# Patient Record
Sex: FEMALE | Race: WHITE | NOT HISPANIC OR LATINO | Employment: UNEMPLOYED | URBAN - METROPOLITAN AREA
[De-identification: names, ages, dates, MRNs, and addresses within clinical notes are randomized per-mention and may not be internally consistent; named-entity substitution may affect disease eponyms.]

---

## 2018-11-13 PROBLEM — R29.818 TRANSIENT NEUROLOGICAL SYMPTOMS: Status: ACTIVE | Noted: 2018-11-13

## 2020-01-24 DIAGNOSIS — N28.9 KIDNEY DISEASE: Primary | ICD-10-CM

## 2020-01-29 ENCOUNTER — TELEPHONE (OUTPATIENT)
Dept: SURGERY | Facility: CLINIC | Age: 60
End: 2020-01-29

## 2020-01-29 NOTE — TELEPHONE ENCOUNTER
----- Message from Judi Contreras sent at 1/29/2020 10:38 AM CST -----  Contact: Patient  Patient called needs to move appt time please give a call back at [phone]

## 2020-03-09 ENCOUNTER — OFFICE VISIT (OUTPATIENT)
Dept: SURGERY | Facility: CLINIC | Age: 60
End: 2020-03-09
Payer: MEDICARE

## 2020-03-09 VITALS — WEIGHT: 240.38 LBS | BODY MASS INDEX: 40.05 KG/M2 | HEIGHT: 65 IN

## 2020-03-09 DIAGNOSIS — N18.6 END STAGE RENAL DISEASE: Primary | ICD-10-CM

## 2020-03-09 PROCEDURE — 99203 PR OFFICE/OUTPT VISIT, NEW, LEVL III, 30-44 MIN: ICD-10-PCS | Mod: S$GLB,,, | Performed by: SURGERY

## 2020-03-09 PROCEDURE — 99203 OFFICE O/P NEW LOW 30 MIN: CPT | Mod: S$GLB,,, | Performed by: SURGERY

## 2020-03-09 RX ORDER — RIVAROXABAN 15 MG/1
15 TABLET, FILM COATED ORAL NIGHTLY
COMMUNITY
Start: 2020-02-14 | End: 2024-02-01

## 2020-03-09 RX ORDER — OLOPATADINE HYDROCHLORIDE 2 MG/ML
SOLUTION/ DROPS OPHTHALMIC
COMMUNITY
Start: 2019-12-01 | End: 2021-04-01 | Stop reason: ALTCHOICE

## 2020-03-09 RX ORDER — OXYBUTYNIN CHLORIDE 5 MG/1
5 TABLET, EXTENDED RELEASE ORAL DAILY
COMMUNITY
Start: 2019-12-04 | End: 2024-02-01

## 2020-03-09 RX ORDER — NIFEDIPINE 60 MG/1
60 TABLET, EXTENDED RELEASE ORAL NIGHTLY
COMMUNITY
Start: 2020-03-02

## 2020-03-09 RX ORDER — TRAZODONE HYDROCHLORIDE 100 MG/1
100 TABLET ORAL NIGHTLY
COMMUNITY
End: 2021-04-01 | Stop reason: ALTCHOICE

## 2020-03-09 RX ORDER — ISOSORBIDE MONONITRATE 30 MG/1
30 TABLET, EXTENDED RELEASE ORAL DAILY
COMMUNITY
Start: 2020-03-03 | End: 2024-02-01

## 2020-03-09 RX ORDER — DICLOFENAC SODIUM 10 MG/G
GEL TOPICAL
COMMUNITY
Start: 2020-01-25 | End: 2021-04-01 | Stop reason: ALTCHOICE

## 2020-03-09 RX ORDER — HYDRALAZINE HYDROCHLORIDE 100 MG/1
TABLET, FILM COATED ORAL
COMMUNITY
Start: 2020-02-29 | End: 2021-04-01 | Stop reason: ALTCHOICE

## 2020-03-09 RX ORDER — FLUOCINONIDE TOPICAL SOLUTION USP, 0.05% 0.5 MG/ML
SOLUTION TOPICAL
COMMUNITY
Start: 2020-02-25 | End: 2021-04-01 | Stop reason: ALTCHOICE

## 2020-03-09 RX ORDER — EZETIMIBE 10 MG/1
10 TABLET ORAL DAILY
COMMUNITY

## 2020-03-09 RX ORDER — ATORVASTATIN CALCIUM 40 MG/1
40 TABLET, FILM COATED ORAL NIGHTLY
COMMUNITY
Start: 2020-03-06

## 2020-03-09 RX ORDER — INSULIN GLARGINE 300 U/ML
INJECTION, SOLUTION SUBCUTANEOUS
COMMUNITY
Start: 2020-02-06 | End: 2021-04-01 | Stop reason: ALTCHOICE

## 2020-03-09 RX ORDER — INSULIN LISPRO 100 [IU]/ML
INJECTION, SOLUTION INTRAVENOUS; SUBCUTANEOUS
COMMUNITY
Start: 2020-03-02 | End: 2021-04-01 | Stop reason: ALTCHOICE

## 2020-03-09 RX ORDER — FLUTICASONE PROPIONATE 50 MCG
SPRAY, SUSPENSION (ML) NASAL
COMMUNITY
Start: 2019-12-27 | End: 2021-04-01 | Stop reason: ALTCHOICE

## 2020-03-09 RX ORDER — LABETALOL 200 MG/1
200 TABLET, FILM COATED ORAL EVERY 12 HOURS
COMMUNITY
Start: 2020-03-01

## 2020-03-09 RX ORDER — CALCIPOTRIENE AND BETAMETHASONE DIPROPIONATE 50; .5 UG/G; MG/G
AEROSOL, FOAM TOPICAL
COMMUNITY
Start: 2020-02-06 | End: 2021-04-01 | Stop reason: ALTCHOICE

## 2020-03-09 RX ORDER — FUROSEMIDE 80 MG/1
80 TABLET ORAL 2 TIMES DAILY
COMMUNITY
Start: 2020-02-29

## 2020-03-09 RX ORDER — SUCRALFATE 1 G/1
1 TABLET ORAL 4 TIMES DAILY PRN
COMMUNITY
End: 2021-04-01 | Stop reason: ALTCHOICE

## 2020-03-09 RX ORDER — CHOLECALCIFEROL (VITAMIN D3) 25 MCG
1000 TABLET ORAL DAILY
COMMUNITY
End: 2021-04-01 | Stop reason: ALTCHOICE

## 2020-03-09 NOTE — LETTER
March 9, 2020      Vijay Blake MD  [address]  [facility] [address]           [place] [facility] General [facility]  [address]  [facility] [address]  Phone: [phone]  Fax: [phone]          Patient: Joann Kenney   MR Number: [id number]   YOB: 1960   Date of Visit: 3/9/2020       Dear Dr. Vijay Blake:    Thank you for referring Joann Kenney to me for evaluation. Attached you will find relevant portions of my assessment and plan of care.    If you have questions, please do not hesitate to call me. I look forward to following Joann Kenney along with you.    Sincerely,    Bryan Yancey MD    Enclosure  CC:  No Recipients    If you would like to receive this communication electronically, please contact [email or web address] or [phone] to request more information on [employer] Link access.    For providers and/or their staff who would like to refer a patient to [facility], please contact us through our one-stop-shop provider referral line, [facility], at [phone].    If you feel you have received this communication in error or would no longer like to receive these types of communications, please e-mail [email or web address]

## 2020-03-09 NOTE — PROGRESS NOTES
History & Physical    SUBJECTIVE:     History of Present Illness:    59-year-old female referred by Dr. Blake for placement of tunneled peritoneal dialysis catheter.  Patient has end-stage renal disease related to single solitary kidney and longstanding diabetes and hypertension.  Patient is not yet on dialysis.  Only abdominal surgery is a hysterectomy    Chief Complaint   Patient presents with    Other     pd cath         Review of patient's allergies indicates:  Review of patient's allergies indicates:  No Known Allergies    Current Outpatient Medications on File Prior to Visit   Medication Sig Dispense Refill    atorvastatin (LIPITOR) 40 MG tablet       diclofenac sodium (VOLTAREN) 1 % Gel       ENSTILAR 0.005-0.064 % Foam APPLY LOCALLY TWICE DAILY      ezetimibe (ZETIA) 10 mg tablet Take 10 mg by mouth once daily.      fluocinonide (LIDEX) 0.05 % external solution APPLY TO AFFECTED AREA 1 2 TIMES DAILY      fluticasone propionate (FLONASE) 50 mcg/actuation nasal spray       furosemide (LASIX) 80 MG tablet       HUMALOG KWIKPEN INSULIN 100 unit/mL pen       hydrALAZINE (APRESOLINE) 100 MG tablet       isosorbide mononitrate (IMDUR) 30 MG 24 hr tablet       labetalol (NORMODYNE) 300 MG tablet       NIFEdipine (ADALAT CC) 60 MG TbSR       olopatadine (PATADAY) 0.2 % Drop       oxybutynin (DITROPAN) 5 MG Tab       sucralfate (CARAFATE) 1 gram tablet Take 1 g by mouth 4 (four) times daily.      TOUJEO SOLOSTAR U-300 INSULIN 300 unit/mL (1.5 mL) InPn pen INJECT 44 UNITS SUBCUTANEOUSLY ONCE DAILY      traZODone (DESYREL) 100 MG tablet Take 100 mg by mouth every evening.      vitamin D (VITAMIN D3) 1000 units Tab Take 1,000 Units by mouth once daily.      XARELTO 15 mg Tab        No current facility-administered medications on file prior to visit.        Past Medical History:   Diagnosis Date    Atrial fibrillation     Depression     Diabetes mellitus, type 2     Hyperlipidemia      Hypertension     Stroke      Past Surgical History:   Procedure Laterality Date    CORONARY ANGIOPLASTY WITH STENT PLACEMENT      internal heart monitor       History reviewed. No pertinent family history.    Social History     Socioeconomic History    Marital status:      Spouse name: Not on file    Number of children: Not on file    Years of education: Not on file    Highest education level: Not on file   Occupational History    Not on file   Social Needs    Financial resource strain: Not on file    Food insecurity:     Worry: Not on file     Inability: Not on file    Transportation needs:     Medical: Not on file     Non-medical: Not on file   Tobacco Use    Smoking status: Former Smoker   Substance and Sexual Activity    Alcohol use: Not on file    Drug use: Not on file    Sexual activity: Not on file   Lifestyle    Physical activity:     Days per week: Not on file     Minutes per session: Not on file    Stress: Not on file   Relationships    Social connections:     Talks on phone: Not on file     Gets together: Not on file     Attends Yazidism service: Not on file     Active member of club or organization: Not on file     Attends meetings of clubs or organizations: Not on file     Relationship status: Not on file   Other Topics Concern    Not on file   Social History Narrative    Not on file          Review of Systems   Constitutional: Negative for chills and fever.   Respiratory: Positive for cough and sputum production.    Cardiovascular: Negative for chest pain and palpitations.   Gastrointestinal: Negative for abdominal pain, heartburn, nausea and vomiting.   Genitourinary: Positive for dysuria and urgency.   Musculoskeletal: Positive for back pain and neck pain.   Endo/Heme/Allergies: Bruises/bleeds easily (Related to Xarelto drug therapy).       OBJECTIVE:     There were no vitals filed for this visit.              Physical Exam:  Physical Exam   Constitutional: She is oriented  to person, place, and time and well-developed, well-nourished, and in no distress.   HENT:   Head: Atraumatic.   Eyes: Pupils are equal, round, and reactive to light. EOM are normal.   Neck: Normal range of motion. Neck supple.   Cardiovascular: Normal rate, regular rhythm and normal heart sounds.   Pulmonary/Chest: Effort normal and breath sounds normal. No respiratory distress.   Abdominal: Soft. Bowel sounds are normal. She exhibits no distension. There is no tenderness.   Neurological: She is alert and oriented to person, place, and time. She has normal reflexes. No cranial nerve deficit.   Skin: Skin is warm and dry.   Psychiatric: Affect and judgment normal.           ASSESSMENT/PLAN:   End-stage renal disease preparing for peritoneal dialysis  PLAN:  Laparoscopic tunneled peritoneal dialysis catheter insertion is scheduled for 03/17/2020.  Procedure, risk and benefits discussed with patient.  Patient instructed to hold Xarelto for 4-5 days preoperatively and aspirin for 3 days preoperatively

## 2020-03-13 LAB
BASOPHILS NFR SNV MANUAL: 1.2 % (ref 0–3)
BUN SERPL-MCNC: 48 MG/DL (ref 7–18)
BUN/CREAT SERPL: 16.84 RATIO (ref 7–18)
CALCIUM SERPL-MCNC: 9 MG/DL (ref 8.8–10.5)
CHLORIDE SERPL-SCNC: 100 MMOL/L (ref 100–108)
CO2 SERPL-SCNC: 26 MMOL/L (ref 21–32)
CREAT SERPL-MCNC: 2.85 MG/DL (ref 0.55–1.02)
EOSINOPHIL NFR SNV MANUAL: 3.2 % (ref 1–3)
ERYTHROCYTE [DISTWIDTH] IN BLOOD BY AUTOMATED COUNT: 12.1 % (ref 12.5–18)
GFR ESTIMATION: 17
GLUCOSE SERPL-MCNC: 245 MG/DL (ref 70–110)
HCT VFR BLD AUTO: 42.1 % (ref 37–47)
HGB BLD-MCNC: 14.2 G/DL (ref 12–16)
LYMPHOCYTES NFR SNV MANUAL: 24.9 % (ref 25–40)
MANUAL NRBC PER 100 CELLS: 0 %
MCH RBC QN AUTO: 28.7 PG (ref 27–31.2)
MCHC RBC AUTO-ENTMCNC: 33.7 G/DL (ref 31.8–35.4)
MCV RBC AUTO: 85.1 FL (ref 80–97)
MONOCYTES/100 LEUKOCYTES: 4.2 % (ref 1–15)
NEUTROPHILS NFR BLD: 5.57 10*3/UL (ref 1.8–7.7)
NEUTROPHILS NFR SNV MANUAL: 66.3 % (ref 37–80)
PLATELETS: 291 10*3/UL (ref 142–424)
POTASSIUM SERPL-SCNC: 4.1 MMOL/L (ref 3.6–5.2)
RBC # BLD AUTO: 4.95 10*6/UL (ref 4.2–5.4)
SODIUM BLD-SCNC: 136 MMOL/L (ref 135–145)
WBC # BLD: 8.4 10*3/UL (ref 4.6–10.2)

## 2020-03-17 ENCOUNTER — OUTSIDE PLACE OF SERVICE (OUTPATIENT)
Dept: ADMINISTRATIVE | Facility: OTHER | Age: 60
End: 2020-03-17
Payer: MEDICARE

## 2020-03-17 LAB
GLUCOSE SERPL-MCNC: 203 MG/DL (ref 70–105)
GLUCOSE SERPL-MCNC: 236 MG/DL (ref 70–105)

## 2020-03-17 PROCEDURE — 49324 LAP INSERT TUNNEL IP CATH: CPT | Mod: ,,, | Performed by: SURGERY

## 2020-03-17 PROCEDURE — 49324 PR LAP INSERTION TUNNELED INTRAPERITONEAL CATHETER: ICD-10-PCS | Mod: ,,, | Performed by: SURGERY

## 2020-03-25 ENCOUNTER — OFFICE VISIT (OUTPATIENT)
Dept: SURGERY | Facility: CLINIC | Age: 60
End: 2020-03-25
Payer: MEDICARE

## 2020-03-25 ENCOUNTER — TELEPHONE (OUTPATIENT)
Dept: SURGERY | Facility: CLINIC | Age: 60
End: 2020-03-25

## 2020-03-25 DIAGNOSIS — Z98.890 POST-OPERATIVE STATE: Primary | ICD-10-CM

## 2020-03-25 PROCEDURE — 99024 PR POST-OP FOLLOW-UP VISIT: ICD-10-PCS | Mod: S$GLB,POP,, | Performed by: SURGERY

## 2020-03-25 PROCEDURE — 99024 POSTOP FOLLOW-UP VISIT: CPT | Mod: S$GLB,POP,, | Performed by: SURGERY

## 2020-03-25 NOTE — PROGRESS NOTES
HPI:  Postop revisit status post laparoscopic tunneled peritoneal dialysis catheter insertion.  Patient complains of abdominal incisional soreness.    PHYSICAL EXAM:  All 3 incisions are healing well.  There are no sutures to remove.  Steri-Strips were reapplied  ASSESSMENT:    Stable postop  PLAN:    Will exteriorized the catheter when we here from dialysis unit.

## 2020-03-25 NOTE — TELEPHONE ENCOUNTER
Left second message         Left message      ----- Message from Honey Espinosa sent at 3/25/2020  3:13 PM CDT -----  Contact: Patient   Patient called in regards to some questions she forgot to ask today at her appointment , please call back at [phone].        Thanks,  Honey Espinosa       spouse

## 2020-05-20 DIAGNOSIS — N18.6 END STAGE RENAL DISEASE: Primary | ICD-10-CM

## 2020-06-02 LAB
ANION GAP SERPL CALC-SCNC: 8 MMOL/L (ref 3–11)
BUN SERPL-MCNC: 57 MG/DL (ref 7–18)
BUN/CREAT SERPL: 17.64 RATIO (ref 7–18)
CALCIUM BLD-MCNC: NORMAL MG/DL
CALCIUM SERPL-MCNC: 9 MG/DL (ref 8.8–10.5)
CHLORIDE SERPL-SCNC: 106 MMOL/L (ref 100–108)
CO2 SERPL-SCNC: 29 MMOL/L (ref 21–32)
COVID-19 AB, IGM: NORMAL
CREAT SERPL-MCNC: 3.23 MG/DL (ref 0.55–1.02)
GFR ESTIMATION: 15
GLUCOSE SERPL-MCNC: 148 MG/DL (ref 70–110)
POTASSIUM SERPL-SCNC: 3.7 MMOL/L (ref 3.6–5.2)
SODIUM BLD-SCNC: 143 MMOL/L (ref 135–145)

## 2020-06-04 ENCOUNTER — OUTSIDE PLACE OF SERVICE (OUTPATIENT)
Dept: ADMINISTRATIVE | Facility: OTHER | Age: 60
End: 2020-06-04
Payer: MEDICARE

## 2020-06-04 LAB
GLUCOSE SERPL-MCNC: 128 MG/DL (ref 70–105)
GLUCOSE SERPL-MCNC: 136 MG/DL (ref 70–105)

## 2020-06-04 PROCEDURE — 49999 UNLISTED PX ABD PERTM&OMN: CPT | Mod: ,,, | Performed by: SURGERY

## 2020-06-04 PROCEDURE — 49999: ICD-10-PCS | Mod: ,,, | Performed by: SURGERY

## 2020-07-31 DIAGNOSIS — Z76.82 ORGAN TRANSPLANT CANDIDATE: Primary | ICD-10-CM

## 2020-08-12 ENCOUNTER — TELEPHONE (OUTPATIENT)
Dept: TRANSPLANT | Facility: CLINIC | Age: 60
End: 2020-08-12

## 2021-02-26 ENCOUNTER — TELEPHONE (OUTPATIENT)
Dept: TRANSPLANT | Facility: CLINIC | Age: 61
End: 2021-02-26

## 2021-03-11 ENCOUNTER — TELEPHONE (OUTPATIENT)
Dept: TRANSPLANT | Facility: CLINIC | Age: 61
End: 2021-03-11

## 2021-03-12 ENCOUNTER — TELEPHONE (OUTPATIENT)
Dept: TRANSPLANT | Facility: CLINIC | Age: 61
End: 2021-03-12

## 2021-03-16 ENCOUNTER — TELEPHONE (OUTPATIENT)
Dept: TRANSPLANT | Facility: CLINIC | Age: 61
End: 2021-03-16

## 2021-03-19 ENCOUNTER — TELEPHONE (OUTPATIENT)
Dept: TRANSPLANT | Facility: CLINIC | Age: 61
End: 2021-03-19

## 2021-04-01 ENCOUNTER — LAB VISIT (OUTPATIENT)
Dept: LAB | Facility: HOSPITAL | Age: 61
End: 2021-04-01
Attending: NURSE PRACTITIONER
Payer: MEDICARE

## 2021-04-01 ENCOUNTER — OFFICE VISIT (OUTPATIENT)
Dept: TRANSPLANT | Facility: CLINIC | Age: 61
End: 2021-04-01
Payer: MEDICARE

## 2021-04-01 ENCOUNTER — TELEPHONE (OUTPATIENT)
Dept: TRANSPLANT | Facility: CLINIC | Age: 61
End: 2021-04-01

## 2021-04-01 VITALS
SYSTOLIC BLOOD PRESSURE: 142 MMHG | OXYGEN SATURATION: 97 % | BODY MASS INDEX: 40.96 KG/M2 | HEART RATE: 63 BPM | TEMPERATURE: 98 F | HEIGHT: 65 IN | DIASTOLIC BLOOD PRESSURE: 47 MMHG | WEIGHT: 245.81 LBS | RESPIRATION RATE: 16 BRPM

## 2021-04-01 DIAGNOSIS — Z01.818 PRE-TRANSPLANT EVALUATION FOR CHRONIC KIDNEY DISEASE: Primary | ICD-10-CM

## 2021-04-01 DIAGNOSIS — N18.6 DIABETES MELLITUS DUE TO UNDERLYING CONDITION WITH CHRONIC KIDNEY DISEASE ON CHRONIC DIALYSIS, WITH LONG-TERM CURRENT USE OF INSULIN: ICD-10-CM

## 2021-04-01 DIAGNOSIS — Z79.4 DIABETES MELLITUS DUE TO UNDERLYING CONDITION WITH CHRONIC KIDNEY DISEASE ON CHRONIC DIALYSIS, WITH LONG-TERM CURRENT USE OF INSULIN: ICD-10-CM

## 2021-04-01 DIAGNOSIS — E08.22 DIABETES MELLITUS DUE TO UNDERLYING CONDITION WITH CHRONIC KIDNEY DISEASE ON CHRONIC DIALYSIS, WITH LONG-TERM CURRENT USE OF INSULIN: ICD-10-CM

## 2021-04-01 DIAGNOSIS — I25.10 CORONARY ARTERY DISEASE INVOLVING NATIVE CORONARY ARTERY OF NATIVE HEART WITHOUT ANGINA PECTORIS: ICD-10-CM

## 2021-04-01 DIAGNOSIS — N18.6 ESRD ON PERITONEAL DIALYSIS: ICD-10-CM

## 2021-04-01 DIAGNOSIS — Z86.73 HISTORY OF STROKE: Chronic | ICD-10-CM

## 2021-04-01 DIAGNOSIS — Z99.2 DIABETES MELLITUS DUE TO UNDERLYING CONDITION WITH CHRONIC KIDNEY DISEASE ON CHRONIC DIALYSIS, WITH LONG-TERM CURRENT USE OF INSULIN: ICD-10-CM

## 2021-04-01 DIAGNOSIS — E66.01 MORBID OBESITY WITH BODY MASS INDEX OF 40.0-44.9 IN ADULT: ICD-10-CM

## 2021-04-01 DIAGNOSIS — D63.1 ANEMIA IN ESRD (END-STAGE RENAL DISEASE): ICD-10-CM

## 2021-04-01 DIAGNOSIS — N18.6 ANEMIA IN ESRD (END-STAGE RENAL DISEASE): ICD-10-CM

## 2021-04-01 DIAGNOSIS — Z76.82 ORGAN TRANSPLANT CANDIDATE: ICD-10-CM

## 2021-04-01 DIAGNOSIS — E78.49 OTHER HYPERLIPIDEMIA: ICD-10-CM

## 2021-04-01 DIAGNOSIS — I15.0 RENOVASCULAR HYPERTENSION: ICD-10-CM

## 2021-04-01 DIAGNOSIS — Z99.2 ESRD ON PERITONEAL DIALYSIS: ICD-10-CM

## 2021-04-01 LAB
A1 AG RBC QL: NORMAL
ABO + RH BLD: NORMAL
ALBUMIN SERPL BCP-MCNC: 3.2 G/DL (ref 3.5–5.2)
ALP SERPL-CCNC: 66 U/L (ref 55–135)
ALT SERPL W/O P-5'-P-CCNC: 10 U/L (ref 10–44)
ANION GAP SERPL CALC-SCNC: 10 MMOL/L (ref 8–16)
APTT BLDCRRT: 27.6 SEC (ref 21–32)
AST SERPL-CCNC: 14 U/L (ref 10–40)
BASOPHILS # BLD AUTO: 0.08 K/UL (ref 0–0.2)
BASOPHILS NFR BLD: 1 % (ref 0–1.9)
BILIRUB DIRECT SERPL-MCNC: 0.2 MG/DL (ref 0.1–0.3)
BILIRUB SERPL-MCNC: 0.4 MG/DL (ref 0.1–1)
BLD GP AB SCN CELLS X3 SERPL QL: NORMAL
BUN SERPL-MCNC: 45 MG/DL (ref 8–23)
CALCIUM SERPL-MCNC: 9.1 MG/DL (ref 8.7–10.5)
CHLORIDE SERPL-SCNC: 107 MMOL/L (ref 95–110)
CHOLEST SERPL-MCNC: 110 MG/DL (ref 120–199)
CHOLEST/HDLC SERPL: 3.7 {RATIO} (ref 2–5)
CMV IGG SERPL QL IA: REACTIVE
CO2 SERPL-SCNC: 27 MMOL/L (ref 23–29)
CREAT SERPL-MCNC: 5.9 MG/DL (ref 0.5–1.4)
DIFFERENTIAL METHOD: ABNORMAL
EOSINOPHIL # BLD AUTO: 0.5 K/UL (ref 0–0.5)
EOSINOPHIL NFR BLD: 5.9 % (ref 0–8)
ERYTHROCYTE [DISTWIDTH] IN BLOOD BY AUTOMATED COUNT: 13.2 % (ref 11.5–14.5)
EST. GFR  (AFRICAN AMERICAN): 8.2 ML/MIN/1.73 M^2
EST. GFR  (NON AFRICAN AMERICAN): 7.1 ML/MIN/1.73 M^2
ESTIMATED AVG GLUCOSE: 151 MG/DL (ref 68–131)
GLUCOSE SERPL-MCNC: 120 MG/DL (ref 70–110)
HBA1C MFR BLD: 6.9 % (ref 4–5.6)
HBV CORE AB SERPL QL IA: NEGATIVE
HBV SURFACE AG SERPL QL IA: NEGATIVE
HCT VFR BLD AUTO: 28.2 % (ref 37–48.5)
HCV AB SERPL QL IA: NEGATIVE
HDLC SERPL-MCNC: 30 MG/DL (ref 40–75)
HDLC SERPL: 27.3 % (ref 20–50)
HGB BLD-MCNC: 9.3 G/DL (ref 12–16)
HIV 1+2 AB+HIV1 P24 AG SERPL QL IA: NEGATIVE
IMM GRANULOCYTES # BLD AUTO: 0.01 K/UL (ref 0–0.04)
IMM GRANULOCYTES NFR BLD AUTO: 0.1 % (ref 0–0.5)
INR PPP: 1 (ref 0.8–1.2)
LDLC SERPL CALC-MCNC: 54.4 MG/DL (ref 63–159)
LYMPHOCYTES # BLD AUTO: 1.8 K/UL (ref 1–4.8)
LYMPHOCYTES NFR BLD: 21.6 % (ref 18–48)
MCH RBC QN AUTO: 30.1 PG (ref 27–31)
MCHC RBC AUTO-ENTMCNC: 33 G/DL (ref 32–36)
MCV RBC AUTO: 91 FL (ref 82–98)
MONOCYTES # BLD AUTO: 0.4 K/UL (ref 0.3–1)
MONOCYTES NFR BLD: 4.9 % (ref 4–15)
NEUTROPHILS # BLD AUTO: 5.6 K/UL (ref 1.8–7.7)
NEUTROPHILS NFR BLD: 66.5 % (ref 38–73)
NONHDLC SERPL-MCNC: 80 MG/DL
NRBC BLD-RTO: 0 /100 WBC
PHOSPHATE SERPL-MCNC: 5.4 MG/DL (ref 2.7–4.5)
PLATELET # BLD AUTO: 252 K/UL (ref 150–450)
PMV BLD AUTO: 10.7 FL (ref 9.2–12.9)
POTASSIUM SERPL-SCNC: 3.9 MMOL/L (ref 3.5–5.1)
PROT SERPL-MCNC: 6.3 G/DL (ref 6–8.4)
PROTHROMBIN TIME: 11.1 SEC (ref 9–12.5)
PTH-INTACT SERPL-MCNC: 619 PG/ML (ref 9–77)
RBC # BLD AUTO: 3.09 M/UL (ref 4–5.4)
SODIUM SERPL-SCNC: 144 MMOL/L (ref 136–145)
TRIGL SERPL-MCNC: 128 MG/DL (ref 30–150)
WBC # BLD AUTO: 8.37 K/UL (ref 3.9–12.7)

## 2021-04-01 PROCEDURE — 86644 CMV ANTIBODY: CPT | Mod: TXP | Performed by: NURSE PRACTITIONER

## 2021-04-01 PROCEDURE — 3008F PR BODY MASS INDEX (BMI) DOCUMENTED: ICD-10-PCS | Mod: CPTII,S$GLB,TXP, | Performed by: NURSE PRACTITIONER

## 2021-04-01 PROCEDURE — 85610 PROTHROMBIN TIME: CPT | Mod: TXP | Performed by: NURSE PRACTITIONER

## 2021-04-01 PROCEDURE — 81373 HLA I TYPING 1 LOCUS LR: CPT | Mod: 91,PO | Performed by: NURSE PRACTITIONER

## 2021-04-01 PROCEDURE — 86825 HLA X-MATH NON-CYTOTOXIC: CPT | Mod: 91,PO,TXP | Performed by: NURSE PRACTITIONER

## 2021-04-01 PROCEDURE — 85730 THROMBOPLASTIN TIME PARTIAL: CPT | Mod: TXP | Performed by: NURSE PRACTITIONER

## 2021-04-01 PROCEDURE — 86706 HEP B SURFACE ANTIBODY: CPT | Mod: TXP | Performed by: NURSE PRACTITIONER

## 2021-04-01 PROCEDURE — 86900 BLOOD TYPING SEROLOGIC ABO: CPT | Mod: TXP | Performed by: NURSE PRACTITIONER

## 2021-04-01 PROCEDURE — 80053 COMPREHEN METABOLIC PANEL: CPT | Mod: TXP | Performed by: NURSE PRACTITIONER

## 2021-04-01 PROCEDURE — 86704 HEP B CORE ANTIBODY TOTAL: CPT | Mod: TXP | Performed by: NURSE PRACTITIONER

## 2021-04-01 PROCEDURE — 99205 PR OFFICE/OUTPT VISIT, NEW, LEVL V, 60-74 MIN: ICD-10-PCS | Mod: S$GLB,TXP,, | Performed by: NURSE PRACTITIONER

## 2021-04-01 PROCEDURE — 3008F BODY MASS INDEX DOCD: CPT | Mod: CPTII,S$GLB,TXP, | Performed by: NURSE PRACTITIONER

## 2021-04-01 PROCEDURE — 83036 HEMOGLOBIN GLYCOSYLATED A1C: CPT | Mod: TXP | Performed by: NURSE PRACTITIONER

## 2021-04-01 PROCEDURE — 86825 HLA X-MATH NON-CYTOTOXIC: CPT | Mod: PO,TXP | Performed by: NURSE PRACTITIONER

## 2021-04-01 PROCEDURE — 99999 PR PBB SHADOW E&M-EST. PATIENT-LVL III: ICD-10-PCS | Mod: PBBFAC,TXP,, | Performed by: NURSE PRACTITIONER

## 2021-04-01 PROCEDURE — 1125F AMNT PAIN NOTED PAIN PRSNT: CPT | Mod: S$GLB,TXP,, | Performed by: NURSE PRACTITIONER

## 2021-04-01 PROCEDURE — 86787 VARICELLA-ZOSTER ANTIBODY: CPT | Mod: TXP | Performed by: NURSE PRACTITIONER

## 2021-04-01 PROCEDURE — 81373 HLA I TYPING 1 LOCUS LR: CPT | Mod: PO | Performed by: NURSE PRACTITIONER

## 2021-04-01 PROCEDURE — 1125F PR PAIN SEVERITY QUANTIFIED, PAIN PRESENT: ICD-10-PCS | Mod: S$GLB,TXP,, | Performed by: NURSE PRACTITIONER

## 2021-04-01 PROCEDURE — 86828 HLA CLASS I&II ANTIBODY QUAL: CPT | Mod: 91,PO,TXP | Performed by: NURSE PRACTITIONER

## 2021-04-01 PROCEDURE — 99205 OFFICE O/P NEW HI 60 MIN: CPT | Mod: S$GLB,TXP,, | Performed by: NURSE PRACTITIONER

## 2021-04-01 PROCEDURE — 99999 PR PBB SHADOW E&M-EST. PATIENT-LVL III: CPT | Mod: PBBFAC,TXP,, | Performed by: NURSE PRACTITIONER

## 2021-04-01 PROCEDURE — 81376 HLA II TYPING 1 LOCUS LR: CPT | Mod: 91,PO | Performed by: NURSE PRACTITIONER

## 2021-04-01 PROCEDURE — 85025 COMPLETE CBC W/AUTO DIFF WBC: CPT | Mod: TXP | Performed by: NURSE PRACTITIONER

## 2021-04-01 PROCEDURE — 86905 BLOOD TYPING RBC ANTIGENS: CPT | Mod: TXP | Performed by: NURSE PRACTITIONER

## 2021-04-01 PROCEDURE — 82248 BILIRUBIN DIRECT: CPT | Mod: TXP | Performed by: NURSE PRACTITIONER

## 2021-04-01 PROCEDURE — 81376 HLA II TYPING 1 LOCUS LR: CPT | Mod: PO,59 | Performed by: NURSE PRACTITIONER

## 2021-04-01 PROCEDURE — 83970 ASSAY OF PARATHORMONE: CPT | Mod: TXP | Performed by: NURSE PRACTITIONER

## 2021-04-01 PROCEDURE — 86480 TB TEST CELL IMMUN MEASURE: CPT | Mod: TXP | Performed by: NURSE PRACTITIONER

## 2021-04-01 PROCEDURE — 86682 HELMINTH ANTIBODY: CPT | Mod: TXP | Performed by: NURSE PRACTITIONER

## 2021-04-01 PROCEDURE — 84100 ASSAY OF PHOSPHORUS: CPT | Mod: TXP | Performed by: NURSE PRACTITIONER

## 2021-04-01 PROCEDURE — 86703 HIV-1/HIV-2 1 RESULT ANTBDY: CPT | Mod: TXP | Performed by: NURSE PRACTITIONER

## 2021-04-01 PROCEDURE — 99244 OFF/OP CNSLTJ NEW/EST MOD 40: CPT | Mod: S$GLB,TXP,, | Performed by: SURGERY

## 2021-04-01 PROCEDURE — 80061 LIPID PANEL: CPT | Mod: TXP | Performed by: NURSE PRACTITIONER

## 2021-04-01 PROCEDURE — 99244 PR OFFICE CONSULTATION,LEVEL IV: ICD-10-PCS | Mod: S$GLB,TXP,, | Performed by: SURGERY

## 2021-04-01 PROCEDURE — 86592 SYPHILIS TEST NON-TREP QUAL: CPT | Mod: TXP | Performed by: NURSE PRACTITIONER

## 2021-04-01 PROCEDURE — 36415 COLL VENOUS BLD VENIPUNCTURE: CPT | Mod: TXP | Performed by: NURSE PRACTITIONER

## 2021-04-01 PROCEDURE — 86829 HLA CLASS I/II ANTIBODY QUAL: CPT | Mod: PO,TXP | Performed by: NURSE PRACTITIONER

## 2021-04-01 PROCEDURE — 86803 HEPATITIS C AB TEST: CPT | Mod: TXP | Performed by: NURSE PRACTITIONER

## 2021-04-01 PROCEDURE — 87340 HEPATITIS B SURFACE AG IA: CPT | Mod: TXP | Performed by: NURSE PRACTITIONER

## 2021-04-01 RX ORDER — INSULIN ASPART 100 [IU]/ML
INJECTION, SOLUTION INTRAVENOUS; SUBCUTANEOUS
COMMUNITY
End: 2024-02-01

## 2021-04-01 RX ORDER — MINOXIDIL 2.5 MG/1
2.5 TABLET ORAL NIGHTLY
COMMUNITY
End: 2024-02-01

## 2021-04-01 RX ORDER — LORATADINE 10 MG/1
10 TABLET ORAL NIGHTLY
COMMUNITY
End: 2024-02-01

## 2021-04-01 RX ORDER — ALPRAZOLAM 0.5 MG/1
0.5 TABLET ORAL NIGHTLY
COMMUNITY

## 2021-04-01 RX ORDER — DOCUSATE SODIUM 100 MG/1
100 CAPSULE, LIQUID FILLED ORAL 2 TIMES DAILY
COMMUNITY
End: 2024-02-01

## 2021-04-01 RX ORDER — ERGOCALCIFEROL (VITAMIN D2) 50 MCG
50000 CAPSULE ORAL WEEKLY
COMMUNITY

## 2021-04-01 RX ORDER — SUCRALFATE 1 G/10ML
1 SUSPENSION ORAL 4 TIMES DAILY PRN
COMMUNITY

## 2021-04-01 RX ORDER — CLONIDINE 0.1 MG/24H
1 PATCH, EXTENDED RELEASE TRANSDERMAL
COMMUNITY
End: 2024-02-01

## 2021-04-01 RX ORDER — DULOXETIN HYDROCHLORIDE 30 MG/1
30 CAPSULE, DELAYED RELEASE ORAL DAILY
COMMUNITY

## 2021-04-01 RX ORDER — LOSARTAN POTASSIUM 100 MG/1
100 TABLET ORAL NIGHTLY
COMMUNITY

## 2021-04-01 RX ORDER — ASPIRIN 81 MG/1
81 TABLET ORAL DAILY
COMMUNITY

## 2021-04-01 RX ORDER — SEVELAMER CARBONATE 800 MG/1
2400 TABLET, FILM COATED ORAL
COMMUNITY
End: 2024-02-01

## 2021-04-02 LAB
RPR SER QL: NORMAL
STRONGYLOIDES ANTIBODY IGG: NEGATIVE

## 2021-04-03 LAB
GAMMA INTERFERON BACKGROUND BLD IA-ACNC: 0.02 IU/ML
M TB IFN-G CD4+ BCKGRND COR BLD-ACNC: 0.01 IU/ML
MITOGEN IGNF BCKGRD COR BLD-ACNC: 7.7 IU/ML
TB GOLD PLUS: NEGATIVE
TB2 - NIL: 0 IU/ML

## 2021-04-04 LAB
HBV SURFACE AB SER QL IA: POSITIVE
HBV SURFACE AB SERPL IA-ACNC: 80 MIU/ML

## 2021-04-05 LAB
VARICELLA INTERPRETATION: POSITIVE
VARICELLA ZOSTER IGG: 1.99 ISR (ref 0–0.9)

## 2021-04-09 ENCOUNTER — COMMITTEE REVIEW (OUTPATIENT)
Dept: TRANSPLANT | Facility: CLINIC | Age: 61
End: 2021-04-09

## 2021-04-14 LAB
B CELL RESULTS - XM AUTO: NEGATIVE
B MCS AVERAGE - XM AUTO: -15.6
FXMAU TESTING DATE: NORMAL
HLA AB QL: NEGATIVE
HLA AB SERPL: NEGATIVE
HLATY INTERPRETATION: NORMAL
SCRFL TESTING DATE: NORMAL
SERUM COLLECTION DT - XM AUTO: NORMAL
SERUM COLLECTION DT: NORMAL
T CELL RESULTS - XM AUTO: NEGATIVE
T MCS AVERAGE - XM AUTO: -9.6

## 2022-03-23 ENCOUNTER — TELEPHONE (OUTPATIENT)
Dept: TRANSPLANT | Facility: CLINIC | Age: 62
End: 2022-03-23
Payer: MEDICARE

## 2022-03-29 ENCOUNTER — TELEPHONE (OUTPATIENT)
Dept: TRANSPLANT | Facility: CLINIC | Age: 62
End: 2022-03-29
Payer: MEDICARE

## 2022-04-01 DIAGNOSIS — Z76.82 ORGAN TRANSPLANT CANDIDATE: Primary | ICD-10-CM

## 2022-04-12 ENCOUNTER — TELEPHONE (OUTPATIENT)
Dept: TRANSPLANT | Facility: CLINIC | Age: 62
End: 2022-04-12
Payer: MEDICARE

## 2022-04-13 ENCOUNTER — TELEPHONE (OUTPATIENT)
Dept: TRANSPLANT | Facility: CLINIC | Age: 62
End: 2022-04-13
Payer: MEDICARE

## 2022-04-18 NOTE — PROGRESS NOTES
Spoke to patient to complete her history for her upcoming RR appointment her son will come with her to her appointment she is aware that she have to bring a small breakfast/snack to eat after blood is drawn she will not need a

## 2022-04-20 ENCOUNTER — HOSPITAL ENCOUNTER (OUTPATIENT)
Dept: RADIOLOGY | Facility: HOSPITAL | Age: 62
Discharge: HOME | End: 2022-04-20
Attending: NURSE PRACTITIONER
Payer: MEDICARE

## 2022-04-20 ENCOUNTER — HOSPITAL ENCOUNTER (OUTPATIENT)
Dept: RADIOLOGY | Facility: HOSPITAL | Age: 62
Discharge: HOME | End: 2022-04-20
Attending: NURSE PRACTITIONER
Payer: MEDICAID

## 2022-04-20 ENCOUNTER — DOCUMENTATION ONLY (OUTPATIENT)
Dept: TRANSPLANT | Facility: CLINIC | Age: 62
End: 2022-04-20
Payer: MEDICARE

## 2022-04-20 ENCOUNTER — OFFICE VISIT (OUTPATIENT)
Dept: TRANSPLANT | Facility: CLINIC | Age: 62
End: 2022-04-20
Payer: MEDICARE

## 2022-04-20 VITALS
DIASTOLIC BLOOD PRESSURE: 79 MMHG | HEART RATE: 65 BPM | HEIGHT: 65 IN | BODY MASS INDEX: 39.92 KG/M2 | TEMPERATURE: 97 F | RESPIRATION RATE: 16 BRPM | SYSTOLIC BLOOD PRESSURE: 187 MMHG | WEIGHT: 239.63 LBS | OXYGEN SATURATION: 95 %

## 2022-04-20 DIAGNOSIS — E08.22 DIABETES MELLITUS DUE TO UNDERLYING CONDITION WITH CHRONIC KIDNEY DISEASE ON CHRONIC DIALYSIS, WITH LONG-TERM CURRENT USE OF INSULIN: ICD-10-CM

## 2022-04-20 DIAGNOSIS — Z99.2 DIABETES MELLITUS DUE TO UNDERLYING CONDITION WITH CHRONIC KIDNEY DISEASE ON CHRONIC DIALYSIS, WITH LONG-TERM CURRENT USE OF INSULIN: ICD-10-CM

## 2022-04-20 DIAGNOSIS — D63.1 ANEMIA IN ESRD (END-STAGE RENAL DISEASE): ICD-10-CM

## 2022-04-20 DIAGNOSIS — I25.10 CORONARY ARTERY DISEASE INVOLVING NATIVE CORONARY ARTERY OF NATIVE HEART WITHOUT ANGINA PECTORIS: ICD-10-CM

## 2022-04-20 DIAGNOSIS — Z76.82 ORGAN TRANSPLANT CANDIDATE: ICD-10-CM

## 2022-04-20 DIAGNOSIS — Z99.2 ESRD ON PERITONEAL DIALYSIS: ICD-10-CM

## 2022-04-20 DIAGNOSIS — N18.6 ESRD ON PERITONEAL DIALYSIS: ICD-10-CM

## 2022-04-20 DIAGNOSIS — N18.6 DIABETES MELLITUS DUE TO UNDERLYING CONDITION WITH CHRONIC KIDNEY DISEASE ON CHRONIC DIALYSIS, WITH LONG-TERM CURRENT USE OF INSULIN: ICD-10-CM

## 2022-04-20 DIAGNOSIS — Z01.818 PRE-TRANSPLANT EVALUATION FOR CHRONIC KIDNEY DISEASE: Primary | ICD-10-CM

## 2022-04-20 DIAGNOSIS — E78.49 OTHER HYPERLIPIDEMIA: ICD-10-CM

## 2022-04-20 DIAGNOSIS — Z79.4 DIABETES MELLITUS DUE TO UNDERLYING CONDITION WITH CHRONIC KIDNEY DISEASE ON CHRONIC DIALYSIS, WITH LONG-TERM CURRENT USE OF INSULIN: ICD-10-CM

## 2022-04-20 DIAGNOSIS — N18.6 ANEMIA IN ESRD (END-STAGE RENAL DISEASE): ICD-10-CM

## 2022-04-20 DIAGNOSIS — I15.0 RENOVASCULAR HYPERTENSION: ICD-10-CM

## 2022-04-20 PROCEDURE — 71046 XR CHEST PA AND LATERAL: ICD-10-PCS | Mod: 26,TXP,, | Performed by: RADIOLOGY

## 2022-04-20 PROCEDURE — 76770 US EXAM ABDO BACK WALL COMP: CPT | Mod: 26,TXP,, | Performed by: RADIOLOGY

## 2022-04-20 PROCEDURE — 71046 X-RAY EXAM CHEST 2 VIEWS: CPT | Mod: 26,TXP,, | Performed by: RADIOLOGY

## 2022-04-20 PROCEDURE — 76770 US EXAM ABDO BACK WALL COMP: CPT | Mod: TC,TXP

## 2022-04-20 PROCEDURE — 93978 VASCULAR STUDY: CPT | Mod: 26,TXP,, | Performed by: RADIOLOGY

## 2022-04-20 PROCEDURE — 99999 PR PBB SHADOW E&M-EST. PATIENT-LVL V: ICD-10-PCS | Mod: PBBFAC,TXP,, | Performed by: NURSE PRACTITIONER

## 2022-04-20 PROCEDURE — 99205 OFFICE O/P NEW HI 60 MIN: CPT | Mod: S$PBB,TXP,, | Performed by: NURSE PRACTITIONER

## 2022-04-20 PROCEDURE — 76770 US RETROPERITONEAL COMPLETE: ICD-10-PCS | Mod: 26,TXP,, | Performed by: RADIOLOGY

## 2022-04-20 PROCEDURE — 99203 PR OFFICE/OUTPT VISIT, NEW, LEVL III, 30-44 MIN: ICD-10-PCS | Mod: S$PBB,TXP,, | Performed by: TRANSPLANT SURGERY

## 2022-04-20 PROCEDURE — 99205 PR OFFICE/OUTPT VISIT, NEW, LEVL V, 60-74 MIN: ICD-10-PCS | Mod: S$PBB,TXP,, | Performed by: NURSE PRACTITIONER

## 2022-04-20 PROCEDURE — 93978 VASCULAR STUDY: CPT | Mod: TC,TXP

## 2022-04-20 PROCEDURE — 72170 X-RAY EXAM OF PELVIS: CPT | Mod: TC,TXP

## 2022-04-20 PROCEDURE — 99203 OFFICE O/P NEW LOW 30 MIN: CPT | Mod: S$PBB,TXP,, | Performed by: TRANSPLANT SURGERY

## 2022-04-20 PROCEDURE — 72170 X-RAY EXAM OF PELVIS: CPT | Mod: 26,TXP,, | Performed by: RADIOLOGY

## 2022-04-20 PROCEDURE — 99999 PR PBB SHADOW E&M-EST. PATIENT-LVL V: CPT | Mod: PBBFAC,TXP,, | Performed by: NURSE PRACTITIONER

## 2022-04-20 PROCEDURE — 72170 XR PELVIS ROUTINE AP: ICD-10-PCS | Mod: 26,TXP,, | Performed by: RADIOLOGY

## 2022-04-20 PROCEDURE — 71046 X-RAY EXAM CHEST 2 VIEWS: CPT | Mod: TC,TXP

## 2022-04-20 PROCEDURE — 93978 US DOPP ILIACS BILATERAL: ICD-10-PCS | Mod: 26,TXP,, | Performed by: RADIOLOGY

## 2022-04-20 RX ORDER — INSULIN GLARGINE 300 U/ML
58 INJECTION, SOLUTION SUBCUTANEOUS DAILY
COMMUNITY
End: 2024-02-01

## 2022-04-20 RX ORDER — CALCITRIOL 0.25 UG/1
0.25 CAPSULE ORAL
COMMUNITY

## 2022-04-20 NOTE — PROGRESS NOTES
Transplant Surgery  Kidney Transplant Recipient Evaluation    Referring Physician: Brice Mike  Current Nephrologist: Brice Mike    Subjective:     Reason for Visit: evaluate transplant candidacy    History of Present Illness: Joann Kenney is a 61 y.o. year old female undergoing transplant evaluation.    Dialysis History: Joann is on hemodialysis.      Transplant History: N/A    Etiology of Renal Disease: Diabetes Mellitus - Type II (based on medical records from referral).    External provider notes reviewed: Yes    Review of Systems  Objective:     Physical Exam:  Constitutional:   Vitals reviewed: yes   Well-nourished and well-groomed: yes  Eyes:   Sclerae icteric: no   Extraocular movements intact: yes  GI:    Bowel sounds normal: yes   Tenderness: no    If yes, quadrant/location: not applicable   Palpable masses: no    If yes, quadrant/location: not applicable   Hepatosplenomegaly: no   Ascites: no   Hernia: no    If yes, type/location: not applicable   Surgical scars: no    If yes, type/location: not applicable  Resp:   Effort normal: yes   Breath sounds normal: yes    CV:   Regular rate and rhythm: yes   Heart sounds normal: yes   Femoral pulses normal: yes   Extremities edematous: no  Skin:   Rashes or lesions present: no    If yes, describe:not applicable   Jaundice:: no    Musculoskeletal:   Gait normal: yes   Strength normal: yes  Psych:   Oriented to person, place, and time: yes   Affect and mood normal: yes    Additional comments: not applicable    Diagnostics:  The following labs have been reviewed: NA  The following radiology images have been independently reviewed and interpreted: NA    Counseling: We provided Joann Kenney with a group education session today.  We discussed kidney transplantation at length with her, including risks, potential complications, and alternatives in the management of her renal failure.  The discussion included complications related to anesthesia, bleeding,  infection, primary nonfunction, and ATN.  I discussed the typical postoperative course, length of hospitalization, the need for long-term immunosuppression, and the need for long-term routine follow-up.  I discussed living-donor and -donor transplantation and the relative advantages and disadvantages of each.  I also discussed average waiting times for both living donation and  donation.  I discussed national and center-specific survival rates.  I also mentioned the potential benefit of multicenter listing to candidates listed with centers within more than one organ procurement organization.  All questions were answered.    Patient advised that it is recommended that all transplant candidates, and their close contacts and household members receive Covid vaccination.    Final determination of transplant candidacy will be made once evaluation is complete and reviewed by the Kidney & Kidney/Pancreas Selection Committee.    Coronavirus disease (COVID-19) caused by severe acute respiratory virus coronavirus 2 (SARS-C0V 2) is associated with increased mortality in solid organ transplant recipients (SOT) compared to non-transplant patients. Vaccine responses to vaccination are depressed against SARS-CoV2 compared to normal individuals but improve with third vaccination doses. Vaccination prior to SOT provides both the best opportunity for transplant candidates to develop protective immunity and to reduce the risk of serious COVID19 infections post transplantation. Organ transplant candidates at [facility] will be required to receive SARS-CoV-2 vaccination prior to being listed with a an active status, whenever possible. Exceptions will be made for disability related reasons or for sincerely held Jain beliefs.          Transplant Surgery - Candidacy   Assessment/Plan:   Joann Kenney has end stage renal disease (ESRD) on dialysis. I see no surgical contraindication  to placing a kidney transplant. Based on available information, Joann Kenney is a marginal kidney transplant candidate due to BMI 39.8     Additional testing to be completed according to the Written Order Guidelines for Adult Pre-kidney and Pancreas Transplant Evaluation (KI-02).  Interpretation of tests and discussion of patient management involves all members of the multidisciplinary transplant team.    Eleno Squires MD

## 2022-04-20 NOTE — LETTER
April 21, 2022        Brice Mike  [address] [place] [facility]   [facility] [address]  Phone: [phone]  Fax: [phone]             [facility] 1st Fl  [address]  [facility] [address]  Phone: [phone]   Patient: Joann Kenney   MR Number: [id number]   YOB: 1960   Date of Visit: 4/20/2022       Dear Dr. Brice Mike    Thank you for referring Joann Kenney to me for evaluation. Attached you will find relevant portions of my assessment and plan of care.    If you have questions, please do not hesitate to call me. I look forward to following Joann Kenney along with you.    Sincerely,    Trinity Adames NP    Enclosure    If you would like to receive this communication electronically, please contact [email or web address] or [phone] to request [employer] Link access.    [employer] Link is a tool which provides read-only access to select patient information with whom you have a relationship. Its easy to use and provides real time access to review your patients record including encounter summaries, notes, results, and demographic information.    If you feel you have received this communication in error or would no longer like to receive these types of communications, please e-mail [email or web address]

## 2022-04-20 NOTE — PROGRESS NOTES
INITIAL PATIENT EDUCATION NOTE    Ms. Joann Kenney was seen in pre-kidney transplant clinic for evaluation for kidney, kidney/pancreas or pancreas only transplant.  The patient attended a an individual video education session that discussed/reviewed the following aspects of transplantation: evaluation and selection committee process, UNOS waitlist management/multiple listings, types of organs offered (KDPI < 85%, KDPI > 85%, PHS risk, DCD, HCV+, HIV+ for HIV+ recipients and enbloc/dual), financial aspects, surgical procedures, dietary instruction pre- and post-transplant, health maintenance pre- and post-transplant, post-transplant hospitalization and outpatient follow-up, potential to participate in a research protocol, and medication management and side effects.  A question and answer session was provided after the presentation.    The patient was seen by all members of the multi-disciplinary team to include: Nephrologist/PA, Surgeon, , Transplant Coordinator, , Pharmacist and Dietician (if applicable).    The patient reviewed and signed all consents for evaluation which were witnessed and sent to scanning into the EPIC chart.    The patient was given an education book and plan for further evaluation based on her individual assessment.      Reviewed program requirement for complete COVID vaccination with documentation prior to listing.  COVID education information reviewed with patient.     The patient was informed that the transplant team would manage immediate post op pain. If the patient requires long term pain management, they will need to have that pain management addressed by their PCP or previous provider who wrote for long term pain medicines.    The patient was encouraged to call with any questions or concerns.

## 2022-04-20 NOTE — PROGRESS NOTES
Transplant Nephrology  Kidney Transplant Recipient Evaluation    Referring Physician: Brice Mike  Current Nephrologist: Brice Mike    Subjective:   CC:  Initial evaluation of kidney transplant candidacy.    HPI:  Ms. Kenney is a 61 y.o. year old White female who has presented to be evaluated as a potential kidney transplant recipient.  She has ESRD secondary to diabetic nephropathy.  Patient is currently on peritoneal dialysis started on 6/5/20. Patient is dialyzing on cyclic peritoneal dialysis.  Patient reports that she is tolerating dialysis well. . She has a PD catheter for dialysis access.     Patient previously not approved due to BMI > than 40.   Body mass index is 39.88 kg/m².    Previous Transplant: no    Past Medical and Surgical History: Ms. Kenney  has a past medical history of Anemia, Anxiety, Atrial fibrillation, Coronary artery disease, Depression, Diabetes mellitus, type 2, Disorder of kidney and ureter, Heart murmur, Hyperlipidemia, Hypertension, Obesity, ALEJA (obstructive sleep apnea), Proteinuria, Solitary kidney, and Stroke.  She has a past surgical history that includes Coronary angioplasty with stent; internal heart monitor; Hysterectomy; Insertion of implantable loop recorder; Peritoneal catheter insertion; and Incontinence surgery.    Past Social and Family History: Ms. Kenney reports that she has quit smoking. She has never used smokeless tobacco. She reports previous alcohol use. She reports that she does not use drugs. Her family history is not on file.    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 (reports "everything" was taken out)    ALEJA--wears Cpap at night    CVA  --no residual effects   11/13/2018  Transient neurological symptoms     < 20 minutes of symptoms of limited speech output and quadriplegia, resolved by the time of my consultation. Findings poorly localize to a vascular territory, suspect a functional component to her exam currently with limited effort on motor testing. No aphasia detected on formal testing. + hypophonia.      DONOR -no   KIDNEY BX -no    HX ALEJA--wears CPAP nightly       FX ASSESSMENT   Does not formally exercise, is able to run errands and do house work if needed        Past Medical History:   Diagnosis Date    Anemia     Anxiety     Atrial fibrillation     Coronary artery disease     Depression     Diabetes mellitus, type 2     Disorder of kidney and ureter     Heart murmur     Hyperlipidemia     Hypertension     Obesity     ALEJA (obstructive sleep apnea)     Proteinuria     Solitary kidney     Stroke        Review of Systems   Constitutional: Positive for fatigue. Negative for activity change, appetite change, chills, fever and unexpected weight change.   HENT: Positive for trouble swallowing. Negative for congestion, facial swelling, postnasal drip, rhinorrhea, sinus pressure and sore throat.    Eyes: Positive for visual disturbance. Negative for pain and redness.   Respiratory: Positive for apnea. Negative for cough, chest tightness, shortness of breath and wheezing.         ALEJA wears CPAP   Cardiovascular: Positive for palpitations. Negative for chest pain and leg swelling.        Afib, loop recorder    Gastrointestinal: Positive for abdominal distention and constipation. Negative for abdominal pain, diarrhea, nausea and vomiting.   Genitourinary: Negative for dysuria, flank pain and urgency.   Musculoskeletal: Negative " "for gait problem, neck pain and neck stiffness.   Skin: Negative for rash.   Allergic/Immunologic: Negative for environmental allergies, food allergies and immunocompromised state.   Neurological: Positive for dizziness and weakness. Negative for light-headedness and headaches.   Hematological: Bruises/bleeds easily.        XARELTO, ASA   Psychiatric/Behavioral: Negative for agitation and confusion. The patient is nervous/anxious.         HX DEPRESSION       Objective:   Blood pressure (!) 187/79, pulse 65, temperature 97.3 °F (36.3 °C), temperature source Temporal, resp. rate 16, height 5' 5" (1.651 m), weight 108.7 kg (239 lb 10.2 oz), SpO2 95 %.body mass index is 39.88 kg/m².    Physical Exam  Vitals reviewed.   Constitutional:       Appearance: Normal appearance. She is well-developed.   HENT:      Head: Normocephalic.   Eyes:      Pupils: Pupils are equal, round, and reactive to light.   Cardiovascular:      Rate and Rhythm: Normal rate and regular rhythm.      Heart sounds: Murmur heard.    Systolic murmur is present with a grade of 1/6.  Pulmonary:      Effort: Pulmonary effort is normal.      Breath sounds: Normal breath sounds.   Abdominal:      General: Bowel sounds are normal. There is distension.      Palpations: Abdomen is soft.       Musculoskeletal:         General: Normal range of motion.      Cervical back: Normal range of motion and neck supple.   Skin:     General: Skin is warm and dry.   Neurological:      Mental Status: She is alert and oriented to person, place, and time.      Motor: No abnormal muscle tone.   Psychiatric:         Behavior: Behavior normal.         Labs:  Lab Results   Component Value Date    WBC 9.53 04/20/2022    HGB 8.7 (L) 04/20/2022    HCT 27.2 (L) 04/20/2022    LABPLAT 291 03/13/2020     04/20/2022    K 3.9 04/20/2022     04/20/2022    CO2 24 04/20/2022    BUN 58 (H) 04/20/2022    CREATININE 6.8 (H) 04/20/2022    EGFRNONAA 5.9 (A) 04/20/2022    CALCIUM 8.9 " 04/20/2022    PHOS 5.4 (H) 04/20/2022    ALBUMIN 3.2 (L) 04/20/2022    AST 13 04/20/2022    ALT 16 04/20/2022    .8 (H) 04/20/2022       No results found for: PREALBUMIN, BILIRUBINUA, GGT, AMYLASE, LIPASE, PROTEINUA, NITRITE, RBCUA, WBCUA    No results found for: HLAABCTYPE    Labs were reviewed with the patient.    Assessment:     1. Pre-transplant evaluation for chronic kidney disease    2. ESRD on peritoneal dialysis    3. 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 Renovascular hypertension    5. Coronary artery disease involving native coronary artery of native heart without angina pectoris    6. Other hyperlipidemia    7. Anemia in ESRD (end-stage renal disease)        Plan:   Prior to Listing, will need the following items to be completed:  1. Standard serologies, cardiac and imaging studies   2. Cardiology clearance  3. Obtain colonoscopy results  4. GYN/MMG    Body mass index is 39.88 kg/m².  Encouraged lifestyle modifications: diet, exercise, weight loss   Needs to maintain acceptable BMI for txp/guidelines       Transplant Candidacy:   Based on available information, Ms. Kenney is a high-risk kidney transplant candidate D/T CAD and CVA  Meets center eligibility for accepting HCV+ donor offer - yes.  Patient educated on HCV+ donors. Joann is willing to accept HCV+ donor offer - yes   Patient is a candidate for KDPI > 85 kidney donor offer - no d/t weight and anticoagulation/ Xarelto .  Final determination of transplant candidacy will be made once workup is complete and reviewed by the selection committee.    Trinity Adames NP        Counseling:  Exercise: reminded patient of the importance of regular exercise for weight management, blood sugar and blood pressure management.  I also explained exercise has been shown to improve cardiovascular health, energy level, and sleep hygiene.  Lastly, I advised  her that cardiovascular complications are leading cause of death for renal transplant recipients, and regular exercise can help lower this risk.    We discussed various aspects of kidney transplantation including transplant surgery, immunosuppressive medications and the need for close follow up. We also discussed side effects of immunosuppression including weight gain, hypertension, hyperlipidemia, new-onset diabetes after transplantation, infections and malignancies, especially skin cancers and lymphomas. I also reviewed the risk of acute rejection, vascular thrombosis, recurrent disease and potential transmission of infections such as hepatitis and HIV. I informed the patient that the average time on the wait list in the [facility] is between 5 to 7 years.       UNOS Patient Status  Functional Status: 60% - Requires occasional assistance but is able to care for needs  Physical Capacity: No Limitations

## 2022-04-20 NOTE — PROGRESS NOTES
PHARM.D. PRE-TRANSPLANT NOTE:    This patient's medication therapy was evaluated as part of her pre-transplant evaluation.      The following general pharmacologic concerns were noted: Xarelto, aspirin 81 mg--hold perio (hx of stroke/AFib); sucralfate PRN should be avoided post-op    The following concerns for post-operative pain management were noted: None    The following pharmacologic concerns related to HCV therapy were noted: Hx of AFib      This patient's medication profile was reviewed for considerations for DAA Hepatitis C therapy:    [x]  No current inducers of CYP 3A4 or PGP  [x]  No amiodarone on this patient's EMR profile in the last 24 months  [YES]  No past or current atrial fibrillation on this patient's EMR profile       Current Outpatient Medications   Medication Sig Dispense Refill    ALPRAZolam (XANAX) 0.5 MG tablet Take 0.5 mg by mouth every evening.      aspirin (ECOTRIN) 81 MG EC tablet Take 81 mg by mouth once daily.      atorvastatin (LIPITOR) 40 MG tablet Take 40 mg by mouth every evening.       calcitRIOL (ROCALTROL) 0.25 MCG Cap Take 0.25 mcg by mouth every Mon, Wed, Fri.      CHOLECALCIFEROL, VITAMIN D3, ORAL Take 50,000 Units by mouth once a week.      cloNIDine 0.1 mg/24 hr td ptwk (CATAPRES) 0.1 mg/24 hr Place 1 patch onto the skin every 7 days.      docusate sodium (COLACE) 100 MG capsule Take 100 mg by mouth 2 (two) times daily.      DULoxetine (CYMBALTA) 30 MG capsule Take 30 mg by mouth once daily.      ergocalciferol, vitamin D2, 50 mcg (2,000 unit) Cap Take 50,000 Units by mouth once a week.      ezetimibe (ZETIA) 10 mg tablet Take 10 mg by mouth once daily.      furosemide (LASIX) 80 MG tablet Take 80 mg by mouth 2 (two) times daily.       insulin aspart U-100 (NOVOLOG) 100 unit/mL injection Inject into the skin. Insulin pump      insulin glargine U-300 conc (TOUJEO MAX U-300 SOLOSTAR) 300 unit/mL (3 mL) insulin pen Inject 58 Units into the skin once daily.       isosorbide mononitrate (IMDUR) 30 MG 24 hr tablet Take 30 mg by mouth once daily.       labetaloL (NORMODYNE) 200 MG tablet Take 200 mg by mouth every 12 (twelve) hours.       linaCLOtide (LINZESS) 145 mcg Cap capsule Take 145 mcg by mouth before breakfast.      loratadine (CLARITIN) 10 mg tablet Take 10 mg by mouth every evening.      losartan (COZAAR) 100 MG tablet Take 100 mg by mouth every evening.      minoxidiL (LONITEN) 2.5 MG tablet Take 2.5 mg by mouth every evening.      NIFEdipine (PROCARDIA-XL) 60 MG (OSM) 24 hr tablet Take 60 mg by mouth every evening.       oxybutynin (DITROPAN-XL) 5 MG TR24 Take 5 mg by mouth once daily.       sevelamer carbonate (RENVELA) 800 mg Tab Take 2,400 mg by mouth 3 (three) times daily with meals.      sucralfate (CARAFATE) 100 mg/mL suspension Take 1 g by mouth 4 (four) times daily as needed.      XARELTO 15 mg Tab Take 15 mg by mouth every evening.        No current facility-administered medications for this visit.           I am available for consultation and can be contacted, as needed by the other members of the Transplant team.

## 2022-04-20 NOTE — LETTER
April 20, 2022    Joann Kenney   [address]  [place] [address]     Dear Dr. Brice Mike, Vijay Blake MD    Patient: Joann Kenney   MR Number: [id number]   YOB: 1960     A battery of tests must be done to determine if you are in suitable health to undergo a kidney transplant.  All  the recommended studies must be completed and received by the transplant team before you can be presented to the transplant selection committee. Once all your evaluation is complete the committee will then decide if you are a suitable transplant candidate.  The following studies need to be obtained at home:    _X__Mammography: ICD-10 code Z12.31.    _X__Gynecologic exam: The need for a pap smear will be determined by gynecologist.    _X__Cardiac stress test: ICD-10 code N19. We request you to have a stress test to determine if you have any evidence of blockages in your heart.  We usually recommend a nuclear stress test or dobutamine stress echo, given most patients cannot walk on a treadmill long enough to achieve their target heart rate.     _X__2-D echo with color flow doppler: ICD-10 code N19. We need to look at the heart valves and heart muscle, and determine pulmonary artery pressures.      _X__Cardiology consult: We are asking that your cardiologist clear you for transplant surgery and maximize your medical management.  We also need to note if there are special  management strategies that need to be used during your transplant event, especially since we routinely use IV fluids to help the new kidney function at its best.  Also, your heart doctor needs to know that the average wait for a kidney transplant can be as long as 3-5 years.  Thus, we not only ask for a preoperative clearance, but also optimal management of your heart  (for example: lipids, high blood pressure, heart failure, etc.).    You and your doctor should feel free to contact us at any time, if there are questions or concerns about these  tests or the transplant evaluation process.    Sincerely,    Thelma Mancilla MD  Medical Director, Kidney & Kidney/Pancreas Transplantation  [facility]  [facility] [place] [address]  [phone] office  [phone] fax

## 2022-04-20 NOTE — PROGRESS NOTES
Transplant Recipient Adult Psychosocial Assessment    Joann Kenney  [address]  [place] [address]   Physical Address:  [address] [address]  Telephone Information:   Mobile [phone]   Home  [phone] (home)  Work  There is no work phone number on file.  E-mail  No e-mail address on record    Sex: female  YOB: 1960  Age: 61 y.o.    Encounter Date: 4/20/2022  [place] Citizen: yes  Primary Language: English   Needed: no    Emergency Contact:  Name: Sarah Lott  Relationship: daughter  Address[address] [place] [address]  Phone Numbers:  [phone] (mobile)    Family/Social Support:   Number of dependents/: Pt reports no dependents.  Marital history: Pt reports never being . Pt reports no current significant other.  Other family dynamics: Pt reports adult daughter: Sarah (very supportive) and Ranjan (supportive). Pt reports mother and sister are living, but has no contact.    Household Composition:  Name: Joann Kenney  Age: 61  Relationship: patient  Does person drive? yes    Do you and your caregivers have access to reliable transportation? yes  PRIMARY CAREGIVER: Sarah Lott (daughter) [phone] and Ranjan Kenney (son) [phone] will be primary caregivers.    Son Ranjan was with patient for organ transplant evaluation today. Pt's daughter Sarah was at her home and transplant caregiver/transportation plan was confirmed by telephone.     provided in-depth information to patient and caregiver regarding pre- and post-transplant caregiver role.   strongly encourages patient and caregiver to have concrete plan regarding post-transplant care giving, including back-up caregiver(s) to ensure care giving needs are met as needed.    Patient and Caregiver states understanding all aspects of caregiver role/commitment.    Patient and Caregiver verbalizes understanding of the education provided  today and caregiver responsibilities.         remains available. Patient and Caregiver agree to contact  in a timely manner if concerns arise.      Able to take time off work without financial concerns: yes.     Additional Significant Others who will Assist with Transplant:  Sarah Lott, 45 yo daughter, Guillermo GILLETTE, does drive/own car, unemployed. [phone]   Ranjan Kenney, 36 yo son, Guillermo GILLETTE, does drive/own car, self employed [employer]. [phone]    Living Will: no  Healthcare Power of : no  Advance Directives on file: <<no information> per medical record.  Verbally reviewed LW/HCPA information.   provided patient with copy of LW/HCPA documents and provided education on completion of forms.    Living Donors: No. Education and resource information given to patient.    Highest Education Level: High School (9-12) or GED  Reading Ability: 12th grade  Reports difficulty with: seeing and hearing. Pt reports some blurry vision and tinnitus  Learns Best By:  a combination of verbal, written, and tactile instructions.     Status: no  VA Benefits: no     Working for Income: No  If no, reason not working: Disability due to back injury from slip and fall at work 2001. Pt reports having ESRD on dialysis.  Patient is disabled.  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    Spouse/Significant Other Employment: Pt reports no current significant other.    Disabled: yes: date disability began: 2001, due to: a back injury.    Monthly Income:   Disability: $700     Able to afford all costs now and if transplanted, including medications: yes Pt reports that her daughter currently assists and daughter will continue to assist post transplant  Patient and Caregiver verbalizes understanding of personal responsibilities related to transplant costs and the importance of having a financial plan to ensure that patients transplant  costs are fully covered.      provided fundraising information/education.  Patient and Caregiver verbalizes understanding.   remains available.    Insurance:   Payor/Plan Subscr  Sex Relation Sub. Ins. ID Effective Group Num   1. HUMANA MANAGE* DAVID CASILLAS 1960 Female Self [id number] 3/1/20 [id number]                                   [address] [address]   2. [place]* DAVID CASILLAS 1960 Female Self [id number]* 10/1/04                                    [address] [address]     Primary Insurance (for UNOS reporting): Public Insurance - Medicare & Choice  Secondary Insurance (for UNOS reporting): Public Insurance - Medicaid  Patient and Caregiver verbalizes clear understanding that patient may experience difficulty obtaining and/or be denied insurance coverage post-surgery. This includes and is not limited to disability insurance, life insurance, health insurance, burial insurance, long term care insurance, and other insurances.    Patient and Caregiver also reports understanding that future health concerns related to or unrelated to transplantation may not be covered by patient's insurance.  Resources and information provided and reviewed.      Patient and Caregiver provides verbal permission to release any necessary information to outside resources for patient care and discharge planning.  Resources and information provided are reviewed.      [facility], [phone].  PD    Dialysis Adherence:  Patient reports is highly compliant with PD treatments and with appointments/instructions within last 3 months. Dialysis compliance update requested.    Infusion Service: patient utilizing? no  Home Health: patient utilizing? no  DME: yes PD Equipment, shower chair, bed rails, wheelchair, rollator, CPAP, and insulin pump  Pulmonary/Cardiac Rehab: Pt denies   ADLS:  Pt reports issues with housekeeping and walking due to getting tired/short of breath and pain. Pt reports issues with  taking medications due to swallowing issues. Pt reports no difficulties with driving, bathing, cooking, eating, and shopping.    Adherence:   Pt reports suitable adherence with medications, dialysis, and health regimen within last 3 months.  Adherence education and counseling provided.     Per History Section:  Past Medical History:   Diagnosis Date    Anemia     Anxiety     Atrial fibrillation     Coronary artery disease     Depression     Diabetes mellitus, type 2     Disorder of kidney and ureter     Heart murmur     Hyperlipidemia     Hypertension     Obesity     ALEJA (obstructive sleep apnea)     Proteinuria     Solitary kidney     Stroke      Social History     Tobacco Use    Smoking status: Former Smoker    Smokeless tobacco: Never Used   Substance Use Topics    Alcohol use: Not Currently     Social History     Substance and Sexual Activity   Drug Use Never     Social History     Substance and Sexual Activity   Sexual Activity Not Currently    Partners: Male       Per Today's Psychosocial:  Tobacco: none, patient denies any use.  Alcohol: none, patient denies any use.  Illicit Drugs/Non-prescribed Medications: none, patient denies any use.    Patient and Caregiver states clear understanding of the potential impact of substance use as it relates to transplant candidacy and is aware of possible random substance screening.  Substance abstinence/cessation counseling, education and resources provided and reviewed.     Arrests/DWI/Treatment/Rehab: patient denies    Psychiatric History:    Please review [facility] 4-1-21 initial psychosocial for other mental health information.    Mental Health: Pt reports some issues with depression. Pt reports is prescribed Cymbalta 30 mg about 1 month ago but is not sure if it is helping with lessening depressive symptoms at this time. Pt reports taking Cymbalta 30 mg as prescribed but reports it is not helping much with depression. Pt was referred back to  nephrologist regarding Cymbalta and possible need for different antidepressant. Transplant SW to contact dialysis SW regarding patient's Cymbalta utilization and possible need for mental health counseling referral.   Psychiatrist/Counselor: Pt denies at this time.  Medications:  Pt reports taking Cymbalta but reports it is not helping with depression much. Pt was referred back to nephrologist regarding Cymbalta and possible need for different antidepressant. Transplant SW to contact dialysis SW regarding patient's reported Cymbalta issue and possible need for mental health counseling referral. EMR shows patient prescribed Xanax .5 mg QD.  Suicide/Homicide Issues: Pt denies any history of or current suicidal or homicidal ideations.    Safety at home: Pt reports currently living in safe home environment with no abuse.    Knowledge: Patient and Caregiver states having clear understanding and realistic expectations regarding the potential risks and potential benefits of organ transplantation and organ donation, agrees to discuss with health care team members and support system members and to utilize available resources and express questions and/or concerns in order to further facilitate the pt informed decision-making.  Resources and information provided and reviewed.     Patient and Caregiver is aware of [facility]'s affiliation and/or partnership with agencies in home health care, LTAC, SNF, [facility], and other hospitals and clinics.    Understanding: Patient and Caregiver reports having a clear understanding of the many lifetime commitments involved with being a transplant recipient, including costs, compliance, medications, lab work, procedures, appointments, concrete and financial planning, preparedness, timely and appropriate communication of concerns, abstinence (ETOH, tobacco, illicit non-prescribed drugs), adherence to all health care team recommendations, support system and caregiver involvement, appropriate and  timely resource utilization and follow-through, mental health counseling as needed/recommended, and patient and caregiver responsibilities.  Social Service Handbook, resources and detailed educational information provided and reviewed.  Educational information provided.    Patient and Caregiver also reports current and expected compliance with health care regime and states having a clear understanding of the importance of compliance.      Patient and Caregiver reports a clear understanding that risks and benefits may be involved with organ transplantation and with organ donation.      Patient and Caregiver also reports clear understanding that psychosocial risk factors may affect patient, and include but are not limited to feelings of depression, generalized anxiety, anxiety regarding dependence on others, post traumatic stress disorder, feelings of guilt and other emotional and/or mental concerns, and/or exacerbation of existing mental health concerns.  Detailed resources provided and discussed.     Patient and Caregiver agrees to access appropriate resources in a timely manner as needed and/or as recommended, and to communicate concerns appropriately.  Patient and Caregiver also reports a clear understanding of treatment options available.      reviewed education, provided additional information, and answered questions.    Feelings or Concerns: Patient and Caregiver reports high motivation to pursue kidney organ transplant at this time.    Coping: Identify Patient & Caregiver Strategies to [place]:   1. Currently & Pre-transplant - play candy crush, wilda, watch tv, play board games, sing, and play cards   2. At the time of surgery - play candy crush, wilda, watch tv, play board games, sing, and play cards   3. During post-Transplant & Recovery Period - play candy crush, wilda, watch tv, play board games, sing, and play cards    Goals: Pt reports getting off dialysis, having more free time, and  being around for her grandchildren as goals for post transplant.  Patient referred to Vocational Rehabilitation.    Interview Behavior: Patient and Caregiver presents as alert and oriented x 4, pleasant, good eye contact, well groomed, recall good, concentration/judgement good, average intelligence, calm, communicative, cooperative and asking and answering questions appropriately. Pt's supportive son Ranjan in session with patient's permission.         Transplant Social Work - Candidacy  Assessment/Plan:     Psychosocial Suitability: Patient presents at this time as low risk kidney organ transplant candidate at this time. Based on psychosocial risk factors, patient presents as low risk due to patient denying psychosocial barriers to kidney organ transplant. Pt reports having organ transplant caregiver/transportation plan, medical insurance plan and plan to afford transplant costs all in place.    Recommendations/Additional Comments:   Dialysis compliance update requested. Pt reports lives away and will need transplant lodging. Pt to meet with dialysis SW regarding Cymbalta 30mg and possible need for different antidepressant medicine. Transplant SW will contact dialysis SW regarding any mental health medication changes.     SW recommends that pt conduct fundraising to assist pt with pay for cost of medications, food, gas, and other transplant related needs. SW recommends that pt remain aware of potential mental health concerns and contact the team if any concerns arise. SW recommends that pt remain abstinent from tobacco, ETOH, and drug use. SW supports pt's continued adherence. SW remains available to answer any questions or concerns that arise as the pt moves through the transplant process.     Final determination of transplant candidacy will be reviewed by the selection committee.     Veronica JOHNSON LCSW

## 2022-05-04 ENCOUNTER — TELEPHONE (OUTPATIENT)
Dept: TRANSPLANT | Facility: CLINIC | Age: 62
End: 2022-05-04
Payer: MEDICARE

## 2022-05-04 NOTE — TELEPHONE ENCOUNTER
----- Message from Reagan Carvalho sent at 5/4/2022 12:08 PM CDT -----  Regarding: call back  Pt call to speak with Faith in regards to text results    Call

## 2022-05-04 NOTE — TELEPHONE ENCOUNTER
Returned pt's call. She wanted to know where the mass was that was found on imaging. This Rn explained the mass is on her kidney so she will have to get the additional testing and see urology. The pt voiced understanding and all questions were answered.

## 2022-05-04 NOTE — TELEPHONE ENCOUNTER
Results discussed with patient. Patient will need  Ct w/wo contrast for vascular calcifications and renal mass protocol. Patient will also need urology clearance in addition to stress, echo, GYN, mammo, heart cath  and colon records.   Patient voiced understanding.

## 2022-05-04 NOTE — LETTER
May 4, 2022    Joann Kenney   [address]  [facility] [address]             Dear Dr. Brice Mike, Vijay Blake MD    Patient: Joann Kenney   MR Number: [id number]   YOB: 1960     A battery of tests must be done to determine if you are in suitable health to undergo a kidney transplant.  All  the recommended studies must be completed and received by the transplant team before you can be presented to the transplant selection committee. Once all your evaluation is complete the committee will then decide if you are a suitable transplant candidate.  The following studies need to be obtained at home:    _X__CT abd/pelvis with/ without contrast: renal mass protocol and assess for vascular calcifications ICD-10 code N28.89, I73.9    _X__Urology consult :need kidney for kidney transplantation. CT per renal mass protocol; peritoneal dialysis started since  6/5/20        You and your doctor should feel free to contact us at any time, if there are questions or concerns about these tests or the transplant evaluation process.    Sincerely,    Thelma Mancilla MD  Medical Director, Kidney & Kidney/Pancreas Transplantation      [facility]  [address] [place] [id number]  [phone] office  [phone] fax

## 2022-05-10 ENCOUNTER — TELEPHONE (OUTPATIENT)
Dept: UROLOGY | Facility: CLINIC | Age: 62
End: 2022-05-10
Payer: MEDICARE

## 2022-05-18 ENCOUNTER — TELEPHONE (OUTPATIENT)
Dept: TRANSPLANT | Facility: CLINIC | Age: 62
End: 2022-05-18
Payer: MEDICARE

## 2022-05-18 NOTE — TELEPHONE ENCOUNTER
Dialysis Adherence Form received along with additional mental health information indicating pt is adherent to dialysis treatment/recommendations and is engaged with a mental health therapist - see below:

## 2022-05-19 LAB
HLA DRB4 1: NORMAL
HLA SSO DNA TYPING CLASS I & II INTERPRETATION: NORMAL
HLA-A 1 SERO. EQUIV: 3
HLA-A 1: NORMAL
HLA-A 2 SERO. EQUIV: 32
HLA-A 2: NORMAL
HLA-B 1 SERO. EQUIV: 7
HLA-B 1: NORMAL
HLA-B 2 SERO. EQUIV: 35
HLA-B 2: NORMAL
HLA-BW 1 SERO. EQUIV: 6
HLA-BW 2 SERO. EQUIV: NORMAL
HLA-C 1: NORMAL
HLA-C 2: NORMAL
HLA-CW 1 SERO. EQUIV: 4
HLA-CW 2 SERO. EQUIV: 7
HLA-DQ 1 SERO. EQUIV: 7
HLA-DQ 2 SERO. EQUIV: 6
HLA-DQB1 1: NORMAL
HLA-DQB1 2: NORMAL
HLA-DRB1 1 SERO. EQUIV: 11
HLA-DRB1 1: NORMAL
HLA-DRB1 2 SERO. EQUIV: 15
HLA-DRB1 2: NORMAL
HLA-DRB3 1: NORMAL
HLA-DRB3 2: NORMAL
HLA-DRB345 1 SERO. EQUIV: 52
HLA-DRB345 2 SERO. EQUIV: 51
HLA-DRB4 2: NORMAL
HLA-DRB5 1: NORMAL
HLA-DRB5 2: NORMAL
SSDQB TESTING DATE: NORMAL
SSDRB TESTING DATE: NORMAL
SSOA TESTING DATE: NORMAL
SSOB TESTING DATE: NORMAL
SSOC TESTING DATE: NORMAL
SSODR TESTING DATE: NORMAL
TYSSO TESTING DATE: NORMAL

## 2022-05-25 DIAGNOSIS — N28.89 RENAL MASS: Primary | ICD-10-CM

## 2022-06-16 ENCOUNTER — TELEPHONE (OUTPATIENT)
Dept: OBSTETRICS AND GYNECOLOGY | Facility: CLINIC | Age: 62
End: 2022-06-16
Payer: MEDICARE

## 2022-06-16 NOTE — TELEPHONE ENCOUNTER
----- Message from Norma Monique sent at 6/16/2022  4:18 PM CDT -----  Contact: PT  .Type:  Sooner Appointment Request    Caller is requesting a sooner appointment.  Caller declined first available appointment listed below.  Caller will not accept being placed on the waitlist and is requesting a message be sent to doctor.  Name of Caller: Joann    When is the first available appointment?    Symptoms: kidney transplant pt- need medical clearance   Would the patient rather a call back or a response via [facility]?  Callback   Best Call Back Number: [phone]  Additional Information:  [facility] doctor suggested [facility]

## 2022-06-17 ENCOUNTER — OFFICE VISIT (OUTPATIENT)
Dept: OBSTETRICS AND GYNECOLOGY | Facility: CLINIC | Age: 62
End: 2022-06-17
Payer: MEDICARE

## 2022-06-17 VITALS
BODY MASS INDEX: 39.82 KG/M2 | SYSTOLIC BLOOD PRESSURE: 158 MMHG | DIASTOLIC BLOOD PRESSURE: 79 MMHG | HEIGHT: 65 IN | WEIGHT: 239 LBS

## 2022-06-17 DIAGNOSIS — Z00.00 ENCOUNTER FOR WELLNESS EXAMINATION: Primary | ICD-10-CM

## 2022-06-17 PROCEDURE — 3077F PR MOST RECENT SYSTOLIC BLOOD PRESSURE >= 140 MM HG: ICD-10-PCS | Mod: CPTII,NTX,S$GLB, | Performed by: OBSTETRICS & GYNECOLOGY

## 2022-06-17 PROCEDURE — 3008F PR BODY MASS INDEX (BMI) DOCUMENTED: ICD-10-PCS | Mod: CPTII,NTX,S$GLB, | Performed by: OBSTETRICS & GYNECOLOGY

## 2022-06-17 PROCEDURE — 3077F SYST BP >= 140 MM HG: CPT | Mod: CPTII,NTX,S$GLB, | Performed by: OBSTETRICS & GYNECOLOGY

## 2022-06-17 PROCEDURE — 1159F MED LIST DOCD IN RCRD: CPT | Mod: CPTII,NTX,S$GLB, | Performed by: OBSTETRICS & GYNECOLOGY

## 2022-06-17 PROCEDURE — 4010F ACE/ARB THERAPY RXD/TAKEN: CPT | Mod: CPTII,NTX,S$GLB, | Performed by: OBSTETRICS & GYNECOLOGY

## 2022-06-17 PROCEDURE — G0101 CA SCREEN;PELVIC/BREAST EXAM: HCPCS | Mod: NTX,S$GLB,, | Performed by: OBSTETRICS & GYNECOLOGY

## 2022-06-17 PROCEDURE — 4010F PR ACE/ARB THEARPY RXD/TAKEN: ICD-10-PCS | Mod: CPTII,NTX,S$GLB, | Performed by: OBSTETRICS & GYNECOLOGY

## 2022-06-17 PROCEDURE — 3044F PR MOST RECENT HEMOGLOBIN A1C LEVEL <7.0%: ICD-10-PCS | Mod: CPTII,NTX,S$GLB, | Performed by: OBSTETRICS & GYNECOLOGY

## 2022-06-17 PROCEDURE — 3008F BODY MASS INDEX DOCD: CPT | Mod: CPTII,NTX,S$GLB, | Performed by: OBSTETRICS & GYNECOLOGY

## 2022-06-17 PROCEDURE — 3078F PR MOST RECENT DIASTOLIC BLOOD PRESSURE < 80 MM HG: ICD-10-PCS | Mod: CPTII,NTX,S$GLB, | Performed by: OBSTETRICS & GYNECOLOGY

## 2022-06-17 PROCEDURE — 1159F PR MEDICATION LIST DOCUMENTED IN MEDICAL RECORD: ICD-10-PCS | Mod: CPTII,NTX,S$GLB, | Performed by: OBSTETRICS & GYNECOLOGY

## 2022-06-17 PROCEDURE — G0101 PR CA SCREEN;PELVIC/BREAST EXAM: ICD-10-PCS | Mod: NTX,S$GLB,, | Performed by: OBSTETRICS & GYNECOLOGY

## 2022-06-17 PROCEDURE — 3044F HG A1C LEVEL LT 7.0%: CPT | Mod: CPTII,NTX,S$GLB, | Performed by: OBSTETRICS & GYNECOLOGY

## 2022-06-17 PROCEDURE — 3078F DIAST BP <80 MM HG: CPT | Mod: CPTII,NTX,S$GLB, | Performed by: OBSTETRICS & GYNECOLOGY

## 2022-06-17 RX ORDER — LACTULOSE 10 G/15ML
SOLUTION ORAL; RECTAL
COMMUNITY
Start: 2022-05-16 | End: 2024-02-01

## 2022-06-17 RX ORDER — B,C/FERROUS FUM/FA/D3/ZINC OX 8MG-800MCG
1 TABLET ORAL DAILY
COMMUNITY
Start: 2022-05-17

## 2022-06-17 RX ORDER — ROPINIROLE 0.25 MG/1
TABLET, FILM COATED ORAL
COMMUNITY
Start: 2022-05-03 | End: 2024-02-01

## 2022-06-17 RX ORDER — CINACALCET 30 MG/1
TABLET, FILM COATED ORAL
COMMUNITY
Start: 2022-06-11

## 2022-06-17 RX ORDER — ADALIMUMAB 40MG/0.4ML
KIT SUBCUTANEOUS
COMMUNITY
Start: 2022-06-13

## 2022-06-17 RX ORDER — NIFEDIPINE 60 MG/1
TABLET, EXTENDED RELEASE ORAL
COMMUNITY
Start: 2022-06-11 | End: 2024-02-01

## 2022-06-17 RX ORDER — TRIAMCINOLONE ACETONIDE 1 MG/G
1 OINTMENT TOPICAL 2 TIMES DAILY
COMMUNITY
Start: 2022-05-11 | End: 2024-02-01

## 2022-06-17 RX ORDER — GABAPENTIN 100 MG/1
CAPSULE ORAL
COMMUNITY
Start: 2022-05-10 | End: 2024-02-01

## 2022-06-17 RX ORDER — CIPROFLOXACIN 500 MG/1
500 TABLET ORAL 2 TIMES DAILY
COMMUNITY
Start: 2022-06-08 | End: 2024-02-01

## 2022-06-17 NOTE — PROGRESS NOTES
Subjective:       Patient ID: Joann Kenney is a 61 y.o. female.    Chief Complaint:  Well Woman      History of Present Illness  HPI  Annual Exam-Postmenopausal  Patient presents for annual exam. The patient has no complaints today. The patient is not sexually active. GYN screening history: last pap: was normal. The patient is not taking hormone replacement therapy. Patient denies post-menopausal vaginal bleeding.  GYN & OB History  No LMP recorded. Patient has had a hysterectomy.   Date of Last Pap: No result found    OB History   No obstetric history on file.       Review of Systems  Review of Systems   Constitutional: Negative.  Negative for activity change, appetite change, chills, fatigue, fever and unexpected weight change.   HENT: Negative for nasal congestion.    Eyes: Negative for visual disturbance.   Respiratory: Negative for cough, shortness of breath and wheezing.    Cardiovascular: Negative for chest pain, palpitations and leg swelling.   Gastrointestinal: Positive for abdominal pain. Negative for bloating, blood in stool, constipation, diarrhea and reflux.   Endocrine: Negative.  Negative for diabetes, hair loss, hot flashes, hyperthyroidism and hypothyroidism.   Genitourinary: Negative.  Negative for bladder incontinence, decreased libido, dysmenorrhea, dyspareunia, dysuria, flank pain, frequency, genital sores, hematuria, hot flashes, menorrhagia, menstrual problem, pelvic pain, urgency, vaginal bleeding, vaginal discharge, vaginal pain, urinary incontinence, postcoital bleeding, postmenopausal bleeding, vaginal dryness and vaginal odor.   Musculoskeletal: Negative for back pain, joint swelling and myalgias.   Integumentary:  Negative for rash, acne, hair changes, mole/lesion, breast mass, nipple discharge, breast skin changes and breast tenderness. Negative.   Neurological: Negative.  Negative for vertigo, seizures, syncope, numbness and headaches.   Hematological: Negative.  Negative for  adenopathy. Does not bruise/bleed easily.   Psychiatric/Behavioral: Negative.  Negative for depression and sleep disturbance. The patient is not nervous/anxious.    Breast: negative.  Negative for asymmetry, breast self exam, lump, mass, mastodynia, nipple discharge, skin changes and tenderness          Objective:    Physical Exam:   Constitutional: She appears well-developed and well-nourished.    HENT:   Nose: No epistaxis.     Neck: No thyroid mass and no thyromegaly present.     Pulmonary/Chest: Effort normal and breath sounds normal. Chest wall is not dull to percussion. She exhibits no mass, no tenderness, no bony tenderness, no laceration, no crepitus, no edema, no deformity, no swelling and no retraction. Right breast exhibits no inverted nipple, no mass, no nipple discharge, no skin change, no tenderness, presence, no bleeding and no swelling. Left breast exhibits no inverted nipple, no mass, no nipple discharge, no skin change, no tenderness, presence, no bleeding and no swelling. Breasts are symmetrical.        Abdominal: Soft. Bowel sounds are normal. She exhibits no distension. There is no abdominal tenderness. Hernia confirmed negative in the right inguinal area and confirmed negative in the left inguinal area.     Genitourinary:    Vagina and rectum normal.      Pelvic exam was performed with patient supine.   Labial bartholins normal.There is no rash, tenderness, lesion or injury on the right labia. There is no rash, tenderness, lesion or injury on the left labia. Right adnexum displays no mass, no tenderness and no fullness. Left adnexum displays no mass, no tenderness and no fullness. Vaginal cuff normal.  No erythema,  no vaginal discharge, tenderness, bleeding, rectocele, cystocele or unspecified prolapse of vaginal walls in the vagina.    No foreign body in the vagina.      No signs of injury in the vagina.   Cervix is absent.Uterus is absent.                Skin: Skin is warm and dry.     Psychiatric: She has a normal mood and affect. Her speech is normal and behavior is normal.          Assessment:        1. Encounter for wellness examination       Encounter for wellness examination               Plan:      Follow up in about 1 year (around 6/17/2023).   Patient was here for gyn exam wellness gyn she does not need a Pap smear as she does not have a uterus or cervix at this time.  She was here for clearance for renal transplant from the gyn standpoint

## 2022-06-23 ENCOUNTER — TELEPHONE (OUTPATIENT)
Dept: TRANSPLANT | Facility: CLINIC | Age: 62
End: 2022-06-23
Payer: MEDICARE

## 2022-06-23 NOTE — TELEPHONE ENCOUNTER
----- Message from Arleen Engel sent at 6/23/2022 10:05 AM CDT -----  Regarding: letter out  Patient calling to speak to Janet roblero letter out. Requesting a call back.      Call:  [phone] (Mobile    
Returned pts call and informed her that I have all records except cards clearance. Pt verbalized understanding.   
no

## 2022-07-14 ENCOUNTER — TELEPHONE (OUTPATIENT)
Dept: TRANSPLANT | Facility: CLINIC | Age: 62
End: 2022-07-14
Payer: MEDICARE

## 2022-07-14 ENCOUNTER — OFFICE VISIT (OUTPATIENT)
Dept: UROLOGY | Facility: CLINIC | Age: 62
End: 2022-07-14
Payer: MEDICARE

## 2022-07-14 VITALS — SYSTOLIC BLOOD PRESSURE: 127 MMHG | DIASTOLIC BLOOD PRESSURE: 58 MMHG | HEART RATE: 71 BPM | TEMPERATURE: 99 F

## 2022-07-14 DIAGNOSIS — N28.89 RENAL MASS: ICD-10-CM

## 2022-07-14 PROCEDURE — 4010F PR ACE/ARB THEARPY RXD/TAKEN: ICD-10-PCS | Mod: CPTII,NTX,S$GLB, | Performed by: UROLOGY

## 2022-07-14 PROCEDURE — 3044F HG A1C LEVEL LT 7.0%: CPT | Mod: CPTII,NTX,S$GLB, | Performed by: UROLOGY

## 2022-07-14 PROCEDURE — 1159F MED LIST DOCD IN RCRD: CPT | Mod: CPTII,NTX,S$GLB, | Performed by: UROLOGY

## 2022-07-14 PROCEDURE — 3078F DIAST BP <80 MM HG: CPT | Mod: CPTII,NTX,S$GLB, | Performed by: UROLOGY

## 2022-07-14 PROCEDURE — 3074F SYST BP LT 130 MM HG: CPT | Mod: CPTII,NTX,S$GLB, | Performed by: UROLOGY

## 2022-07-14 PROCEDURE — 99204 OFFICE O/P NEW MOD 45 MIN: CPT | Mod: NTX,S$GLB,, | Performed by: UROLOGY

## 2022-07-14 PROCEDURE — 3074F PR MOST RECENT SYSTOLIC BLOOD PRESSURE < 130 MM HG: ICD-10-PCS | Mod: CPTII,NTX,S$GLB, | Performed by: UROLOGY

## 2022-07-14 PROCEDURE — 4010F ACE/ARB THERAPY RXD/TAKEN: CPT | Mod: CPTII,NTX,S$GLB, | Performed by: UROLOGY

## 2022-07-14 PROCEDURE — 99204 PR OFFICE/OUTPT VISIT, NEW, LEVL IV, 45-59 MIN: ICD-10-PCS | Mod: NTX,S$GLB,, | Performed by: UROLOGY

## 2022-07-14 PROCEDURE — 3044F PR MOST RECENT HEMOGLOBIN A1C LEVEL <7.0%: ICD-10-PCS | Mod: CPTII,NTX,S$GLB, | Performed by: UROLOGY

## 2022-07-14 PROCEDURE — 3078F PR MOST RECENT DIASTOLIC BLOOD PRESSURE < 80 MM HG: ICD-10-PCS | Mod: CPTII,NTX,S$GLB, | Performed by: UROLOGY

## 2022-07-14 PROCEDURE — 1159F PR MEDICATION LIST DOCUMENTED IN MEDICAL RECORD: ICD-10-PCS | Mod: CPTII,NTX,S$GLB, | Performed by: UROLOGY

## 2022-07-14 RX ORDER — SUCROFERRIC OXYHYDROXIDE 500 MG/1
1 TABLET, CHEWABLE ORAL
COMMUNITY
Start: 2022-07-06 | End: 2024-02-01

## 2022-07-14 NOTE — TELEPHONE ENCOUNTER
Returned pts call regarding txp status. I informed pt that her chart is with Faith for review. Faith will contact pt if anything else is needed. Pt verbalized understanding.

## 2022-07-14 NOTE — TELEPHONE ENCOUNTER
----- Message from Melquiades Ojeda sent at 7/14/2022  2:24 PM CDT -----  Regarding: Clearance  Patient is calling to inquire about if any additional info is needed for kidney eval clearance.    Contact: [phone]

## 2022-07-14 NOTE — PROGRESS NOTES
Subjective:       Patient ID: Joann Kenney is a 61 y.o. female.    Chief Complaint: No chief complaint on file.      HPI:  61-year-old female referred to evaluate a renal mass on her solitary kidney.  Patient is currently on peritoneal dialysis.  An it ultrasound done showed a questionable lesion on the kidney she had a follow-up CT renal mass protocol which did not reveal any abnormalities    Past Medical History:   Past Medical History:   Diagnosis Date    Anemia     Anxiety     Atrial fibrillation     Coronary artery disease     Depression     Diabetes mellitus, type 2     Disorder of kidney and ureter     Heart murmur     Hyperlipidemia     Hypertension     Obesity     ALEJA (obstructive sleep apnea)     Proteinuria     Solitary kidney     Stroke        Past Surgical Historical:   Past Surgical History:   Procedure Laterality Date    CORONARY ANGIOPLASTY WITH STENT PLACEMENT      HYSTERECTOMY      INCONTINENCE SURGERY      INSERTION OF IMPLANTABLE LOOP RECORDER      internal heart monitor      PERITONEAL CATHETER INSERTION          Medications:   Medication List with Changes/Refills   Current Medications    ALPRAZOLAM (XANAX) 0.5 MG TABLET    Take 0.5 mg by mouth every evening.    ASPIRIN (ECOTRIN) 81 MG EC TABLET    Take 81 mg by mouth once daily.    ATORVASTATIN (LIPITOR) 40 MG TABLET    Take 40 mg by mouth every evening.     CALCITRIOL (ROCALTROL) 0.25 MCG CAP    Take 0.25 mcg by mouth every Mon, Wed, Fri.    CHOLECALCIFEROL, VITAMIN D3, ORAL    Take 50,000 Units by mouth once a week.    CINACALCET (SENSIPAR) 30 MG TAB        CIPROFLOXACIN HCL (CIPRO) 500 MG TABLET    Take 500 mg by mouth 2 (two) times daily.    CLONIDINE 0.1 MG/24 HR TD PTWK (CATAPRES) 0.1 MG/24 HR    Place 1 patch onto the skin every 7 days.    DOCUSATE SODIUM (COLACE) 100 MG CAPSULE    Take 100 mg by mouth 2 (two) times daily.    DULOXETINE (CYMBALTA) 30 MG CAPSULE    Take 30 mg by mouth once daily.    ERGOCALCIFEROL,  VITAMIN D2, 50 MCG (2,000 UNIT) CAP    Take 50,000 Units by mouth once a week.    EZETIMIBE (ZETIA) 10 MG TABLET    Take 10 mg by mouth once daily.    FUROSEMIDE (LASIX) 80 MG TABLET    Take 80 mg by mouth 2 (two) times daily.     GABAPENTIN (NEURONTIN) 100 MG CAPSULE    TAKE 1 CAPSULE BY MOUTH 3 TIMES DAILY AS NEEDED    HUMIRA,CF, PEN 40 MG/0.4 ML PNKT        INSULIN ASPART U-100 (NOVOLOG) 100 UNIT/ML INJECTION    Inject into the skin. Insulin pump    INSULIN GLARGINE U-300 CONC (TOUJEO MAX U-300 SOLOSTAR) 300 UNIT/ML (3 ML) INSULIN PEN    Inject 58 Units into the skin once daily.    ISOSORBIDE MONONITRATE (IMDUR) 30 MG 24 HR TABLET    Take 30 mg by mouth once daily.     LABETALOL (NORMODYNE) 200 MG TABLET    Take 200 mg by mouth every 12 (twelve) hours.     LACTULOSE (CHRONULAC) 10 GRAM/15 ML SOLUTION    TAKE 10ML BY MOUTH TWICE DAILY UNTIL NOT CONSTIPATED    LINACLOTIDE (LINZESS) 145 MCG CAP CAPSULE    Take 145 mcg by mouth before breakfast.    LORATADINE (CLARITIN) 10 MG TABLET    Take 10 mg by mouth every evening.    LOSARTAN (COZAAR) 100 MG TABLET    Take 100 mg by mouth every evening.    METHOXY PEG-EPOETIN BETA (MIRCERA INJ)    Inject 150 mcg into the skin.    MINOXIDIL (LONITEN) 2.5 MG TABLET    Take 2.5 mg by mouth every evening.    MV,TP-KH-Q6-OM-3-DHA-EPA-FISH 400-500 MCG-UNIT CAP    1 tablet.    NIFEDIPINE (ADALAT CC) 60 MG TBSR        NIFEDIPINE (PROCARDIA-XL) 60 MG (OSM) 24 HR TABLET    Take 60 mg by mouth every evening.     OXYBUTYNIN (DITROPAN-XL) 5 MG TR24    Take 5 mg by mouth once daily.     PRORENAL -500 MCG-UNIT CAP    Take 1 capsule by mouth once daily.    ROPINIROLE (REQUIP) 0.25 MG TABLET    TAKE 1 TABLET BY MOUTH EVERY NIGHT AT BEDTIME FOR 1 WEEK, THEN TAKE 2 THEREAFTER    SEVELAMER CARBONATE (RENVELA) 800 MG TAB    Take 2,400 mg by mouth 3 (three) times daily with meals.    SODIUM CHLORIDE 0.9% SOLP 100 ML WITH IRON SUCROSE 100 MG IRON/5 ML SOLN 100 MG    200 mg.    SUCRALFATE  (CARAFATE) 100 MG/ML SUSPENSION    Take 1 g by mouth 4 (four) times daily as needed.    SUCROFERRIC OXYHYDROXIDE (VELPHORO) 500 MG CHEW    Take 1 tablet by mouth.    TRIAMCINOLONE ACETONIDE 0.1% (KENALOG) 0.1 % OINTMENT    1 application 2 (two) times daily.    XARELTO 15 MG TAB    Take 15 mg by mouth every evening.         Past Social History:   Social History     Socioeconomic History    Marital status: Single   Tobacco Use    Smoking status: Former Smoker    Smokeless tobacco: Never Used   Substance and Sexual Activity    Alcohol use: Not Currently    Drug use: Never    Sexual activity: Not Currently     Partners: Male   Social History Narrative    Caregiver Son Ranjan       Allergies: Review of patient's allergies indicates:  No Known Allergies     Family History: No family history on file.     Review of Systems:  Review of Systems   Constitutional: Negative for activity change and appetite change.   HENT: Negative for congestion and dental problem.    Respiratory: Negative for chest tightness and shortness of breath.    Cardiovascular: Negative for chest pain.   Gastrointestinal: Negative for abdominal distention and abdominal pain.   Genitourinary: Negative for decreased urine volume, difficulty urinating, dyspareunia, dysuria, enuresis, flank pain, frequency, genital sores, hematuria, pelvic pain and urgency.   Musculoskeletal: Negative for back pain and neck pain.   Allergic/Immunologic: Negative for immunocompromised state.   Neurological: Negative for dizziness.   Hematological: Negative for adenopathy.   Psychiatric/Behavioral: Negative for agitation, behavioral problems and confusion.       Physical Exam:  Physical Exam  Constitutional:       Appearance: She is well-developed.   HENT:      Head: Normocephalic and atraumatic.      Right Ear: External ear normal.      Left Ear: External ear normal.   Eyes:      Conjunctiva/sclera: Conjunctivae normal.   Neck:      Vascular: No JVD.   Cardiovascular:       Rate and Rhythm: Normal rate and regular rhythm.   Pulmonary:      Effort: Pulmonary effort is normal. No respiratory distress.      Breath sounds: Normal breath sounds. No wheezing.   Abdominal:      General: There is no distension.      Palpations: Abdomen is soft.      Tenderness: There is no abdominal tenderness. There is no rebound.   Musculoskeletal:         General: Normal range of motion.      Cervical back: Normal range of motion.   Skin:     General: Skin is warm and dry.      Findings: No erythema or rash.   Neurological:      Mental Status: She is alert and oriented to person, place, and time.   Psychiatric:         Behavior: Behavior normal.         Assessment/Plan:       Problem List Items Addressed This Visit    None     Visit Diagnoses     Renal mass                 Renal mass solitary kidney     I have seen the images I do not identify any lesions which would preclude the patient from getting a renal transplant.

## 2022-08-08 ENCOUNTER — TELEPHONE (OUTPATIENT)
Dept: TRANSPLANT | Facility: CLINIC | Age: 62
End: 2022-08-08
Payer: MEDICARE

## 2022-08-11 ENCOUNTER — TELEPHONE (OUTPATIENT)
Dept: TRANSPLANT | Facility: CLINIC | Age: 62
End: 2022-08-11
Payer: MEDICARE

## 2022-08-11 NOTE — TELEPHONE ENCOUNTER
Returned patient's call. Made her aware she will need an UTD colonoscopy. Pt stated she is sched for September sometime. She stated she will have records faxed when completed.

## 2022-08-11 NOTE — TELEPHONE ENCOUNTER
----- Message from Mari Rogers RN sent at 8/8/2022  6:15 PM CDT -----  Regarding: FW: rtn call    ----- Message -----  From: Katie Murry  Sent: 8/8/2022   4:32 PM CDT  To: [facility] Pre-Kidney Transplant Non-Clinical  Subject: rtn call                                         Pt was returning a call to the office.  She did not know who had called her.  Pt would like a call back.    Joann  @  [phone]

## 2022-11-14 ENCOUNTER — TELEPHONE (OUTPATIENT)
Dept: TRANSPLANT | Facility: CLINIC | Age: 62
End: 2022-11-14
Payer: MEDICARE

## 2022-11-14 NOTE — TELEPHONE ENCOUNTER
----- Message from Mari Rogers RN sent at 11/11/2022  4:09 PM CST -----  Regarding: FW: Questions    ----- Message -----  From: Latha Draper  Sent: 11/11/2022   3:53 PM CST  To: [facility] Pre-Kidney Transplant Clinical  Subject: Questions                                        Pt is requesting to speak with Janet Pal. They have received their letter in the mail.      Joann @ [phone]

## 2022-11-15 ENCOUNTER — TELEPHONE (OUTPATIENT)
Dept: TRANSPLANT | Facility: CLINIC | Age: 62
End: 2022-11-15
Payer: MEDICARE

## 2022-11-15 NOTE — TELEPHONE ENCOUNTER
----- Message from Melquiades Ojeda sent at 11/14/2022  3:43 PM CST -----  Regarding: Returning Call  Patient called in stating she is returning a missed call attempt from Janet. Requesting a call back to discuss further.        Contact: [phone]

## 2022-11-15 NOTE — TELEPHONE ENCOUNTER
Spoke w/pt and informed her that her chart is with Faith for review. She should hear back from Faith in about 2-3 weeks. Pt verbalized understanding.

## 2022-11-21 ENCOUNTER — TELEPHONE (OUTPATIENT)
Dept: TRANSPLANT | Facility: CLINIC | Age: 62
End: 2022-11-21
Payer: MEDICARE

## 2022-11-21 NOTE — TELEPHONE ENCOUNTER
Chart reviewed in preparation for selection committee.     Initial evaluation date 4/20/2022. Reviewed OBGYN, urology, and cardiology clearances as well as MMG and colonoscopy. All acceptable. Last weight documented 235 lbs (was 239 lb when we saw her).    Recommendations: OK to proceed with presentation

## 2022-12-05 ENCOUNTER — TELEPHONE (OUTPATIENT)
Dept: TRANSPLANT | Facility: CLINIC | Age: 62
End: 2022-12-05
Payer: MEDICARE

## 2022-12-05 NOTE — TELEPHONE ENCOUNTER
Pt called to check on when she's going to committee. I informed her that I wasn't sure but I'll get Faith to give her a call. Pt verbalized understanding. Message routed to Faith

## 2022-12-15 ENCOUNTER — TELEPHONE (OUTPATIENT)
Dept: TRANSPLANT | Facility: CLINIC | Age: 62
End: 2022-12-15
Payer: MEDICARE

## 2022-12-16 ENCOUNTER — COMMITTEE REVIEW (OUTPATIENT)
Dept: TRANSPLANT | Facility: CLINIC | Age: 62
End: 2022-12-16
Payer: MEDICARE

## 2022-12-16 ENCOUNTER — TELEPHONE (OUTPATIENT)
Dept: TRANSPLANT | Facility: CLINIC | Age: 62
End: 2022-12-16
Payer: MEDICARE

## 2022-12-16 NOTE — LETTER
December 16, 2022    Dr. Brice Mike  [address] [address]     Dear Dr. Brice Mike:    Patient: Joann Kenney   MR Number: [id number]   YOB: 1960     Your patient, Joann Kenney, was recently discussed at the [facility] Selection Committee meeting on 12/16/2022. I am happy to inform you that Joann has been approved for transplantation.  She has met selection criteria for a kidney transplant related to ESRD secondary to primary diagnosis of Diabetes Mellitus - Type II. Your patient will be placed on the cadaveric wait list pending final financial approval from insurance company.     We appreciate your confidence in allowing us to participate in your patients care.  If you have any questions or concerns, please do not hesitate to contact me.    Sincerely,      Thelma Mancilla MD  Medical Director, Kidney & Kidney/Pancreas Transplantation  Jr: Enclosure  [facility] [place] [facility]         Joann Kenney (Patient)

## 2022-12-16 NOTE — COMMITTEE REVIEW
Native Organ Dx: Diabetes Mellitus - Type II      SELECTION COMMITTEE NOTE    Joann Kenney was presented at selection committee on 12/16/2022.  Patient met selection criteria for kidney transplant related to ESRD due to  Diabetes Mellitus - Type II.  No absolute contraindications to transplant at this time.  Patient will be placed on the cadaveric wait list pending final financial approval from insurance company.  Patient will return to clinic for routine appointment in 6 month(s). Patient does not meet criteria for High KDPI kidney offer due to weight. Patient does meet HCV+ donor offer. Patient does not meet criteria for dual/enbloc, due to weight.      Note written by ARSENIO Cleaning,RN    ===============================================  I was present at the meeting and attest to the decision of the committee.     Johnna Whelan, DO  Transplant Nephrology

## 2023-03-09 ENCOUNTER — TELEPHONE (OUTPATIENT)
Dept: TRANSPLANT | Facility: CLINIC | Age: 63
End: 2023-03-09
Payer: MEDICARE

## 2023-03-09 DIAGNOSIS — Z76.82 ORGAN TRANSPLANT CANDIDATE: Primary | ICD-10-CM

## 2023-03-09 NOTE — TELEPHONE ENCOUNTER
Contacted Sarah with pt's d/u. Made her aware the pt was listed today and she will need HLA drawn monthly. Sarah asked that the kits be mailed to the pt so she can bring the tubes on her visit. Notified the pt of the aforementioned. The pt voiced understanding and all questions were answered.

## 2023-03-09 NOTE — TELEPHONE ENCOUNTER
"  KIDNEY WAIT LISTING NOTE    Date of Financial clearance to list: 3/2/2023    [facility]:     Organ: Kidney    Last Name: Anne Marie  First Name: Joann    : 1960       Gender: female        MRN#: [id number]                                   State of Permanent Residence: [facility] [address]  [facility] [address]    Ethnicity: Not  or /a   Race:      White    CLINICAL INFORMATION   Candidate Medical Urgency Status: Active (1)  Number of Previous Kidney Transplants: 0  Number of Previous Solid Organ Transplants: 0  Did you enter number of previous kidney or other solid organ transplants? yes  Is this Candidate a Prior Living Donor: no  (If yes, please generate letter to [employer] with patient's date of donation, recipient SSN, signed by Surgical Director after patient is listed in order to receive priority points).      ABO  ABO Blood Group:   A POS     ABO Confirmation: (THESE DATES MUST BE PRIOR TO THE LIST DATE AND SUPPORTED BY SEPARATE LAB REPORTS)    Internal Results    Lab Results   Component Value Date    GROUPTRH A POS 2022    GROUPTRH A POS 2021     No results found for: ABO    External Results    ABO Date 1:    ABO Date 2  Are either of these ABO results based on External Labs? no  (If Yes, STOP and go to source document in Media Tab for verification).    VITALS  Height:  5'5"  Weight:  108.7kg  (Use height from Transplant clinic visits only).  Did you enter height/weight? Yes    HLA    Class I:  Lab Results   Component Value Date    VTIZ7HV 3 2021    XTAS3MU 32 2021    CDUU9VY 7 2021    VVSV3DZ 35 2021    LXIGO8EP 6 2021    LNCOF4WG XX 2021    QGTVD6XR 4 2021    QGSGY1RO 7 2021       Class II:  Lab Results   Component Value Date    CHOTCI55ZS 11 2021    OZWNKI46NC 15 2021    VVNPJH763LU 52 2021    TWAUUW6522 51 2021    DOJWL9BR 7 2021    AKGND7XS 6 2021       Tested for HLA Antibodies: Yes, no " "antibodies detected     If result is "Positive" antibodies are detected     If result is "Negative or questionable" no antibodies detected    Lab Results   Component Value Date    CIPRAS Negative 04/20/2022    CIIPRAS Negative 04/20/2022       DIALYSIS INFORMATION  Is patient Pre-Dialysis: No     GFR Information  Report GFR being used as the criteria for placement on the kidney list. If not, leave blank  GFR < or = 20 ml/min? n/a  If Yes, Specify value  ___   ml/min     Initial date GFR became 20 or less:   Is GFR obtained from an Outside lab Result? n/a  (If YES verify with source document scanned into media)    If patient on Dialysis:    Is candidate currently on dialysis for ESRD? Yes  If Yes,  Date Chronic Dialysis Started:   6/5/2020  (verify with source document in Media Tab)   Dialysis Unit Name: [facility]  [address] [place] [address]                        Physician Name:  Dr. Thelma Banks  NPI#: [id number]    DIABETES INFORMATION  Primary Native Kidney Diagnosis: Diabetes Mellitus - Type II  C-Peptide Value - No results found for: CPEPTIDE  Current Diabetes Status: Type II    FOR NON-KIDNEY DEPARTMENT USE ONLY:  Additional Organs Registered? none    Maximum Acceptable Number of HLA Mismatches  ABDR:     6      (0-6)               AB:               (0-4)  ADR:   _____  (0-4)              BDR: _____ (0-4)  A:        _____  (0-2)              B:      _____ (0-2)          DR: ______ (0-2)    Will Recipient Accept?   Accept HBcAB Positive Organ:            Yes  Accept HBV RAH Positive Organ:        no  Accept HCV Antibody Positive Organ: yes   Accept HCV RAH Positive Organ: yes    Dual Kidney and En Bloc Opt In : No  Dual  Local:   No  Dual Import:   No  En Bloc Local:   No  En Bloc Import: No     Accept KDPI > 85: Single: No     Local: No     Import: No  Accept KDPI > 85: Dual: No     Local: No     Import: No    ### NURSE TO VERIFY CONSENT AND MAKE ANY NECESSARY CHANGES " NEEDED IN UNET AT THE TIME OF VERIFICATION ###    Unacceptible Antigens  If yes, list     No results found for: MI1QGON, CIABCLM, CIIAB    ### DO NOT LIST IF ANTIGEN VALUE WEAK ###    Hep B Vaccine series completed: yes    Blood Type x2, serologies and 2728 was verified by myself and RAULITO Talbert RN  Blood type determination and reporting was completed according to the programs protocols and OPTN requirements.

## 2023-03-09 NOTE — LETTER
2023    Joann Kenney  [address]  [place] [address]    Dear Joann Kenney:  MRN: [id number]    Congratulations! On 3/9/2023, you were placed on  the waiting list for a  donor transplant.    Your candidacy for kidney transplant is based on the following criteria:  ESRD due to  Diabetes Mellitus - Type II.    Your transplant coordinator while on the waiting list is Mars Myrick RN. They can be reached at [phone] or [phone] with any questions.      What to do now?    Ask your living donors to call and begin testing  Give your donors our phone number, [phone]  Make sure your donors have your name and date of birth when they call  You will get transplanted much faster if you have a living donor    Have your blood sent to our Transplant Lab every month  If you are on dialysis - our Transplant Lab will work with your dialysis unit to send your blood every month  If you are not on dialysis   If you live near an [facility] lab, we will schedule you to have blood drawn every month  If you do not live near an [facility] lab, you will be sent blood kits in the mail. You will need to take a kit to your local lab or doctor to have your blood drawn every month and mail to the Transplant Lab.     Call us with ANY CHANGES  Phone numbers - we must be able to reach you anytime of the day or night when a kidney is available  Address  Insurance coverage  Dialysis unit or kidney doctor  Ethan: if you have surgery, stay in the hospital, have to get blood, or have an infection    Review your Kidney/Kidney-Pancreas Transplant Guide   This will give you detailed information about what happens when  you are on the waiting list   you are called when a kidney is available    The [facility] has a transplant surgeon and physician available 365 days a year, 24 hours a day to coordinate organ acceptance, procurement, surgical placement and to address urgent patient care issues.  You  will be notified in writing of any changes to our Transplant Centers staffing plan that would impact your ability to receive a transplant.    Attached is a letter from the United Network for Organ Sharing (UNOS). It describes the services and information offered to patients by UNOS and the Organ Procurement and Transplant Network. We look forward to working with you while on the waiting list.     Congratulations,    Your Transplant Partner  [facility]   [address] [place] [address]  [phone]  Jr:   Jeovanny.  Cc: Brice Mike MD        [facility]                                                                                                                     The Organ Procurement and Transplantation Network   Toll-free patient services line: Your resource for organ transplant information     If you have a question regarding your own medical care, you always should call your transplant hospital first. However, for general organ transplant-related information, you can call the Organ Procurement and Transplantation Network (OPTN) toll-free patient services line at [phone].     Anyone, including potential transplant candidates, candidates, recipients, family members, friends, living donors, and donor family members, can call this number to:     Talk about organ donation, living donation, the transplant process, the donation process, and transplant policies.   Get a free patient information kit with helpful booklets, waiting list and transplant information, and a list of all transplant hospitals.   Ask questions about the OPTN website (https://optn.transplant.hrsa.gov/), the United Network for Organ Sharings (UNOS) website (https://unos.org/), or the UNOS website for living donors and transplant recipients. (https://www.transplantliving.org/).   Learn how the OPTN can help you.   Talk about any concerns that you may have with a transplant  hospital.     The nations transplant system, the OPTN, is managed under federal contract by the United Network for Organ Sharing (UNOS), which is a non-profit charitable organization. The OPTN helps create and define organ sharing policies that make the best use of donated organs. This process continuously evaluating new advances and discoveries so policies can be adapted to best serve patients waiting for transplants. To do so, the OPTN works closely with transplant professionals, transplant patients, transplant candidates, donor families, living donors, and the public. All transplant programs and organ procurement organizations throughout the country are OPTN members and are obligated to follow the policies the OPTN creates for allocating organs.     The OPTN also is responsible for:   Providing educational material for patients, the public, and professionals.   Raising awareness of the need for donated organs and tissue.   Coordinating organ procurement, matching, and placement.   Collecting information about every organ transplant and donation that occurs in the [place].     Remember, you should contact your transplant hospital directly if you have questions or concerns about your own medical care including medical records, work-up progress, and test results.     We are not your transplant hospital, and our staff will not be able to answer questions about your case, so please keep your transplant hospitals phone number handy.   However, while you research your transplant needs and learn as much as you can about transplantation and donation, we welcome your call to our toll-free patient services line at [phone].

## 2023-03-15 DIAGNOSIS — Z76.82 ORGAN TRANSPLANT CANDIDATE: Primary | ICD-10-CM

## 2023-03-15 NOTE — PROGRESS NOTES
YEARLY LIST MANAGEMENT NOTE    Joann Kenney's kidney transplant listing status reviewed.  Patient is due for follow-up appointments in May 2023.   Appointments will be scheduled per protocol.  HLA sample is past due at this time as patient was recently listed.

## 2023-04-27 ENCOUNTER — TELEPHONE (OUTPATIENT)
Dept: TRANSPLANT | Facility: CLINIC | Age: 63
End: 2023-04-27
Payer: MEDICARE

## 2023-04-28 ENCOUNTER — TELEPHONE (OUTPATIENT)
Dept: TRANSPLANT | Facility: CLINIC | Age: 63
End: 2023-04-28
Payer: MEDICARE

## 2023-04-28 NOTE — TELEPHONE ENCOUNTER
MA notes per adherence form    FOR THE PAST THREE MONTHS:    0-AMA's  0-No-shows    No concerns with care giving, transportation, or mental health    Per adherence form pt has hx of depression but pt is coping well and takes her meds as prescribed.     Brought over to clinic to be scanned in.    Geetha Mendez  Abdominal Transplant MA

## 2023-05-11 ENCOUNTER — TELEPHONE (OUTPATIENT)
Dept: TRANSPLANT | Facility: CLINIC | Age: 63
End: 2023-05-11
Payer: MEDICARE

## 2023-05-11 NOTE — TELEPHONE ENCOUNTER
----- Message from Nicolasa Flores sent at 5/11/2023  8:40 AM CDT -----  Regarding: upcoming testing questions   The patient called requesting to speak to Nurse  States she is scheduled for some eval appts on 5/19 mammogram, papsmear etc.  Patient states all of that information had already been sent prior do they need to be repeated  Please contact patient to advise    No further information provided      Patient can be contacted @# [phone] ([place])

## 2023-05-11 NOTE — TELEPHONE ENCOUNTER
Reviewed 5/19 appts with patient, explained that she needs these done yearly. She needs to establish with a new GYN in [place] and will do so. She is due for gyn after 6/17/23.

## 2023-05-19 ENCOUNTER — OFFICE VISIT (OUTPATIENT)
Dept: TRANSPLANT | Facility: CLINIC | Age: 63
End: 2023-05-19
Attending: NURSE PRACTITIONER
Payer: MEDICARE

## 2023-05-19 ENCOUNTER — HOSPITAL ENCOUNTER (OUTPATIENT)
Dept: RADIOLOGY | Facility: HOSPITAL | Age: 63
Discharge: HOME | End: 2023-05-19
Attending: NURSE PRACTITIONER
Payer: MEDICARE

## 2023-05-19 ENCOUNTER — HOSPITAL ENCOUNTER (OUTPATIENT)
Dept: CARDIOLOGY | Facility: HOSPITAL | Age: 63
Discharge: HOME | End: 2023-05-19
Attending: NURSE PRACTITIONER
Payer: MEDICARE

## 2023-05-19 VITALS
TEMPERATURE: 97 F | SYSTOLIC BLOOD PRESSURE: 179 MMHG | HEART RATE: 65 BPM | DIASTOLIC BLOOD PRESSURE: 95 MMHG | OXYGEN SATURATION: 95 % | WEIGHT: 231.69 LBS | HEIGHT: 65 IN | RESPIRATION RATE: 16 BRPM | BODY MASS INDEX: 38.6 KG/M2

## 2023-05-19 VITALS
HEART RATE: 74 BPM | HEIGHT: 65 IN | SYSTOLIC BLOOD PRESSURE: 154 MMHG | BODY MASS INDEX: 39.82 KG/M2 | DIASTOLIC BLOOD PRESSURE: 80 MMHG | WEIGHT: 239 LBS

## 2023-05-19 VITALS — WEIGHT: 222 LBS | BODY MASS INDEX: 36.94 KG/M2

## 2023-05-19 DIAGNOSIS — Z76.82 ORGAN TRANSPLANT CANDIDATE: ICD-10-CM

## 2023-05-19 DIAGNOSIS — I25.10 CORONARY ARTERY DISEASE INVOLVING NATIVE CORONARY ARTERY OF NATIVE HEART WITHOUT ANGINA PECTORIS: ICD-10-CM

## 2023-05-19 DIAGNOSIS — N18.6 ESRD ON PERITONEAL DIALYSIS: ICD-10-CM

## 2023-05-19 DIAGNOSIS — I15.0 RENOVASCULAR HYPERTENSION: ICD-10-CM

## 2023-05-19 DIAGNOSIS — Z12.31 BREAST CANCER SCREENING BY MAMMOGRAM: ICD-10-CM

## 2023-05-19 DIAGNOSIS — Z76.82 PATIENT ON WAITING LIST FOR KIDNEY TRANSPLANT: Primary | ICD-10-CM

## 2023-05-19 DIAGNOSIS — Z99.2 ESRD ON PERITONEAL DIALYSIS: ICD-10-CM

## 2023-05-19 DIAGNOSIS — E11.21 DIABETIC NEPHROPATHY ASSOCIATED WITH TYPE 2 DIABETES MELLITUS: ICD-10-CM

## 2023-05-19 LAB
ASCENDING AORTA: 3.65 CM
AV INDEX (PROSTH): 0.91
AV MEAN GRADIENT: 8 MMHG
AV PEAK GRADIENT: 13 MMHG
AV VALVE AREA: 2.96 CM2
AV VELOCITY RATIO: 0.91
BSA FOR ECHO PROCEDURE: 2.23 M2
CV ECHO LV RWT: 0.4 CM
DOP CALC AO PEAK VEL: 1.83 M/S
DOP CALC AO VTI: 45.96 CM
DOP CALC LVOT AREA: 3.3 CM2
DOP CALC LVOT DIAMETER: 2.04 CM
DOP CALC LVOT PEAK VEL: 1.66 M/S
DOP CALC LVOT STROKE VOLUME: 136.16 CM3
DOP CALCLVOT PEAK VEL VTI: 41.68 CM
E WAVE DECELERATION TIME: 250.08 MSEC
E/A RATIO: 1.42
E/E' RATIO: 17.38 M/S
ECHO LV POSTERIOR WALL: 1 CM (ref 0.6–1.1)
EJECTION FRACTION: 65 %
FRACTIONAL SHORTENING: 38 % (ref 28–44)
INTERVENTRICULAR SEPTUM: 1.2 CM (ref 0.6–1.1)
IVRT: 48.53 MSEC
LA MAJOR: 5.93 CM
LA MINOR: 5.95 CM
LA WIDTH: 4.6 CM
LEFT ATRIUM SIZE: 4.15 CM
LEFT ATRIUM VOLUME INDEX MOD: 37.1 ML/M2
LEFT ATRIUM VOLUME INDEX: 45.3 ML/M2
LEFT ATRIUM VOLUME MOD: 79 CM3
LEFT ATRIUM VOLUME: 96.39 CM3
LEFT INTERNAL DIMENSION IN SYSTOLE: 3.09 CM (ref 2.1–4)
LEFT VENTRICLE DIASTOLIC VOLUME INDEX: 48.23 ML/M2
LEFT VENTRICLE DIASTOLIC VOLUME: 102.72 ML
LEFT VENTRICLE MASS INDEX: 97 G/M2
LEFT VENTRICLE SYSTOLIC VOLUME INDEX: 17.6 ML/M2
LEFT VENTRICLE SYSTOLIC VOLUME: 37.53 ML
LEFT VENTRICULAR INTERNAL DIMENSION IN DIASTOLE: 5 CM (ref 3.5–6)
LEFT VENTRICULAR MASS: 207.14 G
LV LATERAL E/E' RATIO: 19.86 M/S
LV SEPTAL E/E' RATIO: 15.44 M/S
MV A" WAVE DURATION": 11.13 MSEC
MV PEAK A VEL: 0.98 M/S
MV PEAK E VEL: 1.39 M/S
MV STENOSIS PRESSURE HALF TIME: 72.52 MS
MV VALVE AREA P 1/2 METHOD: 3.03 CM2
PISA TR MAX VEL: 2.69 M/S
PULM VEIN S/D RATIO: 0.71
PV PEAK D VEL: 0.85 M/S
PV PEAK S VEL: 0.6 M/S
RA MAJOR: 4.95 CM
RA PRESSURE: 3 MMHG
RA WIDTH: 4.22 CM
RIGHT ATRIAL AREA: 17.5 CM2
RIGHT VENTRICULAR END-DIASTOLIC DIMENSION: 4.04 CM
SINUS: 3.1 CM
STJ: 3 CM
TDI LATERAL: 0.07 M/S
TDI SEPTAL: 0.09 M/S
TDI: 0.08 M/S
TR MAX PG: 29 MMHG
TV REST PULMONARY ARTERY PRESSURE: 32 MMHG

## 2023-05-19 PROCEDURE — 1160F RVW MEDS BY RX/DR IN RCRD: CPT | Mod: CPTII,TXP,, | Performed by: NURSE PRACTITIONER

## 2023-05-19 PROCEDURE — 77067 MAMMO DIGITAL SCREENING BILAT WITH TOMO: ICD-10-PCS | Mod: 26,TXP,, | Performed by: RADIOLOGY

## 2023-05-19 PROCEDURE — 1160F PR REVIEW ALL MEDS BY PRESCRIBER/CLIN PHARMACIST DOCUMENTED: ICD-10-PCS | Mod: CPTII,TXP,, | Performed by: NURSE PRACTITIONER

## 2023-05-19 PROCEDURE — 77063 MAMMO DIGITAL SCREENING BILAT WITH TOMO: ICD-10-PCS | Mod: 26,TXP,, | Performed by: RADIOLOGY

## 2023-05-19 PROCEDURE — 99999 PR PBB SHADOW E&M-EST. PATIENT-LVL V: CPT | Mod: PBBFAC,TXP,, | Performed by: NURSE PRACTITIONER

## 2023-05-19 PROCEDURE — 71046 X-RAY EXAM CHEST 2 VIEWS: CPT | Mod: 26,TXP,, | Performed by: RADIOLOGY

## 2023-05-19 PROCEDURE — 76770 US RETROPERITONEAL COMPLETE: ICD-10-PCS | Mod: 26,TXP,, | Performed by: STUDENT IN AN ORGANIZED HEALTH CARE EDUCATION/TRAINING PROGRAM

## 2023-05-19 PROCEDURE — 3080F DIAST BP >= 90 MM HG: CPT | Mod: CPTII,TXP,, | Performed by: NURSE PRACTITIONER

## 2023-05-19 PROCEDURE — 99215 PR OFFICE/OUTPT VISIT, EST, LEVL V, 40-54 MIN: ICD-10-PCS | Mod: TXP,,, | Performed by: NURSE PRACTITIONER

## 2023-05-19 PROCEDURE — 3077F SYST BP >= 140 MM HG: CPT | Mod: CPTII,TXP,, | Performed by: NURSE PRACTITIONER

## 2023-05-19 PROCEDURE — 77063 BREAST TOMOSYNTHESIS BI: CPT | Mod: 26,TXP,, | Performed by: RADIOLOGY

## 2023-05-19 PROCEDURE — 76770 US EXAM ABDO BACK WALL COMP: CPT | Mod: 26,TXP,, | Performed by: STUDENT IN AN ORGANIZED HEALTH CARE EDUCATION/TRAINING PROGRAM

## 2023-05-19 PROCEDURE — 3080F PR MOST RECENT DIASTOLIC BLOOD PRESSURE >= 90 MM HG: ICD-10-PCS | Mod: CPTII,TXP,, | Performed by: NURSE PRACTITIONER

## 2023-05-19 PROCEDURE — 71046 X-RAY EXAM CHEST 2 VIEWS: CPT | Mod: TC,TXP

## 2023-05-19 PROCEDURE — 4010F ACE/ARB THERAPY RXD/TAKEN: CPT | Mod: CPTII,TXP,, | Performed by: NURSE PRACTITIONER

## 2023-05-19 PROCEDURE — 3008F BODY MASS INDEX DOCD: CPT | Mod: CPTII,TXP,, | Performed by: NURSE PRACTITIONER

## 2023-05-19 PROCEDURE — 71046 XR CHEST PA AND LATERAL: ICD-10-PCS | Mod: 26,TXP,, | Performed by: RADIOLOGY

## 2023-05-19 PROCEDURE — 3008F PR BODY MASS INDEX (BMI) DOCUMENTED: ICD-10-PCS | Mod: CPTII,TXP,, | Performed by: NURSE PRACTITIONER

## 2023-05-19 PROCEDURE — 93306 TTE W/DOPPLER COMPLETE: CPT | Mod: 26,TXP,, | Performed by: INTERNAL MEDICINE

## 2023-05-19 PROCEDURE — 93306 ECHO (CUPID ONLY): ICD-10-PCS | Mod: 26,TXP,, | Performed by: INTERNAL MEDICINE

## 2023-05-19 PROCEDURE — 76770 US EXAM ABDO BACK WALL COMP: CPT | Mod: TC,TXP

## 2023-05-19 PROCEDURE — 99215 OFFICE O/P EST HI 40 MIN: CPT | Mod: TXP,,, | Performed by: NURSE PRACTITIONER

## 2023-05-19 PROCEDURE — 1159F MED LIST DOCD IN RCRD: CPT | Mod: CPTII,TXP,, | Performed by: NURSE PRACTITIONER

## 2023-05-19 PROCEDURE — 1159F PR MEDICATION LIST DOCUMENTED IN MEDICAL RECORD: ICD-10-PCS | Mod: CPTII,TXP,, | Performed by: NURSE PRACTITIONER

## 2023-05-19 PROCEDURE — 99999 PR PBB SHADOW E&M-EST. PATIENT-LVL V: ICD-10-PCS | Mod: PBBFAC,TXP,, | Performed by: NURSE PRACTITIONER

## 2023-05-19 PROCEDURE — 77067 SCR MAMMO BI INCL CAD: CPT | Mod: 26,TXP,, | Performed by: RADIOLOGY

## 2023-05-19 PROCEDURE — 99215 OFFICE O/P EST HI 40 MIN: CPT | Mod: 25,TXP | Performed by: NURSE PRACTITIONER

## 2023-05-19 PROCEDURE — 3077F PR MOST RECENT SYSTOLIC BLOOD PRESSURE >= 140 MM HG: ICD-10-PCS | Mod: CPTII,TXP,, | Performed by: NURSE PRACTITIONER

## 2023-05-19 PROCEDURE — 77067 SCR MAMMO BI INCL CAD: CPT | Mod: TC,TXP

## 2023-05-19 PROCEDURE — 93306 TTE W/DOPPLER COMPLETE: CPT | Mod: TXP

## 2023-05-19 PROCEDURE — 4010F PR ACE/ARB THEARPY RXD/TAKEN: ICD-10-PCS | Mod: CPTII,TXP,, | Performed by: NURSE PRACTITIONER

## 2023-05-19 RX ORDER — MUPIROCIN 20 MG/G
OINTMENT TOPICAL
COMMUNITY
Start: 2023-03-09 | End: 2024-02-01

## 2023-05-19 NOTE — PROGRESS NOTES
Kidney Transplant Recipient Reevalulation    Referring Physician: Brice Mike  Current Nephrologist: Brice Mike  Waitlist Status: active  Dialysis Start Date: 6/5/2020    Subjective:     CC:  Six month reassessment of kidney transplant candidacy.    HPI:  Ms. Kenney is a 62 y.o. year old White female with ESRD secondary to diabetic nephropathy, solitary kidney.  PMH DM2, HTN, incontience (s/p bladder sling), CAD with stents, atrial fibrillation (on xarelto),  CVA (no residual deficits), ALEJA, anemia. She has been on the wait list for a kidney transplant at [facility] since 6/5/2020. Patient is currently on peritoneal dialysis started on 6/5/2020. Patient is dialyzing on cyclic peritoneal dialysis.  Patient reports that she is tolerating dialysis well. . She has a PD catheter. Denies peritonitis or exit site infections. Patient denies any recent hospitalizations or ED visits.    Has lost a few pounds since last clinic visit. Remains active, walking in exam room unassisted. Not frail.    CXR 5/19/2023: No significant change from prior.  PXR 4/25/2022: iliac calcifications are present, need noncon CT pelvis  CT abd/pelvis 6/16/2022 (outside): unclear if reviewed by surgery  Renal US 5/19/2023: congenitally absent right kidney. Sonographic findings consistent with chronic medical renal disease of the left kidney. Two simple left renal cysts.  Iliacs 4/20/2022: favorable for transplant   Echo 5/19/2023: EF 65-70%, PA 32   Stress 6/30/2022 (OUTSIDE): EF 66%. Clinically normal Lexiscan stress test   Colonoscopy 11/4/2022: tubular adenoma, repeat in 5 years    GYN 6/17/2022:  Patient was here for gyn exam wellness gyn she does not need a Pap smear as she does not have a uterus or cervix at this time.  She was here for clearance for renal transplant from the gyn standpoint  MMG 5/19/2023: pending    Current Outpatient Medications   Medication Sig Dispense Refill    ALPRAZolam (XANAX) 0.5 MG tablet Take 0.5 mg by mouth  every evening.      aspirin (ECOTRIN) 81 MG EC tablet Take 81 mg by mouth once daily.      atorvastatin (LIPITOR) 40 MG tablet Take 40 mg by mouth every evening.       calcitRIOL (ROCALTROL) 0.25 MCG Cap Take 0.25 mcg by mouth every Mon, Wed, Fri.      cinacalcet (SENSIPAR) 30 MG Tab       cloNIDine 0.1 mg/24 hr td ptwk (CATAPRES) 0.1 mg/24 hr Place 1 patch onto the skin every 7 days.      docusate sodium (COLACE) 100 MG capsule Take 100 mg by mouth 2 (two) times daily.      DULoxetine (CYMBALTA) 30 MG capsule Take 30 mg by mouth once daily.      ezetimibe (ZETIA) 10 mg tablet Take 10 mg by mouth once daily.      furosemide (LASIX) 80 MG tablet Take 80 mg by mouth 2 (two) times daily.       HUMIRA,CF, PEN 40 mg/0.4 mL PnKt       insulin aspart U-100 (NOVOLOG) 100 unit/mL injection Inject into the skin. Insulin pump      isosorbide mononitrate (IMDUR) 30 MG 24 hr tablet Take 30 mg by mouth once daily.       labetaloL (NORMODYNE) 200 MG tablet Take 200 mg by mouth every 12 (twelve) hours.       lanthanum (FOSRENOL) 1,000 mg PwPk Take 1 packet by mouth.      linaCLOtide (LINZESS) 145 mcg Cap capsule Take 145 mcg by mouth before breakfast.      losartan (COZAAR) 100 MG tablet Take 100 mg by mouth every evening.      minoxidiL (LONITEN) 2.5 MG tablet Take 2.5 mg by mouth every evening.      mupirocin (BACTROBAN) 2 % ointment APPLY TO AFFECTED ONCE DAILY      NIFEdipine (PROCARDIA-XL) 60 MG (OSM) 24 hr tablet Take 60 mg by mouth every evening.       oxybutynin (DITROPAN-XL) 5 MG TR24 Take 5 mg by mouth once daily.       PRORENAL -500 mcg-unit Cap Take 1 capsule by mouth once daily.      sucralfate (CARAFATE) 100 mg/mL suspension Take 1 g by mouth 4 (four) times daily as needed.      CHOLECALCIFEROL, VITAMIN D3, ORAL Take 50,000 Units by mouth once a week.      ciprofloxacin HCl (CIPRO) 500 MG tablet Take 500 mg by mouth 2 (two) times daily.      ergocalciferol, vitamin D2, 50 mcg (2,000 unit) Cap Take 50,000 Units  by mouth once a week.      gabapentin (NEURONTIN) 100 MG capsule TAKE 1 CAPSULE BY MOUTH 3 TIMES DAILY AS NEEDED      insulin glargine U-300 conc (TOUJEO MAX U-300 SOLOSTAR) 300 unit/mL (3 mL) insulin pen Inject 58 Units into the skin once daily.      lactulose (CHRONULAC) 10 gram/15 mL solution TAKE 10ML BY MOUTH TWICE DAILY UNTIL NOT CONSTIPATED      loratadine (CLARITIN) 10 mg tablet Take 10 mg by mouth every evening.      methoxy peg-epoetin beta (MIRCERA INJ) Inject 150 mcg into the skin.      mv,Js-NR-L2-OM-3-dha-epa-fish 400-500 mcg-unit Cap 1 tablet.      NIFEdipine (ADALAT CC) 60 MG TbSR       rOPINIRole (REQUIP) 0.25 MG tablet TAKE 1 TABLET BY MOUTH EVERY NIGHT AT BEDTIME FOR 1 WEEK, THEN TAKE 2 THEREAFTER      sevelamer carbonate (RENVELA) 800 mg Tab Take 2,400 mg by mouth 3 (three) times daily with meals.      sodium chloride 0.9% SolP 100 mL with iron sucrose 100 mg iron/5 mL Soln 100 mg 200 mg.      sucroferric oxyhydroxide (VELPHORO) 500 mg Chew Take 1 tablet by mouth.      triamcinolone acetonide 0.1% (KENALOG) 0.1 % ointment 1 application 2 (two) times daily.      XARELTO 15 mg Tab Take 15 mg by mouth every evening.        No current facility-administered medications for this visit.       Past Medical History:   Diagnosis Date    Anemia     Anxiety     Atrial fibrillation     Coronary artery disease     Depression     Diabetes mellitus, type 2     Disorder of kidney and ureter     Heart murmur     Hyperlipidemia     Hypertension     Obesity     ALEJA (obstructive sleep apnea)     Proteinuria     Solitary kidney     Stroke      Review of Systems   Constitutional:  Negative for activity change, appetite change and fever.   HENT:  Negative for congestion, mouth sores and sore throat.    Eyes:  Negative for visual disturbance.   Respiratory:  Negative for cough, chest tightness and shortness of breath.    Cardiovascular:  Negative for chest pain, palpitations and leg swelling.   Gastrointestinal:   "Negative for abdominal distention, constipation, diarrhea and nausea.   Genitourinary:  Positive for decreased urine volume. Negative for difficulty urinating, frequency and hematuria.        Still making good amount of urine   Musculoskeletal:  Negative for arthralgias, gait problem and myalgias.   Skin:  Negative for wound.   Allergic/Immunologic: Negative for environmental allergies, food allergies and immunocompromised state.   Neurological:  Negative for dizziness, weakness and numbness.   Hematological:  Bruises/bleeds easily.        On xarelto   Psychiatric/Behavioral:  Negative for sleep disturbance. The patient is not nervous/anxious.      Objective:   body mass index is 38.19 kg/m².  BP (!) 179/95 (BP Location: Right arm, Patient Position: Sitting, BP Method: Medium (Automatic))   Pulse 65   Temp 97.2 °F (36.2 °C) (Skin)   Resp 16   Ht 5' 5.32" (1.659 m)   Wt 105.1 kg (231 lb 11.3 oz)   SpO2 95%   BMI 38.19 kg/m²     Physical Exam  Vitals and nursing note reviewed.   Constitutional:       Appearance: Normal appearance. She is obese.   HENT:      Head: Normocephalic.   Cardiovascular:      Rate and Rhythm: Normal rate and regular rhythm.      Heart sounds: Normal heart sounds.   Pulmonary:      Effort: Pulmonary effort is normal.      Breath sounds: Normal breath sounds.   Abdominal:      General: Bowel sounds are normal. There is no distension.      Palpations: Abdomen is soft.      Tenderness: There is no abdominal tenderness.      Comments: PD catheter , no erythema, edema, tenderness or drainage.    Musculoskeletal:         General: Normal range of motion.   Skin:     General: Skin is warm and dry.   Neurological:      General: No focal deficit present.      Mental Status: She is alert.   Psychiatric:         Behavior: Behavior normal.       Labs:  Lab Results   Component Value Date    WBC 9.53 04/20/2022    HGB 8.7 (L) 04/20/2022    HCT 27.2 (L) 04/20/2022    LABPLAT 291 03/13/2020     " 04/20/2022    K 3.9 04/20/2022     04/20/2022    CO2 24 04/20/2022    BUN 58 (H) 04/20/2022    CREATININE 6.8 (H) 04/20/2022    CALCIUM 8.9 04/20/2022    PHOS 5.4 (H) 04/20/2022    ALBUMIN 3.2 (L) 04/20/2022    AST 13 04/20/2022    ALT 16 04/20/2022    .8 (H) 04/20/2022     Labs were reviewed with the patient.    Pre-transplant Workup:   Reviewed with the patient.    Assessment:     1. Patient on waiting list for kidney transplant    2. ESRD on peritoneal dialysis    3. Coronary artery disease involving native coronary artery of native heart without angina pectoris    4. Renovascular hypertension    5. Diabetic nephropathy associated with type 2 diabetes mellitus    6. BMI 38.0-38.9,adult      Plan:   Ensure outside CT abd/pelvis from 6/16/2022 is reviewed by transplant surgeons to assess iliac calcifications.  Needs HLA tubes sent to dialysis unit.  Encouraged continued weight loss.      Transplant Candidacy:   Ms. Kenney is a high-risk kidney transplant candidate due to CAD, history of CVA, obesity.  Meets center eligibility for accepting HCV+ donor offer - Yes.  Patient educated on HCV+ donors. Joann is willing  to accept HCV+ donor offer -  Yes   Patient is a candidate for KDPI > 85 kidney donor offer - No (weight > 88 kg).  She remains in overall stable health, and will remain active on the transplant list.    Patient advised that it is recommended that all transplant candidates, and their close contacts and household members receive Covid vaccination.    Lila Mccurdy NP       Follow-up:   In addition to the tests noted in the plan, Ms. Kenney will continue to have reevaluation as per the standing pre-kidney transplant protocol:  Monthly blood for PRA  Annual return to clinic, except HIV positive, > 65 years of age, or pancreas transplant candidates who will be scheduled to see transplant every 6 months while in pre-transplant phase  Annual re-testing: CXR, EKG, yearly mammograms for women  over 40 and PSA for males over 40, cardiology follow-up as recommended by initial cardiology pre-transplant evaluation  Renal ultrasound every 2 years  Baseline colonoscopy after age 50 and repeated as recommended    UNOS Patient Status  Functional Status: 60% - Requires occasional assistance but is able to care for needs  Physical Capacity: No Limitations

## 2023-05-19 NOTE — LETTER
May 22, 2023        Brice Mike  [address] [address]  Phone: [phone]  Fax: [phone]             [facility]  [address]  [facility] [address]  Phone: [phone]   Patient: Joann Kenney   MR Number: [id number]   YOB: 1960   Date of Visit: 5/19/2023       Dear Dr. Brice Mike    Thank you for referring Joann Kenney to me for evaluation. Attached you will find relevant portions of my assessment and plan of care.    If you have questions, please do not hesitate to call me. I look forward to following Joann Kenney along with you.    Sincerely,    Lila Mccurdy, KEN    Enclosure    If you would like to receive this communication electronically, please contact [email or web address] or [phone] to request [employer] access.    [employer] is a tool which provides read-only access to select patient information with whom you have a relationship. Its easy to use and provides real time access to review your patients record including encounter summaries, notes, results, and demographic information.    If you feel you have received this communication in error or would no longer like to receive these types of communications, please e-mail [email or web address]

## 2023-05-23 NOTE — PROGRESS NOTES
Transplant Psychosocial Evaluation Update:  Last psychosocial evaluation for transplant was completed on 4/20/2022 by CHAR Zavala LCSW    Pt presents today in company of pt's daughter/primary caregiver, Sarah Lott. Pt and caregiver  present as alert, oriented to person, place, time, purpose of visit. Pt and caregiver deny concerns about going through with transplant and state motivation and sense of preparedness for transplantation, caregiver role, psychosocial risk factors, medical risk factors, financial aspects, and lifetime commitments. All concerns and education points as per transplant psychosocial evaluation process addressed (also refer to psychosocial dated 4/20/2022). Pt actively participated in today's interview, with pt's daughter, Sarah Lott, participating as caregiver. Pt asking questions appropriately and denies changes to previous psychosocial evaluation for transplantation which we reviewed line by line with pt and, per pt choice, with pts caregiver today.    Primary Caregivers and Transportation for Transplant:  1. Sarah Lott, 46 yo daughter; [place], [place], DOES drive, unemployed, [phone]  2. Ranjan Lott, 37 yo son, travels frequently (in [place] now), plans to stay with pt post-transplant, [address] drive, [phone]    Both pt and caregiver/s plan to stay locally at  apartments or hotel of choice - information reviewed in depth. Caregivers plan to stay at hospital as appropriate for transplant and post-transplant process.    Pts Vocation: Pt not currently working. Pt began receiving SSD in 2001 due to fall at work resulting in back injury.     DME: PD equipment and supplies, shower chair, bed rails, wheelchair, rollator, CPAP, insulin pump     ADLS:  Pt reports some difficulty walking long distances due to prior back injury. Pt reports having a PCA come to the house 5 days/week for 22 hours/week to assist with cooking and household chores.     Household and  "Dependents: pt resides alone and has no dependents at this time.    Income: Pt states ability to afford all monthly expenses and costs including for medications now and if transplanted based on income and on savings and assets.    Dialysis Adherence: [facility] ([phone]). Dialysis compliance report shows in last 3 months pt had 0 AMAs, 0 no-shows, and no concerns with care givers or transportation. DUSW states, "pt has hx of depression but pt is coping well and takes her meds as prescribed."     Pt and daughter deny any additional concerns, stating preparedness and motivation to proceed with organ transplant.     Mental Health: Pt has history of depression and currently takes Cymbalta and .5 mg of Xanax at night to sleep. Pt reports nephrologist manages mental health medications and pt no longer feels Cymbalta works as well as it used to. SW provided pt with information on transplant pt support groups and strongly encouraged pt to attend. SW strongly encouraged pt to speak with nephrologist regarding adjusting mental health medications. Pt denies SI or HI.     Suitability for Transplant:   Pt remains low risk for transplant at this time based on psychosocial risk factors and strengths.    Pt delma well overall with health needs and life in general, has stable supports, adequate insurance, financial stability, transportation and caregiver plans in place, and is using no substances unless prescribed.     Harmony Shields LMSW         "

## 2023-05-25 ENCOUNTER — TELEPHONE (OUTPATIENT)
Dept: TRANSPLANT | Facility: CLINIC | Age: 63
End: 2023-05-25
Payer: MEDICARE

## 2023-05-25 NOTE — TELEPHONE ENCOUNTER
returned call to pt. Pt reports son is moving home from [place] and will be actively involved in pt's caregiver plan. Pt asked who filled out dialysis compliance form so that pt can thank them- SW informed pt there was no name on sheet and typically the dialysis SW complete form.     Pt thanked SW and wished to thank transplant team for having pt on waitlist. No other needs at this time.     Harmony Shields LMSW

## 2023-10-02 ENCOUNTER — TELEPHONE (OUTPATIENT)
Dept: TRANSPLANT | Facility: CLINIC | Age: 63
End: 2023-10-02
Payer: MEDICARE

## 2023-10-02 NOTE — TELEPHONE ENCOUNTER
I have attempted without success to contact this patient by phone to schedule appt for semi annual clinic visit. Message was left to return call.

## 2023-10-03 ENCOUNTER — TELEPHONE (OUTPATIENT)
Dept: TRANSPLANT | Facility: CLINIC | Age: 63
End: 2023-10-03
Payer: MEDICARE

## 2023-10-03 NOTE — TELEPHONE ENCOUNTER
Spoke to pt confirming appts on 12/01/2023. Appt reminders were mailed on 10/03/2023 and pt is aware to bring care giver.

## 2023-11-27 ENCOUNTER — TELEPHONE (OUTPATIENT)
Dept: TRANSPLANT | Facility: CLINIC | Age: 63
End: 2023-11-27
Payer: MEDICARE

## 2023-11-27 NOTE — TELEPHONE ENCOUNTER
MA notes per Adherence form     FOR THE PAST THREE MONTHS:    0-AMA's  0-No-shows    No concerns with care giving, transportation, or mental health    Scanned in pt's media    Per adherence form pt attends all clinic appts and completes and documents all PD tx's.  Pt's Daughter Sarah is pt's caregiver.  Pt and Daughter drives.  Pt has had counseling and clearance from counselor and is on medication and managed by PCP.     Geetha Mendez  Abdominal Transplant MA

## 2023-11-30 ENCOUNTER — TELEPHONE (OUTPATIENT)
Dept: TRANSPLANT | Facility: CLINIC | Age: 63
End: 2023-11-30
Payer: MEDICARE

## 2023-11-30 NOTE — TELEPHONE ENCOUNTER
----- Message from Latha Draper sent at 11/30/2023  9:50 AM CST -----  Regarding: Reschedule Existing Appointment        Current appt date:   12/01    Type of appt :    Ep Appt    Reason for rescheduling:  The appt no longer works with the patients schedule. She prefers Friday afternoons.    Caller:    Joann    Contact Preference:    [phone]

## 2023-11-30 NOTE — TELEPHONE ENCOUNTER
Spoke to pt confirming rescheduled clinic visit appt on 02/02/2024. Appt reminder mailed on 11/30/2023 and pt is aware to bring care giver.

## 2024-01-10 ENCOUNTER — TELEPHONE (OUTPATIENT)
Dept: TRANSPLANT | Facility: CLINIC | Age: 64
End: 2024-01-10
Payer: MEDICARE

## 2024-01-10 DIAGNOSIS — Z76.82 AWAITING ORGAN TRANSPLANT STATUS: Primary | ICD-10-CM

## 2024-01-10 NOTE — TELEPHONE ENCOUNTER
ON-CALL NOTE    UNOS# [id number]    Notified by Babatunde Alaniz, , that Joann Kenney is eligible for kidney offer.  Spoke with patient and identified no acute medical issues with telephone assessment. Protocol script read to patient regarding PHS risk and HCV+ donor offer . Patient verbalized understanding, all questions answered, patient accepts organ offer. Notified by Babatunde Alaniz that virtual crossmatch is  Unable to obtain Virtual CXM due to sample from 8/2/2023 . Patient report Emergency room visit last night 1/9 for Hypertension urgency and Hospital admit on 1/4-1/5/2024 for Syncope and Dehydration.  Patient schedule to see Cardiologist on 1/16/2024 for discussion of a loop recorder.  Case reviewed with  Dr Banks and patient not medically suitable for Transplant at this time. Needs Cardiology clearance.  Patient notified of plan  and agrees with plan.

## 2024-01-29 ENCOUNTER — TELEPHONE (OUTPATIENT)
Dept: TRANSPLANT | Facility: HOSPITAL | Age: 64
End: 2024-01-29
Payer: MEDICARE

## 2024-01-29 NOTE — TELEPHONE ENCOUNTER
received the following in basket message:    Mars Myrick Jr., RN  P [facility] Kidney Transplant Social Workers  Good morning,  The above patient has questions regarding the [facility].  Can someone please give her a call?  Thanks  Mars     called patient. Patient asked this  questions about [employer].  answered questions and mailed [employer] information sheet to patients mailing address at her request. No other concerns reported at this time.     Laura Wood LMSW

## 2024-02-01 ENCOUNTER — OFFICE VISIT (OUTPATIENT)
Dept: TRANSPLANT | Facility: CLINIC | Age: 64
End: 2024-02-01
Payer: MEDICARE

## 2024-02-01 VITALS
BODY MASS INDEX: 30.97 KG/M2 | HEART RATE: 72 BPM | SYSTOLIC BLOOD PRESSURE: 185 MMHG | HEIGHT: 65 IN | DIASTOLIC BLOOD PRESSURE: 91 MMHG | RESPIRATION RATE: 18 BRPM | TEMPERATURE: 97 F | OXYGEN SATURATION: 99 % | WEIGHT: 185.88 LBS

## 2024-02-01 DIAGNOSIS — Z99.2 DIABETES MELLITUS DUE TO UNDERLYING CONDITION WITH CHRONIC KIDNEY DISEASE ON CHRONIC DIALYSIS, WITH LONG-TERM CURRENT USE OF INSULIN: ICD-10-CM

## 2024-02-01 DIAGNOSIS — D63.1 ANEMIA IN ESRD (END-STAGE RENAL DISEASE): ICD-10-CM

## 2024-02-01 DIAGNOSIS — Z86.73 HISTORY OF STROKE: Chronic | ICD-10-CM

## 2024-02-01 DIAGNOSIS — N18.6 TYPE 2 DM WITH HYPERTENSION AND ESRD ON DIALYSIS: ICD-10-CM

## 2024-02-01 DIAGNOSIS — Q60.0 SOLITARY KIDNEY, CONGENITAL: ICD-10-CM

## 2024-02-01 DIAGNOSIS — Z76.82 PATIENT ON WAITING LIST FOR KIDNEY TRANSPLANT: Primary | ICD-10-CM

## 2024-02-01 DIAGNOSIS — Z79.4 DIABETES MELLITUS DUE TO UNDERLYING CONDITION WITH CHRONIC KIDNEY DISEASE ON CHRONIC DIALYSIS, WITH LONG-TERM CURRENT USE OF INSULIN: ICD-10-CM

## 2024-02-01 DIAGNOSIS — I12.0 TYPE 2 DM WITH HYPERTENSION AND ESRD ON DIALYSIS: ICD-10-CM

## 2024-02-01 DIAGNOSIS — N18.6 ANEMIA IN ESRD (END-STAGE RENAL DISEASE): ICD-10-CM

## 2024-02-01 DIAGNOSIS — E08.22 DIABETES MELLITUS DUE TO UNDERLYING CONDITION WITH CHRONIC KIDNEY DISEASE ON CHRONIC DIALYSIS, WITH LONG-TERM CURRENT USE OF INSULIN: ICD-10-CM

## 2024-02-01 DIAGNOSIS — E66.09 CLASS 1 OBESITY DUE TO EXCESS CALORIES WITH SERIOUS COMORBIDITY AND BODY MASS INDEX (BMI) OF 30.0 TO 30.9 IN ADULT: ICD-10-CM

## 2024-02-01 DIAGNOSIS — G47.33 OSA ON CPAP: ICD-10-CM

## 2024-02-01 DIAGNOSIS — R55 SYNCOPE, UNSPECIFIED SYNCOPE TYPE: ICD-10-CM

## 2024-02-01 DIAGNOSIS — E11.22 TYPE 2 DM WITH HYPERTENSION AND ESRD ON DIALYSIS: ICD-10-CM

## 2024-02-01 DIAGNOSIS — N18.6 ESRD ON PERITONEAL DIALYSIS: ICD-10-CM

## 2024-02-01 DIAGNOSIS — Z99.2 TYPE 2 DM WITH HYPERTENSION AND ESRD ON DIALYSIS: ICD-10-CM

## 2024-02-01 DIAGNOSIS — N25.81 HYPERPARATHYROIDISM DUE TO ESRD ON DIALYSIS: ICD-10-CM

## 2024-02-01 DIAGNOSIS — N18.6 DIABETES MELLITUS DUE TO UNDERLYING CONDITION WITH CHRONIC KIDNEY DISEASE ON CHRONIC DIALYSIS, WITH LONG-TERM CURRENT USE OF INSULIN: ICD-10-CM

## 2024-02-01 DIAGNOSIS — I25.10 CORONARY ARTERY DISEASE INVOLVING NATIVE CORONARY ARTERY OF NATIVE HEART WITHOUT ANGINA PECTORIS: ICD-10-CM

## 2024-02-01 DIAGNOSIS — N18.6 HYPERPARATHYROIDISM DUE TO ESRD ON DIALYSIS: ICD-10-CM

## 2024-02-01 DIAGNOSIS — Z99.2 ESRD ON PERITONEAL DIALYSIS: ICD-10-CM

## 2024-02-01 DIAGNOSIS — Z99.2 HYPERPARATHYROIDISM DUE TO ESRD ON DIALYSIS: ICD-10-CM

## 2024-02-01 PROBLEM — E66.811 CLASS 1 OBESITY DUE TO EXCESS CALORIES WITH SERIOUS COMORBIDITY AND BODY MASS INDEX (BMI) OF 30.0 TO 30.9 IN ADULT: Status: ACTIVE | Noted: 2021-04-01

## 2024-02-01 PROBLEM — Z01.818 PRE-TRANSPLANT EVALUATION FOR CHRONIC KIDNEY DISEASE: Status: RESOLVED | Noted: 2021-04-01 | Resolved: 2024-02-01

## 2024-02-01 PROCEDURE — 99999 PR PBB SHADOW E&M-EST. PATIENT-LVL V: CPT | Mod: PBBFAC,TXP,, | Performed by: NURSE PRACTITIONER

## 2024-02-01 PROCEDURE — 3077F SYST BP >= 140 MM HG: CPT | Mod: CPTII,S$GLB,TXP, | Performed by: NURSE PRACTITIONER

## 2024-02-01 PROCEDURE — 4010F ACE/ARB THERAPY RXD/TAKEN: CPT | Mod: CPTII,S$GLB,TXP, | Performed by: NURSE PRACTITIONER

## 2024-02-01 PROCEDURE — 1126F AMNT PAIN NOTED NONE PRSNT: CPT | Mod: CPTII,S$GLB,TXP, | Performed by: NURSE PRACTITIONER

## 2024-02-01 PROCEDURE — 1159F MED LIST DOCD IN RCRD: CPT | Mod: CPTII,S$GLB,TXP, | Performed by: NURSE PRACTITIONER

## 2024-02-01 PROCEDURE — 3008F BODY MASS INDEX DOCD: CPT | Mod: CPTII,S$GLB,TXP, | Performed by: NURSE PRACTITIONER

## 2024-02-01 PROCEDURE — 3288F FALL RISK ASSESSMENT DOCD: CPT | Mod: CPTII,S$GLB,TXP, | Performed by: NURSE PRACTITIONER

## 2024-02-01 PROCEDURE — 99215 OFFICE O/P EST HI 40 MIN: CPT | Mod: S$GLB,TXP,, | Performed by: NURSE PRACTITIONER

## 2024-02-01 PROCEDURE — 3080F DIAST BP >= 90 MM HG: CPT | Mod: CPTII,S$GLB,TXP, | Performed by: NURSE PRACTITIONER

## 2024-02-01 PROCEDURE — 1101F PT FALLS ASSESS-DOCD LE1/YR: CPT | Mod: CPTII,S$GLB,TXP, | Performed by: NURSE PRACTITIONER

## 2024-02-01 RX ORDER — TIRZEPATIDE 5 MG/.5ML
INJECTION, SOLUTION SUBCUTANEOUS
COMMUNITY
Start: 2023-09-26

## 2024-02-01 RX ORDER — PANTOPRAZOLE SODIUM 40 MG/1
40 TABLET, DELAYED RELEASE ORAL
COMMUNITY

## 2024-02-01 RX ORDER — FERRIC CITRATE 210 MG/1
2 TABLET, COATED ORAL
COMMUNITY
Start: 2023-09-25

## 2024-02-01 RX ORDER — CETIRIZINE HYDROCHLORIDE 10 MG/1
10 TABLET ORAL NIGHTLY
COMMUNITY

## 2024-02-01 NOTE — PROGRESS NOTES
Kidney Transplant Recipient Reevalulation    Referring Physician: Brice Mike  Current Nephrologist: Brice Mike  Waitlist Status: inactive  Dialysis Start Date: 6/5/2020    Subjective:     CC:  Six month reassessment of kidney transplant candidacy.    HPI:  Ms. Kenney is a 63 y.o. year old White female with ESRD secondary to diabetic nephropathy, solitary kidney.  PMH DM2, HTN, incontience (s/p bladder sling), CAD with stents, atrial fibrillation (on xarelto),  CVA (no residual deficits), ALEJA, anemia. She has been on the wait list for a kidney transplant at [facility] since 6/5/2020. Patient is currently on peritoneal dialysis started on 6/5/2020. Patient is dialyzing on cyclic peritoneal dialysis.  Patient reports that she is tolerating dialysis well. . She has a PD catheter. Denies peritonitis or exit site infections.        HX CAD, stents/PCI, TIA, HTN:  on ASA   HX ALEJA reports wearing CPAP nightly      Reports losing ~ 50 lbs on [place] and has had to have insulin and BP meds adjusted     Fx assessment: Uses a walker, to help with balance.  Does not appear frail.    Recent hospitalizations or ED visits.  Has been having Syncope episodes --last 2 1/2 weeks, has passed out ~ 4 times   1/10/24 Hospitalized at  [facility].  Per ED note it was suspected to be d/t orthostatic orthostasis --polypharmacy . Plan for outpatient loop recorder.    Cardiac testing 1/4/24 TTE:  EF 65-70%, bubble study (-).   -Outside records reviewed in clin    She is following with local cardiology and Is waiting  on a date  to replace Loop  recorder , The one she has now is no longer recording     LOV 5/19/23    Lab /diagnostic results / txp wk up reviewed      CXR 5/19/2023: No significant change from prior.  PXR 4/25/2022: iliac calcifications are present, need noncon CT pelvis  CT abd/pelvis 6/16/2022 (outside): unclear if reviewed by surgery  Renal US 5/19/2023: congenitally absent right kidney. Sonographic  findings consistent with chronic medical renal disease of the left kidney. Two simple left renal cysts.  Iliacs 4/20/2022: favorable for transplant   Echo 5/19/2023: EF 65-70%, PA 32   Stress 6/30/2022 (OUTSIDE): EF 66%. Clinically normal Lexiscan stress test   Colonoscopy 11/4/2022: tubular adenoma, repeat in 5 years    GYN 6/17/2022:  Patient was here for gyn exam wellness gyn she does not need a Pap smear as she does not have a uterus or cervix at this time.  She was here for clearance for renal transplant from the gyn standpoint  MMG 5/19/2023:  (-)      Past Medical History:   Diagnosis Date    Anemia     Anxiety     Atrial fibrillation     Coronary artery disease     Depression     Diabetes mellitus, type 2     Disorder of kidney and ureter     Heart murmur     Hyperlipidemia     Hypertension     Obesity     ALEJA (obstructive sleep apnea)     Proteinuria     Solitary kidney     Stroke      Review of Systems   Constitutional:  Positive for fatigue. Negative for activity change, appetite change and fever.   HENT:  Negative for congestion, mouth sores and sore throat.    Eyes:  Negative for visual disturbance.   Respiratory:  Negative for cough, chest tightness and shortness of breath.    Cardiovascular:  Negative for chest pain, palpitations and leg swelling.   Gastrointestinal:  Negative for abdominal distention, constipation, diarrhea and nausea.   Genitourinary:  Positive for decreased urine volume. Negative for difficulty urinating, frequency and hematuria.        Still making good amount of urine   Musculoskeletal:  Positive for gait problem. Negative for arthralgias and myalgias.        Using a rolling walker    Skin:  Negative for wound.   Allergic/Immunologic: Negative for environmental allergies, food allergies and immunocompromised state.   Neurological:  Positive for dizziness, syncope and weakness. Negative for numbness.   Hematological:  Bruises/bleeds easily.         ASA   Psychiatric/Behavioral:   "Negative for sleep disturbance. The patient is not nervous/anxious.        Objective:   body mass index is 30.93 kg/m².  BP (!) 185/91 (BP Location: Right arm, Patient Position: Sitting, BP Method: Medium (Automatic))   Pulse 72   Temp 97.3 °F (36.3 °C) (Tympanic)   Resp 18   Ht 5' 5" (1.651 m)   Wt 84.3 kg (185 lb 13.6 oz)   SpO2 99%   BMI 30.93 kg/m²     Physical Exam  Vitals and nursing note reviewed.   Constitutional:       Appearance: Normal appearance. She is obese.   HENT:      Head: Normocephalic.   Cardiovascular:      Rate and Rhythm: Normal rate and regular rhythm.      Heart sounds: Normal heart sounds.   Pulmonary:      Effort: Pulmonary effort is normal.      Breath sounds: Normal breath sounds.   Abdominal:      General: Bowel sounds are normal. There is no distension.      Palpations: Abdomen is soft.      Tenderness: There is no abdominal tenderness.      Comments: PD catheter     Musculoskeletal:         General: Normal range of motion.   Skin:     General: Skin is warm and dry.   Neurological:      General: No focal deficit present.      Mental Status: She is alert.   Psychiatric:         Behavior: Behavior normal.         Labs:  Lab Results   Component Value Date    WBC 9.53 04/20/2022    HGB 8.7 (L) 04/20/2022    HCT 27.2 (L) 04/20/2022    LABPLAT 291 03/13/2020     04/20/2022    K 3.9 04/20/2022     04/20/2022    CO2 24 04/20/2022    BUN 58 (H) 04/20/2022    CREATININE 6.8 (H) 04/20/2022    CALCIUM 8.9 04/20/2022    PHOS 5.4 (H) 04/20/2022    ALBUMIN 3.2 (L) 04/20/2022    AST 13 04/20/2022    ALT 16 04/20/2022    .8 (H) 04/20/2022     Labs were reviewed with the patient.    Pre-transplant Workup:   Reviewed with the patient.    Assessment:     1. Patient on waiting list for kidney transplant    2. ESRD on peritoneal dialysis    3. Type 2 DM with hypertension and ESRD on dialysis    4. Diabetes mellitus due to underlying condition with chronic kidney disease on chronic " dialysis, with long-term current use of insulin    5. Coronary artery disease involving native coronary artery of native heart without angina pectoris    6. Anemia in ESRD (end-stage renal disease)    7. Class 1 obesity due to excess calories with serious comorbidity and body mass index (BMI) of 30.0 to 30.9 in adult    8. History of stroke    9. Solitary kidney, congenital    10. Syncope, unspecified syncope type    11. ALEJA on CPAP    12. Hyperparathyroidism due to ESRD on dialysis        Plan:   Remains inactive:   syncope-- pending change of loop recorder, and will need  cardiology clearance  and UTD cardiac testing Echo and stress test (get through her local cardiologist)  Add 6 minute walk once cleared by cards       Transplant Candidacy:   Ms. Kenney is a high-risk kidney transplant candidate due to CAD, history of CVA, obesity.  Meets center eligibility for accepting HCV+ donor offer - Yes.  Patient educated on HCV+ donors. Joann is willing  to accept HCV+ donor offer -  Yes   Patient is a candidate for KDPI > 85 kidney donor offer - No (weight > 88 kg).  She remains in overall stable health, and will remain active on the transplant list.    Patient advised that it is recommended that all transplant candidates, and their close contacts and household members receive Covid vaccination.    Conchita Waldrop NP       Follow-up:   In addition to the tests noted in the plan, Ms. Kenney will continue to have reevaluation as per the standing pre-kidney transplant protocol:  Monthly blood for PRA  Annual return to clinic, except HIV positive, > 65 years of age, or pancreas transplant candidates who will be scheduled to see transplant every 6 months while in pre-transplant phase  Annual re-testing: CXR, EKG, yearly mammograms for women over 40 and PSA for males over 40, cardiology follow-up as recommended by initial cardiology pre-transplant evaluation  Renal ultrasound every 2 years  Baseline colonoscopy after age 50  and repeated as recommended    UNOS Patient Status  Functional Status: 60% - Requires occasional assistance but is able to care for needs  Physical Capacity: No Limitations

## 2024-02-01 NOTE — PROGRESS NOTES
YEARLY PATIENT EDUCATION NOTE    Ms. Joann Kenney was seen in pre-kidney transplant clinic for yearly (or six months) evaluation for kidney, kidney/pancreas or pancreas only transplant.  The patient attended a web based education session that discussed/reviewed the following aspects of transplantation: UNOS waitlist management/multiple listings, types of organs offered (KDPI < 85%, KDPI > 85%, PHS increased risk, DCD, HCV+), financial aspects, surgical procedures, dietary instruction pre- and post-transplant, health maintenance pre- and post-transplant, post-transplant hospitalization and outpatient follow-up, potential to participate in a research protocol, and medication management and side effects. A question and answer session was provided after the presentation.    The patient was seen by all members of the multi-disciplinary team to include: Nephrologist/PA, , , Pharmacist and Dietician (if applicable).    The patient reviewed and signed all consents for evaluation which were witnessed and sent to scanning into the EPIC chart.    The patient was given an education book and plan for further evaluation based on her individual assessment.      The patient was encouraged to call with any questions or concerns.

## 2024-02-01 NOTE — LETTER
February 5, 2024        Brice Mike  [address] [facility] [address]  Phone: [phone]  Fax: [phone]             [facility]  [address]  [facility] [address]  Phone: [phone]   Patient: Joann Kenney   MR Number: [id number]   YOB: 1960   Date of Visit: 2/1/2024       Dear Dr. Brice Mike    Thank you for referring Joann Kenney to me for evaluation. Attached you will find relevant portions of my assessment and plan of care.    If you have questions, please do not hesitate to call me. I look forward to following Joann Kenney along with you.    Sincerely,    Conchita Waldrop, NP    Enclosure    If you would like to receive this communication electronically, please contact [email or web address] or [phone] to request TIME PLUS Q Link access.    TIME PLUS Q Link is a tool which provides read-only access to select patient information with whom you have a relationship. Its easy to use and provides real time access to review your patients record including encounter summaries, notes, results, and demographic information.    If you feel you have received this communication in error or would no longer like to receive these types of communications, please e-mail [email or web address]

## 2024-02-02 NOTE — PROGRESS NOTES
Transplant Recipient Adult Psychosocial Assessment    Patient presented to clinic with her friend Annalise Kenney  [address]  [facility] [address]  Telephone Information:   Mobile [phone]   Home  [phone] (home)  Work  There is no work phone number on file.  E-mail  [email or web address]    Sex: female  YOB: 1960  Age: 63 y.o.    Encounter Date: 2/1/2024  [place] Citizen: yes  Primary Language: English   Needed: no    Emergency Contact:  Name: Sarah Lott  Relationship: daughter  Address: [place], [place]  Phone Numbers:   [phone] (mobile)    Family/Social Support:   Number of dependents/: patient denied   Marital history: patient reports never being   Other family dynamics: Patient reports having two adult children who are supportive.     Household Composition:    Patient reports she lives alone  Do you and your caregivers have access to reliable transportation? yes  PRIMARY CAREGIVER: Sarah Lott will be primary caregiver, phone number [phone].      provided in-depth information to patient and caregiver regarding pre- and post-transplant caregiver role.   strongly encourages patient and caregiver to have concrete plan regarding post-transplant care giving, including back-up caregiver(s) to ensure care giving needs are met as needed.    Patient states understanding all aspects of caregiver role/commitment.    Patient verbalizes understanding of the education provided today.         remains available. Patient agree to contact  in a timely manner if concerns arise.      Able to take time off work without financial concerns: yes per patient     Additional Significant Others who will Assist with Transplant:  Name: Ranjan Kenney  Age: 37  City: [place] State: [place]  Relationship: son  Does person drive? yes    Living Will: no  Healthcare Power of : no  Advance Directives on file:  <<no information> per medical record.  Verbally reviewed LW/HCPA information.   provided patient with copy of LW/HCPA documents and provided education on completion of forms.    Living Donors: Education and resource information given to patient.    Highest Education Level: High School (9-12) or GED  Reading Ability: 12th grade  Reports difficulty with: seeing and hearing  Learns Best By:  A combination of methods     Status: no  VA Benefits: no     Working for Income: No  If no, reason not working: Disability  Patient is disabled.  Prior to disability, patient  was employed at a phone company.    Spouse/Significant Other Employment: N/A    Disabled: yes: date disability began: , due to: back injury.    Monthly Income:   Disability: $700  Able to afford all costs now and if transplanted, including medications: yes  Patient verbalizes understanding of personal responsibilities related to transplant costs and the importance of having a financial plan to ensure that patients transplant costs are fully covered.      provided fundraising information/education.  Patient verbalizes understanding.   remains available.    Insurance:   Payer/Plan Subscr  Sex Relation Sub. Ins. ID Effective Group Num   1. HUMANA MANAGE* DAVID CASILLAS 1960 Female Self [id number] 3/1/20 [id number]                                   [address] [address]   2. [place]* DAVID CASILLAS 1960 Female Self [id number]* 10/1/04                                    [address] [address]     Primary Insurance (for UNOS reporting): Public Insurance - Medicare & Choice  Secondary Insurance (for UNOS reporting): Public Insurance - Medicaid  Patient verbalizes clear understanding that patient may experience difficulty obtaining and/or be denied insurance coverage post-surgery. This includes and is not limited to disability insurance, life insurance, health insurance, burial insurance, long term care insurance,  "and other insurances.    Patient also reports understanding that future health concerns related to or unrelated to transplantation may not be covered by patient's insurance.  Resources and information provided and reviewed.      Patient provides verbal permission to release any necessary information to outside resources for patient care and discharge planning.  Resources and information provided are reviewed.      Dialysis Adherence:  Patient reports she is on PD, at [facility].     Infusion Service: patient utilizing? no  Home Health: patient utilizing? yes Patient reports she has a home aide through "[employer]"  DME: yes PD equipment, Shower Chair, Wheel Chair, Rollator, CPAP  Pulmonary/Cardiac Rehab: Patient denied    ADLS:  Patient reports she is "mostly independent" in her ADLs. Patient reports her home aide assists her with dressing when needed.     Adherence:   Patient reports she is adherent with to her medical care.  Adherence education and counseling provided.     Per History Section:  Past Medical History:   Diagnosis Date    Anemia     Anxiety     Atrial fibrillation     Coronary artery disease     Depression     Diabetes mellitus, type 2     Disorder of kidney and ureter     Heart murmur     Hyperlipidemia     Hypertension     Obesity     ALEJA (obstructive sleep apnea)     Proteinuria     Solitary kidney     Stroke      Social History     Tobacco Use    Smoking status: Former    Smokeless tobacco: Never   Substance Use Topics    Alcohol use: Not Currently     Social History     Substance and Sexual Activity   Drug Use Never     Social History     Substance and Sexual Activity   Sexual Activity Not Currently    Partners: Male       Per Today's Psychosocial:  Tobacco: none, patient denies any use.  Alcohol: none, patient denies any use.  Illicit Drugs/Non-prescribed Medications: none, patient denies any use.    Patient states clear understanding of the potential impact of substance use as it relates " to transplant candidacy and is aware of possible random substance screening.  Substance abstinence/cessation counseling, education and resources provided and reviewed.     Arrests/DWI/Treatment/Rehab: patient denies    Psychiatric History:    Mental Health:  Patient reports depression. Patient reports receiving mental health medication through her nephrologist and denied wanting referrals and resources at this time.  left a voicemail with DUSW to discuss further.   Psychiatrist/Counselor: None  Medications:  Xanax & Cymbalta. Patient reports xanax works well for her. Patient reports Cymbalta has not been as helpful.  encouraged patient to discuss this with her prescriber. Patient reports she will do so.   Suicide/Homicide Issues: Patient denied current or past thoughts of suicidal or homicidal ideation.    Safety at home: Patient reports feeling safe in her home.     Knowledge: Patient states having clear understanding and realistic expectations regarding the potential risks and potential benefits of organ transplantation and organ donation, agrees to discuss with health care team members and support system members, and to utilize available resources and express questions and/or concerns in order to further facilitate the pt informed decision-making.  Resources and information provided and reviewed.     Patient is aware of [facility] affiliation and/or partnership with agencies in home health care, LTAC, SNF, [facility], and other hospitals and clinics.    Understanding: Patient reports having a clear understanding of the many lifetime commitments involved with being a transplant recipient, including costs, compliance, medications, lab work, procedures, appointments, concrete and financial planning, preparedness, timely and appropriate communication of concerns, abstinence (ETOH, tobacco, illicit non-prescribed drugs), adherence to all health care team recommendations, support system and caregiver  involvement, appropriate and timely resource utilization and follow-through, mental health counseling as needed/recommended, and patient and caregiver responsibilities.  Social Service Handbook, resources and detailed educational information provided and reviewed.  Educational information provided.    Patient also reports current and expected compliance with health care regime and states having a clear understanding of the importance of compliance.      Patient reports a clear understanding that risks and benefits may be involved with organ transplantation and with organ donation.      Patient also reports clear understanding that psychosocial risk factors may affect patient, and include but are not limited to feelings of depression, generalized anxiety, anxiety regarding dependence on others, post traumatic stress disorder, feelings of guilt and other emotional and/or mental concerns, and/or exacerbation of existing mental health concerns.  Detailed resources provided and discussed.     Patient agrees to access appropriate resources in a timely manner as needed and/or as recommended, and to communicate concerns appropriately.  Patient also reports a clear understanding of treatment options available.      reviewed education, provided additional information, and answered questions.    Feelings or Concerns: Patient denied additional concerns at this time.     Coping: Identify Patient & Caregiver Strategies to [place]:   1. In the past - family and friend support.    2. Currently & Pre-transplant - family and friend support.    3. At the time of surgery - family and friend support.    4. During post-Transplant & Recovery Period - family and friend support.     Goals: Patient reports her goal is to live a healthier life.  Patient referred to Vocational Rehabilitation.    Interview Behavior: Patient presents as alert and oriented x 4, pleasant, good eye contact, well groomed, recall good,  concentration/judgement good, average intelligence, calm, communicative, cooperative, and asking and answering questions appropriately.          Transplant Social Work - Candidacy  Assessment/Plan:     Psychosocial Suitability: Patient presents as a suitable candidate for kidney transplant at this time. Based on psychosocial risk factors, patient presents as medium risk. Patient reports suitable care giving, and transportation plan. Patients friend Sherin presented to clinic with her, and Sherin reports she is not a caregiver for patient. Patient reports depression symptoms. Final candidacy to be determined by transplant selection committee.     Recommendations/Additional Comments: Patient reports understanding she is responsible for her own lodging and transportation plan post transplant.  recommends patient present to future transplant listed clinic visits with her caregiver.  recommends patient engage in fundraising to assist with costs.  recommends patient remain abstinent from etoh, tobacco, and other substances. Patient agrees to contact transplant team with needs, questions, or concerns as they arise.     Laura Wood LMSW

## 2024-02-07 ENCOUNTER — TELEPHONE (OUTPATIENT)
Dept: TRANSPLANT | Facility: HOSPITAL | Age: 64
End: 2024-02-07
Payer: MEDICARE

## 2024-02-07 NOTE — TELEPHONE ENCOUNTER
"Received dialysis adherence form from patients unit. Patients dialysis unit reports the following:    AMA: 0    No Shows: " Completes all PD treatment and documents data; attends clinic appointment"    Concerns with caregivers: No, "daughter"    Concerns with transportation: No, "independent"    Psychosocial concerns: No, "takes Rx for depression; has had psych clearance"         spoke with MARSHA Escobedo over the phone.  discussed patients reported depression. MARSHA reports she will follow up.       Laura Wood LMSW   " n/a

## 2024-04-03 PROCEDURE — 86832 HLA CLASS I HIGH DEFIN QUAL: CPT | Performed by: NURSE PRACTITIONER

## 2024-04-03 PROCEDURE — 86833 HLA CLASS II HIGH DEFIN QUAL: CPT | Performed by: NURSE PRACTITIONER

## 2024-04-09 ENCOUNTER — LAB VISIT (OUTPATIENT)
Dept: LAB | Facility: HOSPITAL | Age: 64
End: 2024-04-09
Payer: MEDICARE

## 2024-04-09 DIAGNOSIS — Z76.82 ORGAN TRANSPLANT CANDIDATE: ICD-10-CM

## 2024-04-19 ENCOUNTER — TELEPHONE (OUTPATIENT)
Dept: TRANSPLANT | Facility: CLINIC | Age: 64
End: 2024-04-19
Payer: MEDICARE

## 2024-04-22 DIAGNOSIS — Z76.82 PATIENT ON WAITING LIST FOR KIDNEY TRANSPLANT: ICD-10-CM

## 2024-04-22 DIAGNOSIS — Z76.82 ORGAN TRANSPLANT CANDIDATE: Primary | ICD-10-CM

## 2024-04-22 NOTE — PROGRESS NOTES
YEARLY LIST MANAGEMENT NOTE    Joann Kenney's kidney transplant listing status reviewed.  Patient is due for follow-up appointments in August 2024.  Appointments will be scheduled per protocol.  HLA sample is up to date and being received on a regular basis. YEARLY LIST MANAGEMENT NOTE

## 2024-04-30 LAB — HPRA INTERPRETATION: NORMAL

## 2024-05-13 ENCOUNTER — TELEPHONE (OUTPATIENT)
Dept: TRANSPLANT | Facility: CLINIC | Age: 64
End: 2024-05-13
Payer: MEDICARE

## 2024-05-26 ENCOUNTER — HOSPITAL ENCOUNTER (EMERGENCY)
Facility: HOSPITAL | Age: 64
Discharge: HOME | End: 2024-05-26
Attending: INTERNAL MEDICINE
Payer: MEDICARE

## 2024-05-26 VITALS
DIASTOLIC BLOOD PRESSURE: 64 MMHG | OXYGEN SATURATION: 98 % | HEIGHT: 65 IN | WEIGHT: 185 LBS | BODY MASS INDEX: 30.82 KG/M2 | HEART RATE: 68 BPM | SYSTOLIC BLOOD PRESSURE: 151 MMHG | TEMPERATURE: 98 F | RESPIRATION RATE: 20 BRPM

## 2024-05-26 DIAGNOSIS — K11.5 SALIVARY STONE: Primary | ICD-10-CM

## 2024-05-26 DIAGNOSIS — J02.9 SORE THROAT: ICD-10-CM

## 2024-05-26 PROCEDURE — 99281 EMR DPT VST MAYX REQ PHY/QHP: CPT | Mod: NTX

## 2024-05-26 RX ORDER — CHLORHEXIDINE GLUCONATE ORAL RINSE 1.2 MG/ML
15 SOLUTION DENTAL 2 TIMES DAILY
COMMUNITY
Start: 2024-05-22

## 2024-05-26 RX ORDER — DIPYRIDAMOLE 50 MG
50 TABLET ORAL 2 TIMES DAILY
COMMUNITY
Start: 2024-04-17

## 2024-05-26 RX ORDER — POTASSIUM CHLORIDE 20 MEQ/1
20 TABLET, EXTENDED RELEASE ORAL DAILY
COMMUNITY
Start: 2024-05-12

## 2024-05-26 RX ORDER — PRIMIDONE 50 MG/1
50 TABLET ORAL NIGHTLY
COMMUNITY
Start: 2024-04-12

## 2024-05-26 RX ORDER — ISOSORBIDE MONONITRATE 30 MG/1
30 TABLET, EXTENDED RELEASE ORAL EVERY MORNING
COMMUNITY
Start: 2024-04-29

## 2024-05-26 RX ORDER — ROPINIROLE 0.25 MG/1
0.25 TABLET, FILM COATED ORAL NIGHTLY
COMMUNITY

## 2024-05-26 RX ORDER — OXYCODONE AND ACETAMINOPHEN 5; 325 MG/1; MG/1
1 TABLET ORAL EVERY 8 HOURS PRN
COMMUNITY
Start: 2024-05-23

## 2024-05-26 RX ORDER — CLINDAMYCIN HYDROCHLORIDE 300 MG/1
300 CAPSULE ORAL EVERY 6 HOURS
COMMUNITY
Start: 2024-05-22

## 2024-05-26 RX ORDER — MINOXIDIL 2.5 MG/1
1 TABLET ORAL 2 TIMES DAILY
COMMUNITY
Start: 2024-03-14

## 2024-05-26 NOTE — DISCHARGE INSTRUCTIONS
Continue antibiotic as prescribed. Can continue warm salt water swish and spit. Hard candies to help with pain from the salivary stone. Follow-up with ENT if not resolving. Return for fever/chills if symptoms get worse.

## 2024-05-26 NOTE — ED TRIAGE NOTES
Pt c/o sore throat onset 4 days ago. States was told by urgent care Friday that she had an abscess and is currently on antibiotics.

## 2024-05-26 NOTE — ED PROVIDER NOTES
Encounter Date: 5/26/2024       History     Chief Complaint   Patient presents with    Sore Throat     Pt c/o sore throat onset 4 days ago. States was told by urgent care Friday that she had an abscess and is currently on antibiotics.     See MDM.     The history is provided by the patient. No  was used.     Review of patient's allergies indicates:  No Known Allergies  Past Medical History:   Diagnosis Date    Anemia     Anxiety     Atrial fibrillation     Coronary artery disease     Depression     Diabetes mellitus, type 2     Disorder of kidney and ureter     Heart murmur     Hyperlipidemia     Hypertension     Obesity     ALEJA (obstructive sleep apnea)     Proteinuria     Solitary kidney     Stroke      Past Surgical History:   Procedure Laterality Date    CORONARY ANGIOPLASTY WITH STENT PLACEMENT      HYSTERECTOMY      INCONTINENCE SURGERY      INSERTION OF IMPLANTABLE LOOP RECORDER      internal heart monitor      PERITONEAL CATHETER INSERTION       No family history on file.  Social History     Tobacco Use    Smoking status: Former    Smokeless tobacco: Never   Substance Use Topics    Alcohol use: Not Currently    Drug use: Never     Review of Systems   Constitutional:  Negative for chills and fever.   HENT:  Positive for dental problem (tooth pain) and sore throat. Negative for ear pain.    Eyes:  Negative for photophobia, discharge and visual disturbance.   Respiratory:  Negative for cough and shortness of breath.    Cardiovascular:  Negative for chest pain.   Gastrointestinal:  Negative for constipation, diarrhea, nausea and vomiting.   Genitourinary:  Negative for dysuria and hematuria.   All other systems reviewed and are negative.      Physical Exam     Initial Vitals [05/26/24 1509]   BP Pulse Resp Temp SpO2   (!) 151/64 68 20 98.1 °F (36.7 °C) 98 %      MAP       --         Physical Exam    Nursing note and vitals reviewed.  Constitutional: She appears well-developed and  well-nourished. She is not diaphoretic. No distress.   HENT:   Mouth/Throat: Uvula is midline, oropharynx is clear and moist and mucous membranes are normal. No trismus in the jaw. No dental abscesses.   0.5cm opening with white rim with just left lateral of midline sublingual without purulence, other drainage. Not tender to palpation. No surrounding erythema, warmth.    Eyes: EOM are normal. Pupils are equal, round, and reactive to light.   Neck: Neck supple.   Normal range of motion.  Cardiovascular:  Normal rate.           Pulmonary/Chest: No respiratory distress.   Musculoskeletal:      Cervical back: Normal range of motion and neck supple.     Lymphadenopathy:     She has cervical adenopathy (left tender lymphadenopathy).   Neurological: She is alert and oriented to person, place, and time.   Skin: Skin is warm and dry.         ED Course   Procedures  Labs Reviewed - No data to display       Imaging Results    None          Medications - No data to display  Medical Decision Making  Pt is a 62 y/o female with history of HTN, HLD, DM, ESRD on PD who presents for sore throat and ulcer under her tongue x2 days. States that she had a sore throat and tooth pain starting Wednesday and went to Urgent care on Friday. She was treated with antibiotics and given a swish and spit. States that the sore throat and tooth pain has improved since being on the antibiotics. Concern today is an ulcer under her tongue that appeared two days ago. Notes that last night a large white stone came out of the ulcer, did not take a picture or bring the stone. Also notes that she was having continuous purulent drainage prior to the stone. No current drainage. States that her left cheek was swollen as well but that it is resolving. She denies fevers or chills. States has been compliant with clindamycin, was started on 5/22 and still has a few days left. She was told by her dialysis doctor to come to [facility] facility if the ulcer does not  improve by Friday because there is an ENT specialist on staff that could treat her.     Discussed with patient that likely suffering from sublingual gland stone. Site is non tender, no fevers or chills. Afebrile, in no acute distress. Ulcer has only been present for about 2 days. Discussed with patient she should continue to take antibiotic, warm salt water swish and spit, and can add sour candies to help expel any further stones. She knows to return with fever, chills, increasing pain. Explained if no improvement in a few weeks she can follow-up with ENT. Patient expressed understanding and was in agreement with plan.     Amount and/or Complexity of Data Reviewed  Discussion of management or test interpretation with external provider(s): Discussed patient with Dr. Tipton who had face to face with patient.       Additional MDM:   Differential Diagnosis:   Other: The following diagnoses were also considered and will be evaluated: Fabrizio's angina, sialadenitis and dental infection.                                   Clinical Impression:  Final diagnoses:  [K11.5] Salivary stone (Primary)  [J02.9] Sore throat          ED Disposition Condition    Discharge Stable          ED Prescriptions    None       Follow-up Information       Follow up With Specialties Details Why Contact Info    Vijay Blake MD Nephrology Call in 1 week  [address] [facility] [id number] [phone]               Crissy Lema PA  05/26/24 1171

## 2024-07-22 ENCOUNTER — TELEPHONE (OUTPATIENT)
Dept: TRANSPLANT | Facility: CLINIC | Age: 64
End: 2024-07-22
Payer: MEDICARE

## 2024-07-22 NOTE — TELEPHONE ENCOUNTER
ON-CALL NOTE    UNOS# [id number]    Notified by Ko Bustamante, , that Joann Kenney is eligible for kidney offer.  Spoke with patient and identified no acute medical issues with telephone assessment. Protocol script read to patient regarding N/A, standard donor offer. Patient verbalized understanding, all questions answered, patient accepts organ offer. Notified by Ko Bustamante that virtual crossmatch is negative. If called in for transplant patient would need to provide fresh sample for Retrospective.  Patient reports no sensitizing event since last blood sample for PRA received. Will notify HLA is patient becomes Primary and is admitted to draw Retrospective crossmatch.    Patient was asked if they have had a positive COVID-19 test or if they have any signs or symptoms. Informed patient that they will be tested for COVID-19 upon arrival to the hospital, unless have a previous positive result. If tested and result is positive, the transplant will not be able to occur, they will be inactivated on the wait list for 21 days per protocol and required to quarantine.     Patient instructed to hold daily ASA 81 mg (last dose 7/22/2024 am) and Mounjaro (last dose 2 weeks) until further instructed. Patient informed to continue normal activities, allow 300cc of pD fluid to dwell in cavity in case called in will need to culture fluid and to remain at home on standby and await updates. Patient verbalized understanding. All questions answered to patient's satisfaction.    7/23/2024 @ 0800 am- Report given May BENSON RN

## 2024-07-23 ENCOUNTER — TELEPHONE (OUTPATIENT)
Dept: TRANSPLANT | Facility: CLINIC | Age: 64
End: 2024-07-23
Payer: MEDICARE

## 2024-07-23 DIAGNOSIS — Z99.2 ESRD ON PERITONEAL DIALYSIS: Primary | ICD-10-CM

## 2024-07-23 DIAGNOSIS — N18.6 ESRD ON PERITONEAL DIALYSIS: Primary | ICD-10-CM

## 2024-07-23 NOTE — TELEPHONE ENCOUNTER
[facility] returned pt's call.  Pt reports being told by [facility] staff that there are no rooms available for reservation at this time. Pt also reports not being able to afford hotel room costs and is inquiring about any assistance offered by transplant team.  ASH informed pt that unfortunately transplant team is unable to provide such assistance at this time.  ASH did inform pt that hospitalized transplant pts are allowed to have 1 caregiver present in hospital room 24/7.  Per pt's request, ASH also provided pt with list of local hotels via email ([email or web address]) to consider.  Pt denies having any additional questions or concerns at this time.    ----- Message from Latha Draper sent at 7/23/2024  1:45 PM CDT -----  Regarding: Lodging      Name Of Caller:   Joann      Contact Preference:   [phone]      Nature of call:   Pt states they're on stand-by for a kidney today. She would like assistance reserving a room at the [facility].

## 2024-07-24 ENCOUNTER — TELEPHONE (OUTPATIENT)
Dept: TRANSPLANT | Facility: CLINIC | Age: 64
End: 2024-07-24
Payer: MEDICARE

## 2024-07-24 ENCOUNTER — ANESTHESIA (OUTPATIENT)
Dept: SURGERY | Facility: HOSPITAL | Age: 64
DRG: 652 | End: 2024-07-24
Payer: MEDICARE

## 2024-07-24 ENCOUNTER — HOSPITAL ENCOUNTER (INPATIENT)
Facility: HOSPITAL | Age: 64
LOS: 3 days | Discharge: HOME HEALTH | DRG: 652 | End: 2024-07-27
Attending: TRANSPLANT SURGERY | Admitting: SURGERY
Payer: MEDICARE

## 2024-07-24 ENCOUNTER — ANESTHESIA EVENT (OUTPATIENT)
Dept: SURGERY | Facility: HOSPITAL | Age: 64
DRG: 652 | End: 2024-07-24
Payer: MEDICARE

## 2024-07-24 DIAGNOSIS — I15.0 RENOVASCULAR HYPERTENSION: ICD-10-CM

## 2024-07-24 DIAGNOSIS — E08.22 DIABETES MELLITUS DUE TO UNDERLYING CONDITION WITH CHRONIC KIDNEY DISEASE ON CHRONIC DIALYSIS, WITH LONG-TERM CURRENT USE OF INSULIN: ICD-10-CM

## 2024-07-24 DIAGNOSIS — E66.09 CLASS 1 OBESITY DUE TO EXCESS CALORIES WITH SERIOUS COMORBIDITY AND BODY MASS INDEX (BMI) OF 30.0 TO 30.9 IN ADULT: ICD-10-CM

## 2024-07-24 DIAGNOSIS — K11.20 SALIVARY GLAND INFECTION: ICD-10-CM

## 2024-07-24 DIAGNOSIS — E11.22 TYPE 2 DM WITH HYPERTENSION AND ESRD ON DIALYSIS: ICD-10-CM

## 2024-07-24 DIAGNOSIS — Z01.818 PRE-OP EVALUATION: ICD-10-CM

## 2024-07-24 DIAGNOSIS — D62 ACUTE BLOOD LOSS ANEMIA: ICD-10-CM

## 2024-07-24 DIAGNOSIS — Z99.2 DIABETES MELLITUS DUE TO UNDERLYING CONDITION WITH CHRONIC KIDNEY DISEASE ON CHRONIC DIALYSIS, WITH LONG-TERM CURRENT USE OF INSULIN: ICD-10-CM

## 2024-07-24 DIAGNOSIS — Z99.2 ESRD ON PERITONEAL DIALYSIS: ICD-10-CM

## 2024-07-24 DIAGNOSIS — Z99.2 TYPE 2 DM WITH HYPERTENSION AND ESRD ON DIALYSIS: ICD-10-CM

## 2024-07-24 DIAGNOSIS — Z79.4 DIABETES MELLITUS DUE TO UNDERLYING CONDITION WITH CHRONIC KIDNEY DISEASE ON CHRONIC DIALYSIS, WITH LONG-TERM CURRENT USE OF INSULIN: ICD-10-CM

## 2024-07-24 DIAGNOSIS — T38.0X5A ADRENAL CORTICOSTEROID CAUSING ADVERSE EFFECT IN THERAPEUTIC USE: ICD-10-CM

## 2024-07-24 DIAGNOSIS — Z94.0 S/P KIDNEY TRANSPLANT: Primary | ICD-10-CM

## 2024-07-24 DIAGNOSIS — Z79.60 LONG-TERM USE OF IMMUNOSUPPRESSANT MEDICATION: ICD-10-CM

## 2024-07-24 DIAGNOSIS — Z76.82 KIDNEY TRANSPLANT CANDIDATE: ICD-10-CM

## 2024-07-24 DIAGNOSIS — Z29.89 PROPHYLACTIC IMMUNOTHERAPY: ICD-10-CM

## 2024-07-24 DIAGNOSIS — I25.10 CORONARY ARTERY DISEASE INVOLVING NATIVE CORONARY ARTERY OF NATIVE HEART WITHOUT ANGINA PECTORIS: ICD-10-CM

## 2024-07-24 DIAGNOSIS — N18.6 ANEMIA IN ESRD (END-STAGE RENAL DISEASE): ICD-10-CM

## 2024-07-24 DIAGNOSIS — D63.1 ANEMIA IN ESRD (END-STAGE RENAL DISEASE): ICD-10-CM

## 2024-07-24 DIAGNOSIS — N18.6 TYPE 2 DM WITH HYPERTENSION AND ESRD ON DIALYSIS: ICD-10-CM

## 2024-07-24 DIAGNOSIS — N18.6 ESRD ON PERITONEAL DIALYSIS: ICD-10-CM

## 2024-07-24 DIAGNOSIS — I12.0 TYPE 2 DM WITH HYPERTENSION AND ESRD ON DIALYSIS: ICD-10-CM

## 2024-07-24 DIAGNOSIS — N18.6 DIABETES MELLITUS DUE TO UNDERLYING CONDITION WITH CHRONIC KIDNEY DISEASE ON CHRONIC DIALYSIS, WITH LONG-TERM CURRENT USE OF INSULIN: ICD-10-CM

## 2024-07-24 LAB
ABO + RH BLD: NORMAL
ALBUMIN SERPL BCP-MCNC: 2.1 G/DL (ref 3.5–5.2)
ALBUMIN SERPL BCP-MCNC: 2.1 G/DL (ref 3.5–5.2)
ALBUMIN SERPL BCP-MCNC: 2.6 G/DL (ref 3.5–5.2)
ALP SERPL-CCNC: 55 U/L (ref 55–135)
ALT SERPL W/O P-5'-P-CCNC: 10 U/L (ref 10–44)
ANION GAP SERPL CALC-SCNC: 12 MMOL/L (ref 8–16)
ANION GAP SERPL CALC-SCNC: 12 MMOL/L (ref 8–16)
ANION GAP SERPL CALC-SCNC: 9 MMOL/L (ref 8–16)
APPEARANCE FLD: NORMAL
APTT PPP: 27.6 SEC (ref 21–32)
AST SERPL-CCNC: 12 U/L (ref 10–40)
BASOPHILS # BLD AUTO: 0.01 K/UL (ref 0–0.2)
BASOPHILS # BLD AUTO: 0.09 K/UL (ref 0–0.2)
BASOPHILS NFR BLD: 0.1 % (ref 0–1.9)
BASOPHILS NFR BLD: 0.9 % (ref 0–1.9)
BILIRUB SERPL-MCNC: 0.4 MG/DL (ref 0.1–1)
BLD GP AB SCN CELLS X3 SERPL QL: NORMAL
BODY FLD TYPE: NORMAL
BUN SERPL-MCNC: 42 MG/DL (ref 8–23)
BUN SERPL-MCNC: 43 MG/DL (ref 8–23)
BUN SERPL-MCNC: 44 MG/DL (ref 8–23)
CALCIUM SERPL-MCNC: 8.7 MG/DL (ref 8.7–10.5)
CALCIUM SERPL-MCNC: 8.9 MG/DL (ref 8.7–10.5)
CALCIUM SERPL-MCNC: 9.9 MG/DL (ref 8.7–10.5)
CHLORIDE SERPL-SCNC: 107 MMOL/L (ref 95–110)
CHLORIDE SERPL-SCNC: 108 MMOL/L (ref 95–110)
CHLORIDE SERPL-SCNC: 109 MMOL/L (ref 95–110)
CLASS I ANTIBODIES - LUMINEX: NORMAL
CLASS I ANTIBODY COMMENTS - LUMINEX: NORMAL
CLASS II ANTIBODIES - LUMINEX: NEGATIVE
CO2 SERPL-SCNC: 20 MMOL/L (ref 23–29)
CO2 SERPL-SCNC: 22 MMOL/L (ref 23–29)
CO2 SERPL-SCNC: 23 MMOL/L (ref 23–29)
COLOR FLD: COLORLESS
CPRA %: 24
CREAT SERPL-MCNC: 8.1 MG/DL (ref 0.5–1.4)
CREAT SERPL-MCNC: 8.2 MG/DL (ref 0.5–1.4)
CREAT SERPL-MCNC: 8.2 MG/DL (ref 0.5–1.4)
DIFFERENTIAL METHOD BLD: ABNORMAL
DIFFERENTIAL METHOD BLD: ABNORMAL
EOSINOPHIL # BLD AUTO: 0 K/UL (ref 0–0.5)
EOSINOPHIL # BLD AUTO: 0.3 K/UL (ref 0–0.5)
EOSINOPHIL NFR BLD: 0.1 % (ref 0–8)
EOSINOPHIL NFR BLD: 2.8 % (ref 0–8)
ERYTHROCYTE [DISTWIDTH] IN BLOOD BY AUTOMATED COUNT: 12.5 % (ref 11.5–14.5)
ERYTHROCYTE [DISTWIDTH] IN BLOOD BY AUTOMATED COUNT: 12.6 % (ref 11.5–14.5)
EST. GFR  (NO RACE VARIABLE): 5 ML/MIN/1.73 M^2
EST. GFR  (NO RACE VARIABLE): 5 ML/MIN/1.73 M^2
EST. GFR  (NO RACE VARIABLE): 5.1 ML/MIN/1.73 M^2
ESTIMATED AVG GLUCOSE: 91 MG/DL (ref 68–131)
ESTIMATED AVG GLUCOSE: 91 MG/DL (ref 68–131)
GLUCOSE SERPL-MCNC: 151 MG/DL (ref 70–110)
GLUCOSE SERPL-MCNC: 88 MG/DL (ref 70–110)
GLUCOSE SERPL-MCNC: 89 MG/DL (ref 70–110)
HBA1C MFR BLD: 4.8 % (ref 4–5.6)
HBA1C MFR BLD: 4.8 % (ref 4–5.6)
HBV CORE AB SERPL QL IA: NORMAL
HBV CORE IGM SERPL QL IA: NORMAL
HBV SURFACE AG SERPL QL IA: NORMAL
HCT VFR BLD AUTO: 24.2 % (ref 37–48.5)
HCT VFR BLD AUTO: 27.6 % (ref 37–48.5)
HCT VFR BLD AUTO: 28 % (ref 37–48.5)
HCV AB SERPL QL IA: NORMAL
HCV RNA SERPL QL NAA+PROBE: NOT DETECTED
HCV RNA SPEC NAA+PROBE-ACNC: NOT DETECTED IU/ML
HGB BLD-MCNC: 8.9 G/DL (ref 12–16)
HGB BLD-MCNC: 9.1 G/DL (ref 12–16)
HIV 1+2 AB+HIV1 P24 AG SERPL QL IA: NORMAL
IMM GRANULOCYTES # BLD AUTO: 0.07 K/UL (ref 0–0.04)
IMM GRANULOCYTES # BLD AUTO: 0.11 K/UL (ref 0–0.04)
IMM GRANULOCYTES NFR BLD AUTO: 0.7 % (ref 0–0.5)
IMM GRANULOCYTES NFR BLD AUTO: 0.7 % (ref 0–0.5)
INR PPP: 0.9 (ref 0.8–1.2)
LYMPHOCYTES # BLD AUTO: 0.1 K/UL (ref 1–4.8)
LYMPHOCYTES # BLD AUTO: 2.1 K/UL (ref 1–4.8)
LYMPHOCYTES NFR BLD: 0.3 % (ref 18–48)
LYMPHOCYTES NFR BLD: 21 % (ref 18–48)
LYMPHOCYTES NFR FLD MANUAL: 15 %
MCH RBC QN AUTO: 30.7 PG (ref 27–31)
MCH RBC QN AUTO: 31.5 PG (ref 27–31)
MCHC RBC AUTO-ENTMCNC: 32.2 G/DL (ref 32–36)
MCHC RBC AUTO-ENTMCNC: 32.5 G/DL (ref 32–36)
MCV RBC AUTO: 95 FL (ref 82–98)
MCV RBC AUTO: 97 FL (ref 82–98)
MONOCYTES # BLD AUTO: 0.2 K/UL (ref 0.3–1)
MONOCYTES # BLD AUTO: 0.5 K/UL (ref 0.3–1)
MONOCYTES NFR BLD: 1.4 % (ref 4–15)
MONOCYTES NFR BLD: 5.2 % (ref 4–15)
MONOS+MACROS NFR FLD MANUAL: 78 %
NEUTROPHILS # BLD AUTO: 14.8 K/UL (ref 1.8–7.7)
NEUTROPHILS # BLD AUTO: 7.1 K/UL (ref 1.8–7.7)
NEUTROPHILS NFR BLD: 69.4 % (ref 38–73)
NEUTROPHILS NFR BLD: 97.4 % (ref 38–73)
NEUTROPHILS NFR FLD MANUAL: 7 %
NRBC BLD-RTO: 0 /100 WBC
NRBC BLD-RTO: 0 /100 WBC
OHS QRS DURATION: 100 MS
OHS QTC CALCULATION: 425 MS
PHOSPHATE SERPL-MCNC: 7.5 MG/DL (ref 2.7–4.5)
PHOSPHATE SERPL-MCNC: 7.5 MG/DL (ref 2.7–4.5)
PHOSPHATE SERPL-MCNC: 8.2 MG/DL (ref 2.7–4.5)
PLATELET # BLD AUTO: 172 K/UL (ref 150–450)
PLATELET # BLD AUTO: 256 K/UL (ref 150–450)
PMV BLD AUTO: 10.2 FL (ref 9.2–12.9)
PMV BLD AUTO: 10.3 FL (ref 9.2–12.9)
POCT GLUCOSE: 123 MG/DL (ref 70–110)
POCT GLUCOSE: 87 MG/DL (ref 70–110)
POCT GLUCOSE: 91 MG/DL (ref 70–110)
POCT GLUCOSE: 98 MG/DL (ref 70–110)
POTASSIUM SERPL-SCNC: 4.3 MMOL/L (ref 3.5–5.1)
POTASSIUM SERPL-SCNC: 4.7 MMOL/L (ref 3.5–5.1)
POTASSIUM SERPL-SCNC: 5 MMOL/L (ref 3.5–5.1)
PROT SERPL-MCNC: 5.6 G/DL (ref 6–8.4)
PROTHROMBIN TIME: 10.2 SEC (ref 9–12.5)
PTH-INTACT SERPL-MCNC: 824.8 PG/ML (ref 9–77)
RBC # BLD AUTO: 2.89 M/UL (ref 4–5.4)
RBC # BLD AUTO: 2.9 M/UL (ref 4–5.4)
SERUM COLLECTION DT - LUMINEX CLASS I: NORMAL
SERUM COLLECTION DT - LUMINEX CLASS II: NORMAL
SODIUM SERPL-SCNC: 140 MMOL/L (ref 136–145)
SODIUM SERPL-SCNC: 140 MMOL/L (ref 136–145)
SODIUM SERPL-SCNC: 142 MMOL/L (ref 136–145)
SPCL1 TESTING DATE: NORMAL
SPCL2 TESTING DATE: NORMAL
SPCLU TESTING DATE: NORMAL
SPECIMEN OUTDATE: NORMAL
WBC # BLD AUTO: 10.16 K/UL (ref 3.9–12.7)
WBC # BLD AUTO: 15.2 K/UL (ref 3.9–12.7)
WBC # FLD: 110 /CU MM

## 2024-07-24 PROCEDURE — 82962 GLUCOSE BLOOD TEST: CPT | Performed by: TRANSPLANT SURGERY

## 2024-07-24 PROCEDURE — 86704 HEP B CORE ANTIBODY TOTAL: CPT | Mod: NTX | Performed by: CLINICAL NURSE SPECIALIST

## 2024-07-24 PROCEDURE — 86826 HLA X-MATCH NONCYTOTOXC ADDL: CPT | Performed by: CLINICAL NURSE SPECIALIST

## 2024-07-24 PROCEDURE — 86825 HLA X-MATH NON-CYTOTOXIC: CPT | Performed by: CLINICAL NURSE SPECIALIST

## 2024-07-24 PROCEDURE — 63600175 PHARM REV CODE 636 W HCPCS: Mod: NTX | Performed by: CLINICAL NURSE SPECIALIST

## 2024-07-24 PROCEDURE — S5010 5% DEXTROSE AND 0.45% SALINE: HCPCS

## 2024-07-24 PROCEDURE — 99222 1ST HOSP IP/OBS MODERATE 55: CPT | Mod: NTX,,, | Performed by: NURSE PRACTITIONER

## 2024-07-24 PROCEDURE — 87205 SMEAR GRAM STAIN: CPT | Mod: NTX | Performed by: CLINICAL NURSE SPECIALIST

## 2024-07-24 PROCEDURE — 36000931 HC OR TIME LEV VII EA ADD 15 MIN: Performed by: TRANSPLANT SURGERY

## 2024-07-24 PROCEDURE — 80069 RENAL FUNCTION PANEL: CPT

## 2024-07-24 PROCEDURE — 36415 COLL VENOUS BLD VENIPUNCTURE: CPT | Performed by: TRANSPLANT SURGERY

## 2024-07-24 PROCEDURE — 27201423 OPTIME MED/SURG SUP & DEVICES STERILE SUPPLY: Performed by: TRANSPLANT SURGERY

## 2024-07-24 PROCEDURE — 71000033 HC RECOVERY, INTIAL HOUR: Performed by: TRANSPLANT SURGERY

## 2024-07-24 PROCEDURE — 27200950 HC CAPD SUPPORT: Mod: NTX

## 2024-07-24 PROCEDURE — 85610 PROTHROMBIN TIME: CPT | Mod: NTX | Performed by: CLINICAL NURSE SPECIALIST

## 2024-07-24 PROCEDURE — 87522 HEPATITIS C REVRS TRNSCRPJ: CPT | Mod: NTX | Performed by: CLINICAL NURSE SPECIALIST

## 2024-07-24 PROCEDURE — 83970 ASSAY OF PARATHORMONE: CPT | Mod: NTX | Performed by: CLINICAL NURSE SPECIALIST

## 2024-07-24 PROCEDURE — C2617 STENT, NON-COR, TEM W/O DEL: HCPCS | Performed by: TRANSPLANT SURGERY

## 2024-07-24 PROCEDURE — 86826 HLA X-MATCH NONCYTOTOXC ADDL: CPT | Mod: 91 | Performed by: CLINICAL NURSE SPECIALIST

## 2024-07-24 PROCEDURE — 87070 CULTURE OTHR SPECIMN AEROBIC: CPT | Mod: NTX | Performed by: CLINICAL NURSE SPECIALIST

## 2024-07-24 PROCEDURE — 86706 HEP B SURFACE ANTIBODY: CPT | Mod: NTX | Performed by: CLINICAL NURSE SPECIALIST

## 2024-07-24 PROCEDURE — 36620 INSERTION CATHETER ARTERY: CPT | Mod: 59,,, | Performed by: ANESTHESIOLOGY

## 2024-07-24 PROCEDURE — 86832 HLA CLASS I HIGH DEFIN QUAL: CPT | Performed by: CLINICAL NURSE SPECIALIST

## 2024-07-24 PROCEDURE — 25000003 PHARM REV CODE 250

## 2024-07-24 PROCEDURE — 25000003 PHARM REV CODE 250: Performed by: PHYSICIAN ASSISTANT

## 2024-07-24 PROCEDURE — D9220A PRA ANESTHESIA: Mod: ANES,,, | Performed by: ANESTHESIOLOGY

## 2024-07-24 PROCEDURE — 86901 BLOOD TYPING SEROLOGIC RH(D): CPT | Mod: NTX | Performed by: CLINICAL NURSE SPECIALIST

## 2024-07-24 PROCEDURE — 63600175 PHARM REV CODE 636 W HCPCS: Performed by: PHYSICIAN ASSISTANT

## 2024-07-24 PROCEDURE — 63600175 PHARM REV CODE 636 W HCPCS: Performed by: TRANSPLANT SURGERY

## 2024-07-24 PROCEDURE — 86825 HLA X-MATH NON-CYTOTOXIC: CPT | Mod: 91 | Performed by: CLINICAL NURSE SPECIALIST

## 2024-07-24 PROCEDURE — 49422 REMOVE TUNNELED IP CATH: CPT | Mod: 51,,, | Performed by: TRANSPLANT SURGERY

## 2024-07-24 PROCEDURE — 86833 HLA CLASS II HIGH DEFIN QUAL: CPT | Performed by: CLINICAL NURSE SPECIALIST

## 2024-07-24 PROCEDURE — 85025 COMPLETE CBC W/AUTO DIFF WBC: CPT | Mod: 91 | Performed by: TRANSPLANT SURGERY

## 2024-07-24 PROCEDURE — 86977 RBC SERUM PRETX INCUBJ/INHIB: CPT | Performed by: CLINICAL NURSE SPECIALIST

## 2024-07-24 PROCEDURE — 99900035 HC TECH TIME PER 15 MIN (STAT)

## 2024-07-24 PROCEDURE — 93010 ELECTROCARDIOGRAM REPORT: CPT | Mod: NTX,,, | Performed by: INTERNAL MEDICINE

## 2024-07-24 PROCEDURE — 63600175 PHARM REV CODE 636 W HCPCS

## 2024-07-24 PROCEDURE — 71000039 HC RECOVERY, EACH ADD'L HOUR: Performed by: TRANSPLANT SURGERY

## 2024-07-24 PROCEDURE — 86705 HEP B CORE ANTIBODY IGM: CPT | Mod: NTX | Performed by: CLINICAL NURSE SPECIALIST

## 2024-07-24 PROCEDURE — 27000221 HC OXYGEN, UP TO 24 HOURS

## 2024-07-24 PROCEDURE — 20600001 HC STEP DOWN PRIVATE ROOM

## 2024-07-24 PROCEDURE — 50360 RNL ALTRNSPLJ W/O RCP NFRCT: CPT | Mod: RT,,, | Performed by: TRANSPLANT SURGERY

## 2024-07-24 PROCEDURE — 63600175 PHARM REV CODE 636 W HCPCS: Performed by: STUDENT IN AN ORGANIZED HEALTH CARE EDUCATION/TRAINING PROGRAM

## 2024-07-24 PROCEDURE — 36000930 HC OR TIME LEV VII 1ST 15 MIN: Performed by: TRANSPLANT SURGERY

## 2024-07-24 PROCEDURE — 83036 HEMOGLOBIN GLYCOSYLATED A1C: CPT | Mod: NTX | Performed by: CLINICAL NURSE SPECIALIST

## 2024-07-24 PROCEDURE — 37000009 HC ANESTHESIA EA ADD 15 MINS: Performed by: TRANSPLANT SURGERY

## 2024-07-24 PROCEDURE — 87389 HIV-1 AG W/HIV-1&-2 AB AG IA: CPT | Mod: NTX | Performed by: CLINICAL NURSE SPECIALIST

## 2024-07-24 PROCEDURE — 25000003 PHARM REV CODE 250: Performed by: STUDENT IN AN ORGANIZED HEALTH CARE EDUCATION/TRAINING PROGRAM

## 2024-07-24 PROCEDURE — 81200001 HC KIDNEY ACQUISITION - CADAVER

## 2024-07-24 PROCEDURE — 37000008 HC ANESTHESIA 1ST 15 MINUTES: Performed by: TRANSPLANT SURGERY

## 2024-07-24 PROCEDURE — D9220A PRA ANESTHESIA: Mod: CRNA,,, | Performed by: STUDENT IN AN ORGANIZED HEALTH CARE EDUCATION/TRAINING PROGRAM

## 2024-07-24 PROCEDURE — 63600175 PHARM REV CODE 636 W HCPCS: Performed by: ANESTHESIOLOGY

## 2024-07-24 PROCEDURE — 89051 BODY FLUID CELL COUNT: CPT | Mod: NTX | Performed by: CLINICAL NURSE SPECIALIST

## 2024-07-24 PROCEDURE — 87340 HEPATITIS B SURFACE AG IA: CPT | Mod: NTX | Performed by: CLINICAL NURSE SPECIALIST

## 2024-07-24 PROCEDURE — 86803 HEPATITIS C AB TEST: CPT | Mod: NTX | Performed by: CLINICAL NURSE SPECIALIST

## 2024-07-24 PROCEDURE — 84100 ASSAY OF PHOSPHORUS: CPT | Mod: NTX | Performed by: CLINICAL NURSE SPECIALIST

## 2024-07-24 PROCEDURE — 85025 COMPLETE CBC W/AUTO DIFF WBC: CPT | Mod: NTX | Performed by: CLINICAL NURSE SPECIALIST

## 2024-07-24 PROCEDURE — 0WPG03Z REMOVAL OF INFUSION DEVICE FROM PERITONEAL CAVITY, OPEN APPROACH: ICD-10-PCS | Performed by: TRANSPLANT SURGERY

## 2024-07-24 PROCEDURE — 86850 RBC ANTIBODY SCREEN: CPT | Mod: NTX | Performed by: CLINICAL NURSE SPECIALIST

## 2024-07-24 PROCEDURE — 27000207 HC ISOLATION

## 2024-07-24 PROCEDURE — 0TY00Z0 TRANSPLANTATION OF RIGHT KIDNEY, ALLOGENEIC, OPEN APPROACH: ICD-10-PCS | Performed by: TRANSPLANT SURGERY

## 2024-07-24 PROCEDURE — 80053 COMPREHEN METABOLIC PANEL: CPT | Mod: NTX | Performed by: CLINICAL NURSE SPECIALIST

## 2024-07-24 PROCEDURE — 94660 CPAP INITIATION&MGMT: CPT

## 2024-07-24 PROCEDURE — 50605 INSERT URETERAL SUPPORT: CPT | Mod: 51,RT,, | Performed by: TRANSPLANT SURGERY

## 2024-07-24 PROCEDURE — 5A09357 ASSISTANCE WITH RESPIRATORY VENTILATION, LESS THAN 24 CONSECUTIVE HOURS, CONTINUOUS POSITIVE AIRWAY PRESSURE: ICD-10-PCS | Performed by: PHYSICIAN ASSISTANT

## 2024-07-24 PROCEDURE — 99223 1ST HOSP IP/OBS HIGH 75: CPT | Mod: 57,AI,NTX, | Performed by: CLINICAL NURSE SPECIALIST

## 2024-07-24 PROCEDURE — 94761 N-INVAS EAR/PLS OXIMETRY MLT: CPT

## 2024-07-24 PROCEDURE — 85014 HEMATOCRIT: CPT

## 2024-07-24 PROCEDURE — 25000003 PHARM REV CODE 250: Mod: NTX | Performed by: CLINICAL NURSE SPECIALIST

## 2024-07-24 PROCEDURE — 93005 ELECTROCARDIOGRAM TRACING: CPT | Mod: NTX

## 2024-07-24 PROCEDURE — 85730 THROMBOPLASTIN TIME PARTIAL: CPT | Mod: NTX | Performed by: CLINICAL NURSE SPECIALIST

## 2024-07-24 PROCEDURE — 81300002 HC KIDNEY TRANSPORT, GROUND 4-5 HOURS

## 2024-07-24 PROCEDURE — 71000015 HC POSTOP RECOV 1ST HR: Performed by: TRANSPLANT SURGERY

## 2024-07-24 PROCEDURE — 87075 CULTR BACTERIA EXCEPT BLOOD: CPT | Mod: NTX | Performed by: CLINICAL NURSE SPECIALIST

## 2024-07-24 PROCEDURE — 86900 BLOOD TYPING SEROLOGIC ABO: CPT | Mod: NTX | Performed by: CLINICAL NURSE SPECIALIST

## 2024-07-24 PROCEDURE — 80069 RENAL FUNCTION PANEL: CPT | Mod: 91 | Performed by: TRANSPLANT SURGERY

## 2024-07-24 DEVICE — SOF-FLEX DOUBLE PIGTAIL URETERAL STENT SET
Type: IMPLANTABLE DEVICE | Site: URETER | Status: FUNCTIONAL
Brand: SOF-FLEX

## 2024-07-24 RX ORDER — DEXTROSE MONOHYDRATE AND SODIUM CHLORIDE 5; .45 G/100ML; G/100ML
INJECTION, SOLUTION INTRAVENOUS CONTINUOUS
Status: DISCONTINUED | OUTPATIENT
Start: 2024-07-24 | End: 2024-07-25

## 2024-07-24 RX ORDER — OXYCODONE HYDROCHLORIDE 5 MG/1
5 TABLET ORAL EVERY 6 HOURS PRN
Status: DISCONTINUED | OUTPATIENT
Start: 2024-07-24 | End: 2024-07-27 | Stop reason: HOSPADM

## 2024-07-24 RX ORDER — IBUPROFEN 200 MG
16 TABLET ORAL
Status: DISCONTINUED | OUTPATIENT
Start: 2024-07-24 | End: 2024-07-27 | Stop reason: HOSPADM

## 2024-07-24 RX ORDER — CEFAZOLIN SODIUM 1 G/3ML
INJECTION, POWDER, FOR SOLUTION INTRAMUSCULAR; INTRAVENOUS
Status: DISCONTINUED | OUTPATIENT
Start: 2024-07-24 | End: 2024-07-24

## 2024-07-24 RX ORDER — SODIUM CHLORIDE 0.9 % (FLUSH) 0.9 %
10 SYRINGE (ML) INJECTION
Status: DISCONTINUED | OUTPATIENT
Start: 2024-07-24 | End: 2024-07-27 | Stop reason: HOSPADM

## 2024-07-24 RX ORDER — MUPIROCIN 20 MG/G
OINTMENT TOPICAL
Status: DISCONTINUED | OUTPATIENT
Start: 2024-07-24 | End: 2024-07-24 | Stop reason: HOSPADM

## 2024-07-24 RX ORDER — KETAMINE HCL IN 0.9 % NACL 50 MG/5 ML
SYRINGE (ML) INTRAVENOUS
Status: DISCONTINUED | OUTPATIENT
Start: 2024-07-24 | End: 2024-07-24

## 2024-07-24 RX ORDER — MIDAZOLAM HYDROCHLORIDE 1 MG/ML
INJECTION INTRAMUSCULAR; INTRAVENOUS
Status: DISCONTINUED | OUTPATIENT
Start: 2024-07-24 | End: 2024-07-24

## 2024-07-24 RX ORDER — HEPARIN SODIUM 10000 [USP'U]/ML
INJECTION, SOLUTION INTRAVENOUS; SUBCUTANEOUS
Status: DISCONTINUED | OUTPATIENT
Start: 2024-07-24 | End: 2024-07-24 | Stop reason: HOSPADM

## 2024-07-24 RX ORDER — DOCUSATE SODIUM 100 MG/1
100 CAPSULE, LIQUID FILLED ORAL 3 TIMES DAILY
Status: DISCONTINUED | OUTPATIENT
Start: 2024-07-24 | End: 2024-07-27 | Stop reason: HOSPADM

## 2024-07-24 RX ORDER — ONDANSETRON HYDROCHLORIDE 2 MG/ML
4 INJECTION, SOLUTION INTRAVENOUS EVERY 6 HOURS PRN
Status: DISCONTINUED | OUTPATIENT
Start: 2024-07-24 | End: 2024-07-27 | Stop reason: HOSPADM

## 2024-07-24 RX ORDER — TRAMADOL HYDROCHLORIDE 50 MG/1
50 TABLET ORAL EVERY 4 HOURS PRN
Status: DISCONTINUED | OUTPATIENT
Start: 2024-07-24 | End: 2024-07-24

## 2024-07-24 RX ORDER — MUPIROCIN 20 MG/G
1 OINTMENT TOPICAL 2 TIMES DAILY
Status: DISCONTINUED | OUTPATIENT
Start: 2024-07-24 | End: 2024-07-27 | Stop reason: HOSPADM

## 2024-07-24 RX ORDER — DROPERIDOL 2.5 MG/ML
0.62 INJECTION, SOLUTION INTRAMUSCULAR; INTRAVENOUS ONCE AS NEEDED
Status: DISCONTINUED | OUTPATIENT
Start: 2024-07-24 | End: 2024-07-24 | Stop reason: HOSPADM

## 2024-07-24 RX ORDER — GLUCAGON 1 MG
1 KIT INJECTION
Status: DISCONTINUED | OUTPATIENT
Start: 2024-07-24 | End: 2024-07-27 | Stop reason: HOSPADM

## 2024-07-24 RX ORDER — TRAMADOL HYDROCHLORIDE 50 MG/1
50 TABLET ORAL EVERY 6 HOURS PRN
Status: DISCONTINUED | OUTPATIENT
Start: 2024-07-24 | End: 2024-07-27 | Stop reason: HOSPADM

## 2024-07-24 RX ORDER — ROCURONIUM BROMIDE 10 MG/ML
INJECTION, SOLUTION INTRAVENOUS
Status: DISCONTINUED | OUTPATIENT
Start: 2024-07-24 | End: 2024-07-24

## 2024-07-24 RX ORDER — INSULIN ASPART 100 [IU]/ML
0-5 INJECTION, SOLUTION INTRAVENOUS; SUBCUTANEOUS
Status: DISCONTINUED | OUTPATIENT
Start: 2024-07-24 | End: 2024-07-24

## 2024-07-24 RX ORDER — PHENYLEPHRINE HCL IN 0.9% NACL 1 MG/10 ML
SYRINGE (ML) INTRAVENOUS
Status: DISCONTINUED | OUTPATIENT
Start: 2024-07-24 | End: 2024-07-24

## 2024-07-24 RX ORDER — TALC
6 POWDER (GRAM) TOPICAL NIGHTLY PRN
Status: DISCONTINUED | OUTPATIENT
Start: 2024-07-24 | End: 2024-07-27 | Stop reason: HOSPADM

## 2024-07-24 RX ORDER — FENTANYL CITRATE 50 UG/ML
INJECTION, SOLUTION INTRAMUSCULAR; INTRAVENOUS
Status: DISCONTINUED | OUTPATIENT
Start: 2024-07-24 | End: 2024-07-24

## 2024-07-24 RX ORDER — BUPIVACAINE HYDROCHLORIDE 2.5 MG/ML
INJECTION, SOLUTION EPIDURAL; INFILTRATION; INTRACAUDAL
Status: DISCONTINUED | OUTPATIENT
Start: 2024-07-24 | End: 2024-07-24 | Stop reason: HOSPADM

## 2024-07-24 RX ORDER — SODIUM CHLORIDE 0.9 % (FLUSH) 0.9 %
10 SYRINGE (ML) INJECTION
Status: DISCONTINUED | OUTPATIENT
Start: 2024-07-24 | End: 2024-07-24 | Stop reason: HOSPADM

## 2024-07-24 RX ORDER — FAMOTIDINE 20 MG/1
20 TABLET, FILM COATED ORAL NIGHTLY
Status: DISCONTINUED | OUTPATIENT
Start: 2024-07-24 | End: 2024-07-25

## 2024-07-24 RX ORDER — DIPHENHYDRAMINE HCL 25 MG
50 CAPSULE ORAL ONCE AS NEEDED
Status: DISCONTINUED | OUTPATIENT
Start: 2024-07-24 | End: 2024-07-27 | Stop reason: HOSPADM

## 2024-07-24 RX ORDER — GLUCAGON 1 MG
1 KIT INJECTION CONTINUOUS PRN
Status: DISCONTINUED | OUTPATIENT
Start: 2024-07-24 | End: 2024-07-27 | Stop reason: HOSPADM

## 2024-07-24 RX ORDER — ACETAMINOPHEN 325 MG/1
650 TABLET ORAL
Status: COMPLETED | OUTPATIENT
Start: 2024-07-25 | End: 2024-07-26

## 2024-07-24 RX ORDER — ACETAMINOPHEN 325 MG/1
650 TABLET ORAL EVERY 8 HOURS
Status: DISPENSED | OUTPATIENT
Start: 2024-07-24 | End: 2024-07-26

## 2024-07-24 RX ORDER — TACROLIMUS 1 MG/1
3 CAPSULE ORAL 2 TIMES DAILY
Status: DISCONTINUED | OUTPATIENT
Start: 2024-07-24 | End: 2024-07-27

## 2024-07-24 RX ORDER — MANNITOL 250 MG/ML
INJECTION, SOLUTION INTRAVENOUS
Status: DISCONTINUED | OUTPATIENT
Start: 2024-07-24 | End: 2024-07-24

## 2024-07-24 RX ORDER — SODIUM CHLORIDE 9 MG/ML
INJECTION, SOLUTION INTRAVENOUS CONTINUOUS
Status: DISCONTINUED | OUTPATIENT
Start: 2024-07-24 | End: 2024-07-25

## 2024-07-24 RX ORDER — PROPOFOL 10 MG/ML
VIAL (ML) INTRAVENOUS
Status: DISCONTINUED | OUTPATIENT
Start: 2024-07-24 | End: 2024-07-24

## 2024-07-24 RX ORDER — VALGANCICLOVIR 450 MG/1
450 TABLET, FILM COATED ORAL EVERY MORNING
Status: DISCONTINUED | OUTPATIENT
Start: 2024-08-03 | End: 2024-07-27 | Stop reason: HOSPADM

## 2024-07-24 RX ORDER — HEPARIN SODIUM 5000 [USP'U]/ML
5000 INJECTION, SOLUTION INTRAVENOUS; SUBCUTANEOUS ONCE
Status: COMPLETED | OUTPATIENT
Start: 2024-07-24 | End: 2024-07-24

## 2024-07-24 RX ORDER — PREGABALIN 75 MG/1
75 CAPSULE ORAL
Status: DISCONTINUED | OUTPATIENT
Start: 2024-07-24 | End: 2024-07-24 | Stop reason: HOSPADM

## 2024-07-24 RX ORDER — DEXMEDETOMIDINE HYDROCHLORIDE 100 UG/ML
INJECTION, SOLUTION INTRAVENOUS
Status: DISCONTINUED | OUTPATIENT
Start: 2024-07-24 | End: 2024-07-24

## 2024-07-24 RX ORDER — SULFAMETHOXAZOLE AND TRIMETHOPRIM 400; 80 MG/1; MG/1
1 TABLET ORAL EVERY MORNING
Status: DISCONTINUED | OUTPATIENT
Start: 2024-08-03 | End: 2024-07-27 | Stop reason: HOSPADM

## 2024-07-24 RX ORDER — CEFAZOLIN SODIUM 1 G/3ML
INJECTION, POWDER, FOR SOLUTION INTRAMUSCULAR; INTRAVENOUS
Status: DISCONTINUED | OUTPATIENT
Start: 2024-07-24 | End: 2024-07-24 | Stop reason: HOSPADM

## 2024-07-24 RX ORDER — LIDOCAINE HYDROCHLORIDE 20 MG/ML
INJECTION INTRAVENOUS
Status: DISCONTINUED | OUTPATIENT
Start: 2024-07-24 | End: 2024-07-24

## 2024-07-24 RX ORDER — HALOPERIDOL 5 MG/ML
INJECTION INTRAMUSCULAR
Status: DISCONTINUED | OUTPATIENT
Start: 2024-07-24 | End: 2024-07-24

## 2024-07-24 RX ORDER — INSULIN ASPART 100 [IU]/ML
0-10 INJECTION, SOLUTION INTRAVENOUS; SUBCUTANEOUS EVERY 6 HOURS PRN
Status: DISCONTINUED | OUTPATIENT
Start: 2024-07-24 | End: 2024-07-24

## 2024-07-24 RX ORDER — ACETAMINOPHEN 650 MG/20.3ML
650 LIQUID ORAL ONCE
Status: COMPLETED | OUTPATIENT
Start: 2024-07-24 | End: 2024-07-24

## 2024-07-24 RX ORDER — HYDROMORPHONE HYDROCHLORIDE 1 MG/ML
0.2 INJECTION, SOLUTION INTRAMUSCULAR; INTRAVENOUS; SUBCUTANEOUS EVERY 5 MIN PRN
Status: DISCONTINUED | OUTPATIENT
Start: 2024-07-24 | End: 2024-07-24 | Stop reason: HOSPADM

## 2024-07-24 RX ORDER — INSULIN ASPART 100 [IU]/ML
0-5 INJECTION, SOLUTION INTRAVENOUS; SUBCUTANEOUS EVERY 6 HOURS PRN
Status: DISCONTINUED | OUTPATIENT
Start: 2024-07-24 | End: 2024-07-24

## 2024-07-24 RX ORDER — FUROSEMIDE 10 MG/ML
INJECTION INTRAMUSCULAR; INTRAVENOUS
Status: DISCONTINUED | OUTPATIENT
Start: 2024-07-24 | End: 2024-07-24

## 2024-07-24 RX ORDER — SODIUM CHLORIDE 0.9 % (FLUSH) 0.9 %
10 SYRINGE (ML) INJECTION
Status: DISCONTINUED | OUTPATIENT
Start: 2024-07-24 | End: 2024-07-25

## 2024-07-24 RX ORDER — METHYLPREDNISOLONE SOD SUCC 125 MG
125 VIAL (EA) INJECTION ONCE
Status: COMPLETED | OUTPATIENT
Start: 2024-07-26 | End: 2024-07-26

## 2024-07-24 RX ORDER — EPINEPHRINE 1 MG/ML
1 INJECTION, SOLUTION, CONCENTRATE INTRAVENOUS ONCE AS NEEDED
Status: DISCONTINUED | OUTPATIENT
Start: 2024-07-24 | End: 2024-07-27 | Stop reason: HOSPADM

## 2024-07-24 RX ORDER — DIPHENHYDRAMINE HCL 25 MG
25 CAPSULE ORAL
Status: COMPLETED | OUTPATIENT
Start: 2024-07-25 | End: 2024-07-26

## 2024-07-24 RX ORDER — METHYLPREDNISOLONE SOD SUCC 125 MG
250 VIAL (EA) INJECTION ONCE
Status: COMPLETED | OUTPATIENT
Start: 2024-07-25 | End: 2024-07-25

## 2024-07-24 RX ORDER — HEPARIN SODIUM 5000 [USP'U]/ML
5000 INJECTION, SOLUTION INTRAVENOUS; SUBCUTANEOUS EVERY 8 HOURS
Status: DISCONTINUED | OUTPATIENT
Start: 2024-07-24 | End: 2024-07-27 | Stop reason: HOSPADM

## 2024-07-24 RX ORDER — INSULIN ASPART 100 [IU]/ML
0-10 INJECTION, SOLUTION INTRAVENOUS; SUBCUTANEOUS EVERY 4 HOURS PRN
Status: DISCONTINUED | OUTPATIENT
Start: 2024-07-24 | End: 2024-07-27 | Stop reason: HOSPADM

## 2024-07-24 RX ORDER — DIPHENHYDRAMINE HYDROCHLORIDE 50 MG/ML
50 INJECTION INTRAMUSCULAR; INTRAVENOUS ONCE
Status: COMPLETED | OUTPATIENT
Start: 2024-07-24 | End: 2024-07-24

## 2024-07-24 RX ORDER — FUROSEMIDE 10 MG/ML
100 INJECTION INTRAMUSCULAR; INTRAVENOUS ONCE
Status: COMPLETED | OUTPATIENT
Start: 2024-07-25 | End: 2024-07-24

## 2024-07-24 RX ORDER — HYDROMORPHONE HYDROCHLORIDE 1 MG/ML
INJECTION, SOLUTION INTRAMUSCULAR; INTRAVENOUS; SUBCUTANEOUS
Status: DISPENSED
Start: 2024-07-24 | End: 2024-07-25

## 2024-07-24 RX ORDER — GLUCAGON 1 MG
1 KIT INJECTION
Status: DISCONTINUED | OUTPATIENT
Start: 2024-07-24 | End: 2024-07-24 | Stop reason: HOSPADM

## 2024-07-24 RX ORDER — IBUPROFEN 200 MG
24 TABLET ORAL
Status: DISCONTINUED | OUTPATIENT
Start: 2024-07-24 | End: 2024-07-27 | Stop reason: HOSPADM

## 2024-07-24 RX ORDER — MYCOPHENOLATE MOFETIL 250 MG/1
1000 CAPSULE ORAL 2 TIMES DAILY
Status: DISCONTINUED | OUTPATIENT
Start: 2024-07-24 | End: 2024-07-27 | Stop reason: HOSPADM

## 2024-07-24 RX ORDER — PREDNISONE 20 MG/1
20 TABLET ORAL DAILY
Status: DISCONTINUED | OUTPATIENT
Start: 2024-07-27 | End: 2024-07-27 | Stop reason: HOSPADM

## 2024-07-24 RX ORDER — BISACODYL 5 MG
10 TABLET, DELAYED RELEASE (ENTERIC COATED) ORAL NIGHTLY
Status: DISCONTINUED | OUTPATIENT
Start: 2024-07-24 | End: 2024-07-27 | Stop reason: HOSPADM

## 2024-07-24 RX ORDER — ONDANSETRON HYDROCHLORIDE 2 MG/ML
INJECTION, SOLUTION INTRAVENOUS
Status: DISCONTINUED | OUTPATIENT
Start: 2024-07-24 | End: 2024-07-24

## 2024-07-24 RX ADMIN — AMPICILLIN SODIUM AND SULBACTAM SODIUM 3 G: 2; 1 INJECTION, POWDER, FOR SOLUTION INTRAMUSCULAR; INTRAVENOUS at 06:07

## 2024-07-24 RX ADMIN — Medication 100 MCG: at 01:07

## 2024-07-24 RX ADMIN — MIDAZOLAM HYDROCHLORIDE 2 MG: 1 INJECTION, SOLUTION INTRAMUSCULAR; INTRAVENOUS at 12:07

## 2024-07-24 RX ADMIN — ACETAMINOPHEN 650 MG: 325 TABLET ORAL at 09:07

## 2024-07-24 RX ADMIN — PHENYLEPHRINE HYDROCHLORIDE 0.2 MCG/KG/MIN: 10 INJECTION INTRAVENOUS at 02:07

## 2024-07-24 RX ADMIN — Medication 15 MG: at 02:07

## 2024-07-24 RX ADMIN — CEFAZOLIN 2 G: 330 INJECTION, POWDER, FOR SOLUTION INTRAMUSCULAR; INTRAVENOUS at 01:07

## 2024-07-24 RX ADMIN — PROPOFOL 50 MG: 10 INJECTION, EMULSION INTRAVENOUS at 04:07

## 2024-07-24 RX ADMIN — DEXMEDETOMIDINE 8 MCG: 100 INJECTION, SOLUTION, CONCENTRATE INTRAVENOUS at 03:07

## 2024-07-24 RX ADMIN — PROPOFOL 50 MG: 10 INJECTION, EMULSION INTRAVENOUS at 01:07

## 2024-07-24 RX ADMIN — Medication 20 MG: at 01:07

## 2024-07-24 RX ADMIN — HYDROMORPHONE HYDROCHLORIDE 0.2 MG: 1 INJECTION, SOLUTION INTRAMUSCULAR; INTRAVENOUS; SUBCUTANEOUS at 04:07

## 2024-07-24 RX ADMIN — FENTANYL CITRATE 100 MCG: 50 INJECTION, SOLUTION INTRAMUSCULAR; INTRAVENOUS at 01:07

## 2024-07-24 RX ADMIN — FUROSEMIDE 100 MG: 10 INJECTION, SOLUTION INTRAMUSCULAR; INTRAVENOUS at 02:07

## 2024-07-24 RX ADMIN — MUPIROCIN 1 G: 20 OINTMENT TOPICAL at 08:07

## 2024-07-24 RX ADMIN — GLYCOPYRROLATE 0.2 MG: 0.2 INJECTION, SOLUTION INTRAMUSCULAR; INTRAVENOUS at 02:07

## 2024-07-24 RX ADMIN — FUROSEMIDE 100 MG: 10 INJECTION, SOLUTION INTRAMUSCULAR; INTRAVENOUS at 11:07

## 2024-07-24 RX ADMIN — DEXMEDETOMIDINE 4 MCG: 100 INJECTION, SOLUTION, CONCENTRATE INTRAVENOUS at 03:07

## 2024-07-24 RX ADMIN — SODIUM CHLORIDE 500 MG: 9 INJECTION, SOLUTION INTRAVENOUS at 01:07

## 2024-07-24 RX ADMIN — LIDOCAINE HYDROCHLORIDE 80 MG: 20 INJECTION INTRAVENOUS at 01:07

## 2024-07-24 RX ADMIN — ROCURONIUM BROMIDE 50 MG: 10 INJECTION, SOLUTION INTRAVENOUS at 01:07

## 2024-07-24 RX ADMIN — DOCUSATE SODIUM 100 MG: 100 CAPSULE, LIQUID FILLED ORAL at 08:07

## 2024-07-24 RX ADMIN — PROPOFOL 100 MG: 10 INJECTION, EMULSION INTRAVENOUS at 01:07

## 2024-07-24 RX ADMIN — ANTI-THYMOCYTE GLOBULIN (RABBIT) 100 MG: 5 INJECTION, POWDER, LYOPHILIZED, FOR SOLUTION INTRAVENOUS at 02:07

## 2024-07-24 RX ADMIN — HALOPERIDOL LACTATE 1 MG: 5 INJECTION, SOLUTION INTRAMUSCULAR at 03:07

## 2024-07-24 RX ADMIN — DIPHENHYDRAMINE HYDROCHLORIDE 50 MG: 50 INJECTION, SOLUTION INTRAMUSCULAR; INTRAVENOUS at 01:07

## 2024-07-24 RX ADMIN — SUGAMMADEX 200 MG: 100 INJECTION, SOLUTION INTRAVENOUS at 04:07

## 2024-07-24 RX ADMIN — ROCURONIUM BROMIDE 20 MG: 10 INJECTION, SOLUTION INTRAVENOUS at 01:07

## 2024-07-24 RX ADMIN — HEPARIN SODIUM 5000 UNITS: 5000 INJECTION INTRAVENOUS; SUBCUTANEOUS at 03:07

## 2024-07-24 RX ADMIN — MYCOPHENOLATE MOFETIL 1000 MG: 250 CAPSULE ORAL at 08:07

## 2024-07-24 RX ADMIN — ONDANSETRON 4 MG: 2 INJECTION INTRAMUSCULAR; INTRAVENOUS at 03:07

## 2024-07-24 RX ADMIN — FAMOTIDINE 20 MG: 20 TABLET ORAL at 08:07

## 2024-07-24 RX ADMIN — HEPARIN SODIUM 5000 UNITS: 5000 INJECTION INTRAVENOUS; SUBCUTANEOUS at 09:07

## 2024-07-24 RX ADMIN — SODIUM CHLORIDE: 0.9 INJECTION, SOLUTION INTRAVENOUS at 12:07

## 2024-07-24 RX ADMIN — Medication 15 MG: at 03:07

## 2024-07-24 RX ADMIN — ACETAMINOPHEN 650 MG: 650 SOLUTION ORAL at 12:07

## 2024-07-24 RX ADMIN — DEXTROSE AND SODIUM CHLORIDE: 5; 450 INJECTION, SOLUTION INTRAVENOUS at 04:07

## 2024-07-24 RX ADMIN — Medication 6 MG: at 11:07

## 2024-07-24 RX ADMIN — MANNITOL 25 G: 12.5 INJECTION, SOLUTION INTRAVENOUS at 02:07

## 2024-07-24 RX ADMIN — TACROLIMUS 3 MG: 1 CAPSULE ORAL at 05:07

## 2024-07-24 RX ADMIN — BISACODYL 10 MG: 5 TABLET, COATED ORAL at 08:07

## 2024-07-24 RX ADMIN — SODIUM CHLORIDE, SODIUM ACETATE ANHYDROUS, SODIUM GLUCONATE, POTASSIUM CHLORIDE, AND MAGNESIUM CHLORIDE: 526; 222; 502; 37; 30 INJECTION, SOLUTION INTRAVENOUS at 01:07

## 2024-07-24 NOTE — NURSING TRANSFER
Nursing Transfer Note      7/24/2024   5:07 PM    Nurse giving handoff:Jonathon BATISTA Rn  Nurse receiving handoff: Gladys Shay    Reason patient is being transferred: postop    Transfer To: [id number]    Transfer via bed    Transfer with n/a    Transported by transport    Transfer Vital Signs:  Blood Pressure:see flowsheet  Heart Rate:  O2:  Temperature:  Respirations:    Telemetry: n/a  Order for Tele Monitor? N/a    Additional Lines: Becerra Catheter        Medicines sent: IVF    Any special needs or follow-up needed:     Patient belongings transferred with patient:  n/a    Chart send with patient: Yes    Notified: friend    Patient reassessed at: 7/24/24  1  Upon arrival to floor: bed in lowest position

## 2024-07-24 NOTE — HPI
Reason for Consult: Management of T2DM, Hyperglycemia     Surgical Procedure and Date: s/p kidney transplant on 2024    Diabetes diagnosis year:     Home Diabetes Medications:  Patient previously on tandem insulin pump and Mounjaro. Patient recently lost 60 lbs and had worsening kidney function. Patient states that she is off all diabetes medication for about 3-months and blood sugar has been well controlled. Patient is from [place] and does not have her pump with her to review pump settings. Per chart review, basal insulin was 58 units daily in the past. Anticipate the patient will need more aggressive insulin management post-op.     How often checking glucose at home? >4 x day (previously on Dexcom G6, but off since she stopped using tandem pump)   BG readings on regimen: states BG have been well controlled off the pump and the GLP1-RA/ GIP ('s)  Hypoglycemia on the regimen?  No  Missed doses on regimen?  No    Diabetes Complications include:     Hyperglycemia (history)    Complicating diabetes co morbidities:   Glucocorticoid use       HPI: Ms. Kenney is a 63 y.o. female with ESRD secondary to diabetic nephropathy, solitary kidney.  PMH DM2, HTN, incontience (s/p bladder sling), CAD with stents (on ASA), atrial fibrillation, CVA (no residual deficits), ALEJA, anemia. She has been on the wait list for a kidney transplant at [facility] since 2020. Patient is currently on peritoneal dialysis started on 2020. Patient is dialyzing on cyclic peritoneal dialysis. Patient reports that she is tolerating dialysis well. She has a PD catheter. Denies peritonitis or exit site infections. She now presents as primary candidate for a  donor kidney transplant. Endocrine consulted to manage hyperglycemia and diabetes in the context of transplant and high dose steroids.

## 2024-07-24 NOTE — ASSESSMENT & PLAN NOTE
Endocrinology consulted for BG management.   BG goal 140-180      - Novolog (Insulin Aspart) prn for BG excursions [facility] SSI (150/25)  - BG checks q4hr  - Hypoglycemia protocol in place    ** Please notify Endocrine for any change and/or advance in diet**  ** Please call Endocrine for any BG related issues **    Discharge Planning:   TBD. Please notify endocrinology prior to discharge.

## 2024-07-24 NOTE — TELEPHONE ENCOUNTER
On call note  UNOS [id number]    2130 I received call from Kyle Schafer informing me that we are now Primary for the right kidney. Ms. Kenney can be called in for admission tonight. NPO after 2 am and OR set to follow 800 am transplant with Dr. Mon on 7/24/2024.   I called admissions for reservation. CSN [id number]. Alphonso, house supervisor notified. [facility] notified, Report call to Guillermo, RIANNA.  Patient instructed to come in for admission. She will be admitted to TSU. She was instructed to bring her meds in a bag, her insurance card, comfortable clothing for after transplant. Her caregiver will accompany her. I gave her my phone number to call with any questions and to inform me when she arrives at the hospital. ETA 4 hours.NPO at 2 am .Patient verbalized understanding of all information discussed.

## 2024-07-24 NOTE — PLAN OF CARE
AAOx3, afebrile, w/o c/o pain. BG monitored q6h. Endocrine is consulted.Pt has been NPO since 0200 7/24/24 for kidney txp today. Kidney txp workup is in process. Surgery and blood consents still need to be done. GAIL/SCD will be placed and bath will be done closer to surgery. Pt has a PD cath on LLQ. PD cath samples collected by HD nurse. Pt stated she takes an abx for a sore in her mouth. NP KAYCEE Tyler aware. Pt stated she has been having difficulty swallowing. Unable to assess due to pt being NPO. Pt has her home CPAP at bedside. Daughter at the bedside. Pt uses a walker. Pt in lowest position, side rails up x2, non-skid foot wear in place, call light within reach, pt verbalized understanding to call RN when needed. Hand hygiene practiced per protocol. Will continue to monitor.

## 2024-07-24 NOTE — NURSING
Pt transported off unit via wheelchair to [facility] for KTX. Pre-op checklist completed. Pt's chart & consent sent w/ pt. Daughter w/ pt.

## 2024-07-24 NOTE — NURSING
Pt arrived to TSU [id number] as direct admit for kidney txp. VSS. No skin breakdown noted. Daughter at the bedside. NP KAYCEE Tyler aware of pt's arrival. Will continue to monitor.

## 2024-07-24 NOTE — NURSING
Pt transferred from PACU. VSS, afebrile, on 2 L NC. Pt set to continuous bedside monitor. Pt w/o complaints of pain. D51/2 NS infusing @ 50 cc/hr, Thymo infusing @ 83.3 cc/hr. Becerra intact w/ pink-tinged UOP. Dressing to RLQ w/ scant SS drainage, site marked. 2 previous PD sites CDI w/ gauze. Daughter @ bedside. Labs ordered for 2200.      Nurses Note -- 4 Eyes      7/24/2024   6:15 PM      Skin assessed during: Transfer      [x] No Altered Skin Integrity Present    []Prevention Measures Documented      [] Yes- Altered Skin Integrity Present or Discovered   [] LDA Added if Not in Epic (Describe Wound)   [] New Altered Skin Integrity was Present on Admit and Documented in LDA   [] Wound Image Taken    Wound Care Consulted? No    Attending Nurse:  HILARIO Graham     Second RN/Staff Member:  HILARIO Sanchez

## 2024-07-24 NOTE — CONSULTS
[facility] - [facility] (2nd Fl)  Infectious Disease  Consult Note    Patient Name: Joann Kenney  MRN: [id number]  Admission Date: 7/24/2024  Hospital Length of Stay: 0 days  Attending Physician: Alphonso Mon MD  Primary Care Provider: Vijay Blake MD     Isolation Status: Enhanced Respiratory      Inpatient consult to Infectious Diseases  Consult performed by: Brigid Sutherland MD  Consult ordered by: Allison Holt PA-C        Consult received, patient in OR  Will see patient on 7/25/2024  Continue unasyn IV post-op

## 2024-07-24 NOTE — CONSULTS
[facility] ([facility])  Endocrinology  Diabetes Consult Note    Consult Requested by: Alphonso Mon MD   Reason for admit: ESRD on peritoneal dialysis    HISTORY OF PRESENT ILLNESS:  Reason for Consult: Management of T2DM, Hyperglycemia     Surgical Procedure and Date: s/p kidney transplant on 2024    Diabetes diagnosis year:     Home Diabetes Medications:  Patient previously on tandem insulin pump and Mounjaro. Patient recently lost 60 lbs and had worsening kidney function. Patient states that she is off all diabetes medication for about 3-months and blood sugar has been well controlled. Patient is from [place] and does not have her pump with her to review pump settings. Per chart review, basal insulin was 58 units daily in the past. Anticipate the patient will need more aggressive insulin management post-op.     How often checking glucose at home? >4 x day (previously on Dexcom G6, but off since she stopped using tandem pump)   BG readings on regimen: states BG have been well controlled off the pump and the GLP1-RA/ GIP ('s)  Hypoglycemia on the regimen?  No  Missed doses on regimen?  No    Diabetes Complications include:     Hyperglycemia (history)    Complicating diabetes co morbidities:   Glucocorticoid use       HPI: Ms. Kenney is a 63 y.o. female with ESRD secondary to diabetic nephropathy, solitary kidney.  PMH DM2, HTN, incontience (s/p bladder sling), CAD with stents (on ASA), atrial fibrillation, CVA (no residual deficits), ALEJA, anemia. She has been on the wait list for a kidney transplant at [facility] since 2020. Patient is currently on peritoneal dialysis started on 2020. Patient is dialyzing on cyclic peritoneal dialysis. Patient reports that she is tolerating dialysis well. She has a PD catheter. Denies peritonitis or exit site infections. She now presents as primary candidate for a  donor kidney transplant. Endocrine consulted to manage hyperglycemia and diabetes  in the context of transplant and high dose steroids.           Interval HPI:   Overnight events: No acute events overnight. Patient in room [facility] 2ND FLR Periop Pool*. Blood glucose stable. BG at goal on current insulin regimen (SSI ). Steroid use- Methylprednisolone  500 mg. Day of Surgery  Renal function- Abnormal - Creatinine 8.1   Vasopressors-  None       Endocrine will continue to follow and manage insulin orders inpatient.         Diet NPO Except for: Sips with Medication     Eating:   NPO  Nausea: No  Hypoglycemia and intervention: No  Fever: No  TPN and/or TF: No      PMH, PSH, FH, SH updated and reviewed     ROS:  Review of Systems   Constitutional:  Negative for unexpected weight change.   Eyes:  Negative for visual disturbance.   Respiratory:  Negative for cough.    Cardiovascular:  Negative for chest pain.   Gastrointestinal:  Negative for nausea and vomiting.   Endocrine: Negative for polydipsia and polyuria.   Musculoskeletal:  Negative for back pain.   Skin:  Negative for rash.   Neurological:  Negative for syncope.   Psychiatric/Behavioral:  Negative for agitation and dysphoric mood.        Current Medications and/or Treatments Impacting Glycemic Control  Immunotherapy:    Immunosuppressants       None          Steroids:   Hormones (From admission, onward)      Start     Stop Route Frequency Ordered    07/24/24 0245  methylPREDNISolone sodium succinate (SOLU-MEDROL) 500 mg in D5W 100 mL IVPB  (IP TXP INTRA-OP THYMO - PERIPHERAL THYMO PRECHECKED)         -- IV Once 07/24/24 0239          Pressors:    Autonomic Drugs (From admission, onward)      None          Hyperglycemia/Diabetes Medications:   Antihyperglycemics (From admission, onward)      Start     Stop Route Frequency Ordered    07/24/24 0238  insulin aspart U-100 pen 0-5 Units         -- SubQ Every 6 hours PRN 07/24/24 0238             PHYSICAL EXAMINATION:  Vitals:    07/24/24 1139   BP: (!) 195/81   Pulse: 76   Resp: 18   Temp: 97.5 °F (36.4  "°C)     Body mass index is 30.89 kg/m².     Physical Exam  Constitutional:       Appearance: She is well-developed.   HENT:      Head: Normocephalic.   Eyes:      Conjunctiva/sclera: Conjunctivae normal.   Pulmonary:      Effort: Pulmonary effort is normal.   Musculoskeletal:         General: Normal range of motion.   Skin:     General: Skin is warm.      Findings: No rash.   Neurological:      Mental Status: She is alert and oriented to person, place, and time.            Labs Reviewed and Include   Recent Labs   Lab 07/24/24  0317   GLU 88   CALCIUM 9.9   ALBUMIN 2.6*   PROT 5.6*      K 4.3   CO2 23      BUN 44*   CREATININE 8.1*   ALKPHOS 55   ALT 10   AST 12   BILITOT 0.4     Lab Results   Component Value Date    WBC 10.16 07/24/2024    HGB 8.9 (L) 07/24/2024    HCT 27.6 (L) 07/24/2024    MCV 95 07/24/2024     07/24/2024     No results for input(s): "TSH", "FREET4" in the last 168 hours.  Lab Results   Component Value Date    HGBA1C 4.8 07/24/2024    HGBA1C 4.8 07/24/2024       Nutritional status:   Body mass index is 30.89 kg/m².  Lab Results   Component Value Date    ALBUMIN 2.6 (L) 07/24/2024    ALBUMIN 3.2 (L) 04/20/2022    ALBUMIN 3.2 (L) 04/01/2021     No results found for: "PREALBUMIN"    Estimated Creatinine Clearance: 7.4 mL/min (A) (based on SCr of 8.1 mg/dL (H)).    Accu-Checks  Recent Labs     07/24/24  0241 07/24/24  0811   POCTGLUCOSE 91 87        ASSESSMENT and PLAN    Renal/  * ESRD on peritoneal dialysis  Titrate insulin slowly to avoid hypoglycemia as the risk of hypoglycemia increases with decreased creatinine clearance.  Estimated Creatinine Clearance: 7.4 mL/min (A) (based on SCr of 8.1 mg/dL (H)).        Endocrine  Diabetes mellitus due to underlying condition with chronic kidney disease on chronic dialysis, with long-term current use of insulin  Endocrinology consulted for BG management.   BG goal 140-180      - Novolog (Insulin Aspart) prn for BG excursions [facility] SSI " (150/25)  - BG checks q4hr  - Hypoglycemia protocol in place    ** Please notify Endocrine for any change and/or advance in diet**  ** Please call Endocrine for any BG related issues **    Discharge Planning:   TBD. Please notify endocrinology prior to discharge.        Other  Adrenal corticosteroid causing adverse effect in therapeutic use  Glucocorticoids markedly increase glucose levels. Expect the steroid taper will help glucose control.             Plan discussed with patient, family, and RN at bedside.        Quincy Lyles, DNP, FNP  Endocrinology  [facility] - Surgery (2nd Fl)

## 2024-07-24 NOTE — NURSING
Nurses Note -- 4 Eyes      7/24/2024   3:04 AM      Skin assessed during: Admit      [x] No Altered Skin Integrity Present    []Prevention Measures Documented      [] Yes- Altered Skin Integrity Present or Discovered   [] LDA Added if Not in Epic (Describe Wound)   [] New Altered Skin Integrity was Present on Admit and Documented in LDA   [] Wound Image Taken    Wound Care Consulted? No    Attending Nurse:  Jonathon Freedman RN    Second RN/Staff Member:   ELISABTE Mancera RN

## 2024-07-24 NOTE — OP NOTE
Pre-operative Discussion Note  Kidney Transplant Surgery    Joann Kenney is a 63 y.o. female with ESRD, requiring chronic dialysis admitted for kidney transplant.  I discussed the planned procedure in detail, including expected hospital course and outcomes, benefits, risks, and potential complications.  Complications discussed included death, graft failure, bleeding, infection, vascular thrombosis, and rejection.  I discussed the risks of anesthesia, as well as the potential need for re-operation.  The possibility of other complications not specifically mentioned was also discussed.  Also, I discussed the need for lifelong immunosuppression and the possibility of serious complications from immunosuppressive drugs.    The discussion included the risks that the patient will incur if she elects to not have the proposed procedure.    Relevant donor-specific risk factors were disclosed and discussed with the patient, including:     Specific PHS donor risk criteria for the organ donor include:  None      The patient was SARS-CoV-2 /COVID-19 tested with negative results.    COVID-19: I discussed the possibility of COVID-19 transmission with the patient. Although [facility] testing is available for the virus using a technique which in theory should be very accurate, there is no data yet regarding the likelihood of a  false-negative test leading to virus transmission. Based on accuracy of testing for other viruses, it is expected that this risk is extremely small.     I also discussed that transplant immunosuppression will increase susceptibility to COVID-19 and other viruses, and that although we use stringent precautions to protect patients from infection, it is possible for a transplant recipient to contract this infection. If COVID-19 infection should occur, it would be a serious matter with a significant risk of death.    All questions were answered.  The patient and available family members voice understanding and agree to  proceed with the transplant.    UNOS Patient Status  Note on scores:  ICU = 10 = total assistance  TSU = 20-30 = partial assistance  Outpatient admitted for transplant requiring medical care in last year = 40-50 = partial assistance  Scores 60 or higher indicate no assistance, meaning no need for medical care in last year. This would be very unusual for a transplant candidate.    UNOS Patient Status  Functional Status: 50% - Requires considerable assistance and frequent medical care  Physical Capacity: No Limitations

## 2024-07-24 NOTE — SUBJECTIVE & OBJECTIVE
"  Subjective:     Chief Complaint/Reason for Admission: Kidney transplant candidate    History of Present Illness:  Ms. Kenney is a 63 y.o. female with ESRD secondary to diabetic nephropathy, solitary kidney.  PMH DM2, HTN, incontience (s/p bladder sling), CAD with stents (on ASA), atrial fibrillation, CVA (no residual deficits), ALEJA, anemia. She has been on the wait list for a kidney transplant at [facility] since 2020. Patient is currently on peritoneal dialysis started on 2020. Patient is dialyzing on cyclic peritoneal dialysis. Patient reports that she is tolerating dialysis well. She has a PD catheter. Denies peritonitis or exit site infections.     She now presents as primary candidate for a  donor kidney transplant. Pre-op labs and imaging to be reviewed prior to transplant. Of note, patient states she just started an oral antibiotic earlier this week for a salivary gland infection. Pt states salivary gland infection has been present approx 1 week. She notes mild yellow drainage. Will review with surgeon prior to transplant.             Dialysis History: Ms. David with ESRD, requiring chronic dialysis who is on peritoneal dialysis started on 2020. Patient is dialyzing on cyclic peritoneal dialysis.  Patient reports that she is tolerating dialysis well.   Date of Last Dialysis:     Native urine output per day: "normal" amount    Previous Transplant: no    PTA Medications   Medication Sig    ALPRAZolam (XANAX) 0.5 MG tablet Take 2 mg by mouth every evening.    aspirin (ECOTRIN) 81 MG EC tablet Take 81 mg by mouth once daily.    atorvastatin (LIPITOR) 40 MG tablet Take 20 mg by mouth every evening.    AURYXIA 210 mg iron Tab Take 2 tablets by mouth.    cetirizine (ZYRTEC) 10 MG tablet Take 10 mg by mouth every evening.    clindamycin (CLEOCIN) 300 MG capsule Take 300 mg by mouth every 6 (six) hours.    DULoxetine (CYMBALTA) 30 MG capsule Take 30 mg by mouth once daily.    ezetimibe (ZETIA) 10 " mg tablet Take 10 mg by mouth once daily.    labetaloL (NORMODYNE) 200 MG tablet Take 200 mg by mouth every 12 (twelve) hours.     losartan (COZAAR) 100 MG tablet Take 100 mg by mouth every evening.    minoxidiL (LONITEN) 2.5 MG tablet Take 1 tablet by mouth 2 (two) times daily.    NIFEdipine (PROCARDIA-XL) 60 MG (OSM) 24 hr tablet Take 60 mg by mouth every evening.     pantoprazole (PROTONIX) 40 MG tablet Take 40 mg by mouth.    sucralfate (CARAFATE) 100 mg/mL suspension Take 1 g by mouth 4 (four) times daily as needed.    cinacalcet (SENSIPAR) 30 MG Tab     furosemide (LASIX) 80 MG tablet Take 80 mg by mouth once daily.    HUMIRA,CF, PEN 40 mg/0.4 mL PnKt        Review of patient's allergies indicates:  No Known Allergies    Past Medical History:   Diagnosis Date    Anemia     Anxiety     Atrial fibrillation     Coronary artery disease     Depression     Diabetes mellitus, type 2     Disorder of kidney and ureter     Heart murmur     Hyperlipidemia     Hypertension     Obesity     ALEJA (obstructive sleep apnea)     Proteinuria     Solitary kidney     Stroke      Past Surgical History:   Procedure Laterality Date    CORONARY ANGIOPLASTY WITH STENT PLACEMENT      HYSTERECTOMY      INCONTINENCE SURGERY      INSERTION OF IMPLANTABLE LOOP RECORDER      internal heart monitor      PERITONEAL CATHETER INSERTION       Family History    None       Tobacco Use    Smoking status: Former    Smokeless tobacco: Never   Substance and Sexual Activity    Alcohol use: Not Currently    Drug use: Never    Sexual activity: Not Currently     Partners: Male        Review of Systems   Constitutional:  Negative for chills and fever.   HENT:  Positive for mouth sores. Negative for congestion.    Respiratory:  Negative for shortness of breath.    Cardiovascular:  Negative for leg swelling.   Gastrointestinal:  Negative for abdominal pain, constipation and nausea.   Genitourinary:  Negative for difficulty urinating and dysuria.  "  Allergic/Immunologic: Negative for immunocompromised state.   Neurological:  Negative for dizziness and headaches.   Psychiatric/Behavioral:  Negative for agitation and confusion.      Objective:     Vital Signs (Most Recent):  Temp: 98.5 °F (36.9 °C) (07/24/24 0400)  Pulse: 75 (07/24/24 0400)  Resp: 20 (07/24/24 0400)  BP: 139/76 (07/24/24 0400)  SpO2: 97 % (07/24/24 0400)  Height: 5' 5" (165.1 cm)  Weight: 84.2 kg (185 lb 11.8 oz)  Body mass index is 30.91 kg/m².      Physical Exam  Constitutional:       Appearance: Normal appearance.   HENT:      Mouth/Throat:      Comments: 1 mm opening with white rim left lateral of midline sublingual without purulence. No surrounding erythema  Cardiovascular:      Rate and Rhythm: Normal rate.      Pulses: Normal pulses.   Pulmonary:      Effort: Pulmonary effort is normal.   Abdominal:      General: Bowel sounds are normal.      Palpations: Abdomen is soft.      Comments: LLQ PD cath site. No signs/symptoms of infection   Skin:     General: Skin is warm.   Neurological:      Mental Status: She is alert and oriented to person, place, and time. Mental status is at baseline.   Psychiatric:         Mood and Affect: Mood normal.         Behavior: Behavior normal.          Laboratory  CBC:   Recent Labs   Lab 07/24/24 0317   WBC 10.16   RBC 2.90*   HGB 8.9*   HCT 27.6*      MCV 95   MCH 30.7   MCHC 32.2     CMP:   Recent Labs   Lab 07/24/24 0317   GLU 88   CALCIUM 9.9   ALBUMIN 2.6*   PROT 5.6*      K 4.3   CO2 23      BUN 44*   CREATININE 8.1*   ALKPHOS 55   ALT 10   AST 12       Diagnostic Results:  CXR reviewed      "

## 2024-07-24 NOTE — ASSESSMENT & PLAN NOTE
Titrate insulin slowly to avoid hypoglycemia as the risk of hypoglycemia increases with decreased creatinine clearance.  Estimated Creatinine Clearance: 7.4 mL/min (A) (based on SCr of 8.1 mg/dL (H)).

## 2024-07-24 NOTE — OP NOTE
Operative Report    Date of Procedure: 7/24/2024  Date of Transplant (UNOS): 7/24/24    Surgeons:  Surgeons and Role:     * Alphonso Mon MD - Primary     * Jaleesa Louis MD - Resident - Assisting    First Assistant Attestation:  The indicated resident served as first assistant for this procedure.    Pre-operative Diagnosis: ESRD, requiring chronic dialysis secondary to Diabetes Mellitus - Type II  Post-operative Diagnosis: Same    Procedure(s) Performed:   Back Table Preparation of Right Kidney     Donation after Brain Death Kidney transplant  Removal of peritoneal dialysis catheter    Anesthesia: General endotracheal    Preamble  Indications and Patient Counseling: The patient is a 63 y.o. year-old female with end-stage kidney disease secondary to Diabetes Mellitus - Type II who has been evaluated for a kidney transplant.  The procedure was thoroughly discussed with the patient, including potential risks, complications, and alternatives.  Specific complications mentioned included death, graft non-function, bleeding, infection, and rejection, as well as the possibility of other complications not specifically mentioned.    Donor Risk Factors: Prior to the operation, the patient was advised of any donor-specific risk factors requiring specific disclosure.  Factors in this case included nothing that required specific disclosure.      Specific PHS donor risk criteria for the organ donor include:  None    All questions were answered, the patient voiced appropriate understanding, and she agreed to proceed with the planned procedure.    ABO Confirmation: Immediately following arrival of the donor organ and prior to implantation, a formal ABO confirmation was done according to hospital and UNOS policies.  I confirmed the UNOS ID number ([id number]) of the donor organ and the donor and recipient ABO types, directly verifying these data by comparison with the UNOS Match Run report ([id number]).  This confirmation was personally  done by an attending surgeon and circulating nurse, and is officially documented elsewhere.    Time-Out: A complete time out was carried out prior to incision, with confirmation of patient identity, correct procedure, correct operative site, appropriate antibiotic prophylaxis, review of any known allergies, and presence of all needed equipment.    Procedure in Detail  Prior to starting the operation, the right kidney  was prepared on the back table. Arterial anatomy was single. Venous anatomy was single. Ureteral anatomy was single. Back table vascular reconstruction was not required  .  Unneeded fat was removed from the kidney, the vessels were cleaned of adherent tissue and tested for leaks, and the kidney was maintained at ice temperature in organ preservation solution until it was brought to the operative field.     The patient was brought into the operating room and placed in a supine position on the OR table.  After the induction of general endotracheal anesthesia, lines were placed by the anesthesiologist.  The urinary bladder was catheterized and irrigated with antibiotic solution.  There was no tension on the axillae and all pressure points were padded.  Sequential compression boots were used as were Tiburcio Huggers.  The abdomen was prepped and draped in the usual sterile fashion.  Skin was incised over the right with a knife and deepened with electrocautery.  The peritoneum and its contents were swept medially, exposing the right external iliac artery and the right external iliac vein.  The Bookwalter retractor was used to provide exposure.  Overlying lymphatics were ligated or cauterized and the vessels were dissected free for a length compatible with anastomosis.  The kidney was brought to the OR table at 7/24/2024  2:33 PM.  Venous control was obtained with a vascular clamp.  A venotomy was made, the vein irrigated, and an end renal to right external iliac vein anastomosis was created with 5-0  polypropylene.  Arterial control was obtained with a vascular clamp.  Arteriotomy was made, the artery irrigated, and an end renal to right external iliac artery anastomosis was created with 5-0 polypropylene.  The kidney was unclamped and reperfused at 7/24/2024  2:56 PM.  Reperfusion quality was good. Intraoperative urine production was observed.  After hemostasis was obtained, a Lich uretero-neocystostomy was created.  The bladder was filled and identified, opened, and the anastomosis created using 6-0 PDS.  The bladder muscle was closed over the distal ureter to create an antireflux tunnel.  A ureteral stent was used.  With the kidney well perfused and sitting appropriately without tension on the anastomoses, viscera were replaced in their usual position.  The wound was closed after a final check for hemostasis.  Overall, the graft quality was assessed to be good.       PD catheter was removed and fascia defect closed with 3-0 vicryl.    At the end of the case the needle, sponge and instrument counts were all correct.  Sterile dressings were applied and the patient was brought to the recovery room/ICU in good condition.    Estimated Blood Loss: 10 mL  Fluids Administered:   Crystalloid (mL) 1,300      Drains: None  Specimens: PD catheter discarded    Findings:    Organ Transplanted: Right Kidney    Arterial Anatomy: single  Number of Arteries: 1  Configuration of Multiple Arteries: not applicable  Venous Anatomy: single  Number of Veins: 1  Ureteral Anatomy: single  Number of Ureters: 1  Reperfusion Quality: good  Overall Graft Quality: good  Intraoperative Urine Production: yes  Becerra: not to be removed before 2 days.  Ureteral Stent: Yes    Ischemic Times:   Anastomosis (warm ischemia) time: 23 minutes   Cold ischemia time: 1,402 minutes  Total ischemia time: 1425 minutes    Donor Data:  IceWEBOS ID:   [id number]  UNOS Match Run:   [id number]  Donor Type:   Donation after Brain Death  Donor CMV Status:   Positive  Donor  HBcAB:   Negative  Donor HCV Status:   Negative

## 2024-07-24 NOTE — HPI
Ms. Kenney is a 63 y.o. female with ESRD secondary to diabetic nephropathy, solitary kidney.  PMH DM2, HTN, incontience (s/p bladder sling), CAD with stents (on ASA), atrial fibrillation, CVA (no residual deficits), ALEJA, anemia. She has been on the wait list for a kidney transplant at [facility] since 2020. Patient is currently on peritoneal dialysis started on 2020. Patient is dialyzing on cyclic peritoneal dialysis. Patient reports that she is tolerating dialysis well. She has a PD catheter. Denies peritonitis or exit site infections.     She now presents as primary candidate for a  donor kidney transplant. Pre-op labs and imaging to be reviewed prior to transplant. Of note, patient states she just started an oral antibiotic earlier this week for a salivary gland infection. Pt states salivary gland infection has been present approx 1 week. She notes mild yellow drainage. Will review with surgeon prior to transplant.

## 2024-07-24 NOTE — SUBJECTIVE & OBJECTIVE
Interval HPI:   Overnight events: No acute events overnight. Patient in room [facility] 2ND FLR Periop Pool*. Blood glucose stable. BG at goal on current insulin regimen (SSI ). Steroid use- Methylprednisolone  500 mg. Day of Surgery  Renal function- Abnormal - Creatinine 8.1   Vasopressors-  None       Endocrine will continue to follow and manage insulin orders inpatient.         Diet NPO Except for: Sips with Medication     Eating:   NPO  Nausea: No  Hypoglycemia and intervention: No  Fever: No  TPN and/or TF: No      PMH, PSH, FH, SH updated and reviewed     ROS:  Review of Systems   Constitutional:  Negative for unexpected weight change.   Eyes:  Negative for visual disturbance.   Respiratory:  Negative for cough.    Cardiovascular:  Negative for chest pain.   Gastrointestinal:  Negative for nausea and vomiting.   Endocrine: Negative for polydipsia and polyuria.   Musculoskeletal:  Negative for back pain.   Skin:  Negative for rash.   Neurological:  Negative for syncope.   Psychiatric/Behavioral:  Negative for agitation and dysphoric mood.        Current Medications and/or Treatments Impacting Glycemic Control  Immunotherapy:    Immunosuppressants       None          Steroids:   Hormones (From admission, onward)      Start     Stop Route Frequency Ordered    07/24/24 0245  methylPREDNISolone sodium succinate (SOLU-MEDROL) 500 mg in D5W 100 mL IVPB  (IP TXP INTRA-OP THYMO - PERIPHERAL THYMO PRECHECKED)         -- IV Once 07/24/24 0239          Pressors:    Autonomic Drugs (From admission, onward)      None          Hyperglycemia/Diabetes Medications:   Antihyperglycemics (From admission, onward)      Start     Stop Route Frequency Ordered    07/24/24 0238  insulin aspart U-100 pen 0-5 Units         -- SubQ Every 6 hours PRN 07/24/24 0238             PHYSICAL EXAMINATION:  Vitals:    07/24/24 1139   BP: (!) 195/81   Pulse: 76   Resp: 18   Temp: 97.5 °F (36.4 °C)     Body mass index is 30.89 kg/m².     Physical  Exam  Constitutional:       Appearance: She is well-developed.   HENT:      Head: Normocephalic.   Eyes:      Conjunctiva/sclera: Conjunctivae normal.   Pulmonary:      Effort: Pulmonary effort is normal.   Musculoskeletal:         General: Normal range of motion.   Skin:     General: Skin is warm.      Findings: No rash.   Neurological:      Mental Status: She is alert and oriented to person, place, and time.

## 2024-07-24 NOTE — H&P
"[facility] - Transplant Stepdown  Kidney Transplant  H&P      Subjective:     Chief Complaint/Reason for Admission: Kidney transplant candidate    History of Present Illness:  Ms. Kenney is a 63 y.o. female with ESRD secondary to diabetic nephropathy, solitary kidney.  PMH DM2, HTN, incontience (s/p bladder sling), CAD with stents (on ASA), atrial fibrillation, CVA (no residual deficits), ALEJA, anemia. She has been on the wait list for a kidney transplant at [facility] since 2020. Patient is currently on peritoneal dialysis started on 2020. Patient is dialyzing on cyclic peritoneal dialysis. Patient reports that she is tolerating dialysis well. She has a PD catheter. Denies peritonitis or exit site infections.     She now presents as primary candidate for a  donor kidney transplant. Pre-op labs and imaging to be reviewed prior to transplant. Of note, patient states she just started an oral antibiotic earlier this week for a salivary gland infection. Pt states salivary gland infection has been present approx 1 week. She notes mild yellow drainage. Will review with surgeon prior to transplant.             Dialysis History: Ms. David with ESRD, requiring chronic dialysis who is on peritoneal dialysis started on 2020. Patient is dialyzing on cyclic peritoneal dialysis.  Patient reports that she is tolerating dialysis well.   Date of Last Dialysis:     Native urine output per day: "normal" amount    Previous Transplant: no    PTA Medications   Medication Sig    ALPRAZolam (XANAX) 0.5 MG tablet Take 2 mg by mouth every evening.    aspirin (ECOTRIN) 81 MG EC tablet Take 81 mg by mouth once daily.    atorvastatin (LIPITOR) 40 MG tablet Take 20 mg by mouth every evening.    AURYXIA 210 mg iron Tab Take 2 tablets by mouth.    cetirizine (ZYRTEC) 10 MG tablet Take 10 mg by mouth every evening.    clindamycin (CLEOCIN) 300 MG capsule Take 300 mg by mouth every 6 (six) hours.    DULoxetine (CYMBALTA) 30 MG " capsule Take 30 mg by mouth once daily.    ezetimibe (ZETIA) 10 mg tablet Take 10 mg by mouth once daily.    labetaloL (NORMODYNE) 200 MG tablet Take 200 mg by mouth every 12 (twelve) hours.     losartan (COZAAR) 100 MG tablet Take 100 mg by mouth every evening.    minoxidiL (LONITEN) 2.5 MG tablet Take 1 tablet by mouth 2 (two) times daily.    NIFEdipine (PROCARDIA-XL) 60 MG (OSM) 24 hr tablet Take 60 mg by mouth every evening.     pantoprazole (PROTONIX) 40 MG tablet Take 40 mg by mouth.    sucralfate (CARAFATE) 100 mg/mL suspension Take 1 g by mouth 4 (four) times daily as needed.    cinacalcet (SENSIPAR) 30 MG Tab     furosemide (LASIX) 80 MG tablet Take 80 mg by mouth once daily.    HUMIRA,CF, PEN 40 mg/0.4 mL PnKt        Review of patient's allergies indicates:  No Known Allergies    Past Medical History:   Diagnosis Date    Anemia     Anxiety     Atrial fibrillation     Coronary artery disease     Depression     Diabetes mellitus, type 2     Disorder of kidney and ureter     Heart murmur     Hyperlipidemia     Hypertension     Obesity     ALEJA (obstructive sleep apnea)     Proteinuria     Solitary kidney     Stroke      Past Surgical History:   Procedure Laterality Date    CORONARY ANGIOPLASTY WITH STENT PLACEMENT      HYSTERECTOMY      INCONTINENCE SURGERY      INSERTION OF IMPLANTABLE LOOP RECORDER      internal heart monitor      PERITONEAL CATHETER INSERTION       Family History    None       Tobacco Use    Smoking status: Former    Smokeless tobacco: Never   Substance and Sexual Activity    Alcohol use: Not Currently    Drug use: Never    Sexual activity: Not Currently     Partners: Male        Review of Systems   Constitutional:  Negative for chills and fever.   HENT:  Positive for mouth sores. Negative for congestion.    Respiratory:  Negative for shortness of breath.    Cardiovascular:  Negative for leg swelling.   Gastrointestinal:  Negative for abdominal pain, constipation and nausea.  "  Genitourinary:  Negative for difficulty urinating and dysuria.   Allergic/Immunologic: Negative for immunocompromised state.   Neurological:  Negative for dizziness and headaches.   Psychiatric/Behavioral:  Negative for agitation and confusion.      Objective:     Vital Signs (Most Recent):  Temp: 98.5 °F (36.9 °C) (24)  Pulse: 75 (24)  Resp: 20 (24)  BP: 139/76 (24)  SpO2: 97 % (24)  Height: 5' 5" (165.1 cm)  Weight: 84.2 kg (185 lb 11.8 oz)  Body mass index is 30.91 kg/m².      Physical Exam  Constitutional:       Appearance: Normal appearance.   HENT:      Mouth/Throat:      Comments: 1 mm opening with white rim left lateral of midline sublingual without purulence. No surrounding erythema  Cardiovascular:      Rate and Rhythm: Normal rate.      Pulses: Normal pulses.   Pulmonary:      Effort: Pulmonary effort is normal.   Abdominal:      General: Bowel sounds are normal.      Palpations: Abdomen is soft.      Comments: LLQ PD cath site. No signs/symptoms of infection   Skin:     General: Skin is warm.   Neurological:      Mental Status: She is alert and oriented to person, place, and time. Mental status is at baseline.   Psychiatric:         Mood and Affect: Mood normal.         Behavior: Behavior normal.          Laboratory  CBC:   Recent Labs   Lab 24   WBC 10.16   RBC 2.90*   HGB 8.9*   HCT 27.6*      MCV 95   MCH 30.7   MCHC 32.2     CMP:   Recent Labs   Lab 24   GLU 88   CALCIUM 9.9   ALBUMIN 2.6*   PROT 5.6*      K 4.3   CO2 23      BUN 44*   CREATININE 8.1*   ALKPHOS 55   ALT 10   AST 12       Diagnostic Results:  CXR reviewed      Assessment/Plan:     Renal/  * ESRD on peritoneal dialysis  - Admit for  donor kidney transplant  - Pre-op labs and imaging reviewed  - Thymo induction      Oncology  Anemia in ESRD (end-stage renal disease)  - CBC reviewed    Endocrine  Diabetes mellitus due to " underlying condition with chronic kidney disease on chronic dialysis, with long-term current use of insulin  - Endocrine consulted          The patient presents for kidney transplant.  There are no apparent contraindications to proceeding with the planned transplant.  The patient understands that the transplant could potentially be cancelled pending detailed assessment of the donor organ.  She will receive Thymoglobulin induction.  A complete discussion of the transplant procedure, including risks, complications, and alternatives, as well as any donor-specific risk factors requiring specific disclosure, will be carried out by the responsible staff surgeon prior to the procedure.     Discharge Planning:  Not suitable at this time    Medical decision making for this encounter includes review of pertinent labs and diagnostic studies, assessment and planning, discussions with consulting providers, discussion with patient/family, and participation in multidisciplinary rounds. Time spent caring for patient: 60 minutes    Nino Tyelr NP  Kidney Transplant  [facility] - Transplant [facility]

## 2024-07-24 NOTE — UM SECONDARY REVIEW
Admitted for potential KTX    TRANSPLANT, KIDNEY  12:34 PM  Alphonso Mon MD  [facility]

## 2024-07-24 NOTE — OP NOTE
Operative Report    Date of Procedure: 7/24/2024    Surgeon: Alphonso Mon MD  First Assistant:     Pre-operative Diagnosis: Allograft kidney for transplantation  Post-operative Diagnosis: Same    Procedure(s) Performed: Back Table Preparation of Kidney, Simple    Anesthesia: Not applicable  Estimated Blood Loss: Not applicable  Fluids Administered: Not applicable    Findings: as described below   Drains: not applicable    Preamble  Indications: This report describes only the backbench preparation of the kidney prior to transplantation.  The transplant operation itself is described in a separate report.    ABO Confirmation: Immediately following arrival of the donor organ and prior to implantation, a formal ABO confirmation was done according to hospital and UNOS policies.  I confirmed the UNOS ID number of the donor organ and the donor and recipient ABO types, directly verifying these data by comparison with the UNOS [facility] report.  This confirmation was personally done by an attending surgeon and circulating nurse, and is officially documented elsewhere.    Time-Out: A complete time out was carried out prior to the procedure, with confirmation of patient identity, correct procedure, correct operative site, appropriate antibiotic prophylaxis, review of any known allergies, and presence of all needed equipment.    Procedure in Detail  Prior to starting the operation, the right kidney  was prepared on the back table. Arterial anatomy was single. Venous anatomy was single. Ureteral anatomy was single. Back table vascular reconstruction was not required  .  Unneeded fat was removed from the kidney, the vessels were cleaned of adherent tissue and tested for leaks, and the kidney was maintained at ice temperature in organ preservation solution until it was brought to the operative field.     An extension of the right renal vein was made with 2 firings of a vascular stapler across the vena cava.

## 2024-07-25 PROBLEM — Z29.89 PROPHYLACTIC IMMUNOTHERAPY: Status: ACTIVE | Noted: 2024-07-25

## 2024-07-25 PROBLEM — Z76.82 PATIENT ON WAITING LIST FOR KIDNEY TRANSPLANT: Status: RESOLVED | Noted: 2024-02-01 | Resolved: 2024-07-25

## 2024-07-25 PROBLEM — N18.6 ESRD ON PERITONEAL DIALYSIS: Status: RESOLVED | Noted: 2021-04-01 | Resolved: 2024-07-25

## 2024-07-25 PROBLEM — Z94.0 S/P KIDNEY TRANSPLANT: Status: ACTIVE | Noted: 2024-07-25

## 2024-07-25 PROBLEM — K11.20 SALIVARY GLAND INFECTION: Status: ACTIVE | Noted: 2024-07-25

## 2024-07-25 PROBLEM — Z79.60 LONG-TERM USE OF IMMUNOSUPPRESSANT MEDICATION: Status: ACTIVE | Noted: 2024-07-25

## 2024-07-25 PROBLEM — D62 ACUTE BLOOD LOSS ANEMIA: Status: ACTIVE | Noted: 2024-07-25

## 2024-07-25 PROBLEM — Z99.2 ESRD ON PERITONEAL DIALYSIS: Status: RESOLVED | Noted: 2021-04-01 | Resolved: 2024-07-25

## 2024-07-25 LAB
ALBUMIN SERPL BCP-MCNC: 2.2 G/DL (ref 3.5–5.2)
ANION GAP SERPL CALC-SCNC: 12 MMOL/L (ref 8–16)
ANION GAP SERPL CALC-SCNC: 14 MMOL/L (ref 8–16)
ANION GAP SERPL CALC-SCNC: 15 MMOL/L (ref 8–16)
BASOPHILS # BLD AUTO: 0.02 K/UL (ref 0–0.2)
BASOPHILS # BLD AUTO: 0.04 K/UL (ref 0–0.2)
BASOPHILS NFR BLD: 0.1 % (ref 0–1.9)
BASOPHILS NFR BLD: 0.2 % (ref 0–1.9)
BUN SERPL-MCNC: 45 MG/DL (ref 8–23)
BUN SERPL-MCNC: 46 MG/DL (ref 8–23)
BUN SERPL-MCNC: 50 MG/DL (ref 8–23)
CALCIUM SERPL-MCNC: 10 MG/DL (ref 8.7–10.5)
CALCIUM SERPL-MCNC: 9.3 MG/DL (ref 8.7–10.5)
CALCIUM SERPL-MCNC: 9.5 MG/DL (ref 8.7–10.5)
CHLORIDE SERPL-SCNC: 104 MMOL/L (ref 95–110)
CHLORIDE SERPL-SCNC: 104 MMOL/L (ref 95–110)
CHLORIDE SERPL-SCNC: 107 MMOL/L (ref 95–110)
CO2 SERPL-SCNC: 19 MMOL/L (ref 23–29)
CO2 SERPL-SCNC: 20 MMOL/L (ref 23–29)
CO2 SERPL-SCNC: 22 MMOL/L (ref 23–29)
CREAT SERPL-MCNC: 6.8 MG/DL (ref 0.5–1.4)
CREAT SERPL-MCNC: 7.1 MG/DL (ref 0.5–1.4)
CREAT SERPL-MCNC: 7.8 MG/DL (ref 0.5–1.4)
DIFFERENTIAL METHOD BLD: ABNORMAL
DIFFERENTIAL METHOD BLD: ABNORMAL
EOSINOPHIL # BLD AUTO: 0 K/UL (ref 0–0.5)
EOSINOPHIL # BLD AUTO: 0 K/UL (ref 0–0.5)
EOSINOPHIL NFR BLD: 0 % (ref 0–8)
EOSINOPHIL NFR BLD: 0 % (ref 0–8)
ERYTHROCYTE [DISTWIDTH] IN BLOOD BY AUTOMATED COUNT: 12.8 % (ref 11.5–14.5)
ERYTHROCYTE [DISTWIDTH] IN BLOOD BY AUTOMATED COUNT: 12.9 % (ref 11.5–14.5)
EST. GFR  (NO RACE VARIABLE): 5.3 ML/MIN/1.73 M^2
EST. GFR  (NO RACE VARIABLE): 5.9 ML/MIN/1.73 M^2
EST. GFR  (NO RACE VARIABLE): 6.3 ML/MIN/1.73 M^2
GLUCOSE SERPL-MCNC: 152 MG/DL (ref 70–110)
GLUCOSE SERPL-MCNC: 158 MG/DL (ref 70–110)
GLUCOSE SERPL-MCNC: 191 MG/DL (ref 70–110)
HCT VFR BLD AUTO: 27.1 % (ref 37–48.5)
HCT VFR BLD AUTO: 27.7 % (ref 37–48.5)
HGB BLD-MCNC: 8.9 G/DL (ref 12–16)
HGB BLD-MCNC: 9.2 G/DL (ref 12–16)
IMM GRANULOCYTES # BLD AUTO: 0.09 K/UL (ref 0–0.04)
IMM GRANULOCYTES # BLD AUTO: 0.12 K/UL (ref 0–0.04)
IMM GRANULOCYTES NFR BLD AUTO: 0.5 % (ref 0–0.5)
IMM GRANULOCYTES NFR BLD AUTO: 0.6 % (ref 0–0.5)
LYMPHOCYTES # BLD AUTO: 0.1 K/UL (ref 1–4.8)
LYMPHOCYTES # BLD AUTO: 0.1 K/UL (ref 1–4.8)
LYMPHOCYTES NFR BLD: 0.4 % (ref 18–48)
LYMPHOCYTES NFR BLD: 0.4 % (ref 18–48)
MAGNESIUM SERPL-MCNC: 1.9 MG/DL (ref 1.6–2.6)
MCH RBC QN AUTO: 31.9 PG (ref 27–31)
MCH RBC QN AUTO: 31.9 PG (ref 27–31)
MCHC RBC AUTO-ENTMCNC: 32.8 G/DL (ref 32–36)
MCHC RBC AUTO-ENTMCNC: 33.2 G/DL (ref 32–36)
MCV RBC AUTO: 96 FL (ref 82–98)
MCV RBC AUTO: 97 FL (ref 82–98)
MONOCYTES # BLD AUTO: 0.2 K/UL (ref 0.3–1)
MONOCYTES # BLD AUTO: 0.3 K/UL (ref 0.3–1)
MONOCYTES NFR BLD: 1.2 % (ref 4–15)
MONOCYTES NFR BLD: 1.4 % (ref 4–15)
NEUTROPHILS # BLD AUTO: 18.2 K/UL (ref 1.8–7.7)
NEUTROPHILS # BLD AUTO: 19.2 K/UL (ref 1.8–7.7)
NEUTROPHILS NFR BLD: 97.5 % (ref 38–73)
NEUTROPHILS NFR BLD: 97.7 % (ref 38–73)
NRBC BLD-RTO: 0 /100 WBC
NRBC BLD-RTO: 0 /100 WBC
PHOSPHATE SERPL-MCNC: 8.8 MG/DL (ref 2.7–4.5)
PLATELET # BLD AUTO: 177 K/UL (ref 150–450)
PLATELET # BLD AUTO: 196 K/UL (ref 150–450)
PMV BLD AUTO: 10.7 FL (ref 9.2–12.9)
PMV BLD AUTO: 10.9 FL (ref 9.2–12.9)
POCT GLUCOSE: 151 MG/DL (ref 70–110)
POCT GLUCOSE: 156 MG/DL (ref 70–110)
POCT GLUCOSE: 161 MG/DL (ref 70–110)
POCT GLUCOSE: 163 MG/DL (ref 70–110)
POCT GLUCOSE: 200 MG/DL (ref 70–110)
POCT GLUCOSE: 203 MG/DL (ref 70–110)
POTASSIUM SERPL-SCNC: 4.2 MMOL/L (ref 3.5–5.1)
POTASSIUM SERPL-SCNC: 4.5 MMOL/L (ref 3.5–5.1)
POTASSIUM SERPL-SCNC: 4.6 MMOL/L (ref 3.5–5.1)
RBC # BLD AUTO: 2.79 M/UL (ref 4–5.4)
RBC # BLD AUTO: 2.88 M/UL (ref 4–5.4)
SODIUM SERPL-SCNC: 138 MMOL/L (ref 136–145)
SODIUM SERPL-SCNC: 138 MMOL/L (ref 136–145)
SODIUM SERPL-SCNC: 141 MMOL/L (ref 136–145)
TACROLIMUS BLD-MCNC: 6.4 NG/ML (ref 5–15)
WBC # BLD AUTO: 18.67 K/UL (ref 3.9–12.7)
WBC # BLD AUTO: 19.58 K/UL (ref 3.9–12.7)

## 2024-07-25 PROCEDURE — 99900035 HC TECH TIME PER 15 MIN (STAT)

## 2024-07-25 PROCEDURE — 63600175 PHARM REV CODE 636 W HCPCS: Performed by: PHYSICIAN ASSISTANT

## 2024-07-25 PROCEDURE — 99221 1ST HOSP IP/OBS SF/LOW 40: CPT | Mod: ,,, | Performed by: INTERNAL MEDICINE

## 2024-07-25 PROCEDURE — 27000207 HC ISOLATION

## 2024-07-25 PROCEDURE — S5010 5% DEXTROSE AND 0.45% SALINE: HCPCS

## 2024-07-25 PROCEDURE — 25000003 PHARM REV CODE 250: Performed by: PHYSICIAN ASSISTANT

## 2024-07-25 PROCEDURE — 85025 COMPLETE CBC W/AUTO DIFF WBC: CPT

## 2024-07-25 PROCEDURE — 80197 ASSAY OF TACROLIMUS: CPT

## 2024-07-25 PROCEDURE — 80048 BASIC METABOLIC PNL TOTAL CA: CPT | Mod: 91 | Performed by: PHYSICIAN ASSISTANT

## 2024-07-25 PROCEDURE — 36415 COLL VENOUS BLD VENIPUNCTURE: CPT | Mod: XB | Performed by: PHYSICIAN ASSISTANT

## 2024-07-25 PROCEDURE — 25000003 PHARM REV CODE 250

## 2024-07-25 PROCEDURE — 94761 N-INVAS EAR/PLS OXIMETRY MLT: CPT

## 2024-07-25 PROCEDURE — 80069 RENAL FUNCTION PANEL: CPT

## 2024-07-25 PROCEDURE — 36415 COLL VENOUS BLD VENIPUNCTURE: CPT

## 2024-07-25 PROCEDURE — 85025 COMPLETE CBC W/AUTO DIFF WBC: CPT | Mod: 91 | Performed by: PHYSICIAN ASSISTANT

## 2024-07-25 PROCEDURE — 99233 SBSQ HOSP IP/OBS HIGH 50: CPT | Mod: ,,, | Performed by: PHYSICIAN ASSISTANT

## 2024-07-25 PROCEDURE — 99232 SBSQ HOSP IP/OBS MODERATE 35: CPT | Mod: ,,, | Performed by: NURSE PRACTITIONER

## 2024-07-25 PROCEDURE — 63600175 PHARM REV CODE 636 W HCPCS

## 2024-07-25 PROCEDURE — 20600001 HC STEP DOWN PRIVATE ROOM

## 2024-07-25 PROCEDURE — 94660 CPAP INITIATION&MGMT: CPT

## 2024-07-25 PROCEDURE — 83735 ASSAY OF MAGNESIUM: CPT

## 2024-07-25 PROCEDURE — 63600175 PHARM REV CODE 636 W HCPCS: Performed by: NURSE PRACTITIONER

## 2024-07-25 RX ORDER — DULOXETIN HYDROCHLORIDE 30 MG/1
30 CAPSULE, DELAYED RELEASE ORAL DAILY
Status: DISCONTINUED | OUTPATIENT
Start: 2024-07-25 | End: 2024-07-25

## 2024-07-25 RX ORDER — CINACALCET 30 MG/1
60 TABLET, FILM COATED ORAL
Status: DISCONTINUED | OUTPATIENT
Start: 2024-07-26 | End: 2024-07-27 | Stop reason: HOSPADM

## 2024-07-25 RX ORDER — VALGANCICLOVIR 450 MG/1
450 TABLET, FILM COATED ORAL EVERY MORNING
Qty: 30 TABLET | Refills: 2 | Status: SHIPPED | OUTPATIENT
Start: 2024-07-25 | End: 2024-07-27

## 2024-07-25 RX ORDER — PREDNISONE 5 MG/1
TABLET ORAL
Qty: 70 TABLET | Refills: 11 | Status: ON HOLD | OUTPATIENT
Start: 2024-07-27

## 2024-07-25 RX ORDER — MYCOPHENOLATE MOFETIL 250 MG/1
1000 CAPSULE ORAL 2 TIMES DAILY
Qty: 240 CAPSULE | Refills: 11 | Status: ON HOLD | OUTPATIENT
Start: 2024-07-25 | End: 2025-07-25

## 2024-07-25 RX ORDER — OXYBUTYNIN CHLORIDE 5 MG/1
5 TABLET ORAL 3 TIMES DAILY PRN
Status: DISCONTINUED | OUTPATIENT
Start: 2024-07-25 | End: 2024-07-27 | Stop reason: HOSPADM

## 2024-07-25 RX ORDER — TACROLIMUS 1 MG/1
6 CAPSULE ORAL EVERY 12 HOURS
Qty: 360 CAPSULE | Refills: 11 | Status: SHIPPED | OUTPATIENT
Start: 2024-07-25 | End: 2024-07-27

## 2024-07-25 RX ORDER — SODIUM CHLORIDE 9 MG/ML
INJECTION, SOLUTION INTRAVENOUS CONTINUOUS
Status: DISCONTINUED | OUTPATIENT
Start: 2024-07-25 | End: 2024-07-26

## 2024-07-25 RX ORDER — PANTOPRAZOLE SODIUM 40 MG/1
40 TABLET, DELAYED RELEASE ORAL DAILY
Qty: 30 TABLET | Refills: 2 | Status: ON HOLD | OUTPATIENT
Start: 2024-07-25 | End: 2025-07-25

## 2024-07-25 RX ORDER — SODIUM BICARBONATE 650 MG/1
650 TABLET ORAL 2 TIMES DAILY
Status: DISCONTINUED | OUTPATIENT
Start: 2024-07-25 | End: 2024-07-27 | Stop reason: HOSPADM

## 2024-07-25 RX ORDER — SULFAMETHOXAZOLE AND TRIMETHOPRIM 400; 80 MG/1; MG/1
1 TABLET ORAL EVERY MORNING
Qty: 30 TABLET | Refills: 5 | Status: ON HOLD | OUTPATIENT
Start: 2024-07-25 | End: 2025-01-21

## 2024-07-25 RX ORDER — PANTOPRAZOLE SODIUM 40 MG/1
40 TABLET, DELAYED RELEASE ORAL DAILY
Status: DISCONTINUED | OUTPATIENT
Start: 2024-07-25 | End: 2024-07-27 | Stop reason: HOSPADM

## 2024-07-25 RX ORDER — ALPRAZOLAM 0.5 MG/1
0.5 TABLET ORAL NIGHTLY PRN
Status: DISCONTINUED | OUTPATIENT
Start: 2024-07-25 | End: 2024-07-27 | Stop reason: HOSPADM

## 2024-07-25 RX ORDER — DULOXETIN HYDROCHLORIDE 30 MG/1
30 CAPSULE, DELAYED RELEASE ORAL NIGHTLY
Status: DISCONTINUED | OUTPATIENT
Start: 2024-07-25 | End: 2024-07-27 | Stop reason: HOSPADM

## 2024-07-25 RX ORDER — NIFEDIPINE 30 MG/1
30 TABLET, EXTENDED RELEASE ORAL DAILY
Status: DISCONTINUED | OUTPATIENT
Start: 2024-07-25 | End: 2024-07-25

## 2024-07-25 RX ADMIN — INSULIN ASPART 2 UNITS: 100 INJECTION, SOLUTION INTRAVENOUS; SUBCUTANEOUS at 05:07

## 2024-07-25 RX ADMIN — BISACODYL 10 MG: 5 TABLET, COATED ORAL at 08:07

## 2024-07-25 RX ADMIN — METHYLPREDNISOLONE SODIUM SUCCINATE 250 MG: 125 INJECTION, POWDER, FOR SOLUTION INTRAMUSCULAR; INTRAVENOUS at 09:07

## 2024-07-25 RX ADMIN — HEPARIN SODIUM 5000 UNITS: 5000 INJECTION INTRAVENOUS; SUBCUTANEOUS at 05:07

## 2024-07-25 RX ADMIN — OXYBUTYNIN CHLORIDE 5 MG: 5 TABLET ORAL at 04:07

## 2024-07-25 RX ADMIN — ACETAMINOPHEN 650 MG: 325 TABLET ORAL at 09:07

## 2024-07-25 RX ADMIN — TACROLIMUS 3 MG: 1 CAPSULE ORAL at 05:07

## 2024-07-25 RX ADMIN — MYCOPHENOLATE MOFETIL 1000 MG: 250 CAPSULE ORAL at 09:07

## 2024-07-25 RX ADMIN — SODIUM BICARBONATE 650 MG: 650 TABLET ORAL at 12:07

## 2024-07-25 RX ADMIN — AMPICILLIN SODIUM AND SULBACTAM SODIUM 3 G: 2; 1 INJECTION, POWDER, FOR SOLUTION INTRAMUSCULAR; INTRAVENOUS at 11:07

## 2024-07-25 RX ADMIN — MUPIROCIN 1 G: 20 OINTMENT TOPICAL at 08:07

## 2024-07-25 RX ADMIN — DOCUSATE SODIUM 100 MG: 100 CAPSULE, LIQUID FILLED ORAL at 02:07

## 2024-07-25 RX ADMIN — SODIUM CHLORIDE 500 ML: 9 INJECTION, SOLUTION INTRAVENOUS at 09:07

## 2024-07-25 RX ADMIN — OXYBUTYNIN CHLORIDE 5 MG: 5 TABLET ORAL at 08:07

## 2024-07-25 RX ADMIN — DULOXETINE HYDROCHLORIDE 30 MG: 30 CAPSULE, DELAYED RELEASE ORAL at 08:07

## 2024-07-25 RX ADMIN — HEPARIN SODIUM 5000 UNITS: 5000 INJECTION INTRAVENOUS; SUBCUTANEOUS at 08:07

## 2024-07-25 RX ADMIN — OXYBUTYNIN CHLORIDE 5 MG: 5 TABLET ORAL at 12:07

## 2024-07-25 RX ADMIN — THERA TABS 1 TABLET: TAB at 09:07

## 2024-07-25 RX ADMIN — SODIUM CHLORIDE: 9 INJECTION, SOLUTION INTRAVENOUS at 10:07

## 2024-07-25 RX ADMIN — INSULIN ASPART 2 UNITS: 100 INJECTION, SOLUTION INTRAVENOUS; SUBCUTANEOUS at 04:07

## 2024-07-25 RX ADMIN — DOCUSATE SODIUM 100 MG: 100 CAPSULE, LIQUID FILLED ORAL at 08:07

## 2024-07-25 RX ADMIN — NIFEDIPINE 30 MG: 30 TABLET, FILM COATED, EXTENDED RELEASE ORAL at 03:07

## 2024-07-25 RX ADMIN — ACETAMINOPHEN 650 MG: 325 TABLET ORAL at 10:07

## 2024-07-25 RX ADMIN — TACROLIMUS 3 MG: 1 CAPSULE ORAL at 08:07

## 2024-07-25 RX ADMIN — SODIUM CHLORIDE: 9 INJECTION, SOLUTION INTRAVENOUS at 05:07

## 2024-07-25 RX ADMIN — AMPICILLIN SODIUM AND SULBACTAM SODIUM 3 G: 2; 1 INJECTION, POWDER, FOR SOLUTION INTRAMUSCULAR; INTRAVENOUS at 02:07

## 2024-07-25 RX ADMIN — OXYCODONE HYDROCHLORIDE 5 MG: 5 TABLET ORAL at 10:07

## 2024-07-25 RX ADMIN — SODIUM BICARBONATE 650 MG: 650 TABLET ORAL at 08:07

## 2024-07-25 RX ADMIN — INSULIN ASPART 2 UNITS: 100 INJECTION, SOLUTION INTRAVENOUS; SUBCUTANEOUS at 09:07

## 2024-07-25 RX ADMIN — DIPHENHYDRAMINE HYDROCHLORIDE 25 MG: 25 CAPSULE ORAL at 10:07

## 2024-07-25 RX ADMIN — MYCOPHENOLATE MOFETIL 1000 MG: 250 CAPSULE ORAL at 08:07

## 2024-07-25 RX ADMIN — INSULIN ASPART 2 UNITS: 100 INJECTION, SOLUTION INTRAVENOUS; SUBCUTANEOUS at 08:07

## 2024-07-25 RX ADMIN — SODIUM CHLORIDE: 9 INJECTION, SOLUTION INTRAVENOUS at 06:07

## 2024-07-25 RX ADMIN — INSULIN ASPART 2 UNITS: 100 INJECTION, SOLUTION INTRAVENOUS; SUBCUTANEOUS at 12:07

## 2024-07-25 RX ADMIN — DEXTROSE AND SODIUM CHLORIDE: 5; 450 INJECTION, SOLUTION INTRAVENOUS at 05:07

## 2024-07-25 RX ADMIN — SODIUM CHLORIDE: 9 INJECTION, SOLUTION INTRAVENOUS at 02:07

## 2024-07-25 RX ADMIN — INSULIN ASPART 2 UNITS: 100 INJECTION, SOLUTION INTRAVENOUS; SUBCUTANEOUS at 01:07

## 2024-07-25 RX ADMIN — OXYCODONE HYDROCHLORIDE 5 MG: 5 TABLET ORAL at 06:07

## 2024-07-25 RX ADMIN — PANTOPRAZOLE SODIUM 40 MG: 40 TABLET, DELAYED RELEASE ORAL at 09:07

## 2024-07-25 RX ADMIN — HEPARIN SODIUM 5000 UNITS: 5000 INJECTION INTRAVENOUS; SUBCUTANEOUS at 02:07

## 2024-07-25 RX ADMIN — ALPRAZOLAM 0.5 MG: 0.5 TABLET ORAL at 09:07

## 2024-07-25 RX ADMIN — AMPICILLIN SODIUM AND SULBACTAM SODIUM 3 G: 2; 1 INJECTION, POWDER, FOR SOLUTION INTRAMUSCULAR; INTRAVENOUS at 06:07

## 2024-07-25 NOTE — SUBJECTIVE & OBJECTIVE
Subjective:   History of Present Illness:  Ms. Kenney is a 63 y.o. female with ESRD secondary to diabetic nephropathy, solitary kidney.  PMH DM2, HTN, incontience (s/p bladder sling), CAD with stents (on ASA), atrial fibrillation, CVA (no residual deficits), ALEJA, anemia. She has been on the wait list for a kidney transplant at [facility] since 2020. Patient is currently on peritoneal dialysis started on 2020. Patient is dialyzing on cyclic peritoneal dialysis. Patient reports that she is tolerating dialysis well. She has a PD catheter. Denies peritonitis or exit site infections.     She now presents as primary candidate for a  donor kidney transplant. Pre-op labs and imaging to be reviewed prior to transplant. Of note, patient states she just started an oral antibiotic earlier this week for a salivary gland infection. Pt states salivary gland infection has been present approx 1 week. She notes mild yellow drainage. Will review with surgeon prior to transplant.                 Hospital Course:  She is now s/p DBD kidney transplant on 24. Surgery without complications. 2 day suggs, no drains, PD cath removed    Interval History: Patient doing fine this morning. Did have symptomatic orthostatic hypotension. Antihypertensives held. Given 500 cc NS bolus. She is otherwise doing ok. Some incisional pain. Making urine with slight improvement in creatinine. Kidney US this AM due to bloody drainage from incision. US satisfactory, no hematoma seen. Plan to pathway, place on IVF NS @75 cc/hr. Repeat labs at noon and 6 pm. ID consulted for recommendations for salivary gland infection. Currently on Unasyn. Appreciate their assistance.         Past Medical, Surgical, Family, and Social History:   Unchanged from H&P.    Scheduled Meds:   acetaminophen  650 mg Oral Q8H    acetaminophen  650 mg Oral Q24H    ampicillin-sulbactam  3 g Intravenous Q8H    antithymocyte globulin (rabbit) 100 mg, hydrocortisone sodium  succinate (SOLU-CORTEF) 20 mg in 0.9% NaCl 500 mL (FOR PERIPHERAL LINE ADMINISTRATION ONLY)  1.5 mg/kg (Adjusted) Intravenous Daily    bisacodyL  10 mg Oral QHS    [START ON 7/26/2024] cinacalcet  60 mg Oral Daily with breakfast    diphenhydrAMINE  25 mg Oral Q24H    docusate sodium  100 mg Oral TID    DULoxetine  30 mg Oral Daily    heparin (porcine)  5,000 Units Subcutaneous Q8H    [START ON 7/26/2024] methylPREDNISolone injection (PEDS and ADULTS)  125 mg Intravenous Once    multivitamin  1 tablet Oral Daily    mupirocin  1 g Nasal BID    mycophenolate  1,000 mg Oral BID    pantoprazole  40 mg Oral Daily    [START ON 7/27/2024] predniSONE  20 mg Oral Daily    sodium bicarbonate  650 mg Oral BID    sodium chloride 0.9%  500 mL Intravenous Once    [START ON 8/3/2024] sulfamethoxazole-trimethoprim 400-80mg  1 tablet Oral Daily AM    tacrolimus  3 mg Oral BID    [START ON 8/3/2024] valGANciclovir  450 mg Oral Daily AM     Continuous Infusions:   0.9% NaCl   Intravenous Continuous        glucagon (human recombinant)  1 mg Intramuscular Continuous PRN         PRN Meds:  Current Facility-Administered Medications:     ALPRAZolam, 0.5 mg, Oral, Nightly PRN    dextrose 10%, 12.5 g, Intravenous, PRN    dextrose 10%, 12.5 g, Intravenous, PRN    dextrose 10%, 25 g, Intravenous, PRN    diphenhydrAMINE, 50 mg, Oral, Once PRN    EPINEPHrine (PF), 1 mg, Subcutaneous, Once PRN    glucagon (human recombinant), 1 mg, Intramuscular, PRN    glucagon (human recombinant), 1 mg, Intramuscular, Continuous PRN    glucose, 16 g, Oral, PRN    glucose, 16 g, Oral, PRN    glucose, 24 g, Oral, PRN    hydrocortisone sodium succinate, 100 mg, Intravenous, Once PRN    insulin aspart U-100, 0-10 Units, Subcutaneous, Q4H PRN    melatonin, 6 mg, Oral, Nightly PRN    ondansetron, 4 mg, Intravenous, Q6H PRN    oxybutynin, 5 mg, Oral, TID PRN    oxyCODONE, 5 mg, Oral, Q6H PRN    sodium chloride 0.9%, 10 mL, Intravenous, PRN    sodium chloride 0.9%, 10  "mL, Intravenous, PRN    traMADoL, 50 mg, Oral, Q6H PRN    traZODone, 25 mg, Oral, Nightly PRN    Intake/Output - Last 3 Shifts         07/23 0700 07/24 0659 07/24 0700 07/25 0659 07/25 0700 07/26 0659    P.O. 25 690     I.V. (mL/kg) 0 (0) 2957.1 (35.1) 294.9 (3.5)    Other 0 0     IV Piggyback  1301     Total Intake(mL/kg) 25 (0.3) 4948.1 (58.8) 294.9 (3.5)    Urine (mL/kg/hr) 1 2714 (1.3) 425 (1.7)    Emesis/NG output 0 0     Other 0 0     Stool 0 0     Blood 0 0     Total Output 1 2714 425    Net +24 +2234.1 -130.1           Urine Occurrence 50 x 0 x     Stool Occurrence 0 x 0 x     Emesis Occurrence 0 x 0 x              Review of Systems   Constitutional:  Positive for activity change. Negative for chills and fever.   Respiratory:  Negative for shortness of breath.    Cardiovascular:  Negative for leg swelling.   Gastrointestinal:  Positive for abdominal pain. Negative for abdominal distention, nausea and vomiting.   Genitourinary:  Negative for hematuria.   Skin:  Positive for wound.   Allergic/Immunologic: Positive for immunocompromised state.   Psychiatric/Behavioral:  Negative for confusion. The patient is nervous/anxious.       Objective:     Vital Signs (Most Recent):  Temp: 98.5 °F (36.9 °C) (07/25/24 0830)  Pulse: 69 (07/25/24 0745)  Resp: 18 (07/25/24 0745)  BP: (!) 157/66 (07/25/24 0745)  SpO2: 97 % (07/25/24 0745) Vital Signs (24h Range):  Temp:  [97.2 °F (36.2 °C)-99.1 °F (37.3 °C)] 98.5 °F (36.9 °C)  Pulse:  [60-90] 69  Resp:  [15-25] 18  SpO2:  [92 %-100 %] 97 %  BP: ()/(47-81) 157/66     Weight: 84.2 kg (185 lb 10 oz)  Height: 5' 5" (165.1 cm)  Body mass index is 30.89 kg/m².     Physical Exam  Vitals reviewed.   Cardiovascular:      Rate and Rhythm: Normal rate.   Pulmonary:      Effort: Pulmonary effort is normal.   Abdominal:      General: There is no distension.      Tenderness: There is abdominal tenderness. There is no guarding or rebound.      Comments: Kidney incision clean intact " with staples in place    Musculoskeletal:      Right lower leg: No edema.      Left lower leg: No edema.   Neurological:      Mental Status: She is alert and oriented to person, place, and time.   Psychiatric:         Mood and Affect: Mood normal.         Behavior: Behavior normal.         Thought Content: Thought content normal.         Judgment: Judgment normal.          Laboratory:  CBC:   Recent Labs   Lab 07/24/24  0317 07/24/24  1637 07/24/24 2232 07/25/24  0648   WBC 10.16  --  15.20* 19.58*   RBC 2.90*  --  2.89* 2.88*   HGB 8.9*  --  9.1* 9.2*   HCT 27.6* 24.2* 28.0* 27.7*     --  172 196   MCV 95  --  97 96   MCH 30.7  --  31.5* 31.9*   MCHC 32.2  --  32.5 33.2     BMP:   Recent Labs   Lab 07/24/24 1637 07/24/24 2232 07/25/24  0648   GLU 89 151* 158*    140 141   K 4.7 5.0 4.5    108 107   CO2 22* 20* 22*   BUN 42* 43* 45*   CREATININE 8.2* 8.2* 7.8*   CALCIUM 8.7 8.9 9.3       Diagnostic Results:  None

## 2024-07-25 NOTE — PROGRESS NOTES
Admit Note     Met with patient and daughter to assess needs. Patient is a 63 y.o. single female, admitted for for kidney transplant.      Kidney transplant candidate [Z76.82]      Patient admitted from home on 7/24/2024 .  At this time, patient presents as alert and oriented x 4, pleasant, good eye contact, well groomed, recall good, concentration/judgement good, average intelligence, calm, communicative, cooperative, and asking and answering questions appropriately.  At this time, patients caregiver presents as alert and oriented x 4, pleasant, good eye contact, well groomed, recall good, concentration/judgement good, average intelligence, calm, communicative, cooperative, and asking and answering questions appropriately.    Household/Family Systems     Patient resides with patient's  self , at [address] [place] [address].  Support system includes her daughter Sarah Lott.  Patient does not have dependents that are need of being cared for.     Patients primary caregiver is Sarah Lott, patients daughter, phone number [phone].  Confirmed patients contact information is [phone] (home);   Telephone Information:   Mobile [phone]   .    During admission, patient's caregiver plans to stay in patient's room.  Confirmed patient and patients caregivers do have access to reliable transportation.    Cognitive Status/Learning     Patient reports reading ability as 12th grade and states patient does have difficulty with seeing, patient reports wearing glasses.  Patient reports patient learns best by seeing and hearing.   Needed: No.   Highest education level: High School (9-12) or GED    Vocation/Disability   .  Working for Income: No  If no, reason not working: Disability  Patient is disabled due to back injury since 2001.  Prior to disability, patient  was employed at a phone company.    Adherence     Patient reports a high level of adherence to patients health care regimen.   Adherence counseling and education provided. Patient verbalizes understanding.    Substance Use    Patient reports the following substance usage.    Tobacco: none, patient denies any use.  Alcohol: none, patient denies any use.  Illicit Drugs/Non-prescribed Medications: none, patient denies any use.  Patient states clear understanding of the potential impact of substance use.  Substance abstinence/cessation counseling, education and resources provided and reviewed.     Services Utilizing/ADLS    Infusion Service: Prior to admission, patient utilizing? no  Home Health: Prior to admission, patient utilizing?  Patient reports having a home aide.  DME: Prior to admission, yes: Shower Chair, Wheel Chair, Rollator, and CPAP.   Pulmonary/Cardiac Rehab: Prior to admission, no  Dialysis:  Prior to admission, yes PD @ [facility].  Transplant Specialty Pharmacy:  Prior to admission, no; patient provides permission to release necessary information to specialty pharmacy for medications post-tranplant. Resources provided and patient is choosing [facility] pharmacy at this time.    Prior to admission, patient reports patient was independent with ADLS (reports home aide would assist her as needed) and was driving.  Patient reports patient is not able to care for self at this time due to compromised medical condition (as documented in medical record) and physical weakness..  Patient indicates a willingness to care for self once medically cleared to do so.    Insurance/Medications    Insured by   Payer/Plan Subscr  Sex Relation Sub. Ins. ID Effective Group Num   1. [employer] MANAGE* DAVID CASILLAS 1960 Female Self [id number] 3/1/20 [id number]                                   [address] [address]   2. [place]* DAVID CASILLAS 1960 Female Self [id number]* 10/1/04                                    [address] [address]      Primary Insurance (for UNOS reporting): Public Insurance - Medicare & Choice  Secondary Insurance (for UNOS  "reporting): Public Insurance - Medicaid    Patient reports patient is able to obtain and afford medications at this time and at time of discharge.    Living Will/Healthcare Power of     Patient states patient does not have a LW and/or HCPA.   provided education regarding LW and HCPA and the completion of forms.    Coping/Mental Health    Patient reports benefiting from her families support. Patient reports a diagnosis of depression which is managed through her nephrologist. Patient reports taking Cymbalta and Xanax (for sleep). Patient reports Cymbalta works "sort of". Patient denied wanting to see psychiatry/psychology while in patient. Patient denied current or past thoughts of suicide or homicide. Patient declined wanting mental health resources at this time. Patient and caregiver agree to contact transplant team with needs, questions, or concerns as they arise.     Discharge Planning    At time of discharge, patient plans to return to [facility] under the care of Sarah Lott.  Patients daughter will transport patient.  Per rounds today, expected discharge date has not been medically determined at this time. Patient and patients caregiver  verbalize understanding and are involved in treatment planning and discharge process.    Additional Concerns    Patient's caretaker denies additional needs and/or concerns at this time.  providing ongoing psychosocial support, education, resources and d/c planning as needed.  SW remains available.  remains available. Patient's caregiver verbalizes understanding and agreement with information reviewed,  availability and how to access available resources as needed. Patient denies additional needs and/or concerns at this time. Patient verbalizes understanding and agreement with information reviewed, social work availability, and how to access available resources as needed.    "

## 2024-07-25 NOTE — ASSESSMENT & PLAN NOTE
Titrate insulin slowly to avoid hypoglycemia as the risk of hypoglycemia increases with decreased creatinine clearance.  Estimated Creatinine Clearance: 7.7 mL/min (A) (based on SCr of 7.8 mg/dL (H)).

## 2024-07-25 NOTE — ASSESSMENT & PLAN NOTE
Endocrinology consulted for BG management.   BG goal 140-180    - Novolog (Insulin Aspart) prn for BG excursions [facility] SSI (150/25)  - BG checks q4hr  - Hypoglycemia protocol in place    ** Please notify Endocrine for any change and/or advance in diet**  ** Please call Endocrine for any BG related issues **    Discharge Planning:   TBD. Please notify endocrinology prior to discharge.

## 2024-07-25 NOTE — HOSPITAL COURSE
She is now s/p DBD kidney transplant on 7/24/24. Surgery without complications. 2 day suggs, no drains, PD cath removed. Making urine post op with renal clearance.     Interval History: No acute events overnight. Patient feeling fine this AM. Some incisional pain. Creatinine continues to trend down with good UOP. Plan to remove suggs catheter today. PT to see patient today. Per ID, will transition to Augmentin from Unasyn for salivary gland infection, stop date 8/3/24. Plan for likely discharge tomorrow. Continue to monitor.

## 2024-07-25 NOTE — SUBJECTIVE & OBJECTIVE
"Interval HPI:   Overnight events: No acute events overnight. Patient in room 63707/28153 A. Blood glucose stable. BG at goal on current insulin regimen (SSI ). Steroid use- Methylprednisolone  250 mg. 1 Day Post-Op  Renal function- Abnormal - Creatinine 7.8   Vasopressors-  None     Endocrine will continue to follow and manage insulin orders inpatient.       Diet Renal     Eatin%  Nausea: No  Hypoglycemia and intervention: No  Fever: No  TPN and/or TF: No    BP (!) 157/66 (BP Location: Left arm, Patient Position: Lying)   Pulse 69   Temp 98.5 °F (36.9 °C) (Oral)   Resp 18   Ht 5' 5" (1.651 m)   Wt 84.2 kg (185 lb 10 oz)   SpO2 97%   Breastfeeding No   BMI 30.89 kg/m²     Labs Reviewed and Include    Recent Labs   Lab 24  0648   *   CALCIUM 9.3   ALBUMIN 2.2*      K 4.5   CO2 22*      BUN 45*   CREATININE 7.8*     Lab Results   Component Value Date    WBC 19.58 (H) 2024    HGB 9.2 (L) 2024    HCT 27.7 (L) 2024    MCV 96 2024     2024     No results for input(s): "TSH", "FREET4" in the last 168 hours.  Lab Results   Component Value Date    HGBA1C 4.8 2024    HGBA1C 4.8 2024       Nutritional status:   Body mass index is 30.89 kg/m².  Lab Results   Component Value Date    ALBUMIN 2.2 (L) 2024    ALBUMIN 2.1 (L) 2024    ALBUMIN 2.1 (L) 2024     No results found for: "PREALBUMIN"    Estimated Creatinine Clearance: 7.7 mL/min (A) (based on SCr of 7.8 mg/dL (H)).    Accu-Checks  Recent Labs     24  0241 24  0811 24  1635 24  2034 24  0031 24  0445   POCTGLUCOSE 91 87 98 123* 163* 161*       Current Medications and/or Treatments Impacting Glycemic Control  Immunotherapy:    Immunosuppressants           Stop Route Frequency     antithymocyte globulin (rabbit) 100 mg, hydrocortisone sodium succinate (SOLU-CORTEF) 20 mg in 0.9% NaCl 500 mL (FOR PERIPHERAL LINE ADMINISTRATION ONLY)      "    07/27/24 0859 IV Daily     mycophenolate capsule 1,000 mg         -- Oral 2 times daily     tacrolimus capsule 3 mg         -- Oral 2 times daily          Steroids:   Hormones (From admission, onward)      Start     Stop Route Frequency Ordered    07/27/24 0900  predniSONE tablet 20 mg  (IP TXP KIDNEY POST-OP THYMO WITH PERIPHERAL PRECHECKED)         -- Oral Daily 07/24/24 1630    07/26/24 0800  methylPREDNISolone sodium succinate injection 125 mg  (IP TXP KIDNEY POST-OP THYMO WITH PERIPHERAL PRECHECKED)         -- IV Once 07/24/24 1630    07/25/24 0900  antithymocyte globulin (rabbit) 100 mg, hydrocortisone sodium succinate (SOLU-CORTEF) 20 mg in 0.9% NaCl 500 mL (FOR PERIPHERAL LINE ADMINISTRATION ONLY)  (IP TXP KIDNEY POST-OP THYMO WITH PERIPHERAL PRECHECKED)         07/27/24 0859 IV Daily 07/24/24 1630    07/25/24 0800  methylPREDNISolone sodium succinate injection 250 mg  (IP TXP KIDNEY POST-OP THYMO WITH PERIPHERAL PRECHECKED)         -- IV Once 07/24/24 1630    07/24/24 2331  melatonin tablet 6 mg         -- Oral Nightly PRN 07/24/24 2331    07/24/24 1729  hydrocortisone sodium succinate injection 100 mg  (IP TXP KIDNEY POST-OP THYMO WITH PERIPHERAL PRECHECKED)         12/21/35 0829 IV Once as needed 07/24/24 1630          Pressors:    Autonomic Drugs (From admission, onward)      Start     Stop Route Frequency Ordered    07/24/24 1729  EPINEPHrine (PF) injection 1 mg  (IP TXP KIDNEY POST-OP THYMO WITH PERIPHERAL PRECHECKED)         12/21/35 0829 SubQ Once as needed 07/24/24 1630          Hyperglycemia/Diabetes Medications:   Antihyperglycemics (From admission, onward)      Start     Stop Route Frequency Ordered    07/24/24 1606  insulin aspart U-100 pen 0-10 Units         -- SubQ Every 4 hours PRN 07/24/24 1507

## 2024-07-25 NOTE — NURSING
Notified KAVIN Lu of pt's incision looking more swollen but still soft this am. She stated she will discuss with Dr. Cody but to monitor for now. PA stated to leave the ABD pad on incision at this time. H/H from 2232 was stable. Pt's bp elevated this am (187/74). Procardia started. Will continue to monitor.

## 2024-07-25 NOTE — NURSING
KAVIN Lu notified Dr. Cody about pt's incision. US ordered. Pt's bp is 177/72 two hours after procardia. Orthostatic bp ordered. Will obtain orthostatics after pt returns from US.

## 2024-07-25 NOTE — CONSULTS
[facility] - Transplant Stepdown  Adult Nutrition  Consult Note    S/p kidney transplant     SUMMARY     Recommendations  1. Continue Renal diet   2. If po intake declines <50% meal consumption, rec'd Novasource Renal BID   3. RD following    Goals: Meet % of EEN/EPN by RD f/u date  Nutrition Goal Status: goal not met  Communication of RD Recs: POC    Assessment and Plan    Nutrition Problem  Increased nutrient needs     Related to (etiology):   Physiological demands     Signs and Symptoms (as evidenced by):   S/p kidney transplant 7/24    Interventions (treatment strategy):  Collaboration of nutrition care w/ other providers    Nutrition Diagnosis Status:   New     Reason for Assessment    Reason For Assessment: consult- s/p kidney transplant   Diagnosis: other (see comments) (s/p kidney transplant)  Relevant Medical History: HTN, DM type 2  Interdisciplinary Rounds: did not attend  General Information Comments: RD consulted s/p kidney transplant 7/24. Spoke w/ pt at bedside, reports a poor appetite currently, noted 0-75% meal consumption. Reports stable appetite PTA w/ 2-3 meals. Pt denies any difficulties w/ chewing however does reports some difficulties swallowing medications. No n/v. Pt reports UBW of 180 lbs. Per chart review, no noted weight fluctuations noted. NFPE completed 7/25, pt at risk for malnutrition if po intake continues to decline <50% meal consumption noted. RD following. LBM noted-7/23  Nutrition Discharge Planning: Post transplant nutrition education provided on 7/25. Food safety/drug interactions emphasized. General healthy/low satl diet recommended. Education material left at bedside. No other needs identified.    Nutrition Risk Screen    Nutrition Risk Screen: difficulty chewing/swallowing    Nutrition/Diet History    Spiritual, Cultural Beliefs, Worship Practices, Values that Affect Care: no  Food Allergies: NKFA    Anthropometrics    Temp: 98.8 °F (37.1 °C)  Height Method:  "Stated  Height: 5' 5" (165.1 cm)  Height (inches): 65 in  Weight Method: Standard Scale  Weight: 84.2 kg (185 lb 10 oz)  Weight (lb): 185.63 lb  Ideal Body Weight (IBW), Female: 125 lb  % Ideal Body Weight, Female (lb): 148.5 %  BMI (Calculated): 30.9  BMI Grade: 30 - 34.9- obesity - grade I       Lab/Procedures/Meds    Pertinent Labs Reviewed: reviewed  Pertinent Labs Comments: GFR 5.9, Glucose 152, BUN 46, Phosphorus 8.8  Pertinent Medications Reviewed: reviewed  Pertinent Medications Comments: tacromilus    Estimated/Assessed Needs    Weight Used For Calorie Calculations: 84.2 kg (185 lb 10 oz)  Energy Calorie Requirements (kcal): 1733  Energy Need Method: [place]-St Jeor (PAL 1.25)  Weight Used For Protein Calculations: 84.2 kg (185 lb 10 oz)   RDA Method (mL): 1733     Nutrition Prescription Ordered    Current Diet Order: Renal diet    Evaluation of Received Nutrient/Fluid Intake    I/O: + 2.2 L since admit  Energy Calories Required: not meeting needs  Protein Required: not meeting needs  Fluid Required: not meeting needs  Total Fluid Intake (mL/kg): 1 ml or fluid per MD  Tolerance: tolerating  % Intake of Estimated Energy Needs: 0 - 25 %  % Meal Intake: 0 - 25 %    Nutrition Risk    Level of Risk/Frequency of Follow-up: low ((2x/week))     Monitor and Evaluation    Food and Nutrient Intake: energy intake, food and beverage intake  Food and Nutrient Adminstration: diet order  Knowledge/Beliefs/Attitudes: food and nutrition knowledge/skill, beliefs and attitudes  Physical Activity and Function: nutrition-related ADLs and IADLs, factors affecting access to physical activity  Anthropometric Measurements: height/length, weight, weight change, body mass index, growth pattern indices/percentile ranks  Biochemical Data, Medical Tests and Procedures: electrolyte and renal panel, glucose/endocrine profile, inflammatory profile, lipid profile, gastrointestinal profile  Nutrition-Focused Physical Findings: overall " appearance, extremities, muscles and bones, head and eyes, skin       Nutrition Follow-Up    RD Follow-up?: Yes

## 2024-07-25 NOTE — PROGRESS NOTES
[facility] - Transplant Stepdown  Kidney Transplant  Progress Note      Reason for Follow-up: Reassessment of Kidney Transplant - 2024  (#1) recipient and management of immunosuppression.    ORGAN:   RIGHT KIDNEY    Donor Type:   Donation after Brain Death    Donor CMV Status: Positive  Donor HBcAB:Negative  Donor HCV Status:Negative  Donor HBV RAH:   Donor HCV RAH: Negative      Subjective:   History of Present Illness:  Ms. Kenney is a 63 y.o. female with ESRD secondary to diabetic nephropathy, solitary kidney.  PMH DM2, HTN, incontience (s/p bladder sling), CAD with stents (on ASA), atrial fibrillation, CVA (no residual deficits), ALEJA, anemia. She has been on the wait list for a kidney transplant at [facility] since 2020. Patient is currently on peritoneal dialysis started on 2020. Patient is dialyzing on cyclic peritoneal dialysis. Patient reports that she is tolerating dialysis well. She has a PD catheter. Denies peritonitis or exit site infections.     She now presents as primary candidate for a  donor kidney transplant. Pre-op labs and imaging to be reviewed prior to transplant. Of note, patient states she just started an oral antibiotic earlier this week for a salivary gland infection. Pt states salivary gland infection has been present approx 1 week. She notes mild yellow drainage. Will review with surgeon prior to transplant.                 Hospital Course:  She is now s/p DBD kidney transplant on 24. Surgery without complications. 2 day suggs, no drains, PD cath removed    Interval History: Patient doing fine this morning. Did have symptomatic orthostatic hypotension. Antihypertensives held. Given 500 cc NS bolus. She is otherwise doing ok. Some incisional pain. Making urine with slight improvement in creatinine. Kidney US this AM due to bloody drainage from incision. US satisfactory, no hematoma seen. Plan to pathway, place on IVF NS @75 cc/hr. Repeat labs at noon and 6 pm. ID  consulted for recommendations for salivary gland infection. Currently on Unasyn. Appreciate their assistance.         Past Medical, Surgical, Family, and Social History:   Unchanged from H&P.    Scheduled Meds:   acetaminophen  650 mg Oral Q8H    acetaminophen  650 mg Oral Q24H    ampicillin-sulbactam  3 g Intravenous Q8H    antithymocyte globulin (rabbit) 100 mg, hydrocortisone sodium succinate (SOLU-CORTEF) 20 mg in 0.9% NaCl 500 mL (FOR PERIPHERAL LINE ADMINISTRATION ONLY)  1.5 mg/kg (Adjusted) Intravenous Daily    bisacodyL  10 mg Oral QHS    [START ON 7/26/2024] cinacalcet  60 mg Oral Daily with breakfast    diphenhydrAMINE  25 mg Oral Q24H    docusate sodium  100 mg Oral TID    DULoxetine  30 mg Oral Daily    heparin (porcine)  5,000 Units Subcutaneous Q8H    [START ON 7/26/2024] methylPREDNISolone injection (PEDS and ADULTS)  125 mg Intravenous Once    multivitamin  1 tablet Oral Daily    mupirocin  1 g Nasal BID    mycophenolate  1,000 mg Oral BID    pantoprazole  40 mg Oral Daily    [START ON 7/27/2024] predniSONE  20 mg Oral Daily    sodium bicarbonate  650 mg Oral BID    sodium chloride 0.9%  500 mL Intravenous Once    [START ON 8/3/2024] sulfamethoxazole-trimethoprim 400-80mg  1 tablet Oral Daily AM    tacrolimus  3 mg Oral BID    [START ON 8/3/2024] valGANciclovir  450 mg Oral Daily AM     Continuous Infusions:   0.9% NaCl   Intravenous Continuous        glucagon (human recombinant)  1 mg Intramuscular Continuous PRN         PRN Meds:  Current Facility-Administered Medications:     ALPRAZolam, 0.5 mg, Oral, Nightly PRN    dextrose 10%, 12.5 g, Intravenous, PRN    dextrose 10%, 12.5 g, Intravenous, PRN    dextrose 10%, 25 g, Intravenous, PRN    diphenhydrAMINE, 50 mg, Oral, Once PRN    EPINEPHrine (PF), 1 mg, Subcutaneous, Once PRN    glucagon (human recombinant), 1 mg, Intramuscular, PRN    glucagon (human recombinant), 1 mg, Intramuscular, Continuous PRN    glucose, 16 g, Oral, PRN    glucose, 16 g,  Oral, PRN    glucose, 24 g, Oral, PRN    hydrocortisone sodium succinate, 100 mg, Intravenous, Once PRN    insulin aspart U-100, 0-10 Units, Subcutaneous, Q4H PRN    melatonin, 6 mg, Oral, Nightly PRN    ondansetron, 4 mg, Intravenous, Q6H PRN    oxybutynin, 5 mg, Oral, TID PRN    oxyCODONE, 5 mg, Oral, Q6H PRN    sodium chloride 0.9%, 10 mL, Intravenous, PRN    sodium chloride 0.9%, 10 mL, Intravenous, PRN    traMADoL, 50 mg, Oral, Q6H PRN    traZODone, 25 mg, Oral, Nightly PRN    Intake/Output - Last 3 Shifts         07/23 0700 07/24 0659 07/24 0700 07/25 0659 07/25 0700 07/26 0659    P.O. 25 690     I.V. (mL/kg) 0 (0) 2957.1 (35.1) 294.9 (3.5)    Other 0 0     IV Piggyback  1301     Total Intake(mL/kg) 25 (0.3) 4948.1 (58.8) 294.9 (3.5)    Urine (mL/kg/hr) 1 2714 (1.3) 425 (1.7)    Emesis/NG output 0 0     Other 0 0     Stool 0 0     Blood 0 0     Total Output 1 2714 425    Net +24 +2234.1 -130.1           Urine Occurrence 50 x 0 x     Stool Occurrence 0 x 0 x     Emesis Occurrence 0 x 0 x              Review of Systems   Constitutional:  Positive for activity change. Negative for chills and fever.   Respiratory:  Negative for shortness of breath.    Cardiovascular:  Negative for leg swelling.   Gastrointestinal:  Positive for abdominal pain. Negative for abdominal distention, nausea and vomiting.   Genitourinary:  Negative for hematuria.   Skin:  Positive for wound.   Allergic/Immunologic: Positive for immunocompromised state.   Psychiatric/Behavioral:  Negative for confusion. The patient is nervous/anxious.       Objective:     Vital Signs (Most Recent):  Temp: 98.5 °F (36.9 °C) (07/25/24 0830)  Pulse: 69 (07/25/24 0745)  Resp: 18 (07/25/24 0745)  BP: (!) 157/66 (07/25/24 0745)  SpO2: 97 % (07/25/24 0745) Vital Signs (24h Range):  Temp:  [97.2 °F (36.2 °C)-99.1 °F (37.3 °C)] 98.5 °F (36.9 °C)  Pulse:  [60-90] 69  Resp:  [15-25] 18  SpO2:  [92 %-100 %] 97 %  BP: ()/(47-81) 157/66     Weight: 84.2 kg  "(185 lb 10 oz)  Height: 5' 5" (165.1 cm)  Body mass index is 30.89 kg/m².     Physical Exam  Vitals reviewed.   Cardiovascular:      Rate and Rhythm: Normal rate.   Pulmonary:      Effort: Pulmonary effort is normal.   Abdominal:      General: There is no distension.      Tenderness: There is abdominal tenderness. There is no guarding or rebound.      Comments: Kidney incision clean intact with staples in place    Musculoskeletal:      Right lower leg: No edema.      Left lower leg: No edema.   Neurological:      Mental Status: She is alert and oriented to person, place, and time.   Psychiatric:         Mood and Affect: Mood normal.         Behavior: Behavior normal.         Thought Content: Thought content normal.         Judgment: Judgment normal.          Laboratory:  CBC:   Recent Labs   Lab 07/24/24  0317 07/24/24  1637 07/24/24 2232 07/25/24  0648   WBC 10.16  --  15.20* 19.58*   RBC 2.90*  --  2.89* 2.88*   HGB 8.9*  --  9.1* 9.2*   HCT 27.6* 24.2* 28.0* 27.7*     --  172 196   MCV 95  --  97 96   MCH 30.7  --  31.5* 31.9*   MCHC 32.2  --  32.5 33.2     BMP:   Recent Labs   Lab 07/24/24  1637 07/24/24 2232 07/25/24  0648   GLU 89 151* 158*    140 141   K 4.7 5.0 4.5    108 107   CO2 22* 20* 22*   BUN 42* 43* 45*   CREATININE 8.2* 8.2* 7.8*   CALCIUM 8.7 8.9 9.3       Diagnostic Results:  None  Assessment/Plan:     * S/P kidney transplant  - Kidney US with satisfactory results, no hematoma  - Making urine with slight improvement in creatinine  - Kidney incision intact with staples in place   - 2 day suggs no drains  - Plan for repeat labs at noon and 6 pm  - Place on IVF NS @ 75 cc/hr  - Continue to monitor.       Acute blood loss anemia  - Expected post operatively  - See anemia of chronic disease      Long-term use of immunosuppressant medication  - Continue prograf. Monitor prograf level daily, monitor for toxic side effects, and adjust for therapeutic dose      Prophylactic " immunotherapy  - See long term use of immunosuppressant medication      Type 2 DM with hypertension and ESRD on dialysis  - Endocrine consulted for help with management. Appreciate their assistance      Class 1 obesity due to excess calories with serious comorbidity and body mass index (BMI) of 30.0 to 30.9 in adult  - Dietician consulted to see patient post op      Anemia in ESRD (end-stage renal disease)  - CBC stable  - Monitor with daily cbc    Coronary artery disease involving native coronary artery of native heart without angina pectoris  - Will restart ASA once safe from bleeding standpoint      Renovascular hypertension  - Hold antihypertensives for now given orthostatic hypotension  - Continue to monitor.       Diabetes mellitus due to underlying condition with chronic kidney disease on chronic dialysis, with long-term current use of insulin  - Endocrine consulted for help with management. Appreciate their assistance          Discharge Planning: Not a candidate for discharge at this time     Medical decision making for this encounter includes review of pertinent labs and diagnostic studies, assessment and planning, discussions with consulting providers, discussion with patient/family, and participation in multidisciplinary rounds. Time spent caring for patient: 60 minutes    Gosia Mora PA-C  Kidney Transplant  [facility] - Transplant Stepdown

## 2024-07-25 NOTE — ASSESSMENT & PLAN NOTE
- Kidney US with satisfactory results, no hematoma  - Making urine with slight improvement in creatinine  - Kidney incision intact with staples in place   - 2 day suggs no drains  - Plan for repeat labs at noon and 6 pm  - Place on IVF NS @ 75 cc/hr  - Continue to monitor.

## 2024-07-25 NOTE — PROGRESS NOTES
[facility] - Transplant Stepdown  Endocrinology  Progress Note    Admit Date: 2024     Reason for Consult: Management of T2DM, Hyperglycemia     Surgical Procedure and Date: s/p kidney transplant on 2024    Diabetes diagnosis year:     Home Diabetes Medications:  Patient previously on tandem insulin pump and Mounjaro. Patient recently lost 60 lbs and had worsening kidney function. Patient states that she is off all diabetes medication for about 3-months and blood sugar has been well controlled. Patient is from [place] and does not have her pump with her to review pump settings. Per chart review, basal insulin was 58 units daily in the past. Anticipate the patient will need more aggressive insulin management post-op.     How often checking glucose at home? >4 x day (previously on Dexcom G6, but off since she stopped using tandem pump)   BG readings on regimen: states BG have been well controlled off the pump and the GLP1-RA/ GIP ('s)  Hypoglycemia on the regimen?  No  Missed doses on regimen?  No    Diabetes Complications include:     Hyperglycemia (history)    Complicating diabetes co morbidities:   Glucocorticoid use       HPI: Ms. Kenney is a 63 y.o. female with ESRD secondary to diabetic nephropathy, solitary kidney.  PMH DM2, HTN, incontience (s/p bladder sling), CAD with stents (on ASA), atrial fibrillation, CVA (no residual deficits), ALEJA, anemia. She has been on the wait list for a kidney transplant at [facility] since 2020. Patient is currently on peritoneal dialysis started on 2020. Patient is dialyzing on cyclic peritoneal dialysis. Patient reports that she is tolerating dialysis well. She has a PD catheter. Denies peritonitis or exit site infections. She now presents as primary candidate for a  donor kidney transplant. Endocrine consulted to manage hyperglycemia and diabetes in the context of transplant and high dose steroids.           Interval HPI:   Overnight events:  "No acute events overnight. Patient in room 59175/30681 A. Blood glucose stable. BG at goal on current insulin regimen (SSI ). Steroid use- Methylprednisolone  250 mg. 1 Day Post-Op  Renal function- Abnormal - Creatinine 7.8   Vasopressors-  None     Endocrine will continue to follow and manage insulin orders inpatient.       Diet Renal     Eatin%  Nausea: No  Hypoglycemia and intervention: No  Fever: No  TPN and/or TF: No    BP (!) 157/66 (BP Location: Left arm, Patient Position: Lying)   Pulse 69   Temp 98.5 °F (36.9 °C) (Oral)   Resp 18   Ht 5' 5" (1.651 m)   Wt 84.2 kg (185 lb 10 oz)   SpO2 97%   Breastfeeding No   BMI 30.89 kg/m²     Labs Reviewed and Include    Recent Labs   Lab 24  0648   *   CALCIUM 9.3   ALBUMIN 2.2*      K 4.5   CO2 22*      BUN 45*   CREATININE 7.8*     Lab Results   Component Value Date    WBC 19.58 (H) 2024    HGB 9.2 (L) 2024    HCT 27.7 (L) 2024    MCV 96 2024     2024     No results for input(s): "TSH", "FREET4" in the last 168 hours.  Lab Results   Component Value Date    HGBA1C 4.8 2024    HGBA1C 4.8 2024       Nutritional status:   Body mass index is 30.89 kg/m².  Lab Results   Component Value Date    ALBUMIN 2.2 (L) 2024    ALBUMIN 2.1 (L) 2024    ALBUMIN 2.1 (L) 2024     No results found for: "PREALBUMIN"    Estimated Creatinine Clearance: 7.7 mL/min (A) (based on SCr of 7.8 mg/dL (H)).    Accu-Checks  Recent Labs     24  0241 24  0811 24  1635 24  2034 24  0031 24  0445   POCTGLUCOSE 91 87 98 123* 163* 161*       Current Medications and/or Treatments Impacting Glycemic Control  Immunotherapy:    Immunosuppressants           Stop Route Frequency     antithymocyte globulin (rabbit) 100 mg, hydrocortisone sodium succinate (SOLU-CORTEF) 20 mg in 0.9% NaCl 500 mL (FOR PERIPHERAL LINE ADMINISTRATION ONLY)         24 0859 IV Daily     " mycophenolate capsule 1,000 mg         -- Oral 2 times daily     tacrolimus capsule 3 mg         -- Oral 2 times daily          Steroids:   Hormones (From admission, onward)      Start     Stop Route Frequency Ordered    07/27/24 0900  predniSONE tablet 20 mg  (IP TXP KIDNEY POST-OP THYMO WITH PERIPHERAL PRECHECKED)         -- Oral Daily 07/24/24 1630    07/26/24 0800  methylPREDNISolone sodium succinate injection 125 mg  (IP TXP KIDNEY POST-OP THYMO WITH PERIPHERAL PRECHECKED)         -- IV Once 07/24/24 1630    07/25/24 0900  antithymocyte globulin (rabbit) 100 mg, hydrocortisone sodium succinate (SOLU-CORTEF) 20 mg in 0.9% NaCl 500 mL (FOR PERIPHERAL LINE ADMINISTRATION ONLY)  (IP TXP KIDNEY POST-OP THYMO WITH PERIPHERAL PRECHECKED)         07/27/24 0859 IV Daily 07/24/24 1630    07/25/24 0800  methylPREDNISolone sodium succinate injection 250 mg  (IP TXP KIDNEY POST-OP THYMO WITH PERIPHERAL PRECHECKED)         -- IV Once 07/24/24 1630    07/24/24 2331  melatonin tablet 6 mg         -- Oral Nightly PRN 07/24/24 2331    07/24/24 1729  hydrocortisone sodium succinate injection 100 mg  (IP TXP KIDNEY POST-OP THYMO WITH PERIPHERAL PRECHECKED)         12/21/35 0829 IV Once as needed 07/24/24 1630          Pressors:    Autonomic Drugs (From admission, onward)      Start     Stop Route Frequency Ordered    07/24/24 1729  EPINEPHrine (PF) injection 1 mg  (IP TXP KIDNEY POST-OP THYMO WITH PERIPHERAL PRECHECKED)         12/21/35 0829 SubQ Once as needed 07/24/24 1630          Hyperglycemia/Diabetes Medications:   Antihyperglycemics (From admission, onward)      Start     Stop Route Frequency Ordered    07/24/24 1606  insulin aspart U-100 pen 0-10 Units         -- SubQ Every 4 hours PRN 07/24/24 1507            ASSESSMENT and PLAN    Renal/  * ESRD on peritoneal dialysis  Titrate insulin slowly to avoid hypoglycemia as the risk of hypoglycemia increases with decreased creatinine clearance.  Estimated Creatinine Clearance:  7.7 mL/min (A) (based on SCr of 7.8 mg/dL (H)).        Endocrine  Diabetes mellitus due to underlying condition with chronic kidney disease on chronic dialysis, with long-term current use of insulin  Endocrinology consulted for BG management.   BG goal 140-180    - Novolog (Insulin Aspart) prn for BG excursions [facility] SSI (150/25)  - BG checks q4hr  - Hypoglycemia protocol in place    ** Please notify Endocrine for any change and/or advance in diet**  ** Please call Endocrine for any BG related issues **    Discharge Planning:   TBD. Please notify endocrinology prior to discharge.        Other  Adrenal corticosteroid causing adverse effect in therapeutic use  Glucocorticoids markedly increase glucose levels. Expect the steroid taper will help glucose control.              Quincy Lyles, DNP, FNP  Endocrinology  [facility] - Transplant Stepdown

## 2024-07-25 NOTE — PROGRESS NOTES
TRANSPLANT NOTE:      ORGAN:   RIGHT KIDNEY    Disease Etiology: Diabetes Mellitus - Type II  Donor Type:   Donation after Brain Death    CDC High Risk:   No    Donor CMV Status:  positive   Donor HBcAB:   Negative    Donor HCV Status:   Negative    Peak cPRA % (within 1 year of transplant): 24%      Joann Kenney is a 63 y.o. female s/p    Donation after Brain Death   kidney transplant on 7/24/2024 (Kidney) for Diabetes Mellitus - Type II.   This patient will receive 3 doses of Thymoglobulin for induction.  This patients maintenance immunosuppression will include a steroid taper per protocol to 5mg daily, Prograf, and Cellcept maintenance.  Opportunistic infection prophylaxis will include Valcyte for 3 months (CMV D + , R + ) and Bactrim for 6 months.  Patient is to begin self medications upon transfer to the [facility], and I plan to meet with this patient and his/her support person prior to discharge to review the medication section of the Kidney Transplant Education Manual.  I have reviewed the pre-op medications and have restarted those, as appropriate.

## 2024-07-25 NOTE — PROGRESS NOTES
EDUCATION NOTE:    Met with Joann Kenney and her caregivers to provide teaching re: immunosuppressant medications.  Reviewed medication section of the Kidney Transplant Education book that was provided.  Emphasized the importance of compliance, role of the blue medication card, concerns for drug interactions, and process of obtaining refills.  Counseled regarding Prograf, Cellcept , prednisone, including directions for use, monitoring, how to handle missed doses, and side effects.  Patient and daughter verbalized understanding and had the opportunity to ask questions.

## 2024-07-25 NOTE — PLAN OF CARE
Recommendations  1. Continue Renal diet   2. If po intake declines <50% meal consumption, rec'd Novasource Renal BID   3. RD following     Goals: Meet % of EEN/EPN by RD f/u date  Nutrition Goal Status: goal not met  Communication of RD Recs: POC

## 2024-07-26 DIAGNOSIS — Z94.0 KIDNEY REPLACED BY TRANSPLANT: Primary | ICD-10-CM

## 2024-07-26 LAB
ALBUMIN SERPL BCP-MCNC: 2.1 G/DL (ref 3.5–5.2)
ANION GAP SERPL CALC-SCNC: 13 MMOL/L (ref 8–16)
BASOPHILS # BLD AUTO: 0.02 K/UL (ref 0–0.2)
BASOPHILS NFR BLD: 0.1 % (ref 0–1.9)
BUN SERPL-MCNC: 55 MG/DL (ref 8–23)
CALCIUM SERPL-MCNC: 9.9 MG/DL (ref 8.7–10.5)
CHLORIDE SERPL-SCNC: 108 MMOL/L (ref 95–110)
CO2 SERPL-SCNC: 18 MMOL/L (ref 23–29)
CREAT SERPL-MCNC: 6.3 MG/DL (ref 0.5–1.4)
DIFFERENTIAL METHOD BLD: ABNORMAL
EOSINOPHIL # BLD AUTO: 0 K/UL (ref 0–0.5)
EOSINOPHIL NFR BLD: 0 % (ref 0–8)
ERYTHROCYTE [DISTWIDTH] IN BLOOD BY AUTOMATED COUNT: 12.9 % (ref 11.5–14.5)
EST. GFR  (NO RACE VARIABLE): 6.9 ML/MIN/1.73 M^2
GLUCOSE SERPL-MCNC: 123 MG/DL (ref 70–110)
HBV SURFACE AB SER QL IA: POSITIVE
HBV SURFACE AB SERPL IA-ACNC: NORMAL MIU/ML
HCT VFR BLD AUTO: 24 % (ref 37–48.5)
HGB BLD-MCNC: 8.2 G/DL (ref 12–16)
IMM GRANULOCYTES # BLD AUTO: 0.14 K/UL (ref 0–0.04)
IMM GRANULOCYTES NFR BLD AUTO: 0.8 % (ref 0–0.5)
LYMPHOCYTES # BLD AUTO: 0.2 K/UL (ref 1–4.8)
LYMPHOCYTES NFR BLD: 1 % (ref 18–48)
MAGNESIUM SERPL-MCNC: 2 MG/DL (ref 1.6–2.6)
MCH RBC QN AUTO: 30.8 PG (ref 27–31)
MCHC RBC AUTO-ENTMCNC: 34.2 G/DL (ref 32–36)
MCV RBC AUTO: 90 FL (ref 82–98)
MONOCYTES # BLD AUTO: 0.5 K/UL (ref 0.3–1)
MONOCYTES NFR BLD: 2.7 % (ref 4–15)
NEUTROPHILS # BLD AUTO: 15.9 K/UL (ref 1.8–7.7)
NEUTROPHILS NFR BLD: 95.4 % (ref 38–73)
NRBC BLD-RTO: 0 /100 WBC
PHOSPHATE SERPL-MCNC: 8 MG/DL (ref 2.7–4.5)
PLATELET # BLD AUTO: 145 K/UL (ref 150–450)
PLATELET BLD QL SMEAR: ABNORMAL
PMV BLD AUTO: 12.6 FL (ref 9.2–12.9)
POCT GLUCOSE: 136 MG/DL (ref 70–110)
POCT GLUCOSE: 139 MG/DL (ref 70–110)
POCT GLUCOSE: 146 MG/DL (ref 70–110)
POCT GLUCOSE: 174 MG/DL (ref 70–110)
POCT GLUCOSE: 176 MG/DL (ref 70–110)
POCT GLUCOSE: 220 MG/DL (ref 70–110)
POTASSIUM SERPL-SCNC: 4.2 MMOL/L (ref 3.5–5.1)
RBC # BLD AUTO: 2.66 M/UL (ref 4–5.4)
SODIUM SERPL-SCNC: 139 MMOL/L (ref 136–145)
TACROLIMUS BLD-MCNC: 6.2 NG/ML (ref 5–15)
WBC # BLD AUTO: 16.66 K/UL (ref 3.9–12.7)

## 2024-07-26 PROCEDURE — 99232 SBSQ HOSP IP/OBS MODERATE 35: CPT | Mod: ,,, | Performed by: NURSE PRACTITIONER

## 2024-07-26 PROCEDURE — 85025 COMPLETE CBC W/AUTO DIFF WBC: CPT | Performed by: PHYSICIAN ASSISTANT

## 2024-07-26 PROCEDURE — 94799 UNLISTED PULMONARY SVC/PX: CPT

## 2024-07-26 PROCEDURE — 94761 N-INVAS EAR/PLS OXIMETRY MLT: CPT

## 2024-07-26 PROCEDURE — 25000003 PHARM REV CODE 250

## 2024-07-26 PROCEDURE — 63600175 PHARM REV CODE 636 W HCPCS: Performed by: PHYSICIAN ASSISTANT

## 2024-07-26 PROCEDURE — 27000207 HC ISOLATION

## 2024-07-26 PROCEDURE — 99900035 HC TECH TIME PER 15 MIN (STAT)

## 2024-07-26 PROCEDURE — 99233 SBSQ HOSP IP/OBS HIGH 50: CPT | Mod: 24,,, | Performed by: PHYSICIAN ASSISTANT

## 2024-07-26 PROCEDURE — 94660 CPAP INITIATION&MGMT: CPT

## 2024-07-26 PROCEDURE — 63600175 PHARM REV CODE 636 W HCPCS: Performed by: INTERNAL MEDICINE

## 2024-07-26 PROCEDURE — 80069 RENAL FUNCTION PANEL: CPT | Performed by: PHYSICIAN ASSISTANT

## 2024-07-26 PROCEDURE — 36415 COLL VENOUS BLD VENIPUNCTURE: CPT | Performed by: PHYSICIAN ASSISTANT

## 2024-07-26 PROCEDURE — 63600175 PHARM REV CODE 636 W HCPCS

## 2024-07-26 PROCEDURE — 80197 ASSAY OF TACROLIMUS: CPT | Performed by: PHYSICIAN ASSISTANT

## 2024-07-26 PROCEDURE — 27100171 HC OXYGEN HIGH FLOW UP TO 24 HOURS

## 2024-07-26 PROCEDURE — 20600001 HC STEP DOWN PRIVATE ROOM

## 2024-07-26 PROCEDURE — 25000003 PHARM REV CODE 250: Performed by: PHYSICIAN ASSISTANT

## 2024-07-26 PROCEDURE — 83735 ASSAY OF MAGNESIUM: CPT | Performed by: PHYSICIAN ASSISTANT

## 2024-07-26 RX ORDER — CARVEDILOL 3.12 MG/1
3.12 TABLET ORAL 2 TIMES DAILY
Status: DISCONTINUED | OUTPATIENT
Start: 2024-07-26 | End: 2024-07-27

## 2024-07-26 RX ORDER — CALCIUM CARBONATE 200(500)MG
TABLET,CHEWABLE ORAL
Status: COMPLETED
Start: 2024-07-26 | End: 2024-07-26

## 2024-07-26 RX ORDER — AMOXICILLIN AND CLAVULANATE POTASSIUM 875; 125 MG/1; MG/1
1 TABLET, FILM COATED ORAL EVERY 24 HOURS
Status: DISCONTINUED | OUTPATIENT
Start: 2024-07-26 | End: 2024-07-27 | Stop reason: HOSPADM

## 2024-07-26 RX ORDER — CARVEDILOL 3.12 MG/1
3.12 TABLET ORAL 2 TIMES DAILY
Status: DISCONTINUED | OUTPATIENT
Start: 2024-07-26 | End: 2024-07-26

## 2024-07-26 RX ORDER — CETIRIZINE HYDROCHLORIDE 5 MG/1
5 TABLET ORAL DAILY
Status: DISCONTINUED | OUTPATIENT
Start: 2024-07-26 | End: 2024-07-27 | Stop reason: HOSPADM

## 2024-07-26 RX ORDER — AMOXICILLIN AND CLAVULANATE POTASSIUM 875; 125 MG/1; MG/1
TABLET, FILM COATED ORAL
Status: COMPLETED
Start: 2024-07-26 | End: 2024-07-26

## 2024-07-26 RX ORDER — CALCIUM CARBONATE 200(500)MG
500 TABLET,CHEWABLE ORAL DAILY PRN
Status: DISCONTINUED | OUTPATIENT
Start: 2024-07-26 | End: 2024-07-27 | Stop reason: HOSPADM

## 2024-07-26 RX ORDER — CETIRIZINE HYDROCHLORIDE 5 MG/1
TABLET ORAL
Status: COMPLETED
Start: 2024-07-26 | End: 2024-07-26

## 2024-07-26 RX ADMIN — DOCUSATE SODIUM 100 MG: 100 CAPSULE, LIQUID FILLED ORAL at 01:07

## 2024-07-26 RX ADMIN — DOCUSATE SODIUM 100 MG: 100 CAPSULE, LIQUID FILLED ORAL at 08:07

## 2024-07-26 RX ADMIN — CETIRIZINE HYDROCHLORIDE 5 MG: 5 TABLET, FILM COATED ORAL at 10:07

## 2024-07-26 RX ADMIN — MYCOPHENOLATE MOFETIL 1000 MG: 250 CAPSULE ORAL at 08:07

## 2024-07-26 RX ADMIN — SODIUM BICARBONATE 650 MG: 650 TABLET ORAL at 08:07

## 2024-07-26 RX ADMIN — INSULIN ASPART 2 UNITS: 100 INJECTION, SOLUTION INTRAVENOUS; SUBCUTANEOUS at 11:07

## 2024-07-26 RX ADMIN — DULOXETINE HYDROCHLORIDE 30 MG: 30 CAPSULE, DELAYED RELEASE ORAL at 08:07

## 2024-07-26 RX ADMIN — TACROLIMUS 3 MG: 1 CAPSULE ORAL at 08:07

## 2024-07-26 RX ADMIN — HEPARIN SODIUM 5000 UNITS: 5000 INJECTION INTRAVENOUS; SUBCUTANEOUS at 10:07

## 2024-07-26 RX ADMIN — DIPHENHYDRAMINE HYDROCHLORIDE 25 MG: 25 CAPSULE ORAL at 03:07

## 2024-07-26 RX ADMIN — TACROLIMUS 3 MG: 1 CAPSULE ORAL at 05:07

## 2024-07-26 RX ADMIN — TRAMADOL HYDROCHLORIDE 50 MG: 50 TABLET, COATED ORAL at 03:07

## 2024-07-26 RX ADMIN — ACETAMINOPHEN 650 MG: 325 TABLET ORAL at 03:07

## 2024-07-26 RX ADMIN — HEPARIN SODIUM 5000 UNITS: 5000 INJECTION INTRAVENOUS; SUBCUTANEOUS at 01:07

## 2024-07-26 RX ADMIN — INSULIN ASPART 2 UNITS: 100 INJECTION, SOLUTION INTRAVENOUS; SUBCUTANEOUS at 05:07

## 2024-07-26 RX ADMIN — BISACODYL 10 MG: 5 TABLET, COATED ORAL at 08:07

## 2024-07-26 RX ADMIN — MUPIROCIN 1 G: 20 OINTMENT TOPICAL at 08:07

## 2024-07-26 RX ADMIN — INSULIN ASPART 2 UNITS: 100 INJECTION, SOLUTION INTRAVENOUS; SUBCUTANEOUS at 12:07

## 2024-07-26 RX ADMIN — CALCIUM CARBONATE (ANTACID) CHEW TAB 500 MG 500 MG: 500 CHEW TAB at 10:07

## 2024-07-26 RX ADMIN — INSULIN ASPART 4 UNITS: 100 INJECTION, SOLUTION INTRAVENOUS; SUBCUTANEOUS at 08:07

## 2024-07-26 RX ADMIN — AMOXICILLIN AND CLAVULANATE POTASSIUM 1 TABLET: 875; 125 TABLET, FILM COATED ORAL at 10:07

## 2024-07-26 RX ADMIN — THERA TABS 1 TABLET: TAB at 08:07

## 2024-07-26 RX ADMIN — CARVEDILOL 3.12 MG: 3.12 TABLET, FILM COATED ORAL at 05:07

## 2024-07-26 RX ADMIN — CALCIUM CARBONATE (ANTACID) CHEW TAB 500 MG 500 MG: 500 CHEW TAB at 09:07

## 2024-07-26 RX ADMIN — ONDANSETRON 4 MG: 2 INJECTION INTRAMUSCULAR; INTRAVENOUS at 12:07

## 2024-07-26 RX ADMIN — CETIRIZINE HYDROCHLORIDE 5 MG: 5 TABLET, FILM COATED ORAL at 09:07

## 2024-07-26 RX ADMIN — PANTOPRAZOLE SODIUM 40 MG: 40 TABLET, DELAYED RELEASE ORAL at 08:07

## 2024-07-26 RX ADMIN — METHYLPREDNISOLONE SODIUM SUCCINATE 125 MG: 125 INJECTION, POWDER, FOR SOLUTION INTRAMUSCULAR; INTRAVENOUS at 03:07

## 2024-07-26 RX ADMIN — AMPICILLIN SODIUM AND SULBACTAM SODIUM 3 G: 2; 1 INJECTION, POWDER, FOR SOLUTION INTRAMUSCULAR; INTRAVENOUS at 03:07

## 2024-07-26 RX ADMIN — ALPRAZOLAM 0.5 MG: 0.5 TABLET ORAL at 11:07

## 2024-07-26 RX ADMIN — CINACALCET 60 MG: 30 TABLET, FILM COATED ORAL at 08:07

## 2024-07-26 RX ADMIN — MYCOPHENOLATE MOFETIL 1000 MG: 250 CAPSULE ORAL at 10:07

## 2024-07-26 NOTE — SUBJECTIVE & OBJECTIVE
"Interval HPI:   Overnight events: No acute events overnight. Patient in room 85157/33170 A. Blood glucose stable. BG at goal on current insulin regimen (SSI ). Steroid use- Methylprednisolone  125 mg. 2 Days Post-Op  Renal function- Abnormal - Creatinine 6.8   Vasopressors-  None     Endocrine will continue to follow and manage insulin orders inpatient.       Diet Renal     Eatin%  Nausea: No  Hypoglycemia and intervention: No  Fever: No  TPN and/or TF: No    BP (!) 121/57 (Patient Position: Standing)   Pulse 81   Temp 98 °F (36.7 °C) (Oral)   Resp 18   Ht 5' 5" (1.651 m)   Wt 86.8 kg (191 lb 5.8 oz)   SpO2 97%   Breastfeeding No   BMI 31.84 kg/m²     Labs Reviewed and Include    Recent Labs   Lab 24  1832   *   CALCIUM 10.0      K 4.2   CO2 20*      BUN 50*   CREATININE 6.8*     Lab Results   Component Value Date    WBC 18.67 (H) 2024    HGB 8.9 (L) 2024    HCT 27.1 (L) 2024    MCV 97 2024     2024     No results for input(s): "TSH", "FREET4" in the last 168 hours.  Lab Results   Component Value Date    HGBA1C 4.8 2024    HGBA1C 4.8 2024       Nutritional status:   Body mass index is 31.84 kg/m².  Lab Results   Component Value Date    ALBUMIN 2.2 (L) 2024    ALBUMIN 2.1 (L) 2024    ALBUMIN 2.1 (L) 2024     No results found for: "PREALBUMIN"    Estimated Creatinine Clearance: 9 mL/min (A) (based on SCr of 6.8 mg/dL (H)).    Accu-Checks  Recent Labs     24  1635 24  2034 24  0031 24  0445 24  0847 24  1259 24  1733 24  2103 24  0003 24  0348   POCTGLUCOSE 98 123* 163* 161* 156* 151* 200* 203* 174* 139*       Current Medications and/or Treatments Impacting Glycemic Control  Immunotherapy:    Immunosuppressants           Stop Route Frequency     antithymocyte globulin (rabbit) 100 mg, hydrocortisone sodium succinate (SOLU-CORTEF) 20 mg in 0.9% NaCl 500 mL " (FOR PERIPHERAL LINE ADMINISTRATION ONLY)         07/27/24 0859 IV Daily     mycophenolate capsule 1,000 mg         -- Oral 2 times daily     tacrolimus capsule 3 mg         -- Oral 2 times daily          Steroids:   Hormones (From admission, onward)      Start     Stop Route Frequency Ordered    07/27/24 0900  predniSONE tablet 20 mg  (IP TXP KIDNEY POST-OP THYMO WITH PERIPHERAL PRECHECKED)         -- Oral Daily 07/24/24 1630    07/26/24 0800  methylPREDNISolone sodium succinate injection 125 mg  (IP TXP KIDNEY POST-OP THYMO WITH PERIPHERAL PRECHECKED)         -- IV Once 07/24/24 1630    07/25/24 0900  antithymocyte globulin (rabbit) 100 mg, hydrocortisone sodium succinate (SOLU-CORTEF) 20 mg in 0.9% NaCl 500 mL (FOR PERIPHERAL LINE ADMINISTRATION ONLY)  (IP TXP KIDNEY POST-OP THYMO WITH PERIPHERAL PRECHECKED)         07/27/24 0859 IV Daily 07/24/24 1630    07/24/24 2331  melatonin tablet 6 mg         -- Oral Nightly PRN 07/24/24 2331    07/24/24 1729  hydrocortisone sodium succinate injection 100 mg  (IP TXP KIDNEY POST-OP THYMO WITH PERIPHERAL PRECHECKED)         12/21/35 0829 IV Once as needed 07/24/24 1630          Pressors:    Autonomic Drugs (From admission, onward)      Start     Stop Route Frequency Ordered    07/24/24 1729  EPINEPHrine (PF) injection 1 mg  (IP TXP KIDNEY POST-OP THYMO WITH PERIPHERAL PRECHECKED)         12/21/35 0829 SubQ Once as needed 07/24/24 1630          Hyperglycemia/Diabetes Medications:   Antihyperglycemics (From admission, onward)      Start     Stop Route Frequency Ordered    07/24/24 1606  insulin aspart U-100 pen 0-10 Units         -- SubQ Every 4 hours PRN 07/24/24 1507

## 2024-07-26 NOTE — ASSESSMENT & PLAN NOTE
Endocrinology consulted for BG management.   BG goal 140-180    - Novolog (Insulin Aspart) prn for BG excursions [facility] SSI (150/25)  - BG checks q4hr  - Hypoglycemia protocol in place    ** Please notify Endocrine for any change and/or advance in diet**  ** Please call Endocrine for any BG related issues **    Discharge Planning:   TBD. Please notify endocrinology prior to discharge.

## 2024-07-26 NOTE — PLAN OF CARE
AAOx4, one day post kidney transplant. Daughter at bedside. Medications taught to patient and daughter. Daughter to set up PM meds. NS going at 75ml/hr. Patient is working on pain management. Mobility is accomplished with walker with standby assist. Patient sat in chair several hours until she could no longer tolerate it.  Currently receiving Unasyn 3x a day for salivary infection. ID consulted. Patient and daughter met with SW, transplant coordinator and pharmacy. Daughter and patient aware of plan of care. Currently in bed, SR up x2, Bed low, call light within reach.  Daughter at bedside

## 2024-07-26 NOTE — PROGRESS NOTES
[facility] - Transplant Stepdown  Kidney Transplant  Progress Note      Reason for Follow-up: Reassessment of Kidney Transplant - 2024  (#1) recipient and management of immunosuppression.    ORGAN:   RIGHT KIDNEY    Donor Type:   Donation after Brain Death    Donor CMV Status: Positive  Donor HBcAB:Negative  Donor HCV Status:Negative  Donor HBV RAH:   Donor HCV RAH: Negative      Subjective:   History of Present Illness:  Ms. Kenney is a 63 y.o. female with ESRD secondary to diabetic nephropathy, solitary kidney.  PMH DM2, HTN, incontience (s/p bladder sling), CAD with stents (on ASA), atrial fibrillation, CVA (no residual deficits), ALEJA, anemia. She has been on the wait list for a kidney transplant at [facility] since 2020. Patient is currently on peritoneal dialysis started on 2020. Patient is dialyzing on cyclic peritoneal dialysis. Patient reports that she is tolerating dialysis well. She has a PD catheter. Denies peritonitis or exit site infections.     She now presents as primary candidate for a  donor kidney transplant. Pre-op labs and imaging to be reviewed prior to transplant. Of note, patient states she just started an oral antibiotic earlier this week for a salivary gland infection. Pt states salivary gland infection has been present approx 1 week. She notes mild yellow drainage. Will review with surgeon prior to transplant.                 Hospital Course:  She is now s/p DBD kidney transplant on 24. Surgery without complications. 2 day suggs, no drains, PD cath removed. Making urine post op with renal clearance.     Interval History: No acute events overnight. Patient feeling fine this AM. Some incisional pain. Creatinine continues to trend down with good UOP. Plan to remove sugsg catheter today. PT to see patient today. Per ID, will transition to Augmentin from Unasyn for salivary gland infection, stop date 8/3/24. Plan for likely discharge tomorrow. Continue to monitor.         Past  Medical, Surgical, Family, and Social History:   Unchanged from H&P.    Scheduled Meds:   acetaminophen  650 mg Oral Q8H    acetaminophen  650 mg Oral Q24H    amoxicillin-clavulanate 875-125mg  1 tablet Oral Daily    antithymocyte globulin (rabbit) 100 mg, hydrocortisone sodium succinate (SOLU-CORTEF) 20 mg in 0.9% NaCl 500 mL (FOR PERIPHERAL LINE ADMINISTRATION ONLY)  1.5 mg/kg (Adjusted) Intravenous Daily    bisacodyL  10 mg Oral QHS    cetirizine  5 mg Oral Daily    cinacalcet  60 mg Oral Daily with breakfast    diphenhydrAMINE  25 mg Oral Q24H    docusate sodium  100 mg Oral TID    DULoxetine  30 mg Oral QHS    heparin (porcine)  5,000 Units Subcutaneous Q8H    methylPREDNISolone injection (PEDS and ADULTS)  125 mg Intravenous Once    multivitamin  1 tablet Oral Daily    mupirocin  1 g Nasal BID    mycophenolate  1,000 mg Oral BID    pantoprazole  40 mg Oral Daily    [START ON 7/27/2024] predniSONE  20 mg Oral Daily    sodium bicarbonate  650 mg Oral BID    [START ON 8/3/2024] sulfamethoxazole-trimethoprim 400-80mg  1 tablet Oral Daily AM    tacrolimus  3 mg Oral BID    [START ON 8/3/2024] valGANciclovir  450 mg Oral Daily AM     Continuous Infusions:   glucagon (human recombinant)  1 mg Intramuscular Continuous PRN         PRN Meds:  Current Facility-Administered Medications:     ALPRAZolam, 0.5 mg, Oral, Nightly PRN    calcium carbonate, 500 mg, Oral, Daily PRN    dextrose 10%, 12.5 g, Intravenous, PRN    dextrose 10%, 12.5 g, Intravenous, PRN    dextrose 10%, 25 g, Intravenous, PRN    diphenhydrAMINE, 50 mg, Oral, Once PRN    EPINEPHrine (PF), 1 mg, Subcutaneous, Once PRN    glucagon (human recombinant), 1 mg, Intramuscular, PRN    glucagon (human recombinant), 1 mg, Intramuscular, Continuous PRN    glucose, 16 g, Oral, PRN    glucose, 16 g, Oral, PRN    glucose, 24 g, Oral, PRN    hydrocortisone sodium succinate, 100 mg, Intravenous, Once PRN    insulin aspart U-100, 0-10 Units, Subcutaneous, Q4H PRN     "melatonin, 6 mg, Oral, Nightly PRN    ondansetron, 4 mg, Intravenous, Q6H PRN    oxybutynin, 5 mg, Oral, TID PRN    oxyCODONE, 5 mg, Oral, Q6H PRN    sodium chloride 0.9%, 10 mL, Intravenous, PRN    traMADoL, 50 mg, Oral, Q6H PRN    traZODone, 25 mg, Oral, Nightly PRN    Intake/Output - Last 3 Shifts         07/24 0700 07/25 0659 07/25 0700 07/26 0659 07/26 0700 07/27 0659    P.O. 690 1380 180    I.V. (mL/kg) 2957.1 (35.1) 970.5 (11.2)     Other 0      IV Piggyback 1301 1182.5     Total Intake(mL/kg) 4948.1 (58.8) 3533 (40.7) 180 (2.1)    Urine (mL/kg/hr) 2714 (1.3) 1925 (0.9) 250 (0.7)    Emesis/NG output 0      Other 0      Stool 0 0     Blood 0      Total Output 2714 1925 250    Net +2234.1 +1608 -70           Urine Occurrence 0 x      Stool Occurrence 0 x 0 x     Emesis Occurrence 0 x               Review of Systems   Constitutional:  Positive for activity change. Negative for chills and fever.   Respiratory:  Negative for shortness of breath.    Cardiovascular:  Negative for leg swelling.   Gastrointestinal:  Positive for abdominal pain. Negative for abdominal distention, nausea and vomiting.   Genitourinary:  Negative for hematuria.   Skin:  Positive for wound.   Allergic/Immunologic: Positive for immunocompromised state.   Psychiatric/Behavioral:  Negative for confusion. The patient is nervous/anxious.       Objective:     Vital Signs (Most Recent):  Temp: 98 °F (36.7 °C) (07/26/24 0740)  Pulse: 81 (07/26/24 0740)  Resp: 18 (07/26/24 0740)  BP: (!) 121/57 (07/26/24 0740)  SpO2: 97 % (07/26/24 0740) Vital Signs (24h Range):  Temp:  [97.7 °F (36.5 °C)-98.8 °F (37.1 °C)] 98 °F (36.7 °C)  Pulse:  [67-81] 81  Resp:  [14-22] 18  SpO2:  [96 %-100 %] 97 %  BP: (108-197)/(54-81) 121/57     Weight: 86.8 kg (191 lb 5.8 oz)  Height: 5' 5" (165.1 cm)  Body mass index is 31.84 kg/m².     Physical Exam  Vitals reviewed.   Cardiovascular:      Rate and Rhythm: Normal rate.   Pulmonary:      Effort: Pulmonary effort is " normal.   Abdominal:      General: There is no distension.      Tenderness: There is abdominal tenderness. There is no guarding or rebound.      Comments: Kidney incision clean intact with staples in place    Musculoskeletal:      Right lower leg: No edema.      Left lower leg: No edema.   Neurological:      Mental Status: She is alert and oriented to person, place, and time.   Psychiatric:         Mood and Affect: Mood normal.         Behavior: Behavior normal.         Thought Content: Thought content normal.         Judgment: Judgment normal.          Laboratory:  CBC:   Recent Labs   Lab 07/24/24  2232 07/25/24  0648 07/25/24  1138 07/26/24  0846   WBC 15.20* 19.58* 18.67* 16.66*   RBC 2.89* 2.88* 2.79* 2.66*   HGB 9.1* 9.2* 8.9* 8.2*   HCT 28.0* 27.7* 27.1* 24.0*    196 177  --    MCV 97 96 97 90   MCH 31.5* 31.9* 31.9* 30.8   MCHC 32.5 33.2 32.8 34.2     BMP:   Recent Labs   Lab 07/25/24  1138 07/25/24  1832 07/26/24  0846   * 191* 123*    138 139   K 4.6 4.2 4.2    104 108   CO2 19* 20* 18*   BUN 46* 50* 55*   CREATININE 7.1* 6.8* 6.3*   CALCIUM 9.5 10.0 9.9       Diagnostic Results:  None  Assessment/Plan:     * S/P kidney transplant  - Kidney US with satisfactory results, no hematoma  - Making urine with improvement in creatinine  - Kidney incision intact with staples in place   - 2 day suggs no drains. Will remove suggs today   - Stop IVF  - Continue to monitor.       Salivary gland infection  - Transition to Augmentin per ID, stop date 8/3/24      Acute blood loss anemia  - Expected post operatively  - See anemia of chronic disease      Long-term use of immunosuppressant medication  - Continue prograf. Monitor prograf level daily, monitor for toxic side effects, and adjust for therapeutic dose      Prophylactic immunotherapy  - See long term use of immunosuppressant medication      Type 2 DM with hypertension and ESRD on dialysis  - Endocrine consulted for help with management.  Appreciate their assistance      Class 1 obesity due to excess calories with serious comorbidity and body mass index (BMI) of 30.0 to 30.9 in adult  - Dietician consulted to see patient post op      Anemia in ESRD (end-stage renal disease)  - CBC stable  - Monitor with daily cbc    Coronary artery disease involving native coronary artery of native heart without angina pectoris  - Will restart ASA once safe from bleeding standpoint      Renovascular hypertension  - Hold antihypertensives for now given orthostatic hypotension  - Continue to monitor.       Diabetes mellitus due to underlying condition with chronic kidney disease on chronic dialysis, with long-term current use of insulin  - Endocrine consulted for help with management. Appreciate their assistance          Discharge Planning: Not a candidate for discharge at this time. Likely dc tomorrow     Medical decision making for this encounter includes review of pertinent labs and diagnostic studies, assessment and planning, discussions with consulting providers, discussion with patient/family, and participation in multidisciplinary rounds. Time spent caring for patient: 60 minutes    Gosia Mora PA-C  Kidney Transplant  [facility] - Transplant Stepdown

## 2024-07-26 NOTE — PROGRESS NOTES
Transplant Teaching Book given to patient, Joann Kenney, on 07/26/2024.  During the course of the hospital stay the patient received information regarding kidney transplant. Teaching and instruction were completed.  Areas that were discussed included: how to contact the Transplant Team, the importance of measuring intake of fluids and urine output, and monitoring vital signs such as blood pressure, temperature, and daily weights.  Parameters for which to report abnormal findings were given.  Appointment were provided along with the rational for the importance of lab work and clinic visits.  A written medication list was provided.  The importance of immunosuppressive medications, their common side effects, and treatment to prevent or minimize side effects has been reviewed.  Signs and symptoms of rejection and infection along with various treatments were reviewed.  The need to avoid infection was discussed.  Wound care and special consideration regarding activities of daily living were explained.  Written and verbal teaching of the above information was given.     Discussed with the patient and caregiver the importance of maintaining COVID-19 precautions; wearing a mask, good handwashing, and social distancing.  Also, to report any signs or symptoms (fever, difficulty breathing, loss of taste/smell, etc.), suspected exposure, or COVID testing, immediately to the transplant program.     Education completed with the patient and her daughter.

## 2024-07-26 NOTE — PROGRESS NOTES
Transplant Social Work Potential Weekend Discharge Note:    Pt will discharge to [facility] under the care of Sarah Lott, patient's daughter, phone number [phone].       [employer] [facility] check in time is today at 3pm. Patient reports readiness to discharge when medically able. Patient will receive home health through [facility]. Home health reports they will discuss local home aide options with patient at admit. Patients daughter reports she is able to care for her Patients daughter reports she will purchase patient a shower chair if needed as patient did not bring hers with her. Per rounds this morning patient does not require additional referrals from this  at this time. Patient and caregiver denied needing or wanting additional resources or referrals at this time. Patient and caregiver agree to contact transplant team with needs, questions, or concerns as they arise.     Pt aware of, involved in, and coping well with this discharge plan. Pt did not have any concerns with the discharge plan at this time. SW remains available at [phone].    Laura Wood LMSW

## 2024-07-26 NOTE — ASSESSMENT & PLAN NOTE
- Kidney US with satisfactory results, no hematoma  - Making urine with improvement in creatinine  - Kidney incision intact with staples in place   - 2 day suggs no drains. Will remove suggs today   - Stop IVF  - Continue to monitor.

## 2024-07-26 NOTE — PROGRESS NOTES
"[facility] - Transplant Stepdown  Adult Nutrition  Progress Note    S/p kidney transplant        SUMMARY       Recommendations  1. Continue Renal diet   2. If po intake declines <50% meal consumption, rec'd Novasource Renal BID   3. RD following    Goals: Meet % of EEN/EPN by RD f/u date  Nutrition Goal Status: progressing   Communication of RD Recs: POC    Assessment and Plan    Nutrition Problem  Increased nutrient needs      Related to (etiology):   Physiological demands      Signs and Symptoms (as evidenced by):   S/p kidney transplant 7/24     Interventions (treatment strategy):  Collaboration of nutrition care w/ other providers     Nutrition Diagnosis Status:   Continues      Reason for Assessment    Reason For Assessment: RD follow-up  Diagnosis: (s/p kidney transplant)  Relevant Medical History: HTN, DM type 2  Interdisciplinary Rounds: did not attend  General Information Comments: Spoke w pt and pt's caregiver at bedside, pt reports a poor appetite, RD observed pt consumed about 50-60% of breakfast this morning on plate.  No n/v noted.  Pt reports UBW of 180 lbs. Per chart review, no noted weight fluctuations noted. NFPE completed 7/25, pt at risk for malnutrition if po intake continues to decline <50% meal consumption noted. RD following. LBM noted-7/23  Nutrition Discharge Planning: Pt w/ no additional questions to post-transplant nutrition diet recommendations. RD contact information provided for further questions/concerns.    Nutrition Risk Screen    Nutrition Risk Screen: no indicators present    Nutrition/Diet History    Spiritual, Cultural Beliefs, Evangelical Practices, Values that Affect Care: no  Food Allergies: NKFA    Anthropometrics    Temp: 98 °F (36.7 °C)  Height Method: Stated  Height: 5' 5" (165.1 cm)  Height (inches): 65 in  Weight Method: Standard Scale  Weight: 86.8 kg (191 lb 5.8 oz)  Weight (lb): 191.36 lb  Ideal Body Weight (IBW), Female: 125 lb  % Ideal Body Weight, Female (lb): " 148.5 %  BMI (Calculated): 31.8  BMI Grade: 30 - 34.9- obesity - grade I       Lab/Procedures/Meds    Pertinent Labs Reviewed: reviewed  Pertinent Labs Comments: GFR 5.9, Glucose 152, BUN 46, Phosphorus 8.8  Pertinent Medications Reviewed: reviewed  Pertinent Medications Comments: jet    Estimated/Assessed Needs    Weight Used For Calorie Calculations: 84.2 kg (185 lb 10 oz)  Energy Calorie Requirements (kcal): 1733  Energy Need Method: [place]-St Jeor (PAL 1.25)  Weight Used For Protein Calculations: 84.2 kg (185 lb 10 oz)  RDA Method (mL): 1733         Nutrition Prescription Ordered    Current Diet Order: Renal diet    Evaluation of Received Nutrient/Fluid Intake    I/O: + 2.2 L since admit  Energy Calories Required: progressing   Protein Required: progressing   Fluid Required: progressing   Total Fluid Intake (mL/kg): 1 ml or fluid per MD  Tolerance: tolerating  % Intake of Estimated Energy Needs: 50 - 75 %  % Meal Intake: 50 - 75 %    Nutrition Risk    Level of Risk/Frequency of Follow-up: low ((2x/week))     Monitor and Evaluation    Food and Nutrient Intake: energy intake, food and beverage intake  Food and Nutrient Adminstration: diet order  Knowledge/Beliefs/Attitudes: food and nutrition knowledge/skill, beliefs and attitudes  Physical Activity and Function: nutrition-related ADLs and IADLs, factors affecting access to physical activity  Anthropometric Measurements: height/length, weight, weight change, body mass index, growth pattern indices/percentile ranks  Biochemical Data, Medical Tests and Procedures: electrolyte and renal panel, glucose/endocrine profile, inflammatory profile, lipid profile, gastrointestinal profile  Nutrition-Focused Physical Findings: overall appearance, extremities, muscles and bones, head and eyes, skin     Nutrition Follow-Up    RD Follow-up?: Yes

## 2024-07-26 NOTE — CONSULTS
[facility] - Transplant Stepdown  Infectious Disease  Consult Note    Patient Name: Joann Kenney  MRN: [id number]  Admission Date: 7/24/2024  Hospital Length of Stay: 1 days  Attending Physician: Alphonso Mon MD  Primary Care Provider: Vijay Blake MD     Isolation Status: Enhanced Respiratory    Patient information was obtained from patient, relative(s), and past medical records.      Consults  Assessment/Plan:     ENT  Salivary gland infection  63F with history of CAD, afib,  T2DM c/b ESRD on PD presented for kidney transplant 7/24/2024, left submandibular bacterial infection.    Recommendations:  - Continue IV amp-sulbactam while inpatient  - On discharge, okay to transition to amox-clav 875-125 mg po bid (dosed per CrCl)  - Plan for 10 day course of antibiotics, last day 8/3/2024  - If submandibular swelling worsens, recommend CT neck with ENT consult      35 minutes of total time spent on the encounter, which includes face to face time and non-face to face time preparing to see the patient (eg, review of tests), obtaining and reviewing separately obtained history, documenting clinical information in the electronic or other health record, independently interpreting results (not separately reported) and communicating results to the patient/family/caregiver, and care coordination (not separately reported).         Thank you for your consult. I will sign off. Please contact us if you have any additional questions.    Brigid Sutherland MD  Infectious Disease  [facility] - Transplant Stepdown    Subjective:     Principal Problem: S/P kidney transplant    HPI: 63F with history of CAD, afib,  T2DM c/b ESRD on PD presented for kidney transplant 7/24/2024. Patient had salivary gland infection about 3 weeks ago. She noticed some stones coming from her sublingual salivary gland followed by drainage. Patient completed course of antibiotics, but swelling and pain returned so she was restarted on antibiotics about a week  prior to admission. Patient reports overall swelling and pain is improving. Patient has an outpatient ENT.    Past Medical History:   Diagnosis Date    Anemia     Anxiety     Atrial fibrillation     Coronary artery disease     Depression     Diabetes mellitus, type 2     Disorder of kidney and ureter     Heart murmur     Hyperlipidemia     Hypertension     Obesity     ALEJA (obstructive sleep apnea)     Proteinuria     Solitary kidney     Stroke        Past Surgical History:   Procedure Laterality Date    CORONARY ANGIOPLASTY WITH STENT PLACEMENT      HYSTERECTOMY      INCONTINENCE SURGERY      INSERTION OF IMPLANTABLE LOOP RECORDER      internal heart monitor      KIDNEY TRANSPLANT Right 7/24/2024    Procedure: TRANSPLANT, KIDNEY;  Surgeon: Alphonso Mon MD;  Location: [facility] OR [address] [place] [address];  Service: Transplant;  Laterality: Right;    PERITONEAL CATHETER INSERTION      PERITONEAL CATHETER REMOVAL Left 7/24/2024    Procedure: REMOVAL, CATHETER, DIALYSIS, PERITONEAL;  Surgeon: Alphonso Mon MD;  Location: [facility] OR [address] [place] [address];  Service: Transplant;  Laterality: Left;       Review of patient's allergies indicates:  No Known Allergies    Medications:  Medications Prior to Admission   Medication Sig    ALPRAZolam (XANAX) 0.5 MG tablet Take 2 mg by mouth every evening.    aspirin (ECOTRIN) 81 MG EC tablet Take 81 mg by mouth once daily.    atorvastatin (LIPITOR) 40 MG tablet Take 20 mg by mouth every evening.    AURYXIA 210 mg iron Tab Take 2 tablets by mouth.    cetirizine (ZYRTEC) 10 MG tablet Take 10 mg by mouth every evening.    clindamycin (CLEOCIN) 300 MG capsule Take 300 mg by mouth every 6 (six) hours.    DULoxetine (CYMBALTA) 30 MG capsule Take 30 mg by mouth once daily.    ezetimibe (ZETIA) 10 mg tablet Take 10 mg by mouth once daily.    labetaloL (NORMODYNE) 200 MG tablet Take 200 mg by mouth every 12 (twelve) hours.     losartan (COZAAR) 100 MG tablet Take 100 mg by mouth every evening.    minoxidiL (LONITEN) 2.5 MG  tablet Take 1 tablet by mouth 2 (two) times daily.    NIFEdipine (PROCARDIA-XL) 60 MG (OSM) 24 hr tablet Take 60 mg by mouth every evening.     sucralfate (CARAFATE) 100 mg/mL suspension Take 1 g by mouth 4 (four) times daily as needed.    [DISCONTINUED] pantoprazole (PROTONIX) 40 MG tablet Take 40 mg by mouth.    cinacalcet (SENSIPAR) 30 MG Tab     furosemide (LASIX) 80 MG tablet Take 80 mg by mouth once daily.    HUMIRA,CF, PEN 40 mg/0.4 mL PnKt      Antibiotics (From admission, onward)      Start     Stop Route Frequency Ordered    08/03/24 0800  sulfamethoxazole-trimethoprim 400-80mg per tablet 1 tablet         -- Oral Every morning 07/24/24 1630    07/24/24 2100  mupirocin 2 % ointment 1 g         07/29/24 2059 Nasl 2 times daily 07/24/24 1630    07/24/24 1900  ampicillin-sulbactam (UNASYN) 3 g in 0.9% NaCl 100 mL IVPB (MB+)         -- IV Every 8 hours (non-standard times) 07/24/24 1011          Antifungals (From admission, onward)      None          Antivirals (From admission, onward)          Stop Route Frequency     valGANciclovir         -- Oral Every morning               There is no immunization history on file for this patient.    Family History    None       Social History     Socioeconomic History    Marital status: Single   Tobacco Use    Smoking status: Former    Smokeless tobacco: Never   Substance and Sexual Activity    Alcohol use: Not Currently    Drug use: Never    Sexual activity: Not Currently     Partners: Male   Social History Narrative    Caregiver Son Ranjan     Review of Systems   Constitutional:  Negative for chills, diaphoresis and fever.   HENT:  Negative for rhinorrhea and sore throat.    Respiratory:  Negative for cough and shortness of breath.    Cardiovascular:  Negative for chest pain and leg swelling.   Gastrointestinal:  Negative for abdominal pain, diarrhea, nausea and vomiting.   Genitourinary:  Negative for dysuria and hematuria.   Musculoskeletal:  Negative for arthralgias  and myalgias.   Skin:  Negative for rash.   Neurological:  Negative for headaches.     Objective:     Vital Signs (Most Recent):  Temp: 97.7 °F (36.5 °C) (07/25/24 2047)  Pulse: 74 (07/25/24 2047)  Resp: 18 (07/25/24 2047)  BP: 137/61 (07/25/24 2047)  SpO2: 100 % (07/25/24 2047) Vital Signs (24h Range):  Temp:  [97.7 °F (36.5 °C)-99.1 °F (37.3 °C)] 97.7 °F (36.5 °C)  Pulse:  [67-90] 74  Resp:  [14-25] 18  SpO2:  [96 %-100 %] 100 %  BP: ()/(47-77) 137/61     Weight: 84.2 kg (185 lb 10 oz)  Body mass index is 30.89 kg/m².    Estimated Creatinine Clearance: 8.8 mL/min (A) (based on SCr of 6.8 mg/dL (H)).     Physical Exam  Constitutional:       General: She is not in acute distress.     Appearance: She is well-developed. She is not diaphoretic.   HENT:      Head: Normocephalic and atraumatic.      Nose: Nose normal.      Mouth/Throat:      Mouth: Mucous membranes are moist.      Pharynx: Oropharyngeal exudate present.      Comments: Left mandibular salivary gland with purulent drainage  Eyes:      Conjunctiva/sclera: Conjunctivae normal.   Pulmonary:      Effort: Pulmonary effort is normal. No respiratory distress.   Abdominal:      General: Abdomen is flat. There is no distension.   Musculoskeletal:      Cervical back: Normal range of motion.      Right lower leg: No edema.      Left lower leg: No edema.   Skin:     General: Skin is warm and dry.      Findings: No erythema or rash.   Neurological:      Mental Status: She is alert.   Psychiatric:         Behavior: Behavior normal.          Significant Labs: All pertinent labs within the past 24 hours have been reviewed.    Significant Imaging: I have reviewed all pertinent imaging results/findings within the past 24 hours.

## 2024-07-26 NOTE — HPI
63F with history of CAD, afib,  T2DM c/b ESRD on PD presented for kidney transplant 7/24/2024. Patient had salivary gland infection about 3 weeks ago. She noticed some stones coming from her sublingual salivary gland followed by drainage. Patient completed course of antibiotics, but swelling and pain returned so she was restarted on antibiotics about a week prior to admission. Patient reports overall swelling and pain is improving. Patient has an outpatient ENT.

## 2024-07-26 NOTE — SUBJECTIVE & OBJECTIVE
Subjective:   History of Present Illness:  Ms. Kenney is a 63 y.o. female with ESRD secondary to diabetic nephropathy, solitary kidney.  PMH DM2, HTN, incontience (s/p bladder sling), CAD with stents (on ASA), atrial fibrillation, CVA (no residual deficits), ALEJA, anemia. She has been on the wait list for a kidney transplant at [facility] since 2020. Patient is currently on peritoneal dialysis started on 2020. Patient is dialyzing on cyclic peritoneal dialysis. Patient reports that she is tolerating dialysis well. She has a PD catheter. Denies peritonitis or exit site infections.     She now presents as primary candidate for a  donor kidney transplant. Pre-op labs and imaging to be reviewed prior to transplant. Of note, patient states she just started an oral antibiotic earlier this week for a salivary gland infection. Pt states salivary gland infection has been present approx 1 week. She notes mild yellow drainage. Will review with surgeon prior to transplant.                 Hospital Course:  She is now s/p DBD kidney transplant on 24. Surgery without complications. 2 day suggs, no drains, PD cath removed. Making urine post op with renal clearance.     Interval History: No acute events overnight. Patient feeling fine this AM. Some incisional pain. Creatinine continues to trend down with good UOP. Plan to remove suggs catheter today. PT to see patient today. Per ID, will transition to Augmentin from Unasyn for salivary gland infection, stop date 8/3/24. Plan for likely discharge tomorrow. Continue to monitor.         Past Medical, Surgical, Family, and Social History:   Unchanged from H&P.    Scheduled Meds:   acetaminophen  650 mg Oral Q8H    acetaminophen  650 mg Oral Q24H    amoxicillin-clavulanate 875-125mg  1 tablet Oral Daily    antithymocyte globulin (rabbit) 100 mg, hydrocortisone sodium succinate (SOLU-CORTEF) 20 mg in 0.9% NaCl 500 mL (FOR PERIPHERAL LINE ADMINISTRATION ONLY)  1.5 mg/kg  (Adjusted) Intravenous Daily    bisacodyL  10 mg Oral QHS    cetirizine  5 mg Oral Daily    cinacalcet  60 mg Oral Daily with breakfast    diphenhydrAMINE  25 mg Oral Q24H    docusate sodium  100 mg Oral TID    DULoxetine  30 mg Oral QHS    heparin (porcine)  5,000 Units Subcutaneous Q8H    methylPREDNISolone injection (PEDS and ADULTS)  125 mg Intravenous Once    multivitamin  1 tablet Oral Daily    mupirocin  1 g Nasal BID    mycophenolate  1,000 mg Oral BID    pantoprazole  40 mg Oral Daily    [START ON 7/27/2024] predniSONE  20 mg Oral Daily    sodium bicarbonate  650 mg Oral BID    [START ON 8/3/2024] sulfamethoxazole-trimethoprim 400-80mg  1 tablet Oral Daily AM    tacrolimus  3 mg Oral BID    [START ON 8/3/2024] valGANciclovir  450 mg Oral Daily AM     Continuous Infusions:   glucagon (human recombinant)  1 mg Intramuscular Continuous PRN         PRN Meds:  Current Facility-Administered Medications:     ALPRAZolam, 0.5 mg, Oral, Nightly PRN    calcium carbonate, 500 mg, Oral, Daily PRN    dextrose 10%, 12.5 g, Intravenous, PRN    dextrose 10%, 12.5 g, Intravenous, PRN    dextrose 10%, 25 g, Intravenous, PRN    diphenhydrAMINE, 50 mg, Oral, Once PRN    EPINEPHrine (PF), 1 mg, Subcutaneous, Once PRN    glucagon (human recombinant), 1 mg, Intramuscular, PRN    glucagon (human recombinant), 1 mg, Intramuscular, Continuous PRN    glucose, 16 g, Oral, PRN    glucose, 16 g, Oral, PRN    glucose, 24 g, Oral, PRN    hydrocortisone sodium succinate, 100 mg, Intravenous, Once PRN    insulin aspart U-100, 0-10 Units, Subcutaneous, Q4H PRN    melatonin, 6 mg, Oral, Nightly PRN    ondansetron, 4 mg, Intravenous, Q6H PRN    oxybutynin, 5 mg, Oral, TID PRN    oxyCODONE, 5 mg, Oral, Q6H PRN    sodium chloride 0.9%, 10 mL, Intravenous, PRN    traMADoL, 50 mg, Oral, Q6H PRN    traZODone, 25 mg, Oral, Nightly PRN    Intake/Output - Last 3 Shifts         07/24 0700 07/25 0659 07/25 0700 07/26 0659 07/26 0700 07/27 0659    P.O.  "690 1380 180    I.V. (mL/kg) 2957.1 (35.1) 970.5 (11.2)     Other 0      IV Piggyback 1301 1182.5     Total Intake(mL/kg) 4948.1 (58.8) 3533 (40.7) 180 (2.1)    Urine (mL/kg/hr) 2714 (1.3) 1925 (0.9) 250 (0.7)    Emesis/NG output 0      Other 0      Stool 0 0     Blood 0      Total Output 2714 1925 250    Net +2234.1 +1608 -70           Urine Occurrence 0 x      Stool Occurrence 0 x 0 x     Emesis Occurrence 0 x               Review of Systems   Constitutional:  Positive for activity change. Negative for chills and fever.   Respiratory:  Negative for shortness of breath.    Cardiovascular:  Negative for leg swelling.   Gastrointestinal:  Positive for abdominal pain. Negative for abdominal distention, nausea and vomiting.   Genitourinary:  Negative for hematuria.   Skin:  Positive for wound.   Allergic/Immunologic: Positive for immunocompromised state.   Psychiatric/Behavioral:  Negative for confusion. The patient is nervous/anxious.       Objective:     Vital Signs (Most Recent):  Temp: 98 °F (36.7 °C) (07/26/24 0740)  Pulse: 81 (07/26/24 0740)  Resp: 18 (07/26/24 0740)  BP: (!) 121/57 (07/26/24 0740)  SpO2: 97 % (07/26/24 0740) Vital Signs (24h Range):  Temp:  [97.7 °F (36.5 °C)-98.8 °F (37.1 °C)] 98 °F (36.7 °C)  Pulse:  [67-81] 81  Resp:  [14-22] 18  SpO2:  [96 %-100 %] 97 %  BP: (108-197)/(54-81) 121/57     Weight: 86.8 kg (191 lb 5.8 oz)  Height: 5' 5" (165.1 cm)  Body mass index is 31.84 kg/m².     Physical Exam  Vitals reviewed.   Cardiovascular:      Rate and Rhythm: Normal rate.   Pulmonary:      Effort: Pulmonary effort is normal.   Abdominal:      General: There is no distension.      Tenderness: There is abdominal tenderness. There is no guarding or rebound.      Comments: Kidney incision clean intact with staples in place    Musculoskeletal:      Right lower leg: No edema.      Left lower leg: No edema.   Neurological:      Mental Status: She is alert and oriented to person, place, and time. "   Psychiatric:         Mood and Affect: Mood normal.         Behavior: Behavior normal.         Thought Content: Thought content normal.         Judgment: Judgment normal.          Laboratory:  CBC:   Recent Labs   Lab 07/24/24  2232 07/25/24  0648 07/25/24  1138 07/26/24  0846   WBC 15.20* 19.58* 18.67* 16.66*   RBC 2.89* 2.88* 2.79* 2.66*   HGB 9.1* 9.2* 8.9* 8.2*   HCT 28.0* 27.7* 27.1* 24.0*    196 177  --    MCV 97 96 97 90   MCH 31.5* 31.9* 31.9* 30.8   MCHC 32.5 33.2 32.8 34.2     BMP:   Recent Labs   Lab 07/25/24  1138 07/25/24  1832 07/26/24  0846   * 191* 123*    138 139   K 4.6 4.2 4.2    104 108   CO2 19* 20* 18*   BUN 46* 50* 55*   CREATININE 7.1* 6.8* 6.3*   CALCIUM 9.5 10.0 9.9       Diagnostic Results:  None

## 2024-07-26 NOTE — SUBJECTIVE & OBJECTIVE
Past Medical History:   Diagnosis Date    Anemia     Anxiety     Atrial fibrillation     Coronary artery disease     Depression     Diabetes mellitus, type 2     Disorder of kidney and ureter     Heart murmur     Hyperlipidemia     Hypertension     Obesity     ALEJA (obstructive sleep apnea)     Proteinuria     Solitary kidney     Stroke        Past Surgical History:   Procedure Laterality Date    CORONARY ANGIOPLASTY WITH STENT PLACEMENT      HYSTERECTOMY      INCONTINENCE SURGERY      INSERTION OF IMPLANTABLE LOOP RECORDER      internal heart monitor      KIDNEY TRANSPLANT Right 7/24/2024    Procedure: TRANSPLANT, KIDNEY;  Surgeon: Alphonso Mon MD;  Location: [facility] OR [address] [place] [address];  Service: Transplant;  Laterality: Right;    PERITONEAL CATHETER INSERTION      PERITONEAL CATHETER REMOVAL Left 7/24/2024    Procedure: REMOVAL, CATHETER, DIALYSIS, PERITONEAL;  Surgeon: Alphonso Mon MD;  Location: [facility] OR [address] [place] [address];  Service: Transplant;  Laterality: Left;       Review of patient's allergies indicates:  No Known Allergies    Medications:  Medications Prior to Admission   Medication Sig    ALPRAZolam (XANAX) 0.5 MG tablet Take 2 mg by mouth every evening.    aspirin (ECOTRIN) 81 MG EC tablet Take 81 mg by mouth once daily.    atorvastatin (LIPITOR) 40 MG tablet Take 20 mg by mouth every evening.    AURYXIA 210 mg iron Tab Take 2 tablets by mouth.    cetirizine (ZYRTEC) 10 MG tablet Take 10 mg by mouth every evening.    clindamycin (CLEOCIN) 300 MG capsule Take 300 mg by mouth every 6 (six) hours.    DULoxetine (CYMBALTA) 30 MG capsule Take 30 mg by mouth once daily.    ezetimibe (ZETIA) 10 mg tablet Take 10 mg by mouth once daily.    labetaloL (NORMODYNE) 200 MG tablet Take 200 mg by mouth every 12 (twelve) hours.     losartan (COZAAR) 100 MG tablet Take 100 mg by mouth every evening.    minoxidiL (LONITEN) 2.5 MG tablet Take 1 tablet by mouth 2 (two) times daily.    NIFEdipine (PROCARDIA-XL) 60 MG (OSM) 24 hr tablet Take 60  mg by mouth every evening.     sucralfate (CARAFATE) 100 mg/mL suspension Take 1 g by mouth 4 (four) times daily as needed.    [DISCONTINUED] pantoprazole (PROTONIX) 40 MG tablet Take 40 mg by mouth.    cinacalcet (SENSIPAR) 30 MG Tab     furosemide (LASIX) 80 MG tablet Take 80 mg by mouth once daily.    HUMIRA,CF, PEN 40 mg/0.4 mL PnKt      Antibiotics (From admission, onward)      Start     Stop Route Frequency Ordered    08/03/24 0800  sulfamethoxazole-trimethoprim 400-80mg per tablet 1 tablet         -- Oral Every morning 07/24/24 1630    07/24/24 2100  mupirocin 2 % ointment 1 g         07/29/24 2059 Nasl 2 times daily 07/24/24 1630    07/24/24 1900  ampicillin-sulbactam (UNASYN) 3 g in 0.9% NaCl 100 mL IVPB (MB+)         -- IV Every 8 hours (non-standard times) 07/24/24 1011          Antifungals (From admission, onward)      None          Antivirals (From admission, onward)          Stop Route Frequency     valGANciclovir         -- Oral Every morning               There is no immunization history on file for this patient.    Family History    None       Social History     Socioeconomic History    Marital status: Single   Tobacco Use    Smoking status: Former    Smokeless tobacco: Never   Substance and Sexual Activity    Alcohol use: Not Currently    Drug use: Never    Sexual activity: Not Currently     Partners: Male   Social History Narrative    Caregiver Son Ranjan     Review of Systems   Constitutional:  Negative for chills, diaphoresis and fever.   HENT:  Negative for rhinorrhea and sore throat.    Respiratory:  Negative for cough and shortness of breath.    Cardiovascular:  Negative for chest pain and leg swelling.   Gastrointestinal:  Negative for abdominal pain, diarrhea, nausea and vomiting.   Genitourinary:  Negative for dysuria and hematuria.   Musculoskeletal:  Negative for arthralgias and myalgias.   Skin:  Negative for rash.   Neurological:  Negative for headaches.     Objective:     Vital Signs  (Most Recent):  Temp: 97.7 °F (36.5 °C) (07/25/24 2047)  Pulse: 74 (07/25/24 2047)  Resp: 18 (07/25/24 2047)  BP: 137/61 (07/25/24 2047)  SpO2: 100 % (07/25/24 2047) Vital Signs (24h Range):  Temp:  [97.7 °F (36.5 °C)-99.1 °F (37.3 °C)] 97.7 °F (36.5 °C)  Pulse:  [67-90] 74  Resp:  [14-25] 18  SpO2:  [96 %-100 %] 100 %  BP: ()/(47-77) 137/61     Weight: 84.2 kg (185 lb 10 oz)  Body mass index is 30.89 kg/m².    Estimated Creatinine Clearance: 8.8 mL/min (A) (based on SCr of 6.8 mg/dL (H)).     Physical Exam  Constitutional:       General: She is not in acute distress.     Appearance: She is well-developed. She is not diaphoretic.   HENT:      Head: Normocephalic and atraumatic.      Nose: Nose normal.      Mouth/Throat:      Mouth: Mucous membranes are moist.      Pharynx: Oropharyngeal exudate present.      Comments: Left mandibular salivary gland with purulent drainage  Eyes:      Conjunctiva/sclera: Conjunctivae normal.   Pulmonary:      Effort: Pulmonary effort is normal. No respiratory distress.   Abdominal:      General: Abdomen is flat. There is no distension.   Musculoskeletal:      Cervical back: Normal range of motion.      Right lower leg: No edema.      Left lower leg: No edema.   Skin:     General: Skin is warm and dry.      Findings: No erythema or rash.   Neurological:      Mental Status: She is alert.   Psychiatric:         Behavior: Behavior normal.          Significant Labs: All pertinent labs within the past 24 hours have been reviewed.    Significant Imaging: I have reviewed all pertinent imaging results/findings within the past 24 hours.

## 2024-07-26 NOTE — ASSESSMENT & PLAN NOTE
Managed per primary team  Avoid hypoglycemia  Titrate insulin slowly to avoid hypoglycemia as the risk of hypoglycemia increases with decreased creatinine clearance.  Estimated Creatinine Clearance: 9 mL/min (A) (based on SCr of 6.8 mg/dL (H)).  Optimize BG control to improve wound healing

## 2024-07-26 NOTE — PLAN OF CARE
Problem: Adult Inpatient Plan of Care  Goal: Plan of Care Review  Outcome: Progressing     Problem: Adult Inpatient Plan of Care  Goal: Optimal Comfort and Wellbeing  Outcome: Progressing     Problem: Adult Inpatient Plan of Care  Goal: Readiness for Transition of Care  Outcome: Progressing   VSS. Orthostatic vitals performed, refused morning set. Complains of pain in abdomen, scheduled tylenol given. Incisions intact, dressing with small amount of drainage. Urine output was 1000ml overnight. NS infusing at 75ml/hr. Refused morning meds and labs, will retry at 800am. No acute changes overnight. Family at bedside and call light within reach.

## 2024-07-26 NOTE — PT/OT/SLP PROGRESS
Physical Therapy      Patient Name:  Joann Kenney   MRN:  [id number]    Patient not seen today secondary to  (pt. with c/o nausea and heartburn). Will follow-up tomorrow.    Nathaniel Farah, PT  7/26/2024

## 2024-07-26 NOTE — PROGRESS NOTES
[facility] - Transplant Stepdown  Endocrinology  Progress Note    Admit Date: 2024     Reason for Consult: Management of T2DM, Hyperglycemia     Surgical Procedure and Date: s/p kidney transplant on 2024    Diabetes diagnosis year:     Home Diabetes Medications:  Patient previously on tandem insulin pump and Mounjaro. Patient recently lost 60 lbs and had worsening kidney function. Patient states that she is off all diabetes medication for about 3-months and blood sugar has been well controlled. Patient is from [place] and does not have her pump with her to review pump settings. Per chart review, basal insulin was 58 units daily in the past. Anticipate the patient will need more aggressive insulin management post-op.     How often checking glucose at home? >4 x day (previously on Dexcom G6, but off since she stopped using tandem pump)   BG readings on regimen: states BG have been well controlled off the pump and the GLP1-RA/ GIP ('s)  Hypoglycemia on the regimen?  No  Missed doses on regimen?  No    Diabetes Complications include:     Hyperglycemia (history)    Complicating diabetes co morbidities:   Glucocorticoid use       HPI: Ms. Kenney is a 63 y.o. female with ESRD secondary to diabetic nephropathy, solitary kidney.  PMH DM2, HTN, incontience (s/p bladder sling), CAD with stents (on ASA), atrial fibrillation, CVA (no residual deficits), ALEJA, anemia. She has been on the wait list for a kidney transplant at [facility] since 2020. Patient is currently on peritoneal dialysis started on 2020. Patient is dialyzing on cyclic peritoneal dialysis. Patient reports that she is tolerating dialysis well. She has a PD catheter. Denies peritonitis or exit site infections. She now presents as primary candidate for a  donor kidney transplant. Endocrine consulted to manage hyperglycemia and diabetes in the context of transplant and high dose steroids.           Interval HPI:   Overnight events:  "No acute events overnight. Patient in room 82064/46046 A. Blood glucose stable. BG at goal on current insulin regimen (SSI ). Steroid use- Methylprednisolone  125 mg. 2 Days Post-Op  Renal function- Abnormal - Creatinine 6.8   Vasopressors-  None     Endocrine will continue to follow and manage insulin orders inpatient.       Diet Renal     Eatin%  Nausea: No  Hypoglycemia and intervention: No  Fever: No  TPN and/or TF: No    BP (!) 121/57 (Patient Position: Standing)   Pulse 81   Temp 98 °F (36.7 °C) (Oral)   Resp 18   Ht 5' 5" (1.651 m)   Wt 86.8 kg (191 lb 5.8 oz)   SpO2 97%   Breastfeeding No   BMI 31.84 kg/m²     Labs Reviewed and Include    Recent Labs   Lab 24  1832   *   CALCIUM 10.0      K 4.2   CO2 20*      BUN 50*   CREATININE 6.8*     Lab Results   Component Value Date    WBC 18.67 (H) 2024    HGB 8.9 (L) 2024    HCT 27.1 (L) 2024    MCV 97 2024     2024     No results for input(s): "TSH", "FREET4" in the last 168 hours.  Lab Results   Component Value Date    HGBA1C 4.8 2024    HGBA1C 4.8 2024       Nutritional status:   Body mass index is 31.84 kg/m².  Lab Results   Component Value Date    ALBUMIN 2.2 (L) 2024    ALBUMIN 2.1 (L) 2024    ALBUMIN 2.1 (L) 2024     No results found for: "PREALBUMIN"    Estimated Creatinine Clearance: 9 mL/min (A) (based on SCr of 6.8 mg/dL (H)).    Accu-Checks  Recent Labs     24  1635 24  2034 24  0031 24  0445 24  0847 24  1259 24  1733 24  2103 24  0003 24  0348   POCTGLUCOSE 98 123* 163* 161* 156* 151* 200* 203* 174* 139*       Current Medications and/or Treatments Impacting Glycemic Control  Immunotherapy:    Immunosuppressants           Stop Route Frequency     antithymocyte globulin (rabbit) 100 mg, hydrocortisone sodium succinate (SOLU-CORTEF) 20 mg in 0.9% NaCl 500 mL (FOR PERIPHERAL LINE " ADMINISTRATION ONLY)         07/27/24 0859 IV Daily     mycophenolate capsule 1,000 mg         -- Oral 2 times daily     tacrolimus capsule 3 mg         -- Oral 2 times daily          Steroids:   Hormones (From admission, onward)      Start     Stop Route Frequency Ordered    07/27/24 0900  predniSONE tablet 20 mg  (IP TXP KIDNEY POST-OP THYMO WITH PERIPHERAL PRECHECKED)         -- Oral Daily 07/24/24 1630    07/26/24 0800  methylPREDNISolone sodium succinate injection 125 mg  (IP TXP KIDNEY POST-OP THYMO WITH PERIPHERAL PRECHECKED)         -- IV Once 07/24/24 1630    07/25/24 0900  antithymocyte globulin (rabbit) 100 mg, hydrocortisone sodium succinate (SOLU-CORTEF) 20 mg in 0.9% NaCl 500 mL (FOR PERIPHERAL LINE ADMINISTRATION ONLY)  (IP TXP KIDNEY POST-OP THYMO WITH PERIPHERAL PRECHECKED)         07/27/24 0859 IV Daily 07/24/24 1630    07/24/24 2331  melatonin tablet 6 mg         -- Oral Nightly PRN 07/24/24 2331    07/24/24 1729  hydrocortisone sodium succinate injection 100 mg  (IP TXP KIDNEY POST-OP THYMO WITH PERIPHERAL PRECHECKED)         12/21/35 0829 IV Once as needed 07/24/24 1630          Pressors:    Autonomic Drugs (From admission, onward)      Start     Stop Route Frequency Ordered    07/24/24 1729  EPINEPHrine (PF) injection 1 mg  (IP TXP KIDNEY POST-OP THYMO WITH PERIPHERAL PRECHECKED)         12/21/35 0829 SubQ Once as needed 07/24/24 1630          Hyperglycemia/Diabetes Medications:   Antihyperglycemics (From admission, onward)      Start     Stop Route Frequency Ordered    07/24/24 1606  insulin aspart U-100 pen 0-10 Units         -- SubQ Every 4 hours PRN 07/24/24 1507            ASSESSMENT and PLAN    Renal/  * S/P kidney transplant  Managed per primary team  Avoid hypoglycemia  Titrate insulin slowly to avoid hypoglycemia as the risk of hypoglycemia increases with decreased creatinine clearance.  Estimated Creatinine Clearance: 9 mL/min (A) (based on SCr of 6.8 mg/dL (H)).  Optimize BG  control to improve wound healing        Endocrine  Diabetes mellitus due to underlying condition with chronic kidney disease on chronic dialysis, with long-term current use of insulin  Endocrinology consulted for BG management.   BG goal 140-180    - Novolog (Insulin Aspart) prn for BG excursions MDC SSI (150/25)  - BG checks q4hr  - Hypoglycemia protocol in place    ** Please notify Endocrine for any change and/or advance in diet**  ** Please call Endocrine for any BG related issues **    Discharge Planning:   TBD. Please notify endocrinology prior to discharge.        Other  Adrenal corticosteroid causing adverse effect in therapeutic use  Glucocorticoids markedly increase glucose levels. Expect the steroid taper will help glucose control.              Quincy Lyles, DNP, FNP  Endocrinology  [facility] - Transplant Stepdown

## 2024-07-26 NOTE — ASSESSMENT & PLAN NOTE
63F with history of CAD, afib,  T2DM c/b ESRD on PD presented for kidney transplant 7/24/2024, left submandibular bacterial infection.    Recommendations:  - Continue IV amp-sulbactam while inpatient  - On discharge, okay to transition to amox-clav 875-125 mg po bid (dosed per CrCl)  - Plan for 10 day course of antibiotics, last day 8/3/2024  - If submandibular swelling worsens, recommend CT neck with ENT consult

## 2024-07-26 NOTE — DISCHARGE SUMMARY
[facility] - Transplant Stepdown  Kidney Transplant  Discharge Summary    Patient Name: Joann Kenney  MRN: [id number]  Admission Date: 2024  Hospital Length of Stay: 2 days  Discharge Date and Time:  2024 11:48 AM  Attending Physician: Alphonso Mon MD   Discharging Provider: Gosia Mora PA-C  Primary Care Provider: Vijay Blake MD    HPI:   Ms. Kenney is a 63 y.o. female with ESRD secondary to diabetic nephropathy, solitary kidney.  PMH DM2, HTN, incontience (s/p bladder sling), CAD with stents (on ASA), atrial fibrillation, CVA (no residual deficits), ALEJA, anemia. She has been on the wait list for a kidney transplant at [email or web address] since 2020. Patient is currently on peritoneal dialysis started on 2020. Patient is dialyzing on cyclic peritoneal dialysis. Patient reports that she is tolerating dialysis well. She has a PD catheter. Denies peritonitis or exit site infections.     She now presents as primary candidate for a  donor kidney transplant. Pre-op labs and imaging to be reviewed prior to transplant. Of note, patient states she just started an oral antibiotic earlier this week for a salivary gland infection. Pt states salivary gland infection has been present approx 1 week. She notes mild yellow drainage. Will review with surgeon prior to transplant.             Procedure(s) (LRB):  TRANSPLANT, KIDNEY (Right)  REMOVAL, CATHETER, DIALYSIS, PERITONEAL (Left)     Hospital Course:    She is now s/p DBD kidney transplant on 24. Surgery without complications. 2 day suggs, no drains, PD cath removed. Making urine post op with renal clearance. Creatinine continues to trend down with good UOP. Suggs catheter removed with no issues voiding following removal. PT saw patient post op. She will receive homehealth PT. On day of discharge, patient feeling fine, tolerating diet. Incision intact with staples in place with small amount of SS drainage. She is stable and ready for discharge.  She will follow up with labs and transplant clinic appointment on Monday 7/29/24.  Patient was transfused 1 unit PRBC prior to discharge for 7.0/21.3     Of note, patient with salivary gland infection. Per ID, she will discharge on Augmentin for a 10 day antibiotic course, stop date 8/3/24. Should she develop submandibular swelling, recommend CT neck with ENT consult     Goals of Care Treatment Preferences:  Code Status: Full Code      Final Active Diagnoses:    Diagnosis Date Noted POA    PRINCIPAL PROBLEM:  S/P kidney transplant [Z94.0] 07/25/2024 Not Applicable    Prophylactic immunotherapy [Z29.89] 07/25/2024 Not Applicable    Long-term use of immunosuppressant medication [Z79.60] 07/25/2024 Not Applicable    Acute blood loss anemia [D62] 07/25/2024 No    Salivary gland infection [K11.20] 07/25/2024 Yes    Adrenal corticosteroid causing adverse effect in therapeutic use [T38.0X5A] 07/24/2024 No    Anemia in ESRD (end-stage renal disease) [N18.6, D63.1] 04/01/2021 Yes    Diabetes mellitus due to underlying condition with chronic kidney disease on chronic dialysis, with long-term current use of insulin [E08.22, N18.6, Z79.4, Z99.2] 04/01/2021 Not Applicable    Renovascular hypertension [I15.0] 04/01/2021 Yes    Class 1 obesity due to excess calories with serious comorbidity and body mass index (BMI) of 30.0 to 30.9 in adult [E66.09, Z68.30] 04/01/2021 Not Applicable    Coronary artery disease involving native coronary artery of native heart without angina pectoris [I25.10] 04/01/2021 Yes      Problems Resolved During this Admission:    Diagnosis Date Noted Date Resolved POA    ESRD on peritoneal dialysis [N18.6, Z99.2] 04/01/2021 07/25/2024 Not Applicable       Treatments: IV hydration    Consults (From admission, onward)          Status Ordering Provider     Inpatient consult to Midline team  Once        Provider:  (Not yet assigned)    Acknowledged SCOT WOLFE     Inpatient consult to Registered  Dietitian/Nutritionist  Once        Provider:  (Not yet assigned)    Completed EUGENIA ACEVES     Inpatient consult to Transplant Nephrology (KTM)  Once        Provider:  (Not yet assigned)    Acknowledged EUGENIA ACEVES     Inpatient consult to Infectious Diseases  Once        Provider:  (Not yet assigned)    Completed CRISTIANO SWEENEY     Inpatient consult to Endocrinology  Once        Provider:  (Not yet assigned)    Completed RICH SUÁREZ            Pending Diagnostic Studies:       Procedure Component Value Units Date/Time    Hepatitis B Surface Antibody, Qual/Quant [[id number]] Collected: 07/24/24 0317    Order Status: Sent Lab Status: In process Updated: 07/24/24 0330    Specimen: Blood     Tacrolimus level [[id number]] Collected: 07/26/24 0846    Order Status: Sent Lab Status: In process Updated: 07/26/24 1034    Specimen: Blood           Significant Diagnostic Studies: Labs: BMP:   Recent Labs   Lab 07/25/24  1832 07/26/24  0846 07/27/24  0552   * 123* 116*    139 140   K 4.2 4.2 3.5    108 108   CO2 20* 18* 21*   BUN 50* 55* 55*   CREATININE 6.8* 6.3* 5.7*   CALCIUM 10.0 9.9 8.9   MG  --  2.0 2.0   , CMP   Recent Labs   Lab 07/25/24  1832 07/26/24  0846 07/27/24  0552    139 140   K 4.2 4.2 3.5    108 108   CO2 20* 18* 21*   * 123* 116*   BUN 50* 55* 55*   CREATININE 6.8* 6.3* 5.7*   CALCIUM 10.0 9.9 8.9   ALBUMIN  --  2.1* 2.2*   ANIONGAP 14 13 11   , CBC   Recent Labs   Lab 07/26/24  0846 07/27/24  0552   WBC 16.66* 7.25   HGB 8.2* 7.0*   HCT 24.0* 21.3*   * 105*   , and All labs within the past 24 hours have been reviewed    Discharged Condition: stable    Disposition:     Follow Up:    Patient Instructions:      Ambulatory referral/consult to Home Health   Standing Status: Future   Referral Priority: Routine Referral Type: Home Health Care   Referral Reason: Specialty Services Required   Requested Specialty: Home Health Services   Number of Visits  "Requested: 1     Medications:  Reconciled Home Medications:      Medication List        START taking these medications      amoxicillin-clavulanate 875-125mg 875-125 mg per tablet  Commonly known as: AUGMENTIN  Take 1 tablet by mouth every 12 (twelve) hours. for 7 days     blood sugar diagnostic Strp  use 1 strip to check blood glucose 3 (three) times daily.     blood-glucose meter Misc  use as directed to check blood glucose     carvediloL 6.25 MG tablet  Commonly known as: COREG  Take 1 tablet (6.25 mg total) by mouth 2 (two) times daily.     insulin aspart U-100 100 unit/mL (3 mL) Inpn pen  Commonly known as: NovoLOG  Inject 0-10 Units into the skin 3 (three) times daily with meals. Max: 30 units per day     lancets Misc  1 lancet each to check blood glucose 3 (three) times daily.     multivitamin Tab  Take 1 tablet by mouth once daily.     mycophenolate 250 mg Cap  Commonly known as: CELLCEPT  Take 4 capsules (1,000 mg total) by mouth 2 (two) times daily.     ondansetron 4 MG Tbdl  Commonly known as: ZOFRAN-ODT  Dissolve 1 tablet (4 mg total) by mouth every 8 (eight) hours as needed.     oxybutynin 5 MG Tab  Commonly known as: DITROPAN  Take 1 tablet (5 mg total) by mouth 3 (three) times daily as needed (bladder spasms).     oxyCODONE 5 MG immediate release tablet  Commonly known as: ROXICODONE  Take 1 tablet (5 mg total) by mouth every 6 (six) hours as needed for Pain.     pen needle, diabetic 32 gauge x 5/32" Ndle  1 each for use with insulin pen 3 (three) times daily.     predniSONE 5 MG tablet  Commonly known as: DELTASONE  Take by mouth daily; 7/27/2024-8/2/2024: 20 mg, 8/3/2024-8/9/2024: 15 mg; 8/10/2024-8/16/2024: 10 mg; 8/17/2024- forever: 5 mg; do not stop     sodium bicarbonate 650 MG tablet  Take 1 tablet (650 mg total) by mouth 2 (two) times daily.     sulfamethoxazole-trimethoprim 400-80mg 400-80 mg per tablet  Commonly known as: BACTRIM,SEPTRA  Take 1 tablet by mouth every morning. Stop 1/21/25   "   tacrolimus 1 MG Cap  Commonly known as: PROGRAF  Take 5 capsules (5 mg total) by mouth every 12 (twelve) hours.     valGANciclovir 450 mg Tab  Commonly known as: VALCYTE  Take 1 tablet (450 mg total) by mouth 3 (three) times a week. Stop 10/23/24  Start taking on: July 29, 2024            CHANGE how you take these medications      cetirizine 5 MG tablet  Commonly known as: ZYRTEC  Take 1 tablet (5 mg total) by mouth once daily.  Start taking on: July 28, 2024  What changed:   medication strength  how much to take  when to take this     cinacalcet 60 MG Tab  Commonly known as: SENSIPAR  Take 1 tablet (60 mg total) by mouth daily with breakfast.  Start taking on: July 28, 2024  What changed:   medication strength  how much to take  how to take this  when to take this     pantoprazole 40 MG tablet  Commonly known as: PROTONIX  Take 1 tablet (40 mg total) by mouth once daily.  What changed: when to take this            CONTINUE taking these medications      ALPRAZolam 0.5 MG tablet  Commonly known as: XANAX  Take 2 mg by mouth every evening.     atorvastatin 40 MG tablet  Commonly known as: LIPITOR  Take 20 mg by mouth every evening.     DULoxetine 30 MG capsule  Commonly known as: CYMBALTA  Take 30 mg by mouth once daily.     ezetimibe 10 mg tablet  Commonly known as: ZETIA  Take 1 tablet (10 mg total) by mouth once daily.            STOP taking these medications      aspirin 81 MG EC tablet  Commonly known as: ECOTRIN     AURYXIA 210 mg iron Tab  Generic drug: ferric citrate     clindamycin 300 MG capsule  Commonly known as: CLEOCIN     furosemide 80 MG tablet  Commonly known as: LASIX     HUMIRA(CF) PEN 40 mg/0.4 mL Pnkt  Generic drug: adalimumab     labetaloL 200 MG tablet  Commonly known as: NORMODYNE     losartan 100 MG tablet  Commonly known as: COZAAR     minoxidiL 2.5 MG tablet  Commonly known as: LONITEN     NIFEdipine 60 MG (OSM) 24 hr tablet  Commonly known as: PROCARDIA-XL     sucralfate 100 mg/mL  suspension  Commonly known as: CARAFATE            Time spent caring for patient (Greater than 1/2 spent in direct face-to-face contact): > 30 minutes    Gosia Mora, PA-C  Kidney Transplant  [facility] - Transplant Stepdown

## 2024-07-26 NOTE — PLAN OF CARE
Recommendations  1. Continue Renal diet   2. If po intake declines <50% meal consumption, rec'd Novasource Renal BID   3. RD following     Goals: Meet % of EEN/EPN by RD f/u date  Nutrition Goal Status: progressing   Communication of RD Recs: POC

## 2024-07-26 NOTE — PROGRESS NOTES
Patient admitted for Kidney transplant.  Transplant coordinator met with the patient on rounds to introduce self and explain the coordinator role.     The post-transplant teaching book was given.   Transplant Coordinator explained that she will follow the patient while in the hospital and assist with discharge.     ESRD 2/2 DM  SCD, KDPI 85%, CIT >23 hrs  Thymo induction  CMV ++

## 2024-07-26 NOTE — PLAN OF CARE
Patient is AAOx4. OOB equipment assist/walker. VSS. /200 systolic, Coreg 3.125 mg administered. Tele NSR. Midline placed, 20g L FA, site CDI. Becerra dc'd this shift, making good urine. Cr trending down 6.3. C/o nausea, Zofran 4 mg IV push administered, full relief attained. 2 puncture sites to left abd, staples intact, cleaned with iodine. RLQ incision, staples intact, site cleaned with iodine. Thymo #3 infusing, pt tolerating well. Self meds 100%. Bed lowest setting, locked, 2x rails up, call light within reach, pt verbalized use.

## 2024-07-27 VITALS
HEART RATE: 61 BPM | HEIGHT: 65 IN | TEMPERATURE: 98 F | WEIGHT: 191.13 LBS | SYSTOLIC BLOOD PRESSURE: 200 MMHG | RESPIRATION RATE: 16 BRPM | DIASTOLIC BLOOD PRESSURE: 76 MMHG | OXYGEN SATURATION: 100 % | BODY MASS INDEX: 31.85 KG/M2

## 2024-07-27 LAB
ALBUMIN SERPL BCP-MCNC: 2.2 G/DL (ref 3.5–5.2)
ANION GAP SERPL CALC-SCNC: 11 MMOL/L (ref 8–16)
BACTERIA FLD AEROBE CULT: NO GROWTH
BASOPHILS # BLD AUTO: 0 K/UL (ref 0–0.2)
BASOPHILS NFR BLD: 0 % (ref 0–1.9)
BLD PROD TYP BPU: NORMAL
BLOOD UNIT EXPIRATION DATE: NORMAL
BLOOD UNIT TYPE CODE: 6200
BLOOD UNIT TYPE: NORMAL
BUN SERPL-MCNC: 55 MG/DL (ref 8–23)
CALCIUM SERPL-MCNC: 8.9 MG/DL (ref 8.7–10.5)
CHLORIDE SERPL-SCNC: 108 MMOL/L (ref 95–110)
CO2 SERPL-SCNC: 21 MMOL/L (ref 23–29)
CODING SYSTEM: NORMAL
CREAT SERPL-MCNC: 5.7 MG/DL (ref 0.5–1.4)
CROSSMATCH INTERPRETATION: NORMAL
DIFFERENTIAL METHOD BLD: ABNORMAL
DISPENSE STATUS: NORMAL
EOSINOPHIL # BLD AUTO: 0 K/UL (ref 0–0.5)
EOSINOPHIL NFR BLD: 0 % (ref 0–8)
ERYTHROCYTE [DISTWIDTH] IN BLOOD BY AUTOMATED COUNT: 12.8 % (ref 11.5–14.5)
EST. GFR  (NO RACE VARIABLE): 7.7 ML/MIN/1.73 M^2
GLUCOSE SERPL-MCNC: 116 MG/DL (ref 70–110)
GRAM STN SPEC: NORMAL
GRAM STN SPEC: NORMAL
HCT VFR BLD AUTO: 21.3 % (ref 37–48.5)
HGB BLD-MCNC: 7 G/DL (ref 12–16)
IMM GRANULOCYTES # BLD AUTO: 0.05 K/UL (ref 0–0.04)
IMM GRANULOCYTES NFR BLD AUTO: 0.7 % (ref 0–0.5)
LYMPHOCYTES # BLD AUTO: 0.1 K/UL (ref 1–4.8)
LYMPHOCYTES NFR BLD: 1 % (ref 18–48)
MAGNESIUM SERPL-MCNC: 2 MG/DL (ref 1.6–2.6)
MCH RBC QN AUTO: 32 PG (ref 27–31)
MCHC RBC AUTO-ENTMCNC: 32.9 G/DL (ref 32–36)
MCV RBC AUTO: 97 FL (ref 82–98)
MONOCYTES # BLD AUTO: 0.3 K/UL (ref 0.3–1)
MONOCYTES NFR BLD: 3.9 % (ref 4–15)
NEUTROPHILS # BLD AUTO: 6.9 K/UL (ref 1.8–7.7)
NEUTROPHILS NFR BLD: 94.4 % (ref 38–73)
NRBC BLD-RTO: 0 /100 WBC
NUM UNITS TRANS PACKED RBC: NORMAL
PHOSPHATE SERPL-MCNC: 5.7 MG/DL (ref 2.7–4.5)
PLATELET # BLD AUTO: 105 K/UL (ref 150–450)
PMV BLD AUTO: 12.4 FL (ref 9.2–12.9)
POCT GLUCOSE: 104 MG/DL (ref 70–110)
POCT GLUCOSE: 135 MG/DL (ref 70–110)
POCT GLUCOSE: 155 MG/DL (ref 70–110)
POTASSIUM SERPL-SCNC: 3.5 MMOL/L (ref 3.5–5.1)
RBC # BLD AUTO: 2.19 M/UL (ref 4–5.4)
SODIUM SERPL-SCNC: 140 MMOL/L (ref 136–145)
TACROLIMUS BLD-MCNC: 4.2 NG/ML (ref 5–15)
WBC # BLD AUTO: 7.25 K/UL (ref 3.9–12.7)

## 2024-07-27 PROCEDURE — 25000003 PHARM REV CODE 250: Performed by: PHYSICIAN ASSISTANT

## 2024-07-27 PROCEDURE — 99231 SBSQ HOSP IP/OBS SF/LOW 25: CPT | Mod: ,,, | Performed by: NURSE PRACTITIONER

## 2024-07-27 PROCEDURE — 97116 GAIT TRAINING THERAPY: CPT

## 2024-07-27 PROCEDURE — 83735 ASSAY OF MAGNESIUM: CPT | Performed by: PHYSICIAN ASSISTANT

## 2024-07-27 PROCEDURE — 63600175 PHARM REV CODE 636 W HCPCS: Performed by: INTERNAL MEDICINE

## 2024-07-27 PROCEDURE — 94761 N-INVAS EAR/PLS OXIMETRY MLT: CPT

## 2024-07-27 PROCEDURE — 1111F DSCHRG MED/CURRENT MED MERGE: CPT | Mod: CPTII,,, | Performed by: NURSE PRACTITIONER

## 2024-07-27 PROCEDURE — 63600175 PHARM REV CODE 636 W HCPCS

## 2024-07-27 PROCEDURE — P9016 RBC LEUKOCYTES REDUCED: HCPCS | Performed by: STUDENT IN AN ORGANIZED HEALTH CARE EDUCATION/TRAINING PROGRAM

## 2024-07-27 PROCEDURE — 80069 RENAL FUNCTION PANEL: CPT | Performed by: PHYSICIAN ASSISTANT

## 2024-07-27 PROCEDURE — 80197 ASSAY OF TACROLIMUS: CPT | Performed by: PHYSICIAN ASSISTANT

## 2024-07-27 PROCEDURE — 86920 COMPATIBILITY TEST SPIN: CPT | Performed by: STUDENT IN AN ORGANIZED HEALTH CARE EDUCATION/TRAINING PROGRAM

## 2024-07-27 PROCEDURE — 25000003 PHARM REV CODE 250

## 2024-07-27 PROCEDURE — 36415 COLL VENOUS BLD VENIPUNCTURE: CPT | Performed by: PHYSICIAN ASSISTANT

## 2024-07-27 PROCEDURE — 97161 PT EVAL LOW COMPLEX 20 MIN: CPT

## 2024-07-27 PROCEDURE — 36430 TRANSFUSION BLD/BLD COMPNT: CPT

## 2024-07-27 PROCEDURE — 85025 COMPLETE CBC W/AUTO DIFF WBC: CPT | Performed by: PHYSICIAN ASSISTANT

## 2024-07-27 PROCEDURE — 30233N1 TRANSFUSION OF NONAUTOLOGOUS RED BLOOD CELLS INTO PERIPHERAL VEIN, PERCUTANEOUS APPROACH: ICD-10-PCS | Performed by: SURGERY

## 2024-07-27 PROCEDURE — 99024 POSTOP FOLLOW-UP VISIT: CPT | Mod: ,,, | Performed by: NURSE PRACTITIONER

## 2024-07-27 PROCEDURE — 99900035 HC TECH TIME PER 15 MIN (STAT)

## 2024-07-27 RX ORDER — OXYBUTYNIN CHLORIDE 5 MG/1
5 TABLET ORAL 3 TIMES DAILY PRN
Qty: 12 TABLET | Refills: 0 | Status: ON HOLD | OUTPATIENT
Start: 2024-07-27 | End: 2025-07-27

## 2024-07-27 RX ORDER — CINACALCET 60 MG/1
60 TABLET, FILM COATED ORAL
Qty: 30 TABLET | Refills: 11 | Status: ON HOLD | OUTPATIENT
Start: 2024-07-28 | End: 2025-07-28

## 2024-07-27 RX ORDER — EZETIMIBE 10 MG/1
10 TABLET ORAL DAILY
Qty: 30 TABLET | Refills: 11 | Status: SHIPPED | OUTPATIENT
Start: 2024-07-27 | End: 2024-08-05 | Stop reason: SDUPTHER

## 2024-07-27 RX ORDER — LANCETS
1 EACH MISCELLANEOUS 3 TIMES DAILY
Qty: 100 EACH | Refills: 1 | Status: ON HOLD | OUTPATIENT
Start: 2024-07-27 | End: 2025-07-27

## 2024-07-27 RX ORDER — CETIRIZINE HYDROCHLORIDE 5 MG/1
5 TABLET ORAL DAILY
Qty: 30 TABLET | Refills: 11 | Status: ON HOLD | OUTPATIENT
Start: 2024-07-28 | End: 2025-07-28

## 2024-07-27 RX ORDER — AMOXICILLIN AND CLAVULANATE POTASSIUM 875; 125 MG/1; MG/1
1 TABLET, FILM COATED ORAL EVERY 12 HOURS
Qty: 14 TABLET | Refills: 0 | Status: ON HOLD | OUTPATIENT
Start: 2024-07-27 | End: 2024-08-04 | Stop reason: HOSPADM

## 2024-07-27 RX ORDER — DEXTROSE 4 G
TABLET,CHEWABLE ORAL
Qty: 1 EACH | Refills: 0 | Status: ON HOLD | OUTPATIENT
Start: 2024-07-27

## 2024-07-27 RX ORDER — ONDANSETRON 4 MG/1
4 TABLET, ORALLY DISINTEGRATING ORAL EVERY 8 HOURS PRN
Qty: 30 TABLET | Refills: 0 | Status: ON HOLD | OUTPATIENT
Start: 2024-07-27

## 2024-07-27 RX ORDER — AMOXICILLIN AND CLAVULANATE POTASSIUM 875; 125 MG/1; MG/1
1 TABLET, FILM COATED ORAL EVERY 12 HOURS
Qty: 14 TABLET | Refills: 0 | Status: SHIPPED | OUTPATIENT
Start: 2024-07-27 | End: 2024-07-27 | Stop reason: SDUPTHER

## 2024-07-27 RX ORDER — CARVEDILOL 6.25 MG/1
6.25 TABLET ORAL 2 TIMES DAILY
Qty: 180 TABLET | Refills: 3 | Status: SHIPPED | OUTPATIENT
Start: 2024-07-27 | End: 2024-07-29

## 2024-07-27 RX ORDER — HYDROCODONE BITARTRATE AND ACETAMINOPHEN 500; 5 MG/1; MG/1
TABLET ORAL
Status: DISCONTINUED | OUTPATIENT
Start: 2024-07-27 | End: 2024-07-27 | Stop reason: HOSPADM

## 2024-07-27 RX ORDER — CARVEDILOL 6.25 MG/1
6.25 TABLET ORAL 2 TIMES DAILY
Status: DISCONTINUED | OUTPATIENT
Start: 2024-07-27 | End: 2024-07-27 | Stop reason: HOSPADM

## 2024-07-27 RX ORDER — VALGANCICLOVIR 450 MG/1
450 TABLET, FILM COATED ORAL
Qty: 12 TABLET | Refills: 2 | Status: ON HOLD | OUTPATIENT
Start: 2024-07-29 | End: 2024-08-13

## 2024-07-27 RX ORDER — OXYCODONE HYDROCHLORIDE 5 MG/1
5 TABLET ORAL EVERY 6 HOURS PRN
Qty: 21 TABLET | Refills: 0 | Status: ON HOLD | OUTPATIENT
Start: 2024-07-27

## 2024-07-27 RX ORDER — PEN NEEDLE, DIABETIC 30 GX3/16"
1 NEEDLE, DISPOSABLE MISCELLANEOUS 3 TIMES DAILY
Qty: 100 EACH | Refills: 1 | Status: ON HOLD | OUTPATIENT
Start: 2024-07-27

## 2024-07-27 RX ORDER — SODIUM BICARBONATE 650 MG/1
650 TABLET ORAL 2 TIMES DAILY
Qty: 60 TABLET | Refills: 11 | Status: ON HOLD | OUTPATIENT
Start: 2024-07-27 | End: 2024-08-13

## 2024-07-27 RX ORDER — INSULIN ASPART 100 [IU]/ML
0-10 INJECTION, SOLUTION INTRAVENOUS; SUBCUTANEOUS
Qty: 6 ML | Refills: 0 | Status: ON HOLD | OUTPATIENT
Start: 2024-07-27 | End: 2024-08-04

## 2024-07-27 RX ORDER — TACROLIMUS 5 MG/1
5 CAPSULE ORAL 2 TIMES DAILY
Status: DISCONTINUED | OUTPATIENT
Start: 2024-07-27 | End: 2024-07-27 | Stop reason: HOSPADM

## 2024-07-27 RX ORDER — TACROLIMUS 1 MG/1
2 CAPSULE ORAL ONCE
Status: COMPLETED | OUTPATIENT
Start: 2024-07-27 | End: 2024-07-27

## 2024-07-27 RX ORDER — TACROLIMUS 1 MG/1
5 CAPSULE ORAL EVERY 12 HOURS
Qty: 300 CAPSULE | Refills: 11 | Status: SHIPPED | OUTPATIENT
Start: 2024-07-27 | End: 2024-07-28 | Stop reason: DRUGHIGH

## 2024-07-27 RX ADMIN — PREDNISONE 20 MG: 20 TABLET ORAL at 08:07

## 2024-07-27 RX ADMIN — CETIRIZINE HYDROCHLORIDE 5 MG: 5 TABLET, FILM COATED ORAL at 08:07

## 2024-07-27 RX ADMIN — MYCOPHENOLATE MOFETIL 1000 MG: 250 CAPSULE ORAL at 08:07

## 2024-07-27 RX ADMIN — AMOXICILLIN AND CLAVULANATE POTASSIUM 1 TABLET: 875; 125 TABLET, FILM COATED ORAL at 08:07

## 2024-07-27 RX ADMIN — SODIUM BICARBONATE 650 MG: 650 TABLET ORAL at 08:07

## 2024-07-27 RX ADMIN — TACROLIMUS 3 MG: 1 CAPSULE ORAL at 08:07

## 2024-07-27 RX ADMIN — PANTOPRAZOLE SODIUM 40 MG: 40 TABLET, DELAYED RELEASE ORAL at 08:07

## 2024-07-27 RX ADMIN — THERA TABS 1 TABLET: TAB at 08:07

## 2024-07-27 RX ADMIN — TACROLIMUS 2 MG: 1 CAPSULE ORAL at 10:07

## 2024-07-27 RX ADMIN — DOCUSATE SODIUM 100 MG: 100 CAPSULE, LIQUID FILLED ORAL at 08:07

## 2024-07-27 RX ADMIN — CINACALCET 60 MG: 30 TABLET, FILM COATED ORAL at 08:07

## 2024-07-27 RX ADMIN — HEPARIN SODIUM 5000 UNITS: 5000 INJECTION INTRAVENOUS; SUBCUTANEOUS at 05:07

## 2024-07-27 RX ADMIN — CARVEDILOL 3.12 MG: 3.12 TABLET, FILM COATED ORAL at 08:07

## 2024-07-27 RX ADMIN — MUPIROCIN 1 G: 20 OINTMENT TOPICAL at 08:07

## 2024-07-27 NOTE — PROGRESS NOTES
DISCHARGE NOTE:    Joann Kenney is a 63 y.o. female s/p   RIGHT KIDNEY     Donation after Brain Death   transplant on 7/24/2024 (Kidney) for ESRD secondary to Diabetes Mellitus - Type II.      Past Medical History:   Diagnosis Date    Anemia     Anxiety     Atrial fibrillation     Coronary artery disease     Depression     Diabetes mellitus, type 2     Disorder of kidney and ureter     Heart murmur     Hyperlipidemia     Hypertension     Obesity     ALEJA (obstructive sleep apnea)     Proteinuria     Solitary kidney     Stroke        Hospital Course:    64 yo F DDRT 7/24/24 - DM2   CMV +/+    cPRA 24%; PD (since 2020), normal UO; ESW: [nocPRA]  PMH: DM, HTN, CAD (on aspirin)   Discharging to apartments      Allergies: Review of patient's allergies indicates:  No Known Allergies    [facility]acy: [facility]    Discharge Medications:     Medication List        START taking these medications      amoxicillin-clavulanate 875-125mg 875-125 mg per tablet  Commonly known as: AUGMENTIN  Take 1 tablet by mouth every 12 (twelve) hours. for 7 days     blood sugar diagnostic Strp  use 1 strip to check blood glucose 3 (three) times daily.     blood-glucose meter Misc  use as directed to check blood glucose     carvediloL 6.25 MG tablet  Commonly known as: COREG  Take 1 tablet (6.25 mg total) by mouth 2 (two) times daily.     insulin aspart U-100 100 unit/mL (3 mL) Inpn pen  Commonly known as: NovoLOG  Inject 0-10 Units into the skin 3 (three) times daily with meals. Max: 30 units per day     lancets Misc  1 lancet each to check blood glucose 3 (three) times daily.     multivitamin Tab     mycophenolate 250 mg Cap  Commonly known as: CELLCEPT  Take 4 capsules (1,000 mg total) by mouth 2 (two) times daily.     ondansetron 4 MG Tbdl  Commonly known as: ZOFRAN-ODT  Dissolve 1 tablet (4 mg total) by mouth every 8 (eight) hours as needed.     oxybutynin 5 MG Tab  Commonly known as: DITROPAN  Take 1 tablet (5 mg total) by mouth 3 (three)  "times daily as needed (bladder spasms).     oxyCODONE 5 MG immediate release tablet  Commonly known as: ROXICODONE  Take 1 tablet (5 mg total) by mouth every 6 (six) hours as needed for Pain.     pen needle, diabetic 32 gauge x 5/32" Ndle  1 each for use with insulin pen 3 (three) times daily.     predniSONE 5 MG tablet  Commonly known as: DELTASONE  Take by mouth daily; 7/27/2024-8/2/2024: 20 mg, 8/3/2024-8/9/2024: 15 mg; 8/10/2024-8/16/2024: 10 mg; 8/17/2024- forever: 5 mg; do not stop     sodium bicarbonate 650 MG tablet  Take 1 tablet (650 mg total) by mouth 2 (two) times daily.     sulfamethoxazole-trimethoprim 400-80mg 400-80 mg per tablet  Commonly known as: BACTRIM,SEPTRA  Take 1 tablet by mouth every morning. Stop 1/21/25     tacrolimus 1 MG Cap  Commonly known as: PROGRAF  Take 5 capsules (5 mg total) by mouth every 12 (twelve) hours.     valGANciclovir 450 mg Tab  Commonly known as: VALCYTE  Take 1 tablet (450 mg total) by mouth 3 (three) times a week. Stop 10/23/24  Start taking on: July 29, 2024            CHANGE how you take these medications      cetirizine 5 MG tablet  Commonly known as: ZYRTEC  Take 1 tablet (5 mg total) by mouth once daily.  Start taking on: July 28, 2024  What changed:   medication strength  how much to take  when to take this     cinacalcet 60 MG Tab  Commonly known as: SENSIPAR  Take 1 tablet (60 mg total) by mouth daily with breakfast.  Start taking on: July 28, 2024  What changed:   medication strength  how much to take  how to take this  when to take this     pantoprazole 40 MG tablet  Commonly known as: PROTONIX  Take 1 tablet (40 mg total) by mouth once daily.  What changed: when to take this            CONTINUE taking these medications      ALPRAZolam 0.5 MG tablet  Commonly known as: XANAX     atorvastatin 40 MG tablet  Commonly known as: LIPITOR     DULoxetine 30 MG capsule  Commonly known as: CYMBALTA     ezetimibe 10 mg tablet  Commonly known as: ZETIA  Take 1 tablet " "(10 mg total) by mouth once daily.            STOP taking these medications      aspirin 81 MG EC tablet  Commonly known as: ECOTRIN     AURYXIA 210 mg iron Tab  Generic drug: ferric citrate     clindamycin 300 MG capsule  Commonly known as: CLEOCIN     furosemide 80 MG tablet  Commonly known as: LASIX     HUMIRA(CF) PEN 40 mg/0.4 mL Pnkt  Generic drug: adalimumab     labetaloL 200 MG tablet  Commonly known as: NORMODYNE     losartan 100 MG tablet  Commonly known as: COZAAR     minoxidiL 2.5 MG tablet  Commonly known as: LONITEN     NIFEdipine 60 MG (OSM) 24 hr tablet  Commonly known as: PROCARDIA-XL     sucralfate 100 mg/mL suspension  Commonly known as: CARAFATE               Where to Get Your Medications        These medications were sent to [facility] [place] [id number]      Hours: Always Open Phone: [phone]   amoxicillin-clavulanate 875-125mg 875-125 mg per tablet  blood sugar diagnostic Strp  blood-glucose meter Misc  carvediloL 6.25 MG tablet  cetirizine 5 MG tablet  cinacalcet 60 MG Tab  ezetimibe 10 mg tablet  insulin aspart U-100 100 unit/mL (3 mL) Inpn pen  lancets Misc  mycophenolate 250 mg Cap  ondansetron 4 MG Tbdl  oxybutynin 5 MG Tab  oxyCODONE 5 MG immediate release tablet  pantoprazole 40 MG tablet  pen needle, diabetic 32 gauge x 5/32" Ndle  predniSONE 5 MG tablet  sodium bicarbonate 650 MG tablet  sulfamethoxazole-trimethoprim 400-80mg 400-80 mg per tablet  tacrolimus 1 MG Cap  valGANciclovir 450 mg Tab          Pharmacy Interventions/Recommendations:  1) Transplant Immunosuppression: Induction Thymo, and maintenance FK, MMF, pred   # Immunosuppression   Tacrolimus 5 BID   Last FK 4.2 on 3/3; increased to 5 mg BID    Goal trough: 8-10 ng/mL  Cellcept (Mycophenolate) 1000 mg BID    Prednisone per taper      # Opportunistic Infection   PJP prophylaxis   Sulfamethoxazole/Trimethoprim SS Daily for 6 months total until 10/23/24  CMV prophylaxis   CMV +/+ " (Moderate Risk) valganciclovir  450 mg THREE TIMES WEEKLY for 3 months until 10/23/24  PLEASE increase to 450 mg daily when crcl > 40 mL/min      # Sallivary gland infection    1. Previously on abx and continued post transplant. Discharging with augmentin BID until 8/3/24. Was on augmentin daily inpatient but kidney function improving so increasing to BID at discharge. Please fu with renal function and adjust as needed.     #HTN    1. Patient previously on 4 drug regimen for BP. Low BP inpt and all were held originally. Discharging with only coreg 6.25 mg BID.     # Depression    1. Home cymbalta 30 mg daily     #CAD     1. Home aspirin 81 mg daily (CURRENTLY HELD)    2. Atorvastatin 20 mg daily    3. Ezetimibe 10 mg daily     #Hyperglycemia    1. Discharging with moderate sliding scale insuline 2 units for every 50 over 150.     # Humira    1. Patient was previously on humira. Unclear indication. Reumatologist was also unclear of indication. Discussed briefly with patient and stated that she used to be extremely itchy all the time (likely 2/2 kidney disease) and she believes she was started on humira for that. Given unclear indication, likely okay to continue to hold post-transplant.     # Maintenance   Sodium bicarb 1300 mg BID   Cinacalcet 60 mg daily   Significant GERD, discharging with protonix             2) Opportunistic Infection prophylaxis: see above     3) Osteoporosis Prevention measures (liver txp): NA    4) Patient Counseling/Education: Demonstrated the use of the BP cuff, thermometer.    5) Follow-Up/Discharge Needs:  see above    6) Patient Assistance Information: none     7) The following medications have been placed on HOLD and should be restarted in the outpatient setting (when appropriate): aspirin 81 mg daily (history of CAD)     Joann and her caregiver verbalized their understanding and had the opportunity to ask questions.

## 2024-07-27 NOTE — PLAN OF CARE
Problem: Adult Inpatient Plan of Care  Goal: Plan of Care Review  Outcome: Progressing     Problem: Adult Inpatient Plan of Care  Goal: Optimal Comfort and Wellbeing  Outcome: Progressing     Problem: Adult Inpatient Plan of Care  Goal: Readiness for Transition of Care  Outcome: Progressing   VSS. Complains of incisional pain, refuses pain meds. Incision ULISES and painted with betadine. Thymo infusion completed, no side effects present. Urine output adequate. Patient ambulating independently. No acute changes overnight. Family at bedside. Call light within reach.

## 2024-07-27 NOTE — CARE UPDATE
Care Update:     No acute events overnight. Patient in room 68767/87004 A. Blood glucose stable on current inpatient regimen.No hypoglycemia noted over the past 24-hours. Endocrine will continue to follow and manage insulin orders inpatient.  3 Days Post-Op    Steroid use- Prednisone 20 mg.   Renal function-   Lab Results   Component Value Date    CREATININE 6.3 (H) 07/26/2024        Diet Renal     POCT Glucose   Date Value Ref Range Status   07/27/2024 135 (H) 70 - 110 mg/dL Final   07/26/2024 155 (H) 70 - 110 mg/dL Final   07/26/2024 220 (H) 70 - 110 mg/dL Final   07/26/2024 176 (H) 70 - 110 mg/dL Final   07/26/2024 146 (H) 70 - 110 mg/dL Final   07/26/2024 136 (H) 70 - 110 mg/dL Final   07/26/2024 139 (H) 70 - 110 mg/dL Final   07/26/2024 174 (H) 70 - 110 mg/dL Final   07/25/2024 203 (H) 70 - 110 mg/dL Final   07/25/2024 200 (H) 70 - 110 mg/dL Final   07/25/2024 151 (H) 70 - 110 mg/dL Final   07/25/2024 156 (H) 70 - 110 mg/dL Final   07/25/2024 161 (H) 70 - 110 mg/dL Final   07/25/2024 163 (H) 70 - 110 mg/dL Final   07/24/2024 123 (H) 70 - 110 mg/dL Final   07/24/2024 98 70 - 110 mg/dL Final   07/24/2024 87 70 - 110 mg/dL Final     Lab Results   Component Value Date    HGBA1C 4.8 07/24/2024    HGBA1C 4.8 07/24/2024     Endocrine  Diabetes mellitus due to underlying condition with chronic kidney disease on chronic dialysis, with long-term current use of insulin  Endocrinology consulted for BG management.   BG goal 140-180     - Novolog (Insulin Aspart) prn for BG excursions [facility] SSI (150/25)  - BG checks q4hr  - Hypoglycemia protocol in place     ____________________________________________  - D/C on Novolog [facility] SSI (150/25)  - BD pen needles  - Glucometer  - Send logs in 3 days      Quincy Lyles DNP, FNP-C  Department of Endocrinology  Inpatient Glycemic Management     
Detail Level: Detailed
Quality 130: Documentation Of Current Medications In The Medical Record: Current Medications Documented

## 2024-07-27 NOTE — PLAN OF CARE
CHW met with patient/family at bedside. Patient experience rounding completed and reviewed the following.     Do you know your discharge plan? Yes or No,    If yes, what is the plan? (Home, Home Health, Rehab, SNF, LTAC, or Other)  Yes Home    Have you discussed your needs and preferences with your SW/CM? Yes or No  Yes Home    If you are discharging home, do you have help at home? Yes or No Yes     Do you think you will need help additional at home at discharge? Yes or No  No    Do you currently have difficulty keeping up with bills, affording medicine or buying food? Yes or No No     Assigned SW/CM notified of any patient/family needs or concerns. Appropriate resources provided to address patient's needs.  ANUEL Quintana CM  Case Management  [phone]

## 2024-07-27 NOTE — PROGRESS NOTES
Patient is AAOx4. OOB independently. VSS. Tele dc'd. H/H 7.0/21.3, administered 1 unit pRBC, pt tolerated well. PIV removed. Team sign off, she is ready for discharge. Pharmacy delivered home medications and updated blue card. Discharge papers printed and discussed, pt has no further questions. Transport assisted pt and her daughter off unit at this time.

## 2024-07-27 NOTE — PLAN OF CARE
Patient does not require acute PT services at this time due to being at (I) PLOF and demonstrated safety with functional mobility, including transfers and ambulation. No goals set    Problem: Physical Therapy  Goal: Physical Therapy Goal  Outcome: Met   7/27/2024

## 2024-07-27 NOTE — PT/OT/SLP EVAL
Physical Therapy Evaluation and Discharge Note    Patient Name:  Joann Kenney   MRN:  [id number]    Recommendations:     Discharge Recommendations: Low Intensity Therapy  Discharge Equipment Recommendations: none   Barriers to discharge: None    Assessment:     Joann Kenney is a 63 y.o. female admitted with a medical diagnosis of S/P kidney transplant. .  At this time, patient is functioning at their prior level of function and does not require further acute PT services.     Recent Surgery: Procedure(s) (LRB):  TRANSPLANT, KIDNEY (Right)  REMOVAL, CATHETER, DIALYSIS, PERITONEAL (Left) 3 Days Post-Op    Plan:     During this hospitalization, patient does not require further acute PT services.  Please re-consult if situation changes.      Subjective     Chief Complaint: discomfort around incision site  Patient/Family Comments/goals: To go home  Pain/Comfort:  Pain Rating 1:  (pt did not rate)  Location - Orientation 1: generalized  Location 1: abdomen  Pain Addressed 1: Reposition, Distraction  Pain Rating Post-Intervention 1: 0/10    Patients cultural, spiritual, Yarsani conflicts given the current situation: no    Patient History:     Living Environment: Pt lives alone in [facility] with 1 FIDEL. Bathroom: walk-in shower   Prior Level of Function: Mod I with rollator occasionally  DME owned: rollator, SPC  Caregiver Assistance: daughter    Objective:     Communicated with RN prior to session.  Patient found HOB elevated with telemetry upon PT entry to room.    General Precautions: Standard, fall    Orthopedic Precautions:N/A   Braces: N/A  Respiratory Status: Room air    Exams:  Gross Motor Coordination:  WFL  Sensation:    -       Intact  RLE ROM: WFL  RLE Strength: WFL  LLE ROM: WFL  LLE Strength: WFL    Functional Mobility:    Bed Mobility:   Supine > Sit: supervision    Transfers:   Sit <> Stand Transfer: supervision from EOB without AD  Toilet Transfer: independence without AD    Balance:   Sitting balance: GOOD-:  Incosistently Maintains balance through MODERATE excursions of active trunk movement,     Standing balance:   FAIR: Maintains without assist but unable to take challenges  GOOD: Needs SUPERVISION only during gait and able to self right with moderate LOB                 Gait:  Distance: 15 ft to bathroom with no AD then >400 ft out in olivia with rollator  Assistive Device: rollator for amb in olivia  Assistance Level: supervision  Gait Assessment: Pt amb with decreased vinny and decreased step length but no LOB or c/o increased pain      AM-PAC 6 CLICK MOBILITY  Total Score:23       Treatment and Education:  Pt educated on tip to reduce fall risk and safety with mobility and using call button for assistance from nursing staff with OOB mobility.  Pt educated on sitting up in chair throughout most of day  Pt educated on amb 2-3x per day with assistance from staff and increased movement during hospital stay.  All questions answered within the scope of PT.  White board updated accordingly.    AM-PAC 6 CLICK MOBILITY  Total Score:23     Patient left up in chair with all lines intact, call button in reach, and daughter present.    GOALS:   Multidisciplinary Problems       Physical Therapy Goals       Not on file              Multidisciplinary Problems (Resolved)          Problem: Physical Therapy    Goal Priority Disciplines Outcome Goal Variances Interventions   Physical Therapy Goal   (Resolved)     PT, PT/OT Met                         History:     Past Medical History:   Diagnosis Date    Anemia     Anxiety     Atrial fibrillation     Coronary artery disease     Depression     Diabetes mellitus, type 2     Disorder of kidney and ureter     Heart murmur     Hyperlipidemia     Hypertension     Obesity     ALEJA (obstructive sleep apnea)     Proteinuria     Solitary kidney     Stroke        Past Surgical History:   Procedure Laterality Date    CORONARY ANGIOPLASTY WITH STENT PLACEMENT      HYSTERECTOMY      INCONTINENCE  SURGERY      INSERTION OF IMPLANTABLE LOOP RECORDER      internal heart monitor      KIDNEY TRANSPLANT Right 7/24/2024    Procedure: TRANSPLANT, KIDNEY;  Surgeon: Alphonso Mon MD;  Location: [facility] OR [address] [place] [address];  Service: Transplant;  Laterality: Right;    PERITONEAL CATHETER INSERTION      PERITONEAL CATHETER REMOVAL Left 7/24/2024    Procedure: REMOVAL, CATHETER, DIALYSIS, PERITONEAL;  Surgeon: Alphonso Mon MD;  Location: [facility] OR [address] [place] [address];  Service: Transplant;  Laterality: Left;       Time Tracking:     PT Received On: 07/27/24  PT Start Time: 0824     PT Stop Time: 0840  PT Total Time (min): 16 min     Billable Minutes: Evaluation 8 and Gait Training 8      07/27/2024

## 2024-07-28 ENCOUNTER — HOSPITAL ENCOUNTER (EMERGENCY)
Facility: HOSPITAL | Age: 64
Discharge: HOME | End: 2024-07-28
Attending: STUDENT IN AN ORGANIZED HEALTH CARE EDUCATION/TRAINING PROGRAM
Payer: MEDICARE

## 2024-07-28 ENCOUNTER — PATIENT MESSAGE (OUTPATIENT)
Dept: ENDOCRINOLOGY | Facility: HOSPITAL | Age: 64
End: 2024-07-28
Payer: MEDICARE

## 2024-07-28 ENCOUNTER — TELEPHONE (OUTPATIENT)
Dept: ENDOCRINOLOGY | Facility: HOSPITAL | Age: 64
End: 2024-07-28

## 2024-07-28 ENCOUNTER — LAB VISIT (OUTPATIENT)
Dept: LAB | Facility: HOSPITAL | Age: 64
End: 2024-07-28
Attending: INTERNAL MEDICINE
Payer: MEDICARE

## 2024-07-28 VITALS
DIASTOLIC BLOOD PRESSURE: 66 MMHG | WEIGHT: 180 LBS | HEART RATE: 71 BPM | BODY MASS INDEX: 29.99 KG/M2 | TEMPERATURE: 99 F | HEIGHT: 65 IN | SYSTOLIC BLOOD PRESSURE: 152 MMHG | RESPIRATION RATE: 16 BRPM | OXYGEN SATURATION: 100 %

## 2024-07-28 DIAGNOSIS — Z94.0 KIDNEY REPLACED BY TRANSPLANT: Primary | ICD-10-CM

## 2024-07-28 DIAGNOSIS — E44.1 MALNUTRITION OF MILD DEGREE: ICD-10-CM

## 2024-07-28 DIAGNOSIS — N18.9 ANEMIA OF RENAL DISEASE: ICD-10-CM

## 2024-07-28 DIAGNOSIS — E43 SEVERE PROTEIN-CALORIE MALNUTRITION: ICD-10-CM

## 2024-07-28 DIAGNOSIS — N18.9 ANEMIA OF RENAL DISEASE: Primary | ICD-10-CM

## 2024-07-28 DIAGNOSIS — Z94.0 S/P KIDNEY TRANSPLANT: ICD-10-CM

## 2024-07-28 DIAGNOSIS — D63.1 ANEMIA OF RENAL DISEASE: Primary | ICD-10-CM

## 2024-07-28 DIAGNOSIS — I10 ASYMPTOMATIC HYPERTENSION: Primary | ICD-10-CM

## 2024-07-28 DIAGNOSIS — Z94.0 KIDNEY REPLACED BY TRANSPLANT: ICD-10-CM

## 2024-07-28 DIAGNOSIS — D63.1 ANEMIA OF RENAL DISEASE: ICD-10-CM

## 2024-07-28 PROBLEM — Z94.9 HYPERTENSION ASSOCIATED WITH TRANSPLANTATION: Status: ACTIVE | Noted: 2024-07-28

## 2024-07-28 PROBLEM — I15.8 HYPERTENSION ASSOCIATED WITH TRANSPLANTATION: Status: ACTIVE | Noted: 2024-07-28

## 2024-07-28 LAB
ALBUMIN SERPL BCP-MCNC: 2.4 G/DL (ref 3.5–5.2)
ANION GAP SERPL CALC-SCNC: 11 MMOL/L (ref 8–16)
BASOPHILS # BLD AUTO: 0 K/UL (ref 0–0.2)
BASOPHILS NFR BLD: 0 % (ref 0–1.9)
BUN SERPL-MCNC: 52 MG/DL (ref 8–23)
CALCIUM SERPL-MCNC: 8.9 MG/DL (ref 8.7–10.5)
CHLORIDE SERPL-SCNC: 108 MMOL/L (ref 95–110)
CO2 SERPL-SCNC: 23 MMOL/L (ref 23–29)
CREAT SERPL-MCNC: 4.3 MG/DL (ref 0.5–1.4)
DIFFERENTIAL METHOD BLD: ABNORMAL
EOSINOPHIL # BLD AUTO: 0.1 K/UL (ref 0–0.5)
EOSINOPHIL NFR BLD: 1.3 % (ref 0–8)
ERYTHROCYTE [DISTWIDTH] IN BLOOD BY AUTOMATED COUNT: 14.9 % (ref 11.5–14.5)
EST. GFR  (NO RACE VARIABLE): 10.9 ML/MIN/1.73 M^2
GLUCOSE SERPL-MCNC: 102 MG/DL (ref 70–110)
HCT VFR BLD AUTO: 23.4 % (ref 37–48.5)
HGB BLD-MCNC: 7.6 G/DL (ref 12–16)
IMM GRANULOCYTES # BLD AUTO: 0.09 K/UL (ref 0–0.04)
IMM GRANULOCYTES NFR BLD AUTO: 1.4 % (ref 0–0.5)
LYMPHOCYTES # BLD AUTO: 0.2 K/UL (ref 1–4.8)
LYMPHOCYTES NFR BLD: 2.7 % (ref 18–48)
MAGNESIUM SERPL-MCNC: 1.9 MG/DL (ref 1.6–2.6)
MCH RBC QN AUTO: 29.9 PG (ref 27–31)
MCHC RBC AUTO-ENTMCNC: 32.5 G/DL (ref 32–36)
MCV RBC AUTO: 92 FL (ref 82–98)
MONOCYTES # BLD AUTO: 0.2 K/UL (ref 0.3–1)
MONOCYTES NFR BLD: 3.6 % (ref 4–15)
NEUTROPHILS # BLD AUTO: 5.8 K/UL (ref 1.8–7.7)
NEUTROPHILS NFR BLD: 91 % (ref 38–73)
NRBC BLD-RTO: 1 /100 WBC
PHOSPHATE SERPL-MCNC: 3.1 MG/DL (ref 2.7–4.5)
PLATELET # BLD AUTO: 78 K/UL (ref 150–450)
PMV BLD AUTO: 11.5 FL (ref 9.2–12.9)
POTASSIUM SERPL-SCNC: 3.2 MMOL/L (ref 3.5–5.1)
RBC # BLD AUTO: 2.54 M/UL (ref 4–5.4)
SODIUM SERPL-SCNC: 142 MMOL/L (ref 136–145)
TACROLIMUS BLD-MCNC: 5 NG/ML (ref 5–15)
WBC # BLD AUTO: 6.34 K/UL (ref 3.9–12.7)

## 2024-07-28 PROCEDURE — 99283 EMERGENCY DEPT VISIT LOW MDM: CPT

## 2024-07-28 PROCEDURE — 80069 RENAL FUNCTION PANEL: CPT | Performed by: INTERNAL MEDICINE

## 2024-07-28 PROCEDURE — 83735 ASSAY OF MAGNESIUM: CPT | Performed by: INTERNAL MEDICINE

## 2024-07-28 PROCEDURE — 25000003 PHARM REV CODE 250

## 2024-07-28 PROCEDURE — 80197 ASSAY OF TACROLIMUS: CPT | Performed by: INTERNAL MEDICINE

## 2024-07-28 PROCEDURE — 36415 COLL VENOUS BLD VENIPUNCTURE: CPT | Performed by: INTERNAL MEDICINE

## 2024-07-28 PROCEDURE — 85025 COMPLETE CBC W/AUTO DIFF WBC: CPT | Performed by: INTERNAL MEDICINE

## 2024-07-28 RX ORDER — CARVEDILOL 3.12 MG/1
6.25 TABLET ORAL
Status: DISCONTINUED | OUTPATIENT
Start: 2024-07-28 | End: 2024-07-28 | Stop reason: HOSPADM

## 2024-07-28 RX ORDER — CARVEDILOL 3.12 MG/1
6.25 TABLET ORAL ONCE
Status: DISCONTINUED | OUTPATIENT
Start: 2024-07-28 | End: 2024-07-28 | Stop reason: HOSPADM

## 2024-07-28 RX ORDER — TACROLIMUS 1 MG/1
6 CAPSULE ORAL EVERY 12 HOURS
Qty: 360 CAPSULE | Refills: 11 | Status: ON HOLD | OUTPATIENT
Start: 2024-07-28 | End: 2025-07-28

## 2024-07-28 RX ADMIN — POTASSIUM BICARBONATE 40 MEQ: 391 TABLET, EFFERVESCENT ORAL at 10:07

## 2024-07-28 NOTE — TELEPHONE ENCOUNTER
Patient needs a follow-up appointment in the discharge clinic in 1-2 weeks.    Lab Results   Component Value Date    HGBA1C 4.8 07/24/2024    HGBA1C 4.8 07/24/2024       POCT Glucose   Date Value Ref Range Status   07/27/2024 104 70 - 110 mg/dL Final   07/27/2024 135 (H) 70 - 110 mg/dL Final   07/26/2024 155 (H) 70 - 110 mg/dL Final   07/26/2024 220 (H) 70 - 110 mg/dL Final   07/26/2024 176 (H) 70 - 110 mg/dL Final   07/26/2024 146 (H) 70 - 110 mg/dL Final   07/26/2024 136 (H) 70 - 110 mg/dL Final   07/26/2024 139 (H) 70 - 110 mg/dL Final   07/26/2024 174 (H) 70 - 110 mg/dL Final   07/25/2024 203 (H) 70 - 110 mg/dL Final   07/25/2024 200 (H) 70 - 110 mg/dL Final   07/25/2024 151 (H) 70 - 110 mg/dL Final   07/25/2024 156 (H) 70 - 110 mg/dL Final       Diabetes Medications               insulin aspart U-100 (NOVOLOG) 100 unit/mL (3 mL) InPn pen Inject 0-10 Units into the skin 3 (three) times daily with meals. Max: 30 units per day            Thanks,    Quincy Lyles DNP, FNP-C  Department of Endocrinology  Inpatient Glycemic Management

## 2024-07-28 NOTE — PROGRESS NOTES
More water intake. High K diet   Check iron panel, ferritin, B12, folic acid with next lab   Tac to 6 mg BID

## 2024-07-28 NOTE — TELEPHONE ENCOUNTER
Patient and daughter Sarah repeated back and voice a understanding of orders.  High K diet reviewed.  Schedule for labs tomorrow 7/29.  ----- Message from Elena Wise MD sent at 7/28/2024  2:29 PM CDT -----  More water intake. High K diet   Check iron panel, ferritin, B12, folic acid with next lab   Tac to 6 mg BID

## 2024-07-29 ENCOUNTER — HOSPITAL ENCOUNTER (INPATIENT)
Facility: HOSPITAL | Age: 64
LOS: 6 days | Discharge: HOME | DRG: 812 | End: 2024-08-04
Attending: TRANSPLANT SURGERY | Admitting: TRANSPLANT SURGERY
Payer: MEDICARE

## 2024-07-29 ENCOUNTER — CLINICAL SUPPORT (OUTPATIENT)
Dept: TRANSPLANT | Facility: CLINIC | Age: 64
End: 2024-07-29
Payer: MEDICARE

## 2024-07-29 ENCOUNTER — TELEPHONE (OUTPATIENT)
Dept: TRANSPLANT | Facility: CLINIC | Age: 64
End: 2024-07-29

## 2024-07-29 VITALS
WEIGHT: 190.69 LBS | RESPIRATION RATE: 16 BRPM | HEIGHT: 65 IN | DIASTOLIC BLOOD PRESSURE: 84 MMHG | SYSTOLIC BLOOD PRESSURE: 182 MMHG | BODY MASS INDEX: 31.77 KG/M2 | OXYGEN SATURATION: 100 % | HEIGHT: 65 IN | HEART RATE: 107 BPM | RESPIRATION RATE: 16 BRPM | DIASTOLIC BLOOD PRESSURE: 84 MMHG | HEART RATE: 107 BPM | WEIGHT: 190.69 LBS | SYSTOLIC BLOOD PRESSURE: 182 MMHG | BODY MASS INDEX: 31.77 KG/M2 | TEMPERATURE: 98 F | OXYGEN SATURATION: 100 % | TEMPERATURE: 98 F

## 2024-07-29 DIAGNOSIS — K11.20 SALIVARY GLAND INFECTION: ICD-10-CM

## 2024-07-29 DIAGNOSIS — Z79.60 LONG-TERM USE OF IMMUNOSUPPRESSANT MEDICATION: ICD-10-CM

## 2024-07-29 DIAGNOSIS — Z94.0 S/P KIDNEY TRANSPLANT: ICD-10-CM

## 2024-07-29 DIAGNOSIS — T38.0X5A ADRENAL CORTICOSTEROID CAUSING ADVERSE EFFECT IN THERAPEUTIC USE: ICD-10-CM

## 2024-07-29 DIAGNOSIS — I12.0 TYPE 2 DM WITH HYPERTENSION AND ESRD ON DIALYSIS: ICD-10-CM

## 2024-07-29 DIAGNOSIS — Z79.4 DIABETES MELLITUS DUE TO UNDERLYING CONDITION WITH CHRONIC KIDNEY DISEASE ON CHRONIC DIALYSIS, WITH LONG-TERM CURRENT USE OF INSULIN: ICD-10-CM

## 2024-07-29 DIAGNOSIS — Z99.2 DIABETES MELLITUS DUE TO UNDERLYING CONDITION WITH CHRONIC KIDNEY DISEASE ON CHRONIC DIALYSIS, WITH LONG-TERM CURRENT USE OF INSULIN: ICD-10-CM

## 2024-07-29 DIAGNOSIS — Z99.2 TYPE 2 DM WITH HYPERTENSION AND ESRD ON DIALYSIS: ICD-10-CM

## 2024-07-29 DIAGNOSIS — E08.22 DIABETES MELLITUS DUE TO UNDERLYING CONDITION WITH CHRONIC KIDNEY DISEASE ON CHRONIC DIALYSIS, WITH LONG-TERM CURRENT USE OF INSULIN: ICD-10-CM

## 2024-07-29 DIAGNOSIS — Z94.0 KIDNEY REPLACED BY TRANSPLANT: Primary | ICD-10-CM

## 2024-07-29 DIAGNOSIS — D63.1 ANEMIA IN ESRD (END-STAGE RENAL DISEASE): ICD-10-CM

## 2024-07-29 DIAGNOSIS — N18.6 DIABETES MELLITUS DUE TO UNDERLYING CONDITION WITH CHRONIC KIDNEY DISEASE ON CHRONIC DIALYSIS, WITH LONG-TERM CURRENT USE OF INSULIN: ICD-10-CM

## 2024-07-29 DIAGNOSIS — I15.0 RENOVASCULAR HYPERTENSION: ICD-10-CM

## 2024-07-29 DIAGNOSIS — Z94.0 S/P KIDNEY TRANSPLANT: Primary | ICD-10-CM

## 2024-07-29 DIAGNOSIS — Z29.89 PROPHYLACTIC IMMUNOTHERAPY: ICD-10-CM

## 2024-07-29 DIAGNOSIS — N18.6 ANEMIA IN ESRD (END-STAGE RENAL DISEASE): ICD-10-CM

## 2024-07-29 DIAGNOSIS — D62 ACUTE BLOOD LOSS ANEMIA: Primary | ICD-10-CM

## 2024-07-29 DIAGNOSIS — F41.9 ANXIETY: ICD-10-CM

## 2024-07-29 DIAGNOSIS — E11.22 TYPE 2 DM WITH HYPERTENSION AND ESRD ON DIALYSIS: ICD-10-CM

## 2024-07-29 DIAGNOSIS — N18.6 TYPE 2 DM WITH HYPERTENSION AND ESRD ON DIALYSIS: ICD-10-CM

## 2024-07-29 LAB
ABO + RH BLD: NORMAL
ALBUMIN SERPL BCP-MCNC: 2.7 G/DL (ref 3.5–5.2)
ANION GAP SERPL CALC-SCNC: 9 MMOL/L (ref 8–16)
B CELL RESULTS - XM ALLO: NEGATIVE
B CELL RESULTS - XM ALLO: NEGATIVE
B CELL RESULTS - XM AUTO: NEGATIVE
B MCS AVERAGE - XM ALLO: -28.3
B MCS AVERAGE - XM ALLO: -38.1
B MCS AVERAGE - XM AUTO: -10.7
BASOPHILS # BLD AUTO: 0.01 K/UL (ref 0–0.2)
BASOPHILS # BLD AUTO: 0.01 K/UL (ref 0–0.2)
BASOPHILS NFR BLD: 0.1 % (ref 0–1.9)
BASOPHILS NFR BLD: 0.1 % (ref 0–1.9)
BLD GP AB SCN CELLS X3 SERPL QL: NORMAL
BLD PROD TYP BPU: NORMAL
BLOOD UNIT EXPIRATION DATE: NORMAL
BLOOD UNIT TYPE CODE: 6200
BLOOD UNIT TYPE: NORMAL
BUN SERPL-MCNC: 50 MG/DL (ref 8–23)
CALCIUM SERPL-MCNC: 8.5 MG/DL (ref 8.7–10.5)
CHLORIDE SERPL-SCNC: 106 MMOL/L (ref 95–110)
CO2 SERPL-SCNC: 25 MMOL/L (ref 23–29)
CODING SYSTEM: NORMAL
CREAT SERPL-MCNC: 3.4 MG/DL (ref 0.5–1.4)
CROSSMATCH INTERPRETATION: NORMAL
DIFFERENTIAL METHOD BLD: ABNORMAL
DIFFERENTIAL METHOD BLD: ABNORMAL
DISPENSE STATUS: NORMAL
DONOR MRN: NORMAL
DONOR MRN: NORMAL
EOSINOPHIL # BLD AUTO: 0 K/UL (ref 0–0.5)
EOSINOPHIL # BLD AUTO: 0 K/UL (ref 0–0.5)
EOSINOPHIL NFR BLD: 0.1 % (ref 0–8)
EOSINOPHIL NFR BLD: 0.2 % (ref 0–8)
ERYTHROCYTE [DISTWIDTH] IN BLOOD BY AUTOMATED COUNT: 14.5 % (ref 11.5–14.5)
ERYTHROCYTE [DISTWIDTH] IN BLOOD BY AUTOMATED COUNT: 15.4 % (ref 11.5–14.5)
EST. GFR  (NO RACE VARIABLE): 14.4 ML/MIN/1.73 M^2
FXMAL TESTING DATE: NORMAL
FXMAL TESTING DATE: NORMAL
FXMAU TESTING DATE: NORMAL
GLUCOSE SERPL-MCNC: 165 MG/DL (ref 70–110)
HCT VFR BLD AUTO: 21.9 % (ref 37–48.5)
HCT VFR BLD AUTO: 24.2 % (ref 37–48.5)
HGB BLD-MCNC: 7.3 G/DL (ref 12–16)
HGB BLD-MCNC: 8.1 G/DL (ref 12–16)
HLA FLOW CROSSMATCH (ALLO) INTERPRETATION: NORMAL
HPRA INTERPRETATION: NORMAL
IMM GRANULOCYTES # BLD AUTO: 0.16 K/UL (ref 0–0.04)
IMM GRANULOCYTES # BLD AUTO: 0.21 K/UL (ref 0–0.04)
IMM GRANULOCYTES NFR BLD AUTO: 1.8 % (ref 0–0.5)
IMM GRANULOCYTES NFR BLD AUTO: 2.1 % (ref 0–0.5)
LYMPHOCYTES # BLD AUTO: 0.1 K/UL (ref 1–4.8)
LYMPHOCYTES # BLD AUTO: 0.1 K/UL (ref 1–4.8)
LYMPHOCYTES NFR BLD: 0.8 % (ref 18–48)
LYMPHOCYTES NFR BLD: 1.4 % (ref 18–48)
MAGNESIUM SERPL-MCNC: 1.8 MG/DL (ref 1.6–2.6)
MCH RBC QN AUTO: 29.9 PG (ref 27–31)
MCH RBC QN AUTO: 29.9 PG (ref 27–31)
MCHC RBC AUTO-ENTMCNC: 33.3 G/DL (ref 32–36)
MCHC RBC AUTO-ENTMCNC: 33.5 G/DL (ref 32–36)
MCV RBC AUTO: 89 FL (ref 82–98)
MCV RBC AUTO: 90 FL (ref 82–98)
MONOCYTES # BLD AUTO: 0.2 K/UL (ref 0.3–1)
MONOCYTES # BLD AUTO: 0.3 K/UL (ref 0.3–1)
MONOCYTES NFR BLD: 1.6 % (ref 4–15)
MONOCYTES NFR BLD: 3.4 % (ref 4–15)
NEUTROPHILS # BLD AUTO: 8.3 K/UL (ref 1.8–7.7)
NEUTROPHILS # BLD AUTO: 9.6 K/UL (ref 1.8–7.7)
NEUTROPHILS NFR BLD: 93.1 % (ref 38–73)
NEUTROPHILS NFR BLD: 95.3 % (ref 38–73)
NRBC BLD-RTO: 0 /100 WBC
NRBC BLD-RTO: 0 /100 WBC
NUM UNITS TRANS PACKED RBC: NORMAL
PHOSPHATE SERPL-MCNC: 2 MG/DL (ref 2.7–4.5)
PLATELET # BLD AUTO: 71 K/UL (ref 150–450)
PLATELET # BLD AUTO: 75 K/UL (ref 150–450)
PMV BLD AUTO: 11.8 FL (ref 9.2–12.9)
PMV BLD AUTO: 12 FL (ref 9.2–12.9)
POCT GLUCOSE: 196 MG/DL (ref 70–110)
POTASSIUM SERPL-SCNC: 3.6 MMOL/L (ref 3.5–5.1)
RBC # BLD AUTO: 2.44 M/UL (ref 4–5.4)
RBC # BLD AUTO: 2.71 M/UL (ref 4–5.4)
SERUM COLLECTION DT - XM ALLO: NORMAL
SERUM COLLECTION DT - XM ALLO: NORMAL
SERUM COLLECTION DT - XM AUTO: NORMAL
SODIUM SERPL-SCNC: 140 MMOL/L (ref 136–145)
SPECIMEN OUTDATE: NORMAL
T CELL RESULTS - XM ALLO: NEGATIVE
T CELL RESULTS - XM ALLO: NEGATIVE
T CELL RESULTS - XM AUTO: NEGATIVE
T MCS AVERAGE - XM ALLO: -16.9
T MCS AVERAGE - XM ALLO: -18.5
T MCS AVERAGE - XM AUTO: -4.6
WBC # BLD AUTO: 10.06 K/UL (ref 3.9–12.7)
WBC # BLD AUTO: 8.88 K/UL (ref 3.9–12.7)

## 2024-07-29 PROCEDURE — 86900 BLOOD TYPING SEROLOGIC ABO: CPT | Performed by: PHYSICIAN ASSISTANT

## 2024-07-29 PROCEDURE — 27000207 HC ISOLATION

## 2024-07-29 PROCEDURE — P9016 RBC LEUKOCYTES REDUCED: HCPCS | Performed by: PHYSICIAN ASSISTANT

## 2024-07-29 PROCEDURE — 80069 RENAL FUNCTION PANEL: CPT | Mod: 91 | Performed by: PHYSICIAN ASSISTANT

## 2024-07-29 PROCEDURE — 99999 PR PBB SHADOW E&M-EST. PATIENT-LVL IV: CPT | Mod: PBBFAC,,,

## 2024-07-29 PROCEDURE — 25000003 PHARM REV CODE 250: Performed by: TRANSPLANT SURGERY

## 2024-07-29 PROCEDURE — 85025 COMPLETE CBC W/AUTO DIFF WBC: CPT | Mod: 91 | Performed by: PHYSICIAN ASSISTANT

## 2024-07-29 PROCEDURE — 36415 COLL VENOUS BLD VENIPUNCTURE: CPT | Mod: XB | Performed by: PHYSICIAN ASSISTANT

## 2024-07-29 PROCEDURE — 86920 COMPATIBILITY TEST SPIN: CPT | Performed by: PHYSICIAN ASSISTANT

## 2024-07-29 PROCEDURE — 99222 1ST HOSP IP/OBS MODERATE 55: CPT | Mod: ,,, | Performed by: NURSE PRACTITIONER

## 2024-07-29 PROCEDURE — 86901 BLOOD TYPING SEROLOGIC RH(D): CPT | Performed by: PHYSICIAN ASSISTANT

## 2024-07-29 PROCEDURE — 25000003 PHARM REV CODE 250: Performed by: PHYSICIAN ASSISTANT

## 2024-07-29 PROCEDURE — 83735 ASSAY OF MAGNESIUM: CPT | Mod: 91 | Performed by: PHYSICIAN ASSISTANT

## 2024-07-29 PROCEDURE — 63600175 PHARM REV CODE 636 W HCPCS: Performed by: PHYSICIAN ASSISTANT

## 2024-07-29 PROCEDURE — 86850 RBC ANTIBODY SCREEN: CPT | Performed by: PHYSICIAN ASSISTANT

## 2024-07-29 PROCEDURE — 36415 COLL VENOUS BLD VENIPUNCTURE: CPT | Performed by: PHYSICIAN ASSISTANT

## 2024-07-29 PROCEDURE — 99999 PR PBB SHADOW E&M-EST. PATIENT-LVL III: CPT | Mod: PBBFAC,,,

## 2024-07-29 PROCEDURE — 20600001 HC STEP DOWN PRIVATE ROOM

## 2024-07-29 PROCEDURE — 30233N1 TRANSFUSION OF NONAUTOLOGOUS RED BLOOD CELLS INTO PERIPHERAL VEIN, PERCUTANEOUS APPROACH: ICD-10-PCS | Performed by: INTERNAL MEDICINE

## 2024-07-29 RX ORDER — MYCOPHENOLATE MOFETIL 250 MG/1
1000 CAPSULE ORAL 2 TIMES DAILY
Status: DISCONTINUED | OUTPATIENT
Start: 2024-07-29 | End: 2024-08-04 | Stop reason: HOSPADM

## 2024-07-29 RX ORDER — PREDNISONE 20 MG/1
20 TABLET ORAL DAILY
Status: DISCONTINUED | OUTPATIENT
Start: 2024-07-30 | End: 2024-08-03

## 2024-07-29 RX ORDER — HYDROCODONE BITARTRATE AND ACETAMINOPHEN 500; 5 MG/1; MG/1
TABLET ORAL
Status: DISCONTINUED | OUTPATIENT
Start: 2024-07-29 | End: 2024-08-03

## 2024-07-29 RX ORDER — OXYBUTYNIN CHLORIDE 5 MG/1
5 TABLET ORAL 3 TIMES DAILY PRN
Status: DISCONTINUED | OUTPATIENT
Start: 2024-07-29 | End: 2024-08-04 | Stop reason: HOSPADM

## 2024-07-29 RX ORDER — GLUCAGON 1 MG
1 KIT INJECTION
Status: DISCONTINUED | OUTPATIENT
Start: 2024-07-29 | End: 2024-08-04 | Stop reason: HOSPADM

## 2024-07-29 RX ORDER — TALC
6 POWDER (GRAM) TOPICAL NIGHTLY PRN
Status: DISCONTINUED | OUTPATIENT
Start: 2024-07-29 | End: 2024-08-04 | Stop reason: HOSPADM

## 2024-07-29 RX ORDER — SODIUM BICARBONATE 650 MG/1
650 TABLET ORAL 2 TIMES DAILY
Status: DISCONTINUED | OUTPATIENT
Start: 2024-07-29 | End: 2024-07-30

## 2024-07-29 RX ORDER — TACROLIMUS 1 MG/1
6 CAPSULE ORAL 2 TIMES DAILY
Status: DISCONTINUED | OUTPATIENT
Start: 2024-07-29 | End: 2024-08-02

## 2024-07-29 RX ORDER — AMOXICILLIN AND CLAVULANATE POTASSIUM 875; 125 MG/1; MG/1
1 TABLET, FILM COATED ORAL EVERY 12 HOURS
Status: DISCONTINUED | OUTPATIENT
Start: 2024-07-29 | End: 2024-08-02

## 2024-07-29 RX ORDER — NIFEDIPINE 30 MG/1
30 TABLET, EXTENDED RELEASE ORAL DAILY
Status: DISCONTINUED | OUTPATIENT
Start: 2024-07-29 | End: 2024-07-30

## 2024-07-29 RX ORDER — PROCHLORPERAZINE EDISYLATE 5 MG/ML
5 INJECTION INTRAMUSCULAR; INTRAVENOUS EVERY 6 HOURS PRN
Status: DISCONTINUED | OUTPATIENT
Start: 2024-07-29 | End: 2024-08-04 | Stop reason: HOSPADM

## 2024-07-29 RX ORDER — VALGANCICLOVIR 450 MG/1
450 TABLET, FILM COATED ORAL
Status: DISCONTINUED | OUTPATIENT
Start: 2024-07-29 | End: 2024-08-04 | Stop reason: HOSPADM

## 2024-07-29 RX ORDER — IBUPROFEN 200 MG
16 TABLET ORAL
Status: DISCONTINUED | OUTPATIENT
Start: 2024-07-29 | End: 2024-08-04 | Stop reason: HOSPADM

## 2024-07-29 RX ORDER — CARVEDILOL 12.5 MG/1
12.5 TABLET ORAL 2 TIMES DAILY
Status: DISCONTINUED | OUTPATIENT
Start: 2024-07-29 | End: 2024-07-30

## 2024-07-29 RX ORDER — INSULIN ASPART 100 [IU]/ML
0-5 INJECTION, SOLUTION INTRAVENOUS; SUBCUTANEOUS
Status: DISCONTINUED | OUTPATIENT
Start: 2024-07-29 | End: 2024-08-03

## 2024-07-29 RX ORDER — ATORVASTATIN CALCIUM 20 MG/1
20 TABLET, FILM COATED ORAL NIGHTLY
Status: DISCONTINUED | OUTPATIENT
Start: 2024-07-29 | End: 2024-08-04 | Stop reason: HOSPADM

## 2024-07-29 RX ORDER — CETIRIZINE HYDROCHLORIDE 5 MG/1
5 TABLET ORAL DAILY
Status: DISCONTINUED | OUTPATIENT
Start: 2024-07-29 | End: 2024-07-29

## 2024-07-29 RX ORDER — OXYCODONE HYDROCHLORIDE 5 MG/1
5 TABLET ORAL EVERY 6 HOURS PRN
Status: DISCONTINUED | OUTPATIENT
Start: 2024-07-29 | End: 2024-08-04 | Stop reason: HOSPADM

## 2024-07-29 RX ORDER — DOCUSATE SODIUM 100 MG/1
100 CAPSULE, LIQUID FILLED ORAL 3 TIMES DAILY
Status: DISCONTINUED | OUTPATIENT
Start: 2024-07-29 | End: 2024-07-30

## 2024-07-29 RX ORDER — ONDANSETRON HYDROCHLORIDE 2 MG/ML
4 INJECTION, SOLUTION INTRAVENOUS EVERY 6 HOURS PRN
Status: DISCONTINUED | OUTPATIENT
Start: 2024-07-29 | End: 2024-08-04 | Stop reason: HOSPADM

## 2024-07-29 RX ORDER — CLONIDINE HYDROCHLORIDE 0.1 MG/1
0.1 TABLET ORAL EVERY 6 HOURS PRN
Status: DISCONTINUED | OUTPATIENT
Start: 2024-07-29 | End: 2024-08-04 | Stop reason: HOSPADM

## 2024-07-29 RX ORDER — ALPRAZOLAM 0.25 MG/1
1 TABLET ORAL 2 TIMES DAILY PRN
Status: DISCONTINUED | OUTPATIENT
Start: 2024-07-29 | End: 2024-08-04 | Stop reason: HOSPADM

## 2024-07-29 RX ORDER — PANTOPRAZOLE SODIUM 40 MG/1
40 TABLET, DELAYED RELEASE ORAL DAILY
Status: DISCONTINUED | OUTPATIENT
Start: 2024-07-29 | End: 2024-08-04 | Stop reason: HOSPADM

## 2024-07-29 RX ORDER — IBUPROFEN 200 MG
24 TABLET ORAL
Status: DISCONTINUED | OUTPATIENT
Start: 2024-07-29 | End: 2024-08-04 | Stop reason: HOSPADM

## 2024-07-29 RX ORDER — CINACALCET 30 MG/1
60 TABLET, FILM COATED ORAL
Status: DISCONTINUED | OUTPATIENT
Start: 2024-07-30 | End: 2024-08-04 | Stop reason: HOSPADM

## 2024-07-29 RX ORDER — CALCIUM CARBONATE 200(500)MG
500 TABLET,CHEWABLE ORAL 3 TIMES DAILY PRN
Status: DISCONTINUED | OUTPATIENT
Start: 2024-07-29 | End: 2024-08-04 | Stop reason: HOSPADM

## 2024-07-29 RX ORDER — ACETAMINOPHEN 325 MG/1
650 TABLET ORAL EVERY 4 HOURS PRN
Status: DISCONTINUED | OUTPATIENT
Start: 2024-07-29 | End: 2024-08-04 | Stop reason: HOSPADM

## 2024-07-29 RX ORDER — DULOXETIN HYDROCHLORIDE 30 MG/1
30 CAPSULE, DELAYED RELEASE ORAL DAILY
Status: DISCONTINUED | OUTPATIENT
Start: 2024-07-29 | End: 2024-07-30

## 2024-07-29 RX ORDER — CARVEDILOL 6.25 MG/1
12.5 TABLET ORAL 2 TIMES DAILY
Qty: 360 TABLET | Refills: 3 | Status: ON HOLD | OUTPATIENT
Start: 2024-07-29 | End: 2025-07-29

## 2024-07-29 RX ORDER — EZETIMIBE 10 MG/1
10 TABLET ORAL DAILY
Status: DISCONTINUED | OUTPATIENT
Start: 2024-07-29 | End: 2024-08-04 | Stop reason: HOSPADM

## 2024-07-29 RX ORDER — SULFAMETHOXAZOLE AND TRIMETHOPRIM 400; 80 MG/1; MG/1
1 TABLET ORAL EVERY MORNING
Status: DISCONTINUED | OUTPATIENT
Start: 2024-07-29 | End: 2024-07-30

## 2024-07-29 RX ORDER — BISACODYL 5 MG
10 TABLET, DELAYED RELEASE (ENTERIC COATED) ORAL NIGHTLY
Status: DISCONTINUED | OUTPATIENT
Start: 2024-07-29 | End: 2024-07-30

## 2024-07-29 RX ORDER — CETIRIZINE HYDROCHLORIDE 5 MG/1
5 TABLET ORAL NIGHTLY
Status: DISCONTINUED | OUTPATIENT
Start: 2024-07-29 | End: 2024-08-04 | Stop reason: HOSPADM

## 2024-07-29 RX ADMIN — INSULIN ASPART 1 UNITS: 100 INJECTION, SOLUTION INTRAVENOUS; SUBCUTANEOUS at 09:07

## 2024-07-29 RX ADMIN — TACROLIMUS 6 MG: 1 CAPSULE ORAL at 05:07

## 2024-07-29 RX ADMIN — OXYCODONE 5 MG: 5 TABLET ORAL at 02:07

## 2024-07-29 RX ADMIN — AMOXICILLIN AND CLAVULANATE POTASSIUM 1 TABLET: 875; 125 TABLET, FILM COATED ORAL at 09:07

## 2024-07-29 RX ADMIN — MYCOPHENOLATE MOFETIL 1000 MG: 250 CAPSULE ORAL at 09:07

## 2024-07-29 RX ADMIN — DIBASIC SODIUM PHOSPHATE, MONOBASIC POTASSIUM PHOSPHATE AND MONOBASIC SODIUM PHOSPHATE 2 TABLET: 852; 155; 130 TABLET ORAL at 02:07

## 2024-07-29 RX ADMIN — CLONIDINE HYDROCHLORIDE 0.1 MG: 0.1 TABLET ORAL at 09:07

## 2024-07-29 RX ADMIN — DULOXETINE HYDROCHLORIDE 30 MG: 30 CAPSULE, DELAYED RELEASE ORAL at 02:07

## 2024-07-29 RX ADMIN — NIFEDIPINE 30 MG: 30 TABLET, FILM COATED, EXTENDED RELEASE ORAL at 02:07

## 2024-07-29 RX ADMIN — OXYBUTYNIN CHLORIDE 5 MG: 5 TABLET ORAL at 05:07

## 2024-07-29 RX ADMIN — CETIRIZINE HYDROCHLORIDE 5 MG: 5 TABLET, FILM COATED ORAL at 09:07

## 2024-07-29 RX ADMIN — ALPRAZOLAM 1 MG: 0.25 TABLET ORAL at 09:07

## 2024-07-29 RX ADMIN — DIBASIC SODIUM PHOSPHATE, MONOBASIC POTASSIUM PHOSPHATE AND MONOBASIC SODIUM PHOSPHATE 2 TABLET: 852; 155; 130 TABLET ORAL at 09:07

## 2024-07-29 RX ADMIN — SODIUM BICARBONATE 650 MG: 650 TABLET ORAL at 09:07

## 2024-07-29 RX ADMIN — ATORVASTATIN CALCIUM 20 MG: 20 TABLET, FILM COATED ORAL at 09:07

## 2024-07-29 RX ADMIN — CARVEDILOL 12.5 MG: 12.5 TABLET, FILM COATED ORAL at 09:07

## 2024-07-29 RX ADMIN — DOCUSATE SODIUM 100 MG: 100 CAPSULE, LIQUID FILLED ORAL at 02:07

## 2024-07-29 RX ADMIN — EZETIMIBE 10 MG: 10 TABLET ORAL at 02:07

## 2024-07-29 NOTE — HPI
Ms. Kenney is a 63 y.o. female with ESRD secondary to diabetic nephropathy, solitary kidney.  PMH DM2, HTN, incontience (s/p bladder sling), CAD with stents (on ASA), atrial fibrillation, CVA (no residual deficits), ALEJA, anemia. She is now s/p DBD kidney transplant on 7/24/24. Surgery without complications. PD cath removed. Post op course notable for down trending Cr with excellent UOP. Also notable for   - Salivary gland infection. Per ID,discharged on Augmentin for a 10 day antibiotic course, stop date 8/3/24. Should she develop submandibular swelling, recommend CT neck with ENT consult   - Anemia requiring prbc transfusion on 7/27/24 with adequate response   - ER visit on 7/28 for hypertension - PO meds adjusted and d/c' from ED    She now presents as a direct admit from her routine clinic appointment with transplant surgery. Exam concerning for subcutaneous hematoma. Labs notable for down trending H&H 7.6/23 --> 7/20. Direct admit for imaging and blood transfusion. D/w Dr Squires. On arrival, BP elevated to 204/88 otherwise vital signs normal. Pt c/o nausea, abdominal pain, and constipation. Denies and changes in urinary output, diarrhea, vomiting, chest pain, sob, fever/chills.

## 2024-07-29 NOTE — ASSESSMENT & PLAN NOTE
- Pt with decreasing H&H 7.6/23 --> 7/20  - Obtain CT scan a/p to assess for collections  - transfuse 1 unit PRBC and repeat CBC after transfusion  - last transfused 7/27

## 2024-07-29 NOTE — HPI
Reason for Consult: Management of T2DM, Hyperglycemia      Surgical Procedure and Date: s/p kidney transplant on 7/24/2024     Diabetes diagnosis year: 2004     Home Diabetes Medications:  Patient previously on tandem insulin pump and Mounjaro. Patient recently lost 60 lbs and had worsening kidney function. Patient states that she is off all diabetes medication for about 3-months and blood sugar has been well controlled. Patient is from [place] and does not have her pump with her to review pump settings. Per chart review, basal insulin was 58 units daily in the past. Anticipate the patient will need more aggressive insulin management post-op.     Most recent D/C recommend:    Novolog SSI:     150 - 200 + 2 unit    201 - 250 + 4 units    251 - 300 + 6 units    301 - 350 + 8 units       > 350   + 10 units       How often checking glucose at home? >4 x day (previously on Dexcom G6, but off since she stopped using tandem pump)   BG readings on regimen: states BG have been well controlled off the pump and the GLP1-RA/ GIP ('s)  Hypoglycemia on the regimen?  No  Missed doses on regimen?  No     Diabetes Complications include:     Hyperglycemia (history)     Complicating diabetes co morbidities:   Glucocorticoid use         HPI: Ms. Kenney is a 63 y.o. female with ESRD secondary to diabetic nephropathy, solitary kidney. PMH DM2, HTN, incontience (s/p bladder sling), CAD with stents (on ASA), atrial fibrillation, CVA (no residual deficits), ALEJA, anemia. She is now s/p DBD kidney transplant on 7/24/24. Surgery without complications. PD cath removed. Post op course notable for down trending Cr with excellent UOP. She now presents as a direct admit from her routine clinic appointment with transplant surgery. Exam concerning for subcutaneous hematoma. Labs notable for down trending H&H 7.6/23 --> 7/20. Direct admit for imaging and blood transfusion. D/w Dr Squires. On arrival, BP elevated to 204/88 otherwise vital  signs normal. Pt c/o nausea, abdominal pain, and constipation. States incision developed bruising with worsening pain starting yesterday afternoon. Denies any changes in urinary output, diarrhea, vomiting, chest pain, sob, fever/chills. Endocrine conus;tal to manage hyperglycemia and type 2 diabetes.     Lab Results   Component Value Date    HGBA1C 4.8 07/24/2024    HGBA1C 4.8 07/24/2024

## 2024-07-29 NOTE — TELEPHONE ENCOUNTER
Incision appears swollen and dark purple bruising present around incision. Per patient's daughter this appeared yesterday within the course of about 8 hours.  assessed patient and today's lab work while patient was in clinic and decision was made to direct admit patient to [facility] for bleeding. KAVIN Mcclure and  made aware. [facility] charge nurse, Ciera contacted regarding plan. Room reservation made. Notified KAVIN Brewer that patient is also hypertensive and has not had a BM since transplant.

## 2024-07-29 NOTE — PROGRESS NOTES
Transplant Coordinator  7/24-7/27/24     Pt admitted for a cadaveric kidney transplant. ESRD 2/2 DM. SCD, KDPI 85%, CIT >23 hrs, Thymo induction and CMV ++ PD cath removed.      Kidney function improved daily. Good UOP. No HD     RLQ incision ULISES with staples.  Becerra removed prior to d/c     Labs 7/29/24 and RTC to meet with coordinator/pharmD     Of note, patient with salivary gland infection. Per ID, she will discharge on Augmentin for a 10 day antibiotic course, stop date 8/3/24.

## 2024-07-29 NOTE — PROGRESS NOTES
Nurses Note -- 4 Eyes      7/29/2024   3:09 PM      Skin assessed during: Admit      [] No Altered Skin Integrity Present    []Prevention Measures Documented      [x] Yes- Altered Skin Integrity Present or Discovered   [x] LDA Added if Not in Epic (Describe Wound)   [x] New Altered Skin Integrity was Present on Admit and Documented in LDA   [x] Wound Image Taken    Wound Care Consulted? No    Attending Nurse:  Shira Acosta RN/Staff Member:  KARY VALE

## 2024-07-29 NOTE — PROGRESS NOTES
Pt to and from CT via wheelchair escorted by this RN. Tolerated well, no distress during transport.

## 2024-07-29 NOTE — ASSESSMENT & PLAN NOTE
- continue coreg 12.5 mg BID  - presented to ED yesterday for high blood pressure and coreg dose increased  - uncontrolled on admit  - start nifedipine 30 mg daily

## 2024-07-29 NOTE — PROGRESS NOTES
Clinic Note: First Return to Clinic Post-  Kidney Transplant    Joann Kenney  is a 63 y.o. female  S/p   RIGHT KIDNEY   transplant on 7/24/2024 (Kidney) for Diabetes Mellitus - Type II.      Discharge Course (Issues/Concerns): patient is complaining of pain, elevated BP, and inflamed incision.     Objective:   There were no vitals filed for this visit.    Met with patient and her caregiver in the clinic to review current medication list.     Current Outpatient Medications   Medication Sig Dispense Refill    ALPRAZolam (XANAX) 0.5 MG tablet Take 2 mg by mouth every evening.      amoxicillin-clavulanate 875-125mg (AUGMENTIN) 875-125 mg per tablet Take 1 tablet by mouth every 12 (twelve) hours. Stop on 8/3/24 for 7 days. 14 tablet 0    atorvastatin (LIPITOR) 40 MG tablet Take 20 mg by mouth every evening.      blood sugar diagnostic Strp use 1 strip to check blood glucose 3 (three) times daily. 100 strip 1    blood-glucose meter Misc use as directed to check blood glucose 1 each 0    carvediloL (COREG) 6.25 MG tablet Take 1 tablet (6.25 mg total) by mouth 2 (two) times daily. 180 tablet 3    cetirizine (ZYRTEC) 5 MG tablet Take 1 tablet (5 mg total) by mouth once daily. 30 tablet 11    cinacalcet (SENSIPAR) 60 MG Tab Take 1 tablet (60 mg total) by mouth daily with breakfast. 30 tablet 11    DULoxetine (CYMBALTA) 30 MG capsule Take 30 mg by mouth once daily.      ezetimibe (ZETIA) 10 mg tablet Take 1 tablet (10 mg total) by mouth once daily. 30 tablet 11    insulin aspart U-100 (NOVOLOG) 100 unit/mL (3 mL) InPn pen Inject 0-10 Units into the skin 3 (three) times daily with meals. Max: 30 units per day 6 mL 0    k phos di & mono-sod phos mono (K-PHOS-NEUTRAL) 250 mg Tab Take 2 tablets by mouth 2 (two) times a day. 120 tablet 11    lancets Misc 1 lancet each to check blood glucose 3 (three) times daily. 100 each 1    multivitamin Tab Take 1 tablet by mouth once daily.      mycophenolate (CELLCEPT) 250 mg Cap Take 4  "capsules (1,000 mg total) by mouth 2 (two) times daily. 240 capsule 11    ondansetron (ZOFRAN-ODT) 4 MG TbDL Dissolve 1 tablet (4 mg total) by mouth every 8 (eight) hours as needed. 30 tablet 0    oxybutynin (DITROPAN) 5 MG Tab Take 1 tablet (5 mg total) by mouth 3 (three) times daily as needed (bladder spasms). 12 tablet 0    oxyCODONE (ROXICODONE) 5 MG immediate release tablet Take 1 tablet (5 mg total) by mouth every 6 (six) hours as needed for Pain. 21 tablet 0    pantoprazole (PROTONIX) 40 MG tablet Take 1 tablet (40 mg total) by mouth once daily. 30 tablet 2    pen needle, diabetic 32 gauge x 5/32" Ndle 1 each for use with insulin pen 3 (three) times daily. 100 each 1    predniSONE (DELTASONE) 5 MG tablet Take by mouth daily; 7/27/2024-8/2/2024: 20 mg, 8/3/2024-8/9/2024: 15 mg; 8/10/2024-8/16/2024: 10 mg; 8/17/2024- forever: 5 mg; do not stop 70 tablet 11    sodium bicarbonate 650 MG tablet Take 1 tablet (650 mg total) by mouth 2 (two) times daily. 60 tablet 11    sulfamethoxazole-trimethoprim 400-80mg (BACTRIM,SEPTRA) 400-80 mg per tablet Take 1 tablet by mouth every morning. Stop 1/21/25 30 tablet 5    tacrolimus (PROGRAF) 1 MG Cap Take 6 capsules (6 mg total) by mouth every 12 (twelve) hours. 360 capsule 11    valGANciclovir (VALCYTE) 450 mg Tab Take 1 tablet (450 mg total) by mouth 3 (three) times a week. Stop 10/23/24 12 tablet 2     No current facility-administered medications for this visit.       Pharmacy Interventions/Recommendations:     1) Graft Function & Immunosuppression Issues: tac/mmf/pred taper; SCr 4.3 to 3.7    2) Opportunistic Infection prophylaxis:   PCP ppx: Bactrim until 1/21/25  CMV ppx: Valcyte until 10/23/24  Fungal ppx: n/a    3) Donor Serologies & Monitoring:     Donor CMV Status:  POSITIVE  Donor HCV Status:    Donor HBcAb:    Donor HBV RAH: Organ record is missing.  Donor HCV RAH: Organ record is missing.      4) Pain Management & Bowel Regimen: oxycydone 5 mg; discussed " alternating with OTC Tylenol or taking Tylenol around the clock for a couple of days.     5) Blood Pressure Management: 182/84 standing--was right after taking Coreg so will recheck after RN appt. Also went to ER Sunday with elevated BP and Coreg was increased from 6.25 to 12.5 mg BID.  If remains elevated, can further increase to 25 mg BID or add on additional agent.     6) Blood Sugar Management & Follow-up:  on fasting labs; on sliding scale insulin and to check apartments for pen needles that are missing.     7) Electrolyte Management: K=3.3, monitor; Ph=2 so starting  mg BID and will increase dietary Phos    8) Osteoporosis Prevention Strategy (Liver Transplant): n/a    9) OTHER medication follow-up (patient assistance, held medications, etc):   -adjust valcyte as renal fn improves  -monitor BP and adjust regimen  -caretaker to check for home supply of atorvastatin (clarify tablet size), Cymbalta and Zetia as well as pen needles that were dispensed form [facility].   -f/u on incision to see if SSTI abx needs to be started (doxy?)    10) Reinforced medication education conducted in the hospital, including medication indications, dosing, administration, side effects, monitoring-- including timing of immunosuppressant levels.     Patient received their FIRST fill of medications from [facility].  Discussed the process for obtaining refills of medications, including verifying the dose and mailing address to have medications delivered.     Joann and her caregivers verbalized understanding and had the opportunity to ask questions.

## 2024-07-29 NOTE — H&P
[facility] - Transplant Step[facility]  Kidney Transplant  H&P      Subjective:     Chief Complaint/Reason for Admission: Anemia     History of Present Illness:  Ms. Kenney is a 63 y.o. female with ESRD secondary to diabetic nephropathy, solitary kidney.  PMH DM2, HTN, incontience (s/p bladder sling), CAD with stents (on ASA), atrial fibrillation, CVA (no residual deficits), ALEJA, anemia. She is now s/p DBD kidney transplant on 7/24/24. Surgery without complications. PD cath removed. Post op course notable for down trending Cr with excellent UOP. Also notable for   - Salivary gland infection. Per ID,discharged on Augmentin for a 10 day antibiotic course, stop date 8/3/24. Should she develop submandibular swelling, recommend CT neck with ENT consult   - Anemia requiring prbc transfusion on 7/27/24 with adequate response   - ER visit on 7/28 for hypertension - PO meds adjusted and d/c' from ED    She now presents as a direct admit from her routine clinic appointment with transplant surgery. Exam concerning for subcutaneous hematoma. Labs notable for down trending H&H 7.6/23 --> 7/20. Direct admit for imaging and blood transfusion. D/w Dr Squires. On arrival, BP elevated to 204/88 otherwise vital signs normal. Pt c/o nausea, abdominal pain, and constipation. States incision developed bruising with worsening pain starting yesterday afternoon. Denies any changes in urinary output, diarrhea, vomiting, chest pain, sob, fever/chills.         PTA Medications   Medication Sig    ALPRAZolam (XANAX) 0.5 MG tablet Take 2 mg by mouth every evening.    amoxicillin-clavulanate 875-125mg (AUGMENTIN) 875-125 mg per tablet Take 1 tablet by mouth every 12 (twelve) hours. Stop on 8/3/24 for 7 days.    atorvastatin (LIPITOR) 40 MG tablet Take 20 mg by mouth every evening.    blood sugar diagnostic Strp use 1 strip to check blood glucose 3 (three) times daily.    blood-glucose meter Misc use as directed to check blood glucose    carvediloL  "(COREG) 6.25 MG tablet Take 2 tablets (12.5 mg total) by mouth 2 (two) times daily.    cetirizine (ZYRTEC) 5 MG tablet Take 1 tablet (5 mg total) by mouth once daily.    cinacalcet (SENSIPAR) 60 MG Tab Take 1 tablet (60 mg total) by mouth daily with breakfast.    DULoxetine (CYMBALTA) 30 MG capsule Take 30 mg by mouth once daily.    ezetimibe (ZETIA) 10 mg tablet Take 1 tablet (10 mg total) by mouth once daily.    insulin aspart U-100 (NOVOLOG) 100 unit/mL (3 mL) InPn pen Inject 0-10 Units into the skin 3 (three) times daily with meals. Max: 30 units per day    k phos di & mono-sod phos mono (K-PHOS-NEUTRAL) 250 mg Tab Take 2 tablets by mouth 2 (two) times a day.    lancets Misc 1 lancet each to check blood glucose 3 (three) times daily.    multivitamin Tab Take 1 tablet by mouth once daily.    mycophenolate (CELLCEPT) 250 mg Cap Take 4 capsules (1,000 mg total) by mouth 2 (two) times daily.    ondansetron (ZOFRAN-ODT) 4 MG TbDL Dissolve 1 tablet (4 mg total) by mouth every 8 (eight) hours as needed.    oxybutynin (DITROPAN) 5 MG Tab Take 1 tablet (5 mg total) by mouth 3 (three) times daily as needed (bladder spasms).    oxyCODONE (ROXICODONE) 5 MG immediate release tablet Take 1 tablet (5 mg total) by mouth every 6 (six) hours as needed for Pain.    pantoprazole (PROTONIX) 40 MG tablet Take 1 tablet (40 mg total) by mouth once daily.    pen needle, diabetic 32 gauge x 5/32" Ndle 1 each for use with insulin pen 3 (three) times daily.    predniSONE (DELTASONE) 5 MG tablet Take by mouth daily; 7/27/2024-8/2/2024: 20 mg, 8/3/2024-8/9/2024: 15 mg; 8/10/2024-8/16/2024: 10 mg; 8/17/2024- forever: 5 mg; do not stop    sodium bicarbonate 650 MG tablet Take 1 tablet (650 mg total) by mouth 2 (two) times daily.    sulfamethoxazole-trimethoprim 400-80mg (BACTRIM,SEPTRA) 400-80 mg per tablet Take 1 tablet by mouth every morning. Stop 1/21/25    tacrolimus (PROGRAF) 1 MG Cap Take 6 capsules (6 mg total) by mouth every 12 " (twelve) hours.    valGANciclovir (VALCYTE) 450 mg Tab Take 1 tablet (450 mg total) by mouth 3 (three) times a week. Stop 10/23/24       Review of patient's allergies indicates:  No Known Allergies    Past Medical History:   Diagnosis Date    Anemia     Anxiety     Atrial fibrillation     Coronary artery disease     Depression     Diabetes mellitus, type 2     Disorder of kidney and ureter     Heart murmur     Hyperlipidemia     Hypertension     Obesity     ALEJA (obstructive sleep apnea)     Proteinuria     Solitary kidney     Stroke      Past Surgical History:   Procedure Laterality Date    CORONARY ANGIOPLASTY WITH STENT PLACEMENT      HYSTERECTOMY      INCONTINENCE SURGERY      INSERTION OF IMPLANTABLE LOOP RECORDER      internal heart monitor      KIDNEY TRANSPLANT Right 7/24/2024    Procedure: TRANSPLANT, KIDNEY;  Surgeon: Alphonso Mon MD;  Location: [address];  Service: Transplant;  Laterality: Right;    PERITONEAL CATHETER INSERTION      PERITONEAL CATHETER REMOVAL Left 7/24/2024    Procedure: REMOVAL, CATHETER, DIALYSIS, PERITONEAL;  Surgeon: Alphonso Mon MD;  Location: [facility] OR [address] [place] [address];  Service: Transplant;  Laterality: Left;     Family History    None       Tobacco Use    Smoking status: Former    Smokeless tobacco: Never   Substance and Sexual Activity    Alcohol use: Not Currently    Drug use: Never    Sexual activity: Not Currently     Partners: Male        Review of Systems   Constitutional:  Negative for activity change, chills and fever.   Respiratory:  Negative for shortness of breath.    Cardiovascular:  Negative for leg swelling.   Gastrointestinal:  Positive for abdominal pain and constipation. Negative for abdominal distention, diarrhea, nausea and vomiting.   Genitourinary:  Negative for decreased urine volume, dysuria, flank pain and hematuria.   Skin:  Positive for wound.   Allergic/Immunologic: Positive for immunocompromised state.   Psychiatric/Behavioral:  Negative for confusion.  "The patient is nervous/anxious.      Objective:     Vital Signs (Most Recent):  Temp: 98.6 °F (37 °C) (07/29/24 1406)  Pulse: 62 (07/29/24 1406)  Resp: 17 (07/29/24 1406)  BP: (!) 204/88 (07/29/24 1406)  SpO2: 99 % (07/29/24 1406)  Height: 5' 5" (165.1 cm)  Weight: 84.3 kg (185 lb 13.6 oz)  Body mass index is 30.93 kg/m².      Physical Exam  Vitals and nursing note reviewed.   Constitutional:       Appearance: She is well-developed.   HENT:      Head: Normocephalic and atraumatic.   Eyes:      General:         Right eye: No discharge.         Left eye: No discharge.   Cardiovascular:      Rate and Rhythm: Normal rate and regular rhythm.   Pulmonary:      Effort: Pulmonary effort is normal.   Abdominal:      General: Abdomen is flat. Bowel sounds are normal. There is no distension.      Palpations: Abdomen is soft.      Tenderness: There is abdominal tenderness.       Musculoskeletal:         General: Swelling (trace bilateral LE edema) present.      Cervical back: Normal range of motion.      Right lower leg: Edema present.      Left lower leg: Edema present.   Skin:     General: Skin is warm and dry.   Neurological:      Mental Status: She is alert and oriented to person, place, and time.   Psychiatric:         Behavior: Behavior normal.          Laboratory  CBC:   Recent Labs   Lab 07/27/24  0552 07/28/24  0845 07/29/24  0821   WBC 7.25 6.34 6.43   RBC 2.19* 2.54* 2.33*   HGB 7.0* 7.6* 7.0*   HCT 21.3* 23.4* 20.8*   * 78* 83*   MCV 97 92 89   MCH 32.0* 29.9 30.0   MCHC 32.9 32.5 33.7     CMP:   Recent Labs   Lab 07/24/24  0317 07/24/24  1637 07/27/24  0552 07/28/24  0845 07/29/24  0821   GLU 88   < > 116* 102 123*   CALCIUM 9.9   < > 8.9 8.9 8.7   ALBUMIN 2.6*   < > 2.2* 2.4* 2.6*   PROT 5.6*  --   --   --   --       < > 140 142 142   K 4.3   < > 3.5 3.2* 3.3*   CO2 23   < > 21* 23 26      < > 108 108 107   BUN 44*   < > 55* 52* 52*   CREATININE 8.1*   < > 5.7* 4.3* 3.7*   ALKPHOS 55  --   --   " "--   --    ALT 10  --   --   --   --    AST 12  --   --   --   --     < > = values in this interval not displayed.     Labs within the past 24 hours have been reviewed.    Diagnostic Results:  Pending    Assessment/Plan:     ENT  Salivary gland infection  - continue augmentin      Cardiac/Vascular  Renovascular hypertension  - continue coreg 12.5 mg BID  - presented to ED yesterday for high blood pressure and coreg dose increased  - uncontrolled on admit  - start nifedipine 30 mg daily      Renal/  S/P kidney transplant  - s/p DDRTxp on 7/24/24  - obtain CT a/p to assess for collections  - Cr trending down daily with reported good UOP  - strict intake/output  - daily renal function panel      Immunology/Multi System  Prophylactic immunotherapy  - continue prograf, cellcept, and steroid taper per protocol  - will check daily prograf levels, monitor for toxic side effects, and adjust for therapeutic dose        Oncology  * Acute blood loss anemia  - Pt with decreasing H&H 7.6/23 --> 7/20  - Obtain CT scan a/p to assess for collections  - transfuse 1 unit PRBC and repeat CBC after transfusion  - last transfused 7/27      Anemia in ESRD (end-stage renal disease)  - see "Acute blood loss anemia"      Endocrine  Diabetes mellitus due to underlying condition with chronic kidney disease on chronic dialysis, with long-term current use of insulin  Consult endocrine for management, appreciate their assistance      Palliative Care  Long-term use of immunosuppressant medication  - see "prophylactic immunotherapy"            Aniyah Tidwell PA-C  Kidney Transplant  [facility] - Transplant Stepdown    "

## 2024-07-29 NOTE — SUBJECTIVE & OBJECTIVE
Subjective:     Chief Complaint/Reason for Admission: Anemia     History of Present Illness:  Ms. Kenney is a 63 y.o. female with ESRD secondary to diabetic nephropathy, solitary kidney.  PMH DM2, HTN, incontience (s/p bladder sling), CAD with stents (on ASA), atrial fibrillation, CVA (no residual deficits), ALEJA, anemia. She is now s/p DBD kidney transplant on 7/24/24. Surgery without complications. PD cath removed. Post op course notable for down trending Cr with excellent UOP. Also notable for   - Salivary gland infection. Per ID,discharged on Augmentin for a 10 day antibiotic course, stop date 8/3/24. Should she develop submandibular swelling, recommend CT neck with ENT consult   - Anemia requiring prbc transfusion on 7/27/24 with adequate response   - ER visit on 7/28 for hypertension - PO meds adjusted and d/c' from ED    She now presents as a direct admit from her routine clinic appointment with transplant surgery. Exam concerning for subcutaneous hematoma. Labs notable for down trending H&H 7.6/23 --> 7/20. Direct admit for imaging and blood transfusion. D/w Dr Squires. On arrival, BP elevated to 204/88 otherwise vital signs normal. Pt c/o nausea, abdominal pain, and constipation. States incision developed bruising with worsening pain starting yesterday afternoon. Denies and changes in urinary output, diarrhea, vomiting, chest pain, sob, fever/chills.         PTA Medications   Medication Sig    ALPRAZolam (XANAX) 0.5 MG tablet Take 2 mg by mouth every evening.    amoxicillin-clavulanate 875-125mg (AUGMENTIN) 875-125 mg per tablet Take 1 tablet by mouth every 12 (twelve) hours. Stop on 8/3/24 for 7 days.    atorvastatin (LIPITOR) 40 MG tablet Take 20 mg by mouth every evening.    blood sugar diagnostic Strp use 1 strip to check blood glucose 3 (three) times daily.    blood-glucose meter Misc use as directed to check blood glucose    carvediloL (COREG) 6.25 MG tablet Take 2 tablets (12.5 mg total) by mouth  "2 (two) times daily.    cetirizine (ZYRTEC) 5 MG tablet Take 1 tablet (5 mg total) by mouth once daily.    cinacalcet (SENSIPAR) 60 MG Tab Take 1 tablet (60 mg total) by mouth daily with breakfast.    DULoxetine (CYMBALTA) 30 MG capsule Take 30 mg by mouth once daily.    ezetimibe (ZETIA) 10 mg tablet Take 1 tablet (10 mg total) by mouth once daily.    insulin aspart U-100 (NOVOLOG) 100 unit/mL (3 mL) InPn pen Inject 0-10 Units into the skin 3 (three) times daily with meals. Max: 30 units per day    k phos di & mono-sod phos mono (K-PHOS-NEUTRAL) 250 mg Tab Take 2 tablets by mouth 2 (two) times a day.    lancets Misc 1 lancet each to check blood glucose 3 (three) times daily.    multivitamin Tab Take 1 tablet by mouth once daily.    mycophenolate (CELLCEPT) 250 mg Cap Take 4 capsules (1,000 mg total) by mouth 2 (two) times daily.    ondansetron (ZOFRAN-ODT) 4 MG TbDL Dissolve 1 tablet (4 mg total) by mouth every 8 (eight) hours as needed.    oxybutynin (DITROPAN) 5 MG Tab Take 1 tablet (5 mg total) by mouth 3 (three) times daily as needed (bladder spasms).    oxyCODONE (ROXICODONE) 5 MG immediate release tablet Take 1 tablet (5 mg total) by mouth every 6 (six) hours as needed for Pain.    pantoprazole (PROTONIX) 40 MG tablet Take 1 tablet (40 mg total) by mouth once daily.    pen needle, diabetic 32 gauge x 5/32" Ndle 1 each for use with insulin pen 3 (three) times daily.    predniSONE (DELTASONE) 5 MG tablet Take by mouth daily; 7/27/2024-8/2/2024: 20 mg, 8/3/2024-8/9/2024: 15 mg; 8/10/2024-8/16/2024: 10 mg; 8/17/2024- forever: 5 mg; do not stop    sodium bicarbonate 650 MG tablet Take 1 tablet (650 mg total) by mouth 2 (two) times daily.    sulfamethoxazole-trimethoprim 400-80mg (BACTRIM,SEPTRA) 400-80 mg per tablet Take 1 tablet by mouth every morning. Stop 1/21/25    tacrolimus (PROGRAF) 1 MG Cap Take 6 capsules (6 mg total) by mouth every 12 (twelve) hours.    valGANciclovir (VALCYTE) 450 mg Tab Take 1 tablet " (450 mg total) by mouth 3 (three) times a week. Stop 10/23/24       Review of patient's allergies indicates:  No Known Allergies    Past Medical History:   Diagnosis Date    Anemia     Anxiety     Atrial fibrillation     Coronary artery disease     Depression     Diabetes mellitus, type 2     Disorder of kidney and ureter     Heart murmur     Hyperlipidemia     Hypertension     Obesity     ALEJA (obstructive sleep apnea)     Proteinuria     Solitary kidney     Stroke      Past Surgical History:   Procedure Laterality Date    CORONARY ANGIOPLASTY WITH STENT PLACEMENT      HYSTERECTOMY      INCONTINENCE SURGERY      INSERTION OF IMPLANTABLE LOOP RECORDER      internal heart monitor      KIDNEY TRANSPLANT Right 7/24/2024    Procedure: TRANSPLANT, KIDNEY;  Surgeon: Alphonso Mon MD;  Location: [address];  Service: Transplant;  Laterality: Right;    PERITONEAL CATHETER INSERTION      PERITONEAL CATHETER REMOVAL Left 7/24/2024    Procedure: REMOVAL, CATHETER, DIALYSIS, PERITONEAL;  Surgeon: Alphonso Mon MD;  Location: [address];  Service: Transplant;  Laterality: Left;     Family History    None       Tobacco Use    Smoking status: Former    Smokeless tobacco: Never   Substance and Sexual Activity    Alcohol use: Not Currently    Drug use: Never    Sexual activity: Not Currently     Partners: Male        Review of Systems   Constitutional:  Negative for activity change, chills and fever.   Respiratory:  Negative for shortness of breath.    Cardiovascular:  Negative for leg swelling.   Gastrointestinal:  Positive for abdominal pain and constipation. Negative for abdominal distention, diarrhea, nausea and vomiting.   Genitourinary:  Negative for decreased urine volume, dysuria, flank pain and hematuria.   Skin:  Positive for wound.   Allergic/Immunologic: Positive for immunocompromised state.   Psychiatric/Behavioral:  Negative for confusion. The patient is nervous/anxious.      Objective:     Vital Signs (Most  "Recent):  Temp: 98.6 °F (37 °C) (07/29/24 1406)  Pulse: 62 (07/29/24 1406)  Resp: 17 (07/29/24 1406)  BP: (!) 204/88 (07/29/24 1406)  SpO2: 99 % (07/29/24 1406)  Height: 5' 5" (165.1 cm)  Weight: 84.3 kg (185 lb 13.6 oz)  Body mass index is 30.93 kg/m².      Physical Exam  Vitals and nursing note reviewed.   Constitutional:       Appearance: She is well-developed.   HENT:      Head: Normocephalic and atraumatic.   Eyes:      General:         Right eye: No discharge.         Left eye: No discharge.   Cardiovascular:      Rate and Rhythm: Normal rate and regular rhythm.   Pulmonary:      Effort: Pulmonary effort is normal.   Abdominal:      General: Abdomen is flat. Bowel sounds are normal. There is no distension.      Palpations: Abdomen is soft.      Tenderness: There is abdominal tenderness.       Musculoskeletal:         General: Swelling (trace bilateral LE edema) present.      Cervical back: Normal range of motion.      Right lower leg: Edema present.      Left lower leg: Edema present.   Skin:     General: Skin is warm and dry.   Neurological:      Mental Status: She is alert and oriented to person, place, and time.   Psychiatric:         Behavior: Behavior normal.          Laboratory  CBC:   Recent Labs   Lab 07/27/24  0552 07/28/24  0845 07/29/24  0821   WBC 7.25 6.34 6.43   RBC 2.19* 2.54* 2.33*   HGB 7.0* 7.6* 7.0*   HCT 21.3* 23.4* 20.8*   * 78* 83*   MCV 97 92 89   MCH 32.0* 29.9 30.0   MCHC 32.9 32.5 33.7     CMP:   Recent Labs   Lab 07/24/24  0317 07/24/24  1637 07/27/24  0552 07/28/24  0845 07/29/24  0821   GLU 88   < > 116* 102 123*   CALCIUM 9.9   < > 8.9 8.9 8.7   ALBUMIN 2.6*   < > 2.2* 2.4* 2.6*   PROT 5.6*  --   --   --   --       < > 140 142 142   K 4.3   < > 3.5 3.2* 3.3*   CO2 23   < > 21* 23 26      < > 108 108 107   BUN 44*   < > 55* 52* 52*   CREATININE 8.1*   < > 5.7* 4.3* 3.7*   ALKPHOS 55  --   --   --   --    ALT 10  --   --   --   --    AST 12  --   --   --   --     " < > = values in this interval not displayed.     Labs within the past 24 hours have been reviewed.    Diagnostic Results:  Pending

## 2024-07-29 NOTE — CONSULTS
[facility] - Transplant Stepdown  Endocrinology  Diabetes Consult Note    Consult Requested by: Alphonso Mon MD   Reason for admit: Acute blood loss anemia    HISTORY OF PRESENT ILLNESS:  Reason for Consult: Management of T2DM, Hyperglycemia      Surgical Procedure and Date: s/p kidney transplant on 7/24/2024     Diabetes diagnosis year: 2004     Home Diabetes Medications:  Patient previously on tandem insulin pump and Mounjaro. Patient recently lost 60 lbs and had worsening kidney function. Patient states that she is off all diabetes medication for about 3-months and blood sugar has been well controlled. Patient is from [place] and does not have her pump with her to review pump settings. Per chart review, basal insulin was 58 units daily in the past. Anticipate the patient will need more aggressive insulin management post-op.     Most recent D/C recommend:    Novolog SSI:     150 - 200 + 2 unit    201 - 250 + 4 units    251 - 300 + 6 units    301 - 350 + 8 units       > 350   + 10 units       How often checking glucose at home? >4 x day (previously on Dexcom G6, but off since she stopped using tandem pump)   BG readings on regimen: states BG have been well controlled off the pump and the GLP1-RA/ GIP ('s)  Hypoglycemia on the regimen?  No  Missed doses on regimen?  No     Diabetes Complications include:     Hyperglycemia (history)     Complicating diabetes co morbidities:   Glucocorticoid use         HPI: Ms. Kenney is a 63 y.o. female with ESRD secondary to diabetic nephropathy, solitary kidney. PMH DM2, HTN, incontience (s/p bladder sling), CAD with stents (on ASA), atrial fibrillation, CVA (no residual deficits), ALEJA, anemia. She is now s/p DBD kidney transplant on 7/24/24. Surgery without complications. PD cath removed. Post op course notable for down trending Cr with excellent UOP. She now presents as a direct admit from her routine clinic appointment with transplant surgery. Exam concerning for  subcutaneous hematoma. Labs notable for down trending H&H 7.6/23 --> 7/20. Direct admit for imaging and blood transfusion. D/w Dr Squires. On arrival, BP elevated to 204/88 otherwise vital signs normal. Pt c/o nausea, abdominal pain, and constipation. States incision developed bruising with worsening pain starting yesterday afternoon. Denies any changes in urinary output, diarrhea, vomiting, chest pain, sob, fever/chills. Endocrine conus;tal to manage hyperglycemia and type 2 diabetes.     Lab Results   Component Value Date    HGBA1C 4.8 07/24/2024    HGBA1C 4.8 07/24/2024         Interval HPI:   Overnight events: No acute events overnight. Patient in room 62514/89810 A. Blood glucose stable. BG at goal on current insulin regimen (SSI ). Steroid use- Prednisone 20 mg.    Renal function- Abnormal - Creatinine 3.7   Vasopressors-  None       Endocrine will continue to follow and manage insulin orders inpatient.         Diet diabetic 2000 Calorie     Eating:   NPO  Nausea: No  Hypoglycemia and intervention: No  Fever: No  TPN and/or TF: No      PMH, PSH, FH, SH updated and reviewed     ROS:  Review of Systems   Constitutional:  Negative for unexpected weight change.   Eyes:  Negative for visual disturbance.   Respiratory:  Negative for cough.    Cardiovascular:  Negative for chest pain.   Gastrointestinal:  Negative for nausea and vomiting.   Endocrine: Negative for polydipsia and polyuria.   Musculoskeletal:  Negative for back pain.   Skin:  Negative for rash.   Neurological:  Negative for syncope.   Psychiatric/Behavioral:  Negative for agitation and dysphoric mood.        Current Medications and/or Treatments Impacting Glycemic Control  Immunotherapy:    Immunosuppressants           Stop Route Frequency     mycophenolate capsule 1,000 mg         -- Oral 2 times daily     tacrolimus capsule 6 mg         -- Oral 2 times daily          Steroids:   Hormones (From admission, onward)      Start     Stop Route Frequency  "Ordered    07/30/24 0900  predniSONE tablet 20 mg         -- Oral Daily 07/29/24 1425    07/29/24 1436  melatonin tablet 6 mg         -- Oral Nightly PRN 07/29/24 1425          Pressors:    Autonomic Drugs (From admission, onward)      None          Hyperglycemia/Diabetes Medications:   Antihyperglycemics (From admission, onward)      Start     Stop Route Frequency Ordered    07/29/24 1526  insulin aspart U-100 pen 0-5 Units         -- SubQ Before meals & nightly PRN 07/29/24 1426             PHYSICAL EXAMINATION:  Vitals:    07/29/24 1445   BP:    Pulse:    Resp: 16   Temp:      Body mass index is 30.93 kg/m².     Physical Exam  Constitutional:       Appearance: She is well-developed.   HENT:      Head: Normocephalic.   Eyes:      Conjunctiva/sclera: Conjunctivae normal.   Pulmonary:      Effort: Pulmonary effort is normal.   Musculoskeletal:         General: Normal range of motion.   Skin:     General: Skin is warm.      Findings: No rash.   Neurological:      Mental Status: She is alert and oriented to person, place, and time.            Labs Reviewed and Include   Recent Labs   Lab 07/29/24  0821   *   CALCIUM 8.7   ALBUMIN 2.6*      K 3.3*   CO2 26      BUN 52*   CREATININE 3.7*     Lab Results   Component Value Date    WBC 6.43 07/29/2024    HGB 7.0 (L) 07/29/2024    HCT 20.8 (L) 07/29/2024    MCV 89 07/29/2024    PLT 83 (L) 07/29/2024     No results for input(s): "TSH", "FREET4" in the last 168 hours.  Lab Results   Component Value Date    HGBA1C 4.8 07/24/2024    HGBA1C 4.8 07/24/2024       Nutritional status:   Body mass index is 30.93 kg/m².  Lab Results   Component Value Date    ALBUMIN 2.6 (L) 07/29/2024    ALBUMIN 2.4 (L) 07/28/2024    ALBUMIN 2.2 (L) 07/27/2024     No results found for: "PREALBUMIN"    Estimated Creatinine Clearance: 16.2 mL/min (A) (based on SCr of 3.7 mg/dL (H)).    Accu-Checks  Recent Labs     07/26/24  1741 07/26/24  2029 07/26/24  2350 07/27/24  0519 " 07/27/24  0830   POCTGLUCOSE 176* 220* 155* 135* 104        ASSESSMENT and PLAN    Oncology  * Acute blood loss anemia  Managed per primary team  Avoid hypoglycemia        Endocrine  Diabetes mellitus due to underlying condition with chronic kidney disease on chronic dialysis, with long-term current use of insulin  Endocrinology consulted for BG management.   BG goal 140-180    - Novolog (Insulin Aspart) prn for BG excursions [facility] SSI (150/25)  - BG checks AC/HS  - Hypoglycemia protocol in place    ** Please notify Endocrine for any change and/or advance in diet**  ** Please call Endocrine for any BG related issues **    Discharge Planning:   TBD. Please notify endocrinology prior to discharge.        Other  Adrenal corticosteroid causing adverse effect in therapeutic use  Glucocorticoids markedly increase glucose levels. Expect the steroid taper will help glucose control.             Plan discussed with patient, family, and RN at bedside.        Quincy Lyles, DNP, FNP  Endocrinology  [facility] - Trans[facility] Step[facility]

## 2024-07-29 NOTE — PROGRESS NOTES
[facility] - Transplant Step[facility]  Endocrinology  Progress Note    Admit Date: 7/29/2024     Reason for Consult: Management of T2DM, Hyperglycemia      Surgical Procedure and Date: s/p kidney transplant on 7/24/2024     Diabetes diagnosis year: 2004     Home Diabetes Medications:  Patient previously on tandem insulin pump and Mounjaro. Patient recently lost 60 lbs and had worsening kidney function. Patient states that she is off all diabetes medication for about 3-months and blood sugar has been well controlled. Patient is from [place] and does not have her pump with her to review pump settings. Per chart review, basal insulin was 58 units daily in the past. Anticipate the patient will need more aggressive insulin management post-op.     Most recent D/C recommend:    Novolog SSI:     150 - 200 + 2 unit    201 - 250 + 4 units    251 - 300 + 6 units    301 - 350 + 8 units       > 350   + 10 units       How often checking glucose at home? >4 x day (previously on Dexcom G6, but off since she stopped using tandem pump)   BG readings on regimen: states BG have been well controlled off the pump and the GLP1-RA/ GIP ('s)  Hypoglycemia on the regimen?  No  Missed doses on regimen?  No     Diabetes Complications include:     Hyperglycemia (history)     Complicating diabetes co morbidities:   Glucocorticoid use         HPI: Ms. Kenney is a 63 y.o. female with ESRD secondary to diabetic nephropathy, solitary kidney. PMH DM2, HTN, incontience (s/p bladder sling), CAD with stents (on ASA), atrial fibrillation, CVA (no residual deficits), ALEJA, anemia. She is now s/p DBD kidney transplant on 7/24/24. Surgery without complications. PD cath removed. Post op course notable for down trending Cr with excellent UOP. She now presents as a direct admit from her routine clinic appointment with transplant surgery. Exam concerning for subcutaneous hematoma. Labs notable for down trending H&H 7.6/23 --> 7/20. Direct admit for imaging  and blood transfusion. D/w Dr Squires. On arrival, BP elevated to 204/88 otherwise vital signs normal. Pt c/o nausea, abdominal pain, and constipation. States incision developed bruising with worsening pain starting yesterday afternoon. Denies any changes in urinary output, diarrhea, vomiting, chest pain, sob, fever/chills. Endocrine conus;tal to manage hyperglycemia and type 2 diabetes.     Lab Results   Component Value Date    HGBA1C 4.8 07/24/2024    HGBA1C 4.8 07/24/2024         Interval HPI:   Overnight events: No acute events overnight. Patient in room 36056/20329 A. Blood glucose stable. BG at goal on current insulin regimen (SSI ). Steroid use- Prednisone 20 mg.    Renal function- Abnormal - Creatinine 3.7   Vasopressors-  None       Endocrine will continue to follow and manage insulin orders inpatient.         Diet diabetic 2000 Calorie     Eating:   NPO  Nausea: No  Hypoglycemia and intervention: No  Fever: No  TPN and/or TF: No      PMH, PSH, FH, SH updated and reviewed     ROS:  Review of Systems   Constitutional:  Negative for unexpected weight change.   Eyes:  Negative for visual disturbance.   Respiratory:  Negative for cough.    Cardiovascular:  Negative for chest pain.   Gastrointestinal:  Negative for nausea and vomiting.   Endocrine: Negative for polydipsia and polyuria.   Musculoskeletal:  Negative for back pain.   Skin:  Negative for rash.   Neurological:  Negative for syncope.   Psychiatric/Behavioral:  Negative for agitation and dysphoric mood.        Current Medications and/or Treatments Impacting Glycemic Control  Immunotherapy:    Immunosuppressants           Stop Route Frequency     mycophenolate capsule 1,000 mg         -- Oral 2 times daily     tacrolimus capsule 6 mg         -- Oral 2 times daily          Steroids:   Hormones (From admission, onward)      Start     Stop Route Frequency Ordered    07/30/24 0900  predniSONE tablet 20 mg         -- Oral Daily 07/29/24 1425    07/29/24  1436  melatonin tablet 6 mg         -- Oral Nightly PRN 07/29/24 1425          Pressors:    Autonomic Drugs (From admission, onward)      None          Hyperglycemia/Diabetes Medications:   Antihyperglycemics (From admission, onward)      Start     Stop Route Frequency Ordered    07/29/24 1526  insulin aspart U-100 pen 0-5 Units         -- SubQ Before meals & nightly PRN 07/29/24 1426             PHYSICAL EXAMINATION:  Vitals:    07/29/24 1445   BP:    Pulse:    Resp: 16   Temp:      Body mass index is 30.93 kg/m².     Physical Exam  Constitutional:       Appearance: She is well-developed.   HENT:      Head: Normocephalic.   Eyes:      Conjunctiva/sclera: Conjunctivae normal.   Pulmonary:      Effort: Pulmonary effort is normal.   Musculoskeletal:         General: Normal range of motion.   Skin:     General: Skin is warm.      Findings: No rash.   Neurological:      Mental Status: She is alert and oriented to person, place, and time.            ASSESSMENT and PLAN    Oncology  * Acute blood loss anemia  Managed per primary team  Avoid hypoglycemia        Endocrine  Diabetes mellitus due to underlying condition with chronic kidney disease on chronic dialysis, with long-term current use of insulin  Endocrinology consulted for BG management.   BG goal 140-180    - Novolog (Insulin Aspart) prn for BG excursions [facility] SSI (150/25)  - BG checks AC/HS  - Hypoglycemia protocol in place    ** Please notify Endocrine for any change and/or advance in diet**  ** Please call Endocrine for any BG related issues **    Discharge Planning:   TBD. Please notify endocrinology prior to discharge.        Other  Adrenal corticosteroid causing adverse effect in therapeutic use  Glucocorticoids markedly increase glucose levels. Expect the steroid taper will help glucose control.              Quincy Lyles, DNP, FNP  Endocrinology  [facility] - Transplant Stepdown

## 2024-07-29 NOTE — ASSESSMENT & PLAN NOTE
Endocrinology consulted for BG management.   BG goal 140-180    - Novolog (Insulin Aspart) prn for BG excursions [facility] SSI (150/25)  - BG checks AC/HS  - Hypoglycemia protocol in place    ** Please notify Endocrine for any change and/or advance in diet**  ** Please call Endocrine for any BG related issues **    Discharge Planning:   TBD. Please notify endocrinology prior to discharge.

## 2024-07-29 NOTE — PLAN OF CARE
Pt admitted today for anemia/incisional hematoma. S/p kidney transplant from 7/24/24. Pt is Aaox4, VSS on bedside monitor though hypertensive. On RA. Pending CT scan to assess hematoma. 1x unit of blood ordered, type and screen in process.  RLQ incision edematous and bruised, oozing small amounts of serosang drainage, abd pad provided. L abdominal PD removal sites with staples, C/D/I. Skin otherwise intact. Urinary hesitancy upon initial arrival to room, bladder scan with small amount of urine detected, monitoring. Plan for repeat CBC tonight. PICC consult for PIV placement. Pt is up with standby-assist, walker provided to pt. Fall risk reviewed with her and her daughter. She remains free from falls so far today. Nonskid socks on, call bell within reach, bed locked/lowest position. Pt verbalized understanding to call for needs/assistance.

## 2024-07-29 NOTE — PROGRESS NOTES
"1ST NURSING VISIT POST DISCHARGE NOTE    1st RN appointment with Joann Kenney post discharge 7/27/24 s/p kidney transplant 7/24/24.  Patient's daughter accompanied her.  Patient reports bloody incisional drainage.  Patient says that she that she is sleeping well.  Incision intact with staples.  Patient has  no drains .  Patient that she is able to explain daily incision care and showering instructions.  Reviewed I&O monitoring, measuring, and recording, and the need for hydration (i.e., at least 2 liters of water daily with minimal caffeine and no grapefruit products).  Medication list and rationale were reviewed.  Patient did bring blue medication card and medication bottles for review.  Patient reports that she has not stopped Dulcolax and has continued Colace.  Patient has not had a bowel movement.  Patient expressed understanding of daily care including BID VS, medications, and I&O documentation.  Patient made aware of today's creatinine level: 3.7.  Patient aware that coordinator will review today's labs with a transplant physician and call the patient with any dose changes indicated.  Next lab appointment scheduled for 8/1.  First post-operative transplant team appointment with labs scheduled for 8/2.  Educated patient on collecting midstream urine sample. Verbalized understanding.  Using the Kidney Transplant Patient Reference Manual, the patient submitted her open book "Self-assessment of Kidney Transplant Patient Knowledge" test, which was completed in the transplant clinic this morning before 1st nursing visit.  This test includes questions regarding critical dose medications commonly used after kidney transplant, medication dosing and side effects, importance of timed lab draws, important signs and symptoms to report 24/7 immediately post-transplant as well as how to contact the transplant team 24/7.    Test Score: 25/25    After completing the test, the patient was given a copy of the Self-assessment " Please call and help make appointment for patient to come in to be seen per Dr. Dolan.    Answer Key to reinforce accurate learning of test content.  Patient expressed her understanding of the value of the information included in the self-assessment test.

## 2024-07-29 NOTE — PROGRESS NOTES
Pt direct admit to [facility] [address] for incisional edema/bruising. KAVIN Cruz notified of pt's arrival. Also notified PA of hypertension.

## 2024-07-29 NOTE — ASSESSMENT & PLAN NOTE
- s/p DDRTxp on 7/24/24  - obtain CT a/p to assess for collections  - Cr trending down daily with reported good UOP  - strict intake/output  - daily renal function panel

## 2024-07-29 NOTE — SUBJECTIVE & OBJECTIVE
Interval HPI:   Overnight events: No acute events overnight. Patient in room 28371/74919 A. Blood glucose stable. BG at goal on current insulin regimen (SSI ). Steroid use- Prednisone 20 mg.    Renal function- Abnormal - Creatinine 3.7   Vasopressors-  None       Endocrine will continue to follow and manage insulin orders inpatient.         Diet diabetic 2000 Calorie     Eating:   NPO  Nausea: No  Hypoglycemia and intervention: No  Fever: No  TPN and/or TF: No      PMH, PSH, FH, SH updated and reviewed     ROS:  Review of Systems   Constitutional:  Negative for unexpected weight change.   Eyes:  Negative for visual disturbance.   Respiratory:  Negative for cough.    Cardiovascular:  Negative for chest pain.   Gastrointestinal:  Negative for nausea and vomiting.   Endocrine: Negative for polydipsia and polyuria.   Musculoskeletal:  Negative for back pain.   Skin:  Negative for rash.   Neurological:  Negative for syncope.   Psychiatric/Behavioral:  Negative for agitation and dysphoric mood.        Current Medications and/or Treatments Impacting Glycemic Control  Immunotherapy:    Immunosuppressants           Stop Route Frequency     mycophenolate capsule 1,000 mg         -- Oral 2 times daily     tacrolimus capsule 6 mg         -- Oral 2 times daily          Steroids:   Hormones (From admission, onward)      Start     Stop Route Frequency Ordered    07/30/24 0900  predniSONE tablet 20 mg         -- Oral Daily 07/29/24 1425    07/29/24 1436  melatonin tablet 6 mg         -- Oral Nightly PRN 07/29/24 1425          Pressors:    Autonomic Drugs (From admission, onward)      None          Hyperglycemia/Diabetes Medications:   Antihyperglycemics (From admission, onward)      Start     Stop Route Frequency Ordered    07/29/24 1526  insulin aspart U-100 pen 0-5 Units         -- SubQ Before meals & nightly PRN 07/29/24 1426             PHYSICAL EXAMINATION:  Vitals:    07/29/24 1445   BP:    Pulse:    Resp: 16   Temp:       Body mass index is 30.93 kg/m².     Physical Exam  Constitutional:       Appearance: She is well-developed.   HENT:      Head: Normocephalic.   Eyes:      Conjunctiva/sclera: Conjunctivae normal.   Pulmonary:      Effort: Pulmonary effort is normal.   Musculoskeletal:         General: Normal range of motion.   Skin:     General: Skin is warm.      Findings: No rash.   Neurological:      Mental Status: She is alert and oriented to person, place, and time.

## 2024-07-30 LAB
ALBUMIN SERPL BCP-MCNC: 2.4 G/DL (ref 3.5–5.2)
ALBUMIN SERPL BCP-MCNC: 2.7 G/DL (ref 3.5–5.2)
ANION GAP SERPL CALC-SCNC: 6 MMOL/L (ref 8–16)
ANION GAP SERPL CALC-SCNC: 9 MMOL/L (ref 8–16)
BACTERIA #/AREA URNS AUTO: ABNORMAL /HPF
BASOPHILS # BLD AUTO: 0 K/UL (ref 0–0.2)
BASOPHILS # BLD AUTO: 0.01 K/UL (ref 0–0.2)
BASOPHILS NFR BLD: 0 % (ref 0–1.9)
BASOPHILS NFR BLD: 0.1 % (ref 0–1.9)
BILIRUB UR QL STRIP: NEGATIVE
BUN SERPL-MCNC: 42 MG/DL (ref 8–23)
BUN SERPL-MCNC: 45 MG/DL (ref 8–23)
C DIFF GDH STL QL: NEGATIVE
C DIFF TOX A+B STL QL IA: NEGATIVE
CALCIUM SERPL-MCNC: 7.8 MG/DL (ref 8.7–10.5)
CALCIUM SERPL-MCNC: 8.2 MG/DL (ref 8.7–10.5)
CHLORIDE SERPL-SCNC: 106 MMOL/L (ref 95–110)
CHLORIDE SERPL-SCNC: 106 MMOL/L (ref 95–110)
CLARITY UR REFRACT.AUTO: CLEAR
CLASS I ANTIBODIES - LUMINEX: NORMAL
CLASS II ANTIBODIES - LUMINEX: NEGATIVE
CO2 SERPL-SCNC: 26 MMOL/L (ref 23–29)
CO2 SERPL-SCNC: 26 MMOL/L (ref 23–29)
COLOR UR AUTO: YELLOW
CPRA %: 24
CREAT SERPL-MCNC: 2.7 MG/DL (ref 0.5–1.4)
CREAT SERPL-MCNC: 2.7 MG/DL (ref 0.5–1.4)
DIFFERENTIAL METHOD BLD: ABNORMAL
DIFFERENTIAL METHOD BLD: ABNORMAL
EOSINOPHIL # BLD AUTO: 0.1 K/UL (ref 0–0.5)
EOSINOPHIL # BLD AUTO: 0.2 K/UL (ref 0–0.5)
EOSINOPHIL NFR BLD: 0.7 % (ref 0–8)
EOSINOPHIL NFR BLD: 2.3 % (ref 0–8)
ERYTHROCYTE [DISTWIDTH] IN BLOOD BY AUTOMATED COUNT: 15.9 % (ref 11.5–14.5)
ERYTHROCYTE [DISTWIDTH] IN BLOOD BY AUTOMATED COUNT: 15.9 % (ref 11.5–14.5)
EST. GFR  (NO RACE VARIABLE): 19 ML/MIN/1.73 M^2
EST. GFR  (NO RACE VARIABLE): 19 ML/MIN/1.73 M^2
GLUCOSE SERPL-MCNC: 144 MG/DL (ref 70–110)
GLUCOSE SERPL-MCNC: 218 MG/DL (ref 70–110)
GLUCOSE UR QL STRIP: ABNORMAL
HCT VFR BLD AUTO: 21.4 % (ref 37–48.5)
HCT VFR BLD AUTO: 23.3 % (ref 37–48.5)
HGB BLD-MCNC: 7.1 G/DL (ref 12–16)
HGB BLD-MCNC: 7.8 G/DL (ref 12–16)
HGB UR QL STRIP: ABNORMAL
HYALINE CASTS UR QL AUTO: 0 /LPF
IMM GRANULOCYTES # BLD AUTO: 0.17 K/UL (ref 0–0.04)
IMM GRANULOCYTES # BLD AUTO: 0.2 K/UL (ref 0–0.04)
IMM GRANULOCYTES NFR BLD AUTO: 1.9 % (ref 0–0.5)
IMM GRANULOCYTES NFR BLD AUTO: 2.3 % (ref 0–0.5)
KETONES UR QL STRIP: NEGATIVE
LEUKOCYTE ESTERASE UR QL STRIP: ABNORMAL
LYMPHOCYTES # BLD AUTO: 0.1 K/UL (ref 1–4.8)
LYMPHOCYTES # BLD AUTO: 0.2 K/UL (ref 1–4.8)
LYMPHOCYTES NFR BLD: 1.4 % (ref 18–48)
LYMPHOCYTES NFR BLD: 3.1 % (ref 18–48)
MAGNESIUM SERPL-MCNC: 1.7 MG/DL (ref 1.6–2.6)
MCH RBC QN AUTO: 29.1 PG (ref 27–31)
MCH RBC QN AUTO: 29.5 PG (ref 27–31)
MCHC RBC AUTO-ENTMCNC: 33.2 G/DL (ref 32–36)
MCHC RBC AUTO-ENTMCNC: 33.5 G/DL (ref 32–36)
MCV RBC AUTO: 88 FL (ref 82–98)
MCV RBC AUTO: 88 FL (ref 82–98)
MICROSCOPIC COMMENT: ABNORMAL
MONOCYTES # BLD AUTO: 0.2 K/UL (ref 0.3–1)
MONOCYTES # BLD AUTO: 0.4 K/UL (ref 0.3–1)
MONOCYTES NFR BLD: 1.9 % (ref 4–15)
MONOCYTES NFR BLD: 5.9 % (ref 4–15)
NEUTROPHILS # BLD AUTO: 6.5 K/UL (ref 1.8–7.7)
NEUTROPHILS # BLD AUTO: 9.7 K/UL (ref 1.8–7.7)
NEUTROPHILS NFR BLD: 86.3 % (ref 38–73)
NEUTROPHILS NFR BLD: 94.1 % (ref 38–73)
NITRITE UR QL STRIP: NEGATIVE
NRBC BLD-RTO: 0 /100 WBC
NRBC BLD-RTO: 0 /100 WBC
PH UR STRIP: 7 [PH] (ref 5–8)
PHOSPHATE SERPL-MCNC: 2 MG/DL (ref 2.7–4.5)
PHOSPHATE SERPL-MCNC: 2.5 MG/DL (ref 2.7–4.5)
PLATELET # BLD AUTO: 84 K/UL (ref 150–450)
PLATELET # BLD AUTO: 94 K/UL (ref 150–450)
PMV BLD AUTO: 11.6 FL (ref 9.2–12.9)
PMV BLD AUTO: 12.1 FL (ref 9.2–12.9)
POCT GLUCOSE: 165 MG/DL (ref 70–110)
POCT GLUCOSE: 194 MG/DL (ref 70–110)
POCT GLUCOSE: 202 MG/DL (ref 70–110)
POCT GLUCOSE: 206 MG/DL (ref 70–110)
POCT GLUCOSE: 219 MG/DL (ref 70–110)
POTASSIUM SERPL-SCNC: 2.8 MMOL/L (ref 3.5–5.1)
POTASSIUM SERPL-SCNC: 4.2 MMOL/L (ref 3.5–5.1)
PROT UR QL STRIP: ABNORMAL
RBC # BLD AUTO: 2.44 M/UL (ref 4–5.4)
RBC # BLD AUTO: 2.64 M/UL (ref 4–5.4)
RBC #/AREA URNS AUTO: >100 /HPF (ref 0–4)
SERUM COLLECTION DT - LUMINEX CLASS I: NORMAL
SERUM COLLECTION DT - LUMINEX CLASS II: NORMAL
SODIUM SERPL-SCNC: 138 MMOL/L (ref 136–145)
SODIUM SERPL-SCNC: 141 MMOL/L (ref 136–145)
SP GR UR STRIP: 1.01 (ref 1–1.03)
SPCL1 TESTING DATE: NORMAL
SPCL2 TESTING DATE: NORMAL
SPLUA TESTING DATE: NORMAL
SQUAMOUS #/AREA URNS AUTO: 1 /HPF
TACROLIMUS BLD-MCNC: 6.8 NG/ML (ref 5–15)
URN SPEC COLLECT METH UR: ABNORMAL
WBC # BLD AUTO: 10.28 K/UL (ref 3.9–12.7)
WBC # BLD AUTO: 7.5 K/UL (ref 3.9–12.7)
WBC #/AREA URNS AUTO: 4 /HPF (ref 0–5)
YEAST UR QL AUTO: ABNORMAL

## 2024-07-30 PROCEDURE — 83735 ASSAY OF MAGNESIUM: CPT | Performed by: PHYSICIAN ASSISTANT

## 2024-07-30 PROCEDURE — 81001 URINALYSIS AUTO W/SCOPE: CPT | Performed by: PHYSICIAN ASSISTANT

## 2024-07-30 PROCEDURE — 63600175 PHARM REV CODE 636 W HCPCS: Performed by: PHYSICIAN ASSISTANT

## 2024-07-30 PROCEDURE — 25000003 PHARM REV CODE 250: Performed by: PHYSICIAN ASSISTANT

## 2024-07-30 PROCEDURE — 87449 NOS EACH ORGANISM AG IA: CPT | Performed by: PHYSICIAN ASSISTANT

## 2024-07-30 PROCEDURE — 87324 CLOSTRIDIUM AG IA: CPT | Performed by: PHYSICIAN ASSISTANT

## 2024-07-30 PROCEDURE — 80069 RENAL FUNCTION PANEL: CPT | Performed by: PHYSICIAN ASSISTANT

## 2024-07-30 PROCEDURE — 85025 COMPLETE CBC W/AUTO DIFF WBC: CPT | Performed by: PHYSICIAN ASSISTANT

## 2024-07-30 PROCEDURE — 25000003 PHARM REV CODE 250: Performed by: TRANSPLANT SURGERY

## 2024-07-30 PROCEDURE — 99232 SBSQ HOSP IP/OBS MODERATE 35: CPT | Mod: ,,, | Performed by: NURSE PRACTITIONER

## 2024-07-30 PROCEDURE — 36415 COLL VENOUS BLD VENIPUNCTURE: CPT | Performed by: PHYSICIAN ASSISTANT

## 2024-07-30 PROCEDURE — 87086 URINE CULTURE/COLONY COUNT: CPT | Performed by: PHYSICIAN ASSISTANT

## 2024-07-30 PROCEDURE — 20600001 HC STEP DOWN PRIVATE ROOM

## 2024-07-30 PROCEDURE — 80197 ASSAY OF TACROLIMUS: CPT | Performed by: PHYSICIAN ASSISTANT

## 2024-07-30 RX ORDER — NIFEDIPINE 30 MG/1
30 TABLET, EXTENDED RELEASE ORAL ONCE
Status: COMPLETED | OUTPATIENT
Start: 2024-07-30 | End: 2024-07-30

## 2024-07-30 RX ORDER — MINOXIDIL 2.5 MG/1
2.5 TABLET ORAL DAILY
Status: DISCONTINUED | OUTPATIENT
Start: 2024-07-30 | End: 2024-07-31

## 2024-07-30 RX ORDER — DULOXETIN HYDROCHLORIDE 30 MG/1
30 CAPSULE, DELAYED RELEASE ORAL NIGHTLY
Status: DISCONTINUED | OUTPATIENT
Start: 2024-07-30 | End: 2024-08-04 | Stop reason: HOSPADM

## 2024-07-30 RX ORDER — PSEUDOEPHEDRINE/ACETAMINOPHEN 30MG-500MG
100 TABLET ORAL
Status: DISCONTINUED | OUTPATIENT
Start: 2024-07-30 | End: 2024-07-30

## 2024-07-30 RX ORDER — SULFAMETHOXAZOLE AND TRIMETHOPRIM 400; 80 MG/1; MG/1
1 TABLET ORAL
Status: DISCONTINUED | OUTPATIENT
Start: 2024-07-31 | End: 2024-08-01

## 2024-07-30 RX ORDER — POTASSIUM CHLORIDE 20 MEQ/1
40 TABLET, EXTENDED RELEASE ORAL EVERY 4 HOURS
Status: COMPLETED | OUTPATIENT
Start: 2024-07-30 | End: 2024-07-30

## 2024-07-30 RX ORDER — SYRING-NEEDL,DISP,INSUL,0.3 ML 29 G X1/2"
296 SYRINGE, EMPTY DISPOSABLE MISCELLANEOUS
Status: DISCONTINUED | OUTPATIENT
Start: 2024-07-30 | End: 2024-07-30

## 2024-07-30 RX ORDER — CARVEDILOL 12.5 MG/1
12.5 TABLET ORAL 2 TIMES DAILY
Status: DISCONTINUED | OUTPATIENT
Start: 2024-07-30 | End: 2024-08-03

## 2024-07-30 RX ORDER — NIFEDIPINE 30 MG/1
60 TABLET, EXTENDED RELEASE ORAL 2 TIMES DAILY
Status: DISCONTINUED | OUTPATIENT
Start: 2024-07-30 | End: 2024-08-04 | Stop reason: HOSPADM

## 2024-07-30 RX ORDER — CARVEDILOL 6.25 MG/1
6.25 TABLET ORAL 2 TIMES DAILY
Status: DISCONTINUED | OUTPATIENT
Start: 2024-07-30 | End: 2024-07-30

## 2024-07-30 RX ORDER — POTASSIUM CHLORIDE 7.45 MG/ML
10 INJECTION INTRAVENOUS
Status: COMPLETED | OUTPATIENT
Start: 2024-07-30 | End: 2024-07-30

## 2024-07-30 RX ADMIN — CARVEDILOL 12.5 MG: 12.5 TABLET, FILM COATED ORAL at 08:07

## 2024-07-30 RX ADMIN — TACROLIMUS 6 MG: 1 CAPSULE ORAL at 06:07

## 2024-07-30 RX ADMIN — AMOXICILLIN AND CLAVULANATE POTASSIUM 1 TABLET: 875; 125 TABLET, FILM COATED ORAL at 08:07

## 2024-07-30 RX ADMIN — PANTOPRAZOLE SODIUM 40 MG: 40 TABLET, DELAYED RELEASE ORAL at 09:07

## 2024-07-30 RX ADMIN — POTASSIUM CHLORIDE 40 MEQ: 1500 TABLET, EXTENDED RELEASE ORAL at 07:07

## 2024-07-30 RX ADMIN — NIFEDIPINE 60 MG: 30 TABLET, FILM COATED, EXTENDED RELEASE ORAL at 08:07

## 2024-07-30 RX ADMIN — ATORVASTATIN CALCIUM 20 MG: 20 TABLET, FILM COATED ORAL at 08:07

## 2024-07-30 RX ADMIN — SULFAMETHOXAZOLE AND TRIMETHOPRIM 1 TABLET: 400; 80 TABLET ORAL at 06:07

## 2024-07-30 RX ADMIN — POTASSIUM CHLORIDE 10 MEQ: 7.46 INJECTION, SOLUTION INTRAVENOUS at 09:07

## 2024-07-30 RX ADMIN — MYCOPHENOLATE MOFETIL 1000 MG: 250 CAPSULE ORAL at 08:07

## 2024-07-30 RX ADMIN — DULOXETINE HYDROCHLORIDE 30 MG: 30 CAPSULE, DELAYED RELEASE ORAL at 08:07

## 2024-07-30 RX ADMIN — CETIRIZINE HYDROCHLORIDE 5 MG: 5 TABLET, FILM COATED ORAL at 08:07

## 2024-07-30 RX ADMIN — CLONIDINE HYDROCHLORIDE 0.1 MG: 0.1 TABLET ORAL at 02:07

## 2024-07-30 RX ADMIN — SODIUM BICARBONATE 650 MG: 650 TABLET ORAL at 09:07

## 2024-07-30 RX ADMIN — EZETIMIBE 10 MG: 10 TABLET ORAL at 08:07

## 2024-07-30 RX ADMIN — DIBASIC SODIUM PHOSPHATE, MONOBASIC POTASSIUM PHOSPHATE AND MONOBASIC SODIUM PHOSPHATE 2 TABLET: 852; 155; 130 TABLET ORAL at 08:07

## 2024-07-30 RX ADMIN — NIFEDIPINE 30 MG: 30 TABLET, FILM COATED, EXTENDED RELEASE ORAL at 09:07

## 2024-07-30 RX ADMIN — POTASSIUM CHLORIDE 10 MEQ: 7.46 INJECTION, SOLUTION INTRAVENOUS at 07:07

## 2024-07-30 RX ADMIN — INSULIN ASPART 2 UNITS: 100 INJECTION, SOLUTION INTRAVENOUS; SUBCUTANEOUS at 01:07

## 2024-07-30 RX ADMIN — POTASSIUM CHLORIDE 40 MEQ: 1500 TABLET, EXTENDED RELEASE ORAL at 11:07

## 2024-07-30 RX ADMIN — CLONIDINE HYDROCHLORIDE 0.1 MG: 0.1 TABLET ORAL at 04:07

## 2024-07-30 RX ADMIN — PREDNISONE 20 MG: 20 TABLET ORAL at 09:07

## 2024-07-30 RX ADMIN — NIFEDIPINE 30 MG: 30 TABLET, FILM COATED, EXTENDED RELEASE ORAL at 01:07

## 2024-07-30 RX ADMIN — POTASSIUM CHLORIDE 10 MEQ: 7.46 INJECTION, SOLUTION INTRAVENOUS at 10:07

## 2024-07-30 RX ADMIN — POTASSIUM CHLORIDE 10 MEQ: 7.46 INJECTION, SOLUTION INTRAVENOUS at 11:07

## 2024-07-30 RX ADMIN — MINOXIDIL 2.5 MG: 2.5 TABLET ORAL at 03:07

## 2024-07-30 RX ADMIN — CINACALCET 60 MG: 30 TABLET, FILM COATED ORAL at 07:07

## 2024-07-30 RX ADMIN — TACROLIMUS 6 MG: 1 CAPSULE ORAL at 07:07

## 2024-07-30 RX ADMIN — INSULIN ASPART 2 UNITS: 100 INJECTION, SOLUTION INTRAVENOUS; SUBCUTANEOUS at 06:07

## 2024-07-30 NOTE — PROGRESS NOTES
[facility] - Trans[facility] Step[facility]  Kidney Transplant  Progress Note      Reason for Follow-up: Reassessment of Kidney Transplant - 7/24/2024  (#1) recipient and management of immunosuppression.    ORGAN:   RIGHT KIDNEY    Donor Type:   Donation after Brain Death    Donor CMV Status: Positive  Donor HBcAB:Negative  Donor HCV Status:Negative  Donor HBV RAH:   Donor HCV RAH: Negative      Subjective:   History of Present Illness:  Ms. Kenney is a 63 y.o. female with ESRD secondary to diabetic nephropathy, solitary kidney.  PMH DM2, HTN, incontience (s/p bladder sling), CAD with stents (on ASA), atrial fibrillation, CVA (no residual deficits), ALEJA, anemia. She is now s/p DBD kidney transplant on 7/24/24. Surgery without complications. PD cath removed. Post op course notable for down trending Cr with excellent UOP. Also notable for   - Salivary gland infection. Per ID,discharged on Augmentin for a 10 day antibiotic course, stop date 8/3/24. Should she develop submandibular swelling, recommend CT neck with ENT consult   - Anemia requiring prbc transfusion on 7/27/24 with adequate response   - ER visit on 7/28 for hypertension - PO meds adjusted and d/c' from ED    She now presents as a direct admit from her routine clinic appointment with transplant surgery. Exam concerning for subcutaneous hematoma. Labs notable for down trending H&H 7.6/23 --> 7/20. Direct admit for imaging and blood transfusion. D/w Dr Squires. On arrival, BP elevated to 204/88 otherwise vital signs normal. Pt c/o nausea, abdominal pain, and constipation. Denies and changes in urinary output, diarrhea, vomiting, chest pain, sob, fever/chills.       Hospital Course:  Pt admitted for acute on chronic anemia, abdominal pain, and increased swelling and bruising at incision. CT a/p obtained on admit and reviewed by Dr Cody. Notable for large stool burden and small sub incisional collection. Transfused 1 unit PRBC and responded appropriately initially. H&H  slightly decreased again today - trend CBC today. She has had multiple bowel movements and is feeling much better today. Replace electrolytes. Continue to monitor.         Past Medical, Surgical, Family, and Social History:   Unchanged from H&P.    Scheduled Meds:   amoxicillin-clavulanate 875-125mg  1 tablet Oral Q12H    atorvastatin  20 mg Oral QHS    carvediloL  6.25 mg Oral BID    cetirizine  5 mg Oral QHS    cinacalcet  60 mg Oral Daily with breakfast    DULoxetine  30 mg Oral QHS    ezetimibe  10 mg Oral Daily    mycophenolate  1,000 mg Oral BID    NIFEdipine  60 mg Oral BID    pantoprazole  40 mg Oral Daily    potassium chloride  10 mEq Intravenous Q1H    potassium chloride  40 mEq Oral Q4H    predniSONE  20 mg Oral Daily    [START ON 7/31/2024] sulfamethoxazole-trimethoprim 400-80mg  1 tablet Oral Every Mon, Wed, Fri    tacrolimus  6 mg Oral BID    valGANciclovir  450 mg Oral Once per day on Monday Wednesday Friday     Continuous Infusions:  PRN Meds:  Current Facility-Administered Medications:     0.9%  NaCl infusion (for blood administration), , Intravenous, Q24H PRN    acetaminophen, 650 mg, Oral, Q4H PRN    ALPRAZolam, 1 mg, Oral, BID PRN    calcium carbonate, 500 mg, Oral, TID PRN    cloNIDine, 0.1 mg, Oral, Q6H PRN    dextrose 10%, 12.5 g, Intravenous, PRN    dextrose 10%, 25 g, Intravenous, PRN    glucagon (human recombinant), 1 mg, Intramuscular, PRN    glucose, 16 g, Oral, PRN    glucose, 24 g, Oral, PRN    insulin aspart U-100, 0-5 Units, Subcutaneous, QID (AC + HS) PRN    melatonin, 6 mg, Oral, Nightly PRN    ondansetron, 4 mg, Intravenous, Q6H PRN    oxybutynin, 5 mg, Oral, TID PRN    oxyCODONE, 5 mg, Oral, Q6H PRN    prochlorperazine, 5 mg, Intravenous, Q6H PRN    Intake/Output - Last 3 Shifts         07/28 0700 07/29 0659 07/29 0700 07/30 0659 07/30 0700 07/31 0659    P.O.  702     Blood  368.8     Total Intake(mL/kg)  1070.8 (12.7)     Urine (mL/kg/hr)  675 200 (0.6)    Stool  0     Total  "Output  675 200    Net  +395.8 -200           Urine Occurrence  1 x     Stool Occurrence  2 x              Review of Systems   Constitutional:  Negative for activity change, chills and fever.   Respiratory:  Negative for shortness of breath.    Cardiovascular:  Negative for leg swelling.   Gastrointestinal:  Negative for abdominal distention, abdominal pain, constipation, diarrhea, nausea and vomiting.   Genitourinary:  Negative for decreased urine volume, dysuria, flank pain and hematuria.   Skin:  Positive for wound.   Allergic/Immunologic: Positive for immunocompromised state.   Psychiatric/Behavioral:  Negative for confusion.       Objective:     Vital Signs (Most Recent):  Temp: 97.7 °F (36.5 °C) (07/30/24 0811)  Pulse: 61 (07/30/24 0811)  Resp: 18 (07/30/24 0811)  BP: (!) 200/81 (07/30/24 0811)  SpO2: 97 % (07/30/24 0811) Vital Signs (24h Range):  Temp:  [97.5 °F (36.4 °C)-98.6 °F (37 °C)] 97.7 °F (36.5 °C)  Pulse:  [57-91] 61  Resp:  [16-19] 18  SpO2:  [97 %-100 %] 97 %  BP: (159-204)/(72-88) 200/81     Weight: 84.3 kg (185 lb 13.6 oz)  Height: 5' 5" (165.1 cm)  Body mass index is 30.93 kg/m².     Physical Exam  Vitals and nursing note reviewed.   Constitutional:       Appearance: She is well-developed.   HENT:      Head: Normocephalic and atraumatic.   Eyes:      General:         Right eye: No discharge.         Left eye: No discharge.   Cardiovascular:      Rate and Rhythm: Normal rate and regular rhythm.   Pulmonary:      Effort: Pulmonary effort is normal.   Abdominal:      General: Abdomen is flat. Bowel sounds are normal. There is no distension.      Palpations: Abdomen is soft.      Tenderness: There is abdominal tenderness.       Musculoskeletal:      Cervical back: Normal range of motion.      Right lower leg: Edema present.      Left lower leg: No edema.   Skin:     General: Skin is warm and dry.   Neurological:      Mental Status: She is alert and oriented to person, place, and time.   Psychiatric: " "        Behavior: Behavior normal.          Laboratory:  CBC:   Recent Labs   Lab 07/29/24  1524 07/29/24  2136 07/30/24  0858   WBC 10.06 8.88 7.50   RBC 2.44* 2.71* 2.44*   HGB 7.3* 8.1* 7.1*   HCT 21.9* 24.2* 21.4*   PLT 71* 75* 84*   MCV 90 89 88   MCH 29.9 29.9 29.1   MCHC 33.3 33.5 33.2     CMP:   Recent Labs   Lab 07/24/24  0317 07/24/24  1637 07/29/24  0821 07/29/24  1524 07/30/24  0618   GLU 88   < > 123* 165* 144*   CALCIUM 9.9   < > 8.7 8.5* 7.8*   ALBUMIN 2.6*   < > 2.6* 2.7* 2.4*   PROT 5.6*  --   --   --   --       < > 142 140 141   K 4.3   < > 3.3* 3.6 2.8*   CO2 23   < > 26 25 26      < > 107 106 106   BUN 44*   < > 52* 50* 45*   CREATININE 8.1*   < > 3.7* 3.4* 2.7*   ALKPHOS 55  --   --   --   --    ALT 10  --   --   --   --    AST 12  --   --   --   --     < > = values in this interval not displayed.     Labs within the past 24 hours have been reviewed.    Diagnostic Results:  Reviewed.  Assessment/Plan:     * Acute blood loss anemia  - Pt with decreasing H&H 7.6/23 --> 7/20 on outpatient labs  - CT with small subincisional collection reviewed by Dr Cody  - transfuse 1 unit PRBC   - trend CBC today      S/P kidney transplant  - s/p DDRTxp on 7/24/24  - CT a/p reviewed  - Cr trending down daily with reported good UOP  - strict intake/output  - daily renal function panel      Salivary gland infection  - continue augmentin      Long-term use of immunosuppressant medication  - see "prophylactic immunotherapy"      Prophylactic immunotherapy  - continue prograf, cellcept, and steroid taper per protocol  - will check daily prograf levels, monitor for toxic side effects, and adjust for therapeutic dose        Adrenal corticosteroid causing adverse effect in therapeutic use  - appreciate endo assistance      Anemia in ESRD (end-stage renal disease)  - see "Acute blood loss anemia"      Renovascular hypertension  - on coreg 12.5 mg BID at home  - presented to ED yesterday for high blood pressure " and coreg dose increased  - uncontrolled on admit  - Adjust regimen: procardia 60 mg BID and coreg 6.25 mg BID with hold parameters      Diabetes mellitus due to underlying condition with chronic kidney disease on chronic dialysis, with long-term current use of insulin  Consult endocrine for management, appreciate their assistance          Discharge Planning: Not a candidate for discharge at this time.     Medical decision making for this encounter includes review of pertinent labs and diagnostic studies, assessment and planning, discussions with consulting providers, discussion with patient/family, and participation in multidisciplinary rounds. Time spent caring for patient: 60 minutes    Aniyah Tidwell PA-C  Kidney Transplant  [facility] - Transplant Step[facility]

## 2024-07-30 NOTE — ASSESSMENT & PLAN NOTE
- Pt with decreasing H&H 7.6/23 --> 7/20 on outpatient labs  - CT with small subincisional collection reviewed by Dr Cody  - transfuse 1 unit PRBC   - trend CBC today

## 2024-07-30 NOTE — SUBJECTIVE & OBJECTIVE
Subjective:   History of Present Illness:  Ms. Kenney is a 63 y.o. female with ESRD secondary to diabetic nephropathy, solitary kidney.  PMH DM2, HTN, incontience (s/p bladder sling), CAD with stents (on ASA), atrial fibrillation, CVA (no residual deficits), ALEJA, anemia. She is now s/p DBD kidney transplant on 7/24/24. Surgery without complications. PD cath removed. Post op course notable for down trending Cr with excellent UOP. Also notable for   - Salivary gland infection. Per ID,discharged on Augmentin for a 10 day antibiotic course, stop date 8/3/24. Should she develop submandibular swelling, recommend CT neck with ENT consult   - Anemia requiring prbc transfusion on 7/27/24 with adequate response   - ER visit on 7/28 for hypertension - PO meds adjusted and d/c' from ED    She now presents as a direct admit from her routine clinic appointment with transplant surgery. Exam concerning for subcutaneous hematoma. Labs notable for down trending H&H 7.6/23 --> 7/20. Direct admit for imaging and blood transfusion. D/w Dr Squires. On arrival, BP elevated to 204/88 otherwise vital signs normal. Pt c/o nausea, abdominal pain, and constipation. Denies and changes in urinary output, diarrhea, vomiting, chest pain, sob, fever/chills.       Hospital Course:  Pt admitted for acute on chronic anemia, abdominal pain, and increased swelling and bruising at incision. CT a/p obtained on admit and reviewed by Dr Cody. Notable for large stool burden and small sub incisional collection. Transfused 1 unit PRBC and responded appropriately initially. H&H slightly decreased again today - trend CBC today. She has had multiple bowel movements and is feeling much better today. Replace electrolytes. Continue to monitor.         Past Medical, Surgical, Family, and Social History:   Unchanged from H&P.    Scheduled Meds:   amoxicillin-clavulanate 875-125mg  1 tablet Oral Q12H    atorvastatin  20 mg Oral QHS    carvediloL  6.25 mg Oral BID     cetirizine  5 mg Oral QHS    cinacalcet  60 mg Oral Daily with breakfast    DULoxetine  30 mg Oral QHS    ezetimibe  10 mg Oral Daily    mycophenolate  1,000 mg Oral BID    NIFEdipine  60 mg Oral BID    pantoprazole  40 mg Oral Daily    potassium chloride  10 mEq Intravenous Q1H    potassium chloride  40 mEq Oral Q4H    predniSONE  20 mg Oral Daily    [START ON 7/31/2024] sulfamethoxazole-trimethoprim 400-80mg  1 tablet Oral Every Mon, Wed, Fri    tacrolimus  6 mg Oral BID    valGANciclovir  450 mg Oral Once per day on Monday Wednesday Friday     Continuous Infusions:  PRN Meds:  Current Facility-Administered Medications:     0.9%  NaCl infusion (for blood administration), , Intravenous, Q24H PRN    acetaminophen, 650 mg, Oral, Q4H PRN    ALPRAZolam, 1 mg, Oral, BID PRN    calcium carbonate, 500 mg, Oral, TID PRN    cloNIDine, 0.1 mg, Oral, Q6H PRN    dextrose 10%, 12.5 g, Intravenous, PRN    dextrose 10%, 25 g, Intravenous, PRN    glucagon (human recombinant), 1 mg, Intramuscular, PRN    glucose, 16 g, Oral, PRN    glucose, 24 g, Oral, PRN    insulin aspart U-100, 0-5 Units, Subcutaneous, QID (AC + HS) PRN    melatonin, 6 mg, Oral, Nightly PRN    ondansetron, 4 mg, Intravenous, Q6H PRN    oxybutynin, 5 mg, Oral, TID PRN    oxyCODONE, 5 mg, Oral, Q6H PRN    prochlorperazine, 5 mg, Intravenous, Q6H PRN    Intake/Output - Last 3 Shifts         07/28 0700 07/29 0659 07/29 0700 07/30 0659 07/30 0700 07/31 0659    P.O.  702     Blood  368.8     Total Intake(mL/kg)  1070.8 (12.7)     Urine (mL/kg/hr)  675 200 (0.6)    Stool  0     Total Output  675 200    Net  +395.8 -200           Urine Occurrence  1 x     Stool Occurrence  2 x              Review of Systems   Constitutional:  Negative for activity change, chills and fever.   Respiratory:  Negative for shortness of breath.    Cardiovascular:  Negative for leg swelling.   Gastrointestinal:  Negative for abdominal distention, abdominal pain, constipation, diarrhea,  "nausea and vomiting.   Genitourinary:  Negative for decreased urine volume, dysuria, flank pain and hematuria.   Skin:  Positive for wound.   Allergic/Immunologic: Positive for immunocompromised state.   Psychiatric/Behavioral:  Negative for confusion.       Objective:     Vital Signs (Most Recent):  Temp: 97.7 °F (36.5 °C) (07/30/24 0811)  Pulse: 61 (07/30/24 0811)  Resp: 18 (07/30/24 0811)  BP: (!) 200/81 (07/30/24 0811)  SpO2: 97 % (07/30/24 0811) Vital Signs (24h Range):  Temp:  [97.5 °F (36.4 °C)-98.6 °F (37 °C)] 97.7 °F (36.5 °C)  Pulse:  [57-91] 61  Resp:  [16-19] 18  SpO2:  [97 %-100 %] 97 %  BP: (159-204)/(72-88) 200/81     Weight: 84.3 kg (185 lb 13.6 oz)  Height: 5' 5" (165.1 cm)  Body mass index is 30.93 kg/m².     Physical Exam  Vitals and nursing note reviewed.   Constitutional:       Appearance: She is well-developed.   HENT:      Head: Normocephalic and atraumatic.   Eyes:      General:         Right eye: No discharge.         Left eye: No discharge.   Cardiovascular:      Rate and Rhythm: Normal rate and regular rhythm.   Pulmonary:      Effort: Pulmonary effort is normal.   Abdominal:      General: Abdomen is flat. Bowel sounds are normal. There is no distension.      Palpations: Abdomen is soft.      Tenderness: There is abdominal tenderness.       Musculoskeletal:      Cervical back: Normal range of motion.      Right lower leg: Edema present.      Left lower leg: No edema.   Skin:     General: Skin is warm and dry.   Neurological:      Mental Status: She is alert and oriented to person, place, and time.   Psychiatric:         Behavior: Behavior normal.          Laboratory:  CBC:   Recent Labs   Lab 07/29/24  1524 07/29/24  2136 07/30/24  0858   WBC 10.06 8.88 7.50   RBC 2.44* 2.71* 2.44*   HGB 7.3* 8.1* 7.1*   HCT 21.9* 24.2* 21.4*   PLT 71* 75* 84*   MCV 90 89 88   MCH 29.9 29.9 29.1   MCHC 33.3 33.5 33.2     CMP:   Recent Labs   Lab 07/24/24  0317 07/24/24  1637 07/29/24  0821 07/29/24  1524 " 07/30/24  0618   GLU 88   < > 123* 165* 144*   CALCIUM 9.9   < > 8.7 8.5* 7.8*   ALBUMIN 2.6*   < > 2.6* 2.7* 2.4*   PROT 5.6*  --   --   --   --       < > 142 140 141   K 4.3   < > 3.3* 3.6 2.8*   CO2 23   < > 26 25 26      < > 107 106 106   BUN 44*   < > 52* 50* 45*   CREATININE 8.1*   < > 3.7* 3.4* 2.7*   ALKPHOS 55  --   --   --   --    ALT 10  --   --   --   --    AST 12  --   --   --   --     < > = values in this interval not displayed.     Labs within the past 24 hours have been reviewed.    Diagnostic Results:  Reviewed.

## 2024-07-30 NOTE — SUBJECTIVE & OBJECTIVE
"Interval HPI:   Overnight events: No acute events overnight. Patient in room 96598/19168 A. Blood glucose stable. BG at goal on current insulin regimen (SSI ). Steroid use- Prednisone 20 mg.    Renal function- Abnormal - Creatinine 2.7   Vasopressors-  None       Endocrine will continue to follow and manage insulin orders inpatient.         Diet diabetic  Calorie     Eatin%  Nausea: No  Hypoglycemia and intervention: No  Fever: No  TPN and/or TF: No      BP (!) 200/81   Pulse 61   Temp 97.7 °F (36.5 °C)   Resp 18   Ht 5' 5" (1.651 m)   Wt 84.3 kg (185 lb 13.6 oz)   SpO2 97%   Breastfeeding No   BMI 30.93 kg/m²     Labs Reviewed and Include    Recent Labs   Lab 24  0618   *   CALCIUM 7.8*   ALBUMIN 2.4*      K 2.8*   CO2 26      BUN 45*   CREATININE 2.7*     Lab Results   Component Value Date    WBC 7.50 2024    HGB 7.1 (L) 2024    HCT 21.4 (L) 2024    MCV 88 2024    PLT 84 (L) 2024     No results for input(s): "TSH", "FREET4" in the last 168 hours.  Lab Results   Component Value Date    HGBA1C 4.8 2024    HGBA1C 4.8 2024       Nutritional status:   Body mass index is 30.93 kg/m².  Lab Results   Component Value Date    ALBUMIN 2.4 (L) 2024    ALBUMIN 2.7 (L) 2024    ALBUMIN 2.6 (L) 2024     No results found for: "PREALBUMIN"    Estimated Creatinine Clearance: 22.3 mL/min (A) (based on SCr of 2.7 mg/dL (H)).    Accu-Checks  Recent Labs     24  1721 24  2105 24  0839   POCTGLUCOSE 196* 202* 165*       Current Medications and/or Treatments Impacting Glycemic Control  Immunotherapy:    Immunosuppressants           Stop Route Frequency     mycophenolate capsule 1,000 mg         -- Oral 2 times daily     tacrolimus capsule 6 mg         -- Oral 2 times daily          Steroids:   Hormones (From admission, onward)      Start     Stop Route Frequency Ordered    24 0900  predniSONE tablet 20 mg         " -- Oral Daily 07/29/24 1425    07/29/24 1436  melatonin tablet 6 mg         -- Oral Nightly PRN 07/29/24 1425          Pressors:    Autonomic Drugs (From admission, onward)      None          Hyperglycemia/Diabetes Medications:   Antihyperglycemics (From admission, onward)      Start     Stop Route Frequency Ordered    07/29/24 1526  insulin aspart U-100 pen 0-5 Units         -- SubQ Before meals & nightly PRN 07/29/24 1429

## 2024-07-30 NOTE — HOSPITAL COURSE
Pt admitted for acute on chronic anemia, abdominal pain, and increased swelling and bruising at incision. CT a/p obtained on admit and reviewed by Dr Cody. Notable for large stool burden and small sub incisional collection. Transfused 1 unit PRBC and responded appropriately initially before trending down. Requiring second unit of PRBC on 7/31/24 without a significant response.     Interval History   NAEON. Pt feeling fine today. H&H decreased again to 6.7/20.5. transfuse 1 unit PRBC and repeat CBC following transfusion. Continue to trend hapto/LDH. Kidney US satisfactory. Consult heme/onc for assistance. BP much improved on additional minoxidil. Cr improving. Sleeping better with xanax. VSS. Cont to monitor

## 2024-07-30 NOTE — PROGRESS NOTES
[facility] - Transplant Stepdown  Endocrinology  Progress Note    Admit Date: 7/29/2024     Reason for Consult: Management of T2DM, Hyperglycemia      Surgical Procedure and Date: s/p kidney transplant on 7/24/2024     Diabetes diagnosis year: 2004     Home Diabetes Medications:  Patient previously on tandem insulin pump and Mounjaro. Patient recently lost 60 lbs and had worsening kidney function. Patient states that she is off all diabetes medication for about 3-months and blood sugar has been well controlled. Patient is from [place] and does not have her pump with her to review pump settings. Per chart review, basal insulin was 58 units daily in the past. Anticipate the patient will need more aggressive insulin management post-op.     Most recent D/C recommend:    Novolog SSI:     150 - 200 + 2 unit    201 - 250 + 4 units    251 - 300 + 6 units    301 - 350 + 8 units       > 350   + 10 units       How often checking glucose at home? >4 x day (previously on Dexcom G6, but off since she stopped using tandem pump)   BG readings on regimen: states BG have been well controlled off the pump and the GLP1-RA/ GIP ('s)  Hypoglycemia on the regimen?  No  Missed doses on regimen?  No     Diabetes Complications include:     Hyperglycemia (history)     Complicating diabetes co morbidities:   Glucocorticoid use         HPI: Ms. Kenney is a 63 y.o. female with ESRD secondary to diabetic nephropathy, solitary kidney. PMH DM2, HTN, incontience (s/p bladder sling), CAD with stents (on ASA), atrial fibrillation, CVA (no residual deficits), ALEJA, anemia. She is now s/p DBD kidney transplant on 7/24/24. Surgery without complications. PD cath removed. Post op course notable for down trending Cr with excellent UOP. She now presents as a direct admit from her routine clinic appointment with transplant surgery. Exam concerning for subcutaneous hematoma. Labs notable for down trending H&H 7.6/23 --> 7/20. Direct admit for imaging  "and blood transfusion. D/w Dr Squires. On arrival, BP elevated to 204/88 otherwise vital signs normal. Pt c/o nausea, abdominal pain, and constipation. States incision developed bruising with worsening pain starting yesterday afternoon. Denies any changes in urinary output, diarrhea, vomiting, chest pain, sob, fever/chills. Endocrine conus;tal to manage hyperglycemia and type 2 diabetes.     Lab Results   Component Value Date    HGBA1C 4.8 2024    HGBA1C 4.8 2024         Interval HPI:   Overnight events: No acute events overnight. Patient in room 03112/17851 A. Blood glucose stable. BG at goal on current insulin regimen (SSI ). Steroid use- Prednisone 20 mg.    Renal function- Abnormal - Creatinine 2.7   Vasopressors-  None       Endocrine will continue to follow and manage insulin orders inpatient.         Diet diabetic  Calorie     Eatin%  Nausea: No  Hypoglycemia and intervention: No  Fever: No  TPN and/or TF: No      BP (!) 200/81   Pulse 61   Temp 97.7 °F (36.5 °C)   Resp 18   Ht 5' 5" (1.651 m)   Wt 84.3 kg (185 lb 13.6 oz)   SpO2 97%   Breastfeeding No   BMI 30.93 kg/m²     Labs Reviewed and Include    Recent Labs   Lab 24  0618   *   CALCIUM 7.8*   ALBUMIN 2.4*      K 2.8*   CO2 26      BUN 45*   CREATININE 2.7*     Lab Results   Component Value Date    WBC 7.50 2024    HGB 7.1 (L) 2024    HCT 21.4 (L) 2024    MCV 88 2024    PLT 84 (L) 2024     No results for input(s): "TSH", "FREET4" in the last 168 hours.  Lab Results   Component Value Date    HGBA1C 4.8 2024    HGBA1C 4.8 2024       Nutritional status:   Body mass index is 30.93 kg/m².  Lab Results   Component Value Date    ALBUMIN 2.4 (L) 2024    ALBUMIN 2.7 (L) 2024    ALBUMIN 2.6 (L) 2024     No results found for: "PREALBUMIN"    Estimated Creatinine Clearance: 22.3 mL/min (A) (based on SCr of 2.7 mg/dL (H)).    Accu-Checks  Recent Labs "     07/29/24  1721 07/29/24  2105 07/30/24  0839   POCTGLUCOSE 196* 202* 165*       Current Medications and/or Treatments Impacting Glycemic Control  Immunotherapy:    Immunosuppressants           Stop Route Frequency     mycophenolate capsule 1,000 mg         -- Oral 2 times daily     tacrolimus capsule 6 mg         -- Oral 2 times daily          Steroids:   Hormones (From admission, onward)      Start     Stop Route Frequency Ordered    07/30/24 0900  predniSONE tablet 20 mg         -- Oral Daily 07/29/24 1425    07/29/24 1436  melatonin tablet 6 mg         -- Oral Nightly PRN 07/29/24 1425          Pressors:    Autonomic Drugs (From admission, onward)      None          Hyperglycemia/Diabetes Medications:   Antihyperglycemics (From admission, onward)      Start     Stop Route Frequency Ordered    07/29/24 1526  insulin aspart U-100 pen 0-5 Units         -- SubQ Before meals & nightly PRN 07/29/24 1426            ASSESSMENT and PLAN    Oncology  * Acute blood loss anemia  Managed per primary team  Avoid hypoglycemia        Endocrine  Diabetes mellitus due to underlying condition with chronic kidney disease on chronic dialysis, with long-term current use of insulin  Endocrinology consulted for BG management.   BG goal 140-180    - Novolog (Insulin Aspart) prn for BG excursions [facility] SSI (150/25)  - BG checks AC/HS  - Hypoglycemia protocol in place    ** Please notify Endocrine for any change and/or advance in diet**  ** Please call Endocrine for any BG related issues **    Discharge Planning:   TBD. Please notify endocrinology prior to discharge.        Other  Adrenal corticosteroid causing adverse effect in therapeutic use  Glucocorticoids markedly increase glucose levels. Expect the steroid taper will help glucose control.              Quincy Lyles, DNP, FNP  Endocrinology  [facility] - Transplant Stepdown

## 2024-07-30 NOTE — ASSESSMENT & PLAN NOTE
Endocrinology consulted for BG management.   BG goal 140-180    - Novolog (Insulin Aspart) prn for BG excursions [facility] SSI (150/25)  - BG checks AC/HS  - Hypoglycemia protocol in place    ** Please notify Endocrine for any change and/or advance in diet**  ** Please call Endocrine for any BG related issues **    Discharge Planning:   TBD. Please notify endocrinology prior to discharge.

## 2024-07-30 NOTE — PLAN OF CARE
Pt aaox4 vswnl and no c/o pain but watery diarrhea x 3. Bed in low position and callbell within reach. Pt ambulates with walker. H&H7.1 and 21.9 before unit blood and 8.1 and 24.2 after blood transfusion. Rt lower quad with staples intact. Left removed PD sites x2 with staples intact. Left FA skin tear with scab. Clonidine given for elevated BP. Accu ck at 6106=229.

## 2024-07-30 NOTE — PROGRESS NOTES
Admit Note     Met with patient to assess needs. Patient is a 63 y.o. single female, admitted for:    Unspecified complication of kidney transplant [T86.10]  Acute blood loss anemia [D62]      Patient admitted from transplant clinic on 7/29/2024 .  At this time, patient presents as alert and oriented x 4, pleasant, good eye contact, well groomed, recall good, concentration/judgement good, average intelligence, calm, communicative, cooperative, and asking and answering questions appropriately.  At this time, patients caregiver presents as  not present at this time. Patient reports her daughter is currently at the [facility] .    Household/Family Systems     Patient resides with patient's  self , at [address] [place] [address].  Support system includes her daughter Sarah Lott ([phone]).  Patient does not have dependents that are need of being cared for.     Patients primary caregiver is Sarah oLtt, patients daughter, phone number [phone].  Confirmed patients contact information is [phone] (home);   Telephone Information:   Mobile [phone]   .    During admission, patient's caregiver plans to stay  at [facility] .  Confirmed patient and patients caregivers do have access to reliable transportation.    Cognitive Status/Learning     Patient reports reading ability as 12th grade and states patient does have difficulty with seeing (wears glasses).  Patient reports patient learns best by seeing and hearing .   Needed: No.   Highest education level: High School (9-12) or GED    Vocation/Disability   .  Working for Income: No  If no, reason not working: Disability  Patient is disabled due to back injury since 2001.  Prior to disability, patient  was employed at a phone company.    Adherence     Patient reports a high level of adherence to patients health care regimen.  Adherence counseling and education provided. Patient verbalizes  understanding.    Substance Use    Patient reports the following substance usage.    Tobacco: none, patient denies any use.  Alcohol: none, patient denies any use.  Illicit Drugs/Non-prescribed Medications: none, patient denies any use.  Patient states clear understanding of the potential impact of substance use.  Substance abstinence/cessation counseling, education and resources provided and reviewed.     Services Utilizing/ADLS    Infusion Service: Prior to admission, patient utilizing? no  Home Health: Prior to admission, patient utilizing? yes -Patient referred to [facility] at time of previous discharge. Patient reports she was not seen prior to this admission.   DME: Prior to admission, yes shower chair, rollator, wheel chair, CPAP  Pulmonary/Cardiac Rehab: Prior to admission, no  Dialysis:  Prior to admission, no  Transplant Specialty Pharmacy:  Prior to admission, yes; [facility].    Prior to admission, patient reports patient was somewhat independent with ADLS (received assistance from home aide when home) and was driving.  Patient reports patient is not able to care for self at this time due to compromised medical condition (as documented in medical record) and physical weakness..  Patient indicates a willingness to care for self once medically cleared to do so.    Insurance/Medications    Insured by   Payer/Plan Subscr  Sex Relation Sub. Ins. ID Effective Group Num   1. HU[employer] MANAGE* DAVID CASILLAS 1960 Female Self [id number] 3/1/20 [id number]                                   [address] [address]   2. [place]* DAVID CASILLAS 1960 Female Self [id number]* 10/1/04                                    [address] [address]      Primary Insurance (for UNOS reporting): Public Insurance - Medicare & Choice  Secondary Insurance (for UNOS reporting): Public Insurance - Medicaid    Patient reports patient is able to obtain and afford medications at this time and at time of discharge.    Living Will/Healthcare  Power of     Patient states patient does not have a LW and/or HCPA.   provided education regarding LW and HCPA and the completion of forms.    Coping/Mental Health    Patient is coping adequately with the aid of  family members.  Patient denies mental health difficulties. Patient denied concerns with mental health at this time. Patient reports a history of depression for which she takes Cymbalta and Xanax. Patient denied current thoughts of suicide or homicide. Patient denied needing or wanting resources or referrals at this time. Patient agrees to contact transplant team with needs, questions, or concerns as they arise.     Discharge Planning    At time of discharge, patient plans to return to [facility] under the care of Sarah Lott.  Patients daughter will transport patient.  Per rounds today, expected discharge date has not been medically determined at this time. Patient and patients caregiver  verbalize understanding and are involved in treatment planning and discharge process.    Additional Concerns     providing ongoing psychosocial support, education, resources and d/c planning as needed.  SW remains available.  remains available. Patient denies additional needs and/or concerns at this time. Patient verbalizes understanding and agreement with information reviewed, social work availability, and how to access available resources as needed.

## 2024-07-30 NOTE — ASSESSMENT & PLAN NOTE
- s/p DDRTxp on 7/24/24  - CT a/p reviewed  - Cr trending down daily with reported good UOP  - strict intake/output  - daily renal function panel

## 2024-07-30 NOTE — DISCHARGE SUMMARY
[facility] - Transplant Stepdown  Kidney Transplant  Discharge Summary    Patient Name: Joann Kenney  MRN: [id number]  Admission Date: 7/29/2024  Hospital Length of Stay: 6 days  Discharge Date and Time:  08/04/2024 11:25 AM  Attending Physician: Alphonso Mon MD   Discharging Provider: MONTY Flores  Primary Care Provider: Vijay Blake MD    HPI:   Ms. Kenney is a 63 y.o. female with ESRD secondary to diabetic nephropathy, solitary kidney.  PMH DM2, HTN, incontience (s/p bladder sling), CAD with stents (on ASA), atrial fibrillation, CVA (no residual deficits), ALEJA, anemia. She is now s/p DBD kidney transplant on 7/24/24. Surgery without complications. PD cath removed. Post op course notable for down trending Cr with excellent UOP. Also notable for   - Salivary gland infection. Per ID,discharged on Augmentin for a 10 day antibiotic course, stop date 8/3/24. Should she develop submandibular swelling, recommend CT neck with ENT consult   - Anemia requiring prbc transfusion on 7/27/24 with adequate response   - ER visit on 7/28 for hypertension - PO meds adjusted and d/c' from ED    She now presents as a direct admit from her routine clinic appointment with transplant surgery. Exam concerning for subcutaneous hematoma. Labs notable for down trending H&H 7.6/23 --> 7/20. Direct admit for imaging and blood transfusion. D/w Dr Squires. On arrival, BP elevated to 204/88 otherwise vital signs normal. Pt c/o nausea, abdominal pain, and constipation. Denies and changes in urinary output, diarrhea, vomiting, chest pain, sob, fever/chills.     * No surgery found *     Hospital Course:    Pt admitted for acute on chronic anemia, abdominal pain, and increased swelling and bruising at incision. CT a/p obtained on admit and reviewed by Dr Cody. Notable for large stool burden and small sub incisional collection. Transfused 1 unit PRBC without appropriate response. H&H trended down again and she has required 2  additional PRBC transfusions over the past 4 days. Kidney US obtained without significant collections. She is having regular bowel movements without s/s of bleeding. LDH, hapto, retic trended. Heme/onc consulted  no  signs of schistocytes on peripheral smear. Electrolytes replaced. Blood pressure medication adjusted for hypertension. Cr continues to trend down with excellent urine output. She is now ready for discharge. She has met with pharm D and received education. Pt expressed understanding of discharge instructions and importance of follow up.     Goals of Care Treatment Preferences:  Code Status: Full Code      Final Active Diagnoses:    Diagnosis Date Noted POA    PRINCIPAL PROBLEM:  Acute blood loss anemia [D62] 07/25/2024 Yes    Anxiety [F41.9] 07/31/2024 Yes    S/P kidney transplant [Z94.0] 07/25/2024 Not Applicable    Prophylactic immunotherapy [Z29.89] 07/25/2024 Not Applicable    Long-term use of immunosuppressant medication [Z79.60] 07/25/2024 Not Applicable    Salivary gland infection [K11.20] 07/25/2024 Yes    Adrenal corticosteroid causing adverse effect in therapeutic use [T38.0X5A] 07/24/2024 Yes    Diabetes mellitus due to underlying condition with chronic kidney disease on chronic dialysis, with long-term current use of insulin [E08.22, N18.6, Z79.4, Z99.2] 04/01/2021 Not Applicable    Anemia in ESRD (end-stage renal disease) [N18.6, D63.1] 04/01/2021 Yes    Renovascular hypertension [I15.0] 04/01/2021 Yes      Problems Resolved During this Admission:       Consults (From admission, onward)          Status Ordering Provider     Inpatient consult to Hematology/Oncology  Once        Provider:  (Not yet assigned)    Completed RAN YATES     Inpatient consult to ENT  Once        Provider:  (Not yet assigned)    Completed GLENDY HILL     Inpatient consult to Midline team  Once        Provider:  (Not yet assigned)    Completed SCOT WOLFE     Inpatient consult to PICC team  (NIAS)  Once        Provider:  (Not yet assigned)    Completed SCOT WOLFE     Consult to Endocrinology  Once        Provider:  (Not yet assigned)    Completed RAN YATES            Pending Diagnostic Studies:       Procedure Component Value Units Date/Time    Copper, serum [[id number]] Collected: 08/02/24 1650    Order Status: Sent Lab Status: In process Updated: 08/02/24 1714    Specimen: Blood     Factor 13 activity [[id number]] Collected: 08/02/24 1650    Order Status: Sent Lab Status: In process Updated: 08/02/24 1714    Specimen: Blood           Significant Diagnostic Studies: Labs: BMP:   Recent Labs   Lab 08/03/24  0607 08/04/24  0616   * 132*    141   K 3.6 3.5    110   CO2 24 22*   BUN 35* 29*   CREATININE 1.9* 1.8*   CALCIUM 8.0* 8.2*   MG 1.9 1.7   , CMP   Recent Labs   Lab 08/03/24  0607 08/04/24  0616    141   K 3.6 3.5    110   CO2 24 22*   * 132*   BUN 35* 29*   CREATININE 1.9* 1.8*   CALCIUM 8.0* 8.2*   ALBUMIN 2.4* 2.5*   ANIONGAP 8 9   , CBC   Recent Labs   Lab 08/02/24  1449 08/03/24  0607 08/04/24  0616   WBC 11.47 9.98 10.25   HGB 8.5* 7.8* 7.6*   HCT 25.0* 23.6* 22.9*    218 260   , and All labs within the past 24 hours have been reviewed    Discharged Condition: stable    Disposition: Home or Self Care    Follow Up:    Patient Instructions:      Ambulatory referral/consult to Home Health   Standing Status: Future   Referral Priority: Routine Referral Type: Home Health Care   Referral Reason: Specialty Services Required   Requested Specialty: Home Health Services   Number of Visits Requested: 1     Ambulatory referral/consult to ENT   Standing Status: Future   Referral Priority: Routine Referral Type: Consultation   Referral Reason: Specialty Services Required   Requested Specialty: Otolaryngology   Number of Visits Requested: 1     Diet diabetic     Pelvic Rest     No driving until:     Other restrictions (specify):     Lifting  restrictions     Notify your health care provider if you experience any of the following:     Notify your health care provider if you experience any of the following:  increased confusion or weakness     Notify your health care provider if you experience any of the following:  persistent dizziness, light-headedness, or visual disturbances     Notify your health care provider if you experience any of the following:  worsening rash     Notify your health care provider if you experience any of the following:  severe persistent headache     Notify your health care provider if you experience any of the following:  difficulty breathing or increased cough     Notify your health care provider if you experience any of the following:  redness, tenderness, or signs of infection (pain, swelling, redness, odor or green/yellow discharge around incision site)     Notify your health care provider if you experience any of the following:  severe uncontrolled pain     Notify your health care provider if you experience any of the following:  persistent nausea and vomiting or diarrhea     Notify your health care provider if you experience any of the following:  temperature >100.4     No dressing needed     Weight bearing restrictions (specify):     Shower on day dressing removed (No bath)     Medications:  Reconciled Home Medications:      Medication List        START taking these medications      minoxidiL 2.5 MG tablet  Commonly known as: LONITEN  Take 2 tablets (5 mg total) by mouth once daily.  Start taking on: August 5, 2024     NIFEdipine 60 MG (OSM) 24 hr tablet  Commonly known as: PROCARDIA-XL  Take 1 tablet (60 mg total) by mouth 2 (two) times a day.            CHANGE how you take these medications      carvediloL 6.25 MG tablet  Commonly known as: COREG  Take 1 tablet (6.25 mg total) by mouth 2 (two) times daily.  What changed: how much to take     insulin aspart U-100 100 unit/mL (3 mL) Inpn pen  Commonly known as:  "NovoLOG  Inject 6 Units into the skin 3 (three) times daily with meals. + SSI (TDD: 48)  What changed:   how much to take  additional instructions     tacrolimus 1 MG Cap  Commonly known as: PROGRAF  Take 4 capsules (4 mg total) by mouth every 12 (twelve) hours.  What changed: how much to take            CONTINUE taking these medications      ACCU-CHEK GUIDE GLUCOSE METER Misc  Generic drug: blood-glucose meter  use as directed to check blood glucose     ACCU-CHEK GUIDE TEST STRIPS Strp  Generic drug: blood sugar diagnostic  use 1 strip to check blood glucose 3 (three) times daily.     ACCU-CHEK SOFTCLIX LANCETS Misc  Generic drug: lancets  1 lancet each to check blood glucose 3 (three) times daily.     ALPRAZolam 2 MG Tab  Commonly known as: XANAX  Take 2 mg by mouth every evening.     atorvastatin 20 MG tablet  Commonly known as: LIPITOR  Take 20 mg by mouth every evening.     BD ULTRA-FINE KIRSTIN PEN NEEDLE 32 gauge x 5/32" Ndle  Generic drug: pen needle, diabetic  1 each for use with insulin pen 3 (three) times daily.     cetirizine 5 MG tablet  Commonly known as: ZYRTEC  Take 1 tablet (5 mg total) by mouth once daily.     cinacalcet 60 MG Tab  Commonly known as: SENSIPAR  Take 1 tablet (60 mg total) by mouth daily with breakfast.     DULoxetine 30 MG capsule  Commonly known as: CYMBALTA  Take 30 mg by mouth once daily.     ezetimibe 10 mg tablet  Commonly known as: ZETIA  Take 1 tablet (10 mg total) by mouth once daily.     multivitamin Tab  Take 1 tablet by mouth once daily.     mycophenolate 250 mg Cap  Commonly known as: CELLCEPT  Take 4 capsules (1,000 mg total) by mouth 2 (two) times daily.     ondansetron 4 MG Tbdl  Commonly known as: ZOFRAN-ODT  Dissolve 1 tablet (4 mg total) by mouth every 8 (eight) hours as needed.     oxybutynin 5 MG Tab  Commonly known as: DITROPAN  Take 1 tablet (5 mg total) by mouth 3 (three) times daily as needed (bladder spasms).     oxyCODONE 5 MG immediate release " tablet  Commonly known as: ROXICODONE  Take 1 tablet (5 mg total) by mouth every 6 (six) hours as needed for Pain.     pantoprazole 40 MG tablet  Commonly known as: PROTONIX  Take 1 tablet (40 mg total) by mouth once daily.     predniSONE 5 MG tablet  Commonly known as: DELTASONE  Take by mouth daily; 7/27/2024-8/2/2024: 20 mg, 8/3/2024-8/9/2024: 15 mg; 8/10/2024-8/16/2024: 10 mg; 8/17/2024- forever: 5 mg; do not stop     sodium bicarbonate 650 MG tablet  Take 1 tablet (650 mg total) by mouth 2 (two) times daily.     sulfamethoxazole-trimethoprim 400-80mg 400-80 mg per tablet  Commonly known as: BACTRIM,SEPTRA  Take 1 tablet by mouth every morning. Stop 1/21/25     valGANciclovir 450 mg Tab  Commonly known as: VALCYTE  Take 1 tablet (450 mg total) by mouth 3 (three) times a week. Stop 10/23/24            STOP taking these medications      amoxicillin-clavulanate 875-125mg 875-125 mg per tablet  Commonly known as: AUGMENTIN     k phos di & mono-sod phos mono 250 mg Tab  Commonly known as: K-PHOS-NEUTRAL            Time spent caring for patient (Greater than 1/2 spent in direct face-to-face contact): > 30 minutes    MONTY Flores  Kidney Transplant  [facility] - Transplant Stepdown

## 2024-07-30 NOTE — ASSESSMENT & PLAN NOTE
- on coreg 12.5 mg BID at home  - presented to ED yesterday for high blood pressure and coreg dose increased  - uncontrolled on admit  - Adjust regimen: procardia 60 mg BID and coreg 6.25 mg BID with hold parameters

## 2024-07-31 PROBLEM — F41.9 ANXIETY: Status: ACTIVE | Noted: 2024-07-31

## 2024-07-31 LAB
ALBUMIN SERPL BCP-MCNC: 2.6 G/DL (ref 3.5–5.2)
ANION GAP SERPL CALC-SCNC: 10 MMOL/L (ref 8–16)
BACTERIA UR CULT: NO GROWTH
BASOPHILS # BLD AUTO: 0.01 K/UL (ref 0–0.2)
BASOPHILS NFR BLD: 0.1 % (ref 0–1.9)
BLD PROD TYP BPU: NORMAL
BLOOD UNIT EXPIRATION DATE: NORMAL
BLOOD UNIT TYPE CODE: 6200
BLOOD UNIT TYPE: NORMAL
BUN SERPL-MCNC: 38 MG/DL (ref 8–23)
CALCIUM SERPL-MCNC: 8.1 MG/DL (ref 8.7–10.5)
CHLORIDE SERPL-SCNC: 108 MMOL/L (ref 95–110)
CO2 SERPL-SCNC: 23 MMOL/L (ref 23–29)
CODING SYSTEM: NORMAL
CREAT SERPL-MCNC: 2.3 MG/DL (ref 0.5–1.4)
CROSSMATCH INTERPRETATION: NORMAL
DIFFERENTIAL METHOD BLD: ABNORMAL
DISPENSE STATUS: NORMAL
EOSINOPHIL # BLD AUTO: 0.2 K/UL (ref 0–0.5)
EOSINOPHIL NFR BLD: 2.3 % (ref 0–8)
ERYTHROCYTE [DISTWIDTH] IN BLOOD BY AUTOMATED COUNT: 16.2 % (ref 11.5–14.5)
EST. GFR  (NO RACE VARIABLE): 23 ML/MIN/1.73 M^2
GLUCOSE SERPL-MCNC: 134 MG/DL (ref 70–110)
HAPTOGLOB SERPL-MCNC: <10 MG/DL (ref 30–250)
HCT VFR BLD AUTO: 21.9 % (ref 37–48.5)
HGB BLD-MCNC: 7.4 G/DL (ref 12–16)
IMM GRANULOCYTES # BLD AUTO: 0.24 K/UL (ref 0–0.04)
IMM GRANULOCYTES NFR BLD AUTO: 2.8 % (ref 0–0.5)
LDH SERPL L TO P-CCNC: 418 U/L (ref 110–260)
LYMPHOCYTES # BLD AUTO: 0.4 K/UL (ref 1–4.8)
LYMPHOCYTES NFR BLD: 4.5 % (ref 18–48)
MAGNESIUM SERPL-MCNC: 1.5 MG/DL (ref 1.6–2.6)
MCH RBC QN AUTO: 30.1 PG (ref 27–31)
MCHC RBC AUTO-ENTMCNC: 33.8 G/DL (ref 32–36)
MCV RBC AUTO: 89 FL (ref 82–98)
MONOCYTES # BLD AUTO: 0.4 K/UL (ref 0.3–1)
MONOCYTES NFR BLD: 4.7 % (ref 4–15)
NEUTROPHILS # BLD AUTO: 7.4 K/UL (ref 1.8–7.7)
NEUTROPHILS NFR BLD: 85.6 % (ref 38–73)
NRBC BLD-RTO: 0 /100 WBC
NUM UNITS TRANS PACKED RBC: NORMAL
PATH REV BLD -IMP: NORMAL
PATH REV BLD -IMP: NORMAL
PHOSPHATE SERPL-MCNC: 2.2 MG/DL (ref 2.7–4.5)
PLATELET # BLD AUTO: 121 K/UL (ref 150–450)
PMV BLD AUTO: 12.6 FL (ref 9.2–12.9)
POCT GLUCOSE: 144 MG/DL (ref 70–110)
POCT GLUCOSE: 196 MG/DL (ref 70–110)
POCT GLUCOSE: 207 MG/DL (ref 70–110)
POCT GLUCOSE: 263 MG/DL (ref 70–110)
POTASSIUM SERPL-SCNC: 3.5 MMOL/L (ref 3.5–5.1)
RBC # BLD AUTO: 2.46 M/UL (ref 4–5.4)
RETICS/RBC NFR AUTO: 4 % (ref 0.5–2.5)
SODIUM SERPL-SCNC: 141 MMOL/L (ref 136–145)
TACROLIMUS BLD-MCNC: 7.3 NG/ML (ref 5–15)
WBC # BLD AUTO: 8.59 K/UL (ref 3.9–12.7)

## 2024-07-31 PROCEDURE — 94761 N-INVAS EAR/PLS OXIMETRY MLT: CPT

## 2024-07-31 PROCEDURE — 20600001 HC STEP DOWN PRIVATE ROOM

## 2024-07-31 PROCEDURE — 25000003 PHARM REV CODE 250

## 2024-07-31 PROCEDURE — 83735 ASSAY OF MAGNESIUM: CPT | Performed by: PHYSICIAN ASSISTANT

## 2024-07-31 PROCEDURE — 63600175 PHARM REV CODE 636 W HCPCS

## 2024-07-31 PROCEDURE — 80197 ASSAY OF TACROLIMUS: CPT | Performed by: PHYSICIAN ASSISTANT

## 2024-07-31 PROCEDURE — 36415 COLL VENOUS BLD VENIPUNCTURE: CPT | Performed by: PHYSICIAN ASSISTANT

## 2024-07-31 PROCEDURE — P9016 RBC LEUKOCYTES REDUCED: HCPCS

## 2024-07-31 PROCEDURE — 63600175 PHARM REV CODE 636 W HCPCS: Performed by: PHYSICIAN ASSISTANT

## 2024-07-31 PROCEDURE — 25000003 PHARM REV CODE 250: Performed by: PHYSICIAN ASSISTANT

## 2024-07-31 PROCEDURE — 86920 COMPATIBILITY TEST SPIN: CPT

## 2024-07-31 PROCEDURE — 85025 COMPLETE CBC W/AUTO DIFF WBC: CPT | Performed by: PHYSICIAN ASSISTANT

## 2024-07-31 PROCEDURE — 85045 AUTOMATED RETICULOCYTE COUNT: CPT | Performed by: PHYSICIAN ASSISTANT

## 2024-07-31 PROCEDURE — 36430 TRANSFUSION BLD/BLD COMPNT: CPT

## 2024-07-31 PROCEDURE — 85060 BLOOD SMEAR INTERPRETATION: CPT | Mod: ,,, | Performed by: PATHOLOGY

## 2024-07-31 PROCEDURE — 83615 LACTATE (LD) (LDH) ENZYME: CPT | Performed by: PHYSICIAN ASSISTANT

## 2024-07-31 PROCEDURE — 25000003 PHARM REV CODE 250: Performed by: TRANSPLANT SURGERY

## 2024-07-31 PROCEDURE — 99232 SBSQ HOSP IP/OBS MODERATE 35: CPT | Mod: ,,,

## 2024-07-31 PROCEDURE — 80069 RENAL FUNCTION PANEL: CPT | Performed by: PHYSICIAN ASSISTANT

## 2024-07-31 PROCEDURE — 83010 ASSAY OF HAPTOGLOBIN QUANT: CPT | Performed by: PHYSICIAN ASSISTANT

## 2024-07-31 RX ORDER — ALPRAZOLAM 0.25 MG/1
0.5 TABLET ORAL NIGHTLY
Status: DISCONTINUED | OUTPATIENT
Start: 2024-07-31 | End: 2024-07-31

## 2024-07-31 RX ORDER — ALPRAZOLAM 0.25 MG/1
2 TABLET ORAL NIGHTLY
Status: DISCONTINUED | OUTPATIENT
Start: 2024-07-31 | End: 2024-08-04 | Stop reason: HOSPADM

## 2024-07-31 RX ORDER — MINOXIDIL 2.5 MG/1
2.5 TABLET ORAL ONCE
Status: COMPLETED | OUTPATIENT
Start: 2024-07-31 | End: 2024-07-31

## 2024-07-31 RX ORDER — HYDROCODONE BITARTRATE AND ACETAMINOPHEN 500; 5 MG/1; MG/1
TABLET ORAL
Status: DISCONTINUED | OUTPATIENT
Start: 2024-07-31 | End: 2024-08-03

## 2024-07-31 RX ORDER — MINOXIDIL 2.5 MG/1
5 TABLET ORAL DAILY
Status: DISCONTINUED | OUTPATIENT
Start: 2024-08-01 | End: 2024-08-04 | Stop reason: HOSPADM

## 2024-07-31 RX ORDER — INSULIN ASPART 100 [IU]/ML
3 INJECTION, SOLUTION INTRAVENOUS; SUBCUTANEOUS
Status: DISCONTINUED | OUTPATIENT
Start: 2024-07-31 | End: 2024-08-02

## 2024-07-31 RX ORDER — ALPRAZOLAM 0.25 MG/1
1 TABLET ORAL NIGHTLY
Status: DISCONTINUED | OUTPATIENT
Start: 2024-07-31 | End: 2024-07-31

## 2024-07-31 RX ADMIN — MINOXIDIL 2.5 MG: 2.5 TABLET ORAL at 01:07

## 2024-07-31 RX ADMIN — CARVEDILOL 12.5 MG: 12.5 TABLET, FILM COATED ORAL at 09:07

## 2024-07-31 RX ADMIN — EZETIMIBE 10 MG: 10 TABLET ORAL at 09:07

## 2024-07-31 RX ADMIN — INSULIN ASPART 3 UNITS: 100 INJECTION, SOLUTION INTRAVENOUS; SUBCUTANEOUS at 06:07

## 2024-07-31 RX ADMIN — INSULIN ASPART 2 UNITS: 100 INJECTION, SOLUTION INTRAVENOUS; SUBCUTANEOUS at 03:07

## 2024-07-31 RX ADMIN — ATORVASTATIN CALCIUM 20 MG: 20 TABLET, FILM COATED ORAL at 09:07

## 2024-07-31 RX ADMIN — DIBASIC SODIUM PHOSPHATE, MONOBASIC POTASSIUM PHOSPHATE AND MONOBASIC SODIUM PHOSPHATE 2 TABLET: 852; 155; 130 TABLET ORAL at 09:07

## 2024-07-31 RX ADMIN — MYCOPHENOLATE MOFETIL 1000 MG: 250 CAPSULE ORAL at 09:07

## 2024-07-31 RX ADMIN — MINOXIDIL 2.5 MG: 2.5 TABLET ORAL at 08:07

## 2024-07-31 RX ADMIN — CARVEDILOL 12.5 MG: 12.5 TABLET, FILM COATED ORAL at 08:07

## 2024-07-31 RX ADMIN — SULFAMETHOXAZOLE AND TRIMETHOPRIM 1 TABLET: 400; 80 TABLET ORAL at 09:07

## 2024-07-31 RX ADMIN — ALPRAZOLAM 2 MG: 0.25 TABLET ORAL at 09:07

## 2024-07-31 RX ADMIN — OXYCODONE 5 MG: 5 TABLET ORAL at 01:07

## 2024-07-31 RX ADMIN — PANTOPRAZOLE SODIUM 40 MG: 40 TABLET, DELAYED RELEASE ORAL at 09:07

## 2024-07-31 RX ADMIN — PREDNISONE 20 MG: 20 TABLET ORAL at 09:07

## 2024-07-31 RX ADMIN — DULOXETINE HYDROCHLORIDE 30 MG: 30 CAPSULE, DELAYED RELEASE ORAL at 09:07

## 2024-07-31 RX ADMIN — CINACALCET 60 MG: 30 TABLET, FILM COATED ORAL at 08:07

## 2024-07-31 RX ADMIN — CETIRIZINE HYDROCHLORIDE 5 MG: 5 TABLET, FILM COATED ORAL at 09:07

## 2024-07-31 RX ADMIN — TACROLIMUS 6 MG: 1 CAPSULE ORAL at 08:07

## 2024-07-31 RX ADMIN — TACROLIMUS 6 MG: 1 CAPSULE ORAL at 06:07

## 2024-07-31 RX ADMIN — VALGANCICLOVIR HYDROCHLORIDE 450 MG: 450 TABLET ORAL at 09:07

## 2024-07-31 RX ADMIN — NIFEDIPINE 60 MG: 30 TABLET, FILM COATED, EXTENDED RELEASE ORAL at 08:07

## 2024-07-31 RX ADMIN — NIFEDIPINE 60 MG: 30 TABLET, FILM COATED, EXTENDED RELEASE ORAL at 09:07

## 2024-07-31 RX ADMIN — AMOXICILLIN AND CLAVULANATE POTASSIUM 1 TABLET: 875; 125 TABLET, FILM COATED ORAL at 09:07

## 2024-07-31 NOTE — SUBJECTIVE & OBJECTIVE
"Interval HPI:   Overnight events: No acute events overnight. Patient in room 13262/32798 A. Blood glucose stable. BG at and above goal on current insulin regimen (SSI ). Steroid use- Prednisone 20 mg.    Renal function-   Lab Results   Component Value Date    CREATININE 2.3 (H) 2024       Vasopressors-  None         Endocrine will continue to follow and manage insulin orders inpatient.            Diet diabetic  Calorie      Eatin%  Nausea: No  Hypoglycemia and intervention: No  Fever: No  TPN and/or TF: No    BP (!) 165/75 (BP Location: Right arm, Patient Position: Standing)   Pulse 79   Temp 98.2 °F (36.8 °C) (Oral)   Resp 18   Ht 5' 5" (1.651 m)   Wt 84.3 kg (185 lb 13.6 oz)   SpO2 99%   Breastfeeding No   BMI 30.93 kg/m²     Labs Reviewed and Include    Recent Labs   Lab 24  0529   *   CALCIUM 8.1*   ALBUMIN 2.6*      K 3.5   CO2 23      BUN 38*   CREATININE 2.3*     Lab Results   Component Value Date    WBC 8.59 2024    HGB 7.4 (L) 2024    HCT 21.9 (L) 2024    MCV 89 2024     (L) 2024     No results for input(s): "TSH", "FREET4" in the last 168 hours.  Lab Results   Component Value Date    HGBA1C 4.8 2024    HGBA1C 4.8 2024       Nutritional status:   Body mass index is 30.93 kg/m².  Lab Results   Component Value Date    ALBUMIN 2.6 (L) 2024    ALBUMIN 2.7 (L) 2024    ALBUMIN 2.4 (L) 2024     No results found for: "PREALBUMIN"    Estimated Creatinine Clearance: 26.1 mL/min (A) (based on SCr of 2.3 mg/dL (H)).    Accu-Checks  Recent Labs     24  1721 24  2105 24  0839 24  1226 24  1726 24  2254 24  0801 07/31/24  1233   POCTGLUCOSE 196* 202* 165* 206* 219* 194* 144* 207*       Current Medications and/or Treatments Impacting Glycemic Control  Immunotherapy:    Immunosuppressants           Stop Route Frequency     mycophenolate capsule 1,000 mg         -- " Oral 2 times daily     tacrolimus capsule 6 mg         -- Oral 2 times daily          Steroids:   Hormones (From admission, onward)      Start     Stop Route Frequency Ordered    07/30/24 0900  predniSONE tablet 20 mg         -- Oral Daily 07/29/24 1425    07/29/24 1436  melatonin tablet 6 mg         -- Oral Nightly PRN 07/29/24 1425          Pressors:    Autonomic Drugs (From admission, onward)      None          Hyperglycemia/Diabetes Medications:   Antihyperglycemics (From admission, onward)      Start     Stop Route Frequency Ordered    07/31/24 1645  insulin aspart U-100 pen 3 Units         -- SubQ 3 times daily with meals 07/31/24 1304    07/29/24 1526  insulin aspart U-100 pen 0-5 Units         -- SubQ Before meals & nightly PRN 07/29/24 1426

## 2024-07-31 NOTE — SUBJECTIVE & OBJECTIVE
Subjective:   History of Present Illness:  Ms. Kenney is a 63 y.o. female with ESRD secondary to diabetic nephropathy, solitary kidney.  PMH DM2, HTN, incontience (s/p bladder sling), CAD with stents (on ASA), atrial fibrillation, CVA (no residual deficits), ALEJA, anemia. She is now s/p DBD kidney transplant on 7/24/24. Surgery without complications. PD cath removed. Post op course notable for down trending Cr with excellent UOP. Also notable for   - Salivary gland infection. Per ID,discharged on Augmentin for a 10 day antibiotic course, stop date 8/3/24. Should she develop submandibular swelling, recommend CT neck with ENT consult   - Anemia requiring prbc transfusion on 7/27/24 with adequate response   - ER visit on 7/28 for hypertension - PO meds adjusted and d/c' from ED    She now presents as a direct admit from her routine clinic appointment with transplant surgery. Exam concerning for subcutaneous hematoma. Labs notable for down trending H&H 7.6/23 --> 7/20. Direct admit for imaging and blood transfusion. D/w Dr Squires. On arrival, BP elevated to 204/88 otherwise vital signs normal. Pt c/o nausea, abdominal pain, and constipation. Denies and changes in urinary output, diarrhea, vomiting, chest pain, sob, fever/chills.       Hospital Course:  Pt admitted for acute on chronic anemia, abdominal pain, and increased swelling and bruising at incision. CT a/p obtained on admit and reviewed by Dr Cody. Notable for large stool burden and small sub incisional collection. Transfused 1 unit PRBC and responded appropriately initially then dropped a bit more. Now having BMs.     Interval History   NAEON. Patient doing well this morning. C/o lack of sleep/trouble weaning off xanax. BP elevated into SBP >230. Will watch and adjust medications today. Also scheduled xanax for anxiety. Hgb dropped a bit today again. Will transfuse another unit today, check hapto tomorrow. Does not appear to be hemolyzing despite LDH and  hapto, platelets increased today. Possible dc tomorrow if BP and hgb stable       Past Medical, Surgical, Family, and Social History:   Unchanged from H&P.    Scheduled Meds:   ALPRAZolam  1 mg Oral QHS    amoxicillin-clavulanate 875-125mg  1 tablet Oral Q12H    atorvastatin  20 mg Oral QHS    carvediloL  12.5 mg Oral BID    cetirizine  5 mg Oral QHS    cinacalcet  60 mg Oral Daily with breakfast    DULoxetine  30 mg Oral QHS    ezetimibe  10 mg Oral Daily    k phos di & mono-sod phos mono  500 mg Oral BID    minoxidiL  2.5 mg Oral Daily    mycophenolate  1,000 mg Oral BID    NIFEdipine  60 mg Oral BID    pantoprazole  40 mg Oral Daily    predniSONE  20 mg Oral Daily    sulfamethoxazole-trimethoprim 400-80mg  1 tablet Oral Every Mon, Wed, Fri    tacrolimus  6 mg Oral BID    valGANciclovir  450 mg Oral Once per day on Monday Wednesday Friday     Continuous Infusions:  PRN Meds:  Current Facility-Administered Medications:     0.9%  NaCl infusion (for blood administration), , Intravenous, Q24H PRN    0.9%  NaCl infusion (for blood administration), , Intravenous, Q24H PRN    acetaminophen, 650 mg, Oral, Q4H PRN    ALPRAZolam, 1 mg, Oral, BID PRN    calcium carbonate, 500 mg, Oral, TID PRN    cloNIDine, 0.1 mg, Oral, Q6H PRN    dextrose 10%, 12.5 g, Intravenous, PRN    dextrose 10%, 25 g, Intravenous, PRN    glucagon (human recombinant), 1 mg, Intramuscular, PRN    glucose, 16 g, Oral, PRN    glucose, 24 g, Oral, PRN    insulin aspart U-100, 0-5 Units, Subcutaneous, QID (AC + HS) PRN    melatonin, 6 mg, Oral, Nightly PRN    ondansetron, 4 mg, Intravenous, Q6H PRN    oxybutynin, 5 mg, Oral, TID PRN    oxyCODONE, 5 mg, Oral, Q6H PRN    prochlorperazine, 5 mg, Intravenous, Q6H PRN    Intake/Output - Last 3 Shifts         07/29 0700 07/30 0659 07/30 0700 07/31 0659 07/31 0700 08/01 0659    P.O. 702 1430 120    Blood 368.8      Total Intake(mL/kg) 1070.8 (12.7) 1430 (17) 120 (1.4)    Urine (mL/kg/hr) 675 1600 (0.8)      "Stool 0 0     Total Output 675 1600     Net +395.8 -170 +120           Urine Occurrence 1 x 3 x     Stool Occurrence 2 x 1 x              Review of Systems   Constitutional:  Negative for activity change, chills and fever.   Respiratory:  Negative for shortness of breath.    Cardiovascular:  Negative for chest pain and leg swelling.   Gastrointestinal:  Positive for diarrhea. Negative for abdominal distention, abdominal pain, constipation, nausea and vomiting.   Genitourinary:  Negative for decreased urine volume, difficulty urinating and dysuria.   Skin:  Positive for wound.   Allergic/Immunologic: Positive for immunocompromised state.   Psychiatric/Behavioral:  Positive for sleep disturbance. Negative for confusion. The patient is nervous/anxious.       Objective:     Vital Signs (Most Recent):  Temp: 97.8 °F (36.6 °C) (07/31/24 0752)  Pulse: 68 (07/31/24 0752)  Resp: 18 (07/31/24 0752)  BP: (!) 204/77 (07/31/24 0752)  SpO2: 99 % (07/31/24 0752) Vital Signs (24h Range):  Temp:  [97.8 °F (36.6 °C)-98.9 °F (37.2 °C)] 97.8 °F (36.6 °C)  Pulse:  [68-92] 68  Resp:  [18] 18  SpO2:  [96 %-99 %] 99 %  BP: (134-234)/() 204/77     Weight: 84.3 kg (185 lb 13.6 oz)  Height: 5' 5" (165.1 cm)  Body mass index is 30.93 kg/m².     Physical Exam  Vitals and nursing note reviewed.   Constitutional:       General: She is not in acute distress.     Appearance: She is well-developed. She is not ill-appearing.   HENT:      Head: Normocephalic and atraumatic.   Eyes:      Extraocular Movements: Extraocular movements intact.      Conjunctiva/sclera: Conjunctivae normal.   Cardiovascular:      Rate and Rhythm: Normal rate and regular rhythm.      Pulses: Normal pulses.   Pulmonary:      Effort: Pulmonary effort is normal.   Abdominal:      General: Abdomen is flat. Bowel sounds are normal. There is no distension.      Palpations: Abdomen is soft.      Tenderness: There is abdominal tenderness. There is no guarding. "       Musculoskeletal:      Cervical back: Normal range of motion.      Right lower leg: No edema.      Left lower leg: No edema.   Skin:     General: Skin is warm and dry.      Findings: Bruising present.   Neurological:      Mental Status: She is alert and oriented to person, place, and time.      Motor: No weakness.   Psychiatric:         Mood and Affect: Mood normal.         Behavior: Behavior normal.         Thought Content: Thought content normal.         Judgment: Judgment normal.          Laboratory:  CBC:   Recent Labs   Lab 07/30/24  0858 07/30/24  1354 07/31/24  0529   WBC 7.50 10.28 8.59   RBC 2.44* 2.64* 2.46*   HGB 7.1* 7.8* 7.4*   HCT 21.4* 23.3* 21.9*   PLT 84* 94* 121*   MCV 88 88 89   MCH 29.1 29.5 30.1   MCHC 33.2 33.5 33.8     CMP:   Recent Labs   Lab 07/30/24  0618 07/30/24  1354 07/31/24  0529   * 218* 134*   CALCIUM 7.8* 8.2* 8.1*   ALBUMIN 2.4* 2.7* 2.6*    138 141   K 2.8* 4.2 3.5   CO2 26 26 23    106 108   BUN 45* 42* 38*   CREATININE 2.7* 2.7* 2.3*     Labs within the past 24 hours have been reviewed.    Diagnostic Results:  No new imaging

## 2024-07-31 NOTE — NURSING
AAOx4,VSS,afebrile, pt on RA during the Day/ home CPAP @ night   BP improving  Accchecks ACHS   RLQ incision with staples cleansed with povidone iodine drainage covered with ABD pad, scant serosanguineous drainage   Plan reviewed with family, non skid socks on, call light in reach, bed in lowest position

## 2024-07-31 NOTE — PLAN OF CARE
H&H=7.4&21.9. Transfused 1 unit PRBC's. No reaction noted. Afebrile. Augmentin continues. K+=3.5. Mg+=1.5. Phos=2.2. CO2=23. Cr=2.3 dec from 2.7. No BM's today. Voiding without difficulty. Appetite decreased. BP remains elevated. Minoxidil dose increased from 2.5 to 5mg. Additional 2.5mg given today. BP dec to 165/75. Nifedipine 60mg given this am. Coreg continues. Glucoses monitored. Insulin given as ordered. RLQ incision continues to ooze sero/sang drainage. ABD pad changed. SL to LUE infiltrated during transfusion. PICC team placed a new SL and transfusion completed. Oxycodone given for pain to LUE. Pain resolved.

## 2024-07-31 NOTE — PLAN OF CARE
K+=2.8 this am. KCL 40meq given IV and K-Dur 80 meq po total given Repeat K+=4.2. Cr=2.7. Bactrim dec from daily to MWF dosing. Phos=2.5/2.0. KPhos po to start tonight. CO2=26. Sodium Bicarb po discontinued. BP elevated when standing. Procardia 60mg and Clonidine 0.1 po given today. Coreg given as ordered. Minoxidil ordered and given. BP decreased this afternoon. Standing BP continue to be monitored. Glucoses monitored. Correction Insulin given as ordered. Denies pain. Incision draining scant sero/sang drainage. Covered with ABD pad. Ambulating in independently. Has decreased appetite. Encouraged to order meals to obtain food she can eat. Afebrile. Augmentin continues.

## 2024-07-31 NOTE — PROGRESS NOTES
[facility]  Kidney Transplant  Progress Note      Reason for Follow-up: Reassessment of Kidney Transplant - 7/24/2024  (#1) recipient and management of immunosuppression.    ORGAN:   RIGHT KIDNEY    Donor Type:   Donation after Brain Death    Donor CMV Status: Positive  Donor HBcAB:Negative  Donor HCV Status:Negative  Donor HBV RAH:   Donor HCV RAH: Negative      Subjective:   History of Present Illness:  Ms. Kenney is a 63 y.o. female with ESRD secondary to diabetic nephropathy, solitary kidney.  PMH DM2, HTN, incontience (s/p bladder sling), CAD with stents (on ASA), atrial fibrillation, CVA (no residual deficits), ALEJA, anemia. She is now s/p DBD kidney transplant on 7/24/24. Surgery without complications. PD cath removed. Post op course notable for down trending Cr with excellent UOP. Also notable for   - Salivary gland infection. Per ID,discharged on Augmentin for a 10 day antibiotic course, stop date 8/3/24. Should she develop submandibular swelling, recommend CT neck with ENT consult   - Anemia requiring prbc transfusion on 7/27/24 with adequate response   - ER visit on 7/28 for hypertension - PO meds adjusted and d/c' from ED    She now presents as a direct admit from her routine clinic appointment with transplant surgery. Exam concerning for subcutaneous hematoma. Labs notable for down trending H&H 7.6/23 --> 7/20. Direct admit for imaging and blood transfusion. D/w Dr Squires. On arrival, BP elevated to 204/88 otherwise vital signs normal. Pt c/o nausea, abdominal pain, and constipation. Denies and changes in urinary output, diarrhea, vomiting, chest pain, sob, fever/chills.       Hospital Course:  Pt admitted for acute on chronic anemia, abdominal pain, and increased swelling and bruising at incision. CT a/p obtained on admit and reviewed by Dr Cody. Notable for large stool burden and small sub incisional collection. Transfused 1 unit PRBC and responded appropriately initially then  dropped a bit more. Now having BMs.     Interval History   NAEON. Patient doing well this morning. C/o lack of sleep/trouble weaning off xanax. BP elevated into SBP >230. Will watch and adjust medications today. Also scheduled xanax for anxiety. Hgb dropped a bit today again. Will transfuse another unit today, check hapto tomorrow. Does not appear to be hemolyzing despite LDH and hapto, platelets increased today. Possible dc tomorrow if BP and hgb stable       Past Medical, Surgical, Family, and Social History:   Unchanged from H&P.    Scheduled Meds:   ALPRAZolam  1 mg Oral QHS    amoxicillin-clavulanate 875-125mg  1 tablet Oral Q12H    atorvastatin  20 mg Oral QHS    carvediloL  12.5 mg Oral BID    cetirizine  5 mg Oral QHS    cinacalcet  60 mg Oral Daily with breakfast    DULoxetine  30 mg Oral QHS    ezetimibe  10 mg Oral Daily    k phos di & mono-sod phos mono  500 mg Oral BID    minoxidiL  2.5 mg Oral Daily    mycophenolate  1,000 mg Oral BID    NIFEdipine  60 mg Oral BID    pantoprazole  40 mg Oral Daily    predniSONE  20 mg Oral Daily    sulfamethoxazole-trimethoprim 400-80mg  1 tablet Oral Every Mon, Wed, Fri    tacrolimus  6 mg Oral BID    valGANciclovir  450 mg Oral Once per day on Monday Wednesday Friday     Continuous Infusions:  PRN Meds:  Current Facility-Administered Medications:     0.9%  NaCl infusion (for blood administration), , Intravenous, Q24H PRN    0.9%  NaCl infusion (for blood administration), , Intravenous, Q24H PRN    acetaminophen, 650 mg, Oral, Q4H PRN    ALPRAZolam, 1 mg, Oral, BID PRN    calcium carbonate, 500 mg, Oral, TID PRN    cloNIDine, 0.1 mg, Oral, Q6H PRN    dextrose 10%, 12.5 g, Intravenous, PRN    dextrose 10%, 25 g, Intravenous, PRN    glucagon (human recombinant), 1 mg, Intramuscular, PRN    glucose, 16 g, Oral, PRN    glucose, 24 g, Oral, PRN    insulin aspart U-100, 0-5 Units, Subcutaneous, QID (AC + HS) PRN    melatonin, 6 mg, Oral, Nightly PRN    ondansetron, 4 mg,  "Intravenous, Q6H PRN    oxybutynin, 5 mg, Oral, TID PRN    oxyCODONE, 5 mg, Oral, Q6H PRN    prochlorperazine, 5 mg, Intravenous, Q6H PRN    Intake/Output - Last 3 Shifts         07/29 0700 07/30 0659 07/30 0700 07/31 0659 07/31 0700 08/01 0659    P.O. 702 1430 120    Blood 368.8      Total Intake(mL/kg) 1070.8 (12.7) 1430 (17) 120 (1.4)    Urine (mL/kg/hr) 675 1600 (0.8)     Stool 0 0     Total Output 675 1600     Net +395.8 -170 +120           Urine Occurrence 1 x 3 x     Stool Occurrence 2 x 1 x              Review of Systems   Constitutional:  Negative for activity change, chills and fever.   Respiratory:  Negative for shortness of breath.    Cardiovascular:  Negative for chest pain and leg swelling.   Gastrointestinal:  Positive for diarrhea. Negative for abdominal distention, abdominal pain, constipation, nausea and vomiting.   Genitourinary:  Negative for decreased urine volume, difficulty urinating and dysuria.   Skin:  Positive for wound.   Allergic/Immunologic: Positive for immunocompromised state.   Psychiatric/Behavioral:  Positive for sleep disturbance. Negative for confusion. The patient is nervous/anxious.       Objective:     Vital Signs (Most Recent):  Temp: 97.8 °F (36.6 °C) (07/31/24 0752)  Pulse: 68 (07/31/24 0752)  Resp: 18 (07/31/24 0752)  BP: (!) 204/77 (07/31/24 0752)  SpO2: 99 % (07/31/24 0752) Vital Signs (24h Range):  Temp:  [97.8 °F (36.6 °C)-98.9 °F (37.2 °C)] 97.8 °F (36.6 °C)  Pulse:  [68-92] 68  Resp:  [18] 18  SpO2:  [96 %-99 %] 99 %  BP: (134-234)/() 204/77     Weight: 84.3 kg (185 lb 13.6 oz)  Height: 5' 5" (165.1 cm)  Body mass index is 30.93 kg/m².     Physical Exam  Vitals and nursing note reviewed.   Constitutional:       General: She is not in acute distress.     Appearance: She is well-developed. She is not ill-appearing.   HENT:      Head: Normocephalic and atraumatic.   Eyes:      Extraocular Movements: Extraocular movements intact.      Conjunctiva/sclera: " Conjunctivae normal.   Cardiovascular:      Rate and Rhythm: Normal rate and regular rhythm.      Pulses: Normal pulses.   Pulmonary:      Effort: Pulmonary effort is normal.   Abdominal:      General: Abdomen is flat. Bowel sounds are normal. There is no distension.      Palpations: Abdomen is soft.      Tenderness: There is abdominal tenderness. There is no guarding.       Musculoskeletal:      Cervical back: Normal range of motion.      Right lower leg: No edema.      Left lower leg: No edema.   Skin:     General: Skin is warm and dry.      Findings: Bruising present.   Neurological:      Mental Status: She is alert and oriented to person, place, and time.      Motor: No weakness.   Psychiatric:         Mood and Affect: Mood normal.         Behavior: Behavior normal.         Thought Content: Thought content normal.         Judgment: Judgment normal.          Laboratory:  CBC:   Recent Labs   Lab 07/30/24  0858 07/30/24  1354 07/31/24  0529   WBC 7.50 10.28 8.59   RBC 2.44* 2.64* 2.46*   HGB 7.1* 7.8* 7.4*   HCT 21.4* 23.3* 21.9*   PLT 84* 94* 121*   MCV 88 88 89   MCH 29.1 29.5 30.1   MCHC 33.2 33.5 33.8     CMP:   Recent Labs   Lab 07/30/24  0618 07/30/24  1354 07/31/24  0529   * 218* 134*   CALCIUM 7.8* 8.2* 8.1*   ALBUMIN 2.4* 2.7* 2.6*    138 141   K 2.8* 4.2 3.5   CO2 26 26 23    106 108   BUN 45* 42* 38*   CREATININE 2.7* 2.7* 2.3*     Labs within the past 24 hours have been reviewed.    Diagnostic Results:  No new imaging   Assessment/Plan:     * Acute blood loss anemia  - Pt with decreasing H&H 7.6/23 --> 7/20 on outpatient labs  - CT with small subincisional collection reviewed by Dr Cody  - transfuse 1 unit PRBC   - giving 1 more unit today   - appears unlikely patient is actively bleeding or hemolyzing. Likely related to chronic anemia from kidney disease       Anxiety  - duloxetine + xanax   - patient reports taking 2 mg qHS at home   - trying to wean herself off, has been taking  "1mg recently  - with elevated BP and recent surgery, encouraging patient to cont xanax qHs at this time   - can consider dc'ing xanax once steroids are weaned down and patient has recovered more     Salivary gland infection  - continue augmentin      Long-term use of immunosuppressant medication  - see "prophylactic immunotherapy"      Prophylactic immunotherapy  - continue prograf, cellcept, and steroid taper per protocol  - will check daily prograf levels, monitor for toxic side effects, and adjust for therapeutic dose        S/P kidney transplant  - s/p DDRTxp on 7/24/24  - CT a/p reviewed  - Cr trending down daily with reported good UOP  - strict intake/output  - daily renal function panel      Adrenal corticosteroid causing adverse effect in therapeutic use  - appreciate endo assistance      Anemia in ESRD (end-stage renal disease)  - see "Acute blood loss anemia"      Renovascular hypertension  - on coreg 12.5 mg BID at home  - presented to ED yesterday for high blood pressure and coreg dose increased  - uncontrolled on admit  - Adjust regimen: procardia 60 mg BID and coreg 6.25 mg BID with hold parameters  - minoxidil added       Diabetes mellitus due to underlying condition with chronic kidney disease on chronic dialysis, with long-term current use of insulin  Consult endocrine for management, appreciate their assistance          Discharge Planning:  Not yet stable for dc     Medical decision making for this encounter includes review of pertinent labs and diagnostic studies, assessment and planning, discussions with consulting providers, discussion with patient/family, and participation in multidisciplinary rounds. Time spent caring for patient: 60 minutes    Morena Gudino PA-C  Kidney Transplant  [facility]  "

## 2024-07-31 NOTE — ASSESSMENT & PLAN NOTE
- duloxetine + xanax   - patient reports taking 2 mg qHS at home   - trying to wean herself off, has been taking 1mg recently  - with elevated BP and recent surgery, encouraging patient to cont xanax qHs at this time   - can consider dc'ing xanax once steroids are weaned down and patient has recovered more

## 2024-07-31 NOTE — PROGRESS NOTES
[facility] - [facility]plant Step[facility]  Endocrinology  Progress Note    Admit Date: 7/29/2024     Reason for Consult: Management of T2DM, Hyperglycemia      Surgical Procedure and Date: s/p kidney transplant on 7/24/2024     Diabetes diagnosis year: 2004     Home Diabetes Medications:  Patient previously on tandem insulin pump and Mounjaro. Patient recently lost 60 lbs and had worsening kidney function. Patient states that she is off all diabetes medication for about 3-months and blood sugar has been well controlled. Patient is from [place] and does not have her pump with her to review pump settings. Per chart review, basal insulin was 58 units daily in the past. Anticipate the patient will need more aggressive insulin management post-op.     Most recent D/C recommend:    Novolog SSI:     150 - 200 + 2 unit    201 - 250 + 4 units    251 - 300 + 6 units    301 - 350 + 8 units       > 350   + 10 units       How often checking glucose at home? >4 x day (previously on Dexcom G6, but off since she stopped using tandem pump)   BG readings on regimen: states BG have been well controlled off the pump and the GLP1-RA/ GIP ('s)  Hypoglycemia on the regimen?  No  Missed doses on regimen?  No     Diabetes Complications include:     Hyperglycemia (history)     Complicating diabetes co morbidities:   Glucocorticoid use         HPI: Ms. Kenney is a 63 y.o. female with ESRD secondary to diabetic nephropathy, solitary kidney. PMH DM2, HTN, incontience (s/p bladder sling), CAD with stents (on ASA), atrial fibrillation, CVA (no residual deficits), ALEJA, anemia. She is now s/p DBD kidney transplant on 7/24/24. Surgery without complications. PD cath removed. Post op course notable for down trending Cr with excellent UOP. She now presents as a direct admit from her routine clinic appointment with transplant surgery. Exam concerning for subcutaneous hematoma. Labs notable for down trending H&H 7.6/23 --> 7/20. Direct admit for imaging  "and blood transfusion. D/w Dr Squires. On arrival, BP elevated to 204/88 otherwise vital signs normal. Pt c/o nausea, abdominal pain, and constipation. States incision developed bruising with worsening pain starting yesterday afternoon. Denies any changes in urinary output, diarrhea, vomiting, chest pain, sob, fever/chills. Endocrine conus;tal to manage hyperglycemia and type 2 diabetes.     Lab Results   Component Value Date    HGBA1C 4.8 2024    HGBA1C 4.8 2024         Interval HPI:   Overnight events: No acute events overnight. Patient in room 07771/03705 A. Blood glucose stable. BG at and above goal on current insulin regimen (SSI ). Steroid use- Prednisone 20 mg.    Renal function-   Lab Results   Component Value Date    CREATININE 2.3 (H) 2024       Vasopressors-  None         Endocrine will continue to follow and manage insulin orders inpatient.            Diet diabetic  Calorie      Eatin%  Nausea: No  Hypoglycemia and intervention: No  Fever: No  TPN and/or TF: No    BP (!) 165/75 (BP Location: Right arm, Patient Position: Standing)   Pulse 79   Temp 98.2 °F (36.8 °C) (Oral)   Resp 18   Ht 5' 5" (1.651 m)   Wt 84.3 kg (185 lb 13.6 oz)   SpO2 99%   Breastfeeding No   BMI 30.93 kg/m²     Labs Reviewed and Include    Recent Labs   Lab 24  0529   *   CALCIUM 8.1*   ALBUMIN 2.6*      K 3.5   CO2 23      BUN 38*   CREATININE 2.3*     Lab Results   Component Value Date    WBC 8.59 2024    HGB 7.4 (L) 2024    HCT 21.9 (L) 2024    MCV 89 2024     (L) 2024     No results for input(s): "TSH", "FREET4" in the last 168 hours.  Lab Results   Component Value Date    HGBA1C 4.8 2024    HGBA1C 4.8 2024       Nutritional status:   Body mass index is 30.93 kg/m².  Lab Results   Component Value Date    ALBUMIN 2.6 (L) 2024    ALBUMIN 2.7 (L) 2024    ALBUMIN 2.4 (L) 2024     No results found for: " ""PREALBUMIN"    Estimated Creatinine Clearance: 26.1 mL/min (A) (based on SCr of 2.3 mg/dL (H)).    Accu-Checks  Recent Labs     07/29/24  1721 07/29/24  2105 07/30/24  0839 07/30/24  1226 07/30/24  1726 07/30/24  2254 07/31/24  0801 07/31/24  1233   POCTGLUCOSE 196* 202* 165* 206* 219* 194* 144* 207*       Current Medications and/or Treatments Impacting Glycemic Control  Immunotherapy:    Immunosuppressants           Stop Route Frequency     mycophenolate capsule 1,000 mg         -- Oral 2 times daily     tacrolimus capsule 6 mg         -- Oral 2 times daily          Steroids:   Hormones (From admission, onward)      Start     Stop Route Frequency Ordered    07/30/24 0900  predniSONE tablet 20 mg         -- Oral Daily 07/29/24 1425    07/29/24 1436  melatonin tablet 6 mg         -- Oral Nightly PRN 07/29/24 1425          Pressors:    Autonomic Drugs (From admission, onward)      None          Hyperglycemia/Diabetes Medications:   Antihyperglycemics (From admission, onward)      Start     Stop Route Frequency Ordered    07/31/24 1645  insulin aspart U-100 pen 3 Units         -- SubQ 3 times daily with meals 07/31/24 1304    07/29/24 1526  insulin aspart U-100 pen 0-5 Units         -- SubQ Before meals & nightly PRN 07/29/24 1426            ASSESSMENT and PLAN    Endocrine  Diabetes mellitus due to underlying condition with chronic kidney disease on chronic dialysis, with long-term current use of insulin  Endocrinology consulted for BG management.   BG goal 140-180    - Start novolog 3 units TIDWM given prandial excursions (~0.3 u/kg/day WBD)  - Novolog (Insulin Aspart) prn for BG excursions LDC SSI   - BG checks AC/HS  - Hypoglycemia protocol in place    ** Please notify Endocrine for any change and/or advance in diet**  ** Please call Endocrine for any BG related issues **    Discharge Planning:   TBD. Please notify endocrinology prior to discharge.            Yoana Shah PA-C  Endocrinology  [facility] - " Transplant Stepdown

## 2024-07-31 NOTE — ASSESSMENT & PLAN NOTE
- Pt with decreasing H&H 7.6/23 --> 7/20 on outpatient labs  - CT with small subincisional collection reviewed by Dr Cody  - transfuse 1 unit PRBC   - giving 1 more unit today   - appears unlikely patient is actively bleeding or hemolyzing. Likely related to chronic anemia from kidney disease

## 2024-07-31 NOTE — PROGRESS NOTES
Update:     Patients daughter reported she may need to return home for a brief period to assist her family. She states that one of her mothers friends is going to contact her today to determine whether she can come to assist with care giving. Patients daughter reports she will remain with her mother if no one else is able to come assist her mother.  offered patients daughter support, and asked that she keep transplant social work team updated on whether patient's care giving plan will change. Patients daughter reports she will do so.       Laura Wood LMSW

## 2024-07-31 NOTE — ASSESSMENT & PLAN NOTE
- on coreg 12.5 mg BID at home  - presented to ED yesterday for high blood pressure and coreg dose increased  - uncontrolled on admit  - Adjust regimen: procardia 60 mg BID and coreg 6.25 mg BID with hold parameters  - minoxidil added

## 2024-07-31 NOTE — ASSESSMENT & PLAN NOTE
Endocrinology consulted for BG management.   BG goal 140-180    - Start novolog 3 units TIDWM given prandial excursions (~0.3 u/kg/day WBD)  - Novolog (Insulin Aspart) prn for BG excursions LDC SSI   - BG checks AC/HS  - Hypoglycemia protocol in place    ** Please notify Endocrine for any change and/or advance in diet**  ** Please call Endocrine for any BG related issues **    Discharge Planning:   TBD. Please notify endocrinology prior to discharge.

## 2024-08-01 LAB
ABO + RH BLD: NORMAL
ALBUMIN SERPL BCP-MCNC: 2.6 G/DL (ref 3.5–5.2)
ANION GAP SERPL CALC-SCNC: 8 MMOL/L (ref 8–16)
BACTERIA SPEC ANAEROBE CULT: NORMAL
BASOPHILS # BLD AUTO: 0.01 K/UL (ref 0–0.2)
BASOPHILS NFR BLD: 0.1 % (ref 0–1.9)
BLD GP AB SCN CELLS X3 SERPL QL: NORMAL
BUN SERPL-MCNC: 39 MG/DL (ref 8–23)
CALCIUM SERPL-MCNC: 8 MG/DL (ref 8.7–10.5)
CHLORIDE SERPL-SCNC: 108 MMOL/L (ref 95–110)
CO2 SERPL-SCNC: 24 MMOL/L (ref 23–29)
CREAT SERPL-MCNC: 2.4 MG/DL (ref 0.5–1.4)
DIFFERENTIAL METHOD BLD: ABNORMAL
EOSINOPHIL # BLD AUTO: 0.1 K/UL (ref 0–0.5)
EOSINOPHIL NFR BLD: 1.3 % (ref 0–8)
ERYTHROCYTE [DISTWIDTH] IN BLOOD BY AUTOMATED COUNT: 16 % (ref 11.5–14.5)
EST. GFR  (NO RACE VARIABLE): 21.9 ML/MIN/1.73 M^2
GLUCOSE SERPL-MCNC: 175 MG/DL (ref 70–110)
HAPTOGLOB SERPL-MCNC: <10 MG/DL (ref 30–250)
HCT VFR BLD AUTO: 22.4 % (ref 37–48.5)
HGB BLD-MCNC: 7.5 G/DL (ref 12–16)
IMM GRANULOCYTES # BLD AUTO: 0.35 K/UL (ref 0–0.04)
IMM GRANULOCYTES NFR BLD AUTO: 4.1 % (ref 0–0.5)
LDH SERPL L TO P-CCNC: 314 U/L (ref 110–260)
LYMPHOCYTES # BLD AUTO: 0.4 K/UL (ref 1–4.8)
LYMPHOCYTES NFR BLD: 4.4 % (ref 18–48)
MAGNESIUM SERPL-MCNC: 1.4 MG/DL (ref 1.6–2.6)
MCH RBC QN AUTO: 29.6 PG (ref 27–31)
MCHC RBC AUTO-ENTMCNC: 33.5 G/DL (ref 32–36)
MCV RBC AUTO: 89 FL (ref 82–98)
MONOCYTES # BLD AUTO: 0.4 K/UL (ref 0.3–1)
MONOCYTES NFR BLD: 4.6 % (ref 4–15)
NEUTROPHILS # BLD AUTO: 7.4 K/UL (ref 1.8–7.7)
NEUTROPHILS NFR BLD: 85.5 % (ref 38–73)
NRBC BLD-RTO: 0 /100 WBC
PHOSPHATE SERPL-MCNC: 3.4 MG/DL (ref 2.7–4.5)
PLATELET # BLD AUTO: 157 K/UL (ref 150–450)
PMV BLD AUTO: 11.7 FL (ref 9.2–12.9)
POCT GLUCOSE: 153 MG/DL (ref 70–110)
POCT GLUCOSE: 188 MG/DL (ref 70–110)
POCT GLUCOSE: 225 MG/DL (ref 70–110)
POCT GLUCOSE: 263 MG/DL (ref 70–110)
POTASSIUM SERPL-SCNC: 3.4 MMOL/L (ref 3.5–5.1)
RBC # BLD AUTO: 2.53 M/UL (ref 4–5.4)
SODIUM SERPL-SCNC: 140 MMOL/L (ref 136–145)
SPECIMEN OUTDATE: NORMAL
TACROLIMUS BLD-MCNC: 9.1 NG/ML (ref 5–15)
WBC # BLD AUTO: 8.61 K/UL (ref 3.9–12.7)

## 2024-08-01 PROCEDURE — 25000003 PHARM REV CODE 250: Performed by: PHYSICIAN ASSISTANT

## 2024-08-01 PROCEDURE — 36415 COLL VENOUS BLD VENIPUNCTURE: CPT

## 2024-08-01 PROCEDURE — 85025 COMPLETE CBC W/AUTO DIFF WBC: CPT | Performed by: PHYSICIAN ASSISTANT

## 2024-08-01 PROCEDURE — 94660 CPAP INITIATION&MGMT: CPT

## 2024-08-01 PROCEDURE — 63600175 PHARM REV CODE 636 W HCPCS: Performed by: PHYSICIAN ASSISTANT

## 2024-08-01 PROCEDURE — 83615 LACTATE (LD) (LDH) ENZYME: CPT

## 2024-08-01 PROCEDURE — 20600001 HC STEP DOWN PRIVATE ROOM

## 2024-08-01 PROCEDURE — 86900 BLOOD TYPING SEROLOGIC ABO: CPT

## 2024-08-01 PROCEDURE — 99232 SBSQ HOSP IP/OBS MODERATE 35: CPT | Mod: ,,,

## 2024-08-01 PROCEDURE — 80069 RENAL FUNCTION PANEL: CPT | Performed by: PHYSICIAN ASSISTANT

## 2024-08-01 PROCEDURE — 83010 ASSAY OF HAPTOGLOBIN QUANT: CPT

## 2024-08-01 PROCEDURE — 86850 RBC ANTIBODY SCREEN: CPT

## 2024-08-01 PROCEDURE — 86901 BLOOD TYPING SEROLOGIC RH(D): CPT

## 2024-08-01 PROCEDURE — 25000003 PHARM REV CODE 250: Performed by: TRANSPLANT SURGERY

## 2024-08-01 PROCEDURE — 80197 ASSAY OF TACROLIMUS: CPT | Performed by: PHYSICIAN ASSISTANT

## 2024-08-01 PROCEDURE — 94761 N-INVAS EAR/PLS OXIMETRY MLT: CPT

## 2024-08-01 PROCEDURE — 25000003 PHARM REV CODE 250

## 2024-08-01 PROCEDURE — 83735 ASSAY OF MAGNESIUM: CPT | Performed by: PHYSICIAN ASSISTANT

## 2024-08-01 PROCEDURE — 99900035 HC TECH TIME PER 15 MIN (STAT)

## 2024-08-01 PROCEDURE — 63600175 PHARM REV CODE 636 W HCPCS

## 2024-08-01 PROCEDURE — 5A09357 ASSISTANCE WITH RESPIRATORY VENTILATION, LESS THAN 24 CONSECUTIVE HOURS, CONTINUOUS POSITIVE AIRWAY PRESSURE: ICD-10-PCS | Performed by: INTERNAL MEDICINE

## 2024-08-01 RX ORDER — BISACODYL 5 MG
10 TABLET, DELAYED RELEASE (ENTERIC COATED) ORAL DAILY PRN
Status: DISCONTINUED | OUTPATIENT
Start: 2024-08-01 | End: 2024-08-01

## 2024-08-01 RX ORDER — BISACODYL 10 MG/1
10 SUPPOSITORY RECTAL DAILY PRN
Status: DISCONTINUED | OUTPATIENT
Start: 2024-08-01 | End: 2024-08-04 | Stop reason: HOSPADM

## 2024-08-01 RX ORDER — MAGNESIUM SULFATE HEPTAHYDRATE 40 MG/ML
2 INJECTION, SOLUTION INTRAVENOUS
Status: COMPLETED | OUTPATIENT
Start: 2024-08-01 | End: 2024-08-01

## 2024-08-01 RX ORDER — POTASSIUM CHLORIDE 20 MEQ/1
40 TABLET, EXTENDED RELEASE ORAL ONCE
Status: COMPLETED | OUTPATIENT
Start: 2024-08-01 | End: 2024-08-01

## 2024-08-01 RX ORDER — BISACODYL 10 MG/1
10 SUPPOSITORY RECTAL DAILY PRN
Status: DISCONTINUED | OUTPATIENT
Start: 2024-08-01 | End: 2024-08-01

## 2024-08-01 RX ADMIN — INSULIN ASPART 3 UNITS: 100 INJECTION, SOLUTION INTRAVENOUS; SUBCUTANEOUS at 05:08

## 2024-08-01 RX ADMIN — INSULIN ASPART 1 UNITS: 100 INJECTION, SOLUTION INTRAVENOUS; SUBCUTANEOUS at 09:08

## 2024-08-01 RX ADMIN — POTASSIUM CHLORIDE 40 MEQ: 1500 TABLET, EXTENDED RELEASE ORAL at 10:08

## 2024-08-01 RX ADMIN — PANTOPRAZOLE SODIUM 40 MG: 40 TABLET, DELAYED RELEASE ORAL at 08:08

## 2024-08-01 RX ADMIN — CETIRIZINE HYDROCHLORIDE 5 MG: 5 TABLET, FILM COATED ORAL at 08:08

## 2024-08-01 RX ADMIN — ALPRAZOLAM 2 MG: 0.25 TABLET ORAL at 10:08

## 2024-08-01 RX ADMIN — MAGNESIUM SULFATE HEPTAHYDRATE 2 G: 40 INJECTION, SOLUTION INTRAVENOUS at 01:08

## 2024-08-01 RX ADMIN — MYCOPHENOLATE MOFETIL 1000 MG: 250 CAPSULE ORAL at 08:08

## 2024-08-01 RX ADMIN — OXYCODONE 5 MG: 5 TABLET ORAL at 04:08

## 2024-08-01 RX ADMIN — MINOXIDIL 5 MG: 2.5 TABLET ORAL at 08:08

## 2024-08-01 RX ADMIN — CLONIDINE HYDROCHLORIDE 0.1 MG: 0.1 TABLET ORAL at 04:08

## 2024-08-01 RX ADMIN — AMOXICILLIN AND CLAVULANATE POTASSIUM 1 TABLET: 875; 125 TABLET, FILM COATED ORAL at 08:08

## 2024-08-01 RX ADMIN — MAGNESIUM SULFATE HEPTAHYDRATE 2 G: 40 INJECTION, SOLUTION INTRAVENOUS at 10:08

## 2024-08-01 RX ADMIN — CARVEDILOL 12.5 MG: 12.5 TABLET, FILM COATED ORAL at 08:08

## 2024-08-01 RX ADMIN — INSULIN ASPART 2 UNITS: 100 INJECTION, SOLUTION INTRAVENOUS; SUBCUTANEOUS at 05:08

## 2024-08-01 RX ADMIN — INSULIN ASPART 3 UNITS: 100 INJECTION, SOLUTION INTRAVENOUS; SUBCUTANEOUS at 08:08

## 2024-08-01 RX ADMIN — INSULIN ASPART 3 UNITS: 100 INJECTION, SOLUTION INTRAVENOUS; SUBCUTANEOUS at 01:08

## 2024-08-01 RX ADMIN — DIBASIC SODIUM PHOSPHATE, MONOBASIC POTASSIUM PHOSPHATE AND MONOBASIC SODIUM PHOSPHATE 2 TABLET: 852; 155; 130 TABLET ORAL at 08:08

## 2024-08-01 RX ADMIN — EZETIMIBE 10 MG: 10 TABLET ORAL at 08:08

## 2024-08-01 RX ADMIN — DULOXETINE HYDROCHLORIDE 30 MG: 30 CAPSULE, DELAYED RELEASE ORAL at 08:08

## 2024-08-01 RX ADMIN — PREDNISONE 20 MG: 20 TABLET ORAL at 08:08

## 2024-08-01 RX ADMIN — TACROLIMUS 6 MG: 1 CAPSULE ORAL at 05:08

## 2024-08-01 RX ADMIN — BISACODYL 10 MG: 5 TABLET, COATED ORAL at 10:08

## 2024-08-01 RX ADMIN — ATORVASTATIN CALCIUM 20 MG: 20 TABLET, FILM COATED ORAL at 08:08

## 2024-08-01 RX ADMIN — TACROLIMUS 6 MG: 1 CAPSULE ORAL at 08:08

## 2024-08-01 RX ADMIN — NIFEDIPINE 60 MG: 30 TABLET, FILM COATED, EXTENDED RELEASE ORAL at 08:08

## 2024-08-01 RX ADMIN — CINACALCET 60 MG: 30 TABLET, FILM COATED ORAL at 08:08

## 2024-08-01 NOTE — PLAN OF CARE
Peripheral entered. Patient is due for a follow-up. Pt is aaox4 vswnl after BP meds given. Minoxidil added and Nifedipine increased. Bed in low position and callbell within reach. Pt ambulates independently. Rt lower quad incision jaquelin with staples intact. Swollen with hematoma noted. Pt had a unit blood today for H&H 7.4/21.9. second unit given since this admit. Left arm skin tear noted. Accu ck at 5241=299. No c/o pain. Pt states 1 loose stool this evening.

## 2024-08-01 NOTE — ASSESSMENT & PLAN NOTE
- on coreg 12.5 mg BID at home  - presented to ED yesterday for high blood pressure and coreg dose increased  - uncontrolled on admit  - Adjust regimen: procardia 60 mg BID and coreg 6.25 mg BID with hold parameters  - minoxidil added   - improving on current regimen

## 2024-08-01 NOTE — SUBJECTIVE & OBJECTIVE
Subjective:   History of Present Illness:  Ms. Kenney is a 63 y.o. female with ESRD secondary to diabetic nephropathy, solitary kidney.  PMH DM2, HTN, incontience (s/p bladder sling), CAD with stents (on ASA), atrial fibrillation, CVA (no residual deficits), ALEJA, anemia. She is now s/p DBD kidney transplant on 7/24/24. Surgery without complications. PD cath removed. Post op course notable for down trending Cr with excellent UOP. Also notable for   - Salivary gland infection. Per ID,discharged on Augmentin for a 10 day antibiotic course, stop date 8/3/24. Should she develop submandibular swelling, recommend CT neck with ENT consult   - Anemia requiring prbc transfusion on 7/27/24 with adequate response   - ER visit on 7/28 for hypertension - PO meds adjusted and d/c' from ED    She now presents as a direct admit from her routine clinic appointment with transplant surgery. Exam concerning for subcutaneous hematoma. Labs notable for down trending H&H 7.6/23 --> 7/20. Direct admit for imaging and blood transfusion. D/w Dr Squires. On arrival, BP elevated to 204/88 otherwise vital signs normal. Pt c/o nausea, abdominal pain, and constipation. Denies and changes in urinary output, diarrhea, vomiting, chest pain, sob, fever/chills.       Hospital Course:  Pt admitted for acute on chronic anemia, abdominal pain, and increased swelling and bruising at incision. CT a/p obtained on admit and reviewed by Dr Cody. Notable for large stool burden and small sub incisional collection. Transfused 1 unit PRBC and responded appropriately initially then dropped a bit more. Now having BMs.     Interval History   NAEON. Patient reports some right sided pain this morning, denies increased swelling around incision. Hgb did not respond to unit of blood yesterday either. Will check hapto and LDH again today and repeat kidney US. BP much improved on additional minoxidil. Cr stable at 2.4, encouraged more fluid intake today. Sleeping  better with xanax. VSS. Cont to monitor       Past Medical, Surgical, Family, and Social History:   Unchanged from H&P.    Scheduled Meds:   ALPRAZolam  2 mg Oral QHS    amoxicillin-clavulanate 875-125mg  1 tablet Oral Q12H    atorvastatin  20 mg Oral QHS    carvediloL  12.5 mg Oral BID    cetirizine  5 mg Oral QHS    cinacalcet  60 mg Oral Daily with breakfast    DULoxetine  30 mg Oral QHS    ezetimibe  10 mg Oral Daily    insulin aspart U-100  3 Units Subcutaneous TIDWM    k phos di & mono-sod phos mono  500 mg Oral BID    magnesium sulfate IVPB  2 g Intravenous Q2H    minoxidiL  5 mg Oral Daily    mycophenolate  1,000 mg Oral BID    NIFEdipine  60 mg Oral BID    pantoprazole  40 mg Oral Daily    predniSONE  20 mg Oral Daily    tacrolimus  6 mg Oral BID    valGANciclovir  450 mg Oral Once per day on Monday Wednesday Friday     Continuous Infusions:  PRN Meds:  Current Facility-Administered Medications:     0.9%  NaCl infusion (for blood administration), , Intravenous, Q24H PRN    0.9%  NaCl infusion (for blood administration), , Intravenous, Q24H PRN    acetaminophen, 650 mg, Oral, Q4H PRN    ALPRAZolam, 1 mg, Oral, BID PRN    bisacodyL, 10 mg, Oral, Daily PRN    calcium carbonate, 500 mg, Oral, TID PRN    cloNIDine, 0.1 mg, Oral, Q6H PRN    dextrose 10%, 12.5 g, Intravenous, PRN    dextrose 10%, 25 g, Intravenous, PRN    glucagon (human recombinant), 1 mg, Intramuscular, PRN    glucose, 16 g, Oral, PRN    glucose, 24 g, Oral, PRN    insulin aspart U-100, 0-5 Units, Subcutaneous, QID (AC + HS) PRN    melatonin, 6 mg, Oral, Nightly PRN    ondansetron, 4 mg, Intravenous, Q6H PRN    oxybutynin, 5 mg, Oral, TID PRN    oxyCODONE, 5 mg, Oral, Q6H PRN    prochlorperazine, 5 mg, Intravenous, Q6H PRN    Intake/Output - Last 3 Shifts         07/30 0700 07/31 0659 07/31 0700 08/01 0659 08/01 0700 08/02 0659    P.O. 1430 840     Blood  350     Total Intake(mL/kg) 1430 (17) 1190 (14.1)     Urine (mL/kg/hr) 1600 (0.8) 1050  "(0.5)     Stool 0 0     Total Output 1600 1050     Net -170 +140            Urine Occurrence 3 x      Stool Occurrence 1 x 1 x              Review of Systems   Constitutional:  Negative for activity change, chills and fever.   Respiratory:  Negative for shortness of breath.    Cardiovascular:  Negative for chest pain and leg swelling.   Gastrointestinal:  Positive for abdominal pain. Negative for abdominal distention, constipation, diarrhea, nausea and vomiting.   Genitourinary:  Negative for decreased urine volume, difficulty urinating and dysuria.   Skin:  Positive for wound.   Allergic/Immunologic: Positive for immunocompromised state.   Psychiatric/Behavioral:  Negative for confusion and sleep disturbance. The patient is not nervous/anxious.       Objective:     Vital Signs (Most Recent):  Temp: 97.9 °F (36.6 °C) (08/01/24 0805)  Pulse: 70 (08/01/24 0805)  Resp: 18 (08/01/24 0805)  BP: 129/66 (08/01/24 0805)  SpO2: 98 % (08/01/24 0805) Vital Signs (24h Range):  Temp:  [97.9 °F (36.6 °C)-98.8 °F (37.1 °C)] 97.9 °F (36.6 °C)  Pulse:  [69-84] 70  Resp:  [16-18] 18  SpO2:  [97 %-99 %] 98 %  BP: (129-179)/(66-77) 129/66     Weight: 84.3 kg (185 lb 13.6 oz)  Height: 5' 5" (165.1 cm)  Body mass index is 30.93 kg/m².     Physical Exam  Vitals and nursing note reviewed.   Constitutional:       General: She is not in acute distress.     Appearance: She is well-developed. She is not ill-appearing.   HENT:      Head: Normocephalic and atraumatic.   Eyes:      Extraocular Movements: Extraocular movements intact.      Conjunctiva/sclera: Conjunctivae normal.   Cardiovascular:      Rate and Rhythm: Normal rate and regular rhythm.      Pulses: Normal pulses.   Pulmonary:      Effort: Pulmonary effort is normal. No respiratory distress.      Breath sounds: No wheezing.   Abdominal:      General: Abdomen is flat. Bowel sounds are normal. There is no distension.      Palpations: Abdomen is soft. There is no mass.      Tenderness: " There is abdominal tenderness. There is no guarding.       Musculoskeletal:      Cervical back: Normal range of motion.      Right lower leg: No edema.      Left lower leg: No edema.   Skin:     General: Skin is warm and dry.      Findings: Bruising present.   Neurological:      Mental Status: She is alert and oriented to person, place, and time.      Motor: No weakness.   Psychiatric:         Mood and Affect: Mood normal.         Behavior: Behavior normal.         Thought Content: Thought content normal.         Judgment: Judgment normal.          Laboratory:  CBC:   Recent Labs   Lab 07/30/24  1354 07/31/24  0529 08/01/24  0529   WBC 10.28 8.59 8.61   RBC 2.64* 2.46* 2.53*   HGB 7.8* 7.4* 7.5*   HCT 23.3* 21.9* 22.4*   PLT 94* 121* 157   MCV 88 89 89   MCH 29.5 30.1 29.6   MCHC 33.5 33.8 33.5     CMP:   Recent Labs   Lab 07/30/24  1354 07/31/24  0529 08/01/24  0529   * 134* 175*   CALCIUM 8.2* 8.1* 8.0*   ALBUMIN 2.7* 2.6* 2.6*    141 140   K 4.2 3.5 3.4*   CO2 26 23 24    108 108   BUN 42* 38* 39*   CREATININE 2.7* 2.3* 2.4*     Labs within the past 24 hours have been reviewed.    Diagnostic Results:  No new imaging

## 2024-08-01 NOTE — PROGRESS NOTES
[facility] - Transplant Stepdown  Endocrinology  Progress Note    Admit Date: 7/29/2024     Reason for Consult: Management of T2DM, Hyperglycemia      Surgical Procedure and Date: s/p kidney transplant on 7/24/2024     Diabetes diagnosis year: 2004     Home Diabetes Medications:  Patient previously on tandem insulin pump and Mounjaro. Patient recently lost 60 lbs and had worsening kidney function. Patient states that she is off all diabetes medication for about 3-months and blood sugar has been well controlled. Patient is from [place] and does not have her pump with her to review pump settings. Per chart review, basal insulin was 58 units daily in the past. Anticipate the patient will need more aggressive insulin management post-op.     Most recent D/C recommend:    Novolog SSI:     150 - 200 + 2 unit    201 - 250 + 4 units    251 - 300 + 6 units    301 - 350 + 8 units       > 350   + 10 units       How often checking glucose at home? >4 x day (previously on Dexcom G6, but off since she stopped using tandem pump)   BG readings on regimen: states BG have been well controlled off the pump and the GLP1-RA/ GIP ('s)  Hypoglycemia on the regimen?  No  Missed doses on regimen?  No     Diabetes Complications include:     Hyperglycemia (history)     Complicating diabetes co morbidities:   Glucocorticoid use         HPI: Ms. Kenney is a 63 y.o. female with ESRD secondary to diabetic nephropathy, solitary kidney. PMH DM2, HTN, incontience (s/p bladder sling), CAD with stents (on ASA), atrial fibrillation, CVA (no residual deficits), ALEJA, anemia. She is now s/p DBD kidney transplant on 7/24/24. Surgery without complications. PD cath removed. Post op course notable for down trending Cr with excellent UOP. She now presents as a direct admit from her routine clinic appointment with transplant surgery. Exam concerning for subcutaneous hematoma. Labs notable for down trending H&H 7.6/23 --> 7/20. Direct admit for imaging  "and blood transfusion. D/w Dr Squires. On arrival, BP elevated to 204/88 otherwise vital signs normal. Pt c/o nausea, abdominal pain, and constipation. States incision developed bruising with worsening pain starting yesterday afternoon. Denies any changes in urinary output, diarrhea, vomiting, chest pain, sob, fever/chills. Endocrine conus;tal to manage hyperglycemia and type 2 diabetes.     Lab Results   Component Value Date    HGBA1C 4.8 2024    HGBA1C 4.8 2024         Interval HPI:   Overnight events: No acute events overnight. Patient in room 21290/17240 A. Blood glucose stable. BG at and above goal on current insulin regimen (SSI ). Steroid use- Prednisone 20 mg.    Renal function-   Lab Results   Component Value Date    CREATININE 2.4 (H) 2024       Vasopressors-  None         Diet diabetic  Calorie      Eatin%  Nausea: No  Hypoglycemia and intervention: No  Fever: No  TPN and/or TF: No    /66   Pulse 70   Temp 97.9 °F (36.6 °C) (Oral)   Resp 18   Ht 5' 5" (1.651 m)   Wt 84.3 kg (185 lb 13.6 oz)   SpO2 98%   Breastfeeding No   BMI 30.93 kg/m²     Labs Reviewed and Include    Recent Labs   Lab 24  0529   *   CALCIUM 8.0*   ALBUMIN 2.6*      K 3.4*   CO2 24      BUN 39*   CREATININE 2.4*     Lab Results   Component Value Date    WBC 8.61 2024    HGB 7.5 (L) 2024    HCT 22.4 (L) 2024    MCV 89 2024     2024     No results for input(s): "TSH", "FREET4" in the last 168 hours.  Lab Results   Component Value Date    HGBA1C 4.8 2024    HGBA1C 4.8 2024       Nutritional status:   Body mass index is 30.93 kg/m².  Lab Results   Component Value Date    ALBUMIN 2.6 (L) 2024    ALBUMIN 2.6 (L) 2024    ALBUMIN 2.7 (L) 2024     No results found for: "PREALBUMIN"    Estimated Creatinine Clearance: 25 mL/min (A) (based on SCr of 2.4 mg/dL (H)).    Accu-Checks  Recent Labs     24  6404 " 07/30/24  0839 07/30/24  1226 07/30/24  1726 07/30/24  2254 07/31/24  0801 07/31/24  1233 07/31/24  1800 07/31/24  2150 08/01/24  0844   POCTGLUCOSE 202* 165* 206* 219* 194* 144* 207* 263* 196* 153*       Current Medications and/or Treatments Impacting Glycemic Control  Immunotherapy:    Immunosuppressants           Stop Route Frequency     mycophenolate capsule 1,000 mg         -- Oral 2 times daily     tacrolimus capsule 6 mg         -- Oral 2 times daily          Steroids:   Hormones (From admission, onward)      Start     Stop Route Frequency Ordered    07/30/24 0900  predniSONE tablet 20 mg         -- Oral Daily 07/29/24 1425    07/29/24 1436  melatonin tablet 6 mg         -- Oral Nightly PRN 07/29/24 1425          Pressors:    Autonomic Drugs (From admission, onward)      None          Hyperglycemia/Diabetes Medications:   Antihyperglycemics (From admission, onward)      Start     Stop Route Frequency Ordered    07/31/24 1645  insulin aspart U-100 pen 3 Units         -- SubQ 3 times daily with meals 07/31/24 1304    07/29/24 1526  insulin aspart U-100 pen 0-5 Units         -- SubQ Before meals & nightly PRN 07/29/24 1426            ASSESSMENT and PLAN    Oncology  * Acute blood loss anemia  Managed per primary team  Avoid hypoglycemia        Endocrine  Diabetes mellitus due to underlying condition with chronic kidney disease on chronic dialysis, with long-term current use of insulin  Endocrinology consulted for BG management.   BG goal 140-180    - novolog 3 units TIDWM given prandial excursions (~0.3 u/kg/day WBD)  - Novolog (Insulin Aspart) prn for BG excursions [facility] SSI   - BG checks AC/HS  - Hypoglycemia protocol in place    ** Please notify Endocrine for any change and/or advance in diet**  ** Please call Endocrine for any BG related issues **    Discharge Planning:   TBD. Please notify endocrinology prior to discharge.        Other  Adrenal corticosteroid causing adverse effect in therapeutic  use  Glucocorticoids markedly increase glucose levels. Expect the steroid taper will help glucose control.             Yoana Shah PA-C  Endocrinology  [facility] - Transplant Stepdown

## 2024-08-01 NOTE — PROGRESS NOTES
[facility] - Transplant Stepdown  Kidney Transplant  Progress Note      Reason for Follow-up: Reassessment of Kidney Transplant - 7/24/2024  (#1) recipient and management of immunosuppression.    ORGAN:   RIGHT KIDNEY    Donor Type:   Donation after Brain Death    Donor CMV Status: Positive  Donor HBcAB:Negative  Donor HCV Status:Negative  Donor HBV RAH:   Donor HCV RAH: Negative      Subjective:   History of Present Illness:  Ms. Kenney is a 63 y.o. female with ESRD secondary to diabetic nephropathy, solitary kidney.  PMH DM2, HTN, incontience (s/p bladder sling), CAD with stents (on ASA), atrial fibrillation, CVA (no residual deficits), ALEJA, anemia. She is now s/p DBD kidney transplant on 7/24/24. Surgery without complications. PD cath removed. Post op course notable for down trending Cr with excellent UOP. Also notable for   - Salivary gland infection. Per ID,discharged on Augmentin for a 10 day antibiotic course, stop date 8/3/24. Should she develop submandibular swelling, recommend CT neck with ENT consult   - Anemia requiring prbc transfusion on 7/27/24 with adequate response   - ER visit on 7/28 for hypertension - PO meds adjusted and d/c' from ED    She now presents as a direct admit from her routine clinic appointment with transplant surgery. Exam concerning for subcutaneous hematoma. Labs notable for down trending H&H 7.6/23 --> 7/20. Direct admit for imaging and blood transfusion. D/w Dr Squires. On arrival, BP elevated to 204/88 otherwise vital signs normal. Pt c/o nausea, abdominal pain, and constipation. Denies and changes in urinary output, diarrhea, vomiting, chest pain, sob, fever/chills.       Hospital Course:  Pt admitted for acute on chronic anemia, abdominal pain, and increased swelling and bruising at incision. CT a/p obtained on admit and reviewed by Dr Cody. Notable for large stool burden and small sub incisional collection. Transfused 1 unit PRBC and responded appropriately initially then  dropped a bit more. Now having BMs.     Interval History   NAEON. Patient reports some right sided pain this morning, denies increased swelling around incision. Hgb did not respond to unit of blood yesterday either. Will check hapto and LDH again today and repeat kidney US. BP much improved on additional minoxidil. Cr stable at 2.4, encouraged more fluid intake today. Sleeping better with xanax. VSS. Cont to monitor       Past Medical, Surgical, Family, and Social History:   Unchanged from H&P.    Scheduled Meds:   ALPRAZolam  2 mg Oral QHS    amoxicillin-clavulanate 875-125mg  1 tablet Oral Q12H    atorvastatin  20 mg Oral QHS    carvediloL  12.5 mg Oral BID    cetirizine  5 mg Oral QHS    cinacalcet  60 mg Oral Daily with breakfast    DULoxetine  30 mg Oral QHS    ezetimibe  10 mg Oral Daily    insulin aspart U-100  3 Units Subcutaneous TIDWM    k phos di & mono-sod phos mono  500 mg Oral BID    magnesium sulfate IVPB  2 g Intravenous Q2H    minoxidiL  5 mg Oral Daily    mycophenolate  1,000 mg Oral BID    NIFEdipine  60 mg Oral BID    pantoprazole  40 mg Oral Daily    predniSONE  20 mg Oral Daily    tacrolimus  6 mg Oral BID    valGANciclovir  450 mg Oral Once per day on Monday Wednesday Friday     Continuous Infusions:  PRN Meds:  Current Facility-Administered Medications:     0.9%  NaCl infusion (for blood administration), , Intravenous, Q24H PRN    0.9%  NaCl infusion (for blood administration), , Intravenous, Q24H PRN    acetaminophen, 650 mg, Oral, Q4H PRN    ALPRAZolam, 1 mg, Oral, BID PRN    bisacodyL, 10 mg, Oral, Daily PRN    calcium carbonate, 500 mg, Oral, TID PRN    cloNIDine, 0.1 mg, Oral, Q6H PRN    dextrose 10%, 12.5 g, Intravenous, PRN    dextrose 10%, 25 g, Intravenous, PRN    glucagon (human recombinant), 1 mg, Intramuscular, PRN    glucose, 16 g, Oral, PRN    glucose, 24 g, Oral, PRN    insulin aspart U-100, 0-5 Units, Subcutaneous, QID (AC + HS) PRN    melatonin, 6 mg, Oral, Nightly PRN     "ondansetron, 4 mg, Intravenous, Q6H PRN    oxybutynin, 5 mg, Oral, TID PRN    oxyCODONE, 5 mg, Oral, Q6H PRN    prochlorperazine, 5 mg, Intravenous, Q6H PRN    Intake/Output - Last 3 Shifts         07/30 0700  07/31 0659 07/31 0700  08/01 0659 08/01 0700  08/02 0659    P.O. 1430 840     Blood  350     Total Intake(mL/kg) 1430 (17) 1190 (14.1)     Urine (mL/kg/hr) 1600 (0.8) 1050 (0.5)     Stool 0 0     Total Output 1600 1050     Net -170 +140            Urine Occurrence 3 x      Stool Occurrence 1 x 1 x              Review of Systems   Constitutional:  Negative for activity change, chills and fever.   Respiratory:  Negative for shortness of breath.    Cardiovascular:  Negative for chest pain and leg swelling.   Gastrointestinal:  Positive for abdominal pain. Negative for abdominal distention, constipation, diarrhea, nausea and vomiting.   Genitourinary:  Negative for decreased urine volume, difficulty urinating and dysuria.   Skin:  Positive for wound.   Allergic/Immunologic: Positive for immunocompromised state.   Psychiatric/Behavioral:  Negative for confusion and sleep disturbance. The patient is not nervous/anxious.       Objective:     Vital Signs (Most Recent):  Temp: 97.9 °F (36.6 °C) (08/01/24 0805)  Pulse: 70 (08/01/24 0805)  Resp: 18 (08/01/24 0805)  BP: 129/66 (08/01/24 0805)  SpO2: 98 % (08/01/24 0805) Vital Signs (24h Range):  Temp:  [97.9 °F (36.6 °C)-98.8 °F (37.1 °C)] 97.9 °F (36.6 °C)  Pulse:  [69-84] 70  Resp:  [16-18] 18  SpO2:  [97 %-99 %] 98 %  BP: (129-179)/(66-77) 129/66     Weight: 84.3 kg (185 lb 13.6 oz)  Height: 5' 5" (165.1 cm)  Body mass index is 30.93 kg/m².     Physical Exam  Vitals and nursing note reviewed.   Constitutional:       General: She is not in acute distress.     Appearance: She is well-developed. She is not ill-appearing.   HENT:      Head: Normocephalic and atraumatic.   Eyes:      Extraocular Movements: Extraocular movements intact.      Conjunctiva/sclera: Conjunctivae " normal.   Cardiovascular:      Rate and Rhythm: Normal rate and regular rhythm.      Pulses: Normal pulses.   Pulmonary:      Effort: Pulmonary effort is normal. No respiratory distress.      Breath sounds: No wheezing.   Abdominal:      General: Abdomen is flat. Bowel sounds are normal. There is no distension.      Palpations: Abdomen is soft. There is no mass.      Tenderness: There is abdominal tenderness. There is no guarding.       Musculoskeletal:      Cervical back: Normal range of motion.      Right lower leg: No edema.      Left lower leg: No edema.   Skin:     General: Skin is warm and dry.      Findings: Bruising present.   Neurological:      Mental Status: She is alert and oriented to person, place, and time.      Motor: No weakness.   Psychiatric:         Mood and Affect: Mood normal.         Behavior: Behavior normal.         Thought Content: Thought content normal.         Judgment: Judgment normal.          Laboratory:  CBC:   Recent Labs   Lab 07/30/24  1354 07/31/24  0529 08/01/24  0529   WBC 10.28 8.59 8.61   RBC 2.64* 2.46* 2.53*   HGB 7.8* 7.4* 7.5*   HCT 23.3* 21.9* 22.4*   PLT 94* 121* 157   MCV 88 89 89   MCH 29.5 30.1 29.6   MCHC 33.5 33.8 33.5     CMP:   Recent Labs   Lab 07/30/24  1354 07/31/24  0529 08/01/24  0529   * 134* 175*   CALCIUM 8.2* 8.1* 8.0*   ALBUMIN 2.7* 2.6* 2.6*    141 140   K 4.2 3.5 3.4*   CO2 26 23 24    108 108   BUN 42* 38* 39*   CREATININE 2.7* 2.3* 2.4*     Labs within the past 24 hours have been reviewed.    Diagnostic Results:  No new imaging   Assessment/Plan:     * Acute blood loss anemia  - Pt with decreasing H&H 7.6/23 --> 7/20 on outpatient labs  - CT with small subincisional collection reviewed by Dr Cody  - 2 units transfused this admission without appropriate response   - cont investigating today with hapto, LDH, US   - appears unlikely patient is actively bleeding or hemolyzing. Likely related to chronic anemia from kidney disease  "      Anxiety  - duloxetine + xanax   - patient reports taking 2 mg qHS at home   - trying to wean herself off, has been taking 1mg recently  - with elevated BP and recent surgery, encouraging patient to cont xanax qHs at this time   - can consider dc'ing xanax once steroids are weaned down and patient has recovered more     Salivary gland infection  - continue augmentin      Long-term use of immunosuppressant medication  - see "prophylactic immunotherapy"      Prophylactic immunotherapy  - continue prograf, cellcept, and steroid taper per protocol  - will check daily prograf levels, monitor for toxic side effects, and adjust for therapeutic dose        S/P kidney transplant  - s/p DDRTxp on 7/24/24  - CT a/p reviewed  - Cr trending down daily with reported good UOP  - strict intake/output  - daily renal function panel      Adrenal corticosteroid causing adverse effect in therapeutic use  - appreciate endo assistance      Anemia in ESRD (end-stage renal disease)  - see "Acute blood loss anemia"      Renovascular hypertension  - on coreg 12.5 mg BID at home  - presented to ED yesterday for high blood pressure and coreg dose increased  - uncontrolled on admit  - Adjust regimen: procardia 60 mg BID and coreg 6.25 mg BID with hold parameters  - minoxidil added   - improving on current regimen       Diabetes mellitus due to underlying condition with chronic kidney disease on chronic dialysis, with long-term current use of insulin  Consult endocrine for management, appreciate their assistance          Discharge Planning:  Not yet stable for dc     Medical decision making for this encounter includes review of pertinent labs and diagnostic studies, assessment and planning, discussions with consulting providers, discussion with patient/family, and participation in multidisciplinary rounds. Time spent caring for patient: 60 minutes    Morena Gudino PA-C  Kidney Transplant  [facility] - Trans[facility] Step[facility]  "

## 2024-08-01 NOTE — SUBJECTIVE & OBJECTIVE
"Interval HPI:   Overnight events: No acute events overnight. Patient in room 57909/35723 A. Blood glucose stable. BG at and above goal on current insulin regimen (SSI ). Steroid use- Prednisone 20 mg.    Renal function-   Lab Results   Component Value Date    CREATININE 2.4 (H) 2024       Vasopressors-  None         Diet diabetic  Calorie      Eatin%  Nausea: No  Hypoglycemia and intervention: No  Fever: No  TPN and/or TF: No    /66   Pulse 70   Temp 97.9 °F (36.6 °C) (Oral)   Resp 18   Ht 5' 5" (1.651 m)   Wt 84.3 kg (185 lb 13.6 oz)   SpO2 98%   Breastfeeding No   BMI 30.93 kg/m²     Labs Reviewed and Include    Recent Labs   Lab 24  0529   *   CALCIUM 8.0*   ALBUMIN 2.6*      K 3.4*   CO2 24      BUN 39*   CREATININE 2.4*     Lab Results   Component Value Date    WBC 8.61 2024    HGB 7.5 (L) 2024    HCT 22.4 (L) 2024    MCV 89 2024     2024     No results for input(s): "TSH", "FREET4" in the last 168 hours.  Lab Results   Component Value Date    HGBA1C 4.8 2024    HGBA1C 4.8 2024       Nutritional status:   Body mass index is 30.93 kg/m².  Lab Results   Component Value Date    ALBUMIN 2.6 (L) 2024    ALBUMIN 2.6 (L) 2024    ALBUMIN 2.7 (L) 2024     No results found for: "PREALBUMIN"    Estimated Creatinine Clearance: 25 mL/min (A) (based on SCr of 2.4 mg/dL (H)).    Accu-Checks  Recent Labs     24  2105 24  0839 24  1226 24  1726 24  2254 24  0801 24  1233 24  1800 24  2150 24  0844   POCTGLUCOSE 202* 165* 206* 219* 194* 144* 207* 263* 196* 153*       Current Medications and/or Treatments Impacting Glycemic Control  Immunotherapy:    Immunosuppressants           Stop Route Frequency     mycophenolate capsule 1,000 mg         -- Oral 2 times daily     tacrolimus capsule 6 mg         -- Oral 2 times daily          Steroids:   Hormones " (From admission, onward)      Start     Stop Route Frequency Ordered    07/30/24 0900  predniSONE tablet 20 mg         -- Oral Daily 07/29/24 1425    07/29/24 1436  melatonin tablet 6 mg         -- Oral Nightly PRN 07/29/24 1425          Pressors:    Autonomic Drugs (From admission, onward)      None          Hyperglycemia/Diabetes Medications:   Antihyperglycemics (From admission, onward)      Start     Stop Route Frequency Ordered    07/31/24 1645  insulin aspart U-100 pen 3 Units         -- SubQ 3 times daily with meals 07/31/24 1304    07/29/24 1526  insulin aspart U-100 pen 0-5 Units         -- SubQ Before meals & nightly PRN 07/29/24 1426

## 2024-08-01 NOTE — ASSESSMENT & PLAN NOTE
- Pt with decreasing H&H 7.6/23 --> 7/20 on outpatient labs  - CT with small subincisional collection reviewed by Dr Cody  - 2 units transfused this admission without appropriate response   - cont investigating today with hapto, LDH, US   - appears unlikely patient is actively bleeding or hemolyzing. Likely related to chronic anemia from kidney disease

## 2024-08-01 NOTE — ASSESSMENT & PLAN NOTE
Endocrinology consulted for BG management.   BG goal 140-180    - novolog 3 units TIDWM given prandial excursions (~0.3 u/kg/day WBD)  - Novolog (Insulin Aspart) prn for BG excursions LDC SSI   - BG checks AC/HS  - Hypoglycemia protocol in place    ** Please notify Endocrine for any change and/or advance in diet**  ** Please call Endocrine for any BG related issues **    Discharge Planning:   TBD. Please notify endocrinology prior to discharge.

## 2024-08-01 NOTE — CONSULTS
Otolaryngology - Head and Neck Surgery  Consultation Report    Consultation From: transplant    Chief Complaint: bilateral SMG sialadenosis    History of Present Illness: 63 y.o. year old female with past medical history of diabetes, ESRD, currently admitted for hematoma following renal transplant approximately 1 week ago.  ENT consulted for submandibular stones.  Patient has been dealing with this problem for approximately 1 year, proceeding her renal transplant surgery.  She occasionally passes stones under her tongue.  Gets intermittent bilateral submandibular gland swelling that typically self resolves.  She was evaluated by ID for this in his status post a course of Augmentin.  At this time she reports bilateral submandibular pain, not draining pus into the oral cavity.  She has been evaluated by an outside ENT for this.        Past Medical History: Patient has a past medical history of Anemia, Anxiety, Atrial fibrillation, Coronary artery disease, Depression, Diabetes mellitus, type 2, Disorder of kidney and ureter, Heart murmur, Hyperlipidemia, Hypertension, Obesity, ALEJA (obstructive sleep apnea), Proteinuria, Solitary kidney, and Stroke.    Past Surgical History: Patient has a past surgical history that includes Coronary angioplasty with stent; internal heart monitor; Hysterectomy; Insertion of implantable loop recorder; Peritoneal catheter insertion; Incontinence surgery; Kidney transplant (Right, 7/24/2024); and Peritoneal catheter removal (Left, 7/24/2024).    Social History: Patient reports that she has quit smoking. She has never used smokeless tobacco. She reports that she does not currently use alcohol. She reports that she does not use drugs.    Family History: family history is not on file.    Medications:    ALPRAZolam  2 mg Oral QHS    amoxicillin-clavulanate 875-125mg  1 tablet Oral Q12H    atorvastatin  20 mg Oral QHS    carvediloL  12.5 mg Oral BID    cetirizine  5 mg Oral QHS    cinacalcet  60  mg Oral Daily with breakfast    DULoxetine  30 mg Oral QHS    ezetimibe  10 mg Oral Daily    insulin aspart U-100  3 Units Subcutaneous TIDWM    k phos di & mono-sod phos mono  500 mg Oral BID    minoxidiL  5 mg Oral Daily    mycophenolate  1,000 mg Oral BID    NIFEdipine  60 mg Oral BID    pantoprazole  40 mg Oral Daily    predniSONE  20 mg Oral Daily    tacrolimus  6 mg Oral BID    valGANciclovir  450 mg Oral Once per day on Monday Wednesday Friday       Allergies: Patient has No Known Allergies.    Physical Exam:  Temp:  [97.9 °F (36.6 °C)-98.8 °F (37.1 °C)] 98.2 °F (36.8 °C)  Pulse:  [67-84] 71  Resp:  [18] 18  SpO2:  [98 %-99 %] 99 %  BP: (129-179)/(66-75) 158/68      NAD   Oropharynx clear.    Breathing unlabored.    Ptotic and enlarged bilateral submandibular glands.  Multiple palpable stones at the hilum.  No intraductal stones to palpation.  Both submandibular ducts draining saliva.  No pus.  No edema or overlying erythema in the submandibular area to suggest acute infection.    Parotids normal to palpation.  House Brackmann 1 bilaterally.      Recent Labs   Lab 08/01/24  0529   WBC 8.61   HGB 7.5*   HCT 22.4*   MCV 89        BMP  Recent Labs   Lab 08/01/24  0529   *      K 3.4*      CO2 24   BUN 39*   CREATININE 2.4*   CALCIUM 8.0*   MG 1.4*            Assessment:  Chronic bilateral submandibular gland stones, without acute sialadenitis    Plan:   Discussed conservative management including gland massage, warm compresses, sialagogues (e.g., sugar free lemon candies)  Place ambulatory referral to ENT to discuss possible intervention following recovery from renal transplant      Scott Rivera MD  [facility] Otolaryngology, PGY3  08/01/2024 4:47 PM

## 2024-08-02 DIAGNOSIS — Z94.0 KIDNEY REPLACED BY TRANSPLANT: Primary | ICD-10-CM

## 2024-08-02 DIAGNOSIS — N18.6 END STAGE RENAL DISEASE: ICD-10-CM

## 2024-08-02 DIAGNOSIS — Z21 ASYMPTOMATIC HUMAN IMMUNODEFICIENCY VIRUS (HIV) INFECTION STATUS: ICD-10-CM

## 2024-08-02 LAB
ALBUMIN SERPL BCP-MCNC: 2.4 G/DL (ref 3.5–5.2)
ALBUMIN SERPL BCP-MCNC: 2.5 G/DL (ref 3.5–5.2)
ALP SERPL-CCNC: 48 U/L (ref 55–135)
ALT SERPL W/O P-5'-P-CCNC: 29 U/L (ref 10–44)
ANION GAP SERPL CALC-SCNC: 8 MMOL/L (ref 8–16)
APTT PPP: 23.9 SEC (ref 21–32)
AST SERPL-CCNC: 14 U/L (ref 10–40)
BASOPHILS # BLD AUTO: 0.02 K/UL (ref 0–0.2)
BASOPHILS # BLD AUTO: 0.04 K/UL (ref 0–0.2)
BASOPHILS NFR BLD: 0.3 % (ref 0–1.9)
BASOPHILS NFR BLD: 0.3 % (ref 0–1.9)
BILIRUB DIRECT SERPL-MCNC: 0.2 MG/DL (ref 0.1–0.3)
BILIRUB SERPL-MCNC: 0.6 MG/DL (ref 0.1–1)
BLD PROD TYP BPU: NORMAL
BLOOD UNIT EXPIRATION DATE: NORMAL
BLOOD UNIT TYPE CODE: 6200
BLOOD UNIT TYPE: NORMAL
BUN SERPL-MCNC: 36 MG/DL (ref 8–23)
CALCIUM SERPL-MCNC: 8.2 MG/DL (ref 8.7–10.5)
CHLORIDE SERPL-SCNC: 108 MMOL/L (ref 95–110)
CO2 SERPL-SCNC: 22 MMOL/L (ref 23–29)
CODING SYSTEM: NORMAL
CREAT SERPL-MCNC: 2 MG/DL (ref 0.5–1.4)
CROSSMATCH INTERPRETATION: NORMAL
DIFFERENTIAL METHOD BLD: ABNORMAL
DIFFERENTIAL METHOD BLD: ABNORMAL
DISPENSE STATUS: NORMAL
EOSINOPHIL # BLD AUTO: 0.1 K/UL (ref 0–0.5)
EOSINOPHIL # BLD AUTO: 0.1 K/UL (ref 0–0.5)
EOSINOPHIL NFR BLD: 0.7 % (ref 0–8)
EOSINOPHIL NFR BLD: 1.4 % (ref 0–8)
ERYTHROCYTE [DISTWIDTH] IN BLOOD BY AUTOMATED COUNT: 15.4 % (ref 11.5–14.5)
ERYTHROCYTE [DISTWIDTH] IN BLOOD BY AUTOMATED COUNT: 15.9 % (ref 11.5–14.5)
EST. GFR  (NO RACE VARIABLE): 27.2 ML/MIN/1.73 M^2
FIBRINOGEN PPP-MCNC: 266 MG/DL (ref 182–400)
FOLATE SERPL-MCNC: 6.6 NG/ML (ref 4–24)
GLUCOSE SERPL-MCNC: 145 MG/DL (ref 70–110)
HAPTOGLOB SERPL-MCNC: 11 MG/DL (ref 30–250)
HAPTOGLOB SERPL-MCNC: 15 MG/DL (ref 30–250)
HCT VFR BLD AUTO: 20.5 % (ref 37–48.5)
HCT VFR BLD AUTO: 25 % (ref 37–48.5)
HGB BLD-MCNC: 6.7 G/DL (ref 12–16)
HGB BLD-MCNC: 8.5 G/DL (ref 12–16)
IMM GRANULOCYTES # BLD AUTO: 0.22 K/UL (ref 0–0.04)
IMM GRANULOCYTES # BLD AUTO: 0.23 K/UL (ref 0–0.04)
IMM GRANULOCYTES NFR BLD AUTO: 2 % (ref 0–0.5)
IMM GRANULOCYTES NFR BLD AUTO: 3 % (ref 0–0.5)
INR PPP: 1 (ref 0.8–1.2)
LDH SERPL L TO P-CCNC: 265 U/L (ref 110–260)
LDH SERPL L TO P-CCNC: 303 U/L (ref 110–260)
LYMPHOCYTES # BLD AUTO: 0.1 K/UL (ref 1–4.8)
LYMPHOCYTES # BLD AUTO: 0.3 K/UL (ref 1–4.8)
LYMPHOCYTES NFR BLD: 1.1 % (ref 18–48)
LYMPHOCYTES NFR BLD: 4.7 % (ref 18–48)
MAGNESIUM SERPL-MCNC: 2 MG/DL (ref 1.6–2.6)
MCH RBC QN AUTO: 29.5 PG (ref 27–31)
MCH RBC QN AUTO: 29.9 PG (ref 27–31)
MCHC RBC AUTO-ENTMCNC: 32.7 G/DL (ref 32–36)
MCHC RBC AUTO-ENTMCNC: 34 G/DL (ref 32–36)
MCV RBC AUTO: 88 FL (ref 82–98)
MCV RBC AUTO: 90 FL (ref 82–98)
MONOCYTES # BLD AUTO: 0.3 K/UL (ref 0.3–1)
MONOCYTES # BLD AUTO: 0.4 K/UL (ref 0.3–1)
MONOCYTES NFR BLD: 2.2 % (ref 4–15)
MONOCYTES NFR BLD: 5.8 % (ref 4–15)
NEUTROPHILS # BLD AUTO: 10.7 K/UL (ref 1.8–7.7)
NEUTROPHILS # BLD AUTO: 6.2 K/UL (ref 1.8–7.7)
NEUTROPHILS NFR BLD: 84.8 % (ref 38–73)
NEUTROPHILS NFR BLD: 93.7 % (ref 38–73)
NRBC BLD-RTO: 0 /100 WBC
NRBC BLD-RTO: 0 /100 WBC
NUM UNITS TRANS PACKED RBC: NORMAL
PHOSPHATE SERPL-MCNC: 3.7 MG/DL (ref 2.7–4.5)
PLATELET # BLD AUTO: 196 K/UL (ref 150–450)
PLATELET # BLD AUTO: 235 K/UL (ref 150–450)
PMV BLD AUTO: 11.5 FL (ref 9.2–12.9)
PMV BLD AUTO: 11.8 FL (ref 9.2–12.9)
POCT GLUCOSE: 125 MG/DL (ref 70–110)
POCT GLUCOSE: 179 MG/DL (ref 70–110)
POCT GLUCOSE: 263 MG/DL (ref 70–110)
POCT GLUCOSE: 290 MG/DL (ref 70–110)
POTASSIUM SERPL-SCNC: 3.5 MMOL/L (ref 3.5–5.1)
PROT SERPL-MCNC: 4.2 G/DL (ref 6–8.4)
PROTHROMBIN TIME: 11.1 SEC (ref 9–12.5)
RBC # BLD AUTO: 2.27 M/UL (ref 4–5.4)
RBC # BLD AUTO: 2.84 M/UL (ref 4–5.4)
RETICS/RBC NFR AUTO: 5 % (ref 0.5–2.5)
SODIUM SERPL-SCNC: 138 MMOL/L (ref 136–145)
TACROLIMUS BLD-MCNC: 10.9 NG/ML (ref 5–15)
VIT B12 SERPL-MCNC: 337 PG/ML (ref 210–950)
WBC # BLD AUTO: 11.47 K/UL (ref 3.9–12.7)
WBC # BLD AUTO: 7.29 K/UL (ref 3.9–12.7)

## 2024-08-02 PROCEDURE — 82607 VITAMIN B-12: CPT | Performed by: PHYSICIAN ASSISTANT

## 2024-08-02 PROCEDURE — 83615 LACTATE (LD) (LDH) ENZYME: CPT | Mod: 91 | Performed by: PHYSICIAN ASSISTANT

## 2024-08-02 PROCEDURE — 80069 RENAL FUNCTION PANEL: CPT | Performed by: PHYSICIAN ASSISTANT

## 2024-08-02 PROCEDURE — 99232 SBSQ HOSP IP/OBS MODERATE 35: CPT | Mod: ,,,

## 2024-08-02 PROCEDURE — 80197 ASSAY OF TACROLIMUS: CPT | Performed by: PHYSICIAN ASSISTANT

## 2024-08-02 PROCEDURE — 36415 COLL VENOUS BLD VENIPUNCTURE: CPT | Performed by: INTERNAL MEDICINE

## 2024-08-02 PROCEDURE — 25000003 PHARM REV CODE 250

## 2024-08-02 PROCEDURE — 94660 CPAP INITIATION&MGMT: CPT

## 2024-08-02 PROCEDURE — P9016 RBC LEUKOCYTES REDUCED: HCPCS | Performed by: PHYSICIAN ASSISTANT

## 2024-08-02 PROCEDURE — 85290 CLOT FACTOR XIII FIBRIN STAB: CPT | Performed by: STUDENT IN AN ORGANIZED HEALTH CARE EDUCATION/TRAINING PROGRAM

## 2024-08-02 PROCEDURE — 99233 SBSQ HOSP IP/OBS HIGH 50: CPT | Mod: ,,, | Performed by: INTERNAL MEDICINE

## 2024-08-02 PROCEDURE — 85025 COMPLETE CBC W/AUTO DIFF WBC: CPT | Performed by: PHYSICIAN ASSISTANT

## 2024-08-02 PROCEDURE — 25000003 PHARM REV CODE 250: Performed by: TRANSPLANT SURGERY

## 2024-08-02 PROCEDURE — 83735 ASSAY OF MAGNESIUM: CPT | Performed by: PHYSICIAN ASSISTANT

## 2024-08-02 PROCEDURE — 82525 ASSAY OF COPPER: CPT | Performed by: STUDENT IN AN ORGANIZED HEALTH CARE EDUCATION/TRAINING PROGRAM

## 2024-08-02 PROCEDURE — 80076 HEPATIC FUNCTION PANEL: CPT | Performed by: PHYSICIAN ASSISTANT

## 2024-08-02 PROCEDURE — 85045 AUTOMATED RETICULOCYTE COUNT: CPT | Performed by: PHYSICIAN ASSISTANT

## 2024-08-02 PROCEDURE — 25000003 PHARM REV CODE 250: Performed by: PHYSICIAN ASSISTANT

## 2024-08-02 PROCEDURE — 63600175 PHARM REV CODE 636 W HCPCS: Mod: JZ,EC,JG | Performed by: PHYSICIAN ASSISTANT

## 2024-08-02 PROCEDURE — 27100171 HC OXYGEN HIGH FLOW UP TO 24 HOURS

## 2024-08-02 PROCEDURE — 85730 THROMBOPLASTIN TIME PARTIAL: CPT | Performed by: PHYSICIAN ASSISTANT

## 2024-08-02 PROCEDURE — 85610 PROTHROMBIN TIME: CPT | Performed by: PHYSICIAN ASSISTANT

## 2024-08-02 PROCEDURE — 86920 COMPATIBILITY TEST SPIN: CPT | Performed by: PHYSICIAN ASSISTANT

## 2024-08-02 PROCEDURE — 85025 COMPLETE CBC W/AUTO DIFF WBC: CPT | Mod: 91 | Performed by: TRANSPLANT SURGERY

## 2024-08-02 PROCEDURE — 36415 COLL VENOUS BLD VENIPUNCTURE: CPT | Performed by: PHYSICIAN ASSISTANT

## 2024-08-02 PROCEDURE — 83615 LACTATE (LD) (LDH) ENZYME: CPT | Performed by: INTERNAL MEDICINE

## 2024-08-02 PROCEDURE — 85384 FIBRINOGEN ACTIVITY: CPT | Performed by: PHYSICIAN ASSISTANT

## 2024-08-02 PROCEDURE — 83010 ASSAY OF HAPTOGLOBIN QUANT: CPT | Mod: 91 | Performed by: PHYSICIAN ASSISTANT

## 2024-08-02 PROCEDURE — 36415 COLL VENOUS BLD VENIPUNCTURE: CPT | Mod: XB | Performed by: STUDENT IN AN ORGANIZED HEALTH CARE EDUCATION/TRAINING PROGRAM

## 2024-08-02 PROCEDURE — 94761 N-INVAS EAR/PLS OXIMETRY MLT: CPT

## 2024-08-02 PROCEDURE — 99900035 HC TECH TIME PER 15 MIN (STAT)

## 2024-08-02 PROCEDURE — 20600001 HC STEP DOWN PRIVATE ROOM

## 2024-08-02 PROCEDURE — 83010 ASSAY OF HAPTOGLOBIN QUANT: CPT | Performed by: INTERNAL MEDICINE

## 2024-08-02 PROCEDURE — 82746 ASSAY OF FOLIC ACID SERUM: CPT | Performed by: PHYSICIAN ASSISTANT

## 2024-08-02 RX ORDER — HYDROCODONE BITARTRATE AND ACETAMINOPHEN 500; 5 MG/1; MG/1
TABLET ORAL
Status: DISCONTINUED | OUTPATIENT
Start: 2024-08-02 | End: 2024-08-03

## 2024-08-02 RX ORDER — AMOXICILLIN AND CLAVULANATE POTASSIUM 875; 125 MG/1; MG/1
1 TABLET, FILM COATED ORAL EVERY 12 HOURS
Status: DISCONTINUED | OUTPATIENT
Start: 2024-08-02 | End: 2024-08-04 | Stop reason: HOSPADM

## 2024-08-02 RX ORDER — TACROLIMUS 1 MG/1
5 CAPSULE ORAL 2 TIMES DAILY
Status: DISCONTINUED | OUTPATIENT
Start: 2024-08-02 | End: 2024-08-03

## 2024-08-02 RX ORDER — INSULIN ASPART 100 [IU]/ML
4 INJECTION, SOLUTION INTRAVENOUS; SUBCUTANEOUS
Status: DISCONTINUED | OUTPATIENT
Start: 2024-08-02 | End: 2024-08-03

## 2024-08-02 RX ADMIN — TACROLIMUS 5 MG: 1 CAPSULE ORAL at 05:08

## 2024-08-02 RX ADMIN — DULOXETINE HYDROCHLORIDE 30 MG: 30 CAPSULE, DELAYED RELEASE ORAL at 08:08

## 2024-08-02 RX ADMIN — INSULIN ASPART 3 UNITS: 100 INJECTION, SOLUTION INTRAVENOUS; SUBCUTANEOUS at 05:08

## 2024-08-02 RX ADMIN — PANTOPRAZOLE SODIUM 40 MG: 40 TABLET, DELAYED RELEASE ORAL at 08:08

## 2024-08-02 RX ADMIN — CARVEDILOL 12.5 MG: 12.5 TABLET, FILM COATED ORAL at 08:08

## 2024-08-02 RX ADMIN — EPOETIN ALFA-EPBX 10000 UNITS: 10000 INJECTION, SOLUTION INTRAVENOUS; SUBCUTANEOUS at 01:08

## 2024-08-02 RX ADMIN — NIFEDIPINE 60 MG: 30 TABLET, FILM COATED, EXTENDED RELEASE ORAL at 08:08

## 2024-08-02 RX ADMIN — CINACALCET 60 MG: 30 TABLET, FILM COATED ORAL at 08:08

## 2024-08-02 RX ADMIN — TACROLIMUS 6 MG: 1 CAPSULE ORAL at 08:08

## 2024-08-02 RX ADMIN — MYCOPHENOLATE MOFETIL 1000 MG: 250 CAPSULE ORAL at 08:08

## 2024-08-02 RX ADMIN — DIBASIC SODIUM PHOSPHATE, MONOBASIC POTASSIUM PHOSPHATE AND MONOBASIC SODIUM PHOSPHATE 1 TABLET: 852; 155; 130 TABLET ORAL at 08:08

## 2024-08-02 RX ADMIN — VALGANCICLOVIR HYDROCHLORIDE 450 MG: 450 TABLET ORAL at 08:08

## 2024-08-02 RX ADMIN — INSULIN ASPART 1 UNITS: 100 INJECTION, SOLUTION INTRAVENOUS; SUBCUTANEOUS at 09:08

## 2024-08-02 RX ADMIN — AMOXICILLIN AND CLAVULANATE POTASSIUM 1 TABLET: 875; 125 TABLET, FILM COATED ORAL at 08:08

## 2024-08-02 RX ADMIN — EZETIMIBE 10 MG: 10 TABLET ORAL at 08:08

## 2024-08-02 RX ADMIN — INSULIN ASPART 4 UNITS: 100 INJECTION, SOLUTION INTRAVENOUS; SUBCUTANEOUS at 01:08

## 2024-08-02 RX ADMIN — ATORVASTATIN CALCIUM 20 MG: 20 TABLET, FILM COATED ORAL at 08:08

## 2024-08-02 RX ADMIN — ALPRAZOLAM 2 MG: 0.25 TABLET ORAL at 09:08

## 2024-08-02 RX ADMIN — PREDNISONE 20 MG: 20 TABLET ORAL at 08:08

## 2024-08-02 RX ADMIN — BISACODYL 10 MG: 10 SUPPOSITORY RECTAL at 06:08

## 2024-08-02 RX ADMIN — INSULIN ASPART 3 UNITS: 100 INJECTION, SOLUTION INTRAVENOUS; SUBCUTANEOUS at 08:08

## 2024-08-02 RX ADMIN — CETIRIZINE HYDROCHLORIDE 5 MG: 5 TABLET, FILM COATED ORAL at 08:08

## 2024-08-02 RX ADMIN — MINOXIDIL 5 MG: 2.5 TABLET ORAL at 08:08

## 2024-08-02 RX ADMIN — INSULIN ASPART 4 UNITS: 100 INJECTION, SOLUTION INTRAVENOUS; SUBCUTANEOUS at 05:08

## 2024-08-02 NOTE — SUBJECTIVE & OBJECTIVE
"Interval HPI:   Overnight events: No acute events overnight. Patient in room 54838/93671 A. Blood glucose stable. BG at and above goal on current insulin regimen (SSI ). Steroid use- Prednisone 20 mg.    Renal function-   Lab Results   Component Value Date    CREATININE 2.0 (H) 2024          Vasopressors-  None         Diet diabetic  Calorie      Eatin%  Nausea: No  Hypoglycemia and intervention: No  Fever: No  TPN and/or TF: No    /63   Pulse 65   Temp 98.2 °F (36.8 °C) (Oral)   Resp 18   Ht 5' 5" (1.651 m)   Wt 86.1 kg (189 lb 13.1 oz)   SpO2 100%   Breastfeeding No   BMI 31.59 kg/m²     Labs Reviewed and Include    Recent Labs   Lab 24  0530 24  0708   *  --    CALCIUM 8.2*  --    ALBUMIN 2.4* 2.5*   PROT  --  4.2*     --    K 3.5  --    CO2 22*  --      --    BUN 36*  --    CREATININE 2.0*  --    ALKPHOS  --  48*   ALT  --  29   AST  --  14   BILITOT  --  0.6     Lab Results   Component Value Date    WBC 7.29 2024    HGB 6.7 (L) 2024    HCT 20.5 (L) 2024    MCV 90 2024     2024     No results for input(s): "TSH", "FREET4" in the last 168 hours.  Lab Results   Component Value Date    HGBA1C 4.8 2024    HGBA1C 4.8 2024       Nutritional status:   Body mass index is 31.59 kg/m².  Lab Results   Component Value Date    ALBUMIN 2.5 (L) 2024    ALBUMIN 2.4 (L) 2024    ALBUMIN 2.6 (L) 2024     No results found for: "PREALBUMIN"    Estimated Creatinine Clearance: 30.4 mL/min (A) (based on SCr of 2 mg/dL (H)).    Accu-Checks  Recent Labs     24  2254 24  0801 24  1233 24  1800 24  2150 24  0844 24  1303 24  1739 24  2156 24  0848   POCTGLUCOSE 194* 144* 207* 263* 196* 153* 188* 225* 263* 125*       Current Medications and/or Treatments Impacting Glycemic Control  Immunotherapy:    Immunosuppressants           Stop Route Frequency     " tacrolimus capsule 5 mg         -- Oral 2 times daily     mycophenolate capsule 1,000 mg         -- Oral 2 times daily          Steroids:   Hormones (From admission, onward)      Start     Stop Route Frequency Ordered    07/30/24 0900  predniSONE tablet 20 mg         -- Oral Daily 07/29/24 1425    07/29/24 1436  melatonin tablet 6 mg         -- Oral Nightly PRN 07/29/24 1425          Pressors:    Autonomic Drugs (From admission, onward)      None          Hyperglycemia/Diabetes Medications:   Antihyperglycemics (From admission, onward)      Start     Stop Route Frequency Ordered    07/31/24 1645  insulin aspart U-100 pen 3 Units         -- SubQ 3 times daily with meals 07/31/24 1304    07/29/24 1526  insulin aspart U-100 pen 0-5 Units         -- SubQ Before meals & nightly PRN 07/29/24 1426

## 2024-08-02 NOTE — ASSESSMENT & PLAN NOTE
- Pt with decreasing H&H 7.6/23 --> 7/20 on outpatient labs  - CT with small subincisional collection reviewed by Dr Cody  - 2 units transfused this admission without appropriate response.   - Transfuse 1 unit PRBC again today. Consult heme/onc

## 2024-08-02 NOTE — PROGRESS NOTES
"Transplant Social Work Potential Weekend Discharge Note:    Pt will discharge to [facility] apartments under the care of Sarah Lott, patient's daughter, phone number [phone].     Patient reports readiness to discharge when medically able. Patient has been referred to [facility]  for PT/SN/SW. Per rounds this morning patient does not require additional referrals from this  at this time. Patient denied needing or wanting additional resources or referrals at this time. Patient agrees to contact transplant team with needs, questions, or concerns as they arise.      spoke with patients daughter. Patients daughter reports her brother was supposed to act as a additional caregiver for her mother. Patients daughter voiced frustration that her brother "did not hold up his end of the bargain". Patients daughter reports she has a special needs son who her  is caring for at home and she was hoping her brother would relieve her so she could return home to assist her son.  provided support and encouragement to her.  offered resources including transplant support group and scheduling a appointment with outpatient transplant social work team. Patient declined. Patient reports she will reach out to transplant social work if needed. Patients daughter confirmed she will be able to act as caregiver until her mother returns home or until another family member is able to relieve her.       Pt aware of, involved in, and coping well with this discharge plan. Pt did not have any concerns with the discharge plan at this time. SW remains available at [phone].      Laura Wood LMSW   "

## 2024-08-02 NOTE — PLAN OF CARE
AAOx4, afebrile, c/o mild pain in rt kidney incision. Telemetry monitor in place (Sinus eric- NSR). BG monitored achs and tx per orders. Endocrine is following. CBC monitored closely daily. Haptoglobin and Lactate dehydrogenase ordered for the am. Pt feels constipated. PRN suppository ordered. Pt wants to wait until the am. Encouraging pt to drink. Cr trended up 8/1. Encouraging pt to use the hat to measure the urine. RLQ incision with staples. Betadine applied to staples. Area is bruised-team is aware. Kidney US done 8/1. LUQ incision from old PD cath site with staples-painted with betadine. Heat packs provided for bilateral submandibular area per ENT's recommendations. PO abx continued for salivary gland infection. Pt's bp is not lower standing. Pt is on Home CPAP while sleeping. Pt is able to position self independently. Pt is in lowest position, side rails up x2, non-skid foot wear in place, call light within reach, pt verbalized understanding to call RN when needed. Hand hygiene practiced per protocol. Will continue to monitor.

## 2024-08-02 NOTE — CONSULTS
Hematology Oncology Consult Note    Inpatient consult to Hematology/Oncology  Consult performed by: Saul Hogue MD  Consult ordered by: Aniyah Tidwell PA-C        SUBJECTIVE:     History of Present Illness:    Ms. Kenney is a 63 y.o. female with ESRD secondary to diabetic nephropathy, solitary kidney.  PMH DM2, HTN, incontience (s/p bladder sling), CAD with stents (on ASA), atrial fibrillation, CVA (no residual deficits), ALEJA, anemia. She is now s/p DBD kidney transplant on 7/24/24. Surgery reportedly without complications. Her post op course was reportedly complicated by a salivary gland infection for which she was on Unasyn and later discharged on Augmentin.      She then presented here as direct admit for her routine clinic due to multiple bruises throughout her body and exam concerning for subcutaneous hematoma. Labs notable for down trending H&H 7.6/23 --> 7/20, though here baseline has been around 7-9 most recently. CT abd/pelvis noted a small subincisional collection measured at 4.7cm per radiology.     Subjectively patient referred gradually worsening abdominal bruising for the past couple of days. Admitted to some minor bleeding at the site but not significant per her. Hematology consulted to assist in her evaluation.    Review of patient's allergies indicates:  No Known Allergies  Past Medical History:   Diagnosis Date    Anemia     Anxiety     Atrial fibrillation     Coronary artery disease     Depression     Diabetes mellitus, type 2     Disorder of kidney and ureter     Heart murmur     Hyperlipidemia     Hypertension     Obesity     ALEJA (obstructive sleep apnea)     Proteinuria     Solitary kidney     Stroke      Past Surgical History:   Procedure Laterality Date    CORONARY ANGIOPLASTY WITH STENT PLACEMENT      HYSTERECTOMY      INCONTINENCE SURGERY      INSERTION OF IMPLANTABLE LOOP RECORDER      internal heart monitor      KIDNEY TRANSPLANT Right 7/24/2024    Procedure:  TRANSPLANT, KIDNEY;  Surgeon: Alphonso Mon MD;  Location: [address];  Service: Transplant;  Laterality: Right;    PERITONEAL CATHETER INSERTION      PERITONEAL CATHETER REMOVAL Left 7/24/2024    Procedure: REMOVAL, CATHETER, DIALYSIS, PERITONEAL;  Surgeon: Alphonso Mon MD;  Location: [address];  Service: Transplant;  Laterality: Left;     No family history on file.  Social History     Tobacco Use    Smoking status: Former    Smokeless tobacco: Never   Substance Use Topics    Alcohol use: Not Currently    Drug use: Never     Review of Systems   Constitutional:  Negative for chills, fever, malaise/fatigue and weight loss.   HENT:  Negative for congestion and sore throat.    Eyes:  Negative for blurred vision and double vision.   Respiratory:  Negative for cough and shortness of breath.    Cardiovascular:  Negative for chest pain, orthopnea and leg swelling.   Gastrointestinal:  Negative for abdominal pain, heartburn, nausea and vomiting.   Genitourinary:  Negative for dysuria, frequency and urgency.   Musculoskeletal:  Negative for joint pain and myalgias.   Neurological:  Negative for dizziness and headaches.   Endo/Heme/Allergies:  Bruises/bleeds easily.   Psychiatric/Behavioral:  The patient does not have insomnia.      OBJECTIVE:     Vital Signs:  Temp:  [97.9 °F (36.6 °C)-98.4 °F (36.9 °C)]   Pulse:  [56-72]   Resp:  [18]   BP: (129-138)/(58-70)   SpO2:  [97 %-100 %]     Physical Exam  Vitals and nursing note reviewed.   Constitutional:       General: She is not in acute distress.     Appearance: She is not toxic-appearing.   HENT:      Head: Normocephalic and atraumatic.      Mouth/Throat:      Mouth: Mucous membranes are moist.      Pharynx: No oropharyngeal exudate.   Eyes:      Extraocular Movements: Extraocular movements intact.      Pupils: Pupils are equal, round, and reactive to light.   Cardiovascular:      Rate and Rhythm: Normal rate and regular rhythm.      Heart sounds: No murmur  heard.  Pulmonary:      Effort: Pulmonary effort is normal.      Breath sounds: No wheezing or rales.   Abdominal:      General: Abdomen is flat. Bowel sounds are normal. There is no distension.      Tenderness: There is no abdominal tenderness. There is no guarding.   Musculoskeletal:         General: No swelling or tenderness. Normal range of motion.      Cervical back: Normal range of motion.   Lymphadenopathy:      Cervical: No cervical adenopathy.   Skin:     General: Skin is warm.      Coloration: Skin is not jaundiced.      Findings: Bruising (abdominal bruising at RLQ tracking back to flanks. Large purpuric lesion at left arm) present. No rash.   Neurological:      General: No focal deficit present.      Mental Status: She is alert and oriented to person, place, and time.      Cranial Nerves: No cranial nerve deficit.       Laboratory:  CBC:   Recent Labs   Lab 08/02/24  0530   WBC 7.29   RBC 2.27*   HGB 6.7*   HCT 20.5*      MCV 90   MCH 29.5   MCHC 32.7     BMP:   Recent Labs   Lab 08/02/24  0530   *      K 3.5      CO2 22*   BUN 36*   CREATININE 2.0*   CALCIUM 8.2*   MG 2.0         Diagnostic Results:  Labs: Reviewed  CT: Reviewed      ASSESSMENT/PLAN:   Normocytic Anemia  Bruising    63F with recent kidney transplant, recent infection treated with antibiotics coming in for multiple large bruises. Has had acute on chronic normocytic anemia with a Hgb of 6.7 on consultation though her recent baseline ranges from 7-9.Has evidence of a 4cm postop subcutaneous hematoma and is currently s/p 1u PRBC.    No evidence of hemolysis based on recent smear, and LDH is coming down to 265 today and hapto gradually increasing to 15. Bilirubin is normal.     He has an absolute retic count of 2.1, which suggests hypoproliferation. Nutritional workup normal which includes B12, Folate, and Iron/Ferritin.      Her anemia is multifactorial but would think that her recent worsening is due to marrow  suppression given her acute illness/surgical stressors, recent infection, recent use of antibiotics.     Recommendations:   -would order coags (PT, PTT, Fibrinogen) to investigate for DIC  -factor XIII ordered given subq bleeding  -transfuse for a Hgb of <7      Discussed with Dr. Yusuf      We will follow. Let us know if any questions.      Saul Hogue MD  Hematology/Oncology PGY-IV

## 2024-08-02 NOTE — PROGRESS NOTES
[facility] - Transplant Stepdown  Endocrinology  Progress Note    Admit Date: 7/29/2024     Reason for Consult: Management of T2DM, Hyperglycemia      Surgical Procedure and Date: s/p kidney transplant on 7/24/2024     Diabetes diagnosis year: 2004     Home Diabetes Medications:  Patient previously on tandem insulin pump and Mounjaro. Patient recently lost 60 lbs and had worsening kidney function. Patient states that she is off all diabetes medication for about 3-months and blood sugar has been well controlled. Patient is from [place] and does not have her pump with her to review pump settings. Per chart review, basal insulin was 58 units daily in the past. Anticipate the patient will need more aggressive insulin management post-op.     Most recent D/C recommend:    Novolog SSI:     150 - 200 + 2 unit    201 - 250 + 4 units    251 - 300 + 6 units    301 - 350 + 8 units       > 350   + 10 units       How often checking glucose at home? >4 x day (previously on Dexcom G6, but off since she stopped using tandem pump)   BG readings on regimen: states BG have been well controlled off the pump and the GLP1-RA/ GIP ('s)  Hypoglycemia on the regimen?  No  Missed doses on regimen?  No     Diabetes Complications include:     Hyperglycemia (history)     Complicating diabetes co morbidities:   Glucocorticoid use         HPI: Ms. Kenney is a 63 y.o. female with ESRD secondary to diabetic nephropathy, solitary kidney. PMH DM2, HTN, incontience (s/p bladder sling), CAD with stents (on ASA), atrial fibrillation, CVA (no residual deficits), ALEJA, anemia. She is now s/p DBD kidney transplant on 7/24/24. Surgery without complications. PD cath removed. Post op course notable for down trending Cr with excellent UOP. She now presents as a direct admit from her routine clinic appointment with transplant surgery. Exam concerning for subcutaneous hematoma. Labs notable for down trending H&H 7.6/23 --> 7/20. Direct admit for imaging  "and blood transfusion. D/w Dr Squires. On arrival, BP elevated to 204/88 otherwise vital signs normal. Pt c/o nausea, abdominal pain, and constipation. States incision developed bruising with worsening pain starting yesterday afternoon. Denies any changes in urinary output, diarrhea, vomiting, chest pain, sob, fever/chills. Endocrine conus;tal to manage hyperglycemia and type 2 diabetes.     Lab Results   Component Value Date    HGBA1C 4.8 2024    HGBA1C 4.8 2024         Interval HPI:   Overnight events: No acute events overnight. Patient in room 36558/48869 A. Blood glucose stable. BG at and above goal on current insulin regimen (SSI ). Steroid use- Prednisone 20 mg.    Renal function-   Lab Results   Component Value Date    CREATININE 2.0 (H) 2024          Vasopressors-  None         Diet diabetic  Calorie      Eatin%  Nausea: No  Hypoglycemia and intervention: No  Fever: No  TPN and/or TF: No    /63   Pulse 65   Temp 98.2 °F (36.8 °C) (Oral)   Resp 18   Ht 5' 5" (1.651 m)   Wt 86.1 kg (189 lb 13.1 oz)   SpO2 100%   Breastfeeding No   BMI 31.59 kg/m²     Labs Reviewed and Include    Recent Labs   Lab 24  0530 24  0708   *  --    CALCIUM 8.2*  --    ALBUMIN 2.4* 2.5*   PROT  --  4.2*     --    K 3.5  --    CO2 22*  --      --    BUN 36*  --    CREATININE 2.0*  --    ALKPHOS  --  48*   ALT  --  29   AST  --  14   BILITOT  --  0.6     Lab Results   Component Value Date    WBC 7.29 2024    HGB 6.7 (L) 2024    HCT 20.5 (L) 2024    MCV 90 2024     2024     No results for input(s): "TSH", "FREET4" in the last 168 hours.  Lab Results   Component Value Date    HGBA1C 4.8 2024    HGBA1C 4.8 2024       Nutritional status:   Body mass index is 31.59 kg/m².  Lab Results   Component Value Date    ALBUMIN 2.5 (L) 2024    ALBUMIN 2.4 (L) 2024    ALBUMIN 2.6 (L) 2024     No results found " "for: "PREALBUMIN"    Estimated Creatinine Clearance: 30.4 mL/min (A) (based on SCr of 2 mg/dL (H)).    Accu-Checks  Recent Labs     07/30/24  2254 07/31/24  0801 07/31/24  1233 07/31/24  1800 07/31/24  2150 08/01/24  0844 08/01/24  1303 08/01/24  1739 08/01/24  2156 08/02/24  0848   POCTGLUCOSE 194* 144* 207* 263* 196* 153* 188* 225* 263* 125*       Current Medications and/or Treatments Impacting Glycemic Control  Immunotherapy:    Immunosuppressants           Stop Route Frequency     tacrolimus capsule 5 mg         -- Oral 2 times daily     mycophenolate capsule 1,000 mg         -- Oral 2 times daily          Steroids:   Hormones (From admission, onward)      Start     Stop Route Frequency Ordered    07/30/24 0900  predniSONE tablet 20 mg         -- Oral Daily 07/29/24 1425    07/29/24 1436  melatonin tablet 6 mg         -- Oral Nightly PRN 07/29/24 1425          Pressors:    Autonomic Drugs (From admission, onward)      None          Hyperglycemia/Diabetes Medications:   Antihyperglycemics (From admission, onward)      Start     Stop Route Frequency Ordered    07/31/24 1645  insulin aspart U-100 pen 3 Units         -- SubQ 3 times daily with meals 07/31/24 1304    07/29/24 1526  insulin aspart U-100 pen 0-5 Units         -- SubQ Before meals & nightly PRN 07/29/24 1426            ASSESSMENT and PLAN    Oncology  * Acute blood loss anemia  Managed per primary team  Avoid hypoglycemia        Endocrine  Diabetes mellitus due to underlying condition with chronic kidney disease on chronic dialysis, with long-term current use of insulin  Endocrinology consulted for BG management.   BG goal 140-180    - novolog 4 units TIDWM given prandial excursions (~0.3 u/kg/day WBD)  - Novolog (Insulin Aspart) prn for BG excursions LDC SSI   - BG checks AC/HS  - Hypoglycemia protocol in place    ** Please notify Endocrine for any change and/or advance in diet**  ** Please call Endocrine for any BG related issues **    Discharge " Planning:   TBD. Please notify endocrinology prior to discharge.        Other  Adrenal corticosteroid causing adverse effect in therapeutic use  Glucocorticoids markedly increase glucose levels. Expect the steroid taper will help glucose control.             Yoana Shah PA-C  Endocrinology  [facility] - Transplant Stepdown

## 2024-08-02 NOTE — SUBJECTIVE & OBJECTIVE
Subjective:   History of Present Illness:  Ms. Kenney is a 63 y.o. female with ESRD secondary to diabetic nephropathy, solitary kidney.  PMH DM2, HTN, incontience (s/p bladder sling), CAD with stents (on ASA), atrial fibrillation, CVA (no residual deficits), ALEJA, anemia. She is now s/p DBD kidney transplant on 7/24/24. Surgery without complications. PD cath removed. Post op course notable for down trending Cr with excellent UOP. Also notable for   - Salivary gland infection. Per ID,discharged on Augmentin for a 10 day antibiotic course, stop date 8/3/24. Should she develop submandibular swelling, recommend CT neck with ENT consult   - Anemia requiring prbc transfusion on 7/27/24 with adequate response   - ER visit on 7/28 for hypertension - PO meds adjusted and d/c' from ED    She now presents as a direct admit from her routine clinic appointment with transplant surgery. Exam concerning for subcutaneous hematoma. Labs notable for down trending H&H 7.6/23 --> 7/20. Direct admit for imaging and blood transfusion. D/w Dr Squires. On arrival, BP elevated to 204/88 otherwise vital signs normal. Pt c/o nausea, abdominal pain, and constipation. Denies and changes in urinary output, diarrhea, vomiting, chest pain, sob, fever/chills.     Hospital Course:  Pt admitted for acute on chronic anemia, abdominal pain, and increased swelling and bruising at incision. CT a/p obtained on admit and reviewed by Dr Cody. Notable for large stool burden and small sub incisional collection. Transfused 1 unit PRBC and responded appropriately initially before trending down. Requiring second unit of PRBC on 7/31/24 without a significant response.     Interval History   NAEON. Pt feeling fine today. H&H decreased again to 6.7/20.5. transfuse 1 unit PRBC and repeat CBC following transfusion. Continue to trend hapto/LDH. Kidney US satisfactory. Consult heme/onc for assistance. BP much improved on additional minoxidil. Cr improving. Sleeping  better with xanax. VSS. Cont to monitor       Past Medical, Surgical, Family, and Social History:   Unchanged from H&P.    Scheduled Meds:   ALPRAZolam  2 mg Oral QHS    amoxicillin-clavulanate 875-125mg  1 tablet Oral Q12H    atorvastatin  20 mg Oral QHS    carvediloL  12.5 mg Oral BID    cetirizine  5 mg Oral QHS    cinacalcet  60 mg Oral Daily with breakfast    DULoxetine  30 mg Oral QHS    epoetin zana-epbx  10,000 Units Subcutaneous Q7 Days    ezetimibe  10 mg Oral Daily    insulin aspart U-100  3 Units Subcutaneous TIDWM    k phos di & mono-sod phos mono  250 mg Oral BID    minoxidiL  5 mg Oral Daily    mycophenolate  1,000 mg Oral BID    NIFEdipine  60 mg Oral BID    pantoprazole  40 mg Oral Daily    predniSONE  20 mg Oral Daily    tacrolimus  5 mg Oral BID    valGANciclovir  450 mg Oral Once per day on Monday Wednesday Friday     Continuous Infusions:  PRN Meds:  Current Facility-Administered Medications:     0.9%  NaCl infusion (for blood administration), , Intravenous, Q24H PRN    0.9%  NaCl infusion (for blood administration), , Intravenous, Q24H PRN    0.9%  NaCl infusion (for blood administration), , Intravenous, Q24H PRN    acetaminophen, 650 mg, Oral, Q4H PRN    ALPRAZolam, 1 mg, Oral, BID PRN    bisacodyL, 10 mg, Rectal, Daily PRN    calcium carbonate, 500 mg, Oral, TID PRN    cloNIDine, 0.1 mg, Oral, Q6H PRN    dextrose 10%, 12.5 g, Intravenous, PRN    dextrose 10%, 25 g, Intravenous, PRN    glucagon (human recombinant), 1 mg, Intramuscular, PRN    glucose, 16 g, Oral, PRN    glucose, 24 g, Oral, PRN    insulin aspart U-100, 0-5 Units, Subcutaneous, QID (AC + HS) PRN    melatonin, 6 mg, Oral, Nightly PRN    ondansetron, 4 mg, Intravenous, Q6H PRN    oxybutynin, 5 mg, Oral, TID PRN    oxyCODONE, 5 mg, Oral, Q6H PRN    prochlorperazine, 5 mg, Intravenous, Q6H PRN    Intake/Output - Last 3 Shifts         07/31 0700  08/01 0659 08/01 0700  08/02 0659 08/02 0700  08/03 0659    P.O. 840 4440     I.V.  "(mL/kg)  98.2 (1.1)     Blood 350      Other  0     Total Intake(mL/kg) 1190 (14.1) 2148.2 (24.9)     Urine (mL/kg/hr) 1050 (0.5) 2675 (1.3)     Emesis/NG output  0     Other  0     Stool 0 0     Blood  0     Total Output 1050 2675     Net +140 -526.8            Urine Occurrence  2 x     Stool Occurrence 1 x 0 x     Emesis Occurrence  0 x              Review of Systems   Constitutional:  Negative for activity change, chills and fever.   Respiratory:  Negative for shortness of breath.    Cardiovascular:  Negative for chest pain and leg swelling.   Gastrointestinal:  Negative for abdominal distention, abdominal pain, constipation, diarrhea, nausea and vomiting.   Genitourinary:  Negative for decreased urine volume, difficulty urinating and dysuria.   Skin:  Positive for wound.   Allergic/Immunologic: Positive for immunocompromised state.   Psychiatric/Behavioral:  Negative for confusion and sleep disturbance. The patient is not nervous/anxious.       Objective:     Vital Signs (Most Recent):  Temp: 97.9 °F (36.6 °C) (08/02/24 1204)  Pulse: 72 (08/02/24 1204)  Resp: 18 (08/02/24 1204)  BP: 138/70 (08/02/24 1204)  SpO2: 99 % (08/02/24 1204) Vital Signs (24h Range):  Temp:  [97.9 °F (36.6 °C)-98.4 °F (36.9 °C)] 97.9 °F (36.6 °C)  Pulse:  [56-88] 72  Resp:  [18-22] 18  SpO2:  [98 %-100 %] 99 %  BP: (129-198)/(58-80) 138/70     Weight: 86.1 kg (189 lb 13.1 oz)  Height: 5' 5" (165.1 cm)  Body mass index is 31.59 kg/m².     Physical Exam  Vitals and nursing note reviewed.   Constitutional:       General: She is not in acute distress.     Appearance: She is well-developed. She is not ill-appearing.   HENT:      Head: Normocephalic and atraumatic.   Cardiovascular:      Rate and Rhythm: Normal rate and regular rhythm.      Pulses: Normal pulses.   Pulmonary:      Effort: Pulmonary effort is normal.   Abdominal:      General: Abdomen is flat. Bowel sounds are normal. There is no distension.      Palpations: Abdomen is soft. " There is no mass.      Tenderness: There is abdominal tenderness. There is no guarding.       Musculoskeletal:      Right lower leg: No edema.      Left lower leg: No edema.   Skin:     General: Skin is warm and dry.      Findings: Bruising present.   Neurological:      Mental Status: She is alert and oriented to person, place, and time.      Motor: No weakness.   Psychiatric:         Mood and Affect: Mood normal.         Behavior: Behavior normal.          Laboratory:  CBC:   Recent Labs   Lab 07/31/24 0529 08/01/24 0529 08/02/24  0530   WBC 8.59 8.61 7.29   RBC 2.46* 2.53* 2.27*   HGB 7.4* 7.5* 6.7*   HCT 21.9* 22.4* 20.5*   * 157 196   MCV 89 89 90   MCH 30.1 29.6 29.5   MCHC 33.8 33.5 32.7     CMP:   Recent Labs   Lab 07/31/24 0529 08/01/24 0529 08/02/24  0530 08/02/24  0708   * 175* 145*  --    CALCIUM 8.1* 8.0* 8.2*  --    ALBUMIN 2.6* 2.6* 2.4* 2.5*   PROT  --   --   --  4.2*    140 138  --    K 3.5 3.4* 3.5  --    CO2 23 24 22*  --     108 108  --    BUN 38* 39* 36*  --    CREATININE 2.3* 2.4* 2.0*  --    ALKPHOS  --   --   --  48*   ALT  --   --   --  29   AST  --   --   --  14     Labs within the past 24 hours have been reviewed.    Diagnostic Results:  Reviewed.

## 2024-08-02 NOTE — PROGRESS NOTES
[facility]  Kidney Transplant  Progress Note      Reason for Follow-up: Reassessment of Kidney Transplant - 7/24/2024  (#1) recipient and management of immunosuppression.    ORGAN:   RIGHT KIDNEY    Donor Type:   Donation after Brain Death    Donor CMV Status: Positive  Donor HBcAB:Negative  Donor HCV Status:Negative  Donor HBV RAH:   Donor HCV RAH: Negative      Subjective:   History of Present Illness:  Ms. Kenney is a 63 y.o. female with ESRD secondary to diabetic nephropathy, solitary kidney.  PMH DM2, HTN, incontience (s/p bladder sling), CAD with stents (on ASA), atrial fibrillation, CVA (no residual deficits), ALEJA, anemia. She is now s/p DBD kidney transplant on 7/24/24. Surgery without complications. PD cath removed. Post op course notable for down trending Cr with excellent UOP. Also notable for   - Salivary gland infection. Per ID,discharged on Augmentin for a 10 day antibiotic course, stop date 8/3/24. Should she develop submandibular swelling, recommend CT neck with ENT consult   - Anemia requiring prbc transfusion on 7/27/24 with adequate response   - ER visit on 7/28 for hypertension - PO meds adjusted and d/c' from ED    She now presents as a direct admit from her routine clinic appointment with transplant surgery. Exam concerning for subcutaneous hematoma. Labs notable for down trending H&H 7.6/23 --> 7/20. Direct admit for imaging and blood transfusion. D/w Dr Squires. On arrival, BP elevated to 204/88 otherwise vital signs normal. Pt c/o nausea, abdominal pain, and constipation. Denies and changes in urinary output, diarrhea, vomiting, chest pain, sob, fever/chills.     Hospital Course:  Pt admitted for acute on chronic anemia, abdominal pain, and increased swelling and bruising at incision. CT a/p obtained on admit and reviewed by Dr Cody. Notable for large stool burden and small sub incisional collection. Transfused 1 unit PRBC and responded appropriately initially before  trending down. Requiring second unit of PRBC on 7/31/24 without a significant response.     Interval History   NAEON. Pt feeling fine today. H&H decreased again to 6.7/20.5. transfuse 1 unit PRBC and repeat CBC following transfusion. Continue to trend hapto/LDH. Kidney US satisfactory. Consult heme/onc for assistance. BP much improved on additional minoxidil. Cr improving. Sleeping better with xanax. VSS. Cont to monitor       Past Medical, Surgical, Family, and Social History:   Unchanged from H&P.    Scheduled Meds:   ALPRAZolam  2 mg Oral QHS    amoxicillin-clavulanate 875-125mg  1 tablet Oral Q12H    atorvastatin  20 mg Oral QHS    carvediloL  12.5 mg Oral BID    cetirizine  5 mg Oral QHS    cinacalcet  60 mg Oral Daily with breakfast    DULoxetine  30 mg Oral QHS    epoetin zana-epbx  10,000 Units Subcutaneous Q7 Days    ezetimibe  10 mg Oral Daily    insulin aspart U-100  3 Units Subcutaneous TIDWM    k phos di & mono-sod phos mono  250 mg Oral BID    minoxidiL  5 mg Oral Daily    mycophenolate  1,000 mg Oral BID    NIFEdipine  60 mg Oral BID    pantoprazole  40 mg Oral Daily    predniSONE  20 mg Oral Daily    tacrolimus  5 mg Oral BID    valGANciclovir  450 mg Oral Once per day on Monday Wednesday Friday     Continuous Infusions:  PRN Meds:  Current Facility-Administered Medications:     0.9%  NaCl infusion (for blood administration), , Intravenous, Q24H PRN    0.9%  NaCl infusion (for blood administration), , Intravenous, Q24H PRN    0.9%  NaCl infusion (for blood administration), , Intravenous, Q24H PRN    acetaminophen, 650 mg, Oral, Q4H PRN    ALPRAZolam, 1 mg, Oral, BID PRN    bisacodyL, 10 mg, Rectal, Daily PRN    calcium carbonate, 500 mg, Oral, TID PRN    cloNIDine, 0.1 mg, Oral, Q6H PRN    dextrose 10%, 12.5 g, Intravenous, PRN    dextrose 10%, 25 g, Intravenous, PRN    glucagon (human recombinant), 1 mg, Intramuscular, PRN    glucose, 16 g, Oral, PRN    glucose, 24 g, Oral, PRN    insulin aspart  "U-100, 0-5 Units, Subcutaneous, QID (AC + HS) PRN    melatonin, 6 mg, Oral, Nightly PRN    ondansetron, 4 mg, Intravenous, Q6H PRN    oxybutynin, 5 mg, Oral, TID PRN    oxyCODONE, 5 mg, Oral, Q6H PRN    prochlorperazine, 5 mg, Intravenous, Q6H PRN    Intake/Output - Last 3 Shifts         07/31 0700 08/01 0659 08/01 0700 08/02 0659 08/02 0700 08/03 0659    P.O. 840 2050     I.V. (mL/kg)  98.2 (1.1)     Blood 350      Other  0     Total Intake(mL/kg) 1190 (14.1) 2148.2 (24.9)     Urine (mL/kg/hr) 1050 (0.5) 2675 (1.3)     Emesis/NG output  0     Other  0     Stool 0 0     Blood  0     Total Output 1050 2675     Net +140 -526.8            Urine Occurrence  2 x     Stool Occurrence 1 x 0 x     Emesis Occurrence  0 x              Review of Systems   Constitutional:  Negative for activity change, chills and fever.   Respiratory:  Negative for shortness of breath.    Cardiovascular:  Negative for chest pain and leg swelling.   Gastrointestinal:  Negative for abdominal distention, abdominal pain, constipation, diarrhea, nausea and vomiting.   Genitourinary:  Negative for decreased urine volume, difficulty urinating and dysuria.   Skin:  Positive for wound.   Allergic/Immunologic: Positive for immunocompromised state.   Psychiatric/Behavioral:  Negative for confusion and sleep disturbance. The patient is not nervous/anxious.       Objective:     Vital Signs (Most Recent):  Temp: 97.9 °F (36.6 °C) (08/02/24 1204)  Pulse: 72 (08/02/24 1204)  Resp: 18 (08/02/24 1204)  BP: 138/70 (08/02/24 1204)  SpO2: 99 % (08/02/24 1204) Vital Signs (24h Range):  Temp:  [97.9 °F (36.6 °C)-98.4 °F (36.9 °C)] 97.9 °F (36.6 °C)  Pulse:  [56-88] 72  Resp:  [18-22] 18  SpO2:  [98 %-100 %] 99 %  BP: (129-198)/(58-80) 138/70     Weight: 86.1 kg (189 lb 13.1 oz)  Height: 5' 5" (165.1 cm)  Body mass index is 31.59 kg/m².     Physical Exam  Vitals and nursing note reviewed.   Constitutional:       General: She is not in acute distress.     " Appearance: She is well-developed. She is not ill-appearing.   HENT:      Head: Normocephalic and atraumatic.   Cardiovascular:      Rate and Rhythm: Normal rate and regular rhythm.      Pulses: Normal pulses.   Pulmonary:      Effort: Pulmonary effort is normal.   Abdominal:      General: Abdomen is flat. Bowel sounds are normal. There is no distension.      Palpations: Abdomen is soft. There is no mass.      Tenderness: There is abdominal tenderness. There is no guarding.       Musculoskeletal:      Right lower leg: No edema.      Left lower leg: No edema.   Skin:     General: Skin is warm and dry.      Findings: Bruising present.   Neurological:      Mental Status: She is alert and oriented to person, place, and time.      Motor: No weakness.   Psychiatric:         Mood and Affect: Mood normal.         Behavior: Behavior normal.          Laboratory:  CBC:   Recent Labs   Lab 07/31/24  0529 08/01/24  0529 08/02/24  0530   WBC 8.59 8.61 7.29   RBC 2.46* 2.53* 2.27*   HGB 7.4* 7.5* 6.7*   HCT 21.9* 22.4* 20.5*   * 157 196   MCV 89 89 90   MCH 30.1 29.6 29.5   MCHC 33.8 33.5 32.7     CMP:   Recent Labs   Lab 07/31/24  0529 08/01/24  0529 08/02/24  0530 08/02/24  0708   * 175* 145*  --    CALCIUM 8.1* 8.0* 8.2*  --    ALBUMIN 2.6* 2.6* 2.4* 2.5*   PROT  --   --   --  4.2*    140 138  --    K 3.5 3.4* 3.5  --    CO2 23 24 22*  --     108 108  --    BUN 38* 39* 36*  --    CREATININE 2.3* 2.4* 2.0*  --    ALKPHOS  --   --   --  48*   ALT  --   --   --  29   AST  --   --   --  14     Labs within the past 24 hours have been reviewed.    Diagnostic Results:  Reviewed.  Assessment/Plan:     * Acute blood loss anemia  - Pt with decreasing H&H 7.6/23 --> 7/20 on outpatient labs  - CT with small subincisional collection reviewed by Dr Cody  - 2 units transfused this admission without appropriate response.   - Transfuse 1 unit PRBC again today. Consult heme/onc       S/P kidney transplant  - s/p DDRTxp  "on 7/24/24  - CT a/p reviewed  - Cr trending down daily with reported good UOP  - strict intake/output  - daily renal function panel      Anxiety  - duloxetine + xanax   - patient reports taking 2 mg qHS at home   - trying to wean herself off, has been taking 1mg recently  - with elevated BP and recent surgery, encouraging patient to cont xanax qHs at this time   - can consider dc'ing xanax once steroids are weaned down and patient has recovered more     Salivary gland infection  - continue augmentin      Long-term use of immunosuppressant medication  - see "prophylactic immunotherapy"      Prophylactic immunotherapy  - continue prograf, cellcept, and steroid taper per protocol  - will check daily prograf levels, monitor for toxic side effects, and adjust for therapeutic dose        Adrenal corticosteroid causing adverse effect in therapeutic use  - appreciate endo assistance      Anemia in ESRD (end-stage renal disease)  - see "Acute blood loss anemia"      Renovascular hypertension  - on coreg 12.5 mg BID at home  - presented to ED yesterday for high blood pressure and coreg dose increased  - uncontrolled on admit  - Adjust regimen: procardia 60 mg BID and coreg 6.25 mg BID with hold parameters  - minoxidil added   - improving on current regimen       Diabetes mellitus due to underlying condition with chronic kidney disease on chronic dialysis, with long-term current use of insulin  Consult endocrine for management, appreciate their assistance          Discharge Planning: Not a candidate for discharge at this time.     Medical decision making for this encounter includes review of pertinent labs and diagnostic studies, assessment and planning, discussions with consulting providers, discussion with patient/family, and participation in multidisciplinary rounds. Time spent caring for patient: 60 minutes    Aniyah Tidwell PA-C  Kidney Transplant  [facility]  "

## 2024-08-03 LAB
ALBUMIN SERPL BCP-MCNC: 2.4 G/DL (ref 3.5–5.2)
ANION GAP SERPL CALC-SCNC: 8 MMOL/L (ref 8–16)
ANISOCYTOSIS BLD QL SMEAR: SLIGHT
BASOPHILS NFR BLD: 0 % (ref 0–1.9)
BUN SERPL-MCNC: 35 MG/DL (ref 8–23)
CALCIUM SERPL-MCNC: 8 MG/DL (ref 8.7–10.5)
CHLORIDE SERPL-SCNC: 108 MMOL/L (ref 95–110)
CO2 SERPL-SCNC: 24 MMOL/L (ref 23–29)
CREAT SERPL-MCNC: 1.9 MG/DL (ref 0.5–1.4)
DIFFERENTIAL METHOD BLD: ABNORMAL
DOHLE BOD BLD QL SMEAR: PRESENT
EOSINOPHIL NFR BLD: 0 % (ref 0–8)
ERYTHROCYTE [DISTWIDTH] IN BLOOD BY AUTOMATED COUNT: 15.7 % (ref 11.5–14.5)
EST. GFR  (NO RACE VARIABLE): 28.9 ML/MIN/1.73 M^2
GLUCOSE SERPL-MCNC: 151 MG/DL (ref 70–110)
HAPTOGLOB SERPL-MCNC: 14 MG/DL (ref 30–250)
HCT VFR BLD AUTO: 23.6 % (ref 37–48.5)
HGB BLD-MCNC: 7.8 G/DL (ref 12–16)
HYPOCHROMIA BLD QL SMEAR: ABNORMAL
IMM GRANULOCYTES # BLD AUTO: ABNORMAL K/UL (ref 0–0.04)
IMM GRANULOCYTES NFR BLD AUTO: ABNORMAL % (ref 0–0.5)
LDH SERPL L TO P-CCNC: 340 U/L (ref 110–260)
LYMPHOCYTES NFR BLD: 1 % (ref 18–48)
MAGNESIUM SERPL-MCNC: 1.9 MG/DL (ref 1.6–2.6)
MCH RBC QN AUTO: 29.5 PG (ref 27–31)
MCHC RBC AUTO-ENTMCNC: 33.1 G/DL (ref 32–36)
MCV RBC AUTO: 89 FL (ref 82–98)
MONOCYTES NFR BLD: 5 % (ref 4–15)
NEUTROPHILS NFR BLD: 94 % (ref 38–73)
NRBC BLD-RTO: 0 /100 WBC
PHOSPHATE SERPL-MCNC: 3.6 MG/DL (ref 2.7–4.5)
PLATELET # BLD AUTO: 218 K/UL (ref 150–450)
PLATELET BLD QL SMEAR: ABNORMAL
PMV BLD AUTO: 11.7 FL (ref 9.2–12.9)
POCT GLUCOSE: 126 MG/DL (ref 70–110)
POCT GLUCOSE: 185 MG/DL (ref 70–110)
POCT GLUCOSE: 224 MG/DL (ref 70–110)
POCT GLUCOSE: 237 MG/DL (ref 70–110)
POTASSIUM SERPL-SCNC: 3.6 MMOL/L (ref 3.5–5.1)
RBC # BLD AUTO: 2.64 M/UL (ref 4–5.4)
SODIUM SERPL-SCNC: 140 MMOL/L (ref 136–145)
TACROLIMUS BLD-MCNC: 10.2 NG/ML (ref 5–15)
TOXIC GRANULES BLD QL SMEAR: PRESENT
WBC # BLD AUTO: 9.98 K/UL (ref 3.9–12.7)

## 2024-08-03 PROCEDURE — 63600175 PHARM REV CODE 636 W HCPCS: Performed by: INTERNAL MEDICINE

## 2024-08-03 PROCEDURE — 99232 SBSQ HOSP IP/OBS MODERATE 35: CPT | Mod: ,,, | Performed by: INTERNAL MEDICINE

## 2024-08-03 PROCEDURE — 63600175 PHARM REV CODE 636 W HCPCS: Performed by: PHYSICIAN ASSISTANT

## 2024-08-03 PROCEDURE — 97161 PT EVAL LOW COMPLEX 20 MIN: CPT

## 2024-08-03 PROCEDURE — 99232 SBSQ HOSP IP/OBS MODERATE 35: CPT | Mod: ,,,

## 2024-08-03 PROCEDURE — 25000003 PHARM REV CODE 250

## 2024-08-03 PROCEDURE — 83735 ASSAY OF MAGNESIUM: CPT | Performed by: PHYSICIAN ASSISTANT

## 2024-08-03 PROCEDURE — 25000003 PHARM REV CODE 250: Performed by: PHYSICIAN ASSISTANT

## 2024-08-03 PROCEDURE — 94761 N-INVAS EAR/PLS OXIMETRY MLT: CPT

## 2024-08-03 PROCEDURE — 63600175 PHARM REV CODE 636 W HCPCS

## 2024-08-03 PROCEDURE — 85027 COMPLETE CBC AUTOMATED: CPT | Performed by: PHYSICIAN ASSISTANT

## 2024-08-03 PROCEDURE — 36415 COLL VENOUS BLD VENIPUNCTURE: CPT | Performed by: PHYSICIAN ASSISTANT

## 2024-08-03 PROCEDURE — 83010 ASSAY OF HAPTOGLOBIN QUANT: CPT | Performed by: PHYSICIAN ASSISTANT

## 2024-08-03 PROCEDURE — 94660 CPAP INITIATION&MGMT: CPT

## 2024-08-03 PROCEDURE — 80069 RENAL FUNCTION PANEL: CPT | Performed by: PHYSICIAN ASSISTANT

## 2024-08-03 PROCEDURE — 20600001 HC STEP DOWN PRIVATE ROOM

## 2024-08-03 PROCEDURE — 25000003 PHARM REV CODE 250: Performed by: TRANSPLANT SURGERY

## 2024-08-03 PROCEDURE — 80197 ASSAY OF TACROLIMUS: CPT | Performed by: PHYSICIAN ASSISTANT

## 2024-08-03 PROCEDURE — 85007 BL SMEAR W/DIFF WBC COUNT: CPT | Performed by: PHYSICIAN ASSISTANT

## 2024-08-03 PROCEDURE — 83615 LACTATE (LD) (LDH) ENZYME: CPT | Performed by: PHYSICIAN ASSISTANT

## 2024-08-03 RX ORDER — INSULIN ASPART 100 [IU]/ML
0-10 INJECTION, SOLUTION INTRAVENOUS; SUBCUTANEOUS
Status: DISCONTINUED | OUTPATIENT
Start: 2024-08-03 | End: 2024-08-04 | Stop reason: HOSPADM

## 2024-08-03 RX ORDER — CARVEDILOL 6.25 MG/1
6.25 TABLET ORAL 2 TIMES DAILY
Status: DISCONTINUED | OUTPATIENT
Start: 2024-08-03 | End: 2024-08-04 | Stop reason: HOSPADM

## 2024-08-03 RX ORDER — INSULIN ASPART 100 [IU]/ML
6 INJECTION, SOLUTION INTRAVENOUS; SUBCUTANEOUS
Status: DISCONTINUED | OUTPATIENT
Start: 2024-08-04 | End: 2024-08-04 | Stop reason: HOSPADM

## 2024-08-03 RX ORDER — TACROLIMUS 1 MG/1
4 CAPSULE ORAL 2 TIMES DAILY
Status: DISCONTINUED | OUTPATIENT
Start: 2024-08-03 | End: 2024-08-04 | Stop reason: HOSPADM

## 2024-08-03 RX ADMIN — MINOXIDIL 5 MG: 2.5 TABLET ORAL at 08:08

## 2024-08-03 RX ADMIN — INSULIN ASPART 2 UNITS: 100 INJECTION, SOLUTION INTRAVENOUS; SUBCUTANEOUS at 08:08

## 2024-08-03 RX ADMIN — NIFEDIPINE 60 MG: 30 TABLET, FILM COATED, EXTENDED RELEASE ORAL at 08:08

## 2024-08-03 RX ADMIN — INSULIN ASPART 4 UNITS: 100 INJECTION, SOLUTION INTRAVENOUS; SUBCUTANEOUS at 08:08

## 2024-08-03 RX ADMIN — DIBASIC SODIUM PHOSPHATE, MONOBASIC POTASSIUM PHOSPHATE AND MONOBASIC SODIUM PHOSPHATE 1 TABLET: 852; 155; 130 TABLET ORAL at 08:08

## 2024-08-03 RX ADMIN — PREDNISONE 20 MG: 20 TABLET ORAL at 08:08

## 2024-08-03 RX ADMIN — AMOXICILLIN AND CLAVULANATE POTASSIUM 1 TABLET: 875; 125 TABLET, FILM COATED ORAL at 08:08

## 2024-08-03 RX ADMIN — CLONIDINE HYDROCHLORIDE 0.1 MG: 0.1 TABLET ORAL at 03:08

## 2024-08-03 RX ADMIN — ATORVASTATIN CALCIUM 20 MG: 20 TABLET, FILM COATED ORAL at 08:08

## 2024-08-03 RX ADMIN — EZETIMIBE 10 MG: 10 TABLET ORAL at 08:08

## 2024-08-03 RX ADMIN — ALPRAZOLAM 2 MG: 0.25 TABLET ORAL at 10:08

## 2024-08-03 RX ADMIN — INSULIN ASPART 2 UNITS: 100 INJECTION, SOLUTION INTRAVENOUS; SUBCUTANEOUS at 12:08

## 2024-08-03 RX ADMIN — MYCOPHENOLATE MOFETIL 1000 MG: 250 CAPSULE ORAL at 08:08

## 2024-08-03 RX ADMIN — CETIRIZINE HYDROCHLORIDE 5 MG: 5 TABLET, FILM COATED ORAL at 10:08

## 2024-08-03 RX ADMIN — INSULIN ASPART 4 UNITS: 100 INJECTION, SOLUTION INTRAVENOUS; SUBCUTANEOUS at 05:08

## 2024-08-03 RX ADMIN — INSULIN ASPART 4 UNITS: 100 INJECTION, SOLUTION INTRAVENOUS; SUBCUTANEOUS at 12:08

## 2024-08-03 RX ADMIN — TACROLIMUS 4 MG: 1 CAPSULE ORAL at 05:08

## 2024-08-03 RX ADMIN — TACROLIMUS 5 MG: 1 CAPSULE ORAL at 08:08

## 2024-08-03 RX ADMIN — PANTOPRAZOLE SODIUM 40 MG: 40 TABLET, DELAYED RELEASE ORAL at 08:08

## 2024-08-03 RX ADMIN — CARVEDILOL 12.5 MG: 12.5 TABLET, FILM COATED ORAL at 08:08

## 2024-08-03 RX ADMIN — CINACALCET 60 MG: 30 TABLET, FILM COATED ORAL at 08:08

## 2024-08-03 RX ADMIN — CARVEDILOL 6.25 MG: 6.25 TABLET, FILM COATED ORAL at 08:08

## 2024-08-03 RX ADMIN — DULOXETINE HYDROCHLORIDE 30 MG: 30 CAPSULE, DELAYED RELEASE ORAL at 08:08

## 2024-08-03 NOTE — PT/OT/SLP EVAL
Physical Therapy Evaluation and discharge    Patient Name:  Joann Kenney   MRN:  [id number]    Recommendations:     Discharge Recommendations: No Therapy Indicated   Discharge Equipment Recommendations: none   Barriers to discharge: None    Assessment:     Joann Kenney is a 63 y.o. female admitted with a medical diagnosis of Acute blood loss anemia.  She presents with the following impairments/functional limitations:  (pt has no rehab needs.) pt tolerated treatment well and does not have PT needs. Pt is safe to ambulate on unit and will be able to discharge home with no needs. Pt is s/p kidney transplant 7/24/24.    Rehab Prognosis: Good; patient would benefit from acute skilled PT services to address these deficits and reach maximum level of function.    Recent Surgery: * No surgery found *      Plan:     During this hospitalization, patient to be seen  (pt is being discharged from PT.) to address the identified rehab impairments via  (pt has no rehab needs.)   Plan of Care Expires:  08/03/24    Subjective     Chief Complaint: pt c/o slight pain during treatment.   Patient/Family Comments/goals: to go home   Pain/Comfort:  Pain Rating 1: 3/10 (R lower abdomen)  Pain Addressed 1: Distraction  Pain Rating Post-Intervention 1: 3/10 (R lower abdomen)    Patients cultural, spiritual, Taoism conflicts given the current situation:      Living Environment:  Pt lives alone in 1 st floor apt with slab entrance.   Prior to admission, patients level of function was pt used rollator PRN.  Equipment used at home: rollator, cane, quad, cane, straight, wheelchair (walk in shower with built in bench).  DME owned (not currently used): none.  Upon discharge, patient will have assistance from no one.    Objective:     Communicated with nurse  prior to session.  Patient found supine with telemetry (hep lock)  upon PT entry to room.    General Precautions: Standard, fall  Orthopedic Precautions:    Braces:    Respiratory Status:  Room air    Exams:  Cognitive Exam:  Patient is oriented to Person, Place, Time, and Situation  RLE ROM: WFL  RLE Strength: WFL  LLE ROM: WFL  LLE Strength: WFL    Functional Mobility:  Bed Mobility:     Rolling Left:  independence  Supine to Sit: independence    Transfers:     Sit to Stand:  independence with no AD    Gait: pt received gait training ~ 56 ft independently in room. Pt did not want to ambulate in hallway.     Balance: pt sat on EOB Independently.     Pt white board updated with current therapists name and level of mobility assistance needed.       AM-PAC 6 CLICK MOBILITY  Total Score:24       Treatment & Education:  Pt received verbal instructions in role of PT, POC and discharge instructions.     Patient left up in chair with all lines intact, call button in reach, and RN  notified.    GOALS:   Multidisciplinary Problems       Physical Therapy Goals       Not on file                    History:     Past Medical History:   Diagnosis Date    Anemia     Anxiety     Atrial fibrillation     Coronary artery disease     Depression     Diabetes mellitus, type 2     Disorder of kidney and ureter     Heart murmur     Hyperlipidemia     Hypertension     Obesity     ALEJA (obstructive sleep apnea)     Proteinuria     Solitary kidney     Stroke        Past Surgical History:   Procedure Laterality Date    CORONARY ANGIOPLASTY WITH STENT PLACEMENT      HYSTERECTOMY      INCONTINENCE SURGERY      INSERTION OF IMPLANTABLE LOOP RECORDER      internal heart monitor      KIDNEY TRANSPLANT Right 7/24/2024    Procedure: TRANSPLANT, KIDNEY;  Surgeon: Alphonso Mon MD;  Location: [facility] OR [address] [place] [address];  Service: Transplant;  Laterality: Right;    PERITONEAL CATHETER INSERTION      PERITONEAL CATHETER REMOVAL Left 7/24/2024    Procedure: REMOVAL, CATHETER, DIALYSIS, PERITONEAL;  Surgeon: Alphonso Mon MD;  Location: [facility] OR [address] [place] [address];  Service: Transplant;  Laterality: Left;       Time Tracking:     PT Received On: 08/03/24  PT  Start Time: 1017     PT Stop Time: 1027  PT Total Time (min): 10 min     Billable Minutes: Evaluation 10 min       08/03/2024

## 2024-08-03 NOTE — PROGRESS NOTES
[facility]  Endocrinology  Progress Note    Admit Date: 7/29/2024     Reason for Consult: Management of T2DM, Hyperglycemia      Surgical Procedure and Date: s/p kidney transplant on 7/24/2024     Diabetes diagnosis year: 2004     Home Diabetes Medications:  Patient previously on tandem insulin pump and Mounjaro. Patient recently lost 60 lbs and had worsening kidney function. Patient states that she is off all diabetes medication for about 3-months and blood sugar has been well controlled. Patient is from [place] and does not have her pump with her to review pump settings. Per chart review, basal insulin was 58 units daily in the past. Anticipate the patient will need more aggressive insulin management post-op.     Most recent D/C recommend:    Novolog SSI:     150 - 200 + 2 unit    201 - 250 + 4 units    251 - 300 + 6 units    301 - 350 + 8 units       > 350   + 10 units       How often checking glucose at home? >4 x day (previously on Dexcom G6, but off since she stopped using tandem pump)   BG readings on regimen: states BG have been well controlled off the pump and the GLP1-RA/ GIP ('s)  Hypoglycemia on the regimen?  No  Missed doses on regimen?  No     Diabetes Complications include:     Hyperglycemia (history)     Complicating diabetes co morbidities:   Glucocorticoid use         HPI: Ms. Kenney is a 63 y.o. female with ESRD secondary to diabetic nephropathy, solitary kidney. PMH DM2, HTN, incontience (s/p bladder sling), CAD with stents (on ASA), atrial fibrillation, CVA (no residual deficits), ALEJA, anemia. She is now s/p DBD kidney transplant on 7/24/24. Surgery without complications. PD cath removed. Post op course notable for down trending Cr with excellent UOP. She now presents as a direct admit from her routine clinic appointment with transplant surgery. Exam concerning for subcutaneous hematoma. Labs notable for down trending H&H 7.6/23 --> 7/20. Direct admit for imaging  "and blood transfusion. D/w Dr Squires. On arrival, BP elevated to 204/88 otherwise vital signs normal. Pt c/o nausea, abdominal pain, and constipation. States incision developed bruising with worsening pain starting yesterday afternoon. Denies any changes in urinary output, diarrhea, vomiting, chest pain, sob, fever/chills. Endocrine conus;tal to manage hyperglycemia and type 2 diabetes.     Lab Results   Component Value Date    HGBA1C 4.8 2024    HGBA1C 4.8 2024         Interval HPI:   Overnight events: No acute events overnight. Patient in room 54950/90475 A. Blood glucose stable. BG at and above goal on current insulin regimen (SSI ). Steroid use- Prednisone 20 mg.    Renal function-   Lab Results   Component Value Date    CREATININE 1.9 (H) 2024          Vasopressors-  None         Diet diabetic  Calorie      Eatin%  Nausea: No  Hypoglycemia and intervention: No  Fever: No  TPN and/or TF: No    /62   Pulse (!) 59   Temp 97.9 °F (36.6 °C)   Resp 19   Ht 5' 5" (1.651 m)   Wt 86.7 kg (191 lb 2.2 oz)   SpO2 99%   Breastfeeding No   BMI 31.81 kg/m²     Labs Reviewed and Include    Recent Labs   Lab 24  0607   *   CALCIUM 8.0*   ALBUMIN 2.4*      K 3.6   CO2 24      BUN 35*   CREATININE 1.9*     Lab Results   Component Value Date    WBC 9.98 2024    HGB 7.8 (L) 2024    HCT 23.6 (L) 2024    MCV 89 2024     2024     No results for input(s): "TSH", "FREET4" in the last 168 hours.  Lab Results   Component Value Date    HGBA1C 4.8 2024    HGBA1C 4.8 2024       Nutritional status:   Body mass index is 31.81 kg/m².  Lab Results   Component Value Date    ALBUMIN 2.4 (L) 2024    ALBUMIN 2.5 (L) 2024    ALBUMIN 2.4 (L) 2024     No results found for: "PREALBUMIN"    Estimated Creatinine Clearance: 32.1 mL/min (A) (based on SCr of 1.9 mg/dL (H)).    Accu-Checks  Recent Labs     24  2790 " 08/01/24  0844 08/01/24  1303 08/01/24  1739 08/01/24  2156 08/02/24  0848 08/02/24  1302 08/02/24  1731 08/02/24  2145 08/03/24  0810   POCTGLUCOSE 196* 153* 188* 225* 263* 125* 179* 290* 263* 126*       Current Medications and/or Treatments Impacting Glycemic Control  Immunotherapy:    Immunosuppressants           Stop Route Frequency     tacrolimus capsule 5 mg         -- Oral 2 times daily     mycophenolate capsule 1,000 mg         -- Oral 2 times daily          Steroids:   Hormones (From admission, onward)      Start     Stop Route Frequency Ordered    07/30/24 0900  predniSONE tablet 20 mg         -- Oral Daily 07/29/24 1425    07/29/24 1436  melatonin tablet 6 mg         -- Oral Nightly PRN 07/29/24 1425          Pressors:    Autonomic Drugs (From admission, onward)      None          Hyperglycemia/Diabetes Medications:   Antihyperglycemics (From admission, onward)      Start     Stop Route Frequency Ordered    08/02/24 1245  insulin aspart U-100 pen 4 Units         -- SubQ 3 times daily with meals 08/02/24 1234    07/29/24 1526  insulin aspart U-100 pen 0-5 Units         -- SubQ Before meals & nightly PRN 07/29/24 1426            ASSESSMENT and PLAN    Oncology  * Acute blood loss anemia  Managed per primary team  Avoid hypoglycemia        Endocrine  Diabetes mellitus due to underlying condition with chronic kidney disease on chronic dialysis, with long-term current use of insulin  Endocrinology consulted for BG management.   BG goal 140-180    - novolog 4 units TIDWM given prandial excursions  - Novolog (Insulin Aspart) prn for BG excursions [facility] SSI (150/25)   - BG checks AC/HS  - Hypoglycemia protocol in place    ** Please notify Endocrine for any change and/or advance in diet**  ** Please call Endocrine for any BG related issues **    Discharge Planning:   TBD. Please notify endocrinology prior to discharge.        Other  Adrenal corticosteroid causing adverse effect in therapeutic use  Glucocorticoids  markedly increase glucose levels. Expect the steroid taper will help glucose control.             Yoana Shah PA-C  Endocrinology  [facility] - Transplant Stepdown

## 2024-08-03 NOTE — PROGRESS NOTES
[facility]  Transplant Nephrology  Progress Note    Patient Name: Joann Kenney  MRN: [id number]  Admission Date: 7/29/2024  Hospital Length of Stay: 5 days  Attending Provider: Alphonso Mon MD   Primary Care Physician: Vijay Blake MD  Principal Problem:Acute blood loss anemia    Consults  Subjective:     Interval History: no issues overnight. BP remains controlled. Patient received a unit of blood yesterday with Hgb going from 6.7 to 7.8. Denies any bleeding at this time. Seen by hematology without any schistocytes noted on peripheral smear. Factor XIII pending    Review of patient's allergies indicates:  No Known Allergies  Current Facility-Administered Medications   Medication Frequency    acetaminophen tablet 650 mg Q4H PRN    ALPRAZolam tablet 1 mg BID PRN    ALPRAZolam tablet 2 mg QHS    amoxicillin-clavulanate 875-125mg per tablet 1 tablet Q12H    atorvastatin tablet 20 mg QHS    bisacodyL suppository 10 mg Daily PRN    calcium carbonate 200 mg calcium (500 mg) chewable tablet 500 mg TID PRN    carvediloL tablet 6.25 mg BID    cetirizine tablet 5 mg QHS    cinacalcet tablet 60 mg Daily with breakfast    cloNIDine tablet 0.1 mg Q6H PRN    dextrose 10% bolus 125 mL 125 mL PRN    dextrose 10% bolus 250 mL 250 mL PRN    DULoxetine DR capsule 30 mg QHS    epoetin zana-epbx injection 10,000 Units Q7 Days    ezetimibe tablet 10 mg Daily    glucagon (human recombinant) injection 1 mg PRN    glucose chewable tablet 16 g PRN    glucose chewable tablet 24 g PRN    insulin aspart U-100 pen 0-10 Units QID (AC + HS) PRN    insulin aspart U-100 pen 4 Units TIDWM    k phos di & mono-sod phos mono 250 mg tablet 1 tablet BID    melatonin tablet 6 mg Nightly PRN    minoxidiL tablet 5 mg Daily    mycophenolate capsule 1,000 mg BID    NIFEdipine 24 hr tablet 60 mg BID    ondansetron injection 4 mg Q6H PRN    oxybutynin tablet 5 mg TID PRN    oxyCODONE immediate release tablet 5 mg Q6H PRN     pantoprazole EC tablet 40 mg Daily    [START ON 8/4/2024] predniSONE tablet 15 mg Daily    prochlorperazine injection Soln 5 mg Q6H PRN    tacrolimus capsule 4 mg BID    valGANciclovir tablet 450 mg Once per day on Monday Wednesday Friday       Objective:     Vital Signs (Most Recent):  Temp: 98 °F (36.7 °C) (08/03/24 1206)  Pulse: (!) 59 (08/03/24 1206)  Resp: 18 (08/03/24 1206)  BP: 128/61 (08/03/24 1206)  SpO2: 98 % (08/03/24 1206) Vital Signs (24h Range):  Temp:  [97.9 °F (36.6 °C)-98.4 °F (36.9 °C)] 98 °F (36.7 °C)  Pulse:  [56-75] 59  Resp:  [18-19] 18  SpO2:  [97 %-99 %] 98 %  BP: (128-183)/(61-78) 128/61     Weight: 86.7 kg (191 lb 2.2 oz) (08/03/24 0300)  Body mass index is 31.81 kg/m².  Body surface area is 1.99 meters squared.    I/O last 3 completed shifts:  In: 2150 [P.O.:1850; Blood:300]  Out: 2125 [Urine:2125]    Physical Exam  Vitals reviewed.   Constitutional:       General: She is not in acute distress.     Appearance: Normal appearance. She is not ill-appearing or toxic-appearing.      Comments: Appears stated age   HENT:      Head: Normocephalic and atraumatic.      Right Ear: External ear normal.      Left Ear: External ear normal.      Nose: Nose normal.   Eyes:      General: No scleral icterus.     Conjunctiva/sclera: Conjunctivae normal.   Cardiovascular:      Rate and Rhythm: Normal rate and regular rhythm.      Pulses: Normal pulses.      Heart sounds: Normal heart sounds. No murmur heard.     No friction rub.   Pulmonary:      Effort: Pulmonary effort is normal. No respiratory distress.      Breath sounds: Normal breath sounds. No wheezing or rhonchi.   Abdominal:      General: Bowel sounds are normal. There is no distension.      Palpations: Abdomen is soft.      Tenderness: There is no abdominal tenderness. There is no guarding.      Comments: Incision noted; clean with some fullness present    Musculoskeletal:         General: No swelling or deformity. Normal range of motion.       Cervical back: Normal range of motion and neck supple. No tenderness.      Right lower leg: No edema.      Left lower leg: No edema.   Skin:     General: Skin is warm and dry.      Coloration: Skin is not jaundiced.      Findings: No erythema or rash.   Neurological:      General: No focal deficit present.      Mental Status: She is alert and oriented to person, place, and time.      Motor: No weakness.   Psychiatric:         Mood and Affect: Mood normal.         Behavior: Behavior normal.         Assessment/Plan:   63 yr old  female with ESRD secondary to solitary kidney/DM s/p a kidney transplant on 24. Crossmatch negative. 24% PRA    1) s/p  donor kidney transplant:   - improving graft function with creatinine decreased to 1.9. Cont to monitor   - CT abdomen and pelvis with 4.5 cm SQ fluid collection. US kidney transplant with 3.0 complex collection along the inferior pole of the kidney   - high FK. Decrease FK to 4 mg BID from 5 mg BID   - cont on MMF 1 gram BID   - cont on prednisone taper   - cont on Valcyte MWF   - will need PJP prophylaxis at the time of dscharge  2) Anemia:   - initially felt to be secondary to acute blood loss but both CT and US kidney transplant without strong evidence for it   - haptoglobin, LDH consistent with hemolysis   - heme consulted but no signs of schistocytes on peripheral smear noted. Absolute retic count low and concern for hypo proliferation.   - PRBC transfusion on ,  and    - monitor without PRBC in the next 24 hrs   - cont on epogen  3) HTN:   - cont on minoxidil 5 mg daily   - decrease Coreg to 6.25 mg BID   - cont on Procardia 60 mg BID  4) Acute sialadenitis:   - on Augmentin and to complete 10 day course   - to follow up with ENT as outpatient  5) type II DM:   - appreciate endo's recommendation  6) Secondary hyperparathyroidism   - cont on Sensipar 60 mg BID  7) Hyperphosphatemia:   - can stop kphos    Dispo: not suitable for  discharge. IF H/H remains stable tomorrow, will consider d/c    Johnna Whelan DO  Nephrology  [facility]

## 2024-08-03 NOTE — PLAN OF CARE
AAOx4, afebrile, c/o mild pain in rt kidney incision. Telemetry monitor in place (NSR). BG monitored achs and tx per orders. Endocrine is following. CBC monitored closely daily. Haptoglobin and Lactate dehydrogenase ordered for the am. Heme/Onc following. Pt feels constipated. PRN suppository given. Pt had small bm. Active bowel sounds noted. Encouraging pt to use the hat to measure the urine. RLQ incision with staples, leaking ss drainage. Betadine applied to staples. Area is bruised-team is aware. Kidney US done 8/1. LUQ incision from old PD cath site with staples-painted with betadine. Heat packs provided for bilateral submandibular area per ENT's recommendations. PO abx continued for salivary gland infection. Wound care ordered for scab noted on back. Pt's bp was elevated this am. Pt's bp was not lower standing. PRN clonidine given. Pt is on Home CPAP while sleeping. Pt is able to position self independently. Pt is in lowest position, side rails up x2, non-skid foot wear in place, call light within reach, pt verbalized understanding to call RN when needed. Hand hygiene practiced per protocol. Will continue to monitor.

## 2024-08-03 NOTE — ASSESSMENT & PLAN NOTE
Endocrinology consulted for BG management.   BG goal 140-180    - novolog 4 units TIDWM given prandial excursions  - Novolog (Insulin Aspart) prn for BG excursions [facility] SSI (150/25)   - BG checks AC/HS  - Hypoglycemia protocol in place    ** Please notify Endocrine for any change and/or advance in diet**  ** Please call Endocrine for any BG related issues **    Discharge Planning:   TBD. Please notify endocrinology prior to discharge.

## 2024-08-03 NOTE — PLAN OF CARE
Patient is AAOx4. OOB independently. VSS. Tele SB/NSR. PT worked with her, tolerated well, PT signed off on her. H/H 7.8/23.6 this shift. Incision RLQ, staples intact, Ecchymosis noted to site, ULISES. Two puncture sites to L quadrant,s, staples intact, sites CDI. Skin tears noted to Left arm, site CDI. Large ecchymosis noted to left bicep from previous midline removal. Bed lowest setting, locked, 2x rails up, call light within reach, pt verbalized use.

## 2024-08-03 NOTE — SUBJECTIVE & OBJECTIVE
"Interval HPI:   Overnight events: No acute events overnight. Patient in room 86217/66303 A. Blood glucose stable. BG at and above goal on current insulin regimen (SSI ). Steroid use- Prednisone 20 mg.    Renal function-   Lab Results   Component Value Date    CREATININE 1.9 (H) 2024          Vasopressors-  None         Diet diabetic  Calorie      Eatin%  Nausea: No  Hypoglycemia and intervention: No  Fever: No  TPN and/or TF: No    /62   Pulse (!) 59   Temp 97.9 °F (36.6 °C)   Resp 19   Ht 5' 5" (1.651 m)   Wt 86.7 kg (191 lb 2.2 oz)   SpO2 99%   Breastfeeding No   BMI 31.81 kg/m²     Labs Reviewed and Include    Recent Labs   Lab 24  0607   *   CALCIUM 8.0*   ALBUMIN 2.4*      K 3.6   CO2 24      BUN 35*   CREATININE 1.9*     Lab Results   Component Value Date    WBC 9.98 2024    HGB 7.8 (L) 2024    HCT 23.6 (L) 2024    MCV 89 2024     2024     No results for input(s): "TSH", "FREET4" in the last 168 hours.  Lab Results   Component Value Date    HGBA1C 4.8 2024    HGBA1C 4.8 2024       Nutritional status:   Body mass index is 31.81 kg/m².  Lab Results   Component Value Date    ALBUMIN 2.4 (L) 2024    ALBUMIN 2.5 (L) 2024    ALBUMIN 2.4 (L) 2024     No results found for: "PREALBUMIN"    Estimated Creatinine Clearance: 32.1 mL/min (A) (based on SCr of 1.9 mg/dL (H)).    Accu-Checks  Recent Labs     24  2150 24  0844 24  1303 24  1739 24  2156 24  0848 24  1302 24  1731 24  2145 24  0810   POCTGLUCOSE 196* 153* 188* 225* 263* 125* 179* 290* 263* 126*       Current Medications and/or Treatments Impacting Glycemic Control  Immunotherapy:    Immunosuppressants           Stop Route Frequency     tacrolimus capsule 5 mg         -- Oral 2 times daily     mycophenolate capsule 1,000 mg         -- Oral 2 times daily          Steroids:   Hormones " (From admission, onward)      Start     Stop Route Frequency Ordered    07/30/24 0900  predniSONE tablet 20 mg         -- Oral Daily 07/29/24 1425    07/29/24 1436  melatonin tablet 6 mg         -- Oral Nightly PRN 07/29/24 1425          Pressors:    Autonomic Drugs (From admission, onward)      None          Hyperglycemia/Diabetes Medications:   Antihyperglycemics (From admission, onward)      Start     Stop Route Frequency Ordered    08/02/24 1245  insulin aspart U-100 pen 4 Units         -- SubQ 3 times daily with meals 08/02/24 1234    07/29/24 1526  insulin aspart U-100 pen 0-5 Units         -- SubQ Before meals & nightly PRN 07/29/24 1426

## 2024-08-04 ENCOUNTER — PATIENT MESSAGE (OUTPATIENT)
Dept: ENDOCRINOLOGY | Facility: HOSPITAL | Age: 64
End: 2024-08-04
Payer: MEDICARE

## 2024-08-04 VITALS
WEIGHT: 191.13 LBS | TEMPERATURE: 98 F | HEART RATE: 60 BPM | BODY MASS INDEX: 31.85 KG/M2 | OXYGEN SATURATION: 97 % | DIASTOLIC BLOOD PRESSURE: 65 MMHG | SYSTOLIC BLOOD PRESSURE: 143 MMHG | HEIGHT: 65 IN | RESPIRATION RATE: 19 BRPM

## 2024-08-04 LAB
ALBUMIN SERPL BCP-MCNC: 2.5 G/DL (ref 3.5–5.2)
ANION GAP SERPL CALC-SCNC: 9 MMOL/L (ref 8–16)
BASOPHILS # BLD AUTO: 0.02 K/UL (ref 0–0.2)
BASOPHILS NFR BLD: 0.2 % (ref 0–1.9)
BUN SERPL-MCNC: 29 MG/DL (ref 8–23)
CALCIUM SERPL-MCNC: 8.2 MG/DL (ref 8.7–10.5)
CHLORIDE SERPL-SCNC: 110 MMOL/L (ref 95–110)
CO2 SERPL-SCNC: 22 MMOL/L (ref 23–29)
CREAT SERPL-MCNC: 1.8 MG/DL (ref 0.5–1.4)
DIFFERENTIAL METHOD BLD: ABNORMAL
EOSINOPHIL # BLD AUTO: 0.1 K/UL (ref 0–0.5)
EOSINOPHIL NFR BLD: 1.2 % (ref 0–8)
ERYTHROCYTE [DISTWIDTH] IN BLOOD BY AUTOMATED COUNT: 15.6 % (ref 11.5–14.5)
EST. GFR  (NO RACE VARIABLE): 30.9 ML/MIN/1.73 M^2
GLUCOSE SERPL-MCNC: 132 MG/DL (ref 70–110)
HCT VFR BLD AUTO: 22.9 % (ref 37–48.5)
HGB BLD-MCNC: 7.6 G/DL (ref 12–16)
IMM GRANULOCYTES # BLD AUTO: 0.21 K/UL (ref 0–0.04)
IMM GRANULOCYTES NFR BLD AUTO: 2 % (ref 0–0.5)
LDH SERPL L TO P-CCNC: 294 U/L (ref 110–260)
LYMPHOCYTES # BLD AUTO: 0.4 K/UL (ref 1–4.8)
LYMPHOCYTES NFR BLD: 4.2 % (ref 18–48)
MAGNESIUM SERPL-MCNC: 1.7 MG/DL (ref 1.6–2.6)
MCH RBC QN AUTO: 30.5 PG (ref 27–31)
MCHC RBC AUTO-ENTMCNC: 33.2 G/DL (ref 32–36)
MCV RBC AUTO: 92 FL (ref 82–98)
MONOCYTES # BLD AUTO: 0.4 K/UL (ref 0.3–1)
MONOCYTES NFR BLD: 3.5 % (ref 4–15)
NEUTROPHILS # BLD AUTO: 9.1 K/UL (ref 1.8–7.7)
NEUTROPHILS NFR BLD: 88.9 % (ref 38–73)
NRBC BLD-RTO: 0 /100 WBC
PHOSPHATE SERPL-MCNC: 3.5 MG/DL (ref 2.7–4.5)
PLATELET # BLD AUTO: 260 K/UL (ref 150–450)
PMV BLD AUTO: 11.6 FL (ref 9.2–12.9)
POCT GLUCOSE: 129 MG/DL (ref 70–110)
POTASSIUM SERPL-SCNC: 3.5 MMOL/L (ref 3.5–5.1)
RBC # BLD AUTO: 2.49 M/UL (ref 4–5.4)
SODIUM SERPL-SCNC: 141 MMOL/L (ref 136–145)
TACROLIMUS BLD-MCNC: 9.7 NG/ML (ref 5–15)
WBC # BLD AUTO: 10.25 K/UL (ref 3.9–12.7)

## 2024-08-04 PROCEDURE — 83735 ASSAY OF MAGNESIUM: CPT | Performed by: PHYSICIAN ASSISTANT

## 2024-08-04 PROCEDURE — 63600175 PHARM REV CODE 636 W HCPCS: Performed by: PHYSICIAN ASSISTANT

## 2024-08-04 PROCEDURE — 25000003 PHARM REV CODE 250: Performed by: PHYSICIAN ASSISTANT

## 2024-08-04 PROCEDURE — 99232 SBSQ HOSP IP/OBS MODERATE 35: CPT | Mod: ,,,

## 2024-08-04 PROCEDURE — 63600175 PHARM REV CODE 636 W HCPCS: Performed by: INTERNAL MEDICINE

## 2024-08-04 PROCEDURE — 94660 CPAP INITIATION&MGMT: CPT

## 2024-08-04 PROCEDURE — 25000003 PHARM REV CODE 250

## 2024-08-04 PROCEDURE — 83615 LACTATE (LD) (LDH) ENZYME: CPT | Performed by: PHYSICIAN ASSISTANT

## 2024-08-04 PROCEDURE — 80069 RENAL FUNCTION PANEL: CPT | Performed by: PHYSICIAN ASSISTANT

## 2024-08-04 PROCEDURE — 85025 COMPLETE CBC W/AUTO DIFF WBC: CPT | Performed by: PHYSICIAN ASSISTANT

## 2024-08-04 PROCEDURE — 25000003 PHARM REV CODE 250: Performed by: TRANSPLANT SURGERY

## 2024-08-04 PROCEDURE — 99900035 HC TECH TIME PER 15 MIN (STAT)

## 2024-08-04 PROCEDURE — 36415 COLL VENOUS BLD VENIPUNCTURE: CPT | Performed by: PHYSICIAN ASSISTANT

## 2024-08-04 PROCEDURE — 80197 ASSAY OF TACROLIMUS: CPT | Performed by: PHYSICIAN ASSISTANT

## 2024-08-04 RX ORDER — TACROLIMUS 1 MG/1
4 CAPSULE ORAL EVERY 12 HOURS
Qty: 240 CAPSULE | Refills: 11 | Status: ON HOLD | OUTPATIENT
Start: 2024-08-04 | End: 2024-08-13

## 2024-08-04 RX ORDER — CARVEDILOL 6.25 MG/1
6.25 TABLET ORAL 2 TIMES DAILY
Qty: 180 TABLET | Refills: 3 | Status: ON HOLD | OUTPATIENT
Start: 2024-08-04

## 2024-08-04 RX ORDER — NIFEDIPINE 60 MG/1
60 TABLET, EXTENDED RELEASE ORAL 2 TIMES DAILY
Qty: 60 TABLET | Refills: 11 | Status: ON HOLD | OUTPATIENT
Start: 2024-08-04

## 2024-08-04 RX ORDER — INSULIN ASPART 100 [IU]/ML
6 INJECTION, SOLUTION INTRAVENOUS; SUBCUTANEOUS
Qty: 6 ML | Refills: 0 | Status: ON HOLD | OUTPATIENT
Start: 2024-08-04 | End: 2025-08-04

## 2024-08-04 RX ORDER — MINOXIDIL 2.5 MG/1
5 TABLET ORAL DAILY
Qty: 60 TABLET | Refills: 11 | Status: ON HOLD | OUTPATIENT
Start: 2024-08-05 | End: 2025-08-05

## 2024-08-04 RX ADMIN — MINOXIDIL 5 MG: 2.5 TABLET ORAL at 09:08

## 2024-08-04 RX ADMIN — PANTOPRAZOLE SODIUM 40 MG: 40 TABLET, DELAYED RELEASE ORAL at 09:08

## 2024-08-04 RX ADMIN — AMOXICILLIN AND CLAVULANATE POTASSIUM 1 TABLET: 875; 125 TABLET, FILM COATED ORAL at 09:08

## 2024-08-04 RX ADMIN — CLONIDINE HYDROCHLORIDE 0.1 MG: 0.1 TABLET ORAL at 04:08

## 2024-08-04 RX ADMIN — INSULIN ASPART 6 UNITS: 100 INJECTION, SOLUTION INTRAVENOUS; SUBCUTANEOUS at 09:08

## 2024-08-04 RX ADMIN — EZETIMIBE 10 MG: 10 TABLET ORAL at 09:08

## 2024-08-04 RX ADMIN — MYCOPHENOLATE MOFETIL 1000 MG: 250 CAPSULE ORAL at 09:08

## 2024-08-04 RX ADMIN — CINACALCET 60 MG: 30 TABLET, FILM COATED ORAL at 09:08

## 2024-08-04 RX ADMIN — TACROLIMUS 4 MG: 1 CAPSULE ORAL at 08:08

## 2024-08-04 RX ADMIN — NIFEDIPINE 60 MG: 30 TABLET, FILM COATED, EXTENDED RELEASE ORAL at 09:08

## 2024-08-04 RX ADMIN — PREDNISONE 15 MG: 5 TABLET ORAL at 09:08

## 2024-08-04 RX ADMIN — DIBASIC SODIUM PHOSPHATE, MONOBASIC POTASSIUM PHOSPHATE AND MONOBASIC SODIUM PHOSPHATE 1 TABLET: 852; 155; 130 TABLET ORAL at 09:08

## 2024-08-04 RX ADMIN — CARVEDILOL 6.25 MG: 6.25 TABLET, FILM COATED ORAL at 09:08

## 2024-08-04 NOTE — PLAN OF CARE
Patient is alert and orientedX4. She was admitted on 7/29/024 for acute blood loss anaemia. S/p Kidney Transplant on 7/24/024 secondary to HTN. VSS. Afebrile. Spo2 maintained@RA. NSR on Tele. Accucheck AC HS. HS BG= 224, 2 U insulin Novolog given for the correction. Scheduled medicines given as per MAR. PO Xanax given before bed. Patient slept well. Recent H/H=7.8/23.6. Serum Cr= 1.9. RLQ incision CDI with staples intact.  Bed in low position, wheels locked, call light within patient's reach. Plan of care reviewed and discussed with the patient in the beginning of the shift, verbalized understanding of care.

## 2024-08-04 NOTE — PLAN OF CARE
Patient discharged to home. Discharge instructions verbally given and hard copy provided to patient and daughter. Prescription delivered to bedside. Removed 20 g IV with cath tip in place. Patient tolerated IV removal well with bleeding controlled. Patient remains free of falls with no acute pain or distress noted. Patient left floor via transport.

## 2024-08-04 NOTE — ASSESSMENT & PLAN NOTE
Endocrinology consulted for BG management.   BG goal 140-180    - novolog 6 units TIDWM given prandial excursions (20% increase)  - Novolog (Insulin Aspart) prn for BG excursions [facility] SSI (150/25)   - BG checks AC/HS  - Hypoglycemia protocol in place    ** Please notify Endocrine for any change and/or advance in diet**  ** Please call Endocrine for any BG related issues **    Discharge Planning:   Discharge Planning:   - Novolog 6 units TIDWM  - Novolog SSI for BG excursions:  Add correction scale if needed.  Blood sugar 150 to 200 add 2 units  Blood sugar 201 to 250 add 4 units  Blood sugar 251 to 300 add 6 units  Blood sugar 301 to 350 add 8 units  Blood sugar greater than 350 add 10 units  - Insurance approved diabetes testing supplies to check blood sugar 4 times a day (Before meals and at bedtime) and as needed.  - BD pen needles   - Send BG logs in 4 days  - Follow-up in discharge clinic in 2 weeks.          CONSTITUTIONAL: Well-developed; well-nourished; appears in moderate distress from pain.   SKIN: warm, dry  HEAD: Normocephalic; atraumatic.  EYES: EOMI, normal sclera and conjunctiva   ENT: No nasal discharge; airway clear.  NECK: Supple; non tender.  CARD: S1, S2 normal; no murmurs, gallops, or rubs. Regular rate and rhythm.   RESP: No wheezes, rales or rhonchi.  ABD: soft ntnd  MSK: right ttp CVA.   EXT: Normal ROM.  No clubbing, cyanosis or edema.   LYMPH: No acute cervical adenopathy.  NEURO: Alert, oriented, grossly unremarkable  PSYCH: Cooperative, appropriate.

## 2024-08-04 NOTE — SUBJECTIVE & OBJECTIVE
"Interval HPI:   Overnight events: No acute events overnight. Patient in room 37204/26834 A. Blood glucose stable. BG at and above goal on current insulin regimen (SSI ). Steroid use- Prednisone 15 mg.    Renal function-   Lab Results   Component Value Date    CREATININE 1.8 (H) 2024          Vasopressors-  None         Diet diabetic  Calorie      Eatin%  Nausea: No  Hypoglycemia and intervention: No  Fever: No  TPN and/or TF: No    BP (!) 176/74   Pulse 65   Temp 97.5 °F (36.4 °C)   Resp 19   Ht 5' 5" (1.651 m)   Wt 86.7 kg (191 lb 2.2 oz)   SpO2 97%   Breastfeeding No   BMI 31.81 kg/m²     Labs Reviewed and Include    Recent Labs   Lab 24  0616   *   CALCIUM 8.2*   ALBUMIN 2.5*      K 3.5   CO2 22*      BUN 29*   CREATININE 1.8*     Lab Results   Component Value Date    WBC 10.25 2024    HGB 7.6 (L) 2024    HCT 22.9 (L) 2024    MCV 92 2024     2024     No results for input(s): "TSH", "FREET4" in the last 168 hours.  Lab Results   Component Value Date    HGBA1C 4.8 2024    HGBA1C 4.8 2024       Nutritional status:   Body mass index is 31.81 kg/m².  Lab Results   Component Value Date    ALBUMIN 2.5 (L) 2024    ALBUMIN 2.4 (L) 2024    ALBUMIN 2.5 (L) 2024     No results found for: "PREALBUMIN"    Estimated Creatinine Clearance: 33.9 mL/min (A) (based on SCr of 1.8 mg/dL (H)).    Accu-Checks  Recent Labs     24  2156 24  0848 24  1302 24  1731 24  2145 24  0810 24  1231 24  1732 24  2054 24  0911   POCTGLUCOSE 263* 125* 179* 290* 263* 126* 185* 237* 224* 129*       Current Medications and/or Treatments Impacting Glycemic Control  Immunotherapy:    Immunosuppressants           Stop Route Frequency     tacrolimus capsule 4 mg         -- Oral 2 times daily     mycophenolate capsule 1,000 mg         -- Oral 2 times daily          Steroids: "   Hormones (From admission, onward)      Start     Stop Route Frequency Ordered    08/04/24 0900  predniSONE tablet 15 mg         -- Oral Daily 08/03/24 1004    07/29/24 1436  melatonin tablet 6 mg         -- Oral Nightly PRN 07/29/24 1425          Pressors:    Autonomic Drugs (From admission, onward)      None          Hyperglycemia/Diabetes Medications:   Antihyperglycemics (From admission, onward)      Start     Stop Route Frequency Ordered    08/04/24 0715  insulin aspart U-100 pen 6 Units         -- SubQ 3 times daily with meals 08/03/24 2223    08/03/24 1026  insulin aspart U-100 pen 0-10 Units         -- SubQ Before meals & nightly PRN 08/03/24 0904

## 2024-08-04 NOTE — PROGRESS NOTES
[facility]  Endocrinology  Progress Note    Admit Date: 7/29/2024     Reason for Consult: Management of T2DM, Hyperglycemia      Surgical Procedure and Date: s/p kidney transplant on 7/24/2024     Diabetes diagnosis year: 2004     Home Diabetes Medications:  Patient previously on tandem insulin pump and Mounjaro. Patient recently lost 60 lbs and had worsening kidney function. Patient states that she is off all diabetes medication for about 3-months and blood sugar has been well controlled. Patient is from [place] and does not have her pump with her to review pump settings. Per chart review, basal insulin was 58 units daily in the past. Anticipate the patient will need more aggressive insulin management post-op.     Most recent D/C recommend:    Novolog SSI:     150 - 200 + 2 unit    201 - 250 + 4 units    251 - 300 + 6 units    301 - 350 + 8 units       > 350   + 10 units       How often checking glucose at home? >4 x day (previously on Dexcom G6, but off since she stopped using tandem pump)   BG readings on regimen: states BG have been well controlled off the pump and the GLP1-RA/ GIP ('s)  Hypoglycemia on the regimen?  No  Missed doses on regimen?  No     Diabetes Complications include:     Hyperglycemia (history)     Complicating diabetes co morbidities:   Glucocorticoid use         HPI: Ms. Kenney is a 63 y.o. female with ESRD secondary to diabetic nephropathy, solitary kidney. PMH DM2, HTN, incontience (s/p bladder sling), CAD with stents (on ASA), atrial fibrillation, CVA (no residual deficits), ALEJA, anemia. She is now s/p DBD kidney transplant on 7/24/24. Surgery without complications. PD cath removed. Post op course notable for down trending Cr with excellent UOP. She now presents as a direct admit from her routine clinic appointment with transplant surgery. Exam concerning for subcutaneous hematoma. Labs notable for down trending H&H 7.6/23 --> 7/20. Direct admit for imaging  "and blood transfusion. D/w Dr Squires. On arrival, BP elevated to 204/88 otherwise vital signs normal. Pt c/o nausea, abdominal pain, and constipation. States incision developed bruising with worsening pain starting yesterday afternoon. Denies any changes in urinary output, diarrhea, vomiting, chest pain, sob, fever/chills. Endocrine conus;tal to manage hyperglycemia and type 2 diabetes.     Lab Results   Component Value Date    HGBA1C 4.8 2024    HGBA1C 4.8 2024         Interval HPI:   Overnight events: No acute events overnight. Patient in room 20317/75116 A. Blood glucose stable. BG at and above goal on current insulin regimen (SSI ). Steroid use- Prednisone 15 mg.    Renal function-   Lab Results   Component Value Date    CREATININE 1.8 (H) 2024          Vasopressors-  None         Diet diabetic  Calorie      Eatin%  Nausea: No  Hypoglycemia and intervention: No  Fever: No  TPN and/or TF: No    BP (!) 176/74   Pulse 65   Temp 97.5 °F (36.4 °C)   Resp 19   Ht 5' 5" (1.651 m)   Wt 86.7 kg (191 lb 2.2 oz)   SpO2 97%   Breastfeeding No   BMI 31.81 kg/m²     Labs Reviewed and Include    Recent Labs   Lab 24  0616   *   CALCIUM 8.2*   ALBUMIN 2.5*      K 3.5   CO2 22*      BUN 29*   CREATININE 1.8*     Lab Results   Component Value Date    WBC 10.25 2024    HGB 7.6 (L) 2024    HCT 22.9 (L) 2024    MCV 92 2024     2024     No results for input(s): "TSH", "FREET4" in the last 168 hours.  Lab Results   Component Value Date    HGBA1C 4.8 2024    HGBA1C 4.8 2024       Nutritional status:   Body mass index is 31.81 kg/m².  Lab Results   Component Value Date    ALBUMIN 2.5 (L) 2024    ALBUMIN 2.4 (L) 2024    ALBUMIN 2.5 (L) 2024     No results found for: "PREALBUMIN"    Estimated Creatinine Clearance: 33.9 mL/min (A) (based on SCr of 1.8 mg/dL (H)).    Accu-Checks  Recent Labs     24  9828 " 08/02/24  0848 08/02/24  1302 08/02/24  1731 08/02/24  2145 08/03/24  0810 08/03/24  1231 08/03/24  1732 08/03/24  2054 08/04/24  0911   POCTGLUCOSE 263* 125* 179* 290* 263* 126* 185* 237* 224* 129*       Current Medications and/or Treatments Impacting Glycemic Control  Immunotherapy:    Immunosuppressants           Stop Route Frequency     tacrolimus capsule 4 mg         -- Oral 2 times daily     mycophenolate capsule 1,000 mg         -- Oral 2 times daily          Steroids:   Hormones (From admission, onward)      Start     Stop Route Frequency Ordered    08/04/24 0900  predniSONE tablet 15 mg         -- Oral Daily 08/03/24 1004    07/29/24 1436  melatonin tablet 6 mg         -- Oral Nightly PRN 07/29/24 1425          Pressors:    Autonomic Drugs (From admission, onward)      None          Hyperglycemia/Diabetes Medications:   Antihyperglycemics (From admission, onward)      Start     Stop Route Frequency Ordered    08/04/24 0715  insulin aspart U-100 pen 6 Units         -- SubQ 3 times daily with meals 08/03/24 2223    08/03/24 1026  insulin aspart U-100 pen 0-10 Units         -- SubQ Before meals & nightly PRN 08/03/24 0926            ASSESSMENT and PLAN    Endocrine  Diabetes mellitus due to underlying condition with chronic kidney disease on chronic dialysis, with long-term current use of insulin  Endocrinology consulted for BG management.   BG goal 140-180    - novolog 6 units TIDWM given prandial excursions (20% increase)  - Novolog (Insulin Aspart) prn for BG excursions [facility] SSI (150/25)   - BG checks AC/HS  - Hypoglycemia protocol in place    ** Please notify Endocrine for any change and/or advance in diet**  ** Please call Endocrine for any BG related issues **    Discharge Planning:   Discharge Planning:   - Novolog 6 units TIDWM  - Novolog SSI for BG excursions:  Add correction scale if needed.  Blood sugar 150 to 200 add 2 units  Blood sugar 201 to 250 add 4 units  Blood sugar 251 to 300 add 6  units  Blood sugar 301 to 350 add 8 units  Blood sugar greater than 350 add 10 units  - Insurance approved diabetes testing supplies to check blood sugar 4 times a day (Before meals and at bedtime) and as needed.  - BD pen needles   - Send BG logs in 4 days  - Follow-up in discharge clinic in 2 weeks.               Yoana Shah PA-C  Endocrinology  [facility]

## 2024-08-04 NOTE — PROGRESS NOTES
Discharge Medication Note:    Hospital Course:  62 y/o F s/p Kidney Transplant on 7/24/24 admitted for acute on chronic anemia, abdominal pain and increased swelling at incision.  CT A/P - notable for large stool burden and small sub incisional collection.  Patient received blood products, having regular bowel movements without s/s of bleeding.  Heme c/s with no signs of schistocytes on peripheral smear and trended LDH/hapto/retics. Will do retacrit weekly outpatient.  Patient with BP management -- coreg decreased from 12.5 mg BID to 6.25 mg BID, added minoxidil 5 mg daily and nifedipine 60 mg BID.  Endocrine consulted for diabetes management - adjusted regimen to Novolog 6 units with meals + SSI.      Met with Joann Kenney at discharge to review discharge medications and to update the blue medication card.           Medication List        START taking these medications      minoxidiL 2.5 MG tablet  Commonly known as: LONITEN  Take 2 tablets (5 mg total) by mouth once daily.  Start taking on: August 5, 2024     NIFEdipine 60 MG (OSM) 24 hr tablet  Commonly known as: PROCARDIA-XL  Take 1 tablet (60 mg total) by mouth 2 (two) times a day.            CHANGE how you take these medications      carvediloL 6.25 MG tablet  Commonly known as: COREG  Take 1 tablet (6.25 mg total) by mouth 2 (two) times daily.  What changed: how much to take     insulin aspart U-100 100 unit/mL (3 mL) Inpn pen  Commonly known as: NovoLOG  Inject 6 Units into the skin 3 (three) times daily with meals. + SSI (TDD: 48)  What changed:   how much to take  additional instructions     tacrolimus 1 MG Cap  Commonly known as: PROGRAF  Take 4 capsules (4 mg total) by mouth every 12 (twelve) hours.  What changed: how much to take            CONTINUE taking these medications      ACCU-CHEK GUIDE GLUCOSE METER Misc  Generic drug: blood-glucose meter  use as directed to check blood glucose     ACCU-CHEK GUIDE TEST STRIPS Strp  Generic drug: blood sugar  "diagnostic  use 1 strip to check blood glucose 3 (three) times daily.     ACCU-CHEK SOFTCLIX LANCETS Misc  Generic drug: lancets  1 lancet each to check blood glucose 3 (three) times daily.     ALPRAZolam 2 MG Tab  Commonly known as: XANAX     atorvastatin 20 MG tablet  Commonly known as: LIPITOR     BD ULTRA-FINE KIRSTIN PEN NEEDLE 32 gauge x 5/32" Ndle  Generic drug: pen needle, diabetic  1 each for use with insulin pen 3 (three) times daily.     cetirizine 5 MG tablet  Commonly known as: ZYRTEC  Take 1 tablet (5 mg total) by mouth once daily.     cinacalcet 60 MG Tab  Commonly known as: SENSIPAR  Take 1 tablet (60 mg total) by mouth daily with breakfast.     DULoxetine 30 MG capsule  Commonly known as: CYMBALTA     ezetimibe 10 mg tablet  Commonly known as: ZETIA  Take 1 tablet (10 mg total) by mouth once daily.     multivitamin Tab     mycophenolate 250 mg Cap  Commonly known as: CELLCEPT  Take 4 capsules (1,000 mg total) by mouth 2 (two) times daily.     ondansetron 4 MG Tbdl  Commonly known as: ZOFRAN-ODT  Dissolve 1 tablet (4 mg total) by mouth every 8 (eight) hours as needed.     oxybutynin 5 MG Tab  Commonly known as: DITROPAN  Take 1 tablet (5 mg total) by mouth 3 (three) times daily as needed (bladder spasms).     oxyCODONE 5 MG immediate release tablet  Commonly known as: ROXICODONE  Take 1 tablet (5 mg total) by mouth every 6 (six) hours as needed for Pain.     pantoprazole 40 MG tablet  Commonly known as: PROTONIX  Take 1 tablet (40 mg total) by mouth once daily.     predniSONE 5 MG tablet  Commonly known as: DELTASONE  Take by mouth daily; 7/27/2024-8/2/2024: 20 mg, 8/3/2024-8/9/2024: 15 mg; 8/10/2024-8/16/2024: 10 mg; 8/17/2024- forever: 5 mg; do not stop     sodium bicarbonate 650 MG tablet  Take 1 tablet (650 mg total) by mouth 2 (two) times daily.     sulfamethoxazole-trimethoprim 400-80mg 400-80 mg per tablet  Commonly known as: BACTRIM,SEPTRA  Take 1 tablet by mouth every morning. Stop 1/21/25   "   valGANciclovir 450 mg Tab  Commonly known as: VALCYTE  Take 1 tablet (450 mg total) by mouth 3 (three) times a week. Stop 10/23/24            STOP taking these medications      amoxicillin-clavulanate 875-125mg 875-125 mg per tablet  Commonly known as: AUGMENTIN     k phos di & mono-sod phos mono 250 mg Tab  Commonly known as: K-PHOS-NEUTRAL               Where to Get Your Medications        These medications were sent to [facility]  [address] [place] [id number]      Hours: Always Open Phone: [phone]   carvediloL 6.25 MG tablet  insulin aspart U-100 100 unit/mL (3 mL) Inpn pen  minoxidiL 2.5 MG tablet  NIFEdipine 60 MG (OSM) 24 hr tablet  tacrolimus 1 MG Cap            The following medications have been placed on HOLD and should be restarted in the outpatient setting (when appropriate): N/A    Joann Kenney verbalized understanding and had the opportunity to ask questions.

## 2024-08-05 ENCOUNTER — LAB VISIT (OUTPATIENT)
Dept: LAB | Facility: HOSPITAL | Age: 64
End: 2024-08-05
Attending: INTERNAL MEDICINE
Payer: MEDICARE

## 2024-08-05 ENCOUNTER — TELEPHONE (OUTPATIENT)
Dept: TRANSPLANT | Facility: CLINIC | Age: 64
End: 2024-08-05

## 2024-08-05 DIAGNOSIS — Z94.0 S/P KIDNEY TRANSPLANT: ICD-10-CM

## 2024-08-05 DIAGNOSIS — Z94.0 KIDNEY REPLACED BY TRANSPLANT: ICD-10-CM

## 2024-08-05 LAB
ALBUMIN SERPL BCP-MCNC: 2.9 G/DL (ref 3.5–5.2)
ANION GAP SERPL CALC-SCNC: 8 MMOL/L (ref 8–16)
BASOPHILS # BLD AUTO: 0.07 K/UL (ref 0–0.2)
BASOPHILS NFR BLD: 0.6 % (ref 0–1.9)
BUN SERPL-MCNC: 25 MG/DL (ref 8–23)
CALCIUM SERPL-MCNC: 8.6 MG/DL (ref 8.7–10.5)
CHLORIDE SERPL-SCNC: 109 MMOL/L (ref 95–110)
CO2 SERPL-SCNC: 25 MMOL/L (ref 23–29)
CREAT SERPL-MCNC: 1.7 MG/DL (ref 0.5–1.4)
DIFFERENTIAL METHOD BLD: ABNORMAL
EOSINOPHIL # BLD AUTO: 0.1 K/UL (ref 0–0.5)
EOSINOPHIL NFR BLD: 1.3 % (ref 0–8)
ERYTHROCYTE [DISTWIDTH] IN BLOOD BY AUTOMATED COUNT: 16.3 % (ref 11.5–14.5)
EST. GFR  (NO RACE VARIABLE): 33.1 ML/MIN/1.73 M^2
GLUCOSE SERPL-MCNC: 129 MG/DL (ref 70–110)
HCT VFR BLD AUTO: 27.8 % (ref 37–48.5)
HGB BLD-MCNC: 9 G/DL (ref 12–16)
IMM GRANULOCYTES # BLD AUTO: 0.2 K/UL (ref 0–0.04)
IMM GRANULOCYTES NFR BLD AUTO: 1.8 % (ref 0–0.5)
LYMPHOCYTES # BLD AUTO: 0.4 K/UL (ref 1–4.8)
LYMPHOCYTES NFR BLD: 3.7 % (ref 18–48)
MAGNESIUM SERPL-MCNC: 1.6 MG/DL (ref 1.6–2.6)
MCH RBC QN AUTO: 30.5 PG (ref 27–31)
MCHC RBC AUTO-ENTMCNC: 32.4 G/DL (ref 32–36)
MCV RBC AUTO: 94 FL (ref 82–98)
MONOCYTES # BLD AUTO: 0.4 K/UL (ref 0.3–1)
MONOCYTES NFR BLD: 3.9 % (ref 4–15)
NEUTROPHILS # BLD AUTO: 9.9 K/UL (ref 1.8–7.7)
NEUTROPHILS NFR BLD: 88.7 % (ref 38–73)
NRBC BLD-RTO: 0 /100 WBC
PHOSPHATE SERPL-MCNC: 3 MG/DL (ref 2.7–4.5)
PLATELET # BLD AUTO: 328 K/UL (ref 150–450)
PMV BLD AUTO: 11.4 FL (ref 9.2–12.9)
POTASSIUM SERPL-SCNC: 3.5 MMOL/L (ref 3.5–5.1)
RBC # BLD AUTO: 2.95 M/UL (ref 4–5.4)
SODIUM SERPL-SCNC: 142 MMOL/L (ref 136–145)
TACROLIMUS BLD-MCNC: 8.8 NG/ML (ref 5–15)
WBC # BLD AUTO: 11.2 K/UL (ref 3.9–12.7)

## 2024-08-05 PROCEDURE — 85025 COMPLETE CBC W/AUTO DIFF WBC: CPT | Performed by: INTERNAL MEDICINE

## 2024-08-05 PROCEDURE — 36415 COLL VENOUS BLD VENIPUNCTURE: CPT | Performed by: INTERNAL MEDICINE

## 2024-08-05 PROCEDURE — 83735 ASSAY OF MAGNESIUM: CPT | Performed by: INTERNAL MEDICINE

## 2024-08-05 PROCEDURE — 80197 ASSAY OF TACROLIMUS: CPT | Performed by: INTERNAL MEDICINE

## 2024-08-05 PROCEDURE — 80069 RENAL FUNCTION PANEL: CPT | Performed by: INTERNAL MEDICINE

## 2024-08-05 RX ORDER — ATORVASTATIN CALCIUM 20 MG/1
20 TABLET, FILM COATED ORAL NIGHTLY
Qty: 90 TABLET | Refills: 3 | Status: ON HOLD | OUTPATIENT
Start: 2024-08-05 | End: 2025-08-05

## 2024-08-05 RX ORDER — EZETIMIBE 10 MG/1
10 TABLET ORAL DAILY
Qty: 90 TABLET | Refills: 3 | Status: ON HOLD | OUTPATIENT
Start: 2024-08-05 | End: 2025-08-05

## 2024-08-06 LAB — COPPER SERPL-MCNC: 920 UG/L (ref 810–1990)

## 2024-08-07 ENCOUNTER — PATIENT OUTREACH (OUTPATIENT)
Dept: ADMINISTRATIVE | Facility: CLINIC | Age: 64
End: 2024-08-07
Payer: MEDICARE

## 2024-08-07 ENCOUNTER — CLINICAL SUPPORT (OUTPATIENT)
Dept: TRANSPLANT | Facility: CLINIC | Age: 64
End: 2024-08-07
Payer: MEDICARE

## 2024-08-07 ENCOUNTER — OFFICE VISIT (OUTPATIENT)
Dept: TRANSPLANT | Facility: CLINIC | Age: 64
End: 2024-08-07
Payer: MEDICARE

## 2024-08-07 VITALS
TEMPERATURE: 97 F | HEIGHT: 65 IN | TEMPERATURE: 97 F | HEIGHT: 65 IN | WEIGHT: 194.44 LBS | OXYGEN SATURATION: 98 % | BODY MASS INDEX: 32.4 KG/M2 | SYSTOLIC BLOOD PRESSURE: 147 MMHG | DIASTOLIC BLOOD PRESSURE: 65 MMHG | RESPIRATION RATE: 16 BRPM | SYSTOLIC BLOOD PRESSURE: 147 MMHG | HEART RATE: 87 BPM | HEART RATE: 87 BPM | OXYGEN SATURATION: 98 % | BODY MASS INDEX: 32.4 KG/M2 | RESPIRATION RATE: 16 BRPM | WEIGHT: 194.44 LBS | DIASTOLIC BLOOD PRESSURE: 65 MMHG

## 2024-08-07 DIAGNOSIS — Z94.9 HYPERTENSION ASSOCIATED WITH TRANSPLANTATION: ICD-10-CM

## 2024-08-07 DIAGNOSIS — Z99.2 DIABETES MELLITUS DUE TO UNDERLYING CONDITION WITH CHRONIC KIDNEY DISEASE ON CHRONIC DIALYSIS, WITH LONG-TERM CURRENT USE OF INSULIN: ICD-10-CM

## 2024-08-07 DIAGNOSIS — Z79.60 LONG-TERM USE OF IMMUNOSUPPRESSANT MEDICATION: ICD-10-CM

## 2024-08-07 DIAGNOSIS — Z79.4 DIABETES MELLITUS DUE TO UNDERLYING CONDITION WITH CHRONIC KIDNEY DISEASE ON CHRONIC DIALYSIS, WITH LONG-TERM CURRENT USE OF INSULIN: ICD-10-CM

## 2024-08-07 DIAGNOSIS — N18.6 DIABETES MELLITUS DUE TO UNDERLYING CONDITION WITH CHRONIC KIDNEY DISEASE ON CHRONIC DIALYSIS, WITH LONG-TERM CURRENT USE OF INSULIN: ICD-10-CM

## 2024-08-07 DIAGNOSIS — N18.6 ANEMIA IN ESRD (END-STAGE RENAL DISEASE): Primary | ICD-10-CM

## 2024-08-07 DIAGNOSIS — E08.22 DIABETES MELLITUS DUE TO UNDERLYING CONDITION WITH CHRONIC KIDNEY DISEASE ON CHRONIC DIALYSIS, WITH LONG-TERM CURRENT USE OF INSULIN: ICD-10-CM

## 2024-08-07 DIAGNOSIS — Z94.0 S/P KIDNEY TRANSPLANT: Primary | ICD-10-CM

## 2024-08-07 DIAGNOSIS — D63.1 ANEMIA IN ESRD (END-STAGE RENAL DISEASE): Primary | ICD-10-CM

## 2024-08-07 DIAGNOSIS — I15.8 HYPERTENSION ASSOCIATED WITH TRANSPLANTATION: ICD-10-CM

## 2024-08-07 PROCEDURE — 1125F AMNT PAIN NOTED PAIN PRSNT: CPT | Mod: CPTII,S$GLB,, | Performed by: NURSE PRACTITIONER

## 2024-08-07 PROCEDURE — 99999 PR PBB SHADOW E&M-EST. PATIENT-LVL III: CPT | Mod: PBBFAC,,, | Performed by: NURSE PRACTITIONER

## 2024-08-07 PROCEDURE — 1111F DSCHRG MED/CURRENT MED MERGE: CPT | Mod: CPTII,S$GLB,, | Performed by: NURSE PRACTITIONER

## 2024-08-07 PROCEDURE — 3008F BODY MASS INDEX DOCD: CPT | Mod: CPTII,S$GLB,, | Performed by: NURSE PRACTITIONER

## 2024-08-07 PROCEDURE — 1101F PT FALLS ASSESS-DOCD LE1/YR: CPT | Mod: CPTII,S$GLB,, | Performed by: NURSE PRACTITIONER

## 2024-08-07 PROCEDURE — 3066F NEPHROPATHY DOC TX: CPT | Mod: CPTII,S$GLB,, | Performed by: NURSE PRACTITIONER

## 2024-08-07 PROCEDURE — 3078F DIAST BP <80 MM HG: CPT | Mod: CPTII,S$GLB,, | Performed by: NURSE PRACTITIONER

## 2024-08-07 PROCEDURE — 99999 PR PBB SHADOW E&M-EST. PATIENT-LVL III: CPT | Mod: PBBFAC,,,

## 2024-08-07 PROCEDURE — 3044F HG A1C LEVEL LT 7.0%: CPT | Mod: CPTII,S$GLB,, | Performed by: NURSE PRACTITIONER

## 2024-08-07 PROCEDURE — 4010F ACE/ARB THERAPY RXD/TAKEN: CPT | Mod: CPTII,S$GLB,, | Performed by: NURSE PRACTITIONER

## 2024-08-07 PROCEDURE — 3288F FALL RISK ASSESSMENT DOCD: CPT | Mod: CPTII,S$GLB,, | Performed by: NURSE PRACTITIONER

## 2024-08-07 PROCEDURE — 99215 OFFICE O/P EST HI 40 MIN: CPT | Mod: S$GLB,,, | Performed by: NURSE PRACTITIONER

## 2024-08-07 PROCEDURE — 3077F SYST BP >= 140 MM HG: CPT | Mod: CPTII,S$GLB,, | Performed by: NURSE PRACTITIONER

## 2024-08-08 ENCOUNTER — NURSE TRIAGE (OUTPATIENT)
Dept: ADMINISTRATIVE | Facility: CLINIC | Age: 64
End: 2024-08-08
Payer: MEDICARE

## 2024-08-08 PROBLEM — K59.00 CONSTIPATION: Status: ACTIVE | Noted: 2024-08-08

## 2024-08-08 PROBLEM — T14.8XXA WOUND DRAINAGE: Status: ACTIVE | Noted: 2024-08-08

## 2024-08-08 PROBLEM — R58 BLEEDING: Status: ACTIVE | Noted: 2024-08-08

## 2024-08-08 PROCEDURE — 99285 EMERGENCY DEPT VISIT HI MDM: CPT | Mod: 27

## 2024-08-09 ENCOUNTER — HOSPITAL ENCOUNTER (INPATIENT)
Facility: HOSPITAL | Age: 64
LOS: 4 days | Discharge: HOME HEALTH | DRG: 699 | End: 2024-08-13
Attending: EMERGENCY MEDICINE | Admitting: INTERNAL MEDICINE
Payer: MEDICARE

## 2024-08-09 DIAGNOSIS — T14.8XXA WOUND DRAINAGE: ICD-10-CM

## 2024-08-09 DIAGNOSIS — R58 BLEEDING: ICD-10-CM

## 2024-08-09 DIAGNOSIS — E83.42 HYPOMAGNESEMIA: ICD-10-CM

## 2024-08-09 DIAGNOSIS — E87.8 DISORDER OF ELECTROLYTES: ICD-10-CM

## 2024-08-09 DIAGNOSIS — K59.00 CONSTIPATION, UNSPECIFIED CONSTIPATION TYPE: ICD-10-CM

## 2024-08-09 DIAGNOSIS — Z94.0 S/P KIDNEY TRANSPLANT: Primary | ICD-10-CM

## 2024-08-09 DIAGNOSIS — Z79.60 LONG-TERM USE OF IMMUNOSUPPRESSANT MEDICATION: ICD-10-CM

## 2024-08-09 DIAGNOSIS — I15.0 RENOVASCULAR HYPERTENSION: ICD-10-CM

## 2024-08-09 DIAGNOSIS — Z29.89 PROPHYLACTIC IMMUNOTHERAPY: ICD-10-CM

## 2024-08-09 PROBLEM — R29.818 TRANSIENT NEUROLOGICAL SYMPTOMS: Status: RESOLVED | Noted: 2018-11-13 | Resolved: 2024-08-09

## 2024-08-09 LAB
ALBUMIN SERPL BCP-MCNC: 3.1 G/DL (ref 3.5–5.2)
ALBUMIN SERPL BCP-MCNC: 3.1 G/DL (ref 3.5–5.2)
ALP SERPL-CCNC: 75 U/L (ref 55–135)
ALT SERPL W/O P-5'-P-CCNC: 19 U/L (ref 10–44)
ANION GAP SERPL CALC-SCNC: 10 MMOL/L (ref 8–16)
ANION GAP SERPL CALC-SCNC: 9 MMOL/L (ref 8–16)
APPEARANCE FLD: NORMAL
AST SERPL-CCNC: 15 U/L (ref 10–40)
BASOPHILS # BLD AUTO: 0.06 K/UL (ref 0–0.2)
BASOPHILS NFR BLD: 0.7 % (ref 0–1.9)
BILIRUB SERPL-MCNC: 0.5 MG/DL (ref 0.1–1)
BODY FLD TYPE: NORMAL
BUN SERPL-MCNC: 17 MG/DL (ref 8–23)
BUN SERPL-MCNC: 18 MG/DL (ref 8–23)
CALCIUM SERPL-MCNC: 8.9 MG/DL (ref 8.7–10.5)
CALCIUM SERPL-MCNC: 9 MG/DL (ref 8.7–10.5)
CHLORIDE SERPL-SCNC: 110 MMOL/L (ref 95–110)
CHLORIDE SERPL-SCNC: 111 MMOL/L (ref 95–110)
CO2 SERPL-SCNC: 21 MMOL/L (ref 23–29)
CO2 SERPL-SCNC: 22 MMOL/L (ref 23–29)
COLOR FLD: YELLOW
CREAT SERPL-MCNC: 1.8 MG/DL (ref 0.5–1.4)
CREAT SERPL-MCNC: 1.8 MG/DL (ref 0.5–1.4)
DIFFERENTIAL METHOD BLD: ABNORMAL
EOSINOPHIL # BLD AUTO: 0.1 K/UL (ref 0–0.5)
EOSINOPHIL NFR BLD: 0.6 % (ref 0–8)
ERYTHROCYTE [DISTWIDTH] IN BLOOD BY AUTOMATED COUNT: 18 % (ref 11.5–14.5)
EST. GFR  (NO RACE VARIABLE): 30.9 ML/MIN/1.73 M^2
EST. GFR  (NO RACE VARIABLE): 30.9 ML/MIN/1.73 M^2
FERRITIN SERPL-MCNC: 1666 NG/ML (ref 20–300)
GLUCOSE SERPL-MCNC: 145 MG/DL (ref 70–110)
GLUCOSE SERPL-MCNC: 147 MG/DL (ref 70–110)
GRAM STN SPEC: NORMAL
GRAM STN SPEC: NORMAL
HCT VFR BLD AUTO: 28 % (ref 37–48.5)
HGB BLD-MCNC: 9.1 G/DL (ref 12–16)
IMM GRANULOCYTES # BLD AUTO: 0.04 K/UL (ref 0–0.04)
IMM GRANULOCYTES NFR BLD AUTO: 0.5 % (ref 0–0.5)
IRON SERPL-MCNC: 103 UG/DL (ref 30–160)
LYMPHOCYTES # BLD AUTO: 0.4 K/UL (ref 1–4.8)
LYMPHOCYTES NFR BLD: 4.2 % (ref 18–48)
LYMPHOCYTES NFR FLD MANUAL: 83 %
MAGNESIUM SERPL-MCNC: 1.3 MG/DL (ref 1.6–2.6)
MCH RBC QN AUTO: 30.1 PG (ref 27–31)
MCHC RBC AUTO-ENTMCNC: 32.5 G/DL (ref 32–36)
MCV RBC AUTO: 93 FL (ref 82–98)
MONOCYTES # BLD AUTO: 0.4 K/UL (ref 0.3–1)
MONOCYTES NFR BLD: 4.2 % (ref 4–15)
MONOS+MACROS NFR FLD MANUAL: 7 %
NEUTROPHILS # BLD AUTO: 8 K/UL (ref 1.8–7.7)
NEUTROPHILS NFR BLD: 89.8 % (ref 38–73)
NEUTROPHILS NFR FLD MANUAL: 10 %
NRBC BLD-RTO: 0 /100 WBC
OHS QRS DURATION: 96 MS
OHS QTC CALCULATION: 480 MS
PHOSPHATE SERPL-MCNC: 2 MG/DL (ref 2.7–4.5)
PHOSPHATE SERPL-MCNC: 2 MG/DL (ref 2.7–4.5)
PLATELET # BLD AUTO: 313 K/UL (ref 150–450)
PMV BLD AUTO: 10.6 FL (ref 9.2–12.9)
POCT GLUCOSE: 217 MG/DL (ref 70–110)
POTASSIUM SERPL-SCNC: 3.9 MMOL/L (ref 3.5–5.1)
POTASSIUM SERPL-SCNC: 4 MMOL/L (ref 3.5–5.1)
PROT SERPL-MCNC: 5.6 G/DL (ref 6–8.4)
RBC # BLD AUTO: 3.02 M/UL (ref 4–5.4)
SATURATED IRON: 42 % (ref 20–50)
SODIUM SERPL-SCNC: 141 MMOL/L (ref 136–145)
SODIUM SERPL-SCNC: 142 MMOL/L (ref 136–145)
TACROLIMUS BLD-MCNC: 12.8 NG/ML (ref 5–15)
TOTAL IRON BINDING CAPACITY: 247 UG/DL (ref 250–450)
TRANSFERRIN SERPL-MCNC: 167 MG/DL (ref 200–375)
WBC # BLD AUTO: 8.88 K/UL (ref 3.9–12.7)
WBC # FLD: 47 /CU MM

## 2024-08-09 PROCEDURE — 63600175 PHARM REV CODE 636 W HCPCS: Performed by: NURSE PRACTITIONER

## 2024-08-09 PROCEDURE — 87116 MYCOBACTERIA CULTURE: CPT | Performed by: NURSE PRACTITIONER

## 2024-08-09 PROCEDURE — 25000003 PHARM REV CODE 250: Performed by: NURSE PRACTITIONER

## 2024-08-09 PROCEDURE — 93005 ELECTROCARDIOGRAM TRACING: CPT

## 2024-08-09 PROCEDURE — 82728 ASSAY OF FERRITIN: CPT | Performed by: NURSE PRACTITIONER

## 2024-08-09 PROCEDURE — 83735 ASSAY OF MAGNESIUM: CPT | Performed by: NURSE PRACTITIONER

## 2024-08-09 PROCEDURE — 89051 BODY FLUID CELL COUNT: CPT | Performed by: NURSE PRACTITIONER

## 2024-08-09 PROCEDURE — 20600001 HC STEP DOWN PRIVATE ROOM

## 2024-08-09 PROCEDURE — 93010 ELECTROCARDIOGRAM REPORT: CPT | Mod: ,,, | Performed by: INTERNAL MEDICINE

## 2024-08-09 PROCEDURE — 83540 ASSAY OF IRON: CPT | Performed by: NURSE PRACTITIONER

## 2024-08-09 PROCEDURE — 85025 COMPLETE CBC W/AUTO DIFF WBC: CPT

## 2024-08-09 PROCEDURE — 80053 COMPREHEN METABOLIC PANEL: CPT | Performed by: STUDENT IN AN ORGANIZED HEALTH CARE EDUCATION/TRAINING PROGRAM

## 2024-08-09 PROCEDURE — 80197 ASSAY OF TACROLIMUS: CPT | Performed by: NURSE PRACTITIONER

## 2024-08-09 PROCEDURE — 82570 ASSAY OF URINE CREATININE: CPT | Performed by: NURSE PRACTITIONER

## 2024-08-09 PROCEDURE — 80069 RENAL FUNCTION PANEL: CPT | Performed by: NURSE PRACTITIONER

## 2024-08-09 PROCEDURE — 87205 SMEAR GRAM STAIN: CPT | Performed by: NURSE PRACTITIONER

## 2024-08-09 PROCEDURE — 27000207 HC ISOLATION

## 2024-08-09 PROCEDURE — 0W9G30Z DRAINAGE OF PERITONEAL CAVITY WITH DRAINAGE DEVICE, PERCUTANEOUS APPROACH: ICD-10-PCS | Performed by: RADIOLOGY

## 2024-08-09 PROCEDURE — 87206 SMEAR FLUORESCENT/ACID STAI: CPT | Performed by: NURSE PRACTITIONER

## 2024-08-09 PROCEDURE — 87102 FUNGUS ISOLATION CULTURE: CPT | Performed by: NURSE PRACTITIONER

## 2024-08-09 PROCEDURE — 63600175 PHARM REV CODE 636 W HCPCS: Performed by: RADIOLOGY

## 2024-08-09 PROCEDURE — 87075 CULTR BACTERIA EXCEPT BLOOD: CPT | Performed by: NURSE PRACTITIONER

## 2024-08-09 PROCEDURE — 87070 CULTURE OTHR SPECIMN AEROBIC: CPT | Performed by: NURSE PRACTITIONER

## 2024-08-09 RX ORDER — MAGNESIUM SULFATE HEPTAHYDRATE 40 MG/ML
2 INJECTION, SOLUTION INTRAVENOUS ONCE
Status: COMPLETED | OUTPATIENT
Start: 2024-08-09 | End: 2024-08-09

## 2024-08-09 RX ORDER — PREDNISONE 10 MG/1
10 TABLET ORAL DAILY
Status: DISCONTINUED | OUTPATIENT
Start: 2024-08-10 | End: 2024-08-13 | Stop reason: HOSPADM

## 2024-08-09 RX ORDER — VALGANCICLOVIR 450 MG/1
450 TABLET, FILM COATED ORAL
Status: DISCONTINUED | OUTPATIENT
Start: 2024-08-09 | End: 2024-08-12

## 2024-08-09 RX ORDER — PREDNISONE 20 MG/1
20 TABLET ORAL DAILY
Status: DISCONTINUED | OUTPATIENT
Start: 2024-08-09 | End: 2024-08-09

## 2024-08-09 RX ORDER — DOCUSATE SODIUM 100 MG/1
100 CAPSULE, LIQUID FILLED ORAL 3 TIMES DAILY
Status: DISCONTINUED | OUTPATIENT
Start: 2024-08-09 | End: 2024-08-13 | Stop reason: HOSPADM

## 2024-08-09 RX ORDER — CARVEDILOL 6.25 MG/1
6.25 TABLET ORAL 2 TIMES DAILY
Status: DISCONTINUED | OUTPATIENT
Start: 2024-08-09 | End: 2024-08-13 | Stop reason: HOSPADM

## 2024-08-09 RX ORDER — MYCOPHENOLATE MOFETIL 250 MG/1
1000 CAPSULE ORAL 2 TIMES DAILY
Status: DISCONTINUED | OUTPATIENT
Start: 2024-08-09 | End: 2024-08-13 | Stop reason: HOSPADM

## 2024-08-09 RX ORDER — ONDANSETRON HYDROCHLORIDE 2 MG/ML
INJECTION, SOLUTION INTRAVENOUS
Status: COMPLETED | OUTPATIENT
Start: 2024-08-09 | End: 2024-08-09

## 2024-08-09 RX ORDER — ATORVASTATIN CALCIUM 20 MG/1
20 TABLET, FILM COATED ORAL NIGHTLY
Status: DISCONTINUED | OUTPATIENT
Start: 2024-08-09 | End: 2024-08-13 | Stop reason: HOSPADM

## 2024-08-09 RX ORDER — ONDANSETRON 8 MG/1
8 TABLET, ORALLY DISINTEGRATING ORAL EVERY 6 HOURS PRN
Status: DISCONTINUED | OUTPATIENT
Start: 2024-08-09 | End: 2024-08-12

## 2024-08-09 RX ORDER — TALC
6 POWDER (GRAM) TOPICAL NIGHTLY PRN
Status: DISCONTINUED | OUTPATIENT
Start: 2024-08-09 | End: 2024-08-13 | Stop reason: HOSPADM

## 2024-08-09 RX ORDER — CETIRIZINE HYDROCHLORIDE 5 MG/1
5 TABLET ORAL DAILY
Status: DISCONTINUED | OUTPATIENT
Start: 2024-08-09 | End: 2024-08-09

## 2024-08-09 RX ORDER — NIFEDIPINE 30 MG/1
60 TABLET, EXTENDED RELEASE ORAL 2 TIMES DAILY
Status: DISCONTINUED | OUTPATIENT
Start: 2024-08-09 | End: 2024-08-13 | Stop reason: HOSPADM

## 2024-08-09 RX ORDER — SULFAMETHOXAZOLE AND TRIMETHOPRIM 400; 80 MG/1; MG/1
1 TABLET ORAL EVERY MORNING
Status: DISCONTINUED | OUTPATIENT
Start: 2024-08-09 | End: 2024-08-13 | Stop reason: HOSPADM

## 2024-08-09 RX ORDER — OXYCODONE HYDROCHLORIDE 5 MG/1
5 TABLET ORAL EVERY 6 HOURS PRN
Status: DISCONTINUED | OUTPATIENT
Start: 2024-08-09 | End: 2024-08-13 | Stop reason: HOSPADM

## 2024-08-09 RX ORDER — LANOLIN ALCOHOL/MO/W.PET/CERES
400 CREAM (GRAM) TOPICAL 2 TIMES DAILY
Status: DISCONTINUED | OUTPATIENT
Start: 2024-08-09 | End: 2024-08-11

## 2024-08-09 RX ORDER — SODIUM CHLORIDE 0.9 % (FLUSH) 0.9 %
3 SYRINGE (ML) INJECTION
Status: DISCONTINUED | OUTPATIENT
Start: 2024-08-09 | End: 2024-08-13 | Stop reason: HOSPADM

## 2024-08-09 RX ORDER — SODIUM BICARBONATE 650 MG/1
1300 TABLET ORAL 2 TIMES DAILY
Status: DISCONTINUED | OUTPATIENT
Start: 2024-08-09 | End: 2024-08-13 | Stop reason: HOSPADM

## 2024-08-09 RX ORDER — EZETIMIBE 10 MG/1
10 TABLET ORAL DAILY
Status: DISCONTINUED | OUTPATIENT
Start: 2024-08-09 | End: 2024-08-13 | Stop reason: HOSPADM

## 2024-08-09 RX ORDER — POLYETHYLENE GLYCOL 3350 17 G/17G
17 POWDER, FOR SOLUTION ORAL 2 TIMES DAILY
Status: DISCONTINUED | OUTPATIENT
Start: 2024-08-09 | End: 2024-08-13 | Stop reason: HOSPADM

## 2024-08-09 RX ORDER — FENTANYL CITRATE 50 UG/ML
INJECTION, SOLUTION INTRAMUSCULAR; INTRAVENOUS
Status: COMPLETED | OUTPATIENT
Start: 2024-08-09 | End: 2024-08-09

## 2024-08-09 RX ORDER — TACROLIMUS 1 MG/1
4 CAPSULE ORAL 2 TIMES DAILY
Status: DISCONTINUED | OUTPATIENT
Start: 2024-08-09 | End: 2024-08-09

## 2024-08-09 RX ORDER — BISACODYL 5 MG
10 TABLET, DELAYED RELEASE (ENTERIC COATED) ORAL NIGHTLY
Status: DISCONTINUED | OUTPATIENT
Start: 2024-08-09 | End: 2024-08-13 | Stop reason: HOSPADM

## 2024-08-09 RX ORDER — SODIUM BICARBONATE 650 MG/1
650 TABLET ORAL 2 TIMES DAILY
Status: DISCONTINUED | OUTPATIENT
Start: 2024-08-09 | End: 2024-08-09

## 2024-08-09 RX ORDER — DULOXETIN HYDROCHLORIDE 30 MG/1
30 CAPSULE, DELAYED RELEASE ORAL DAILY
Status: DISCONTINUED | OUTPATIENT
Start: 2024-08-09 | End: 2024-08-12

## 2024-08-09 RX ORDER — MIDAZOLAM HYDROCHLORIDE 1 MG/ML
INJECTION, SOLUTION INTRAMUSCULAR; INTRAVENOUS
Status: COMPLETED | OUTPATIENT
Start: 2024-08-09 | End: 2024-08-09

## 2024-08-09 RX ORDER — MINOXIDIL 2.5 MG/1
5 TABLET ORAL DAILY
Status: DISCONTINUED | OUTPATIENT
Start: 2024-08-09 | End: 2024-08-13 | Stop reason: HOSPADM

## 2024-08-09 RX ORDER — TACROLIMUS 1 MG/1
3 CAPSULE ORAL 2 TIMES DAILY
Status: DISCONTINUED | OUTPATIENT
Start: 2024-08-10 | End: 2024-08-12

## 2024-08-09 RX ORDER — CINACALCET 30 MG/1
60 TABLET, FILM COATED ORAL
Status: DISCONTINUED | OUTPATIENT
Start: 2024-08-09 | End: 2024-08-13 | Stop reason: HOSPADM

## 2024-08-09 RX ORDER — SODIUM CHLORIDE 9 MG/ML
INJECTION, SOLUTION INTRAVENOUS CONTINUOUS
Status: DISCONTINUED | OUTPATIENT
Start: 2024-08-09 | End: 2024-08-11

## 2024-08-09 RX ORDER — ALPRAZOLAM 0.25 MG/1
2 TABLET ORAL NIGHTLY
Status: DISCONTINUED | OUTPATIENT
Start: 2024-08-09 | End: 2024-08-13 | Stop reason: HOSPADM

## 2024-08-09 RX ORDER — PANTOPRAZOLE SODIUM 40 MG/1
40 TABLET, DELAYED RELEASE ORAL DAILY
Status: DISCONTINUED | OUTPATIENT
Start: 2024-08-09 | End: 2024-08-13 | Stop reason: HOSPADM

## 2024-08-09 RX ADMIN — PANTOPRAZOLE SODIUM 40 MG: 40 TABLET, DELAYED RELEASE ORAL at 09:08

## 2024-08-09 RX ADMIN — MYCOPHENOLATE MOFETIL 1000 MG: 250 CAPSULE ORAL at 09:08

## 2024-08-09 RX ADMIN — ATORVASTATIN CALCIUM 20 MG: 20 TABLET, FILM COATED ORAL at 09:08

## 2024-08-09 RX ADMIN — MINOXIDIL 5 MG: 2.5 TABLET ORAL at 09:08

## 2024-08-09 RX ADMIN — ONDANSETRON 4 MG: 2 INJECTION INTRAMUSCULAR; INTRAVENOUS at 03:08

## 2024-08-09 RX ADMIN — THERA TABS 1 TABLET: TAB at 09:08

## 2024-08-09 RX ADMIN — TACROLIMUS 4 MG: 1 CAPSULE ORAL at 09:08

## 2024-08-09 RX ADMIN — DIBASIC SODIUM PHOSPHATE, MONOBASIC POTASSIUM PHOSPHATE AND MONOBASIC SODIUM PHOSPHATE 2 TABLET: 852; 155; 130 TABLET ORAL at 09:08

## 2024-08-09 RX ADMIN — CARVEDILOL 6.25 MG: 6.25 TABLET, FILM COATED ORAL at 09:08

## 2024-08-09 RX ADMIN — CINACALCET 60 MG: 60 TABLET, FILM COATED ORAL at 09:08

## 2024-08-09 RX ADMIN — MAGNESIUM SULFATE HEPTAHYDRATE 2 G: 40 INJECTION, SOLUTION INTRAVENOUS at 10:08

## 2024-08-09 RX ADMIN — MIDAZOLAM HYDROCHLORIDE 1 MG: 2 INJECTION, SOLUTION INTRAMUSCULAR; INTRAVENOUS at 03:08

## 2024-08-09 RX ADMIN — POLYETHYLENE GLYCOL 3350 17 G: 17 POWDER, FOR SOLUTION ORAL at 09:08

## 2024-08-09 RX ADMIN — ALPRAZOLAM 2 MG: 0.25 TABLET ORAL at 09:08

## 2024-08-09 RX ADMIN — NIFEDIPINE 60 MG: 30 TABLET, FILM COATED, EXTENDED RELEASE ORAL at 09:08

## 2024-08-09 RX ADMIN — BISACODYL 10 MG: 5 TABLET, COATED ORAL at 09:08

## 2024-08-09 RX ADMIN — DOCUSATE SODIUM 100 MG: 100 CAPSULE, LIQUID FILLED ORAL at 09:08

## 2024-08-09 RX ADMIN — PREDNISONE 20 MG: 20 TABLET ORAL at 09:08

## 2024-08-09 RX ADMIN — SODIUM CHLORIDE: 9 INJECTION, SOLUTION INTRAVENOUS at 10:08

## 2024-08-09 RX ADMIN — SULFAMETHOXAZOLE AND TRIMETHOPRIM 1 TABLET: 400; 80 TABLET ORAL at 09:08

## 2024-08-09 RX ADMIN — FENTANYL CITRATE 50 MCG: 50 INJECTION, SOLUTION INTRAMUSCULAR; INTRAVENOUS at 03:08

## 2024-08-09 RX ADMIN — VALGANCICLOVIR HYDROCHLORIDE 450 MG: 450 TABLET ORAL at 09:08

## 2024-08-09 RX ADMIN — EZETIMIBE 10 MG: 10 TABLET ORAL at 10:08

## 2024-08-09 RX ADMIN — Medication 400 MG: at 09:08

## 2024-08-09 RX ADMIN — SODIUM BICARBONATE 650 MG TABLET 650 MG: at 09:08

## 2024-08-09 RX ADMIN — SODIUM BICARBONATE 650 MG TABLET 1300 MG: at 09:08

## 2024-08-09 NOTE — PROGRESS NOTES
KTS ADMIT ATTEMPT      SW attempted to contact pt and caregiver in ED, to complete hospital admit. SW was unsuccessful and will complete Monday if needed.       SW remains available to pt and family.

## 2024-08-09 NOTE — H&P
Radiology History & Physical      SUBJECTIVE:     Chief Complaint: Bleeding from surgical incision     History of Present Illness:  Joann Kenney is a 63 y.o. female with PMHx including ESRD s/p right kidney transplant (DOS 7/24/24), a-fib, CAD, DM II, HLD, HTN, ALEJA who initially presented with bleeding from her transplant surgical incision.     CT imaging shows fluid collection around the right renal transplant and extending into the RLQ abdominal wall. This fluid collection has enlarged compared to previous CT on 7/29/24.     IR consult was placed to evaluate for aspiration of the fluid collection with drain placement.      Past Medical History:   Diagnosis Date    Anemia     Anxiety     Atrial fibrillation     Coronary artery disease     Depression     Diabetes mellitus, type 2     Disorder of kidney and ureter     Heart murmur     Hyperlipidemia     Hypertension     Obesity     ALEJA (obstructive sleep apnea)     Proteinuria     Solitary kidney     Stroke     Transient neurological symptoms 11/13/2018     Past Surgical History:   Procedure Laterality Date    CORONARY ANGIOPLASTY WITH STENT PLACEMENT      HYSTERECTOMY      INCONTINENCE SURGERY      INSERTION OF IMPLANTABLE LOOP RECORDER      internal heart monitor      KIDNEY TRANSPLANT Right 7/24/2024    Procedure: TRANSPLANT, KIDNEY;  Surgeon: Alphonso Mon MD;  Location: [facility] OR [address] [place] [address];  Service: Transplant;  Laterality: Right;    PERITONEAL CATHETER INSERTION      PERITONEAL CATHETER REMOVAL Left 7/24/2024    Procedure: REMOVAL, CATHETER, DIALYSIS, PERITONEAL;  Surgeon: Alphonso Mon MD;  Location: [facility] OR [address] [place] [address];  Service: Transplant;  Laterality: Left;       Home Meds:   Prior to Admission medications    Medication Sig Start Date End Date Taking? Authorizing Provider   ALPRAZolam (XANAX) 2 MG Tab Take 2 mg by mouth every evening.    Provider, Historical   atorvastatin (LIPITOR) 20 MG tablet Take 1 tablet (20 mg total) by mouth every evening. 8/5/24  8/5/25  Joce Landry MD   bisacodyL (DULCOLAX) 10 mg Supp Place 1 suppository (10 mg total) rectally once. for 1 dose 8/8/24 8/9/24  Anabel Comer MD   blood sugar diagnostic Strp use 1 strip to check blood glucose 3 (three) times daily. 7/27/24   Elena Wise MD   blood-glucose meter Misc use as directed to check blood glucose 7/27/24   Elena Wise MD   carvediloL (COREG) 6.25 MG tablet Take 1 tablet (6.25 mg total) by mouth 2 (two) times daily. 8/4/24   Roberth Broderick MD   cetirizine (ZYRTEC) 5 MG tablet Take 1 tablet (5 mg total) by mouth once daily. 7/28/24 7/28/25  Elena Wise MD   cinacalcet (SENSIPAR) 60 MG Tab Take 1 tablet (60 mg total) by mouth daily with breakfast. 7/28/24 7/28/25  Elena Wise MD   DULoxetine (CYMBALTA) 30 MG capsule Take 30 mg by mouth once daily.    Provider, Historical   ezetimibe (ZETIA) 10 mg tablet Take 1 tablet (10 mg total) by mouth once daily. 8/5/24 8/5/25  Joce Landry MD   furosemide (LASIX) 40 MG tablet Take 1 tablet (40 mg total) by mouth once daily. for 4 days 8/9/24 8/13/24  Anabel Comer MD   insulin aspart U-100 (NOVOLOG) 100 unit/mL (3 mL) InPn pen Inject 6 Units into the skin 3 (three) times daily with meals. + SSI (TDD: 48) 8/4/24 8/4/25  Roberth Broderick MD   lancets Misc 1 lancet each to check blood glucose 3 (three) times daily. 7/27/24 7/27/25  Elena Wise MD   minoxidiL (LONITEN) 2.5 MG tablet Take 2 tablets (5 mg total) by mouth once daily. 8/5/24 8/5/25  Roberth Broderick MD   multivitamin Tab Take 1 tablet by mouth once daily. 7/26/24   Elena Wise MD   mycophenolate (CELLCEPT) 250 mg Cap Take 4 capsules (1,000 mg total) by mouth 2 (two) times daily. 7/25/24 7/25/25  Elena Wise MD   NIFEdipine (PROCARDIA-XL) 60 MG (OSM) 24 hr tablet Take 1 tablet (60 mg total) by mouth 2 (two) times a day. 8/4/24   Roberth Broderick MD   ondansetron (ZOFRAN-ODT) 4 MG TbDL Dissolve 1 tablet (4 mg total) by  "mouth every 8 (eight) hours as needed. 7/27/24   Elena Wise MD   oxybutynin (DITROPAN) 5 MG Tab Take 1 tablet (5 mg total) by mouth 3 (three) times daily as needed (bladder spasms). 7/27/24 7/27/25  Elena Wise MD   oxyCODONE (ROXICODONE) 5 MG immediate release tablet Take 1 tablet (5 mg total) by mouth every 6 (six) hours as needed for Pain. 7/27/24   Vandana Cloud, MONTY   pantoprazole (PROTONIX) 40 MG tablet Take 1 tablet (40 mg total) by mouth once daily. 7/25/24 7/25/25  Elena Wise MD   pen needle, diabetic 32 gauge x 5/32" Ndle 1 each for use with insulin pen 3 (three) times daily. 7/27/24   Elena Wise MD   predniSONE (DELTASONE) 5 MG tablet Take by mouth daily; 7/27/2024-8/2/2024: 20 mg, 8/3/2024-8/9/2024: 15 mg; 8/10/2024-8/16/2024: 10 mg; 8/17/2024- forever: 5 mg; do not stop 7/27/24   Elena Wise MD   sodium bicarbonate 650 MG tablet Take 1 tablet (650 mg total) by mouth 2 (two) times daily. 7/27/24 7/27/25  Elena Wise MD   sulfamethoxazole-trimethoprim 400-80mg (BACTRIM,SEPTRA) 400-80 mg per tablet Take 1 tablet by mouth every morning. Stop 1/21/25 7/25/24 1/21/25  Elena Wise MD   tacrolimus (PROGRAF) 1 MG Cap Take 4 capsules (4 mg total) by mouth every 12 (twelve) hours. 8/4/24 8/4/25  Roberth Broderick MD   valGANciclovir (VALCYTE) 450 mg Tab Take 1 tablet (450 mg total) by mouth 3 (three) times a week. Stop 10/23/24 7/29/24 10/27/24  Elena Wise MD     Anticoagulants/Antiplatelets: no anticoagulation    Allergies: Review of patient's allergies indicates:  No Known Allergies  Sedation History:  no adverse reactions    Review of Systems:   Hematological: no known coagulopathies  Respiratory: no shortness of breath  Cardiovascular: no chest pain  Gastrointestinal: no abdominal pain  Genito-Urinary: no dysuria  Musculoskeletal: negative  Neurological: no TIA or stroke symptoms         OBJECTIVE:     Vital Signs (Most Recent)  Temp: 97.9 " °F (36.6 °C) (08/09/24 1138)  Pulse: 71 (08/09/24 1138)  Resp: 20 (08/09/24 1138)  BP: (!) 167/71 (08/09/24 1138)  SpO2: 99 % (08/09/24 1138)    Physical Exam:  ASA: class 3  Mallampati: class III    General: no acute distress  Mental Status: alert and oriented to person, place and time  HEENT: normocephalic, atraumatic  Chest: unlabored breathing  Heart: regular heart rate  Abdomen: nondistended. Two laparoscopy incisions are well healed. Lower abdomen/pelvis area has dressing that is clean and dry  Extremity: moves all extremities    Laboratory  Lab Results   Component Value Date    INR 1.0 08/02/2024       Lab Results   Component Value Date    WBC 8.88 08/09/2024    HGB 9.1 (L) 08/09/2024    HCT 28.0 (L) 08/09/2024    MCV 93 08/09/2024     08/09/2024      Lab Results   Component Value Date     (H) 08/09/2024     (H) 08/09/2024     08/09/2024     08/09/2024    K 3.9 08/09/2024    K 4.0 08/09/2024     (H) 08/09/2024     08/09/2024    CO2 21 (L) 08/09/2024    CO2 22 (L) 08/09/2024    BUN 17 08/09/2024    BUN 18 08/09/2024    CREATININE 1.8 (H) 08/09/2024    CREATININE 1.8 (H) 08/09/2024    CALCIUM 8.9 08/09/2024    CALCIUM 9.0 08/09/2024    MG 1.3 (L) 08/09/2024    ALT 19 08/09/2024    AST 15 08/09/2024    ALBUMIN 3.1 (L) 08/09/2024    ALBUMIN 3.1 (L) 08/09/2024    BILITOT 0.5 08/09/2024    BILIDIR 0.2 08/02/2024       ASSESSMENT/PLAN:     Sedation Plan: up to moderate    After thorough discussion regarding the risks and benefits of the procedure with the patient and her daughter at bedside, the patient has elected to proceed, and formal consent was obtained. Patient will undergo US guided, possibly CT guided, aspiration of intra abdominal fluid collection with drain placement.    Quincy Lam MD PGY-2  Department of Radiology  [facility]

## 2024-08-09 NOTE — NURSING
Pt arrived to unit via stretcher from ED. Pt AAOx4. Pt denies pain or SOB upon arrival. Pt MALKA to RLQ draining SS fluid. Pt updated on plan of care. Bed in low position with call light within reach. Pt instructed to call for assistance prior to getting out of bed. Pt placed in non skid slip resistant socks.

## 2024-08-09 NOTE — HPI
Patient is a 64 y/o F s/p DBD kidney transplant 7/24/2024 for ESRD 2/2 Type II DM. PMHx born w solitary kidney, DM2, HTN, incontience (s/p bladder sling), CAD with stents (on ASA), atrial fibrillation, CVA (no residual deficits), ALEJA, and anemia. Surgery without complications. PD cath removed. Post op course notable for down trending Cr with excellent UOP. She was seen in ER 7/28 for hypertension - PO meds adjusted and d/c' from ED. Pt was re admitted 7/29 thru 8/4 from clinic w HTN, decreased H/H and suspected subcutaneous hematoma at incision. She was transfused 3u pRBC during this hospital stay. Hemolysis w/u w no signs of schistocytes on peripheral smear. Kidney US 8/1 w 2 small collections. BP meds were adjusted- Pt is taking Coreg 6.25 mg bid (HR 65-75), Nifedipine 60mg bid and Minoxidil 5mg for BP management. Pt has been doing well since other than constipation. She presented to ER 8/8 w c/o spontaneous rust colored drainage fr lower portion of incision where she was missing staple(s). She was otherwise feeling well, labs were stable. Steri strips applied to open area and pressure dressing applied. Pt was started on Lasix 40 mg PO qd x5 and discharged from ER. Per pt several hours later she was bedning forward and noted large gush of again rust colored drainage and returned to ER as instructed. In ER, Lab are stable. Incision w new swelling to anterior portion of incision. CT A/P noted enlarged perinephric collection and also enlarged right lower quad abdominal wall collection. BP improved. Of note, pt remains constipated despite supp last evening. Plan to admit, consult IR for drainage, f/u fluid studies and monitor CBC and CR. Pt and dtr verbalize understanding to admission. POC d/w w Dr Ruelas per colleague.

## 2024-08-09 NOTE — H&P
[facility] Emergency Dept  Kidney Transplant  H&P        Subjective:     History of Present Illness:   Patient is a 64 y/o F s/p DBD kidney transplant 7/24/2024 for ESRD 2/2 Type II DM. PMHx born w solitary kidney, DM2, HTN, incontience (s/p bladder sling), CAD with stents (on ASA), atrial fibrillation, CVA (no residual deficits), ALEJA, and anemia. Surgery without complications. PD cath removed. Post op course notable for down trending Cr with excellent UOP. She was seen in ER 7/28 for hypertension - PO meds adjusted and d/c' from ED. Pt was re admitted 7/29 thru 8/4 from clinic w HTN, decreased H/H and suspected subcutaneous hematoma at incision. She was transfused 3u pRBC during this hospital stay. Hemolysis w/u w no signs of schistocytes on peripheral smear. Kidney US 8/1 w 2 small collections. BP meds were adjusted- Pt is taking Coreg 6.25 mg bid (HR 65-75), Nifedipine 60mg bid and Minoxidil 5mg for BP management. Pt has been doing well since other than constipation. She presented to ER 8/8 w c/o spontaneous rust colored drainage fr lower portion of incision where she was missing staple(s). She was otherwise feeling well, labs were stable. Steri strips applied to open area and pressure dressing applied. Pt was started on Lasix 40 mg PO qd x5 and discharged from ER. Per pt several hours later she was bedning forward and noted large gush of again rust colored drainage and returned to ER as instructed. In ER, Lab are stable. Incision w new swelling to anterior portion of incision. CT A/P noted enlarged perinephric collection and also enlarged right lower quad abdominal wall collection. BP improved. Of note, pt remains constipated despite supp last evening. Plan to admit, consult IR for drainage, f/u fluid studies and monitor CBC and CR. Pt and dtr verbalize understanding to admission. POC d/w w Dr Ruelas per colleague.        Ms. Kenney is a 63 y.o. year old female who is status post Kidney Transplant - 7/24/2024   (#1).    She received induction therapy with Thymoglobulin and her maintenance immunosuppression consists of:   Immunosuppressants (From admission, onward)      Start     Stop Route Frequency Ordered    08/09/24 0900  mycophenolate capsule 1,000 mg         -- Oral 2 times daily 08/09/24 0739    08/09/24 0845  tacrolimus capsule 4 mg         -- Oral 2 times daily 08/09/24 0739          Past Medical and Surgical History: Ms. Kenney has a past medical history of Anemia, Anxiety, Atrial fibrillation, Coronary artery disease, Depression, Diabetes mellitus, type 2, Disorder of kidney and ureter, Heart murmur, Hyperlipidemia, Hypertension, Obesity, ALEJA (obstructive sleep apnea), Proteinuria, Solitary kidney, and Stroke.  She has a past surgical history that includes Coronary angioplasty with stent; internal heart monitor; Hysterectomy; Insertion of implantable loop recorder; Peritoneal catheter insertion; Incontinence surgery; Kidney transplant (Right, 7/24/2024); and Peritoneal catheter removal (Left, 7/24/2024).    Past Social and Family History: Ms. Kenney reports that she has quit smoking. She has never used smokeless tobacco. She reports that she does not currently use alcohol. She reports that she does not use drugs.Her family history is not on file.    Intake/Output - Last 3 Shifts       None             Review of Systems   Constitutional:  Negative for activity change, appetite change, fatigue and fever.   HENT: Negative.     Eyes: Negative.    Respiratory: Negative.  Negative for cough, shortness of breath and wheezing.    Cardiovascular:  Positive for leg swelling. Negative for chest pain and palpitations.   Gastrointestinal:  Positive for constipation. Negative for abdominal pain (denies), nausea and vomiting.   Genitourinary: Negative.    Musculoskeletal: Negative.    Skin:  Positive for wound.   Allergic/Immunologic: Positive for immunocompromised state.   Hematological: Negative.    Psychiatric/Behavioral:  "Negative.       Objective:     Vital Signs (Most Recent):  Temp: 98.7 °F (37.1 °C) (08/09/24 0710)  Pulse: 72 (08/09/24 0730)  Resp: 20 (08/09/24 0730)  BP: (!) 158/70 (08/09/24 0730)  SpO2: 99 % (08/09/24 0730) Vital Signs (24h Range):  Temp:  [98.3 °F (36.8 °C)-99.4 °F (37.4 °C)] 98.7 °F (37.1 °C)  Pulse:  [63-84] 72  Resp:  [12-20] 20  SpO2:  [96 %-100 %] 99 %  BP: (157-215)/(66-97) 158/70     Weight: 88 kg (194 lb)  Height: 5' 5" (165.1 cm)  Body mass index is 32.28 kg/m².     Physical Exam  Vitals and nursing note reviewed.   Constitutional:       Appearance: Normal appearance. She is obese.   HENT:      Head: Normocephalic.   Eyes:      General: No scleral icterus.  Abdominal:      General: Bowel sounds are normal. There is no distension.      Palpations: Abdomen is soft.      Tenderness: There is no abdominal tenderness.      Comments: Palpable hematoma to anterior portion of RLQ kidney incision larger and more diffuse on exam today compared to 8/8  Area w/o staples to lower portion of incision CDI but WITH pressure draining rust colored, thin drainage   Musculoskeletal:         General: Injury: trace BLE.      Right lower leg: Edema (trace) present.      Left lower leg: Edema (trace BLE) present.   Skin:     General: Skin is warm and dry.      Capillary Refill: Capillary refill takes 2 to 3 seconds.   Neurological:      General: No focal deficit present.      Mental Status: She is alert and oriented to person, place, and time.   Psychiatric:         Mood and Affect: Mood normal.         Behavior: Behavior normal.         Thought Content: Thought content normal.         Judgment: Judgment normal.          Significant Labs:  CBC:   Recent Labs   Lab 08/05/24  0943 08/08/24  1318 08/09/24  0339   WBC 11.20 6.81 8.88   RBC 2.95* 2.75* 3.02*   HGB 9.0* 8.3* 9.1*   HCT 27.8* 25.5* 28.0*    255 313   MCV 94 93 93   MCH 30.5 30.2 30.1   MCHC 32.4 32.5 32.5     CMP:   Recent Labs   Lab 08/05/24  0943 " 08/08/24  1318 08/09/24  0737   * 169* 147*  145*   CALCIUM 8.6* 8.6* 9.0  8.9   ALBUMIN 2.9* 2.7* 3.1*  3.1*   PROT  --  4.8* 5.6*    141 142  141   K 3.5 4.0 4.0  3.9   CO2 25 23 22*  21*    110 110  111*   BUN 25* 18 18  17   CREATININE 1.7* 1.7* 1.8*  1.8*   ALKPHOS  --  65 75   ALT  --  18 19   AST  --  12 15     Coagulation:   Recent Labs   Lab 08/02/24  1449   APTT 23.9     Labs within the past 24 hours have been reviewed.    Diagnostics:  Previous imaging reviewed  Assessment/Plan:     Neuro  Transient neurological symptoms-resolved as of 8/9/2024        Cardiac/Vascular  Renovascular hypertension  - cont Coreg, Nifedipine and Minoxidil w parameters      Renal/  S/P kidney transplant  - s/p DBD kidney transplant w no surgical issues  - post op was seen in ER for BP mgt and admitted 7/29-8/4 for HTN, anemia and subq hematoma, required adjsutment in BP meds, 3u pRBC and monitor of incision  - she returned to ER 8/8 w spontaneous drainage from lower portsion of incision, h/h stable, Cr at BL, steri strips applied, pressure dressing and Lasix Rx  - she returns to ER overnight w increasing drainage and swelling to anterior portion of icnision, CT A/P reviewd and w enlarging collections  - IR consulted to drain  - cont to trend Cr, strict I/Os      Immunology/Multi System  Prophylactic immunotherapy  - see Long term immuno      Oncology  Anemia in ESRD (end-stage renal disease)  - anemia likely multifactorial- post surgery w enlarging collection on CT, blood draws and anemia of CKD  - CBC stable, cont to monitor  - transfuse for hgb <7      Palliative Care  Long-term use of immunosuppressant medication  - Chronic Prophylactic Immunosuppression- cont to check prograf level daily.  Assess for toxicity and adjust level as needed  - cont MMF          Discharge Planning:  Discussed plan of care.  No plan for discharge today.      Medical decision making for this encounter includes review  of pertinent labs and diagnostic studies, assessment and planning, discussions with consulting providers, discussion with patient/family, and participation in multidisciplinary rounds. Time spent caring for patient: 90 minutes    Vandana Lamb NP  Kidney Transplant  [facility] - Emergency Dept

## 2024-08-09 NOTE — ASSESSMENT & PLAN NOTE
- Chronic Prophylactic Immunosuppression- cont to check prograf level daily.  Assess for toxicity and adjust level as needed  - cont MMF

## 2024-08-09 NOTE — ASSESSMENT & PLAN NOTE
- anemia likely multifactorial- post surgery w enlarging collection on CT, blood draws and anemia of CKD  - CBC stable, cont to monitor  - transfuse for hgb <7

## 2024-08-09 NOTE — ED NOTES
Nurses Note -- 4 Eyes      8/9/2024   1:19 AM      Skin assessed during: Daily Assessment      [] No Altered Skin Integrity Present    []Prevention Measures Documented      [x] Yes- Altered Skin Integrity Present or Discovered   [x] LDA Added if Not in Epic (Describe Wound)   [x] New Altered Skin Integrity was Present on Admit and Documented in LDA   [x] Wound Image Taken    Wound Care Consulted? No    Attending Nurse:  Carmen Acosat RN/Staff Member:  [email or web address]

## 2024-08-09 NOTE — PLAN OF CARE
Post-procedure recovery completed. Patient AAOx3, no distress noted, respirations even and unlabored, will continue to monitor. VSS. RLQ site clean, dry, and intact; no bleeding or hematoma noted. Patient to be transferred to room [address]34 via patient transporter. Patient stable for transport. Phone report given, RN.

## 2024-08-09 NOTE — SUBJECTIVE & OBJECTIVE
Subjective:     History of Present Illness:   Patient is a 62 y/o F s/p DBD kidney transplant 7/24/2024 for ESRD 2/2 Type II DM. PMHx born w solitary kidney, DM2, HTN, incontience (s/p bladder sling), CAD with stents (on ASA), atrial fibrillation, CVA (no residual deficits), ALEJA, and anemia. Surgery without complications. PD cath removed. Post op course notable for down trending Cr with excellent UOP. She was seen in ER 7/28 for hypertension - PO meds adjusted and d/c' from ED. Pt was re admitted 7/29 thru 8/4 from clinic w HTN, decreased H/H and suspected subcutaneous hematoma at incision. She was transfused 3u pRBC during this hospital stay. Hemolysis w/u w no signs of schistocytes on peripheral smear. Kidney US 8/1 w 2 small collections. BP meds were adjusted- Pt is taking Coreg 6.25 mg bid (HR 65-75), Nifedipine 60mg bid and Minoxidil 5mg for BP management. Pt has been doing well since other than constipation. She presented to ER 8/8 w c/o spontaneous rust colored drainage fr lower portion of incision where she was missing staple(s). She was otherwise feeling well, labs were stable. Steri strips applied to open area and pressure dressing applied. Pt was started on Lasix 40 mg PO qd x5 and discharged from ER. Per pt several hours later she was bedning forward and noted large gush of again rust colored drainage and returned to ER as instructed. In ER, Lab are stable. Incision w new swelling to anterior portion of incision. CT A/P noted enlarged perinephric collection and also enlarged right lower quad abdominal wall collection. BP improved. Of note, pt remains constipated despite supp last evening. Plan to admit, consult IR for drainage, f/u fluid studies and monitor CBC and CR. Pt and dtr verbalize understanding to admission. POC d/w w Dr Ruelas per colleague.        Ms. Kenney is a 63 y.o. year old female who is status post Kidney Transplant - 7/24/2024  (#1).    She received induction therapy with  Thymoglobulin and her maintenance immunosuppression consists of:   Immunosuppressants (From admission, onward)      Start     Stop Route Frequency Ordered    08/09/24 0900  mycophenolate capsule 1,000 mg         -- Oral 2 times daily 08/09/24 0739    08/09/24 0845  tacrolimus capsule 4 mg         -- Oral 2 times daily 08/09/24 0739          Past Medical and Surgical History: Ms. Kenney has a past medical history of Anemia, Anxiety, Atrial fibrillation, Coronary artery disease, Depression, Diabetes mellitus, type 2, Disorder of kidney and ureter, Heart murmur, Hyperlipidemia, Hypertension, Obesity, ALEJA (obstructive sleep apnea), Proteinuria, Solitary kidney, and Stroke.  She has a past surgical history that includes Coronary angioplasty with stent; internal heart monitor; Hysterectomy; Insertion of implantable loop recorder; Peritoneal catheter insertion; Incontinence surgery; Kidney transplant (Right, 7/24/2024); and Peritoneal catheter removal (Left, 7/24/2024).    Past Social and Family History: Ms. Kenney reports that she has quit smoking. She has never used smokeless tobacco. She reports that she does not currently use alcohol. She reports that she does not use drugs.Her family history is not on file.    Intake/Output - Last 3 Shifts       None             Review of Systems   Constitutional:  Negative for activity change, appetite change, fatigue and fever.   HENT: Negative.     Eyes: Negative.    Respiratory: Negative.  Negative for cough, shortness of breath and wheezing.    Cardiovascular:  Positive for leg swelling. Negative for chest pain and palpitations.   Gastrointestinal:  Positive for constipation. Negative for abdominal pain (denies), nausea and vomiting.   Genitourinary: Negative.    Musculoskeletal: Negative.    Skin:  Positive for wound.   Allergic/Immunologic: Positive for immunocompromised state.   Hematological: Negative.    Psychiatric/Behavioral: Negative.       Objective:     Vital Signs  "(Most Recent):  Temp: 98.7 °F (37.1 °C) (08/09/24 0710)  Pulse: 72 (08/09/24 0730)  Resp: 20 (08/09/24 0730)  BP: (!) 158/70 (08/09/24 0730)  SpO2: 99 % (08/09/24 0730) Vital Signs (24h Range):  Temp:  [98.3 °F (36.8 °C)-99.4 °F (37.4 °C)] 98.7 °F (37.1 °C)  Pulse:  [63-84] 72  Resp:  [12-20] 20  SpO2:  [96 %-100 %] 99 %  BP: (157-215)/(66-97) 158/70     Weight: 88 kg (194 lb)  Height: 5' 5" (165.1 cm)  Body mass index is 32.28 kg/m².     Physical Exam  Vitals and nursing note reviewed.   Constitutional:       Appearance: Normal appearance. She is obese.   HENT:      Head: Normocephalic.   Eyes:      General: No scleral icterus.  Abdominal:      General: Bowel sounds are normal. There is no distension.      Palpations: Abdomen is soft.      Tenderness: There is no abdominal tenderness.      Comments: Palpable hematoma to anterior portion of RLQ kidney incision larger and more diffuse on exam today compared to 8/8  Area w/o staples to lower portion of incision CDI but WITH pressure draining rust colored, thin drainage   Musculoskeletal:         General: Injury: trace BLE.      Right lower leg: Edema (trace) present.      Left lower leg: Edema (trace BLE) present.   Skin:     General: Skin is warm and dry.      Capillary Refill: Capillary refill takes 2 to 3 seconds.   Neurological:      General: No focal deficit present.      Mental Status: She is alert and oriented to person, place, and time.   Psychiatric:         Mood and Affect: Mood normal.         Behavior: Behavior normal.         Thought Content: Thought content normal.         Judgment: Judgment normal.          Significant Labs:  CBC:   Recent Labs   Lab 08/05/24  0943 08/08/24  1318 08/09/24  0339   WBC 11.20 6.81 8.88   RBC 2.95* 2.75* 3.02*   HGB 9.0* 8.3* 9.1*   HCT 27.8* 25.5* 28.0*    255 313   MCV 94 93 93   MCH 30.5 30.2 30.1   MCHC 32.4 32.5 32.5     CMP:   Recent Labs   Lab 08/05/24  0943 08/08/24  1318 08/09/24  0737   * 169* 147*  " 145*   CALCIUM 8.6* 8.6* 9.0  8.9   ALBUMIN 2.9* 2.7* 3.1*  3.1*   PROT  --  4.8* 5.6*    141 142  141   K 3.5 4.0 4.0  3.9   CO2 25 23 22*  21*    110 110  111*   BUN 25* 18 18  17   CREATININE 1.7* 1.7* 1.8*  1.8*   ALKPHOS  --  65 75   ALT  --  18 19   AST  --  12 15     Coagulation:   Recent Labs   Lab 08/02/24  1449   APTT 23.9     Labs within the past 24 hours have been reviewed.    Diagnostics:  Previous imaging reviewed

## 2024-08-09 NOTE — ED NOTES
Report given to Paz in TSU. Spoke w/ IR nurse + IR to send patient to room #[id number] following IR recovery period.

## 2024-08-09 NOTE — ED TRIAGE NOTES
Joann Kenney, a 63 y.o. female presents to the ED w/ complaint of bleeding. Patient states she is having bleeding from incision site. Patient seen yesterday for similar issue. Patient states having kidney transplant 15 days ago, patient denies C/P,SOB,N/V/D.  Patient took all of her home medications prior to arrival    Triage note:  Chief Complaint   Patient presents with    Post-op Problem     S/p kidney transplant 15 days ago, reporting bleeding from incision site, seen here earlier today and steri strips were placed to site     Review of patient's allergies indicates:  No Known Allergies  Past Medical History:   Diagnosis Date    Anemia     Anxiety     Atrial fibrillation     Coronary artery disease     Depression     Diabetes mellitus, type 2     Disorder of kidney and ureter     Heart murmur     Hyperlipidemia     Hypertension     Obesity     ALEJA (obstructive sleep apnea)     Proteinuria     Solitary kidney     Stroke

## 2024-08-09 NOTE — CONSULTS
Radiology Consult Note      SUBJECTIVE:     Chief Complaint: Bleeding from surgical incision    History of Present Illness:  Joann Kenney is a 63 y.o. female with PMHx including ESRD s/p right kidney transplant (DOS 7/24/24), a-fib, CAD, DM II, HLD, HTN, ALEJA who initially presented with bleeding from her transplant surgical incision.    CT imaging shows fluid collection around the right renal transplant and extending into the RLQ abdominal wall. This fluid collection has enlarged compared to previous CT on 7/29/24.    IR consult was placed to evaluate for drainage of the fluid collection.      Past Medical History:   Diagnosis Date    Anemia     Anxiety     Atrial fibrillation     Coronary artery disease     Depression     Diabetes mellitus, type 2     Disorder of kidney and ureter     Heart murmur     Hyperlipidemia     Hypertension     Obesity     ALEJA (obstructive sleep apnea)     Proteinuria     Solitary kidney     Stroke     Transient neurological symptoms 11/13/2018     Past Surgical History:   Procedure Laterality Date    CORONARY ANGIOPLASTY WITH STENT PLACEMENT      HYSTERECTOMY      INCONTINENCE SURGERY      INSERTION OF IMPLANTABLE LOOP RECORDER      internal heart monitor      KIDNEY TRANSPLANT Right 7/24/2024    Procedure: TRANSPLANT, KIDNEY;  Surgeon: Alphonso Mon MD;  Location: [facility] OR [address] [place] [address];  Service: Transplant;  Laterality: Right;    PERITONEAL CATHETER INSERTION      PERITONEAL CATHETER REMOVAL Left 7/24/2024    Procedure: REMOVAL, CATHETER, DIALYSIS, PERITONEAL;  Surgeon: Alphonso Mon MD;  Location: [facility] OR [address] [place] [address];  Service: Transplant;  Laterality: Left;       Home Meds:   Prior to Admission medications    Medication Sig Start Date End Date Taking? Authorizing Provider   ALPRAZolam (XANAX) 2 MG Tab Take 2 mg by mouth every evening.    Provider, Historical   atorvastatin (LIPITOR) 20 MG tablet Take 1 tablet (20 mg total) by mouth every evening. 8/5/24 8/5/25  Joce Landry MD    bisacodyL (DULCOLAX) 10 mg Supp Place 1 suppository (10 mg total) rectally once. for 1 dose 8/8/24 8/9/24  Anabel Comer MD   blood sugar diagnostic Strp use 1 strip to check blood glucose 3 (three) times daily. 7/27/24   Elena Wise MD   blood-glucose meter Misc use as directed to check blood glucose 7/27/24   Elena Wise MD   carvediloL (COREG) 6.25 MG tablet Take 1 tablet (6.25 mg total) by mouth 2 (two) times daily. 8/4/24   Roberth Broderick MD   cetirizine (ZYRTEC) 5 MG tablet Take 1 tablet (5 mg total) by mouth once daily. 7/28/24 7/28/25  Elena Wise MD   cinacalcet (SENSIPAR) 60 MG Tab Take 1 tablet (60 mg total) by mouth daily with breakfast. 7/28/24 7/28/25  Elena Wise MD   DULoxetine (CYMBALTA) 30 MG capsule Take 30 mg by mouth once daily.    Provider, Historical   ezetimibe (ZETIA) 10 mg tablet Take 1 tablet (10 mg total) by mouth once daily. 8/5/24 8/5/25  Joce Landry MD   furosemide (LASIX) 40 MG tablet Take 1 tablet (40 mg total) by mouth once daily. for 4 days 8/9/24 8/13/24  Anabel Comer MD   insulin aspart U-100 (NOVOLOG) 100 unit/mL (3 mL) InPn pen Inject 6 Units into the skin 3 (three) times daily with meals. + SSI (TDD: 48) 8/4/24 8/4/25  Roberth Broderick MD   lancets Misc 1 lancet each to check blood glucose 3 (three) times daily. 7/27/24 7/27/25  Elena Wise MD   minoxidiL (LONITEN) 2.5 MG tablet Take 2 tablets (5 mg total) by mouth once daily. 8/5/24 8/5/25  Roberth Broderick MD   multivitamin Tab Take 1 tablet by mouth once daily. 7/26/24   Elena Wise MD   mycophenolate (CELLCEPT) 250 mg Cap Take 4 capsules (1,000 mg total) by mouth 2 (two) times daily. 7/25/24 7/25/25  Elena Wise MD   NIFEdipine (PROCARDIA-XL) 60 MG (OSM) 24 hr tablet Take 1 tablet (60 mg total) by mouth 2 (two) times a day. 8/4/24   Roberth Broderick MD   ondansetron (ZOFRAN-ODT) 4 MG TbDL Dissolve 1 tablet (4 mg total) by mouth every 8 (eight) hours as  "needed. 7/27/24   Elena Wise MD   oxybutynin (DITROPAN) 5 MG Tab Take 1 tablet (5 mg total) by mouth 3 (three) times daily as needed (bladder spasms). 7/27/24 7/27/25  Elena Wise MD   oxyCODONE (ROXICODONE) 5 MG immediate release tablet Take 1 tablet (5 mg total) by mouth every 6 (six) hours as needed for Pain. 7/27/24   Vandana Cloud, FAUSTOP   pantoprazole (PROTONIX) 40 MG tablet Take 1 tablet (40 mg total) by mouth once daily. 7/25/24 7/25/25  Elena Wise MD   pen needle, diabetic 32 gauge x 5/32" Ndle 1 each for use with insulin pen 3 (three) times daily. 7/27/24   Elena Wise MD   predniSONE (DELTASONE) 5 MG tablet Take by mouth daily; 7/27/2024-8/2/2024: 20 mg, 8/3/2024-8/9/2024: 15 mg; 8/10/2024-8/16/2024: 10 mg; 8/17/2024- forever: 5 mg; do not stop 7/27/24   Elena Wise MD   sodium bicarbonate 650 MG tablet Take 1 tablet (650 mg total) by mouth 2 (two) times daily. 7/27/24 7/27/25  Elena Wise MD   sulfamethoxazole-trimethoprim 400-80mg (BACTRIM,SEPTRA) 400-80 mg per tablet Take 1 tablet by mouth every morning. Stop 1/21/25 7/25/24 1/21/25  Elena Wise MD   tacrolimus (PROGRAF) 1 MG Cap Take 4 capsules (4 mg total) by mouth every 12 (twelve) hours. 8/4/24 8/4/25  Roberth Broderick MD   valGANciclovir (VALCYTE) 450 mg Tab Take 1 tablet (450 mg total) by mouth 3 (three) times a week. Stop 10/23/24 7/29/24 10/27/24  Elena Wise MD     Anticoagulants/Antiplatelets: no anticoagulation    Allergies: Review of patient's allergies indicates:  No Known Allergies        OBJECTIVE:     Vital Signs (Most Recent)  Temp: 98.7 °F (37.1 °C) (08/09/24 0710)  Pulse: 72 (08/09/24 0730)  Resp: 20 (08/09/24 0730)  BP: (!) 158/70 (08/09/24 0730)  SpO2: 99 % (08/09/24 0730)      Laboratory  Lab Results   Component Value Date    INR 1.0 08/02/2024       Lab Results   Component Value Date    WBC 8.88 08/09/2024    HGB 9.1 (L) 08/09/2024    HCT 28.0 (L) 08/09/2024    " MCV 93 08/09/2024     08/09/2024      Lab Results   Component Value Date     (H) 08/09/2024     (H) 08/09/2024     08/09/2024     08/09/2024    K 3.9 08/09/2024    K 4.0 08/09/2024     (H) 08/09/2024     08/09/2024    CO2 21 (L) 08/09/2024    CO2 22 (L) 08/09/2024    BUN 17 08/09/2024    BUN 18 08/09/2024    CREATININE 1.8 (H) 08/09/2024    CREATININE 1.8 (H) 08/09/2024    CALCIUM 8.9 08/09/2024    CALCIUM 9.0 08/09/2024    MG 1.3 (L) 08/09/2024    ALT 19 08/09/2024    AST 15 08/09/2024    ALBUMIN 3.1 (L) 08/09/2024    ALBUMIN 3.1 (L) 08/09/2024    BILITOT 0.5 08/09/2024    BILIDIR 0.2 08/02/2024       ASSESSMENT/PLAN:     Medical records and imaging studies were reviewed. We will plan on percutaneous aspiration and drain placement for her intraabdominal fluid collection today (8/9/24). Please keep the patient NPO in preparation for the procedure. Will evaluate the patient and provide H&P. Please see that note for complete assessment and plan.    Quincy Lam MD PGY-2  Department of Radiology  [facility]

## 2024-08-09 NOTE — ASSESSMENT & PLAN NOTE
- s/p DBD kidney transplant w no surgical issues  - post op was seen in ER for BP mgt and admitted 7/29-8/4 for HTN, anemia and subq hematoma, required adjsutment in BP meds, 3u pRBC and monitor of incision  - she returned to ER 8/8 w spontaneous drainage from lower portsion of incision, h/h stable, Cr at BL, steri strips applied, pressure dressing and Lasix Rx  - she returns to ER overnight w increasing drainage and swelling to anterior portion of icnision, CT A/P reviewd and w enlarging collections  - IR consulted to drain  - cont to trend Cr, strict I/Os

## 2024-08-09 NOTE — PROCEDURES
Radiology Post-Procedure Note    Pre Op Diagnosis: Merle transplant fluid collection  Post Op Diagnosis: Same    Procedure: US-guided drain placement    Procedure performed by: Jesús Rose MD    Written Informed Consent Obtained: Yes  Specimen Removed: YES 20 mL serous fluid  Estimated Blood Loss: Minimal    Findings:   Stable RLQ merle-transplant fluid collection. 10 Fr drain placed.     Patient tolerated procedure well.    Jesús Rose MD  Interventional Radiology  Department of Radiology

## 2024-08-09 NOTE — ED NOTES
"Per Mora,MD states to  "stand patient  up and capture the blood pressure after 1 minute of standing. This is how she normally gets her blood pressure taken as it is chronically high when she lays down".   "

## 2024-08-09 NOTE — NURSING
Nurses Note -- 4 Eyes      8/9/2024   6:30 PM      Skin assessed during: Admit      [] No Altered Skin Integrity Present    []Prevention Measures Documented      [x] Yes- Altered Skin Integrity Present or Discovered   [] LDA Added if Not in Epic (Describe Wound)   [x] New Altered Skin Integrity was Present on Admit and Documented in LDA   [] Wound Image Taken    Wound Care Consulted? No    Attending Nurse:  Paz Acosta RN/Staff Member:  MO

## 2024-08-09 NOTE — ED PROVIDER NOTES
"ED [place] of Care Note  7:19 AM 8/9/2024  Joann Kenney is a 63 y.o. female who presented to the ED on 8/9/2024 and has been managed by , who reports patient C/O bleeding from incisional site. I assumed care of patient from off-going ED physician team at 7:19 AM pending ***.    On my evaluation, Joann Kenney appears {DESC; WELL/ILL:31352} and is hemodynamically {DESC STABLE/UNSTABLE:06937}. Thus far, Joann Kenney has received:  Medications - No data to display    On my exam, I appreciate:  BP (!) 176/76 (BP Location: Left arm, Patient Position: Standing)   Pulse 74   Temp 98.7 °F (37.1 °C) (Oral)   Resp 18   Ht 5' 5" (1.651 m)   Wt 88 kg (194 lb)   SpO2 98%   BMI 32.28 kg/m²     ED Course as of 08/09/24 0719   Fri Aug 09, 2024   0116 CBC reviewed with finding of 7.6 hemoglobin. [KW]   0557 Hemoglobin(!): 9.1 [KW]   0558 Stable from prior CBC blood draws over the past several days.  Hemoglobin of 9.1 is likely secondary to chronic inflammation versus CKD. [KW]   0648 EKG independently interpreted with findings of sinus tachycardia no ST elevation QT prolongation regular rate rhythm right bundle branch block which is not significantly to the accept in V1 which can be a normal variant. [KW]   0702 Sign out: 15d post op renal transplant returning after incision bleeding. Pending ct abd w/o contrast to eval for wound dehiscence. Pending surg rec  [AC]      ED Course User Index  [AC] Anabel Comer MD  [KW] Ramy Mora MD        Additional ED course:  ***    Disposition: ***  I have discussed and counseled Joann Kenney regarding exam, results, diagnosis, treatment, and plan.  ______________________  Anabel Comer MD   Emergency Medicine Resident PGY1  8/9/2024        "

## 2024-08-09 NOTE — PROVIDER PROGRESS NOTES - EMERGENCY DEPT.
Encounter Date: 8/8/2024    ED Physician Progress Notes       .  ED [place] of Care Note  8:36 AM 8/9/2024  Joann Kenney is a 63 y.o. female who presented to the ED on 8/9/2024 and has been managed by Dr. Mora, who reports patient C/O bleeding from incision site. I assumed care of patient from off-going ED physician team at 8:36 AM pending CT abd/pelvis.    On my evaluation, Joann Kenney appears well and is hemodynamically stable. Thus far, Joann Kenney has received:  Medications   ALPRAZolam tablet 2 mg (has no administration in time range)   atorvastatin tablet 20 mg (has no administration in time range)   carvediloL tablet 6.25 mg (6.25 mg Oral Given 8/9/24 0941)   cinacalcet tablet 60 mg (60 mg Oral Given 8/9/24 0947)   DULoxetine DR capsule 30 mg (30 mg Oral Not Given 8/9/24 0900)   ezetimibe tablet 10 mg (10 mg Oral Given 8/9/24 1035)   minoxidiL tablet 5 mg (5 mg Oral Given 8/9/24 0941)   multivitamin tablet (1 tablet Oral Given 8/9/24 0941)   mycophenolate capsule 1,000 mg (1,000 mg Oral Given 8/9/24 0940)   NIFEdipine 24 hr tablet 60 mg (60 mg Oral Given 8/9/24 0941)   oxyCODONE immediate release tablet 5 mg (has no administration in time range)   pantoprazole EC tablet 40 mg (40 mg Oral Given 8/9/24 0942)   sulfamethoxazole-trimethoprim 400-80mg per tablet 1 tablet (1 tablet Oral Given 8/9/24 0941)   valGANciclovir tablet 450 mg (450 mg Oral Given 8/9/24 0955)   sodium chloride 0.9% flush 3 mL (has no administration in time range)   ondansetron disintegrating tablet 8 mg (has no administration in time range)   melatonin tablet 6 mg (has no administration in time range)   0.9%  NaCl infusion ( Intravenous New Bag 8/9/24 1003)   k phos di & mono-sod phos mono 250 mg tablet 2 tablet (2 tablets Oral Given 8/9/24 0942)   predniSONE tablet 10 mg (has no administration in time range)   sodium bicarbonate tablet 1,300 mg (has no administration in time range)   magnesium oxide tablet 400 mg (0 mg Oral Hold  "8/9/24 1100)   tacrolimus capsule 3 mg (has no administration in time range)   magnesium sulfate 2g in water 50mL IVPB (premix) (0 g Intravenous Stopped 8/9/24 1201)       On my exam, I appreciate:  BP (!) 167/71 (Patient Position: Standing)   Pulse 71   Temp 97.9 °F (36.6 °C) (Oral)   Resp 20   Ht 5' 5" (1.651 m)   Wt 88 kg (194 lb)   SpO2 99%   BMI 32.28 kg/m²     ED Course as of 08/09/24 1351   Fri Aug 09, 2024   0116 CBC reviewed with finding of 7.6 hemoglobin. [KW]   0557 Hemoglobin(!): 9.1 [KW]   0558 Stable from prior CBC blood draws over the past several days.  Hemoglobin of 9.1 is likely secondary to chronic inflammation versus CKD. [KW]   0648 EKG independently interpreted with findings of sinus tachycardia no ST elevation QT prolongation regular rate rhythm right bundle branch block which is not significantly to the accept in V1 which can be a normal variant. [KW]   0702 Sign out: 15d post op renal transplant returning after incision bleeding. Pending ct abd w/o contrast to eval for wound dehiscence. Pending surg rec  [AC]   0730 Patient is signed out to Dr. JAMISON LUO to follow formal CT read and communicate with transplant team regarding disposition of patient. [KW]   0746 Throughout the night it was notable that the patient had multiple episodes of elevated blood pressure.  However on questioning the patient patient states that her blood pressure is generally taken standing which generated results and a 30-40 point systolic drop in her blood pressure.  Her blood pressure orthostatic was taken by this writer and was found to be in the 160s to 170 systolic over 80s to 90s diastolic. [KW]   0813 Spoke with transplant surgery: new, larger perinephritic fluid, plan for IR to drain. Recommend admission [AC]      ED Course User Index  [AC] Anabel Luo MD  [KW] Ramy Moar MD        Additional ED course:  CT abdomen and pelvis significant for increased collection of fluid, increased fat " stranding, improvement of previously identified hematoma.  Discussed case with transplant surgery who recommended IR drainage of fluid.  Patient will need to be admitted following her procedure.    Disposition: Admitted  I have discussed and counseled Joann Kenney regarding exam, results, diagnosis, treatment, and plan.  ______________________  Anabel Comer MD   Emergency Medicine Resident PGY1  8/9/2024

## 2024-08-09 NOTE — ED PROVIDER NOTES
Encounter Date: 8/8/2024       History     Chief Complaint   Patient presents with    Post-op Problem     S/p kidney transplant 15 days ago, reporting bleeding from incision site, seen here earlier today and steri strips were placed to site     Patient is a 63-year-old female with history of recent renal transplant 15 days ago.  Patient is coming in for bleeding from the incision site.  Patient was seen in the ED less than 24 hours ago or bleeding at the incision site.  Patient states that the blood is soaking through a pad.  At this time bleeding stopped.  Patient denies fevers chills nausea vomiting chest pain shortness of breath diaphoresis.  Patient is urinating normally.  Patient has taken her home meds including her immunosuppressive therapy.  Patient's last was immunosuppressant therapy was done in the p.m..  Patient denies traumas including her accidents and falls.  Patient has no complaints at this time.      Patient stated she has changed her dressing since leaving the ED this morning.  Patient states that that gauze was soaked        Review of patient's allergies indicates:  No Known Allergies  Past Medical History:   Diagnosis Date    Anemia     Anxiety     Atrial fibrillation     Coronary artery disease     Depression     Diabetes mellitus, type 2     Disorder of kidney and ureter     Heart murmur     Hyperlipidemia     Hypertension     Obesity     ALEJA (obstructive sleep apnea)     Proteinuria     Solitary kidney     Stroke      Past Surgical History:   Procedure Laterality Date    CORONARY ANGIOPLASTY WITH STENT PLACEMENT      HYSTERECTOMY      INCONTINENCE SURGERY      INSERTION OF IMPLANTABLE LOOP RECORDER      internal heart monitor      KIDNEY TRANSPLANT Right 7/24/2024    Procedure: TRANSPLANT, KIDNEY;  Surgeon: Alphonso Mon MD;  Location: [facility] OR [address] [place] [address];  Service: Transplant;  Laterality: Right;    PERITONEAL CATHETER INSERTION      PERITONEAL CATHETER REMOVAL Left 7/24/2024    Procedure:  REMOVAL, CATHETER, DIALYSIS, PERITONEAL;  Surgeon: Alphonso Mon MD;  Location: [facility] OR [address] [place] [address];  Service: Transplant;  Laterality: Left;     No family history on file.  Social History     Tobacco Use    Smoking status: Former    Smokeless tobacco: Never   Substance Use Topics    Alcohol use: Not Currently    Drug use: Never     Review of Systems    Physical Exam     Initial Vitals [08/08/24 2251]   BP Pulse Resp Temp SpO2   (!) 179/71 74 18 99 °F (37.2 °C) 100 %      MAP       --         Physical Exam    Vitals reviewed.  Constitutional: She appears well-developed and well-nourished.   HENT:   Head: Normocephalic and atraumatic.   Cardiovascular:  Normal rate and regular rhythm.           Abdominal: Abdomen is soft. She exhibits no distension and no mass. There is no abdominal tenderness.   An incision line measuring approximately 20 cm under the pannus.  The wound does not appear to be infected and it is not red or tender out of proportion.  There is no subcutaneous air noted on exam.  No crackles.  At this time there is no bleeding.  It is noted that on the dressing there is some blood.   There is no rebound and no guarding.     Neurological: She is alert. GCS score is 15. GCS eye subscore is 4. GCS verbal subscore is 5. GCS motor subscore is 6.   Skin: Skin is warm and dry. Capillary refill takes less than 2 seconds. No rash noted. No erythema. No pallor.   Psychiatric: She has a normal mood and affect. Her behavior is normal. Judgment and thought content normal.         ED Course   Procedures  Labs Reviewed - No data to display       Imaging Results    None          Medications - No data to display  Medical Decision Making  My differential diagnosis includes but is not limited to:  Intra-abdominal bleeding versus incisional bleeding versus soft tissue infection versus wound dehiscence.      My leading diagnosis at this time is:  Bleeding from incision site        Life threats to patient which have been  considered include:  DIC which is unlikely this time as the patient is not bleeding on examination.        (include 3)  External notes reviewed include findings of:  I reviewed her last outpatient transplant surgery note.  At that time on 08/07/2024 patient was doing well.          All tests reviewed with findings of:    Prior CBC from 4 days ago was notable for hemoglobin of 7.6 down from 9 2 days prior to that.      (1 of the following)    I have independently interpreted   With findings of       Discussions with other healthcare providers include:  AP P from the transplant team.  The plan is to admit the patient to observation.        (minimal low moderate high)  Level of risk for this patient is:  High given that the patient has a recent transplant is at risk for acute rejection       Amount and/or Complexity of Data Reviewed  Labs: ordered. Decision-making details documented in ED Course.     Details: No labs were ordered given the fact that we have recently ascertain labs less than 12 hours prior to this admission.  The quantity of blood that the patient is endorsing does not appear to be large.  Radiology: ordered. Decision-making details documented in ED Course.     Details: No imaging was ordered given that the patient has stable vitals is not endorsing pain out of proportion.    Risk  Decision regarding hospitalization.  Risk Details: Patient signed out to JAMISON LUO pending follow up CT abdomen and pelvis read and follow up with transplant a advanced practice provider.               ED Course as of 08/10/24 0820   Fri Aug 09, 2024   0116 CBC reviewed with finding of 7.6 hemoglobin. [KW]   0557 Hemoglobin(!): 9.1 [KW]   0558 Stable from prior CBC blood draws over the past several days.  Hemoglobin of 9.1 is likely secondary to chronic inflammation versus CKD. [KW]   0648 EKG independently interpreted with findings of sinus tachycardia no ST elevation QT prolongation regular rate rhythm right bundle  branch block which is not significantly to the accept in V1 which can be a normal variant. [KW]   0702 Sign out: 15d post op renal transplant returning after incision bleeding. Pending ct abd w/o contrast to eval for wound dehiscence. Pending surg rec  [AC]   0730 Patient is signed out to Dr. JAMISON LUO to follow formal CT read and communicate with transplant team regarding disposition of patient. [KW]   0746 Throughout the night it was notable that the patient had multiple episodes of elevated blood pressure.  However on questioning the patient patient states that her blood pressure is generally taken standing which generated results and a 30-40 point systolic drop in her blood pressure.  Her blood pressure orthostatic was taken by this writer and was found to be in the 160s to 170 systolic over 80s to 90s diastolic. [KW]   0813 Spoke with transplant surgery: new, larger perinephritic fluid, plan for IR to drain. Recommend admission [AC]      ED Course User Index  [AC] Anabel Luo MD  [KW] Ramy Mora MD                             Clinical Impression:  Patient is stable.            Ramy Mora MD  Resident  08/09/24 2533       Ramy Mora MD  Resident  08/10/24 0821

## 2024-08-10 LAB
ALBUMIN SERPL BCP-MCNC: 2.8 G/DL (ref 3.5–5.2)
ANION GAP SERPL CALC-SCNC: 10 MMOL/L (ref 8–16)
BASOPHILS # BLD AUTO: 0.07 K/UL (ref 0–0.2)
BASOPHILS NFR BLD: 1.4 % (ref 0–1.9)
BODY FLUID SOURCE, CREATININE: NORMAL
BUN SERPL-MCNC: 16 MG/DL (ref 8–23)
CALCIUM SERPL-MCNC: 8.4 MG/DL (ref 8.7–10.5)
CHLORIDE SERPL-SCNC: 113 MMOL/L (ref 95–110)
CO2 SERPL-SCNC: 19 MMOL/L (ref 23–29)
CREAT FLD-MCNC: 1.6 MG/DL
CREAT SERPL-MCNC: 1.6 MG/DL (ref 0.5–1.4)
DIFFERENTIAL METHOD BLD: ABNORMAL
EOSINOPHIL # BLD AUTO: 0.1 K/UL (ref 0–0.5)
EOSINOPHIL NFR BLD: 2.5 % (ref 0–8)
ERYTHROCYTE [DISTWIDTH] IN BLOOD BY AUTOMATED COUNT: 18.3 % (ref 11.5–14.5)
EST. GFR  (NO RACE VARIABLE): 35.6 ML/MIN/1.73 M^2
GLUCOSE SERPL-MCNC: 115 MG/DL (ref 70–110)
HCT VFR BLD AUTO: 26.9 % (ref 37–48.5)
HGB BLD-MCNC: 8.6 G/DL (ref 12–16)
IMM GRANULOCYTES # BLD AUTO: 0.02 K/UL (ref 0–0.04)
IMM GRANULOCYTES NFR BLD AUTO: 0.4 % (ref 0–0.5)
LYMPHOCYTES # BLD AUTO: 0.4 K/UL (ref 1–4.8)
LYMPHOCYTES NFR BLD: 7.9 % (ref 18–48)
MAGNESIUM SERPL-MCNC: 1.5 MG/DL (ref 1.6–2.6)
MCH RBC QN AUTO: 30.8 PG (ref 27–31)
MCHC RBC AUTO-ENTMCNC: 32 G/DL (ref 32–36)
MCV RBC AUTO: 96 FL (ref 82–98)
MONOCYTES # BLD AUTO: 0.4 K/UL (ref 0.3–1)
MONOCYTES NFR BLD: 7.4 % (ref 4–15)
NEUTROPHILS # BLD AUTO: 4.2 K/UL (ref 1.8–7.7)
NEUTROPHILS NFR BLD: 80.4 % (ref 38–73)
NRBC BLD-RTO: 0 /100 WBC
PHOSPHATE SERPL-MCNC: 3 MG/DL (ref 2.7–4.5)
PLATELET # BLD AUTO: 250 K/UL (ref 150–450)
PMV BLD AUTO: 10.5 FL (ref 9.2–12.9)
POTASSIUM SERPL-SCNC: 3.6 MMOL/L (ref 3.5–5.1)
RBC # BLD AUTO: 2.79 M/UL (ref 4–5.4)
SODIUM SERPL-SCNC: 142 MMOL/L (ref 136–145)
TACROLIMUS BLD-MCNC: 7.3 NG/ML (ref 5–15)
WBC # BLD AUTO: 5.17 K/UL (ref 3.9–12.7)

## 2024-08-10 PROCEDURE — 80069 RENAL FUNCTION PANEL: CPT | Performed by: NURSE PRACTITIONER

## 2024-08-10 PROCEDURE — 36415 COLL VENOUS BLD VENIPUNCTURE: CPT | Performed by: NURSE PRACTITIONER

## 2024-08-10 PROCEDURE — 85025 COMPLETE CBC W/AUTO DIFF WBC: CPT | Performed by: NURSE PRACTITIONER

## 2024-08-10 PROCEDURE — 20600001 HC STEP DOWN PRIVATE ROOM

## 2024-08-10 PROCEDURE — 63600175 PHARM REV CODE 636 W HCPCS: Performed by: NURSE PRACTITIONER

## 2024-08-10 PROCEDURE — 27000207 HC ISOLATION

## 2024-08-10 PROCEDURE — 25000003 PHARM REV CODE 250: Performed by: NURSE PRACTITIONER

## 2024-08-10 PROCEDURE — 83735 ASSAY OF MAGNESIUM: CPT | Performed by: NURSE PRACTITIONER

## 2024-08-10 PROCEDURE — 80197 ASSAY OF TACROLIMUS: CPT | Performed by: NURSE PRACTITIONER

## 2024-08-10 PROCEDURE — 94761 N-INVAS EAR/PLS OXIMETRY MLT: CPT

## 2024-08-10 PROCEDURE — 99233 SBSQ HOSP IP/OBS HIGH 50: CPT | Mod: ,,, | Performed by: INTERNAL MEDICINE

## 2024-08-10 RX ADMIN — BISACODYL 10 MG: 5 TABLET, COATED ORAL at 08:08

## 2024-08-10 RX ADMIN — PANTOPRAZOLE SODIUM 40 MG: 40 TABLET, DELAYED RELEASE ORAL at 08:08

## 2024-08-10 RX ADMIN — SODIUM BICARBONATE 650 MG TABLET 1300 MG: at 08:08

## 2024-08-10 RX ADMIN — DULOXETINE HYDROCHLORIDE 30 MG: 30 CAPSULE, DELAYED RELEASE ORAL at 08:08

## 2024-08-10 RX ADMIN — THERA TABS 1 TABLET: TAB at 08:08

## 2024-08-10 RX ADMIN — CINACALCET 60 MG: 60 TABLET, FILM COATED ORAL at 08:08

## 2024-08-10 RX ADMIN — MYCOPHENOLATE MOFETIL 1000 MG: 250 CAPSULE ORAL at 08:08

## 2024-08-10 RX ADMIN — POLYETHYLENE GLYCOL 3350 17 G: 17 POWDER, FOR SOLUTION ORAL at 08:08

## 2024-08-10 RX ADMIN — PREDNISONE 10 MG: 10 TABLET ORAL at 08:08

## 2024-08-10 RX ADMIN — ALPRAZOLAM 2 MG: 0.25 TABLET ORAL at 10:08

## 2024-08-10 RX ADMIN — DOCUSATE SODIUM 100 MG: 100 CAPSULE, LIQUID FILLED ORAL at 03:08

## 2024-08-10 RX ADMIN — NIFEDIPINE 60 MG: 30 TABLET, FILM COATED, EXTENDED RELEASE ORAL at 08:08

## 2024-08-10 RX ADMIN — TACROLIMUS 3 MG: 1 CAPSULE ORAL at 08:08

## 2024-08-10 RX ADMIN — MINOXIDIL 5 MG: 2.5 TABLET ORAL at 08:08

## 2024-08-10 RX ADMIN — Medication 400 MG: at 08:08

## 2024-08-10 RX ADMIN — DOCUSATE SODIUM 100 MG: 100 CAPSULE, LIQUID FILLED ORAL at 08:08

## 2024-08-10 RX ADMIN — EZETIMIBE 10 MG: 10 TABLET ORAL at 08:08

## 2024-08-10 RX ADMIN — SULFAMETHOXAZOLE AND TRIMETHOPRIM 1 TABLET: 400; 80 TABLET ORAL at 06:08

## 2024-08-10 RX ADMIN — CARVEDILOL 6.25 MG: 6.25 TABLET, FILM COATED ORAL at 08:08

## 2024-08-10 RX ADMIN — DIBASIC SODIUM PHOSPHATE, MONOBASIC POTASSIUM PHOSPHATE AND MONOBASIC SODIUM PHOSPHATE 2 TABLET: 852; 155; 130 TABLET ORAL at 08:08

## 2024-08-10 RX ADMIN — ATORVASTATIN CALCIUM 20 MG: 20 TABLET, FILM COATED ORAL at 08:08

## 2024-08-10 RX ADMIN — TACROLIMUS 3 MG: 1 CAPSULE ORAL at 05:08

## 2024-08-10 NOTE — NURSING
Met with patient at bedside. She was crying and upset because her son, who was supposed to be her caregiver starting tomorrow, has backed out and will no longer be her caregiver. Her daughter has been at her bedside but must leave today to care for her special needs son. Patient said no one else can come to [place] to be with her at the apartments.     Plan is for patient to have surgery and then she doesn't know how long she will be in the hospital before being discharged. Discussed with patient the possibility that her team may deem her well enough to return home instead of to [facility] apartments upon hospital discharge. She said during the week if she is discharged, she has a friend who can get her and drive her home; her daughter would be available for teaching via [email or web address]. If she is discharged Friday through Sunday, her daughter could come get her.     Will send a message to  to see her Monday and will update medical team.

## 2024-08-10 NOTE — PLAN OF CARE
Pt accidentally removed IV access. NS fluids stopped. IV access attempted 3 times with no success. KAVIN Renee notified. Charge Nurse notified rapid team to place IV access. At this time team was in a middle of rapid. Pt with no signs of distress noted. Bed locked in the lowest position and call light within reach.

## 2024-08-10 NOTE — PROGRESS NOTES
"KIDNEY TRANSPLANT MEDICINE PROGRESS NOTE    Please refer to detailed progress note from 8/9/2024 for complete details of this admission.    Interval history: fluid collection drained, she feels great.    Past medical, surgical, family, social history reviewed & unchanged since admission.     I/O last 3 completed shifts:  In: 640 [P.O.:640]  Out: 1830 [Urine:1450; Drains:380]    VITALS:  height is 5' 5" (1.651 m) and weight is 82.6 kg (182 lb 1.6 oz). Her oral temperature is 98 °F (36.7 °C). Her blood pressure is 153/71 (abnormal) and her pulse is 60. Her respiration is 18 and oxygen saturation is 99%.       A&O x3, NAD.  Lungs CTA, unlabored.  Heart regular, no rub.  Abdomen soft, nontender, no masses.  Allograft nontender.  Edema: none.    Recent Labs   Lab 08/08/24  1318 08/09/24  0339 08/10/24  0553   WBC 6.81 8.88 5.17   HGB 8.3* 9.1* 8.6*   HCT 25.5* 28.0* 26.9*    313 250       Recent Labs   Lab 08/05/24  0943 08/08/24  1318 08/09/24  0737 08/10/24  0553    141 142  141 142   K 3.5 4.0 4.0  3.9 3.6    110 110  111* 113*   CO2 25 23 22*  21* 19*   BUN 25* 18 18  17 16   CREATININE 1.7* 1.7* 1.8*  1.8* 1.6*   CALCIUM 8.6* 8.6* 9.0  8.9 8.4*   PHOS 3.0  --  2.0*  2.0* 3.0     Recent Labs   Lab 08/05/24  0943 08/08/24  1318 08/09/24  0737   Tacrolimus Lvl 8.8 11.1 12.8       ASSESSMENT/PLAN:    Problems/plans as noted in the referenced progress note. Additional pertinent findings:  Immunosuppression: will adjust prograf dose when level is resulted. No change with MMF (high risk medication) 1000 bid   Fluid collection: unlikely to be infected only 47 cells, but cultures are pending  Kidney function improved   Acidosis continue with sodium bicarbonate   Prophylaxis per protocol  Surgery to discuss disposition with drains and discharge if they feel indicated     CKD post kidney transplant  -Follow with strict I/Os, daily weights, BP (goal <140/90), daily labs.    -Avoid nephrotoxic agents " when feasible (NSAIDs, IV contrast dye, Aminoglycoside-containing antibiotics)  -Renally dose all appropriate medications, including antibiotics      Encounter for Monitoring Immunosuppression post Transplant  -Continue to trend immunosuppression levels.   -Monitor for med-related side effects or drug toxicity given immunosuppressant med with narrow therapeutic window.

## 2024-08-10 NOTE — PLAN OF CARE
Problem: Adult Inpatient Plan of Care  Goal: Plan of Care Review  Outcome: Progressing  Goal: Patient-Specific Goal (Individualized)  Outcome: Progressing  Goal: Absence of Hospital-Acquired Illness or Injury  Outcome: Progressing  Goal: Optimal Comfort and Wellbeing  Outcome: Progressing  Goal: Readiness for Transition of Care  Outcome: Progressing  Pt progressing towards goals of care. RLQ MALKA with 140 cc of serous fluid this shift. Inc is CDI. No BM despite bowel regimen. Will continue to monitor.

## 2024-08-10 NOTE — PLAN OF CARE
Pt is AAOx4. Ambulatory independently. Afebrile. RA. VSS. RLQ incision, ULISES with staples. Syd drain to RLQ, total output this shift 170 cc. Pt educated on the frequency of emptying drain. Pt not adhering to instructions and wanting drain to be emptied Q2 hrs. Pt voiding in hat. See flowsheets for details. Scheduled meds given. Pt free of falls. Bed locked in the lowest position and call light within reach.

## 2024-08-11 LAB
ALBUMIN SERPL BCP-MCNC: 2.7 G/DL (ref 3.5–5.2)
ANION GAP SERPL CALC-SCNC: 8 MMOL/L (ref 8–16)
BASOPHILS # BLD AUTO: 0.06 K/UL (ref 0–0.2)
BASOPHILS NFR BLD: 1.2 % (ref 0–1.9)
BUN SERPL-MCNC: 15 MG/DL (ref 8–23)
CALCIUM SERPL-MCNC: 8.2 MG/DL (ref 8.7–10.5)
CHLORIDE SERPL-SCNC: 113 MMOL/L (ref 95–110)
CO2 SERPL-SCNC: 20 MMOL/L (ref 23–29)
CREAT SERPL-MCNC: 1.6 MG/DL (ref 0.5–1.4)
DIFFERENTIAL METHOD BLD: ABNORMAL
EOSINOPHIL # BLD AUTO: 0.2 K/UL (ref 0–0.5)
EOSINOPHIL NFR BLD: 3.1 % (ref 0–8)
ERYTHROCYTE [DISTWIDTH] IN BLOOD BY AUTOMATED COUNT: 18.1 % (ref 11.5–14.5)
EST. GFR  (NO RACE VARIABLE): 35.6 ML/MIN/1.73 M^2
GLUCOSE SERPL-MCNC: 111 MG/DL (ref 70–110)
HCT VFR BLD AUTO: 25.4 % (ref 37–48.5)
HGB BLD-MCNC: 8 G/DL (ref 12–16)
IMM GRANULOCYTES # BLD AUTO: 0.02 K/UL (ref 0–0.04)
IMM GRANULOCYTES NFR BLD AUTO: 0.4 % (ref 0–0.5)
LYMPHOCYTES # BLD AUTO: 0.4 K/UL (ref 1–4.8)
LYMPHOCYTES NFR BLD: 9 % (ref 18–48)
MAGNESIUM SERPL-MCNC: 1.4 MG/DL (ref 1.6–2.6)
MCH RBC QN AUTO: 30.5 PG (ref 27–31)
MCHC RBC AUTO-ENTMCNC: 31.5 G/DL (ref 32–36)
MCV RBC AUTO: 97 FL (ref 82–98)
MONOCYTES # BLD AUTO: 0.3 K/UL (ref 0.3–1)
MONOCYTES NFR BLD: 6.5 % (ref 4–15)
NEUTROPHILS # BLD AUTO: 3.9 K/UL (ref 1.8–7.7)
NEUTROPHILS NFR BLD: 79.8 % (ref 38–73)
NRBC BLD-RTO: 0 /100 WBC
PHOSPHATE SERPL-MCNC: 2.5 MG/DL (ref 2.7–4.5)
PLATELET # BLD AUTO: 232 K/UL (ref 150–450)
PMV BLD AUTO: 10.6 FL (ref 9.2–12.9)
POTASSIUM SERPL-SCNC: 3.7 MMOL/L (ref 3.5–5.1)
RBC # BLD AUTO: 2.62 M/UL (ref 4–5.4)
SODIUM SERPL-SCNC: 141 MMOL/L (ref 136–145)
TACROLIMUS BLD-MCNC: 8.7 NG/ML (ref 5–15)
WBC # BLD AUTO: 4.89 K/UL (ref 3.9–12.7)

## 2024-08-11 PROCEDURE — 99233 SBSQ HOSP IP/OBS HIGH 50: CPT | Mod: ,,, | Performed by: INTERNAL MEDICINE

## 2024-08-11 PROCEDURE — 36415 COLL VENOUS BLD VENIPUNCTURE: CPT | Performed by: NURSE PRACTITIONER

## 2024-08-11 PROCEDURE — 80197 ASSAY OF TACROLIMUS: CPT | Performed by: NURSE PRACTITIONER

## 2024-08-11 PROCEDURE — 63600175 PHARM REV CODE 636 W HCPCS: Performed by: NURSE PRACTITIONER

## 2024-08-11 PROCEDURE — 80069 RENAL FUNCTION PANEL: CPT | Performed by: NURSE PRACTITIONER

## 2024-08-11 PROCEDURE — 25000003 PHARM REV CODE 250: Performed by: NURSE PRACTITIONER

## 2024-08-11 PROCEDURE — 85025 COMPLETE CBC W/AUTO DIFF WBC: CPT | Performed by: NURSE PRACTITIONER

## 2024-08-11 PROCEDURE — 20600001 HC STEP DOWN PRIVATE ROOM

## 2024-08-11 PROCEDURE — 27000207 HC ISOLATION

## 2024-08-11 PROCEDURE — 83735 ASSAY OF MAGNESIUM: CPT | Performed by: NURSE PRACTITIONER

## 2024-08-11 PROCEDURE — 94761 N-INVAS EAR/PLS OXIMETRY MLT: CPT

## 2024-08-11 RX ORDER — LANOLIN ALCOHOL/MO/W.PET/CERES
800 CREAM (GRAM) TOPICAL 2 TIMES DAILY
Status: DISCONTINUED | OUTPATIENT
Start: 2024-08-11 | End: 2024-08-13 | Stop reason: HOSPADM

## 2024-08-11 RX ORDER — HEPARIN SODIUM 5000 [USP'U]/ML
5000 INJECTION, SOLUTION INTRAVENOUS; SUBCUTANEOUS EVERY 8 HOURS
Status: DISCONTINUED | OUTPATIENT
Start: 2024-08-11 | End: 2024-08-13 | Stop reason: HOSPADM

## 2024-08-11 RX ADMIN — Medication 800 MG: at 08:08

## 2024-08-11 RX ADMIN — MINOXIDIL 5 MG: 2.5 TABLET ORAL at 08:08

## 2024-08-11 RX ADMIN — CARVEDILOL 6.25 MG: 6.25 TABLET, FILM COATED ORAL at 08:08

## 2024-08-11 RX ADMIN — SODIUM BICARBONATE 650 MG TABLET 1300 MG: at 08:08

## 2024-08-11 RX ADMIN — THERA TABS 1 TABLET: TAB at 08:08

## 2024-08-11 RX ADMIN — DOCUSATE SODIUM 100 MG: 100 CAPSULE, LIQUID FILLED ORAL at 08:08

## 2024-08-11 RX ADMIN — DIBASIC SODIUM PHOSPHATE, MONOBASIC POTASSIUM PHOSPHATE AND MONOBASIC SODIUM PHOSPHATE 2 TABLET: 852; 155; 130 TABLET ORAL at 08:08

## 2024-08-11 RX ADMIN — EZETIMIBE 10 MG: 10 TABLET ORAL at 08:08

## 2024-08-11 RX ADMIN — Medication 400 MG: at 08:08

## 2024-08-11 RX ADMIN — TACROLIMUS 3 MG: 1 CAPSULE ORAL at 05:08

## 2024-08-11 RX ADMIN — NIFEDIPINE 60 MG: 30 TABLET, FILM COATED, EXTENDED RELEASE ORAL at 08:08

## 2024-08-11 RX ADMIN — BISACODYL 10 MG: 5 TABLET, COATED ORAL at 08:08

## 2024-08-11 RX ADMIN — PANTOPRAZOLE SODIUM 40 MG: 40 TABLET, DELAYED RELEASE ORAL at 08:08

## 2024-08-11 RX ADMIN — MYCOPHENOLATE MOFETIL 1000 MG: 250 CAPSULE ORAL at 08:08

## 2024-08-11 RX ADMIN — TACROLIMUS 3 MG: 1 CAPSULE ORAL at 08:08

## 2024-08-11 RX ADMIN — HEPARIN SODIUM 5000 UNITS: 5000 INJECTION INTRAVENOUS; SUBCUTANEOUS at 02:08

## 2024-08-11 RX ADMIN — ATORVASTATIN CALCIUM 20 MG: 20 TABLET, FILM COATED ORAL at 08:08

## 2024-08-11 RX ADMIN — DULOXETINE HYDROCHLORIDE 30 MG: 30 CAPSULE, DELAYED RELEASE ORAL at 08:08

## 2024-08-11 RX ADMIN — ALPRAZOLAM 2 MG: 0.25 TABLET ORAL at 09:08

## 2024-08-11 RX ADMIN — POLYETHYLENE GLYCOL 3350 17 G: 17 POWDER, FOR SOLUTION ORAL at 09:08

## 2024-08-11 RX ADMIN — CINACALCET 60 MG: 60 TABLET, FILM COATED ORAL at 08:08

## 2024-08-11 RX ADMIN — HEPARIN SODIUM 5000 UNITS: 5000 INJECTION INTRAVENOUS; SUBCUTANEOUS at 08:08

## 2024-08-11 RX ADMIN — POLYETHYLENE GLYCOL 3350 17 G: 17 POWDER, FOR SOLUTION ORAL at 08:08

## 2024-08-11 RX ADMIN — PREDNISONE 10 MG: 10 TABLET ORAL at 08:08

## 2024-08-11 RX ADMIN — DOCUSATE SODIUM 100 MG: 100 CAPSULE, LIQUID FILLED ORAL at 02:08

## 2024-08-11 RX ADMIN — ONDANSETRON 8 MG: 8 TABLET, ORALLY DISINTEGRATING ORAL at 09:08

## 2024-08-11 RX ADMIN — SULFAMETHOXAZOLE AND TRIMETHOPRIM 1 TABLET: 400; 80 TABLET ORAL at 08:08

## 2024-08-11 NOTE — PLAN OF CARE
Pt. AAOx4, VSS, spo2> 94% on room air.   Pt. Ambulating independently.   Syd drain c minimal serous output  NS infusing at 75cc/hr  No complaints of pain/N/V this shift.   Bed in low locked position, call light within reach, pt instructed to call for assistance needed, pt verbalized understanding, remains free from falls this shift. WCTM.

## 2024-08-11 NOTE — PLAN OF CARE
Problem: Adult Inpatient Plan of Care  Goal: Plan of Care Review  Outcome: Progressing  Goal: Patient-Specific Goal (Individualized)  Outcome: Progressing  Goal: Absence of Hospital-Acquired Illness or Injury  Outcome: Progressing  Goal: Optimal Comfort and Wellbeing  Outcome: Progressing  Goal: Readiness for Transition of Care  Outcome: Progressing     Problem: Diabetes Comorbidity  Goal: Blood Glucose Level Within Targeted Range  Outcome: Progressing   Pt progressing towards goals of care. RLQ MALKA with 100 cc of serous fluid this shift. Inc is CDI. Bowel regimen effective with pt reporting bm today. Will continue to monitor.

## 2024-08-11 NOTE — PROGRESS NOTES
"KIDNEY TRANSPLANT MEDICINE PROGRESS NOTE    Please refer to detailed progress note from 8/9/2024 for complete details of this admission.    Interval history: today feels fine and has some questions about kidney function and surgery to repair fascia defect.    Past medical, surgical, family, social history reviewed & unchanged since admission.     I/O last 3 completed shifts:  In: 1673 [P.O.:820; I.V.:853]  Out: 3250 [Urine:2900; Drains:350]    VITALS:  height is 5' 5" (1.651 m) and weight is 82.6 kg (182 lb 1.6 oz). Her oral temperature is 98 °F (36.7 °C). Her blood pressure is 135/59 (abnormal) and her pulse is 64. Her respiration is 16 and oxygen saturation is 98%.       A&O x3, NAD.  Lungs CTA, unlabored.  Heart regular, no rub.  Abdomen soft, nontender, no masses.  Allograft nontender.  Edema: none.    Recent Labs   Lab 08/09/24  0339 08/10/24  0553 08/11/24  0537   WBC 8.88 5.17 4.89   HGB 9.1* 8.6* 8.0*   HCT 28.0* 26.9* 25.4*    250 232       Recent Labs   Lab 08/09/24  0737 08/10/24  0553 08/11/24  0537     141 142 141   K 4.0  3.9 3.6 3.7     111* 113* 113*   CO2 22*  21* 19* 20*   BUN 18  17 16 15   CREATININE 1.8*  1.8* 1.6* 1.6*   CALCIUM 9.0  8.9 8.4* 8.2*   PHOS 2.0*  2.0* 3.0 2.5*     Recent Labs   Lab 08/08/24  1318 08/09/24  0737 08/10/24  0553   Tacrolimus Lvl 11.1 12.8 7.3       ASSESSMENT/PLAN:    Problems/plans as noted in the referenced progress note. Additional pertinent findings:  Neuro  Transient neurological symptoms-resolved as of 8/9/2024           Cardiac/Vascular  Renovascular hypertension  - cont Coreg, Nifedipine and Minoxidil w parameters        Renal/  S/P kidney transplant  - s/p DBD kidney transplant w no surgical issues  - post op was seen in ER for BP mgt and admitted 7/29-8/4 for HTN, anemia and subq hematoma, required adjsutment in BP meds, 3u pRBC and monitor of incision  - she returned to ER 8/8 w spontaneous drainage from lower portsion of " incision, h/h stable, Cr at BL, steri strips applied, pressure dressing and Lasix Rx  - she returns to ER overnight w increasing drainage and swelling to anterior portion of icnision, CT A/P reviewd and w enlarging collections  Collection drained per IR. Creatinine improving   - cont to trend Cr, strict I/Os        Immunology/Multi System  Prophylactic immunotherapy  - see Long term immuno        Oncology  Anemia in ESRD (end-stage renal disease)  - anemia likely multifactorial- post surgery w enlarging collection on CT, blood draws and anemia of CKD  - CBC stable, cont to monitor  - transfuse for hgb <7        Palliative Care  Long-term use of immunosuppressant medication  - Chronic Prophylactic Immunosuppression- cont to check prograf level daily.  Assess for toxicity and adjust level as needed  - cont MMF    CT scan reviewed by Dr Holt yesterday and plan to proceed with surgery to repair fascia defect ?tomorrow. Surgery to clarify    Magnesium replacement    I spoke with daughter during the face to face encounter with the patient              CKD post kidney transplant  -Follow with strict I/Os, daily weights, BP (goal <140/90), daily labs.    -Avoid nephrotoxic agents when feasible (NSAIDs, IV contrast dye, Aminoglycoside-containing antibiotics)  -Renally dose all appropriate medications, including antibiotics      Encounter for Monitoring Immunosuppression post Transplant  -Continue to trend immunosuppression levels.   -Monitor for med-related side effects or drug toxicity given immunosuppressant med with narrow therapeutic window.

## 2024-08-12 PROBLEM — R58 BLEEDING: Status: RESOLVED | Noted: 2024-08-08 | Resolved: 2024-08-12

## 2024-08-12 LAB
ACID FAST MOD KINY STN SPEC: NORMAL
ALBUMIN SERPL BCP-MCNC: 2.8 G/DL (ref 3.5–5.2)
ANION GAP SERPL CALC-SCNC: 9 MMOL/L (ref 8–16)
BACTERIA SPEC AEROBE CULT: NO GROWTH
BASOPHILS # BLD AUTO: 0.06 K/UL (ref 0–0.2)
BASOPHILS NFR BLD: 0.9 % (ref 0–1.9)
BUN SERPL-MCNC: 17 MG/DL (ref 8–23)
CALCIUM SERPL-MCNC: 8.2 MG/DL (ref 8.7–10.5)
CHLORIDE SERPL-SCNC: 112 MMOL/L (ref 95–110)
CO2 SERPL-SCNC: 22 MMOL/L (ref 23–29)
CREAT SERPL-MCNC: 1.6 MG/DL (ref 0.5–1.4)
DIFFERENTIAL METHOD BLD: ABNORMAL
EOSINOPHIL # BLD AUTO: 0.2 K/UL (ref 0–0.5)
EOSINOPHIL NFR BLD: 2.6 % (ref 0–8)
ERYTHROCYTE [DISTWIDTH] IN BLOOD BY AUTOMATED COUNT: 18.2 % (ref 11.5–14.5)
EST. GFR  (NO RACE VARIABLE): 35.6 ML/MIN/1.73 M^2
FUNGUS SPEC CULT: NORMAL
GLUCOSE SERPL-MCNC: 110 MG/DL (ref 70–110)
HCT VFR BLD AUTO: 26.8 % (ref 37–48.5)
HGB BLD-MCNC: 8.6 G/DL (ref 12–16)
IMM GRANULOCYTES # BLD AUTO: 0.05 K/UL (ref 0–0.04)
IMM GRANULOCYTES NFR BLD AUTO: 0.7 % (ref 0–0.5)
LYMPHOCYTES # BLD AUTO: 0.4 K/UL (ref 1–4.8)
LYMPHOCYTES NFR BLD: 6 % (ref 18–48)
MAGNESIUM SERPL-MCNC: 1.4 MG/DL (ref 1.6–2.6)
MCH RBC QN AUTO: 31 PG (ref 27–31)
MCHC RBC AUTO-ENTMCNC: 32.1 G/DL (ref 32–36)
MCV RBC AUTO: 97 FL (ref 82–98)
MONOCYTES # BLD AUTO: 0.3 K/UL (ref 0.3–1)
MONOCYTES NFR BLD: 4.8 % (ref 4–15)
MYCOBACTERIUM SPEC QL CULT: NORMAL
NEUTROPHILS # BLD AUTO: 5.8 K/UL (ref 1.8–7.7)
NEUTROPHILS NFR BLD: 85 % (ref 38–73)
NRBC BLD-RTO: 0 /100 WBC
PHOSPHATE SERPL-MCNC: 2.5 MG/DL (ref 2.7–4.5)
PLATELET # BLD AUTO: 229 K/UL (ref 150–450)
PMV BLD AUTO: 10.7 FL (ref 9.2–12.9)
POTASSIUM SERPL-SCNC: 3.4 MMOL/L (ref 3.5–5.1)
RBC # BLD AUTO: 2.77 M/UL (ref 4–5.4)
SODIUM SERPL-SCNC: 143 MMOL/L (ref 136–145)
TACROLIMUS BLD-MCNC: 6.5 NG/ML (ref 5–15)
WBC # BLD AUTO: 6.82 K/UL (ref 3.9–12.7)

## 2024-08-12 PROCEDURE — 25000003 PHARM REV CODE 250: Performed by: NURSE PRACTITIONER

## 2024-08-12 PROCEDURE — 80197 ASSAY OF TACROLIMUS: CPT | Performed by: NURSE PRACTITIONER

## 2024-08-12 PROCEDURE — 63600175 PHARM REV CODE 636 W HCPCS: Performed by: NURSE PRACTITIONER

## 2024-08-12 PROCEDURE — 25000003 PHARM REV CODE 250: Performed by: INTERNAL MEDICINE

## 2024-08-12 PROCEDURE — 20600001 HC STEP DOWN PRIVATE ROOM

## 2024-08-12 PROCEDURE — 25000003 PHARM REV CODE 250

## 2024-08-12 PROCEDURE — 83735 ASSAY OF MAGNESIUM: CPT | Performed by: NURSE PRACTITIONER

## 2024-08-12 PROCEDURE — 63600175 PHARM REV CODE 636 W HCPCS

## 2024-08-12 PROCEDURE — 85025 COMPLETE CBC W/AUTO DIFF WBC: CPT | Performed by: NURSE PRACTITIONER

## 2024-08-12 PROCEDURE — 27000207 HC ISOLATION

## 2024-08-12 PROCEDURE — 36415 COLL VENOUS BLD VENIPUNCTURE: CPT | Performed by: NURSE PRACTITIONER

## 2024-08-12 PROCEDURE — 80069 RENAL FUNCTION PANEL: CPT | Performed by: NURSE PRACTITIONER

## 2024-08-12 PROCEDURE — 25500020 PHARM REV CODE 255

## 2024-08-12 RX ORDER — SODIUM,POTASSIUM PHOSPHATES 280-250MG
2 POWDER IN PACKET (EA) ORAL DAILY
Status: DISCONTINUED | OUTPATIENT
Start: 2024-08-12 | End: 2024-08-13 | Stop reason: HOSPADM

## 2024-08-12 RX ORDER — PROCHLORPERAZINE EDISYLATE 5 MG/ML
2.5 INJECTION INTRAMUSCULAR; INTRAVENOUS EVERY 6 HOURS PRN
Status: DISCONTINUED | OUTPATIENT
Start: 2024-08-12 | End: 2024-08-13 | Stop reason: HOSPADM

## 2024-08-12 RX ORDER — ONDANSETRON HYDROCHLORIDE 2 MG/ML
4 INJECTION, SOLUTION INTRAVENOUS EVERY 6 HOURS PRN
Status: DISCONTINUED | OUTPATIENT
Start: 2024-08-12 | End: 2024-08-13 | Stop reason: HOSPADM

## 2024-08-12 RX ORDER — TACROLIMUS 1 MG/1
4 CAPSULE ORAL 2 TIMES DAILY
Status: DISCONTINUED | OUTPATIENT
Start: 2024-08-12 | End: 2024-08-13

## 2024-08-12 RX ORDER — VALGANCICLOVIR 450 MG/1
450 TABLET, FILM COATED ORAL DAILY
Status: DISCONTINUED | OUTPATIENT
Start: 2024-08-13 | End: 2024-08-13 | Stop reason: HOSPADM

## 2024-08-12 RX ORDER — TACROLIMUS 1 MG/1
1 CAPSULE ORAL ONCE
Status: COMPLETED | OUTPATIENT
Start: 2024-08-12 | End: 2024-08-12

## 2024-08-12 RX ORDER — POTASSIUM CHLORIDE 20 MEQ/1
40 TABLET, EXTENDED RELEASE ORAL ONCE
Status: DISCONTINUED | OUTPATIENT
Start: 2024-08-12 | End: 2024-08-12

## 2024-08-12 RX ORDER — DULOXETIN HYDROCHLORIDE 30 MG/1
30 CAPSULE, DELAYED RELEASE ORAL NIGHTLY
Status: DISCONTINUED | OUTPATIENT
Start: 2024-08-12 | End: 2024-08-13 | Stop reason: HOSPADM

## 2024-08-12 RX ORDER — MAGNESIUM SULFATE HEPTAHYDRATE 40 MG/ML
2 INJECTION, SOLUTION INTRAVENOUS ONCE
Status: COMPLETED | OUTPATIENT
Start: 2024-08-12 | End: 2024-08-12

## 2024-08-12 RX ADMIN — PREDNISONE 10 MG: 10 TABLET ORAL at 08:08

## 2024-08-12 RX ADMIN — NIFEDIPINE 60 MG: 30 TABLET, FILM COATED, EXTENDED RELEASE ORAL at 08:08

## 2024-08-12 RX ADMIN — PANTOPRAZOLE SODIUM 40 MG: 40 TABLET, DELAYED RELEASE ORAL at 08:08

## 2024-08-12 RX ADMIN — MYCOPHENOLATE MOFETIL 1000 MG: 250 CAPSULE ORAL at 09:08

## 2024-08-12 RX ADMIN — CINACALCET 60 MG: 60 TABLET, FILM COATED ORAL at 08:08

## 2024-08-12 RX ADMIN — EZETIMIBE 10 MG: 10 TABLET ORAL at 08:08

## 2024-08-12 RX ADMIN — ONDANSETRON 4 MG: 2 INJECTION INTRAMUSCULAR; INTRAVENOUS at 07:08

## 2024-08-12 RX ADMIN — TACROLIMUS 1 MG: 1 CAPSULE ORAL at 10:08

## 2024-08-12 RX ADMIN — HEPARIN SODIUM 5000 UNITS: 5000 INJECTION INTRAVENOUS; SUBCUTANEOUS at 06:08

## 2024-08-12 RX ADMIN — BISACODYL 10 MG: 5 TABLET, COATED ORAL at 09:08

## 2024-08-12 RX ADMIN — IOHEXOL 15 ML: 350 INJECTION, SOLUTION INTRAVENOUS at 05:08

## 2024-08-12 RX ADMIN — OXYCODONE 5 MG: 5 TABLET ORAL at 03:08

## 2024-08-12 RX ADMIN — HEPARIN SODIUM 5000 UNITS: 5000 INJECTION INTRAVENOUS; SUBCUTANEOUS at 09:08

## 2024-08-12 RX ADMIN — POTASSIUM BICARBONATE 50 MEQ: 978 TABLET, EFFERVESCENT ORAL at 12:08

## 2024-08-12 RX ADMIN — Medication 800 MG: at 09:08

## 2024-08-12 RX ADMIN — MYCOPHENOLATE MOFETIL 1000 MG: 250 CAPSULE ORAL at 08:08

## 2024-08-12 RX ADMIN — OXYCODONE 5 MG: 5 TABLET ORAL at 11:08

## 2024-08-12 RX ADMIN — DULOXETINE HYDROCHLORIDE 30 MG: 30 CAPSULE, DELAYED RELEASE ORAL at 09:08

## 2024-08-12 RX ADMIN — MINOXIDIL 5 MG: 2.5 TABLET ORAL at 08:08

## 2024-08-12 RX ADMIN — CARVEDILOL 6.25 MG: 6.25 TABLET, FILM COATED ORAL at 09:08

## 2024-08-12 RX ADMIN — CARVEDILOL 6.25 MG: 6.25 TABLET, FILM COATED ORAL at 08:08

## 2024-08-12 RX ADMIN — DIBASIC SODIUM PHOSPHATE, MONOBASIC POTASSIUM PHOSPHATE AND MONOBASIC SODIUM PHOSPHATE 2 TABLET: 852; 155; 130 TABLET ORAL at 08:08

## 2024-08-12 RX ADMIN — DOCUSATE SODIUM 100 MG: 100 CAPSULE, LIQUID FILLED ORAL at 02:08

## 2024-08-12 RX ADMIN — THERA TABS 1 TABLET: TAB at 08:08

## 2024-08-12 RX ADMIN — DOCUSATE SODIUM 100 MG: 100 CAPSULE, LIQUID FILLED ORAL at 08:08

## 2024-08-12 RX ADMIN — Medication 800 MG: at 08:08

## 2024-08-12 RX ADMIN — TACROLIMUS 4 MG: 1 CAPSULE ORAL at 05:08

## 2024-08-12 RX ADMIN — OXYCODONE 5 MG: 5 TABLET ORAL at 08:08

## 2024-08-12 RX ADMIN — SODIUM BICARBONATE 650 MG TABLET 1300 MG: at 09:08

## 2024-08-12 RX ADMIN — IOHEXOL 15 ML: 350 INJECTION, SOLUTION INTRAVENOUS at 06:08

## 2024-08-12 RX ADMIN — PROMETHAZINE HYDROCHLORIDE 12.5 MG: 25 INJECTION INTRAMUSCULAR; INTRAVENOUS at 11:08

## 2024-08-12 RX ADMIN — SODIUM BICARBONATE 650 MG TABLET 1300 MG: at 08:08

## 2024-08-12 RX ADMIN — POTASSIUM BICARBONATE 50 MEQ: 978 TABLET, EFFERVESCENT ORAL at 02:08

## 2024-08-12 RX ADMIN — NIFEDIPINE 30 MG: 30 TABLET, FILM COATED, EXTENDED RELEASE ORAL at 09:08

## 2024-08-12 RX ADMIN — POLYETHYLENE GLYCOL 3350 17 G: 17 POWDER, FOR SOLUTION ORAL at 09:08

## 2024-08-12 RX ADMIN — HEPARIN SODIUM 5000 UNITS: 5000 INJECTION INTRAVENOUS; SUBCUTANEOUS at 02:08

## 2024-08-12 RX ADMIN — MAGNESIUM SULFATE HEPTAHYDRATE 2 G: 40 INJECTION, SOLUTION INTRAVENOUS at 08:08

## 2024-08-12 RX ADMIN — VALGANCICLOVIR HYDROCHLORIDE 450 MG: 450 TABLET ORAL at 08:08

## 2024-08-12 RX ADMIN — DOCUSATE SODIUM 100 MG: 100 CAPSULE, LIQUID FILLED ORAL at 09:08

## 2024-08-12 RX ADMIN — PROCHLORPERAZINE EDISYLATE 2.5 MG: 5 INJECTION INTRAMUSCULAR; INTRAVENOUS at 09:08

## 2024-08-12 RX ADMIN — SULFAMETHOXAZOLE AND TRIMETHOPRIM 1 TABLET: 400; 80 TABLET ORAL at 07:08

## 2024-08-12 RX ADMIN — ATORVASTATIN CALCIUM 20 MG: 20 TABLET, FILM COATED ORAL at 09:08

## 2024-08-12 RX ADMIN — ALPRAZOLAM 1 MG: 0.25 TABLET ORAL at 09:08

## 2024-08-12 RX ADMIN — TACROLIMUS 3 MG: 1 CAPSULE ORAL at 08:08

## 2024-08-12 NOTE — PLAN OF CARE
Patient is AAOx3. Patient c/o RLQ incisional pain. PRN oxy given per orders. RLQ MALKA drain to bulb suction with serous output noted. Patient received K and Mg replacement. Patient is scheduled for a CT AP today. Reminded the patient to call for assistance.

## 2024-08-12 NOTE — SUBJECTIVE & OBJECTIVE
Subjective:     History of Present Illness:   Patient is a 64 y/o F s/p DBD kidney transplant 7/24/2024 for ESRD 2/2 Type II DM. PMHx born w solitary kidney, DM2, HTN, incontience (s/p bladder sling), CAD with stents (on ASA), atrial fibrillation, CVA (no residual deficits), ALEJA, and anemia. Surgery without complications. PD cath removed. Post op course notable for down trending Cr with excellent UOP. She was seen in ER 7/28 for hypertension - PO meds adjusted and d/c' from ED. Pt was re admitted 7/29 thru 8/4 from clinic w HTN, decreased H/H and suspected subcutaneous hematoma at incision. She was transfused 3u pRBC during this hospital stay. Hemolysis w/u w no signs of schistocytes on peripheral smear. Kidney US 8/1 w 2 small collections. BP meds were adjusted- Pt is taking Coreg 6.25 mg bid (HR 65-75), Nifedipine 60mg bid and Minoxidil 5mg for BP management. Pt has been doing well since other than constipation. She presented to ER 8/8 w c/o spontaneous rust colored drainage fr lower portion of incision where she was missing staple(s). She was otherwise feeling well, labs were stable. Steri strips applied to open area and pressure dressing applied. Pt was started on Lasix 40 mg PO qd x5 and discharged from ER. Per pt several hours later she was bedning forward and noted large gush of again rust colored drainage and returned to ER as instructed. In ER, Lab are stable. Incision w new swelling to anterior portion of incision. CT A/P noted enlarged perinephric collection and also enlarged right lower quad abdominal wall collection. BP improved. Of note, pt remains constipated despite supp last evening. Plan to admit, consult IR for drainage, f/u fluid studies and monitor CBC and CR. Pt and dtr verbalize understanding to admission. POC d/w w Dr Ruelas per colleague.        Patient admitted for drainage of perinephric fluid collection. IR consulted and drain placed 8/19/24. Fluid drained w/o signs of infection or  urine.     Interval History: no acute events overnight. Pt overall doing well. Cr down to 1.6, making excellent UOP. Drain to RLQ w 125cc serosang drainage. CT A/P w/o fr 8/8 reviewed, plan to repeat CT A/P today since fluid collection drained to re assess fascia. Pt verbalizes understanding. Electrolytes replaced. Cont bowel regimen. CBC stable. VSS. Monitor. Anne Marie is a 63 y.o. year old female who is status post Kidney Transplant - 7/24/2024  (#1).      She received induction therapy with Thymoglobulin and her maintenance immunosuppression consists of:   Immunosuppressants (From admission, onward)      Start     Stop Route Frequency Ordered    08/12/24 1800  tacrolimus capsule 4 mg         -- Oral 2 times daily 08/12/24 0943    08/09/24 0900  mycophenolate capsule 1,000 mg         -- Oral 2 times daily 08/09/24 0739          Past Medical and Surgical History: Ms. Kenney has a past medical history of Anemia, Anxiety, Atrial fibrillation, Coronary artery disease, Depression, Diabetes mellitus, type 2, Disorder of kidney and ureter, Heart murmur, Hyperlipidemia, Hypertension, Obesity, ALEJA (obstructive sleep apnea), Proteinuria, Solitary kidney, Stroke, and Transient neurological symptoms (11/13/2018).  She has a past surgical history that includes Coronary angioplasty with stent; internal heart monitor; Hysterectomy; Insertion of implantable loop recorder; Peritoneal catheter insertion; Incontinence surgery; Kidney transplant (Right, 7/24/2024); and Peritoneal catheter removal (Left, 7/24/2024).    Past Social and Family History: Ms. Kenney reports that she has quit smoking. She has never used smokeless tobacco. She reports that she does not currently use alcohol. She reports that she does not use drugs.Her family history is not on file.    Intake/Output - Last 3 Shifts         08/10 0700  08/11 0659 08/11 0700 08/12 0659 08/12 0700 08/13 0659    P.O. 180 360 240    I.V. (mL/kg) 853 (10.3)  50 (0.6)    Total  "Intake(mL/kg) 1033 (12.5) 360 (4.4) 290 (3.5)    Urine (mL/kg/hr) 1800 (0.9) 1700 (0.9) 200 (0.4)    Drains 180 125     Stool 0 0     Total Output 1980 1825 200    Net -947 -1465 +90           Stool Occurrence 0 x               Review of Systems   Constitutional:  Negative for activity change, appetite change, fatigue and fever.   HENT: Negative.     Eyes: Negative.    Respiratory: Negative.  Negative for cough, shortness of breath and wheezing.    Cardiovascular:  Positive for leg swelling. Negative for chest pain and palpitations.   Gastrointestinal:  Positive for constipation. Negative for abdominal pain (denies), nausea and vomiting.   Genitourinary: Negative.    Musculoskeletal: Negative.    Skin:  Positive for wound.   Allergic/Immunologic: Positive for immunocompromised state.   Hematological: Negative.    Psychiatric/Behavioral: Negative.       Objective:     Vital Signs (Most Recent):  Temp: 98.6 °F (37 °C) (08/12/24 1117)  Pulse: 68 (08/12/24 1117)  Resp: 20 (08/12/24 1117)  BP: (!) 169/71 (08/12/24 1117)  SpO2: 99 % (08/12/24 1117) Vital Signs (24h Range):  Temp:  [98.2 °F (36.8 °C)-98.6 °F (37 °C)] 98.6 °F (37 °C)  Pulse:  [68-84] 68  Resp:  [16-20] 20  SpO2:  [97 %-99 %] 99 %  BP: (141-198)/(63-82) 169/71     Weight: 82.5 kg (181 lb 14.1 oz)  Height: 5' 5" (165.1 cm)  Body mass index is 30.27 kg/m².     Physical Exam  Vitals and nursing note reviewed.   Constitutional:       Appearance: Normal appearance. She is obese.   HENT:      Head: Normocephalic.   Eyes:      General: No scleral icterus.  Abdominal:      General: Bowel sounds are normal. There is no distension.      Palpations: Abdomen is soft.      Tenderness: There is no abdominal tenderness.      Comments: Abdomen soft w MALKA to RLQ w serosang drainage  Small amount of nehemiah drainage w deep palpation to lower end of incision     Musculoskeletal:         General: Injury: trace BLE.      Right lower leg: Edema (trace) present.      Left lower leg: " "Edema (trace BLE) present.   Skin:     General: Skin is warm and dry.      Capillary Refill: Capillary refill takes 2 to 3 seconds.   Neurological:      General: No focal deficit present.      Mental Status: She is alert and oriented to person, place, and time. Mental status is at baseline.   Psychiatric:         Mood and Affect: Mood normal.         Behavior: Behavior normal.         Thought Content: Thought content normal.         Judgment: Judgment normal.          Significant Labs:  CBC:   Recent Labs   Lab 08/10/24  0553 08/11/24  0537 08/12/24  0619   WBC 5.17 4.89 6.82   RBC 2.79* 2.62* 2.77*   HGB 8.6* 8.0* 8.6*   HCT 26.9* 25.4* 26.8*    232 229   MCV 96 97 97   MCH 30.8 30.5 31.0   MCHC 32.0 31.5* 32.1     CMP:   Recent Labs   Lab 08/08/24  1318 08/09/24  0737 08/10/24  0553 08/11/24  0537 08/12/24  0619   * 147*  145* 115* 111* 110   CALCIUM 8.6* 9.0  8.9 8.4* 8.2* 8.2*   ALBUMIN 2.7* 3.1*  3.1* 2.8* 2.7* 2.8*   PROT 4.8* 5.6*  --   --   --     142  141 142 141 143   K 4.0 4.0  3.9 3.6 3.7 3.4*   CO2 23 22*  21* 19* 20* 22*    110  111* 113* 113* 112*   BUN 18 18  17 16 15 17   CREATININE 1.7* 1.8*  1.8* 1.6* 1.6* 1.6*   ALKPHOS 65 75  --   --   --    ALT 18 19  --   --   --    AST 12 15  --   --   --      Coagulation:   No results for input(s): "PT", "APTT" in the last 168 hours.    Labs within the past 24 hours have been reviewed.    Diagnostics:  Previous imaging reviewed  "

## 2024-08-12 NOTE — PROGRESS NOTES
[facility] - Transplant Stepdown  Kidney Transplant  Progress Note      Reason for Follow-up: Reassessment of Kidney Transplant - 7/24/2024  (#1) recipient and management of immunosuppression.     ORGAN:   RIGHT KIDNEY    Donor Type:   Donation after Brain Death    PHS Increased Risk: no   Cold Ischemia:   Induction Medications: Thymoglobulin        Subjective:     History of Present Illness:   Patient is a 62 y/o F s/p DBD kidney transplant 7/24/2024 for ESRD 2/2 Type II DM. PMHx born w solitary kidney, DM2, HTN, incontience (s/p bladder sling), CAD with stents (on ASA), atrial fibrillation, CVA (no residual deficits), ALEJA, and anemia. Surgery without complications. PD cath removed. Post op course notable for down trending Cr with excellent UOP. She was seen in ER 7/28 for hypertension - PO meds adjusted and d/c' from ED. Pt was re admitted 7/29 thru 8/4 from clinic w HTN, decreased H/H and suspected subcutaneous hematoma at incision. She was transfused 3u pRBC during this hospital stay. Hemolysis w/u w no signs of schistocytes on peripheral smear. Kidney US 8/1 w 2 small collections. BP meds were adjusted- Pt is taking Coreg 6.25 mg bid (HR 65-75), Nifedipine 60mg bid and Minoxidil 5mg for BP management. Pt has been doing well since other than constipation. She presented to ER 8/8 w c/o spontaneous rust colored drainage fr lower portion of incision where she was missing staple(s). She was otherwise feeling well, labs were stable. Steri strips applied to open area and pressure dressing applied. Pt was started on Lasix 40 mg PO qd x5 and discharged from ER. Per pt several hours later she was bedning forward and noted large gush of again rust colored drainage and returned to ER as instructed. In ER, Lab are stable. Incision w new swelling to anterior portion of incision. CT A/P noted enlarged perinephric collection and also enlarged right lower quad abdominal wall collection. BP improved. Of note, pt remains  constipated despite supp last evening. Plan to admit, consult IR for drainage, f/u fluid studies and monitor CBC and CR. Pt and dtr verbalize understanding to admission. POC d/w w Dr Ruelas per colleague.        Patient admitted for drainage of perinephric fluid collection. IR consulted and drain placed 8/19/24. Fluid drained w/o signs of infection or urine.     Interval History: no acute events overnight. Pt overall doing well. Cr down to 1.6, making excellent UOP. Drain to RLQ w 125cc serosang drainage. CT A/P w/o fr 8/8 reviewed, plan to repeat CT A/P today since fluid collection drained to re assess fascia. Pt verbalizes understanding. Electrolytes replaced. Cont bowel regimen. CBC stable. VSS. Monitor. Anne Marie is a 63 y.o. year old female who is status post Kidney Transplant - 7/24/2024  (#1).      She received induction therapy with Thymoglobulin and her maintenance immunosuppression consists of:   Immunosuppressants (From admission, onward)      Start     Stop Route Frequency Ordered    08/12/24 1800  tacrolimus capsule 4 mg         -- Oral 2 times daily 08/12/24 0943    08/09/24 0900  mycophenolate capsule 1,000 mg         -- Oral 2 times daily 08/09/24 0739          Past Medical and Surgical History: Ms. Kenney has a past medical history of Anemia, Anxiety, Atrial fibrillation, Coronary artery disease, Depression, Diabetes mellitus, type 2, Disorder of kidney and ureter, Heart murmur, Hyperlipidemia, Hypertension, Obesity, ALEJA (obstructive sleep apnea), Proteinuria, Solitary kidney, Stroke, and Transient neurological symptoms (11/13/2018).  She has a past surgical history that includes Coronary angioplasty with stent; internal heart monitor; Hysterectomy; Insertion of implantable loop recorder; Peritoneal catheter insertion; Incontinence surgery; Kidney transplant (Right, 7/24/2024); and Peritoneal catheter removal (Left, 7/24/2024).    Past Social and Family History: Ms. Kenney reports that she has quit  "smoking. She has never used smokeless tobacco. She reports that she does not currently use alcohol. She reports that she does not use drugs.Her family history is not on file.    Intake/Output - Last 3 Shifts         08/10 0700  08/11 0659 08/11 0700 08/12 0659 08/12 0700 08/13 0659    P.O. 180 360 240    I.V. (mL/kg) 853 (10.3)  50 (0.6)    Total Intake(mL/kg) 1033 (12.5) 360 (4.4) 290 (3.5)    Urine (mL/kg/hr) 1800 (0.9) 1700 (0.9) 200 (0.4)    Drains 180 125     Stool 0 0     Total Output 1980 1825 200    Net -947 -1465 +90           Stool Occurrence 0 x               Review of Systems   Constitutional:  Negative for activity change, appetite change, fatigue and fever.   HENT: Negative.     Eyes: Negative.    Respiratory: Negative.  Negative for cough, shortness of breath and wheezing.    Cardiovascular:  Positive for leg swelling. Negative for chest pain and palpitations.   Gastrointestinal:  Positive for constipation. Negative for abdominal pain (denies), nausea and vomiting.   Genitourinary: Negative.    Musculoskeletal: Negative.    Skin:  Positive for wound.   Allergic/Immunologic: Positive for immunocompromised state.   Hematological: Negative.    Psychiatric/Behavioral: Negative.       Objective:     Vital Signs (Most Recent):  Temp: 98.6 °F (37 °C) (08/12/24 1117)  Pulse: 68 (08/12/24 1117)  Resp: 20 (08/12/24 1117)  BP: (!) 169/71 (08/12/24 1117)  SpO2: 99 % (08/12/24 1117) Vital Signs (24h Range):  Temp:  [98.2 °F (36.8 °C)-98.6 °F (37 °C)] 98.6 °F (37 °C)  Pulse:  [68-84] 68  Resp:  [16-20] 20  SpO2:  [97 %-99 %] 99 %  BP: (141-198)/(63-82) 169/71     Weight: 82.5 kg (181 lb 14.1 oz)  Height: 5' 5" (165.1 cm)  Body mass index is 30.27 kg/m².     Physical Exam  Vitals and nursing note reviewed.   Constitutional:       Appearance: Normal appearance. She is obese.   HENT:      Head: Normocephalic.   Eyes:      General: No scleral icterus.  Abdominal:      General: Bowel sounds are normal. There is no " "distension.      Palpations: Abdomen is soft.      Tenderness: There is no abdominal tenderness.      Comments: Abdomen soft w MALKA to RLQ w serosang drainage  Small amount of nehemiah drainage w deep palpation to lower end of incision     Musculoskeletal:         General: Injury: trace BLE.      Right lower leg: Edema (trace) present.      Left lower leg: Edema (trace BLE) present.   Skin:     General: Skin is warm and dry.      Capillary Refill: Capillary refill takes 2 to 3 seconds.   Neurological:      General: No focal deficit present.      Mental Status: She is alert and oriented to person, place, and time. Mental status is at baseline.   Psychiatric:         Mood and Affect: Mood normal.         Behavior: Behavior normal.         Thought Content: Thought content normal.         Judgment: Judgment normal.          Significant Labs:  CBC:   Recent Labs   Lab 08/10/24  0553 08/11/24  0537 08/12/24  0619   WBC 5.17 4.89 6.82   RBC 2.79* 2.62* 2.77*   HGB 8.6* 8.0* 8.6*   HCT 26.9* 25.4* 26.8*    232 229   MCV 96 97 97   MCH 30.8 30.5 31.0   MCHC 32.0 31.5* 32.1     CMP:   Recent Labs   Lab 08/08/24  1318 08/09/24  0737 08/10/24  0553 08/11/24  0537 08/12/24  0619   * 147*  145* 115* 111* 110   CALCIUM 8.6* 9.0  8.9 8.4* 8.2* 8.2*   ALBUMIN 2.7* 3.1*  3.1* 2.8* 2.7* 2.8*   PROT 4.8* 5.6*  --   --   --     142  141 142 141 143   K 4.0 4.0  3.9 3.6 3.7 3.4*   CO2 23 22*  21* 19* 20* 22*    110  111* 113* 113* 112*   BUN 18 18  17 16 15 17   CREATININE 1.7* 1.8*  1.8* 1.6* 1.6* 1.6*   ALKPHOS 65 75  --   --   --    ALT 18 19  --   --   --    AST 12 15  --   --   --      Coagulation:   No results for input(s): "PT", "APTT" in the last 168 hours.    Labs within the past 24 hours have been reviewed.    Diagnostics:  Previous imaging reviewed  Assessment/Plan:     * S/P kidney transplant  - s/p DBD kidney transplant w no surgical issues  - post op was seen in ER for BP mgt and admitted " 7/29-8/4 for HTN, anemia and subq hematoma, required adjsutment in BP meds, 3u pRBC and monitor of incision  - she returned to ER 8/8 w spontaneous drainage from lower portsion of incision, h/h stable, Cr at BL, steri strips applied, pressure dressing and Lasix Rx  - she returns to ER overnight w increasing drainage and swelling to anterior portion of icnision, CT A/P reviewd and w enlarging collections  - IR consulted to drain  - drain holding charge, 125 cc serosang drainage noted.  - plan for repeat CT A/P today since fluid collection drained to reassess fascia. Will make pt NPO at [facility] in the event pt would need surgical procedure.   - cont to trend Cr, strict I/Os      Constipation  - cont bowel regimen      Acute blood loss anemia  - CBC remains stable, no need for blood transfusion since admit  - cont to monitor CBC daily    Long-term use of immunosuppressant medication  - Chronic Prophylactic Immunosuppression- cont to check prograf level daily.  Assess for toxicity and adjust level as needed  - cont MMF      Prophylactic immunotherapy  - see Long term immuno      Anemia in ESRD (end-stage renal disease)  - anemia likely multifactorial- post surgery w enlarging collection on CT, blood draws and anemia of CKD  - CBC stable, cont to monitor  - transfuse for hgb <7      Renovascular hypertension  - cont Coreg, Nifedipine and Minoxidil w parameters          Discharge Planning:  Discussed plan of care.  No plan for discharge today.    Medical decision making for this encounter includes review of pertinent labs and diagnostic studies, assessment and planning, discussions with consulting providers, discussion with patient/family, and participation in multidisciplinary rounds. Time spent caring for patient: 90 minutes    Vandana Lamb NP  Kidney Transplant  [facility] - Transplant Stepdown

## 2024-08-12 NOTE — HOSPITAL COURSE
Patient admitted for drainage of perinephric fluid collection. IR consulted and drain placed 8/19/24. Fluid drained w/o signs of infection or urine.     Interval History: no acute events overnight. Pt doing well. Cr cont to trend down, 1.5 today, making excellent UOP. Drain to RLQ w decreasing output. CT a/p repeated 8/12- images and collections reviewed per surgeon, no plan for surgery, ok to discharge home w drain. Pt will need f/u in clinic to assess removing drain 8/21/24.  Pt was on bowel regimen (colace, bisacodyl and Miralax bid) for stool burden. She is now moving her bowels and having loose stool. Bowel regimen decreased. Pt also had nausea, improved w Zofran. Able to drink a supplement. Plan to make sure pt can eat dinner. If she can, will proceed w discharge. RN to flush drain today and educate pt/CG on drain care. CBC stable. VSS. Monitor.

## 2024-08-12 NOTE — ASSESSMENT & PLAN NOTE
- s/p DBD kidney transplant w no surgical issues  - post op was seen in ER for BP mgt and admitted 7/29-8/4 for HTN, anemia and subq hematoma, required adjsutment in BP meds, 3u pRBC and monitor of incision  - she returned to ER 8/8 w spontaneous drainage from lower portsion of incision, h/h stable, Cr at BL, steri strips applied, pressure dressing and Lasix Rx  - she returns to ER overnight w increasing drainage and swelling to anterior portion of icnision, CT A/P reviewd and w enlarging collections  - IR consulted to drain  - drain holding charge, 125 cc serosang drainage noted.  - plan for repeat CT A/P today since fluid collection drained to reassess fascia. Will make pt NPO at MN in the event pt would need surgical procedure.   - cont to trend Cr, strict I/Os

## 2024-08-12 NOTE — PLAN OF CARE
Pt is AAOx4. Ambulatory independently. Afebrile. RA. RLQ incision, ULISES with staples. Syd drain to RLQ, total output this shift 170 cc. Pt voiding in hat. See flowsheets for details. Possible surgery today for fascia defect.  Pt free of falls. Bed locked in the lowest position and call light within reach.

## 2024-08-12 NOTE — PROGRESS NOTES
Admit Note     Met with patient and daughter to assess needs. Patient is a 63 y.o. single female, admitted for:    Bleeding [R58]  Renovascular hypertension [I15.0]  S/P kidney transplant [Z94.0]  Prophylactic immunotherapy [Z29.89]  Disorder of electrolytes [E87.8]  Wound drainage [T14.8XXA]  Long-term use of immunosuppressant medication [Z79.60]    Patient admitted from emergency department on 8/9/2024 .  At this time, patient presents as alert and oriented x 4, pleasant, good eye contact, well groomed, recall good, concentration/judgement good, average intelligence, calm, communicative, cooperative, and asking and answering questions appropriately.  At this time, patients caregiver presents as alert and oriented x 4, pleasant, good eye contact, well groomed, recall good, concentration/judgement good, average intelligence, calm, communicative, cooperative, and asking and answering questions appropriately.    Household/Family Systems     Patient resides with patient's  self , at  [address] [address], [place], [place]. Support system includes her daughter Sarah Lott ([phone]).  Patient does not have dependents that are need of being cared for.       Patient's daughter reports she is returning to [place], [place] today to assist him caring for her children. Patients daughter reports she has found a caregiver who will be able to assist her mother starting Wednesday and can stay with her mother for the next two weeks. Patients daughter reports caregiver will be Summer Beckham ([phone]).      Confirmed patients contact information is [phone] (home);   Telephone Information:   Mobile [phone]       Confirmed patient and patients caregivers do have access to reliable transportation.    Cognitive Status/Learning     Patient reports reading ability as 12th grade and states patient does have difficulty with seeing.  Patient reports patient learns best by seeing and hearing.   Needed:  No.   Highest education level: High School (9-12) or GED    Vocation/Disability   .  Working for Income: No  If no, reason not working: Disability  Patient is disabled due to back injury since .    Adherence     Patient reports a high level of adherence to patients health care regimen.  Adherence counseling and education provided. Patient verbalizes understanding.    Substance Use    Patient reports the following substance usage.    Tobacco: none, patient denies any use.  Alcohol: none, patient denies any use.  Illicit Drugs/Non-prescribed Medications: none, patient denies any use.  Patient states clear understanding of the potential impact of substance use.  Substance abstinence/cessation counseling, education and resources provided and reviewed.     Services Utilizing/ADLS    Infusion Service: Prior to admission, patient utilizing? no  Home Health: Prior to admission, patient utilizing? Patient previously referred to [facility].   DME: Prior to admission, yes- shower chair, rollator, wheel chair, CPAP  Pulmonary/Cardiac Rehab: Prior to admission, no  Dialysis:  Prior to admission, no  Transplant Specialty Pharmacy:  Prior to admission, yes; [facility].    Prior to admission, patient reports patient was somewhat independent with ADLS (received assistance at home from a aide) and was driving.  Patient reports patient is able to care for self at this time..  Patient indicates a willingness to care for self once medically cleared to do so.    Insurance/Medications    Insured by   Payer/Plan Subscr  Sex Relation Sub. Ins. ID Effective Group Num   1. [employer] MANAGE* DAVID CASILLAS 1960 Female Self [id number] 3/1/20 [id number]                                   [address] [address]   2. [place]* DAVID CASILLAS 1960 Female Self [id number]* 10/1/04                                    [address] [address]      Primary Insurance (for UNOS reporting): Public Insurance - Medicare & Choice  Secondary Insurance (for UNOS  reporting): Public Insurance - Medicaid    Patient reports patient is able to obtain and afford medications at this time and at time of discharge.    Living Will/Healthcare Power of     Patient states patient does not have a LW and/or HCPA.   provided education regarding LW and HCPA and the completion of forms.    Coping/Mental Health    Patient is coping adequately with the aid of  family members.  Patient reports increased stress due to readmissions and care giving concerns.  offered support and encouragement. Patient denied wanting resources or referrals at this time. Patient agrees to contact transplant team with needs, questions, or concerns as they arise.      offered support to patients daughter. Patients daughter expressed feeling stress related to her children's needs at home and trying to find a caregiver for her mother.  offered support and encouragement.     Discharge Planning    At time of discharge, patient plans to return to [address] under the care of Summer Beckham.  Patients friend will transport patient.  Per rounds today, expected discharge date has not been medically determined at this time. Patient and patients caregiver  verbalize understanding and are involved in treatment planning and discharge process.    Additional Concerns    Patient's caretaker denies additional needs and/or concerns at this time.  providing ongoing psychosocial support, education, resources and d/c planning as needed.  SW remains available.  remains available. Patient's caregiver verbalizes understanding and agreement with information reviewed,  availability and how to access available resources as needed. Patient denies additional needs and/or concerns at this time. Patient verbalizes understanding and agreement with information reviewed, social work availability, and how to access available resources as  needed.

## 2024-08-13 VITALS
HEART RATE: 70 BPM | SYSTOLIC BLOOD PRESSURE: 166 MMHG | WEIGHT: 181.88 LBS | BODY MASS INDEX: 30.3 KG/M2 | RESPIRATION RATE: 16 BRPM | TEMPERATURE: 98 F | DIASTOLIC BLOOD PRESSURE: 71 MMHG | OXYGEN SATURATION: 98 % | HEIGHT: 65 IN

## 2024-08-13 PROBLEM — E87.8 DISORDER OF ELECTROLYTES: Status: ACTIVE | Noted: 2024-08-13

## 2024-08-13 LAB
ALBUMIN SERPL BCP-MCNC: 2.7 G/DL (ref 3.5–5.2)
ANION GAP SERPL CALC-SCNC: 7 MMOL/L (ref 8–16)
BASOPHILS # BLD AUTO: 0.04 K/UL (ref 0–0.2)
BASOPHILS NFR BLD: 0.5 % (ref 0–1.9)
BUN SERPL-MCNC: 17 MG/DL (ref 8–23)
CALCIUM SERPL-MCNC: 8.5 MG/DL (ref 8.7–10.5)
CHLORIDE SERPL-SCNC: 108 MMOL/L (ref 95–110)
CO2 SERPL-SCNC: 25 MMOL/L (ref 23–29)
CREAT SERPL-MCNC: 1.5 MG/DL (ref 0.5–1.4)
DIFFERENTIAL METHOD BLD: ABNORMAL
EOSINOPHIL # BLD AUTO: 0.1 K/UL (ref 0–0.5)
EOSINOPHIL NFR BLD: 0.7 % (ref 0–8)
ERYTHROCYTE [DISTWIDTH] IN BLOOD BY AUTOMATED COUNT: 18.1 % (ref 11.5–14.5)
EST. GFR  (NO RACE VARIABLE): 38.4 ML/MIN/1.73 M^2
GLUCOSE SERPL-MCNC: 111 MG/DL (ref 70–110)
HCT VFR BLD AUTO: 25 % (ref 37–48.5)
HGB BLD-MCNC: 8.4 G/DL (ref 12–16)
IMM GRANULOCYTES # BLD AUTO: 0.03 K/UL (ref 0–0.04)
IMM GRANULOCYTES NFR BLD AUTO: 0.4 % (ref 0–0.5)
LYMPHOCYTES # BLD AUTO: 0.4 K/UL (ref 1–4.8)
LYMPHOCYTES NFR BLD: 5.9 % (ref 18–48)
MAGNESIUM SERPL-MCNC: 1.7 MG/DL (ref 1.6–2.6)
MCH RBC QN AUTO: 32.3 PG (ref 27–31)
MCHC RBC AUTO-ENTMCNC: 33.6 G/DL (ref 32–36)
MCV RBC AUTO: 96 FL (ref 82–98)
MONOCYTES # BLD AUTO: 0.4 K/UL (ref 0.3–1)
MONOCYTES NFR BLD: 5.8 % (ref 4–15)
NEUTROPHILS # BLD AUTO: 6.5 K/UL (ref 1.8–7.7)
NEUTROPHILS NFR BLD: 86.7 % (ref 38–73)
NRBC BLD-RTO: 0 /100 WBC
PHOSPHATE SERPL-MCNC: 2.4 MG/DL (ref 2.7–4.5)
PLATELET # BLD AUTO: 204 K/UL (ref 150–450)
PMV BLD AUTO: 11.5 FL (ref 9.2–12.9)
POTASSIUM SERPL-SCNC: 4.2 MMOL/L (ref 3.5–5.1)
RBC # BLD AUTO: 2.6 M/UL (ref 4–5.4)
SODIUM SERPL-SCNC: 140 MMOL/L (ref 136–145)
TACROLIMUS BLD-MCNC: 5.9 NG/ML (ref 5–15)
WBC # BLD AUTO: 7.44 K/UL (ref 3.9–12.7)

## 2024-08-13 PROCEDURE — 85025 COMPLETE CBC W/AUTO DIFF WBC: CPT | Performed by: NURSE PRACTITIONER

## 2024-08-13 PROCEDURE — 63600175 PHARM REV CODE 636 W HCPCS: Performed by: NURSE PRACTITIONER

## 2024-08-13 PROCEDURE — 80197 ASSAY OF TACROLIMUS: CPT | Performed by: NURSE PRACTITIONER

## 2024-08-13 PROCEDURE — 25000003 PHARM REV CODE 250: Performed by: NURSE PRACTITIONER

## 2024-08-13 PROCEDURE — 83735 ASSAY OF MAGNESIUM: CPT | Performed by: NURSE PRACTITIONER

## 2024-08-13 PROCEDURE — 80069 RENAL FUNCTION PANEL: CPT | Performed by: NURSE PRACTITIONER

## 2024-08-13 PROCEDURE — 36415 COLL VENOUS BLD VENIPUNCTURE: CPT | Performed by: NURSE PRACTITIONER

## 2024-08-13 PROCEDURE — 63600175 PHARM REV CODE 636 W HCPCS

## 2024-08-13 RX ORDER — SODIUM BICARBONATE 650 MG/1
1300 TABLET ORAL 2 TIMES DAILY
Qty: 120 TABLET | Refills: 11 | Status: ON HOLD | OUTPATIENT
Start: 2024-08-13 | End: 2025-08-13

## 2024-08-13 RX ORDER — LANOLIN ALCOHOL/MO/W.PET/CERES
800 CREAM (GRAM) TOPICAL 2 TIMES DAILY
Qty: 60 TABLET | Refills: 5 | Status: ON HOLD | OUTPATIENT
Start: 2024-08-13

## 2024-08-13 RX ORDER — TACROLIMUS 1 MG/1
5 CAPSULE ORAL 2 TIMES DAILY
Status: DISCONTINUED | OUTPATIENT
Start: 2024-08-13 | End: 2024-08-13 | Stop reason: HOSPADM

## 2024-08-13 RX ORDER — TACROLIMUS 1 MG/1
5 CAPSULE ORAL EVERY 12 HOURS
Qty: 300 CAPSULE | Refills: 11 | Status: ON HOLD | OUTPATIENT
Start: 2024-08-13 | End: 2025-08-13

## 2024-08-13 RX ORDER — TACROLIMUS 1 MG/1
1 CAPSULE ORAL ONCE
Status: COMPLETED | OUTPATIENT
Start: 2024-08-13 | End: 2024-08-13

## 2024-08-13 RX ORDER — VALGANCICLOVIR 450 MG/1
450 TABLET, FILM COATED ORAL DAILY
Qty: 30 TABLET | Refills: 2 | Status: ON HOLD | OUTPATIENT
Start: 2024-08-13 | End: 2024-11-11

## 2024-08-13 RX ADMIN — CARVEDILOL 6.25 MG: 6.25 TABLET, FILM COATED ORAL at 08:08

## 2024-08-13 RX ADMIN — MYCOPHENOLATE MOFETIL 1000 MG: 250 CAPSULE ORAL at 08:08

## 2024-08-13 RX ADMIN — HEPARIN SODIUM 5000 UNITS: 5000 INJECTION INTRAVENOUS; SUBCUTANEOUS at 01:08

## 2024-08-13 RX ADMIN — TACROLIMUS 4 MG: 1 CAPSULE ORAL at 08:08

## 2024-08-13 RX ADMIN — EZETIMIBE 10 MG: 10 TABLET ORAL at 08:08

## 2024-08-13 RX ADMIN — CINACALCET 60 MG: 60 TABLET, FILM COATED ORAL at 08:08

## 2024-08-13 RX ADMIN — ONDANSETRON 4 MG: 2 INJECTION INTRAMUSCULAR; INTRAVENOUS at 08:08

## 2024-08-13 RX ADMIN — TACROLIMUS 1 MG: 1 CAPSULE ORAL at 11:08

## 2024-08-13 RX ADMIN — PREDNISONE 10 MG: 10 TABLET ORAL at 08:08

## 2024-08-13 RX ADMIN — MINOXIDIL 5 MG: 2.5 TABLET ORAL at 08:08

## 2024-08-13 RX ADMIN — ONDANSETRON 4 MG: 2 INJECTION INTRAMUSCULAR; INTRAVENOUS at 01:08

## 2024-08-13 RX ADMIN — VALGANCICLOVIR HYDROCHLORIDE 450 MG: 450 TABLET ORAL at 08:08

## 2024-08-13 RX ADMIN — POTASSIUM & SODIUM PHOSPHATES POWDER PACK 280-160-250 MG 2 PACKET: 280-160-250 PACK at 08:08

## 2024-08-13 RX ADMIN — OXYCODONE 5 MG: 5 TABLET ORAL at 04:08

## 2024-08-13 RX ADMIN — PANTOPRAZOLE SODIUM 40 MG: 40 TABLET, DELAYED RELEASE ORAL at 08:08

## 2024-08-13 RX ADMIN — POLYETHYLENE GLYCOL 3350 17 G: 17 POWDER, FOR SOLUTION ORAL at 08:08

## 2024-08-13 RX ADMIN — NIFEDIPINE 60 MG: 30 TABLET, FILM COATED, EXTENDED RELEASE ORAL at 08:08

## 2024-08-13 RX ADMIN — SULFAMETHOXAZOLE AND TRIMETHOPRIM 1 TABLET: 400; 80 TABLET ORAL at 06:08

## 2024-08-13 RX ADMIN — HEPARIN SODIUM 5000 UNITS: 5000 INJECTION INTRAVENOUS; SUBCUTANEOUS at 06:08

## 2024-08-13 NOTE — PROGRESS NOTES
Transplant Social Work Discharge Note:     Pt will discharge to daughter's (Sarah) home ([address]; [place] [address]) under the care of Sarah Lott, patient's daughter, phone number [phone].      Patient reports readiness to discharge. Patient expressed understanding and agreement with recommendation for discharge with home health services.  Pt denies preference for agency placement.  Referral completed with [facility] [place] ([phone]  / [phone]) and confirmed pt will be seen at residence for HH admission tomorrow (8/14/2024). Per rounds this morning patient does not require additional referrals from this  at this time. Patient denied needing or wanting additional resources or referrals at this time. Patient agrees to contact transplant team with needs, questions, or concerns as they arise.  [facility] also contacted Sarah via phone to review and confirm discharge plans.  Sarah denies any additional needs or concerns at this time.     Pt aware of, involved in, and coping well with this discharge plan. Pt did not have any concerns with the discharge plan at this time. [facility] remains available at [phone].

## 2024-08-13 NOTE — PLAN OF CARE
PLAN OF CARE     Dx: Reassessment of kidney transplant site           Vital Signs (last 12 hours):   Temp:  [98 °F -100.2 °F ]   Pulse:  [67-77]   Resp:  [16-20]   BP: (153-175)/(67-74)   SpO2:  [98 %]        Neuro: A&Ox4      Cardiac: No tele, pulse WNL.      Respiratory: RA. Home CPAP nightly for sleep.      Gtts: N/A    Gastro.: No BM this shift, previous BM on 7/11. Pt. Had 2 emesis events that required PRN Zofran, Compazine & a one time order of Phenergan. No N/V post Phenergan admin.      Urine Output: Total UOP was 1505 cc.     Drains: MALKA drain 10 cc output.     Diet: NPO @ midnight for possible IR intervention.      Labs/Accuchecks: Daily labs.      Skin: Sacrum intact without redness. RLQ incision & MALKA site, CDI.        Shift Events:      Fall bundle in place. Pt. Remained free from falls/rauma/injuries. No complaints of CP/ SOB/discomfort. VSS. Pt. Reports pain this shift, given PRN Oxy. The POC reviewed w/ pt., questions were answered & encouraged. No further concerns at this time.

## 2024-08-13 NOTE — PROGRESS NOTES
"Discharge Medication Note:    Hospital Course:    Patient admitted for drainage of perinephric fluid collection. IR consulted and drain placed 8/19/24. Fluid drained w/o signs of infection or urine.     Met with Joann Kenney at discharge to review discharge medications and to update the blue medication card.           Medication List        START taking these medications      magnesium oxide 400 mg (241.3 mg magnesium) tablet  Commonly known as: MAG-OX  Take 2 tablets (800 mg total) by mouth 2 (two) times daily.            CHANGE how you take these medications      sodium bicarbonate 650 MG tablet  Take 2 tablets (1,300 mg total) by mouth 2 (two) times daily.  What changed: how much to take     tacrolimus 1 MG Cap  Commonly known as: PROGRAF  Take 5 capsules (5 mg total) by mouth every 12 (twelve) hours.  What changed: how much to take     valGANciclovir 450 mg Tab  Commonly known as: VALCYTE  Take 1 tablet (450 mg total) by mouth once daily. Stop 10/23/24  What changed: when to take this            CONTINUE taking these medications      ACCU-CHEK GUIDE GLUCOSE METER Misc  Generic drug: blood-glucose meter  use as directed to check blood glucose     ACCU-CHEK GUIDE TEST STRIPS Strp  Generic drug: blood sugar diagnostic  use 1 strip to check blood glucose 3 (three) times daily.     ACCU-CHEK SOFTCLIX LANCETS Misc  Generic drug: lancets  1 lancet each to check blood glucose 3 (three) times daily.     ALPRAZolam 2 MG Tab  Commonly known as: XANAX     atorvastatin 20 MG tablet  Commonly known as: LIPITOR  Take 1 tablet (20 mg total) by mouth every evening.     BD ULTRA-FINE KIRSTIN PEN NEEDLE 32 gauge x 5/32" Ndle  Generic drug: pen needle, diabetic  1 each for use with insulin pen 3 (three) times daily.     carvediloL 6.25 MG tablet  Commonly known as: COREG  Take 1 tablet (6.25 mg total) by mouth 2 (two) times daily.     cetirizine 5 MG tablet  Commonly known as: ZYRTEC  Take 1 tablet (5 mg total) by mouth once " daily.     cinacalcet 60 MG Tab  Commonly known as: SENSIPAR  Take 1 tablet (60 mg total) by mouth daily with breakfast.     DULoxetine 30 MG capsule  Commonly known as: CYMBALTA     ezetimibe 10 mg tablet  Commonly known as: ZETIA  Take 1 tablet (10 mg total) by mouth once daily.     insulin aspart U-100 100 unit/mL (3 mL) Inpn pen  Commonly known as: NovoLOG  Inject 6 Units into the skin 3 (three) times daily with meals. + SSI (TDD: 48)     minoxidiL 2.5 MG tablet  Commonly known as: LONITEN  Take 2 tablets (5 mg total) by mouth once daily.     multivitamin Tab     mycophenolate 250 mg Cap  Commonly known as: CELLCEPT  Take 4 capsules (1,000 mg total) by mouth 2 (two) times daily.     NIFEdipine 60 MG (OSM) 24 hr tablet  Commonly known as: PROCARDIA-XL  Take 1 tablet (60 mg total) by mouth 2 (two) times a day.     ondansetron 4 MG Tbdl  Commonly known as: ZOFRAN-ODT  Dissolve 1 tablet (4 mg total) by mouth every 8 (eight) hours as needed.     oxybutynin 5 MG Tab  Commonly known as: DITROPAN  Take 1 tablet (5 mg total) by mouth 3 (three) times daily as needed (bladder spasms).     oxyCODONE 5 MG immediate release tablet  Commonly known as: ROXICODONE  Take 1 tablet (5 mg total) by mouth every 6 (six) hours as needed for Pain.     pantoprazole 40 MG tablet  Commonly known as: PROTONIX  Take 1 tablet (40 mg total) by mouth once daily.     predniSONE 5 MG tablet  Commonly known as: DELTASONE  Take by mouth daily; 7/27/2024-8/2/2024: 20 mg, 8/3/2024-8/9/2024: 15 mg; 8/10/2024-8/16/2024: 10 mg; 8/17/2024- forever: 5 mg; do not stop     sulfamethoxazole-trimethoprim 400-80mg 400-80 mg per tablet  Commonly known as: BACTRIM,SEPTRA  Take 1 tablet by mouth every morning. Stop 1/21/25            STOP taking these medications      bisacodyL 10 mg Supp  Commonly known as: DULCOLAX     furosemide 40 MG tablet  Commonly known as: LASIX               Where to Get Your Medications        These medications were sent to [facility]  [address] [facility] [address]      Hours: Always Open Phone: [phone]   magnesium oxide 400 mg (241.3 mg magnesium) tablet  sodium bicarbonate 650 MG tablet  tacrolimus 1 MG Cap  valGANciclovir 450 mg Tab            The following medications have been placed on HOLD and should be restarted in the outpatient setting (when appropriate): None    Joann Kenney verbalized understanding and had the opportunity to ask questions.

## 2024-08-13 NOTE — DISCHARGE SUMMARY
[facility] - Transplant Stepdown  Kidney Transplant  Discharge Summary    Patient Name: Joann Kenney  MRN: [id number]  Admission Date: 8/9/2024  Hospital Length of Stay: 4 days  Discharge Date and Time:  08/13/2024 10:57 AM  Attending Physician: Joce Landry MD   Discharging Provider: Vandana Lamb NP  Primary Care Provider: Vijay Blake MD    HPI:   Patient is a 64 y/o F s/p DBD kidney transplant 7/24/2024 for ESRD 2/2 Type II DM. PMHx born w solitary kidney, DM2, HTN, incontience (s/p bladder sling), CAD with stents (on ASA), atrial fibrillation, CVA (no residual deficits), ALEJA, and anemia. Surgery without complications. PD cath removed. Post op course notable for down trending Cr with excellent UOP. She was seen in ER 7/28 for hypertension - PO meds adjusted and d/c' from ED. Pt was re admitted 7/29 thru 8/4 from clinic w HTN, decreased H/H and suspected subcutaneous hematoma at incision. She was transfused 3u pRBC during this hospital stay. Hemolysis w/u w no signs of schistocytes on peripheral smear. Kidney US 8/1 w 2 small collections. BP meds were adjusted- Pt is taking Coreg 6.25 mg bid (HR 65-75), Nifedipine 60mg bid and Minoxidil 5mg for BP management. Pt has been doing well since other than constipation. She presented to ER 8/8 w c/o spontaneous rust colored drainage fr lower portion of incision where she was missing staple(s). She was otherwise feeling well, labs were stable. Steri strips applied to open area and pressure dressing applied. Pt was started on Lasix 40 mg PO qd x5 and discharged from ER. Per pt several hours later she was bedning forward and noted large gush of again rust colored drainage and returned to ER as instructed. In ER, Lab are stable. Incision w new swelling to anterior portion of incision. CT A/P noted enlarged perinephric collection and also enlarged right lower quad abdominal wall collection. BP improved. Of note, pt remains constipated despite supp last evening.  Plan to admit, consult IR for drainage, f/u fluid studies and monitor CBC and CR. Pt and dtr verbalize understanding to admission. POC d/w w Dr Ruelas per colleague.      * No surgery found *     Hospital Course:    Patient admitted for drainage of perinephric fluid collection. IR consulted and drain placed 8/9/24. Fluid drainage w/o signs of infection or urine(WBC 47, segs 10, lymph 83, Cr 1.6). Initially, drain not holding charge, continued to drain. MALKA drain to RLQ holding charge and draining up to 125cc serosang drainage daily. Plan to keep drain in place until patient is seen in clinic on 8/21/2024 per surgeon. CT was repeated 8/12/24. Images reviewed per Dr Ruelas, no need for surgical intervention, collections reviewed, cont drainage per MALKA. Dtr is primary CG. She needs to return home to care for special needs child. Plan to discharge patient to home in [place] where dtr can care for patient. Plan for labs Thursday and Monday in [place]. Pt will return to clinic 8/21/24 to have stent removed and assess for drain removal. Patient is eating well. She is moving her bowels. She reports discomfort to RLQ, encourage pt to take PRN pain medications. Excellent urine output. On day of discharge, Cr lowest its been since transplant at 1.5. CBC stable, no transfusion requirements during this hospital stay. Vital signs are stable on current regimen (Coreg, Nifedipine and Minoxidil).    Discharge instructions reviewed by Pharmacist, Nursing and Transplant coordinator.  Patient and CG verbalize understanding and are in agreement with discharge from hospital today.  Patient is medically stable for discharge.  Patient will f/u w labs on Thursday and Monday in [place]. [facility] ordered. Clinic appt 8/21/24. Plan of care reviewed w pt's dtr- Sarah per phone.       Length of Stay was longer than expected due to: Discharged as expected    Goals of Care Treatment Preferences:  Code Status: Full Code      Final Active  "Diagnoses:    Diagnosis Date Noted POA    PRINCIPAL PROBLEM:  S/P kidney transplant [Z94.0] 07/25/2024 Not Applicable    Disorder of electrolytes [E87.8] 08/13/2024 Yes    Constipation [K59.00] 08/08/2024 Yes    Long-term use of immunosuppressant medication [Z79.60] 07/25/2024 Not Applicable    Prophylactic immunotherapy [Z29.89] 07/25/2024 Not Applicable    Acute blood loss anemia [D62] 07/25/2024 Yes    Renovascular hypertension [I15.0] 04/01/2021 Yes      Problems Resolved During this Admission:    Diagnosis Date Noted Date Resolved POA    Transient neurological symptoms [R29.818] 11/13/2018 08/09/2024 Yes     Treatments: see hospital course    Consults (From admission, onward)          Status Ordering Provider     Inpatient consult to PICC team (hospitals)  Once        Provider:  (Not yet assigned)    Completed DEBORA COULTER     Inpatient consult to Interventional Radiology  Once        Provider:  (Not yet assigned)    Completed LAILA GHOTRA            Pending Diagnostic Studies:       Procedure Component Value Units Date/Time    IR Abscess Drainage With Tube Placement [[id number]] Resulted: 08/09/24 1531    Order Status: Sent Lab Status: In process Updated: 08/09/24 1533          Significant Diagnostic Studies:   Labs: CMP   Recent Labs   Lab 08/12/24  0619 08/13/24  0602    140   K 3.4* 4.2   * 108   CO2 22* 25    111*   BUN 17 17   CREATININE 1.6* 1.5*   CALCIUM 8.2* 8.5*   ALBUMIN 2.8* 2.7*   ANIONGAP 9 7*     CBC   Recent Labs   Lab 08/12/24  0619 08/13/24  0602   WBC 6.82 7.44   HGB 8.6* 8.4*   HCT 26.8* 25.0*    204     INR   Lab Results   Component Value Date    INR 1.0 08/02/2024    INR 0.9 07/24/2024    INR 1.0 04/20/2022     and All labs within the past 24 hours have been reviewed    Microbiology: Blood Culture No results found for: "LABBLOO" and Urine Culture    Lab Results   Component Value Date    LABURIN No growth 07/30/2024     Radiology: X-Ray: CXR: X-Ray Chest 1 View " (CXR): No results found for this visit on 08/09/24.  CT scan: CT ABDOMEN PELVIS WITHOUT CONTRAST:   Results for orders placed or performed during the hospital encounter of 08/09/24   CT Abdomen Pelvis  Without Contrast    Narrative    EXAMINATION:  CT ABDOMEN PELVIS WITHOUT CONTRAST    CLINICAL HISTORY:  Abdominal abscess/infection suspected;assess fascia;    TECHNIQUE:  Low dose axial images, sagittal and coronal reformations were obtained from the lung bases to the pubic symphysis.  Contrast was not administered.    COMPARISON:  CT abdomen is 08/09/2024, 07/29/2024 CT abdomen pelvis with and without 06/16/2022    FINDINGS:  Evaluation of solid organs is limited due to the absence of intravenous contrast.    Lungs: No consolidation or pleural effusion in the visualized lung bases.    Heart: Normal size. Calcified atherosclerosis of the coronary arteries within multiple vessels.    Liver: Mildly enlarged bladder.  1.1 cm hyperdensity in left hepatic lobe is similar in appearance when compared to CT of the abdomen in 2022. Stable subcentimeter hypodensities.    Gallbladder: No calcified gallstones.  No pericholecystic inflammatory changes.    Bile ducts: No intrahepatic or extrahepatic biliary ductal dilatation.    Spleen: Normal size. Interval resolution of perisplenic fluid.    Pancreas: No peripancreatic fat stranding.    Adrenals: Stable left adrenal adenoma.  Hypertrophy of the right adrenal.    Renal/Ureters: The right native kidney is absent. There is perinephric fat stranding and cortical thinning of the atrophic left native kidney. Small nonobstructing nephrolithiasis within the native kidney.    Right iliac fossa transplanted kidney with redemonstration of the loculated perinephric fluid collection, status post IR drain placement.  Continues with abdominal fluid collection.  Similar in size, dimensions 20 x 7.8 x 9.4 cm. Previously 21 x 7.6 x 9.7 cm when remeasured today. There is a right ureteral stent  projecting from the transplant into the bladder. The bladder is unremarkable.    Reproductive: Surgically absent uterus.  No adnexal masses.    Stomach/Bowel: No evidence of obstruction or inflammatory changes. Appendix is normal.    Peritoneum: Similar volume of perinephric fluid.  Small stable volume of pelvic ascites.  No intraperitoneal free air.    Lymph Nodes: No pathologic kashmir enlargement in the abdomen or pelvis.    Vasculature: Abdominal aorta tapers normally.  There is moderate calcified atherosclerosis of the abdominal aorta and its branches.    Bones: No acute fractures. Degenerative changes of the spine.  No osseous destructive changes.    Soft Tissues: Interval expansion of the right lower quadrant incisional fluid collection. There are incisional staples overlying the collection.      Impression    1. Overall similar size of right lower quadrant perinephric fluid collection that appears continuous with abdominal wall fluid collection status post percutaneous drainage catheter placement.  Exam is limited but there is no evidence of hydronephrosis of the transplanted kidney.  2. Interval increase in volume of the subcutaneous incisional fluid collection.  Seroma versus hematoma.  3. Stable adrenal adenoma, stable liver hyperdensity.  4. Additional findings above.  5. This report was flagged in Epic as abnormal.    Electronically signed by resident: Harley Epsarza  Date:    08/13/2024  Time:    07:01    Electronically signed by: Fran Gutierrez  Date:    08/13/2024  Time:    09:22       Discharged Condition: fair    Disposition: Home or Self Care    Follow Up: see hospital course    Patient Instructions:      Notify your health care provider if you experience any of the following:  temperature >100.4     Notify your health care provider if you experience any of the following:  persistent nausea and vomiting or diarrhea     Notify your health care provider if you experience any of the following:  severe  uncontrolled pain     Notify your health care provider if you experience any of the following:  redness, tenderness, or signs of infection (pain, swelling, redness, odor or green/yellow discharge around incision site)     Notify your health care provider if you experience any of the following:  difficulty breathing or increased cough     Notify your health care provider if you experience any of the following:  severe persistent headache     Notify your health care provider if you experience any of the following:  increased confusion or weakness     Notify your health care provider if you experience any of the following:  persistent dizziness, light-headedness, or visual disturbances     Notify your health care provider if you experience any of the following:  worsening rash     Activity as tolerated     Medications:  Reconciled Home Medications:      Medication List        START taking these medications      magnesium oxide 400 mg (241.3 mg magnesium) tablet  Commonly known as: MAG-OX  Take 2 tablets (800 mg total) by mouth 2 (two) times daily.            CHANGE how you take these medications      sodium bicarbonate 650 MG tablet  Take 2 tablets (1,300 mg total) by mouth 2 (two) times daily.  What changed: how much to take     tacrolimus 1 MG Cap  Commonly known as: PROGRAF  Take 5 capsules (5 mg total) by mouth every 12 (twelve) hours.  What changed: how much to take     valGANciclovir 450 mg Tab  Commonly known as: VALCYTE  Take 1 tablet (450 mg total) by mouth once daily. Stop 10/23/24  What changed: when to take this            CONTINUE taking these medications      ACCU-CHEK GUIDE GLUCOSE METER Misc  Generic drug: blood-glucose meter  use as directed to check blood glucose     ACCU-CHEK GUIDE TEST STRIPS Strp  Generic drug: blood sugar diagnostic  use 1 strip to check blood glucose 3 (three) times daily.     ACCU-CHEK SOFTCLIX LANCETS Misc  Generic drug: lancets  1 lancet each to check blood glucose 3 (three)  "times daily.     ALPRAZolam 2 MG Tab  Commonly known as: XANAX  Take 2 mg by mouth every evening.     atorvastatin 20 MG tablet  Commonly known as: LIPITOR  Take 1 tablet (20 mg total) by mouth every evening.     BD ULTRA-FINE KIRSTIN PEN NEEDLE 32 gauge x 5/32" Ndle  Generic drug: pen needle, diabetic  1 each for use with insulin pen 3 (three) times daily.     carvediloL 6.25 MG tablet  Commonly known as: COREG  Take 1 tablet (6.25 mg total) by mouth 2 (two) times daily.     cetirizine 5 MG tablet  Commonly known as: ZYRTEC  Take 1 tablet (5 mg total) by mouth once daily.     cinacalcet 60 MG Tab  Commonly known as: SENSIPAR  Take 1 tablet (60 mg total) by mouth daily with breakfast.     DULoxetine 30 MG capsule  Commonly known as: CYMBALTA  Take 30 mg by mouth once daily.     ezetimibe 10 mg tablet  Commonly known as: ZETIA  Take 1 tablet (10 mg total) by mouth once daily.     insulin aspart U-100 100 unit/mL (3 mL) Inpn pen  Commonly known as: NovoLOG  Inject 6 Units into the skin 3 (three) times daily with meals. + SSI (TDD: 48)     minoxidiL 2.5 MG tablet  Commonly known as: LONITEN  Take 2 tablets (5 mg total) by mouth once daily.     multivitamin Tab  Take 1 tablet by mouth once daily.     mycophenolate 250 mg Cap  Commonly known as: CELLCEPT  Take 4 capsules (1,000 mg total) by mouth 2 (two) times daily.     NIFEdipine 60 MG (OSM) 24 hr tablet  Commonly known as: PROCARDIA-XL  Take 1 tablet (60 mg total) by mouth 2 (two) times a day.     ondansetron 4 MG Tbdl  Commonly known as: ZOFRAN-ODT  Dissolve 1 tablet (4 mg total) by mouth every 8 (eight) hours as needed.     oxybutynin 5 MG Tab  Commonly known as: DITROPAN  Take 1 tablet (5 mg total) by mouth 3 (three) times daily as needed (bladder spasms).     oxyCODONE 5 MG immediate release tablet  Commonly known as: ROXICODONE  Take 1 tablet (5 mg total) by mouth every 6 (six) hours as needed for Pain.     pantoprazole 40 MG tablet  Commonly known as: " PROTONIX  Take 1 tablet (40 mg total) by mouth once daily.     predniSONE 5 MG tablet  Commonly known as: DELTASONE  Take by mouth daily; 7/27/2024-8/2/2024: 20 mg, 8/3/2024-8/9/2024: 15 mg; 8/10/2024-8/16/2024: 10 mg; 8/17/2024- forever: 5 mg; do not stop     sulfamethoxazole-trimethoprim 400-80mg 400-80 mg per tablet  Commonly known as: BACTRIM,SEPTRA  Take 1 tablet by mouth every morning. Stop 1/21/25            STOP taking these medications      bisacodyL 10 mg Supp  Commonly known as: DULCOLAX     furosemide 40 MG tablet  Commonly known as: LASIX            Time spent caring for patient (Greater than 1/2 spent in direct face-to-face contact): > 30 minutes    Vandana Lamb NP  Kidney Transplant  [facility] - Transplant Stepdown

## 2024-08-13 NOTE — NURSING
Discharge instructions reviewed with the patient and her daughter. Both were taught how to empty and record the MALKA drain output. Reminded them both that the drain is to be empty every 8 hours. Right FA PIV removed. Patient preparing for discharge, awaiting bedside delivery.

## 2024-08-13 NOTE — SUBJECTIVE & OBJECTIVE
Subjective:     History of Present Illness:   Patient is a 64 y/o F s/p DBD kidney transplant 7/24/2024 for ESRD 2/2 Type II DM. PMHx born w solitary kidney, DM2, HTN, incontience (s/p bladder sling), CAD with stents (on ASA), atrial fibrillation, CVA (no residual deficits), ALEJA, and anemia. Surgery without complications. PD cath removed. Post op course notable for down trending Cr with excellent UOP. She was seen in ER 7/28 for hypertension - PO meds adjusted and d/c' from ED. Pt was re admitted 7/29 thru 8/4 from clinic w HTN, decreased H/H and suspected subcutaneous hematoma at incision. She was transfused 3u pRBC during this hospital stay. Hemolysis w/u w no signs of schistocytes on peripheral smear. Kidney US 8/1 w 2 small collections. BP meds were adjusted- Pt is taking Coreg 6.25 mg bid (HR 65-75), Nifedipine 60mg bid and Minoxidil 5mg for BP management. Pt has been doing well since other than constipation. She presented to ER 8/8 w c/o spontaneous rust colored drainage fr lower portion of incision where she was missing staple(s). She was otherwise feeling well, labs were stable. Steri strips applied to open area and pressure dressing applied. Pt was started on Lasix 40 mg PO qd x5 and discharged from ER. Per pt several hours later she was bedning forward and noted large gush of again rust colored drainage and returned to ER as instructed. In ER, Lab are stable. Incision w new swelling to anterior portion of incision. CT A/P noted enlarged perinephric collection and also enlarged right lower quad abdominal wall collection. BP improved. Of note, pt remains constipated despite supp last evening. Plan to admit, consult IR for drainage, f/u fluid studies and monitor CBC and CR. Pt and dtr verbalize understanding to admission. POC d/w w Dr Ruelas per colleague.        Ms. Kenney is a 63 y.o. year old female who is status post Kidney Transplant - 7/24/2024  (#1).    She received induction therapy with  Thymoglobulin and her maintenance immunosuppression consists of:   Immunosuppressants (From admission, onward)      Start     Stop Route Frequency Ordered    08/13/24 1800  tacrolimus capsule 5 mg         -- Oral 2 times daily 08/13/24 1021    08/09/24 0900  mycophenolate capsule 1,000 mg         -- Oral 2 times daily 08/09/24 0739          Past Medical and Surgical History: Ms. Kenney has a past medical history of Anemia, Anxiety, Atrial fibrillation, Bleeding (08/08/2024), Coronary artery disease, Depression, Diabetes mellitus, type 2, Disorder of kidney and ureter, Heart murmur, Hyperlipidemia, Hypertension, Obesity, ALEJA (obstructive sleep apnea), Proteinuria, Solitary kidney, Stroke, and Transient neurological symptoms (11/13/2018).  She has a past surgical history that includes Coronary angioplasty with stent; internal heart monitor; Hysterectomy; Insertion of implantable loop recorder; Peritoneal catheter insertion; Incontinence surgery; Kidney transplant (Right, 7/24/2024); and Peritoneal catheter removal (Left, 7/24/2024).    Past Social and Family History: Ms. Kenney reports that she has quit smoking. She has never used smokeless tobacco. She reports that she does not currently use alcohol. She reports that she does not use drugs.Her family history is not on file.    Intake/Output - Last 3 Shifts         08/11 0700 08/12 0659 08/12 0700 08/13 0659 08/13 0700 08/14 0659    P.O. 360 1610 240    I.V. (mL/kg)  50 (0.6)     Total Intake(mL/kg) 360 (4.4) 1660 (20.1) 240 (2.9)    Urine (mL/kg/hr) 1700 (0.9) 2500 (1.3) 200 (0.3)    Emesis/NG output  0     Drains 125 15     Stool 0 0 0    Total Output 1825 2515 200    Net -1465 -855 +40           Urine Occurrence   1 x    Stool Occurrence  0 x 1 x    Emesis Occurrence  1 x              Review of Systems   Constitutional:  Negative for activity change, appetite change, fatigue and fever.   HENT: Negative.     Eyes: Negative.    Respiratory: Negative.  Negative  "for cough, shortness of breath and wheezing.    Cardiovascular:  Negative for chest pain and palpitations.   Gastrointestinal:  Negative for abdominal pain (denies), constipation, nausea and vomiting.   Genitourinary: Negative.    Musculoskeletal: Negative.    Skin:  Positive for wound.   Allergic/Immunologic: Positive for immunocompromised state.   Hematological: Negative.    Psychiatric/Behavioral: Negative.       Objective:     Vital Signs (Most Recent):  Temp: 98.4 °F (36.9 °C) (08/13/24 1113)  Pulse: 70 (08/13/24 1113)  Resp: 18 (08/13/24 1113)  BP: (!) 166/71 (08/13/24 1113)  SpO2: 98 % (08/13/24 1113) Vital Signs (24h Range):  Temp:  [97.9 °F (36.6 °C)-100.2 °F (37.9 °C)] 98.4 °F (36.9 °C)  Pulse:  [67-77] 70  Resp:  [16-20] 18  SpO2:  [96 %-100 %] 98 %  BP: (145-175)/(65-74) 166/71     Weight: 82.5 kg (181 lb 14.1 oz)  Height: 5' 5" (165.1 cm)  Body mass index is 30.27 kg/m².     Physical Exam  Vitals and nursing note reviewed.   Constitutional:       Appearance: Normal appearance. She is obese.   HENT:      Head: Normocephalic.   Eyes:      General: No scleral icterus.  Abdominal:      General: Bowel sounds are normal. There is no distension.      Palpations: Abdomen is soft.      Tenderness: There is no abdominal tenderness.      Comments: Palpable hematoma to anterior portion of RLQ kidney incision larger and more diffuse on exam today compared to 8/8  Area w/o staples to lower portion of incision CDI but WITH pressure draining rust colored, thin drainage   Musculoskeletal:         General: Injury: trace BLE.      Right lower leg: No edema.   Skin:     General: Skin is warm and dry.      Capillary Refill: Capillary refill takes 2 to 3 seconds.   Neurological:      General: No focal deficit present.      Mental Status: She is alert and oriented to person, place, and time.   Psychiatric:         Mood and Affect: Mood normal.         Behavior: Behavior normal.         Thought Content: Thought content normal. " "        Judgment: Judgment normal.        Significant Labs:  CBC:   Recent Labs   Lab 08/11/24  0537 08/12/24  0619 08/13/24  0602   WBC 4.89 6.82 7.44   RBC 2.62* 2.77* 2.60*   HGB 8.0* 8.6* 8.4*   HCT 25.4* 26.8* 25.0*    229 204   MCV 97 97 96   MCH 30.5 31.0 32.3*   MCHC 31.5* 32.1 33.6     CMP:   Recent Labs   Lab 08/08/24  1318 08/09/24  0737 08/10/24  0553 08/11/24  0537 08/12/24  0619 08/13/24  0602   * 147*  145*   < > 111* 110 111*   CALCIUM 8.6* 9.0  8.9   < > 8.2* 8.2* 8.5*   ALBUMIN 2.7* 3.1*  3.1*   < > 2.7* 2.8* 2.7*   PROT 4.8* 5.6*  --   --   --   --     142  141   < > 141 143 140   K 4.0 4.0  3.9   < > 3.7 3.4* 4.2   CO2 23 22*  21*   < > 20* 22* 25    110  111*   < > 113* 112* 108   BUN 18 18  17   < > 15 17 17   CREATININE 1.7* 1.8*  1.8*   < > 1.6* 1.6* 1.5*   ALKPHOS 65 75  --   --   --   --    ALT 18 19  --   --   --   --    AST 12 15  --   --   --   --     < > = values in this interval not displayed.     Coagulation:   No results for input(s): "PT", "APTT" in the last 168 hours.    Labs within the past 24 hours have been reviewed.    Diagnostics:  Previous imaging reviewed  "

## 2024-08-14 LAB — FACT XIIIA PPP-ACNC: 82 %

## 2024-08-16 ENCOUNTER — TELEPHONE (OUTPATIENT)
Dept: TRANSPLANT | Facility: CLINIC | Age: 64
End: 2024-08-16
Payer: MEDICARE

## 2024-08-16 ENCOUNTER — HOSPITAL ENCOUNTER (INPATIENT)
Facility: HOSPITAL | Age: 64
LOS: 10 days | Discharge: HOME HEALTH | DRG: 863 | End: 2024-08-26
Attending: TRANSPLANT SURGERY | Admitting: TRANSPLANT SURGERY
Payer: MEDICARE

## 2024-08-16 DIAGNOSIS — Z16.12 INFECTION DUE TO ESBL-PRODUCING KLEBSIELLA PNEUMONIAE: ICD-10-CM

## 2024-08-16 DIAGNOSIS — Z29.89 PROPHYLACTIC IMMUNOTHERAPY: ICD-10-CM

## 2024-08-16 DIAGNOSIS — I15.8 HYPERTENSION ASSOCIATED WITH TRANSPLANTATION: ICD-10-CM

## 2024-08-16 DIAGNOSIS — Z79.60 LONG-TERM USE OF IMMUNOSUPPRESSANT MEDICATION: ICD-10-CM

## 2024-08-16 DIAGNOSIS — N18.6 TYPE 2 DM WITH HYPERTENSION AND ESRD ON DIALYSIS: ICD-10-CM

## 2024-08-16 DIAGNOSIS — Z94.9 HYPERTENSION ASSOCIATED WITH TRANSPLANTATION: ICD-10-CM

## 2024-08-16 DIAGNOSIS — Z99.2 TYPE 2 DM WITH HYPERTENSION AND ESRD ON DIALYSIS: ICD-10-CM

## 2024-08-16 DIAGNOSIS — F41.9 ANXIETY: ICD-10-CM

## 2024-08-16 DIAGNOSIS — Z94.0 S/P KIDNEY TRANSPLANT: ICD-10-CM

## 2024-08-16 DIAGNOSIS — E08.22 DIABETES MELLITUS DUE TO UNDERLYING CONDITION WITH CHRONIC KIDNEY DISEASE ON CHRONIC DIALYSIS, WITH LONG-TERM CURRENT USE OF INSULIN: ICD-10-CM

## 2024-08-16 DIAGNOSIS — Z99.2 DIABETES MELLITUS DUE TO UNDERLYING CONDITION WITH CHRONIC KIDNEY DISEASE ON CHRONIC DIALYSIS, WITH LONG-TERM CURRENT USE OF INSULIN: ICD-10-CM

## 2024-08-16 DIAGNOSIS — N18.6 DIABETES MELLITUS DUE TO UNDERLYING CONDITION WITH CHRONIC KIDNEY DISEASE ON CHRONIC DIALYSIS, WITH LONG-TERM CURRENT USE OF INSULIN: ICD-10-CM

## 2024-08-16 DIAGNOSIS — T81.49XA WOUND INFECTION AFTER SURGERY: Primary | ICD-10-CM

## 2024-08-16 DIAGNOSIS — E83.39 HYPOPHOSPHATEMIA: ICD-10-CM

## 2024-08-16 DIAGNOSIS — E78.49 OTHER HYPERLIPIDEMIA: ICD-10-CM

## 2024-08-16 DIAGNOSIS — A49.8 INFECTION DUE TO ESBL-PRODUCING KLEBSIELLA PNEUMONIAE: ICD-10-CM

## 2024-08-16 DIAGNOSIS — D63.8 ANEMIA OF CHRONIC DISEASE: ICD-10-CM

## 2024-08-16 DIAGNOSIS — E87.6 HYPOKALEMIA: ICD-10-CM

## 2024-08-16 DIAGNOSIS — I12.0 TYPE 2 DM WITH HYPERTENSION AND ESRD ON DIALYSIS: ICD-10-CM

## 2024-08-16 DIAGNOSIS — T14.8XXA WOUND DRAINAGE: ICD-10-CM

## 2024-08-16 DIAGNOSIS — Z79.4 DIABETES MELLITUS DUE TO UNDERLYING CONDITION WITH CHRONIC KIDNEY DISEASE ON CHRONIC DIALYSIS, WITH LONG-TERM CURRENT USE OF INSULIN: ICD-10-CM

## 2024-08-16 DIAGNOSIS — E11.22 TYPE 2 DM WITH HYPERTENSION AND ESRD ON DIALYSIS: ICD-10-CM

## 2024-08-16 LAB — BACTERIA SPEC ANAEROBE CULT: NORMAL

## 2024-08-16 PROCEDURE — 25000003 PHARM REV CODE 250: Performed by: PHYSICIAN ASSISTANT

## 2024-08-16 PROCEDURE — 20600001 HC STEP DOWN PRIVATE ROOM

## 2024-08-16 RX ORDER — LANOLIN ALCOHOL/MO/W.PET/CERES
800 CREAM (GRAM) TOPICAL 2 TIMES DAILY
Status: DISCONTINUED | OUTPATIENT
Start: 2024-08-16 | End: 2024-08-26 | Stop reason: HOSPADM

## 2024-08-16 RX ORDER — MYCOPHENOLATE MOFETIL 250 MG/1
1000 CAPSULE ORAL 2 TIMES DAILY
Status: DISCONTINUED | OUTPATIENT
Start: 2024-08-16 | End: 2024-08-17

## 2024-08-16 RX ORDER — ACETAMINOPHEN 325 MG/1
650 TABLET ORAL EVERY 8 HOURS PRN
Status: DISCONTINUED | OUTPATIENT
Start: 2024-08-16 | End: 2024-08-26 | Stop reason: HOSPADM

## 2024-08-16 RX ORDER — SODIUM CHLORIDE 0.9 % (FLUSH) 0.9 %
10 SYRINGE (ML) INJECTION
Status: DISCONTINUED | OUTPATIENT
Start: 2024-08-16 | End: 2024-08-26 | Stop reason: HOSPADM

## 2024-08-16 RX ORDER — CARVEDILOL 6.25 MG/1
6.25 TABLET ORAL 2 TIMES DAILY
Status: DISCONTINUED | OUTPATIENT
Start: 2024-08-16 | End: 2024-08-26 | Stop reason: HOSPADM

## 2024-08-16 RX ORDER — OXYBUTYNIN CHLORIDE 5 MG/1
5 TABLET ORAL 3 TIMES DAILY PRN
Status: DISCONTINUED | OUTPATIENT
Start: 2024-08-16 | End: 2024-08-26 | Stop reason: HOSPADM

## 2024-08-16 RX ORDER — MINOXIDIL 2.5 MG/1
5 TABLET ORAL DAILY
Status: DISCONTINUED | OUTPATIENT
Start: 2024-08-17 | End: 2024-08-24

## 2024-08-16 RX ORDER — ALPRAZOLAM 0.5 MG/1
2 TABLET ORAL NIGHTLY
Status: DISCONTINUED | OUTPATIENT
Start: 2024-08-16 | End: 2024-08-26 | Stop reason: HOSPADM

## 2024-08-16 RX ORDER — CINACALCET 30 MG/1
60 TABLET, FILM COATED ORAL
Status: DISCONTINUED | OUTPATIENT
Start: 2024-08-17 | End: 2024-08-26 | Stop reason: HOSPADM

## 2024-08-16 RX ORDER — SODIUM BICARBONATE 650 MG/1
1300 TABLET ORAL 2 TIMES DAILY
Status: DISCONTINUED | OUTPATIENT
Start: 2024-08-16 | End: 2024-08-18

## 2024-08-16 RX ORDER — OXYCODONE HYDROCHLORIDE 5 MG/1
5 TABLET ORAL EVERY 6 HOURS PRN
Status: DISCONTINUED | OUTPATIENT
Start: 2024-08-16 | End: 2024-08-26 | Stop reason: HOSPADM

## 2024-08-16 RX ORDER — PREDNISONE 5 MG/1
5 TABLET ORAL DAILY
Status: DISCONTINUED | OUTPATIENT
Start: 2024-08-17 | End: 2024-08-26 | Stop reason: HOSPADM

## 2024-08-16 RX ORDER — ONDANSETRON 8 MG/1
8 TABLET, ORALLY DISINTEGRATING ORAL EVERY 8 HOURS PRN
Status: DISCONTINUED | OUTPATIENT
Start: 2024-08-16 | End: 2024-08-26 | Stop reason: HOSPADM

## 2024-08-16 RX ORDER — GLUCAGON 1 MG
1 KIT INJECTION
Status: DISCONTINUED | OUTPATIENT
Start: 2024-08-16 | End: 2024-08-26 | Stop reason: HOSPADM

## 2024-08-16 RX ORDER — SULFAMETHOXAZOLE AND TRIMETHOPRIM 400; 80 MG/1; MG/1
1 TABLET ORAL EVERY MORNING
Status: DISCONTINUED | OUTPATIENT
Start: 2024-08-17 | End: 2024-08-26 | Stop reason: HOSPADM

## 2024-08-16 RX ORDER — TACROLIMUS 5 MG/1
5 CAPSULE ORAL 2 TIMES DAILY
Status: DISCONTINUED | OUTPATIENT
Start: 2024-08-17 | End: 2024-08-17

## 2024-08-16 RX ORDER — INSULIN ASPART 100 [IU]/ML
6 INJECTION, SOLUTION INTRAVENOUS; SUBCUTANEOUS
Status: DISCONTINUED | OUTPATIENT
Start: 2024-08-17 | End: 2024-08-17

## 2024-08-16 RX ORDER — IBUPROFEN 200 MG
16 TABLET ORAL
Status: DISCONTINUED | OUTPATIENT
Start: 2024-08-16 | End: 2024-08-26 | Stop reason: HOSPADM

## 2024-08-16 RX ORDER — NIFEDIPINE 30 MG/1
60 TABLET, EXTENDED RELEASE ORAL 2 TIMES DAILY
Status: DISCONTINUED | OUTPATIENT
Start: 2024-08-16 | End: 2024-08-21

## 2024-08-16 RX ORDER — PANTOPRAZOLE SODIUM 40 MG/1
40 TABLET, DELAYED RELEASE ORAL DAILY
Status: DISCONTINUED | OUTPATIENT
Start: 2024-08-17 | End: 2024-08-26 | Stop reason: HOSPADM

## 2024-08-16 RX ORDER — IBUPROFEN 200 MG
24 TABLET ORAL
Status: DISCONTINUED | OUTPATIENT
Start: 2024-08-16 | End: 2024-08-26 | Stop reason: HOSPADM

## 2024-08-16 RX ORDER — ATORVASTATIN CALCIUM 20 MG/1
20 TABLET, FILM COATED ORAL NIGHTLY
Status: DISCONTINUED | OUTPATIENT
Start: 2024-08-16 | End: 2024-08-26 | Stop reason: HOSPADM

## 2024-08-16 RX ORDER — TALC
6 POWDER (GRAM) TOPICAL NIGHTLY PRN
Status: DISCONTINUED | OUTPATIENT
Start: 2024-08-16 | End: 2024-08-26 | Stop reason: HOSPADM

## 2024-08-16 RX ORDER — EZETIMIBE 10 MG/1
10 TABLET ORAL DAILY
Status: DISCONTINUED | OUTPATIENT
Start: 2024-08-17 | End: 2024-08-26 | Stop reason: HOSPADM

## 2024-08-16 RX ORDER — INSULIN ASPART 100 [IU]/ML
0-5 INJECTION, SOLUTION INTRAVENOUS; SUBCUTANEOUS
Status: DISCONTINUED | OUTPATIENT
Start: 2024-08-16 | End: 2024-08-26 | Stop reason: HOSPADM

## 2024-08-16 RX ORDER — DULOXETIN HYDROCHLORIDE 30 MG/1
30 CAPSULE, DELAYED RELEASE ORAL DAILY
Status: DISCONTINUED | OUTPATIENT
Start: 2024-08-17 | End: 2024-08-17

## 2024-08-16 RX ORDER — VALGANCICLOVIR 450 MG/1
450 TABLET, FILM COATED ORAL DAILY
Status: DISCONTINUED | OUTPATIENT
Start: 2024-08-17 | End: 2024-08-17

## 2024-08-16 RX ORDER — CETIRIZINE HYDROCHLORIDE 5 MG/1
5 TABLET ORAL DAILY
Status: DISCONTINUED | OUTPATIENT
Start: 2024-08-17 | End: 2024-08-17

## 2024-08-16 RX ADMIN — ALPRAZOLAM 2 MG: 0.5 TABLET ORAL at 11:08

## 2024-08-16 NOTE — HPI
Joann Kenney is a 62 y/o F s/p DBD kidney transplant 7/24/2024 for ESRD 2/2 Type II DM. PMHx born w solitary kidney, DM2, HTN, incontience (s/p bladder sling), CAD with stents (on ASA), atrial fibrillation, CVA (no residual deficits), ALEJA, and anemia. Surgery without complications. PD cath removed. Post op course notable for down trending Cr with excellent UOP. Patient recently admitted 8/8 to 8/13 d/t incisional drainage. CT A/P noted enlarged fluid collection around the right renal transplant and extending into the RLQ abdominal wall. She underwent IR fluid aspiration and drain placement on 8/9. Fluid studies neg for infection or urine leak (WBC 47, segs 10, lymph 83, Cr 1.6). Repeat CT scan done 8/12 reviewed by surgeons, plan to discharge pt with drain in place for continued drainage per MALKA.     Patient now presents as a transfer from OSH due to incisional drainage. Pt presented to her local ED with large amount of bloody drainage from her transplant incision. Wound has 3 cm opening in medial aspect with old bloody drainage CT A/P at OSH w/ large collection 17 x 4 x 3 cm. Her percutaneous drain remains in place and per patient turned milky and purulent appearing day prior to presenting to the OSH.  Pt reports overall feeling well. Pt denies fever/chills, decreased UOP, dysuria. Plan for infectious work-up, IV abx, wound care consult. Will review CT imaging with transplant surgeon with likely plan for IR consult

## 2024-08-16 NOTE — TELEPHONE ENCOUNTER
ASH returned Sarah's call regarding care for mother (pt) beyond recent admit.     Sarah reports she is unable to care for mother and recently appointed caregiver will not be able to assist either. Sarah inquired of rehabilitation facilities and nursing facilities. ASH explained unsure if these facilities will be available to pt based off need. ASH explained team will not know until pt's discharge plans have been determined based of need.      Sarah verbalized understanding and will await follow up. ASH remains available to pt and family.         ----- Message from Nivia Lara sent at 8/16/2024 12:14 PM CDT -----  Regarding: Patient advice  Contact: Sarah  [phone]            Name of Caller: Sarah Pt's daughter      Contact Preference:[phone]     Nature of Call:  Requesting a call back states pt is back in the ER pt's daughter have a few question

## 2024-08-16 NOTE — ASSESSMENT & PLAN NOTE
- S/p Ktxp 7/24/24  - Cr trended down with excellent UOP  - Multiple Readmit d/t wound complications

## 2024-08-16 NOTE — ASSESSMENT & PLAN NOTE
- Transfer from OSH d/t large wound drainage from kidney transplant incision  - Recently admitted 8/8-8/13 for wound drainage, s/p IR aspiration and drain placement of perinephric collection  - Perc drain now w/ milky purulent output  - Plan for IV abx, infectious w/u, wound care, possible IR consult   - Continue to monitor

## 2024-08-17 LAB
ALBUMIN SERPL BCP-MCNC: 2.3 G/DL (ref 3.5–5.2)
ALBUMIN SERPL BCP-MCNC: 2.6 G/DL (ref 3.5–5.2)
ANION GAP SERPL CALC-SCNC: 10 MMOL/L (ref 8–16)
ANION GAP SERPL CALC-SCNC: 10 MMOL/L (ref 8–16)
APPEARANCE FLD: NORMAL
BACTERIA #/AREA URNS AUTO: ABNORMAL /HPF
BASOPHILS # BLD AUTO: 0.01 K/UL (ref 0–0.2)
BASOPHILS # BLD AUTO: 0.01 K/UL (ref 0–0.2)
BASOPHILS NFR BLD: 0.1 % (ref 0–1.9)
BASOPHILS NFR BLD: 0.1 % (ref 0–1.9)
BILIRUB UR QL STRIP: NEGATIVE
BODY FLD TYPE: NORMAL
BODY FLUID COMMENTS: NORMAL
BODY FLUID SOURCE, CREATININE: NORMAL
BUN SERPL-MCNC: 19 MG/DL (ref 8–23)
BUN SERPL-MCNC: 20 MG/DL (ref 8–23)
CALCIUM SERPL-MCNC: 10.2 MG/DL (ref 8.7–10.5)
CALCIUM SERPL-MCNC: 9.8 MG/DL (ref 8.7–10.5)
CHLORIDE SERPL-SCNC: 107 MMOL/L (ref 95–110)
CHLORIDE SERPL-SCNC: 108 MMOL/L (ref 95–110)
CLARITY UR REFRACT.AUTO: CLEAR
CO2 SERPL-SCNC: 19 MMOL/L (ref 23–29)
CO2 SERPL-SCNC: 22 MMOL/L (ref 23–29)
COLOR FLD: NORMAL
COLOR UR AUTO: COLORLESS
CREAT FLD-MCNC: 1.7 MG/DL
CREAT SERPL-MCNC: 1.8 MG/DL (ref 0.5–1.4)
CREAT SERPL-MCNC: 1.9 MG/DL (ref 0.5–1.4)
DIFFERENTIAL METHOD BLD: ABNORMAL
DIFFERENTIAL METHOD BLD: ABNORMAL
EOSINOPHIL # BLD AUTO: 0 K/UL (ref 0–0.5)
EOSINOPHIL # BLD AUTO: 0 K/UL (ref 0–0.5)
EOSINOPHIL NFR BLD: 0 % (ref 0–8)
EOSINOPHIL NFR BLD: 0 % (ref 0–8)
ERYTHROCYTE [DISTWIDTH] IN BLOOD BY AUTOMATED COUNT: 17 % (ref 11.5–14.5)
ERYTHROCYTE [DISTWIDTH] IN BLOOD BY AUTOMATED COUNT: 17 % (ref 11.5–14.5)
EST. GFR  (NO RACE VARIABLE): 28.9 ML/MIN/1.73 M^2
EST. GFR  (NO RACE VARIABLE): 30.9 ML/MIN/1.73 M^2
GLUCOSE SERPL-MCNC: 211 MG/DL (ref 70–110)
GLUCOSE SERPL-MCNC: 228 MG/DL (ref 70–110)
GLUCOSE UR QL STRIP: ABNORMAL
HCT VFR BLD AUTO: 27.5 % (ref 37–48.5)
HCT VFR BLD AUTO: 29 % (ref 37–48.5)
HGB BLD-MCNC: 9.1 G/DL (ref 12–16)
HGB BLD-MCNC: 9.4 G/DL (ref 12–16)
HGB UR QL STRIP: NEGATIVE
HYALINE CASTS UR QL AUTO: 0 /LPF
IMM GRANULOCYTES # BLD AUTO: 0.03 K/UL (ref 0–0.04)
IMM GRANULOCYTES # BLD AUTO: 0.04 K/UL (ref 0–0.04)
IMM GRANULOCYTES NFR BLD AUTO: 0.4 % (ref 0–0.5)
IMM GRANULOCYTES NFR BLD AUTO: 0.6 % (ref 0–0.5)
KETONES UR QL STRIP: ABNORMAL
LEUKOCYTE ESTERASE UR QL STRIP: ABNORMAL
LYMPHOCYTES # BLD AUTO: 0.2 K/UL (ref 1–4.8)
LYMPHOCYTES # BLD AUTO: 0.2 K/UL (ref 1–4.8)
LYMPHOCYTES NFR BLD: 2.3 % (ref 18–48)
LYMPHOCYTES NFR BLD: 2.9 % (ref 18–48)
LYMPHOCYTES NFR FLD MANUAL: 1 %
MAGNESIUM SERPL-MCNC: 1.5 MG/DL (ref 1.6–2.6)
MCH RBC QN AUTO: 30.7 PG (ref 27–31)
MCH RBC QN AUTO: 31.5 PG (ref 27–31)
MCHC RBC AUTO-ENTMCNC: 32.4 G/DL (ref 32–36)
MCHC RBC AUTO-ENTMCNC: 33.1 G/DL (ref 32–36)
MCV RBC AUTO: 95 FL (ref 82–98)
MCV RBC AUTO: 95 FL (ref 82–98)
MICROSCOPIC COMMENT: ABNORMAL
MONOCYTES # BLD AUTO: 0.1 K/UL (ref 0.3–1)
MONOCYTES # BLD AUTO: 0.3 K/UL (ref 0.3–1)
MONOCYTES NFR BLD: 1.1 % (ref 4–15)
MONOCYTES NFR BLD: 3.6 % (ref 4–15)
MONOS+MACROS NFR FLD MANUAL: 1 %
NEUTROPHILS # BLD AUTO: 6.5 K/UL (ref 1.8–7.7)
NEUTROPHILS # BLD AUTO: 7.1 K/UL (ref 1.8–7.7)
NEUTROPHILS NFR BLD: 92.8 % (ref 38–73)
NEUTROPHILS NFR BLD: 96.1 % (ref 38–73)
NEUTROPHILS NFR FLD MANUAL: 98 %
NITRITE UR QL STRIP: NEGATIVE
NRBC BLD-RTO: 0 /100 WBC
NRBC BLD-RTO: 0 /100 WBC
PH UR STRIP: 8 [PH] (ref 5–8)
PHOSPHATE SERPL-MCNC: 3.2 MG/DL (ref 2.7–4.5)
PHOSPHATE SERPL-MCNC: 3.2 MG/DL (ref 2.7–4.5)
PLATELET # BLD AUTO: 292 K/UL (ref 150–450)
PLATELET # BLD AUTO: 324 K/UL (ref 150–450)
PMV BLD AUTO: 10.9 FL (ref 9.2–12.9)
PMV BLD AUTO: 11.2 FL (ref 9.2–12.9)
POCT GLUCOSE: 223 MG/DL (ref 70–110)
POCT GLUCOSE: 233 MG/DL (ref 70–110)
POCT GLUCOSE: 240 MG/DL (ref 70–110)
POCT GLUCOSE: 283 MG/DL (ref 70–110)
POTASSIUM SERPL-SCNC: 4.4 MMOL/L (ref 3.5–5.1)
POTASSIUM SERPL-SCNC: 4.4 MMOL/L (ref 3.5–5.1)
PROT UR QL STRIP: ABNORMAL
RBC # BLD AUTO: 2.89 M/UL (ref 4–5.4)
RBC # BLD AUTO: 3.06 M/UL (ref 4–5.4)
RBC #/AREA URNS AUTO: 1 /HPF (ref 0–4)
SODIUM SERPL-SCNC: 136 MMOL/L (ref 136–145)
SODIUM SERPL-SCNC: 140 MMOL/L (ref 136–145)
SP GR UR STRIP: 1.01 (ref 1–1.03)
SQUAMOUS #/AREA URNS AUTO: 1 /HPF
TACROLIMUS BLD-MCNC: 10.5 NG/ML (ref 5–15)
URN SPEC COLLECT METH UR: ABNORMAL
WBC # BLD AUTO: 6.96 K/UL (ref 3.9–12.7)
WBC # BLD AUTO: 7.4 K/UL (ref 3.9–12.7)
WBC # FLD: NORMAL /CU MM
WBC #/AREA URNS AUTO: 11 /HPF (ref 0–5)
YEAST UR QL AUTO: ABNORMAL

## 2024-08-17 PROCEDURE — 80069 RENAL FUNCTION PANEL: CPT | Performed by: PHYSICIAN ASSISTANT

## 2024-08-17 PROCEDURE — 0HC7XZZ EXTIRPATION OF MATTER FROM ABDOMEN SKIN, EXTERNAL APPROACH: ICD-10-PCS | Performed by: SURGERY

## 2024-08-17 PROCEDURE — 84478 ASSAY OF TRIGLYCERIDES: CPT | Performed by: PHYSICIAN ASSISTANT

## 2024-08-17 PROCEDURE — 63600175 PHARM REV CODE 636 W HCPCS: Performed by: NURSE PRACTITIONER

## 2024-08-17 PROCEDURE — 25000003 PHARM REV CODE 250: Performed by: PHYSICIAN ASSISTANT

## 2024-08-17 PROCEDURE — 3E10X8Z IRRIGATION OF SKIN AND MUCOUS MEMBRANES USING IRRIGATING SUBSTANCE: ICD-10-PCS | Performed by: SURGERY

## 2024-08-17 PROCEDURE — 87086 URINE CULTURE/COLONY COUNT: CPT | Performed by: PHYSICIAN ASSISTANT

## 2024-08-17 PROCEDURE — 36415 COLL VENOUS BLD VENIPUNCTURE: CPT | Performed by: PHYSICIAN ASSISTANT

## 2024-08-17 PROCEDURE — 94660 CPAP INITIATION&MGMT: CPT

## 2024-08-17 PROCEDURE — 63600175 PHARM REV CODE 636 W HCPCS: Performed by: PHYSICIAN ASSISTANT

## 2024-08-17 PROCEDURE — 94761 N-INVAS EAR/PLS OXIMETRY MLT: CPT

## 2024-08-17 PROCEDURE — 87040 BLOOD CULTURE FOR BACTERIA: CPT | Mod: 59 | Performed by: PHYSICIAN ASSISTANT

## 2024-08-17 PROCEDURE — 87186 SC STD MICRODIL/AGAR DIL: CPT | Performed by: PHYSICIAN ASSISTANT

## 2024-08-17 PROCEDURE — 25000003 PHARM REV CODE 250: Performed by: TRANSPLANT SURGERY

## 2024-08-17 PROCEDURE — 20600001 HC STEP DOWN PRIVATE ROOM

## 2024-08-17 PROCEDURE — 85025 COMPLETE CBC W/AUTO DIFF WBC: CPT | Mod: 91 | Performed by: PHYSICIAN ASSISTANT

## 2024-08-17 PROCEDURE — 63600175 PHARM REV CODE 636 W HCPCS: Performed by: TRANSPLANT SURGERY

## 2024-08-17 PROCEDURE — 99223 1ST HOSP IP/OBS HIGH 75: CPT | Mod: 24,,, | Performed by: PHYSICIAN ASSISTANT

## 2024-08-17 PROCEDURE — 80197 ASSAY OF TACROLIMUS: CPT | Performed by: PHYSICIAN ASSISTANT

## 2024-08-17 PROCEDURE — 87088 URINE BACTERIA CULTURE: CPT | Performed by: PHYSICIAN ASSISTANT

## 2024-08-17 PROCEDURE — 89051 BODY FLUID CELL COUNT: CPT | Performed by: PHYSICIAN ASSISTANT

## 2024-08-17 PROCEDURE — 97606 NEG PRS WND THER DME>50 SQCM: CPT

## 2024-08-17 PROCEDURE — 27000190 HC CPAP FULL FACE MASK W/VALVE

## 2024-08-17 PROCEDURE — 80069 RENAL FUNCTION PANEL: CPT | Mod: 91 | Performed by: PHYSICIAN ASSISTANT

## 2024-08-17 PROCEDURE — 99222 1ST HOSP IP/OBS MODERATE 55: CPT | Mod: ,,, | Performed by: NURSE PRACTITIONER

## 2024-08-17 PROCEDURE — 81001 URINALYSIS AUTO W/SCOPE: CPT | Performed by: PHYSICIAN ASSISTANT

## 2024-08-17 PROCEDURE — 27100171 HC OXYGEN HIGH FLOW UP TO 24 HOURS

## 2024-08-17 PROCEDURE — 82570 ASSAY OF URINE CREATININE: CPT | Performed by: PHYSICIAN ASSISTANT

## 2024-08-17 PROCEDURE — 99900035 HC TECH TIME PER 15 MIN (STAT)

## 2024-08-17 PROCEDURE — 83735 ASSAY OF MAGNESIUM: CPT | Performed by: PHYSICIAN ASSISTANT

## 2024-08-17 RX ORDER — VALGANCICLOVIR 450 MG/1
450 TABLET, FILM COATED ORAL
Status: DISCONTINUED | OUTPATIENT
Start: 2024-08-19 | End: 2024-08-22

## 2024-08-17 RX ORDER — DULOXETIN HYDROCHLORIDE 30 MG/1
30 CAPSULE, DELAYED RELEASE ORAL NIGHTLY
Status: DISCONTINUED | OUTPATIENT
Start: 2024-08-17 | End: 2024-08-26 | Stop reason: HOSPADM

## 2024-08-17 RX ORDER — MAGNESIUM SULFATE HEPTAHYDRATE 40 MG/ML
2 INJECTION, SOLUTION INTRAVENOUS ONCE
Status: COMPLETED | OUTPATIENT
Start: 2024-08-17 | End: 2024-08-17

## 2024-08-17 RX ORDER — INSULIN ASPART 100 [IU]/ML
5 INJECTION, SOLUTION INTRAVENOUS; SUBCUTANEOUS
Status: DISCONTINUED | OUTPATIENT
Start: 2024-08-17 | End: 2024-08-18

## 2024-08-17 RX ORDER — TACROLIMUS 1 MG/1
4 CAPSULE ORAL 2 TIMES DAILY
Status: DISCONTINUED | OUTPATIENT
Start: 2024-08-17 | End: 2024-08-18

## 2024-08-17 RX ORDER — CETIRIZINE HYDROCHLORIDE 5 MG/1
5 TABLET ORAL NIGHTLY
Status: DISCONTINUED | OUTPATIENT
Start: 2024-08-17 | End: 2024-08-26 | Stop reason: HOSPADM

## 2024-08-17 RX ADMIN — EZETIMIBE 10 MG: 10 TABLET ORAL at 08:08

## 2024-08-17 RX ADMIN — TACROLIMUS 5 MG: 5 CAPSULE ORAL at 08:08

## 2024-08-17 RX ADMIN — MYCOPHENOLATE MOFETIL 1000 MG: 250 CAPSULE ORAL at 12:08

## 2024-08-17 RX ADMIN — CEFEPIME 1 G: 1 INJECTION, POWDER, FOR SOLUTION INTRAMUSCULAR; INTRAVENOUS at 12:08

## 2024-08-17 RX ADMIN — DULOXETINE 30 MG: 30 CAPSULE, DELAYED RELEASE ORAL at 01:08

## 2024-08-17 RX ADMIN — CETIRIZINE HYDROCHLORIDE 5 MG: 5 TABLET, FILM COATED ORAL at 10:08

## 2024-08-17 RX ADMIN — INSULIN ASPART 2 UNITS: 100 INJECTION, SOLUTION INTRAVENOUS; SUBCUTANEOUS at 05:08

## 2024-08-17 RX ADMIN — INSULIN ASPART 2 UNITS: 100 INJECTION, SOLUTION INTRAVENOUS; SUBCUTANEOUS at 08:08

## 2024-08-17 RX ADMIN — DULOXETINE 30 MG: 30 CAPSULE, DELAYED RELEASE ORAL at 10:08

## 2024-08-17 RX ADMIN — MINOXIDIL 5 MG: 2.5 TABLET ORAL at 08:08

## 2024-08-17 RX ADMIN — ATORVASTATIN CALCIUM 20 MG: 20 TABLET, FILM COATED ORAL at 10:08

## 2024-08-17 RX ADMIN — Medication 800 MG: at 12:08

## 2024-08-17 RX ADMIN — PANTOPRAZOLE SODIUM 40 MG: 40 TABLET, DELAYED RELEASE ORAL at 08:08

## 2024-08-17 RX ADMIN — ALPRAZOLAM 2 MG: 0.5 TABLET ORAL at 10:08

## 2024-08-17 RX ADMIN — MYCOPHENOLATE MOFETIL 1000 MG: 250 CAPSULE ORAL at 08:08

## 2024-08-17 RX ADMIN — ATORVASTATIN CALCIUM 20 MG: 20 TABLET, FILM COATED ORAL at 12:08

## 2024-08-17 RX ADMIN — INSULIN ASPART 3 UNITS: 100 INJECTION, SOLUTION INTRAVENOUS; SUBCUTANEOUS at 12:08

## 2024-08-17 RX ADMIN — CARVEDILOL 6.25 MG: 6.25 TABLET, FILM COATED ORAL at 12:08

## 2024-08-17 RX ADMIN — SODIUM BICARBONATE 650 MG TABLET 1300 MG: at 08:08

## 2024-08-17 RX ADMIN — SODIUM BICARBONATE 650 MG TABLET 1300 MG: at 10:08

## 2024-08-17 RX ADMIN — Medication 800 MG: at 10:08

## 2024-08-17 RX ADMIN — CARVEDILOL 6.25 MG: 6.25 TABLET, FILM COATED ORAL at 10:08

## 2024-08-17 RX ADMIN — PREDNISONE 5 MG: 5 TABLET ORAL at 08:08

## 2024-08-17 RX ADMIN — NIFEDIPINE 60 MG: 30 TABLET, FILM COATED, EXTENDED RELEASE ORAL at 12:08

## 2024-08-17 RX ADMIN — NIFEDIPINE 60 MG: 30 TABLET, FILM COATED, EXTENDED RELEASE ORAL at 08:08

## 2024-08-17 RX ADMIN — INSULIN ASPART 5 UNITS: 100 INJECTION, SOLUTION INTRAVENOUS; SUBCUTANEOUS at 12:08

## 2024-08-17 RX ADMIN — CARVEDILOL 6.25 MG: 6.25 TABLET, FILM COATED ORAL at 08:08

## 2024-08-17 RX ADMIN — SULFAMETHOXAZOLE AND TRIMETHOPRIM 1 TABLET: 400; 80 TABLET ORAL at 06:08

## 2024-08-17 RX ADMIN — VALGANCICLOVIR HYDROCHLORIDE 450 MG: 450 TABLET ORAL at 08:08

## 2024-08-17 RX ADMIN — MAGNESIUM SULFATE HEPTAHYDRATE 2 G: 40 INJECTION, SOLUTION INTRAVENOUS at 09:08

## 2024-08-17 RX ADMIN — CETIRIZINE HYDROCHLORIDE 5 MG: 5 TABLET, FILM COATED ORAL at 01:08

## 2024-08-17 RX ADMIN — SODIUM BICARBONATE 650 MG TABLET 1300 MG: at 12:08

## 2024-08-17 RX ADMIN — TACROLIMUS 4 MG: 1 CAPSULE ORAL at 05:08

## 2024-08-17 RX ADMIN — NIFEDIPINE 60 MG: 30 TABLET, FILM COATED, EXTENDED RELEASE ORAL at 10:08

## 2024-08-17 RX ADMIN — CINACALCET 60 MG: 30 TABLET, FILM COATED ORAL at 08:08

## 2024-08-17 RX ADMIN — Medication 800 MG: at 08:08

## 2024-08-17 RX ADMIN — INSULIN ASPART 1 UNITS: 100 INJECTION, SOLUTION INTRAVENOUS; SUBCUTANEOUS at 10:08

## 2024-08-17 RX ADMIN — CEFEPIME 1 G: 1 INJECTION, POWDER, FOR SOLUTION INTRAMUSCULAR; INTRAVENOUS at 11:08

## 2024-08-17 RX ADMIN — INSULIN ASPART 5 UNITS: 100 INJECTION, SOLUTION INTRAVENOUS; SUBCUTANEOUS at 05:08

## 2024-08-17 NOTE — PROGRESS NOTES
Pharmacist Renal Dose Adjustment Note    Joann Kenney is a 63 y.o. female being treated with the medication cefepime    Patient Data:    Vital Signs (Most Recent):  Temp: 98.5 °F (36.9 °C) (08/16/24 2310)  Pulse: 87 (08/16/24 2310)  Resp: 18 (08/16/24 2310)  BP: (!) 178/68 (08/16/24 2310)  SpO2: 97 % (08/16/24 2310) Vital Signs (72h Range):  Temp:  [98.2 °F (36.8 °C)-98.5 °F (36.9 °C)]   Pulse:  [71-87]   Resp:  [18-27]   BP: (168-178)/(64-73)   SpO2:  [94 %-97 %]      Recent Labs   Lab 08/11/24  0537 08/12/24  0619 08/13/24  0602   CREATININE 1.6* 1.6* 1.5*     Serum creatinine: 1.5 mg/dL (H) 08/13/24 0602  Estimated creatinine clearance: 39.4 mL/min (A)    Medication: cefepime 1 gram every 8 hours will be changed to cefepime 1 gram every 12 hours    Andrew Del AngelD

## 2024-08-17 NOTE — SUBJECTIVE & OBJECTIVE
Subjective:   History of Present Illness:  Joann Kenney is a 62 y/o F s/p DBD kidney transplant 7/24/2024 for ESRD 2/2 Type II DM. PMHx born w solitary kidney, DM2, HTN, incontience (s/p bladder sling), CAD with stents (on ASA), atrial fibrillation, CVA (no residual deficits), ALEJA, and anemia. Surgery without complications. PD cath removed. Post op course notable for down trending Cr with excellent UOP. Patient recently admitted 8/8 to 8/13 d/t incisional drainage. CT A/P noted enlarged fluid collection around the right renal transplant and extending into the RLQ abdominal wall. She underwent IR fluid aspiration and drain placement on 8/9. Fluid studies neg for infection or urine leak (WBC 47, segs 10, lymph 83, Cr 1.6). Repeat CT scan done 8/12 reviewed by surgeons, plan to discharge pt with drain in place for continued drainage per MALKA.     Patient now presents as a transfer from OSH due to incisional drainage. Pt presented to her local ED with large amount of bloody drainage from her transplant incision. Wound has 3 cm opening in medial aspect with old bloody drainage CT A/P at OSH w/ large collection 17 x 4 x 3 cm. Her percutaneous drain remains in place and per patient turned milky and purulent appearing day prior to presenting to the OSH.  Pt reports overall feeling well. Pt denies fever/chills, decreased UOP, dysuria. Plan for infectious work-up, IV abx, wound care consult. Will review CT imaging with transplant surgeon with likely plan for IR consult     Ms. Kenney is a 63 y.o. year old female who is status post Kidney Transplant - 7/24/2024  (#1).  Her maintenance immunosuppression consists of:   Immunosuppressants (From admission, onward)      Start     Stop Route Frequency Ordered    08/17/24 1800  tacrolimus capsule 4 mg         -- Oral 2 times daily 08/17/24 1015            Her post transplant course has been complicated by wound infection .    Hospital Course:  No notes on file    Interval  History:  [place]  Has no complaints this AM  RLQ MALKA drain holding suction with thin milk serous drainage  OSH CT images reviewed; persistent subcutaneous collection with foci of air present  Wound open at bedside with old hematoma evacuation; fascia intact so wound was irrigated, packed, and wound care consulted for wound vac placement  Continues to have good urine output  Cr downtrending; no emergent indication for dialysis at this time    Past Medical, Surgical, Family, and Social History:   Unchanged from H&P.    Scheduled Meds:   ALPRAZolam  2 mg Oral QHS    atorvastatin  20 mg Oral QHS    carvediloL  6.25 mg Oral BID    ceFEPime IV (PEDS and ADULTS)  1 g Intravenous Q12H    cetirizine  5 mg Oral QHS    cinacalcet  60 mg Oral Daily with breakfast    DULoxetine  30 mg Oral QHS    ezetimibe  10 mg Oral Daily    insulin aspart U-100  5 Units Subcutaneous TID WM    magnesium oxide  800 mg Oral BID    magnesium sulfate IVPB  2 g Intravenous Once    minoxidiL  5 mg Oral Daily    NIFEdipine  60 mg Oral BID    pantoprazole  40 mg Oral Daily    predniSONE  5 mg Oral Daily    sodium bicarbonate  1,300 mg Oral BID    sulfamethoxazole-trimethoprim 400-80mg  1 tablet Oral QAM    tacrolimus  4 mg Oral BID    [START ON 8/19/2024] valGANciclovir  450 mg Oral Every Mon, Wed, Fri     Continuous Infusions:  PRN Meds:  Current Facility-Administered Medications:     acetaminophen, 650 mg, Oral, Q8H PRN    dextrose 10%, 12.5 g, Intravenous, PRN    dextrose 10%, 25 g, Intravenous, PRN    glucagon (human recombinant), 1 mg, Intramuscular, PRN    glucose, 16 g, Oral, PRN    glucose, 24 g, Oral, PRN    insulin aspart U-100, 0-5 Units, Subcutaneous, QID (AC + HS) PRN    melatonin, 6 mg, Oral, Nightly PRN    ondansetron, 8 mg, Oral, Q8H PRN    oxybutynin, 5 mg, Oral, TID PRN    oxyCODONE, 5 mg, Oral, Q6H PRN    sodium chloride 0.9%, 10 mL, Intravenous, PRN    Intake/Output - Last 3 Shifts         08/15 0700  08/16 0659 08/16 0700  08/17  "0659 08/17 0700  08/18 0659    P.O.  360     Total Intake(mL/kg)  360 (4.4)     Urine (mL/kg/hr)  800 600 (1.5)    Drains  200 100    Stool  0     Total Output  1000 700    Net  -640 -700           Stool Occurrence  0 x              Review of Systems   Constitutional:  Negative for activity change, appetite change, chills and fever.   Respiratory:  Negative for shortness of breath.    Cardiovascular:  Negative for leg swelling.   Gastrointestinal:  Negative for abdominal distention, nausea and vomiting.   Genitourinary:  Negative for decreased urine volume and dysuria.   Skin:  Positive for wound.   Allergic/Immunologic: Positive for immunocompromised state.   Psychiatric/Behavioral:  Negative for confusion and decreased concentration.       Objective:     Vital Signs (Most Recent):  Temp: 97.7 °F (36.5 °C) (08/17/24 1128)  Pulse: 68 (08/17/24 1128)  Resp: 18 (08/17/24 1128)  BP: 131/60 (08/17/24 1128)  SpO2: 96 % (08/17/24 1128) Vital Signs (24h Range):  Temp:  [97.5 °F (36.4 °C)-98.5 °F (36.9 °C)] 97.7 °F (36.5 °C)  Pulse:  [68-87] 68  Resp:  [17-27] 18  SpO2:  [94 %-100 %] 96 %  BP: (129-189)/(60-84) 131/60     Weight: 81.6 kg (180 lb)  Height: 5' 5" (165.1 cm)  Body mass index is 29.95 kg/m².     Physical Exam  Vitals reviewed.   Cardiovascular:      Rate and Rhythm: Normal rate.   Pulmonary:      Effort: Pulmonary effort is normal.   Abdominal:      General: There is no distension.      Palpations: Abdomen is soft.      Tenderness: There is abdominal tenderness. There is no guarding or rebound.          Comments: RLQ incision with staples removed from medial 2/3 and old hematoma evacuated. No necrotic tissue present and fascia palpated and intact. Wound irrigated and packed with wet to dry Kirlex. RLQ MALKA drain with suction and thin milky serous output present   Musculoskeletal:      Right lower leg: No edema.      Left lower leg: No edema.   Skin:     General: Skin is warm and dry.   Neurological:      General: " No focal deficit present.      Mental Status: She is alert. Mental status is at baseline.   Psychiatric:         Mood and Affect: Mood normal.         Thought Content: Thought content normal.          Laboratory:  CBC:   Recent Labs   Lab 08/13/24  0602 08/17/24  0006 08/17/24  0550   WBC 7.44 7.40 6.96   RBC 2.60* 3.06* 2.89*   HGB 8.4* 9.4* 9.1*   HCT 25.0* 29.0* 27.5*    324 292   MCV 96 95 95   MCH 32.3* 30.7 31.5*   MCHC 33.6 32.4 33.1     BMP:   Recent Labs   Lab 08/13/24  0602 08/17/24  0006 08/17/24  0550   * 211* 228*    140 136   K 4.2 4.4 4.4    108 107   CO2 25 22* 19*   BUN 17 19 20   CREATININE 1.5* 1.9* 1.8*   CALCIUM 8.5* 10.2 9.8       Diagnostic Results:  US - Kidney: Results for orders placed during the hospital encounter of 07/29/24    US Transplant Kidney With Doppler    Narrative  EXAMINATION:  US TRANSPLANT KIDNEY WITH DOPPLER    CLINICAL HISTORY:  complication after renal transplant;    TECHNIQUE:  Real time gray scale and doppler ultrasound was performed over the patient's renal allograft.    COMPARISON:  CT abdomen pelvis 07/29/2024; u/S transplant kidney with Doppler 07/25/2024    FINDINGS:  Patient is status post renal allograft in the right lower quadrant on 07/24/2024.  Hilum is located posterior medially, previously posterolaterally.  The allograft measures 9.7 cm. Normal perfusion. No hydronephrosis.  Ureteral stent is possibly visualized near the hilum; however, it is not visualized within the bladder.    Minimally complex collection adjacent to the inferior pole measuring 3.0 x 3.2 x 3.0 cm.    Minimally complex collection superficial to the renal allograft measuring 3.5 x 1.6 x 1.0 cm.    Small amount of peritoneal free fluid.    Vasculature:    Resistive indices of the arteries:    Interlobar: 0.78 with normal waveform, previously 0.76    Upper segment: 0.72 with normal waveform, previously 0.89    Mid segment: 0.81 with normal waveform, previously  0.82    Lower segment: 0.81 with normal waveform, previously 0.87    Main renal artery peak systolic velocity: 230cm/sec with normal waveform, previously 175cm/sec.    Renal artery/iliac ratio: 1.4.    The main renal vein is patent.    Impression  Slight improvement of segmental renal arterial velocities, which remain mildly elevated within the mid and lower segments.    Peritransplant fluid collections as detailed above.    Electronically signed by resident: Kyle Nieto  Date:    08/01/2024  Time:    13:15    Electronically signed by: Kevyn Reynoso MD  Date:    08/01/2024  Time:    13:56

## 2024-08-17 NOTE — SUBJECTIVE & OBJECTIVE
Interval HPI:   Overnight events: Remains in TSU. BG slightly above goal ranges on current prn SQ insulin correction scale. Remains on Prednisone 5 mg daily. Creatinine 1.8. Diet NPO    Eating:   NPO  Nausea: No  Hypoglycemia and intervention: No  Fever: No  TPN and/or TF: No  If yes, type of TF/TPN and rate: n/a    PMH, PSH, FH, SH reviewed     ROS:  Constitutional: Negative for weight changes.  Eyes: Negative for visual disturbance.  Respiratory: Negative for cough.   Cardiovascular: Negative for chest pain.  Gastrointestinal: Negative for nausea.  Endocrine: Negative for polyuria, polydipsia.  Musculoskeletal: Negative for back pain.  Skin: Negative for rash.  Neurological: Negative for syncope.  Psychiatric/Behavioral: Negative for depression.      Review of Systems    Current Medications and/or Treatments Impacting Glycemic Control  Immunotherapy:    Immunosuppressants           Stop Route Frequency     tacrolimus capsule 4 mg         -- Oral 2 times daily          Steroids:   Hormones (From admission, onward)      Start     Stop Route Frequency Ordered    08/17/24 0900  predniSONE tablet 5 mg         -- Oral Daily 08/16/24 2320    08/16/24 2320  melatonin tablet 6 mg         -- Oral Nightly PRN 08/16/24 2320          Pressors:    Autonomic Drugs (From admission, onward)      None          Hyperglycemia/Diabetes Medications:   Antihyperglycemics (From admission, onward)      Start     Stop Route Frequency Ordered    08/17/24 1200  insulin aspart U-100 pen 5 Units         -- SubQ 3 times daily with meals 08/17/24 1037    08/16/24 2320  insulin aspart U-100 pen 0-5 Units         -- SubQ Before meals & nightly PRN 08/16/24 2320             PHYSICAL EXAMINATION:  Vitals:    08/17/24 0741   BP: 129/61   Pulse: 69   Resp: 18   Temp: 97.5 °F (36.4 °C)     Body mass index is 29.95 kg/m².     Physical Exam  Constitutional: Well developed, well nourished, NAD.  ENT: External ears no masses with nose patent; normal  hearing.  Neck: Supple; trachea midline.  Cardiovascular: Normal heart sounds  Lungs: Normal effort; lungs anterior bilaterally clear to auscultation.  Abdomen: Soft, no masses, no hernias.  MS: No clubbing or cyanosis of nails noted;unable to assess gait.  Skin: No rashes, lesions, or ulcers; no nodules. Injection sites are ok. No lipo hypertropthy or atrophy.  Psychiatric: Good judgement and insight; normal mood and affect.  Neurological: Cranial nerves are grossly intact.   Foot: Nails in good condition, no amputations noted.

## 2024-08-17 NOTE — NURSING
Pt admitted to TSU in room 80130 via stretcher. Plan of care reviewed. Pt oriented to room and use of call light. Bed locked in the lowest position and call light within reach.

## 2024-08-17 NOTE — CONSULTS
[facility] - Transplant Stepdown  Endocrinology  Diabetes Consult Note    Consult Requested by: Freddy Ruelas Jr., *   Reason for admit: Wound drainage    HISTORY OF PRESENT ILLNESS:  Reason for Consult: Management of T2DM, Hyperglycemia     Surgical Procedure and Date:  kidney transplant 7/24/2024     Diabetes diagnosis year: 2004    Home Diabetes Medications:    - Novolog 6 units TIDWM  - Novolog SSI for BG excursions:  Add correction scale if needed.  Blood sugar 150 to 200 add 2 units  Blood sugar 201 to 250 add 4 units  Blood sugar 251 to 300 add 6 units  Blood sugar 301 to 350 add 8 units  Blood sugar greater than 350 add 10 units    Lab Results   Component Value Date    HGBA1C 4.8 07/24/2024    HGBA1C 4.8 07/24/2024       How often checking glucose at home?  3x daily     BG readings on regimen: 140-<200  Hypoglycemia on the regimen?  No  Missed doses on regimen?  No    Diabetes Complications include:     none    Complicating diabetes co morbidities:   S/p kidney txp, glucocorticoid use       HPI:   Patient is a 63 y.o. 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 PMHx born w solitary kidney, DM2, HTN, incontience (s/p bladder sling), CAD with stents (on ASA), atrial fibrillation, CVA (no residual deficits), ALEJA, and anemia. Surgery without complications. PD cath removed. Post op course notable for down trending Cr with excellent UOP. Patient recently admitted 8/8 to 8/13 d/t incisional drainage. CT A/P noted enlarged fluid collection around the right renal transplant and extending into the RLQ abdominal wall. She underwent IR fluid aspiration and drain placement on 8/9. Fluid studies neg for infection or urine leak (WBC 47, segs 10, lymph 83, Cr 1.6). Repeat CT scan done 8/12 reviewed by surgeons, plan to discharge pt with drain in place for continued drainage per MALKA.      Patient now presents as a transfer from OSH due to incisional drainage. Pt presented to her  local ED with large amount of bloody drainage from her transplant incision. Wound has 3 cm opening in medial aspect with old bloody drainage CT A/P at OSH w/ large collection 17 x 4 x 3 cm. Her percutaneous drain remains in place and per patient turned milky and purulent appearing day prior to presenting to the OSH.  Pt reports overall feeling well. Pt denies fever/chills, decreased UOP, dysuria. Plan for infectious work-up, IV abx, wound care consult. Endocrinology consulted for management of T2DM.         Interval HPI:   Overnight events: Remains in TSU. BG slightly above goal ranges on current prn SQ insulin correction scale. Remains on Prednisone 5 mg daily. Creatinine 1.8. Diet NPO    Eating:   NPO  Nausea: No  Hypoglycemia and intervention: No  Fever: No  TPN and/or TF: No  If yes, type of TF/TPN and rate: n/a    PMH, PSH, FH, SH reviewed     ROS:  Constitutional: Negative for weight changes.  Eyes: Negative for visual disturbance.  Respiratory: Negative for cough.   Cardiovascular: Negative for chest pain.  Gastrointestinal: Negative for nausea.  Endocrine: Negative for polyuria, polydipsia.  Musculoskeletal: Negative for back pain.  Skin: Negative for rash.  Neurological: Negative for syncope.  Psychiatric/Behavioral: Negative for depression.      Review of Systems    Current Medications and/or Treatments Impacting Glycemic Control  Immunotherapy:    Immunosuppressants           Stop Route Frequency     tacrolimus capsule 4 mg         -- Oral 2 times daily          Steroids:   Hormones (From admission, onward)      Start     Stop Route Frequency Ordered    08/17/24 0900  predniSONE tablet 5 mg         -- Oral Daily 08/16/24 2320    08/16/24 2320  melatonin tablet 6 mg         -- Oral Nightly PRN 08/16/24 2320          Pressors:    Autonomic Drugs (From admission, onward)      None          Hyperglycemia/Diabetes Medications:   Antihyperglycemics (From admission, onward)      Start     Stop Route Frequency  "Ordered    08/17/24 1200  insulin aspart U-100 pen 5 Units         -- SubQ 3 times daily with meals 08/17/24 1037    08/16/24 2320  insulin aspart U-100 pen 0-5 Units         -- SubQ Before meals & nightly PRN 08/16/24 2320             PHYSICAL EXAMINATION:  Vitals:    08/17/24 0741   BP: 129/61   Pulse: 69   Resp: 18   Temp: 97.5 °F (36.4 °C)     Body mass index is 29.95 kg/m².     Physical Exam  Constitutional: Well developed, well nourished, NAD.  ENT: External ears no masses with nose patent; normal hearing.  Neck: Supple; trachea midline.  Cardiovascular: Normal heart sounds  Lungs: Normal effort; lungs anterior bilaterally clear to auscultation.  Abdomen: Soft, no masses, no hernias.  MS: No clubbing or cyanosis of nails noted;unable to assess gait.  Skin: No rashes, lesions, or ulcers; no nodules. Injection sites are ok. No lipo hypertropthy or atrophy.  Psychiatric: Good judgement and insight; normal mood and affect.  Neurological: Cranial nerves are grossly intact.   Foot: Nails in good condition, no amputations noted.         Labs Reviewed and Include   Recent Labs   Lab 08/17/24  0550   *   CALCIUM 9.8   ALBUMIN 2.3*      K 4.4   CO2 19*      BUN 20   CREATININE 1.8*     Lab Results   Component Value Date    WBC 6.96 08/17/2024    HGB 9.1 (L) 08/17/2024    HCT 27.5 (L) 08/17/2024    MCV 95 08/17/2024     08/17/2024     No results for input(s): "TSH", "FREET4" in the last 168 hours.  Lab Results   Component Value Date    HGBA1C 4.8 07/24/2024    HGBA1C 4.8 07/24/2024       Nutritional status:   Body mass index is 29.95 kg/m².  Lab Results   Component Value Date    ALBUMIN 2.3 (L) 08/17/2024    ALBUMIN 2.6 (L) 08/17/2024    ALBUMIN 2.7 (L) 08/13/2024     No results found for: "PREALBUMIN"    Estimated Creatinine Clearance: 32.9 mL/min (A) (based on SCr of 1.8 mg/dL (H)).    Accu-Checks  Recent Labs     08/17/24  0841   POCTGLUCOSE 223*        ASSESSMENT and PLAN    Renal/  S/P " kidney transplant  Managed per primary team  Avoid hypoglycemia        Immunology/Multi System  Prophylactic immunotherapy  May increase insulin resistance.         Endocrine  Diabetes mellitus due to underlying condition with chronic kidney disease on chronic dialysis, with long-term current use of insulin  BG goal 140-180    Start Novolog 5 units TID with meals (20% reduction from home dosing) HOLD while NPO  Low Dose Correction Scale  BG monitoring ac/hs    ** Please call Endocrine for any BG related issues **        Orthopedic  * Wound drainage  Managed per primary team              Plan discussed with patient at bedside.     Tatiana Garcia NP  Endocrinology  [facility] - Transplant Stepdown

## 2024-08-17 NOTE — PROGRESS NOTES
[facility] - [facility]plant [facility]  Kidney Transplant  Progress Note      Reason for Follow-up: Reassessment of Kidney Transplant - 7/24/2024  (#1) recipient and management of immunosuppression.    ORGAN:   RIGHT KIDNEY    Donor Type:   Donation after Brain Death    Donor CMV Status: Positive  Donor HBcAB:Negative  Donor HCV Status:Negative  Donor HBV RAH:   Donor HCV RAH: Negative      Subjective:   History of Present Illness:  Joann Kenney is a 64 y/o F s/p DBD kidney transplant 7/24/2024 for ESRD 2/2 Type II DM. PMHx born w solitary kidney, DM2, HTN, incontience (s/p bladder sling), CAD with stents (on ASA), atrial fibrillation, CVA (no residual deficits), ALEJA, and anemia. Surgery without complications. PD cath removed. Post op course notable for down trending Cr with excellent UOP. Patient recently admitted 8/8 to 8/13 d/t incisional drainage. CT A/P noted enlarged fluid collection around the right renal transplant and extending into the RLQ abdominal wall. She underwent IR fluid aspiration and drain placement on 8/9. Fluid studies neg for infection or urine leak (WBC 47, segs 10, lymph 83, Cr 1.6). Repeat CT scan done 8/12 reviewed by surgeons, plan to discharge pt with drain in place for continued drainage per MALKA.     Patient now presents as a transfer from OSH due to incisional drainage. Pt presented to her local ED with large amount of bloody drainage from her transplant incision. Wound has 3 cm opening in medial aspect with old bloody drainage CT A/P at OSH w/ large collection 17 x 4 x 3 cm. Her percutaneous drain remains in place and per patient turned milky and purulent appearing day prior to presenting to the OSH.  Pt reports overall feeling well. Pt denies fever/chills, decreased UOP, dysuria. Plan for infectious work-up, IV abx, wound care consult. Will review CT imaging with transplant surgeon with likely plan for IR consult     Ms. Kenney is a 63 y.o. year old female who is status post Kidney  Transplant - 7/24/2024  (#1).  Her maintenance immunosuppression consists of:   Immunosuppressants (From admission, onward)      Start     Stop Route Frequency Ordered    08/17/24 1800  tacrolimus capsule 4 mg         -- Oral 2 times daily 08/17/24 1015            Her post transplant course has been complicated by wound infection .    Hospital Course:  No notes on file    Interval History:  [place]  Has no complaints this AM  RLQ MALKA drain holding suction with thin milk serous drainage  OSH CT images reviewed; persistent subcutaneous collection with foci of air present  Wound open at bedside with old hematoma evacuation; fascia intact so wound was irrigated, packed, and wound care consulted for wound vac placement  Continues to have good urine output  Cr downtrending; no emergent indication for dialysis at this time    Past Medical, Surgical, Family, and Social History:   Unchanged from H&P.    Scheduled Meds:   ALPRAZolam  2 mg Oral QHS    atorvastatin  20 mg Oral QHS    carvediloL  6.25 mg Oral BID    ceFEPime IV (PEDS and ADULTS)  1 g Intravenous Q12H    cetirizine  5 mg Oral QHS    cinacalcet  60 mg Oral Daily with breakfast    DULoxetine  30 mg Oral QHS    ezetimibe  10 mg Oral Daily    insulin aspart U-100  5 Units Subcutaneous TID WM    magnesium oxide  800 mg Oral BID    magnesium sulfate IVPB  2 g Intravenous Once    minoxidiL  5 mg Oral Daily    NIFEdipine  60 mg Oral BID    pantoprazole  40 mg Oral Daily    predniSONE  5 mg Oral Daily    sodium bicarbonate  1,300 mg Oral BID    sulfamethoxazole-trimethoprim 400-80mg  1 tablet Oral QAM    tacrolimus  4 mg Oral BID    [START ON 8/19/2024] valGANciclovir  450 mg Oral Every Mon, Wed, Fri     Continuous Infusions:  PRN Meds:  Current Facility-Administered Medications:     acetaminophen, 650 mg, Oral, Q8H PRN    dextrose 10%, 12.5 g, Intravenous, PRN    dextrose 10%, 25 g, Intravenous, PRN    glucagon (human recombinant), 1 mg, Intramuscular, PRN    glucose, 16 g,  "Oral, PRN    glucose, 24 g, Oral, PRN    insulin aspart U-100, 0-5 Units, Subcutaneous, QID (AC + HS) PRN    melatonin, 6 mg, Oral, Nightly PRN    ondansetron, 8 mg, Oral, Q8H PRN    oxybutynin, 5 mg, Oral, TID PRN    oxyCODONE, 5 mg, Oral, Q6H PRN    sodium chloride 0.9%, 10 mL, Intravenous, PRN    Intake/Output - Last 3 Shifts         08/15 0700 08/16 0659 08/16 0700  08/17 0659 08/17 0700  08/18 0659    P.O.  360     Total Intake(mL/kg)  360 (4.4)     Urine (mL/kg/hr)  800 600 (1.5)    Drains  200 100    Stool  0     Total Output  1000 700    Net  -640 -700           Stool Occurrence  0 x              Review of Systems   Constitutional:  Negative for activity change, appetite change, chills and fever.   Respiratory:  Negative for shortness of breath.    Cardiovascular:  Negative for leg swelling.   Gastrointestinal:  Negative for abdominal distention, nausea and vomiting.   Genitourinary:  Negative for decreased urine volume and dysuria.   Skin:  Positive for wound.   Allergic/Immunologic: Positive for immunocompromised state.   Psychiatric/Behavioral:  Negative for confusion and decreased concentration.       Objective:     Vital Signs (Most Recent):  Temp: 97.7 °F (36.5 °C) (08/17/24 1128)  Pulse: 68 (08/17/24 1128)  Resp: 18 (08/17/24 1128)  BP: 131/60 (08/17/24 1128)  SpO2: 96 % (08/17/24 1128) Vital Signs (24h Range):  Temp:  [97.5 °F (36.4 °C)-98.5 °F (36.9 °C)] 97.7 °F (36.5 °C)  Pulse:  [68-87] 68  Resp:  [17-27] 18  SpO2:  [94 %-100 %] 96 %  BP: (129-189)/(60-84) 131/60     Weight: 81.6 kg (180 lb)  Height: 5' 5" (165.1 cm)  Body mass index is 29.95 kg/m².     Physical Exam  Vitals reviewed.   Cardiovascular:      Rate and Rhythm: Normal rate.   Pulmonary:      Effort: Pulmonary effort is normal.   Abdominal:      General: There is no distension.      Palpations: Abdomen is soft.      Tenderness: There is abdominal tenderness. There is no guarding or rebound.          Comments: RLQ incision with staples " removed from medial 2/3 and old hematoma evacuated. No necrotic tissue present and fascia palpated and intact. Wound irrigated and packed with wet to dry Kirlex. RLQ MALKA drain with suction and thin milky serous output present   Musculoskeletal:      Right lower leg: No edema.      Left lower leg: No edema.   Skin:     General: Skin is warm and dry.   Neurological:      General: No focal deficit present.      Mental Status: She is alert. Mental status is at baseline.   Psychiatric:         Mood and Affect: Mood normal.         Thought Content: Thought content normal.          Laboratory:  CBC:   Recent Labs   Lab 08/13/24  0602 08/17/24  0006 08/17/24  0550   WBC 7.44 7.40 6.96   RBC 2.60* 3.06* 2.89*   HGB 8.4* 9.4* 9.1*   HCT 25.0* 29.0* 27.5*    324 292   MCV 96 95 95   MCH 32.3* 30.7 31.5*   MCHC 33.6 32.4 33.1     BMP:   Recent Labs   Lab 08/13/24  0602 08/17/24  0006 08/17/24  0550   * 211* 228*    140 136   K 4.2 4.4 4.4    108 107   CO2 25 22* 19*   BUN 17 19 20   CREATININE 1.5* 1.9* 1.8*   CALCIUM 8.5* 10.2 9.8       Diagnostic Results:  US - Kidney: Results for orders placed during the hospital encounter of 07/29/24    US Transplant Kidney With Doppler    Narrative  EXAMINATION:  US TRANSPLANT KIDNEY WITH DOPPLER    CLINICAL HISTORY:  complication after renal transplant;    TECHNIQUE:  Real time gray scale and doppler ultrasound was performed over the patient's renal allograft.    COMPARISON:  CT abdomen pelvis 07/29/2024; u/S transplant kidney with Doppler 07/25/2024    FINDINGS:  Patient is status post renal allograft in the right lower quadrant on 07/24/2024.  Hilum is located posterior medially, previously posterolaterally.  The allograft measures 9.7 cm. Normal perfusion. No hydronephrosis.  Ureteral stent is possibly visualized near the hilum; however, it is not visualized within the bladder.    Minimally complex collection adjacent to the inferior pole measuring 3.0 x 3.2 x  "3.0 cm.    Minimally complex collection superficial to the renal allograft measuring 3.5 x 1.6 x 1.0 cm.    Small amount of peritoneal free fluid.    Vasculature:    Resistive indices of the arteries:    Interlobar: 0.78 with normal waveform, previously 0.76    Upper segment: 0.72 with normal waveform, previously 0.89    Mid segment: 0.81 with normal waveform, previously 0.82    Lower segment: 0.81 with normal waveform, previously 0.87    Main renal artery peak systolic velocity: 230cm/sec with normal waveform, previously 175cm/sec.    Renal artery/iliac ratio: 1.4.    The main renal vein is patent.    Impression  Slight improvement of segmental renal arterial velocities, which remain mildly elevated within the mid and lower segments.    Peritransplant fluid collections as detailed above.    Electronically signed by resident: Kyle Nieto  Date:    08/01/2024  Time:    13:15    Electronically signed by: Kevyn Reynoso MD  Date:    08/01/2024  Time:    13:56  Assessment/Plan:     * Wound drainage  - Transfer from OSH d/t large wound drainage from kidney transplant incision  - Recently admitted 8/8-8/13 for wound drainage, s/p IR aspiration and drain placement of perinephric collection  - Perc drain now w/ milky purulent output; recent studies show infected fluid collecton  - OSH CT reviewed and shows persistent collection with foci of air  - Incision opened bedside on 8/17; fascia intact and old hematoma evacuated. Wound packed and wound care consulted for wound vac placement      Anemia of chronic disease  - daily CBC      Anxiety  - continue home xanax      Hypertension associated with transplantation  - continue home antihypertensives with hold parameters    Long-term use of immunosuppressant medication  - see Prophylactic immunotherapy"      Prophylactic immunotherapy  - Continue prograf, cellcept, and steroid taper  - Check prograf level daily and adjust for therapeutic dosage. Monitor for toxic side effects "       S/P kidney transplant  - S/p Ktxp 7/24/24  - Cr trended down with excellent UOP  - Multiple Readmit d/t wound complications      Other hyperlipidemia  - continue home statin      Diabetes mellitus due to underlying condition with chronic kidney disease on chronic dialysis, with long-term current use of insulin  - Endocrine consulted  - sliding scale          Discharge Planning:  Not yet ready for discharge    Medical decision making for this encounter includes review of pertinent labs and diagnostic studies, assessment and planning, discussions with consulting providers, discussion with patient/family, and participation in multidisciplinary rounds. Time spent caring for patient: 60 minutes    Clemencia Doss MD  Kidney Transplant  [facility] - Transplant Stepdown

## 2024-08-17 NOTE — PLAN OF CARE
Patient is AAOx4.  Afebrile  RA  Standing BPS  RLQ incision opened up at bedside. Wound vac placed.   RLQ MALKA drain remains in place.  IV abx continued per order.   Call light remains within reach.   Instructed to call for assistance.   Bed is in lowest position with wheels locked.   Plan of care is ongoing.

## 2024-08-17 NOTE — HPI
Reason for Consult: Management of T2DM, Hyperglycemia     Surgical Procedure and Date:  kidney transplant 7/24/2024     Diabetes diagnosis year: 2004    Home Diabetes Medications:    - Novolog 6 units TIDWM  - Novolog SSI for BG excursions:  Add correction scale if needed.  Blood sugar 150 to 200 add 2 units  Blood sugar 201 to 250 add 4 units  Blood sugar 251 to 300 add 6 units  Blood sugar 301 to 350 add 8 units  Blood sugar greater than 350 add 10 units    Lab Results   Component Value Date    HGBA1C 4.8 07/24/2024    HGBA1C 4.8 07/24/2024       How often checking glucose at home?  3x daily     BG readings on regimen: 140-<200  Hypoglycemia on the regimen?  No  Missed doses on regimen?  No    Diabetes Complications include:     none    Complicating diabetes co morbidities:   S/p kidney txp, glucocorticoid use       HPI:   Patient is a 63 y.o. 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 PMHx born w solitary kidney, DM2, HTN, incontience (s/p bladder sling), CAD with stents (on ASA), atrial fibrillation, CVA (no residual deficits), ALEJA, and anemia. Surgery without complications. PD cath removed. Post op course notable for down trending Cr with excellent UOP. Patient recently admitted 8/8 to 8/13 d/t incisional drainage. CT A/P noted enlarged fluid collection around the right renal transplant and extending into the RLQ abdominal wall. She underwent IR fluid aspiration and drain placement on 8/9. Fluid studies neg for infection or urine leak (WBC 47, segs 10, lymph 83, Cr 1.6). Repeat CT scan done 8/12 reviewed by surgeons, plan to discharge pt with drain in place for continued drainage per MALKA.      Patient now presents as a transfer from OSH due to incisional drainage. Pt presented to her local ED with large amount of bloody drainage from her transplant incision. Wound has 3 cm opening in medial aspect with old bloody drainage CT A/P at OSH w/ large collection 17 x 4 x 3 cm.  Her percutaneous drain remains in place and per patient turned milky and purulent appearing day prior to presenting to the OSH.  Pt reports overall feeling well. Pt denies fever/chills, decreased UOP, dysuria. Plan for infectious work-up, IV abx, wound care consult. Endocrinology consulted for management of T2DM.

## 2024-08-17 NOTE — H&P
[facility] - Transplant Stepdown  Kidney Transplant  H&P      Subjective:     Chief Complaint/Reason for Admission: Wound Infection    History of Present Illness:  Joann Kenney is a 62 y/o F s/p DBD kidney transplant 7/24/2024 for ESRD 2/2 Type II DM. PMHx born w solitary kidney, DM2, HTN, incontience (s/p bladder sling), CAD with stents (on ASA), atrial fibrillation, CVA (no residual deficits), ALEJA, and anemia. Surgery without complications. PD cath removed. Post op course notable for down trending Cr with excellent UOP. Patient recently admitted 8/8 to 8/13 d/t incisional drainage. CT A/P noted enlarged fluid collection around the right renal transplant and extending into the RLQ abdominal wall. She underwent IR fluid aspiration and drain placement on 8/9. Fluid studies neg for infection or urine leak (WBC 47, segs 10, lymph 83, Cr 1.6). Repeat CT scan done 8/12 reviewed by surgeons, plan to discharge pt with drain in place for continued drainage per MALKA.     Patient now presents as a transfer from OSH due to incisional drainage. Pt presented to her local ED with large amount of bloody drainage from her transplant incision. Wound has 3 cm opening in medial aspect with old bloody drainage CT A/P at OSH w/ large collection 17 x 4 x 3 cm. Her percutaneous drain remains in place and per patient turned milky and purulent appearing day prior to presenting to the OSH.  Pt reports overall feeling well. Pt denies fever/chills, decreased UOP, dysuria. Plan for infectious work-up, IV abx, wound care consult. Will review CT imaging with transplant surgeon with likely plan for IR consult     PTA Medications   Medication Sig    ALPRAZolam (XANAX) 2 MG Tab Take 2 mg by mouth every evening.    atorvastatin (LIPITOR) 20 MG tablet Take 1 tablet (20 mg total) by mouth every evening.    blood sugar diagnostic Strp use 1 strip to check blood glucose 3 (three) times daily.    blood-glucose meter Misc use as directed to check blood  "glucose    carvediloL (COREG) 6.25 MG tablet Take 1 tablet (6.25 mg total) by mouth 2 (two) times daily.    cetirizine (ZYRTEC) 5 MG tablet Take 1 tablet (5 mg total) by mouth once daily.    cinacalcet (SENSIPAR) 60 MG Tab Take 1 tablet (60 mg total) by mouth daily with breakfast.    DULoxetine (CYMBALTA) 30 MG capsule Take 30 mg by mouth once daily.    ezetimibe (ZETIA) 10 mg tablet Take 1 tablet (10 mg total) by mouth once daily.    insulin aspart U-100 (NOVOLOG) 100 unit/mL (3 mL) InPn pen Inject 6 Units into the skin 3 (three) times daily with meals. + SSI (TDD: 48)    lancets Misc 1 lancet each to check blood glucose 3 (three) times daily.    magnesium oxide (MAG-OX) 400 mg (241.3 mg magnesium) tablet Take 2 tablets (800 mg total) by mouth 2 (two) times daily.    minoxidiL (LONITEN) 2.5 MG tablet Take 2 tablets (5 mg total) by mouth once daily.    multivitamin Tab Take 1 tablet by mouth once daily.    mycophenolate (CELLCEPT) 250 mg Cap Take 4 capsules (1,000 mg total) by mouth 2 (two) times daily.    NIFEdipine (PROCARDIA-XL) 60 MG (OSM) 24 hr tablet Take 1 tablet (60 mg total) by mouth 2 (two) times a day.    ondansetron (ZOFRAN-ODT) 4 MG TbDL Dissolve 1 tablet (4 mg total) by mouth every 8 (eight) hours as needed.    oxybutynin (DITROPAN) 5 MG Tab Take 1 tablet (5 mg total) by mouth 3 (three) times daily as needed (bladder spasms).    oxyCODONE (ROXICODONE) 5 MG immediate release tablet Take 1 tablet (5 mg total) by mouth every 6 (six) hours as needed for Pain.    pantoprazole (PROTONIX) 40 MG tablet Take 1 tablet (40 mg total) by mouth once daily.    pen needle, diabetic 32 gauge x 5/32" Ndle 1 each for use with insulin pen 3 (three) times daily.    predniSONE (DELTASONE) 5 MG tablet Take by mouth daily; 7/27/2024-8/2/2024: 20 mg, 8/3/2024-8/9/2024: 15 mg; 8/10/2024-8/16/2024: 10 mg; 8/17/2024- forever: 5 mg; do not stop    sodium bicarbonate 650 MG tablet Take 2 tablets (1,300 mg total) by mouth 2 (two) " times daily.    sulfamethoxazole-trimethoprim 400-80mg (BACTRIM,SEPTRA) 400-80 mg per tablet Take 1 tablet by mouth every morning. Stop 1/21/25    tacrolimus (PROGRAF) 1 MG Cap Take 5 capsules (5 mg total) by mouth every 12 (twelve) hours.    valGANciclovir (VALCYTE) 450 mg Tab Take 1 tablet (450 mg total) by mouth once daily. Stop 10/23/24       Review of patient's allergies indicates:  No Known Allergies    Past Medical History:   Diagnosis Date    Anemia     Anxiety     Atrial fibrillation     Bleeding 08/08/2024    Coronary artery disease     Depression     Diabetes mellitus, type 2     Disorder of kidney and ureter     Heart murmur     Hyperlipidemia     Hypertension     Obesity     ALEJA (obstructive sleep apnea)     Proteinuria     Solitary kidney     Stroke     Transient neurological symptoms 11/13/2018     Past Surgical History:   Procedure Laterality Date    CORONARY ANGIOPLASTY WITH STENT PLACEMENT      HYSTERECTOMY      INCONTINENCE SURGERY      INSERTION OF IMPLANTABLE LOOP RECORDER      internal heart monitor      KIDNEY TRANSPLANT Right 7/24/2024    Procedure: TRANSPLANT, KIDNEY;  Surgeon: Alphonso Mon MD;  Location: [facility] OR [address] [place] [address];  Service: Transplant;  Laterality: Right;    PERITONEAL CATHETER INSERTION      PERITONEAL CATHETER REMOVAL Left 7/24/2024    Procedure: REMOVAL, CATHETER, DIALYSIS, PERITONEAL;  Surgeon: Alphonso Mon MD;  Location: [facility] OR [address] [place] [address];  Service: Transplant;  Laterality: Left;     Family History    None       Tobacco Use    Smoking status: Former    Smokeless tobacco: Never   Substance and Sexual Activity    Alcohol use: Not Currently    Drug use: Never    Sexual activity: Not Currently     Partners: Male        Review of Systems   Constitutional:  Negative for activity change, appetite change, chills and fever.   Respiratory:  Negative for shortness of breath.    Cardiovascular:  Negative for leg swelling.   Gastrointestinal:  Negative for abdominal distention, nausea and  "vomiting.   Genitourinary:  Negative for decreased urine volume and dysuria.   Skin:  Positive for wound.   Allergic/Immunologic: Positive for immunocompromised state.   Psychiatric/Behavioral:  Negative for confusion and decreased concentration.      Objective:     Vital Signs (Most Recent):  Temp: 98.5 °F (36.9 °C) (08/16/24 2310)  Pulse: 87 (08/16/24 2310)  Resp: 18 (08/16/24 2310)  BP: (!) 178/68 (08/16/24 2310)  SpO2: 97 % (08/16/24 2310)  Height: 5' 5" (165.1 cm)  Weight: 81.6 kg (180 lb)  Body mass index is 29.95 kg/m².      Physical Exam  Vitals reviewed.   Cardiovascular:      Rate and Rhythm: Normal rate.   Pulmonary:      Effort: Pulmonary effort is normal.   Abdominal:      General: There is no distension.      Palpations: Abdomen is soft.      Tenderness: There is no abdominal tenderness. There is no guarding or rebound.       Neurological:      Mental Status: She is alert.   Psychiatric:         Mood and Affect: Mood normal.         Behavior: Behavior normal.         Thought Content: Thought content normal.         Judgment: Judgment normal.          Laboratory  CBC:   Recent Labs   Lab 08/12/24  0619 08/13/24  0602 08/17/24  0006   WBC 6.82 7.44 7.40   RBC 2.77* 2.60* 3.06*   HGB 8.6* 8.4* 9.4*   HCT 26.8* 25.0* 29.0*    204 324   MCV 97 96 95   MCH 31.0 32.3* 30.7   MCHC 32.1 33.6 32.4     CMP:   Recent Labs   Lab 08/11/24  0537 08/12/24  0619 08/13/24  0602   * 110 111*   CALCIUM 8.2* 8.2* 8.5*   ALBUMIN 2.7* 2.8* 2.7*    143 140   K 3.7 3.4* 4.2   CO2 20* 22* 25   * 112* 108   BUN 15 17 17   CREATININE 1.6* 1.6* 1.5*       Diagnostic Results:  None      Assessment/Plan:     Psychiatric  Anxiety  - continue home xanax      Cardiac/Vascular  Hypertension associated with transplantation  - continue home antihypertensives with hold parameters    Other hyperlipidemia  - continue home statin      Renal/  S/P kidney transplant  - S/p Ktxp 7/24/24  - Cr trended down with " "excellent UOP  - Multiple Readmit d/t wound complications      Immunology/Multi System  Prophylactic immunotherapy  - Continue prograf, cellcept, and steroid taper  - Check prograf level daily and adjust for therapeutic dosage. Monitor for toxic side effects       Oncology  Anemia of chronic disease  - daily CBC      Endocrine  Diabetes mellitus due to underlying condition with chronic kidney disease on chronic dialysis, with long-term current use of insulin  - Endocrine consulted  - sliding scale      Orthopedic  * Wound drainage  - Transfer from OSH d/t large wound drainage from kidney transplant incision  - Recently admitted 8/8-8/13 for wound drainage, s/p IR aspiration and drain placement of perinephric collection  - Perc drain now w/ milky purulent output  - Plan for IV abx, infectious w/u, wound care, possible IR consult   - Continue to monitor       Palliative Care  Long-term use of immunosuppressant medication  - see Prophylactic immunotherapy"            Discharge Planning: Not a candidate for discharge at this time       Medical decision making for this encounter includes review of pertinent labs and diagnostic studies, assessment and planning, discussions with consulting providers, discussion with patient/family, and participation in multidisciplinary rounds. Time spent caring for patient: 60 minutes    Gosia Mora PA-C  Kidney Transplant  [facility] - Transplant Stepdown    "

## 2024-08-17 NOTE — ASSESSMENT & PLAN NOTE
BG goal 140-180    Start Novolog 5 units TID with meals (20% reduction from home dosing) HOLD while NPO  Low Dose Correction Scale  BG monitoring ac/hs    ** Please call Endocrine for any BG related issues **

## 2024-08-17 NOTE — ASSESSMENT & PLAN NOTE
- Transfer from OSH d/t large wound drainage from kidney transplant incision  - Recently admitted 8/8-8/13 for wound drainage, s/p IR aspiration and drain placement of perinephric collection  - Perc drain now w/ milky purulent output; recent studies show infected fluid collecton  - OSH CT reviewed and shows persistent collection with foci of air  - Incision opened bedside on 8/17; fascia intact and old hematoma evacuated. Wound packed and wound care consulted for wound vac placement

## 2024-08-17 NOTE — PLAN OF CARE
Pt admitted for drainage from wound.   Pt is AAOx4. Ambulatory independently. Afebrile. RA. BP slightly elevated. Accucheck, AC&HS. NPO after midnight.  RLQ incision, ULISES with staples. Syd drain to RLQ, total output this shift 200cc of milky pink tinged drainage.  Pt voiding in hat (see flowsheets for details). Rocephin started. Scheduled meds given. Cr 1.9. UA sent.  Pt free of falls. Bed locked in the lowest position and call light within reach.

## 2024-08-17 NOTE — NURSING
Nurses Note -- 4 Eyes      8/17/2024   2:03 AM      Skin assessed during: Admit      [] No Altered Skin Integrity Present    []Prevention Measures Documented      [x] Yes- Altered Skin Integrity Present or Discovered   [] LDA Added if Not in Epic (Describe Wound)   [] New Altered Skin Integrity was Present on Admit and Documented in LDA   [] Wound Image Taken    Wound Care Consulted? No    Attending Nurse:  Gerda Acosta RN/Staff Member:   Ramya small

## 2024-08-17 NOTE — SUBJECTIVE & OBJECTIVE
Subjective:     Chief Complaint/Reason for Admission: Wound Infection    History of Present Illness:  Joann Kenney is a 62 y/o F s/p DBD kidney transplant 7/24/2024 for ESRD 2/2 Type II DM. PMHx born w solitary kidney, DM2, HTN, incontience (s/p bladder sling), CAD with stents (on ASA), atrial fibrillation, CVA (no residual deficits), ALEJA, and anemia. Surgery without complications. PD cath removed. Post op course notable for down trending Cr with excellent UOP. Patient recently admitted 8/8 to 8/13 d/t incisional drainage. CT A/P noted enlarged fluid collection around the right renal transplant and extending into the RLQ abdominal wall. She underwent IR fluid aspiration and drain placement on 8/9. Fluid studies neg for infection or urine leak (WBC 47, segs 10, lymph 83, Cr 1.6). Repeat CT scan done 8/12 reviewed by surgeons, plan to discharge pt with drain in place for continued drainage per MALKA.     Patient now presents as a transfer from OSH due to incisional drainage. Pt presented to her local ED with large amount of bloody drainage from her transplant incision. Wound has 3 cm opening in medial aspect with old bloody drainage CT A/P at OSH w/ large collection 17 x 4 x 3 cm. Her percutaneous drain remains in place and per patient turned milky and purulent appearing day prior to presenting to the OSH.  Pt reports overall feeling well. Pt denies fever/chills, decreased UOP, dysuria. Plan for infectious work-up, IV abx, wound care consult. Will review CT imaging with transplant surgeon with likely plan for IR consult     PTA Medications   Medication Sig    ALPRAZolam (XANAX) 2 MG Tab Take 2 mg by mouth every evening.    atorvastatin (LIPITOR) 20 MG tablet Take 1 tablet (20 mg total) by mouth every evening.    blood sugar diagnostic Strp use 1 strip to check blood glucose 3 (three) times daily.    blood-glucose meter Misc use as directed to check blood glucose    carvediloL (COREG) 6.25 MG tablet Take 1 tablet  "(6.25 mg total) by mouth 2 (two) times daily.    cetirizine (ZYRTEC) 5 MG tablet Take 1 tablet (5 mg total) by mouth once daily.    cinacalcet (SENSIPAR) 60 MG Tab Take 1 tablet (60 mg total) by mouth daily with breakfast.    DULoxetine (CYMBALTA) 30 MG capsule Take 30 mg by mouth once daily.    ezetimibe (ZETIA) 10 mg tablet Take 1 tablet (10 mg total) by mouth once daily.    insulin aspart U-100 (NOVOLOG) 100 unit/mL (3 mL) InPn pen Inject 6 Units into the skin 3 (three) times daily with meals. + SSI (TDD: 48)    lancets Misc 1 lancet each to check blood glucose 3 (three) times daily.    magnesium oxide (MAG-OX) 400 mg (241.3 mg magnesium) tablet Take 2 tablets (800 mg total) by mouth 2 (two) times daily.    minoxidiL (LONITEN) 2.5 MG tablet Take 2 tablets (5 mg total) by mouth once daily.    multivitamin Tab Take 1 tablet by mouth once daily.    mycophenolate (CELLCEPT) 250 mg Cap Take 4 capsules (1,000 mg total) by mouth 2 (two) times daily.    NIFEdipine (PROCARDIA-XL) 60 MG (OSM) 24 hr tablet Take 1 tablet (60 mg total) by mouth 2 (two) times a day.    ondansetron (ZOFRAN-ODT) 4 MG TbDL Dissolve 1 tablet (4 mg total) by mouth every 8 (eight) hours as needed.    oxybutynin (DITROPAN) 5 MG Tab Take 1 tablet (5 mg total) by mouth 3 (three) times daily as needed (bladder spasms).    oxyCODONE (ROXICODONE) 5 MG immediate release tablet Take 1 tablet (5 mg total) by mouth every 6 (six) hours as needed for Pain.    pantoprazole (PROTONIX) 40 MG tablet Take 1 tablet (40 mg total) by mouth once daily.    pen needle, diabetic 32 gauge x 5/32" Ndle 1 each for use with insulin pen 3 (three) times daily.    predniSONE (DELTASONE) 5 MG tablet Take by mouth daily; 7/27/2024-8/2/2024: 20 mg, 8/3/2024-8/9/2024: 15 mg; 8/10/2024-8/16/2024: 10 mg; 8/17/2024- forever: 5 mg; do not stop    sodium bicarbonate 650 MG tablet Take 2 tablets (1,300 mg total) by mouth 2 (two) times daily.    sulfamethoxazole-trimethoprim 400-80mg " (BACTRIM,SEPTRA) 400-80 mg per tablet Take 1 tablet by mouth every morning. Stop 1/21/25    tacrolimus (PROGRAF) 1 MG Cap Take 5 capsules (5 mg total) by mouth every 12 (twelve) hours.    valGANciclovir (VALCYTE) 450 mg Tab Take 1 tablet (450 mg total) by mouth once daily. Stop 10/23/24       Review of patient's allergies indicates:  No Known Allergies    Past Medical History:   Diagnosis Date    Anemia     Anxiety     Atrial fibrillation     Bleeding 08/08/2024    Coronary artery disease     Depression     Diabetes mellitus, type 2     Disorder of kidney and ureter     Heart murmur     Hyperlipidemia     Hypertension     Obesity     ALEJA (obstructive sleep apnea)     Proteinuria     Solitary kidney     Stroke     Transient neurological symptoms 11/13/2018     Past Surgical History:   Procedure Laterality Date    CORONARY ANGIOPLASTY WITH STENT PLACEMENT      HYSTERECTOMY      INCONTINENCE SURGERY      INSERTION OF IMPLANTABLE LOOP RECORDER      internal heart monitor      KIDNEY TRANSPLANT Right 7/24/2024    Procedure: TRANSPLANT, KIDNEY;  Surgeon: Alphonso Mon MD;  Location: [address];  Service: Transplant;  Laterality: Right;    PERITONEAL CATHETER INSERTION      PERITONEAL CATHETER REMOVAL Left 7/24/2024    Procedure: REMOVAL, CATHETER, DIALYSIS, PERITONEAL;  Surgeon: Alphonso Mon MD;  Location: [address];  Service: Transplant;  Laterality: Left;     Family History    None       Tobacco Use    Smoking status: Former    Smokeless tobacco: Never   Substance and Sexual Activity    Alcohol use: Not Currently    Drug use: Never    Sexual activity: Not Currently     Partners: Male        Review of Systems   Constitutional:  Negative for activity change, appetite change, chills and fever.   Respiratory:  Negative for shortness of breath.    Cardiovascular:  Negative for leg swelling.   Gastrointestinal:  Negative for abdominal distention, nausea and vomiting.   Genitourinary:  Negative for decreased  "urine volume and dysuria.   Skin:  Positive for wound.   Allergic/Immunologic: Positive for immunocompromised state.   Psychiatric/Behavioral:  Negative for confusion and decreased concentration.      Objective:     Vital Signs (Most Recent):  Temp: 98.5 °F (36.9 °C) (08/16/24 2310)  Pulse: 87 (08/16/24 2310)  Resp: 18 (08/16/24 2310)  BP: (!) 178/68 (08/16/24 2310)  SpO2: 97 % (08/16/24 2310)  Height: 5' 5" (165.1 cm)  Weight: 81.6 kg (180 lb)  Body mass index is 29.95 kg/m².      Physical Exam  Vitals reviewed.   Cardiovascular:      Rate and Rhythm: Normal rate.   Pulmonary:      Effort: Pulmonary effort is normal.   Abdominal:      General: There is no distension.      Palpations: Abdomen is soft.      Tenderness: There is no abdominal tenderness. There is no guarding or rebound.       Neurological:      Mental Status: She is alert.   Psychiatric:         Mood and Affect: Mood normal.         Behavior: Behavior normal.         Thought Content: Thought content normal.         Judgment: Judgment normal.          Laboratory  CBC:   Recent Labs   Lab 08/12/24  0619 08/13/24  0602 08/17/24  0006   WBC 6.82 7.44 7.40   RBC 2.77* 2.60* 3.06*   HGB 8.6* 8.4* 9.4*   HCT 26.8* 25.0* 29.0*    204 324   MCV 97 96 95   MCH 31.0 32.3* 30.7   MCHC 32.1 33.6 32.4     CMP:   Recent Labs   Lab 08/11/24  0537 08/12/24  0619 08/13/24  0602   * 110 111*   CALCIUM 8.2* 8.2* 8.5*   ALBUMIN 2.7* 2.8* 2.7*    143 140   K 3.7 3.4* 4.2   CO2 20* 22* 25   * 112* 108   BUN 15 17 17   CREATININE 1.6* 1.6* 1.5*       Diagnostic Results:  None      "

## 2024-08-18 LAB
ALBUMIN SERPL BCP-MCNC: 2.5 G/DL (ref 3.5–5.2)
ANION GAP SERPL CALC-SCNC: 7 MMOL/L (ref 8–16)
BASOPHILS # BLD AUTO: 0.06 K/UL (ref 0–0.2)
BASOPHILS NFR BLD: 0.8 % (ref 0–1.9)
BUN SERPL-MCNC: 18 MG/DL (ref 8–23)
CALCIUM SERPL-MCNC: 10 MG/DL (ref 8.7–10.5)
CHLORIDE SERPL-SCNC: 108 MMOL/L (ref 95–110)
CO2 SERPL-SCNC: 22 MMOL/L (ref 23–29)
CREAT SERPL-MCNC: 1.6 MG/DL (ref 0.5–1.4)
DIFFERENTIAL METHOD BLD: ABNORMAL
EOSINOPHIL # BLD AUTO: 0.1 K/UL (ref 0–0.5)
EOSINOPHIL NFR BLD: 1.5 % (ref 0–8)
ERYTHROCYTE [DISTWIDTH] IN BLOOD BY AUTOMATED COUNT: 16.5 % (ref 11.5–14.5)
EST. GFR  (NO RACE VARIABLE): 35.6 ML/MIN/1.73 M^2
GLUCOSE SERPL-MCNC: 146 MG/DL (ref 70–110)
HCT VFR BLD AUTO: 30 % (ref 37–48.5)
HGB BLD-MCNC: 9.8 G/DL (ref 12–16)
IMM GRANULOCYTES # BLD AUTO: 0.04 K/UL (ref 0–0.04)
IMM GRANULOCYTES NFR BLD AUTO: 0.6 % (ref 0–0.5)
LYMPHOCYTES # BLD AUTO: 0.4 K/UL (ref 1–4.8)
LYMPHOCYTES NFR BLD: 6 % (ref 18–48)
MAGNESIUM SERPL-MCNC: 1.9 MG/DL (ref 1.6–2.6)
MCH RBC QN AUTO: 31.5 PG (ref 27–31)
MCHC RBC AUTO-ENTMCNC: 32.7 G/DL (ref 32–36)
MCV RBC AUTO: 97 FL (ref 82–98)
MONOCYTES # BLD AUTO: 0.3 K/UL (ref 0.3–1)
MONOCYTES NFR BLD: 4.6 % (ref 4–15)
NEUTROPHILS # BLD AUTO: 6.3 K/UL (ref 1.8–7.7)
NEUTROPHILS NFR BLD: 86.5 % (ref 38–73)
NRBC BLD-RTO: 0 /100 WBC
PHOSPHATE SERPL-MCNC: 1.6 MG/DL (ref 2.7–4.5)
PLATELET # BLD AUTO: 360 K/UL (ref 150–450)
PMV BLD AUTO: 10.7 FL (ref 9.2–12.9)
POCT GLUCOSE: 146 MG/DL (ref 70–110)
POCT GLUCOSE: 159 MG/DL (ref 70–110)
POCT GLUCOSE: 169 MG/DL (ref 70–110)
POCT GLUCOSE: 216 MG/DL (ref 70–110)
POTASSIUM SERPL-SCNC: 4.2 MMOL/L (ref 3.5–5.1)
RBC # BLD AUTO: 3.11 M/UL (ref 4–5.4)
SODIUM SERPL-SCNC: 137 MMOL/L (ref 136–145)
TACROLIMUS BLD-MCNC: 18.8 NG/ML (ref 5–15)
WBC # BLD AUTO: 7.22 K/UL (ref 3.9–12.7)

## 2024-08-18 PROCEDURE — 25000003 PHARM REV CODE 250: Performed by: PHYSICIAN ASSISTANT

## 2024-08-18 PROCEDURE — 94660 CPAP INITIATION&MGMT: CPT

## 2024-08-18 PROCEDURE — 99900035 HC TECH TIME PER 15 MIN (STAT)

## 2024-08-18 PROCEDURE — 25000003 PHARM REV CODE 250: Performed by: INTERNAL MEDICINE

## 2024-08-18 PROCEDURE — 36415 COLL VENOUS BLD VENIPUNCTURE: CPT | Performed by: PHYSICIAN ASSISTANT

## 2024-08-18 PROCEDURE — 20600001 HC STEP DOWN PRIVATE ROOM

## 2024-08-18 PROCEDURE — 25000003 PHARM REV CODE 250: Performed by: TRANSPLANT SURGERY

## 2024-08-18 PROCEDURE — 63600175 PHARM REV CODE 636 W HCPCS: Performed by: PHYSICIAN ASSISTANT

## 2024-08-18 PROCEDURE — 63600175 PHARM REV CODE 636 W HCPCS: Performed by: TRANSPLANT SURGERY

## 2024-08-18 PROCEDURE — 83735 ASSAY OF MAGNESIUM: CPT | Performed by: PHYSICIAN ASSISTANT

## 2024-08-18 PROCEDURE — 80197 ASSAY OF TACROLIMUS: CPT | Performed by: PHYSICIAN ASSISTANT

## 2024-08-18 PROCEDURE — 63600175 PHARM REV CODE 636 W HCPCS: Performed by: NURSE PRACTITIONER

## 2024-08-18 PROCEDURE — 80069 RENAL FUNCTION PANEL: CPT | Performed by: PHYSICIAN ASSISTANT

## 2024-08-18 PROCEDURE — 27100171 HC OXYGEN HIGH FLOW UP TO 24 HOURS

## 2024-08-18 PROCEDURE — 94761 N-INVAS EAR/PLS OXIMETRY MLT: CPT

## 2024-08-18 PROCEDURE — 99232 SBSQ HOSP IP/OBS MODERATE 35: CPT | Mod: ,,, | Performed by: NURSE PRACTITIONER

## 2024-08-18 PROCEDURE — 85025 COMPLETE CBC W/AUTO DIFF WBC: CPT | Performed by: PHYSICIAN ASSISTANT

## 2024-08-18 PROCEDURE — 25000003 PHARM REV CODE 250: Performed by: NURSE PRACTITIONER

## 2024-08-18 PROCEDURE — 94799 UNLISTED PULMONARY SVC/PX: CPT

## 2024-08-18 RX ORDER — BISACODYL 5 MG
5 TABLET, DELAYED RELEASE (ENTERIC COATED) ORAL DAILY
Status: DISCONTINUED | OUTPATIENT
Start: 2024-08-19 | End: 2024-08-19

## 2024-08-18 RX ORDER — DOCUSATE SODIUM 100 MG/1
100 CAPSULE, LIQUID FILLED ORAL
Status: DISCONTINUED | OUTPATIENT
Start: 2024-08-18 | End: 2024-08-26 | Stop reason: HOSPADM

## 2024-08-18 RX ORDER — HYDROXYZINE HYDROCHLORIDE 25 MG/1
25 TABLET, FILM COATED ORAL 3 TIMES DAILY PRN
Status: DISCONTINUED | OUTPATIENT
Start: 2024-08-18 | End: 2024-08-26 | Stop reason: HOSPADM

## 2024-08-18 RX ORDER — INSULIN ASPART 100 [IU]/ML
6 INJECTION, SOLUTION INTRAVENOUS; SUBCUTANEOUS
Status: DISCONTINUED | OUTPATIENT
Start: 2024-08-18 | End: 2024-08-23

## 2024-08-18 RX ADMIN — INSULIN ASPART 6 UNITS: 100 INJECTION, SOLUTION INTRAVENOUS; SUBCUTANEOUS at 01:08

## 2024-08-18 RX ADMIN — DULOXETINE 30 MG: 30 CAPSULE, DELAYED RELEASE ORAL at 08:08

## 2024-08-18 RX ADMIN — INSULIN ASPART 2 UNITS: 100 INJECTION, SOLUTION INTRAVENOUS; SUBCUTANEOUS at 01:08

## 2024-08-18 RX ADMIN — TACROLIMUS 4 MG: 1 CAPSULE ORAL at 08:08

## 2024-08-18 RX ADMIN — HYDROXYZINE HYDROCHLORIDE 25 MG: 25 TABLET ORAL at 12:08

## 2024-08-18 RX ADMIN — DIBASIC SODIUM PHOSPHATE, MONOBASIC POTASSIUM PHOSPHATE AND MONOBASIC SODIUM PHOSPHATE 1 TABLET: 852; 155; 130 TABLET ORAL at 08:08

## 2024-08-18 RX ADMIN — PREDNISONE 5 MG: 5 TABLET ORAL at 08:08

## 2024-08-18 RX ADMIN — CARVEDILOL 6.25 MG: 6.25 TABLET, FILM COATED ORAL at 08:08

## 2024-08-18 RX ADMIN — CETIRIZINE HYDROCHLORIDE 5 MG: 5 TABLET, FILM COATED ORAL at 08:08

## 2024-08-18 RX ADMIN — ALPRAZOLAM 2 MG: 0.5 TABLET ORAL at 08:08

## 2024-08-18 RX ADMIN — EZETIMIBE 10 MG: 10 TABLET ORAL at 08:08

## 2024-08-18 RX ADMIN — DOCUSATE SODIUM 100 MG: 100 CAPSULE, LIQUID FILLED ORAL at 11:08

## 2024-08-18 RX ADMIN — NIFEDIPINE 60 MG: 30 TABLET, FILM COATED, EXTENDED RELEASE ORAL at 08:08

## 2024-08-18 RX ADMIN — DIBASIC SODIUM PHOSPHATE, MONOBASIC POTASSIUM PHOSPHATE AND MONOBASIC SODIUM PHOSPHATE 1 TABLET: 852; 155; 130 TABLET ORAL at 12:08

## 2024-08-18 RX ADMIN — SODIUM BICARBONATE 650 MG TABLET 1300 MG: at 08:08

## 2024-08-18 RX ADMIN — SULFAMETHOXAZOLE AND TRIMETHOPRIM 1 TABLET: 400; 80 TABLET ORAL at 06:08

## 2024-08-18 RX ADMIN — Medication 800 MG: at 08:08

## 2024-08-18 RX ADMIN — ATORVASTATIN CALCIUM 20 MG: 20 TABLET, FILM COATED ORAL at 08:08

## 2024-08-18 RX ADMIN — MINOXIDIL 5 MG: 2.5 TABLET ORAL at 08:08

## 2024-08-18 RX ADMIN — CEFEPIME 1 G: 1 INJECTION, POWDER, FOR SOLUTION INTRAMUSCULAR; INTRAVENOUS at 03:08

## 2024-08-18 RX ADMIN — CINACALCET 60 MG: 30 TABLET, FILM COATED ORAL at 08:08

## 2024-08-18 RX ADMIN — SODIUM PHOSPHATE, MONOBASIC, MONOHYDRATE AND SODIUM PHOSPHATE, DIBASIC, ANHYDROUS 20.01 MMOL: 142; 276 INJECTION, SOLUTION INTRAVENOUS at 10:08

## 2024-08-18 RX ADMIN — PANTOPRAZOLE SODIUM 40 MG: 40 TABLET, DELAYED RELEASE ORAL at 08:08

## 2024-08-18 RX ADMIN — CEFEPIME 1 G: 1 INJECTION, POWDER, FOR SOLUTION INTRAMUSCULAR; INTRAVENOUS at 11:08

## 2024-08-18 RX ADMIN — INSULIN ASPART 6 UNITS: 100 INJECTION, SOLUTION INTRAVENOUS; SUBCUTANEOUS at 05:08

## 2024-08-18 NOTE — SUBJECTIVE & OBJECTIVE
"Interval HPI:   Overnight events: Remains in TSU. BG at or slightly above goal ranges on current SQ insulin regimen. Remains on Prednisone 5 mg daily. Diet Renal    Eatin%  Nausea: No  Hypoglycemia and intervention: No  Fever: No  TPN and/or TF: No  If yes, type of TF/TPN and rate: n/a    /63 (BP Location: Right arm, Patient Position: Standing)   Pulse 82   Temp 97.9 °F (36.6 °C) (Oral)   Resp 18   Ht 5' 5" (1.651 m)   Wt 81.6 kg (180 lb)   SpO2 99%   BMI 29.95 kg/m²     Labs Reviewed and Include    Recent Labs   Lab 24  0620   *   CALCIUM 10.0   ALBUMIN 2.5*      K 4.2   CO2 22*      BUN 18   CREATININE 1.6*     Lab Results   Component Value Date    WBC 7.22 2024    HGB 9.8 (L) 2024    HCT 30.0 (L) 2024    MCV 97 2024     2024     No results for input(s): "TSH", "FREET4" in the last 168 hours.  Lab Results   Component Value Date    HGBA1C 4.8 2024    HGBA1C 4.8 2024       Nutritional status:   Body mass index is 29.95 kg/m².  Lab Results   Component Value Date    ALBUMIN 2.5 (L) 2024    ALBUMIN 2.3 (L) 2024    ALBUMIN 2.6 (L) 2024     No results found for: "PREALBUMIN"    Estimated Creatinine Clearance: 37 mL/min (A) (based on SCr of 1.6 mg/dL (H)).    Accu-Checks  Recent Labs     24  0841 24  1239 24  1740 24  2259 24  0804   POCTGLUCOSE 223* 283* 240* 233* 159*       Current Medications and/or Treatments Impacting Glycemic Control  Immunotherapy:    Immunosuppressants       None          Steroids:   Hormones (From admission, onward)      Start     Stop Route Frequency Ordered    24 0900  predniSONE tablet 5 mg         -- Oral Daily 24 23224 2320  melatonin tablet 6 mg         -- Oral Nightly PRN 24 2320          Pressors:    Autonomic Drugs (From admission, onward)      None          Hyperglycemia/Diabetes Medications:   Antihyperglycemics " (From admission, onward)      Start     Stop Route Frequency Ordered    08/17/24 1200  insulin aspart U-100 pen 5 Units         -- SubQ 3 times daily with meals 08/17/24 1037    08/16/24 2320  insulin aspart U-100 pen 0-5 Units         -- SubQ Before meals & nightly PRN 08/16/24 2320

## 2024-08-18 NOTE — PLAN OF CARE
Patient is AAOx4.  Afebrile  RA  AC&HS glucose monitoring  Standing BPS  IV abx continued per order.   RLQ kidney incision with staples and wound vac in place.  RLQ MALKA drain  Bed is in lowest position with wheels locked.   Instructed to call for assistance.   Plan of care is ongoing.

## 2024-08-18 NOTE — PROGRESS NOTES
[facility] - Transplant Stepdown  Endocrinology  Progress Note    Admit Date: 8/16/2024     Reason for Consult: Management of T2DM, Hyperglycemia     Surgical Procedure and Date:  kidney transplant 7/24/2024     Diabetes diagnosis year: 2004    Home Diabetes Medications:    - Novolog 6 units TIDWM  - Novolog SSI for BG excursions:  Add correction scale if needed.  Blood sugar 150 to 200 add 2 units  Blood sugar 201 to 250 add 4 units  Blood sugar 251 to 300 add 6 units  Blood sugar 301 to 350 add 8 units  Blood sugar greater than 350 add 10 units    Lab Results   Component Value Date    HGBA1C 4.8 07/24/2024    HGBA1C 4.8 07/24/2024       How often checking glucose at home?  3x daily     BG readings on regimen: 140-<200  Hypoglycemia on the regimen?  No  Missed doses on regimen?  No    Diabetes Complications include:     none    Complicating diabetes co morbidities:   S/p kidney txp, glucocorticoid use       HPI:   Patient is a 63 y.o. 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 PMHx born w solitary kidney, DM2, HTN, incontience (s/p bladder sling), CAD with stents (on ASA), atrial fibrillation, CVA (no residual deficits), ALEJA, and anemia. Surgery without complications. PD cath removed. Post op course notable for down trending Cr with excellent UOP. Patient recently admitted 8/8 to 8/13 d/t incisional drainage. CT A/P noted enlarged fluid collection around the right renal transplant and extending into the RLQ abdominal wall. She underwent IR fluid aspiration and drain placement on 8/9. Fluid studies neg for infection or urine leak (WBC 47, segs 10, lymph 83, Cr 1.6). Repeat CT scan done 8/12 reviewed by surgeons, plan to discharge pt with drain in place for continued drainage per MALKA.      Patient now presents as a transfer from OSH due to incisional drainage. Pt presented to her local ED with large amount of bloody drainage from her transplant incision. Wound has 3 cm opening in  "medial aspect with old bloody drainage CT A/P at OSH w/ large collection 17 x 4 x 3 cm. Her percutaneous drain remains in place and per patient turned milky and purulent appearing day prior to presenting to the OSH.  Pt reports overall feeling well. Pt denies fever/chills, decreased UOP, dysuria. Plan for infectious work-up, IV abx, wound care consult. Endocrinology consulted for management of T2DM.         Interval HPI:   Overnight events: Remains in TSU. BG at or slightly above goal ranges on current SQ insulin regimen. Remains on Prednisone 5 mg daily. Diet Renal    Eatin%  Nausea: No  Hypoglycemia and intervention: No  Fever: No  TPN and/or TF: No  If yes, type of TF/TPN and rate: n/a    /63 (BP Location: Right arm, Patient Position: Standing)   Pulse 82   Temp 97.9 °F (36.6 °C) (Oral)   Resp 18   Ht 5' 5" (1.651 m)   Wt 81.6 kg (180 lb)   SpO2 99%   BMI 29.95 kg/m²     Labs Reviewed and Include    Recent Labs   Lab 24  0620   *   CALCIUM 10.0   ALBUMIN 2.5*      K 4.2   CO2 22*      BUN 18   CREATININE 1.6*     Lab Results   Component Value Date    WBC 7.22 2024    HGB 9.8 (L) 2024    HCT 30.0 (L) 2024    MCV 97 2024     2024     No results for input(s): "TSH", "FREET4" in the last 168 hours.  Lab Results   Component Value Date    HGBA1C 4.8 2024    HGBA1C 4.8 2024       Nutritional status:   Body mass index is 29.95 kg/m².  Lab Results   Component Value Date    ALBUMIN 2.5 (L) 2024    ALBUMIN 2.3 (L) 2024    ALBUMIN 2.6 (L) 2024     No results found for: "PREALBUMIN"    Estimated Creatinine Clearance: 37 mL/min (A) (based on SCr of 1.6 mg/dL (H)).    Accu-Checks  Recent Labs     24  0841 24  1239 24  1740 24  2259 24  0804   POCTGLUCOSE 223* 283* 240* 233* 159*       Current Medications and/or Treatments Impacting Glycemic Control  Immunotherapy:    Immunosuppressants  "      None          Steroids:   Hormones (From admission, onward)      Start     Stop Route Frequency Ordered    08/17/24 0900  predniSONE tablet 5 mg         -- Oral Daily 08/16/24 2320 08/16/24 2320  melatonin tablet 6 mg         -- Oral Nightly PRN 08/16/24 2320          Pressors:    Autonomic Drugs (From admission, onward)      None          Hyperglycemia/Diabetes Medications:   Antihyperglycemics (From admission, onward)      Start     Stop Route Frequency Ordered    08/17/24 1200  insulin aspart U-100 pen 5 Units         -- SubQ 3 times daily with meals 08/17/24 1037    08/16/24 2320  insulin aspart U-100 pen 0-5 Units         -- SubQ Before meals & nightly PRN 08/16/24 2320            ASSESSMENT and PLAN    Renal/  S/P kidney transplant  Managed per primary team  Avoid hypoglycemia        Immunology/Multi System  Prophylactic immunotherapy  May increase insulin resistance.         Endocrine  Diabetes mellitus due to underlying condition with chronic kidney disease on chronic dialysis, with long-term current use of insulin  BG goal 140-180    Increase Novolog to 6 units TID with meals (home dose)   Low Dose Correction Scale  BG monitoring ac/hs    ** Please call Endocrine for any BG related issues **        Orthopedic  * Wound drainage  Managed per primary team              Tatiana Garcia NP  Endocrinology  [facility] - Trans[facility]

## 2024-08-18 NOTE — ASSESSMENT & PLAN NOTE
- Transfer from OSH d/t large wound drainage from kidney transplant incision  - Recently admitted 8/8-8/13 for wound drainage, s/p IR aspiration and drain placement of perinephric collection  - Perc drain now w/ milky purulent output; recent studies show infected fluid collecton  - OSH CT reviewed and shows persistent collection with foci of air  - Incision opened bedside on 8/17; fascia intact and old hematoma evacuated. Wound packed and wound care consulted for wound vac placement  - Wound vac placed 8/17 and tolerating well; will need  assistance with home health wound care and wound vac  - ID consult Monday for outpatient abx plan; currently on cefepime with cultures NGTD but positive cell count from initial drain placement

## 2024-08-18 NOTE — SUBJECTIVE & OBJECTIVE
Subjective:   History of Present Illness:  Joann Kenney is a 62 y/o F s/p DBD kidney transplant 7/24/2024 for ESRD 2/2 Type II DM. PMHx born w solitary kidney, DM2, HTN, incontience (s/p bladder sling), CAD with stents (on ASA), atrial fibrillation, CVA (no residual deficits), ALEJA, and anemia. Surgery without complications. PD cath removed. Post op course notable for down trending Cr with excellent UOP. Patient recently admitted 8/8 to 8/13 d/t incisional drainage. CT A/P noted enlarged fluid collection around the right renal transplant and extending into the RLQ abdominal wall. She underwent IR fluid aspiration and drain placement on 8/9. Fluid studies neg for infection or urine leak (WBC 47, segs 10, lymph 83, Cr 1.6). Repeat CT scan done 8/12 reviewed by surgeons, plan to discharge pt with drain in place for continued drainage per MALKA.     Patient now presents as a transfer from OSH due to incisional drainage. Pt presented to her local ED with large amount of bloody drainage from her transplant incision. Wound has 3 cm opening in medial aspect with old bloody drainage CT A/P at OSH w/ large collection 17 x 4 x 3 cm. Her percutaneous drain remains in place and per patient turned milky and purulent appearing day prior to presenting to the OSH.  Pt reports overall feeling well. Pt denies fever/chills, decreased UOP, dysuria. Plan for infectious work-up, IV abx, wound care consult. Will review CT imaging with transplant surgeon with likely plan for IR consult     Ms. Kenney is a 63 y.o. year old female who is status post Kidney Transplant - 7/24/2024  (#1).  Her maintenance immunosuppression consists of:   Immunosuppressants (From admission, onward)      None            Her post transplant course has been  complicated by wound infection .    Interval History:  NAEON  Wound opened bedside yesterday with old hematoma evacuated; packed and wound vac placed later  Has no complaints this AM and feels  well  Afebrile  RLQ MALKA drain with 295cc of thin milky serous output  2500cc UOP/24 hours  Cr continuing to downtrend now 1.6  Awaiting SW to set up home health wound care and wound vac tomorrow for potential discharge  Will consult ID tomorrow re: outpatient abx plan; cultures NGTD but remains on cefepime but positive cell counts in wound drainage    Past Medical, Surgical, Family, and Social History:   Unchanged from H&P.    Scheduled Meds:   ALPRAZolam  2 mg Oral QHS    atorvastatin  20 mg Oral QHS    carvediloL  6.25 mg Oral BID    ceFEPime IV (PEDS and ADULTS)  1 g Intravenous Q12H    cetirizine  5 mg Oral QHS    cinacalcet  60 mg Oral Daily with breakfast    DULoxetine  30 mg Oral QHS    ezetimibe  10 mg Oral Daily    insulin aspart U-100  6 Units Subcutaneous TID WM    magnesium oxide  800 mg Oral BID    minoxidiL  5 mg Oral Daily    NIFEdipine  60 mg Oral BID    pantoprazole  40 mg Oral Daily    predniSONE  5 mg Oral Daily    sodium bicarbonate  1,300 mg Oral BID    sodium phosphate 20.01 mmol in D5W 250 mL IVPB  20.01 mmol Intravenous Once    sulfamethoxazole-trimethoprim 400-80mg  1 tablet Oral QAM    [START ON 8/19/2024] valGANciclovir  450 mg Oral Every Mon, Wed, Fri     Continuous Infusions:  PRN Meds:  Current Facility-Administered Medications:     acetaminophen, 650 mg, Oral, Q8H PRN    dextrose 10%, 12.5 g, Intravenous, PRN    dextrose 10%, 25 g, Intravenous, PRN    glucagon (human recombinant), 1 mg, Intramuscular, PRN    glucose, 16 g, Oral, PRN    glucose, 24 g, Oral, PRN    insulin aspart U-100, 0-5 Units, Subcutaneous, QID (AC + HS) PRN    melatonin, 6 mg, Oral, Nightly PRN    ondansetron, 8 mg, Oral, Q8H PRN    oxybutynin, 5 mg, Oral, TID PRN    oxyCODONE, 5 mg, Oral, Q6H PRN    sodium chloride 0.9%, 10 mL, Intravenous, PRN    Intake/Output - Last 3 Shifts         08/16 0700 08/17 0659 08/17 0700 08/18 0659 08/18 0700 08/19 0659    P.O. 360 700     IV Piggyback  97.3     Total Intake(mL/kg)  "360 (4.4) 797.3 (9.8)     Urine (mL/kg/hr) 800 2500 (1.3)     Emesis/NG output  0     Drains 200 295 50    Stool 0 0     Total Output 1000 2795 50    Net -640 -1997.7 -50           Urine Occurrence  0 x     Stool Occurrence 0 x 0 x     Emesis Occurrence  0 x              Review of Systems   Constitutional:  Negative for activity change, appetite change, chills and fever.   Respiratory:  Negative for shortness of breath.    Cardiovascular:  Negative for leg swelling.   Gastrointestinal:  Negative for abdominal distention, nausea and vomiting.   Genitourinary:  Negative for decreased urine volume and dysuria.   Skin:  Positive for wound.   Allergic/Immunologic: Positive for immunocompromised state.   Psychiatric/Behavioral:  Negative for confusion and decreased concentration.       Objective:     Vital Signs (Most Recent):  Temp: 97.9 °F (36.6 °C) (08/18/24 0715)  Pulse: 82 (08/18/24 0715)  Resp: 18 (08/18/24 0715)  BP: 136/63 (08/18/24 0715)  SpO2: 99 % (08/18/24 0715) Vital Signs (24h Range):  Temp:  [97.5 °F (36.4 °C)-98 °F (36.7 °C)] 97.9 °F (36.6 °C)  Pulse:  [63-82] 82  Resp:  [18-28] 18  SpO2:  [95 %-99 %] 99 %  BP: (131-186)/(60-79) 136/63     Weight: 81.6 kg (180 lb)  Height: 5' 5" (165.1 cm)  Body mass index is 29.95 kg/m².     Physical Exam  Vitals reviewed.   Cardiovascular:      Rate and Rhythm: Normal rate.   Pulmonary:      Effort: Pulmonary effort is normal.   Abdominal:      General: There is no distension.      Palpations: Abdomen is soft.      Tenderness: There is abdominal tenderness. There is no guarding or rebound.          Comments: RLQ incision wound vac present with minimal output in tubing. RLQ MALKA drain with suction and thin milky serous output present   Musculoskeletal:      Right lower leg: No edema.      Left lower leg: No edema.   Skin:     General: Skin is warm and dry.   Neurological:      General: No focal deficit present.      Mental Status: She is alert. Mental status is at baseline. "   Psychiatric:         Mood and Affect: Mood normal.         Thought Content: Thought content normal.          Laboratory:  CBC:   Recent Labs   Lab 08/17/24  0006 08/17/24  0550 08/18/24  0620   WBC 7.40 6.96 7.22   RBC 3.06* 2.89* 3.11*   HGB 9.4* 9.1* 9.8*   HCT 29.0* 27.5* 30.0*    292 360   MCV 95 95 97   MCH 30.7 31.5* 31.5*   MCHC 32.4 33.1 32.7     BMP:   Recent Labs   Lab 08/17/24  0006 08/17/24  0550 08/18/24  0620   * 228* 146*    136 137   K 4.4 4.4 4.2    107 108   CO2 22* 19* 22*   BUN 19 20 18   CREATININE 1.9* 1.8* 1.6*   CALCIUM 10.2 9.8 10.0       Diagnostic Results:  US - Kidney: Results for orders placed during the hospital encounter of 07/29/24    US Transplant Kidney With Doppler    Narrative  EXAMINATION:  US TRANSPLANT KIDNEY WITH DOPPLER    CLINICAL HISTORY:  complication after renal transplant;    TECHNIQUE:  Real time gray scale and doppler ultrasound was performed over the patient's renal allograft.    COMPARISON:  CT abdomen pelvis 07/29/2024; u/S transplant kidney with Doppler 07/25/2024    FINDINGS:  Patient is status post renal allograft in the right lower quadrant on 07/24/2024.  Hilum is located posterior medially, previously posterolaterally.  The allograft measures 9.7 cm. Normal perfusion. No hydronephrosis.  Ureteral stent is possibly visualized near the hilum; however, it is not visualized within the bladder.    Minimally complex collection adjacent to the inferior pole measuring 3.0 x 3.2 x 3.0 cm.    Minimally complex collection superficial to the renal allograft measuring 3.5 x 1.6 x 1.0 cm.    Small amount of peritoneal free fluid.    Vasculature:    Resistive indices of the arteries:    Interlobar: 0.78 with normal waveform, previously 0.76    Upper segment: 0.72 with normal waveform, previously 0.89    Mid segment: 0.81 with normal waveform, previously 0.82    Lower segment: 0.81 with normal waveform, previously 0.87    Main renal artery peak  systolic velocity: 230cm/sec with normal waveform, previously 175cm/sec.    Renal artery/iliac ratio: 1.4.    The main renal vein is patent.    Impression  Slight improvement of segmental renal arterial velocities, which remain mildly elevated within the mid and lower segments.    Peritransplant fluid collections as detailed above.    Electronically signed by resident: Kyle Nieto  Date:    08/01/2024  Time:    13:15    Electronically signed by: Kevyn Reynoso MD  Date:    08/01/2024  Time:    13:56

## 2024-08-18 NOTE — PROGRESS NOTES
[facility]  Kidney Transplant  Progress Note      Reason for Follow-up: Reassessment of Kidney Transplant - 7/24/2024  (#1) recipient and management of immunosuppression.    ORGAN:   RIGHT KIDNEY    Donor Type:   Donation after Brain Death    Donor CMV Status: Positive  Donor HBcAB:Negative  Donor HCV Status:Negative  Donor HBV RAH:   Donor HCV RAH: Negative      Subjective:   History of Present Illness:  Joann Kenney is a 62 y/o F s/p DBD kidney transplant 7/24/2024 for ESRD 2/2 Type II DM. PMHx born w solitary kidney, DM2, HTN, incontience (s/p bladder sling), CAD with stents (on ASA), atrial fibrillation, CVA (no residual deficits), ALEJA, and anemia. Surgery without complications. PD cath removed. Post op course notable for down trending Cr with excellent UOP. Patient recently admitted 8/8 to 8/13 d/t incisional drainage. CT A/P noted enlarged fluid collection around the right renal transplant and extending into the RLQ abdominal wall. She underwent IR fluid aspiration and drain placement on 8/9. Fluid studies neg for infection or urine leak (WBC 47, segs 10, lymph 83, Cr 1.6). Repeat CT scan done 8/12 reviewed by surgeons, plan to discharge pt with drain in place for continued drainage per MALKA.     Patient now presents as a transfer from OSH due to incisional drainage. Pt presented to her local ED with large amount of bloody drainage from her transplant incision. Wound has 3 cm opening in medial aspect with old bloody drainage CT A/P at OSH w/ large collection 17 x 4 x 3 cm. Her percutaneous drain remains in place and per patient turned milky and purulent appearing day prior to presenting to the OSH.  Pt reports overall feeling well. Pt denies fever/chills, decreased UOP, dysuria. Plan for infectious work-up, IV abx, wound care consult. Will review CT imaging with transplant surgeon with likely plan for IR consult     Ms. Kenney is a 63 y.o. year old female who is status post Kidney  Transplant - 7/24/2024  (#1).  Her maintenance immunosuppression consists of:   Immunosuppressants (From admission, onward)      None            Her post transplant course has been  complicated by wound infection .    Interval History:  NAEON  Wound opened bedside yesterday with old hematoma evacuated; packed and wound vac placed later  Has no complaints this AM and feels well  Afebrile  RLQ MALKA drain with 295cc of thin milky serous output  2500cc UOP/24 hours  Cr continuing to downtrend now 1.6  Awaiting SW to set up home health wound care and wound vac tomorrow for potential discharge  Will consult ID tomorrow re: outpatient abx plan; cultures NGTD but remains on cefepime but positive cell counts in wound drainage    Past Medical, Surgical, Family, and Social History:   Unchanged from H&P.    Scheduled Meds:   ALPRAZolam  2 mg Oral QHS    atorvastatin  20 mg Oral QHS    carvediloL  6.25 mg Oral BID    ceFEPime IV (PEDS and ADULTS)  1 g Intravenous Q12H    cetirizine  5 mg Oral QHS    cinacalcet  60 mg Oral Daily with breakfast    DULoxetine  30 mg Oral QHS    ezetimibe  10 mg Oral Daily    insulin aspart U-100  6 Units Subcutaneous TID WM    magnesium oxide  800 mg Oral BID    minoxidiL  5 mg Oral Daily    NIFEdipine  60 mg Oral BID    pantoprazole  40 mg Oral Daily    predniSONE  5 mg Oral Daily    sodium bicarbonate  1,300 mg Oral BID    sodium phosphate 20.01 mmol in D5W 250 mL IVPB  20.01 mmol Intravenous Once    sulfamethoxazole-trimethoprim 400-80mg  1 tablet Oral QAM    [START ON 8/19/2024] valGANciclovir  450 mg Oral Every Mon, Wed, Fri     Continuous Infusions:  PRN Meds:  Current Facility-Administered Medications:     acetaminophen, 650 mg, Oral, Q8H PRN    dextrose 10%, 12.5 g, Intravenous, PRN    dextrose 10%, 25 g, Intravenous, PRN    glucagon (human recombinant), 1 mg, Intramuscular, PRN    glucose, 16 g, Oral, PRN    glucose, 24 g, Oral, PRN    insulin aspart U-100, 0-5 Units, Subcutaneous, QID (AC +  "HS) PRN    melatonin, 6 mg, Oral, Nightly PRN    ondansetron, 8 mg, Oral, Q8H PRN    oxybutynin, 5 mg, Oral, TID PRN    oxyCODONE, 5 mg, Oral, Q6H PRN    sodium chloride 0.9%, 10 mL, Intravenous, PRN    Intake/Output - Last 3 Shifts         08/16 0700 08/17 0659 08/17 0700 08/18 0659 08/18 0700 08/19 0659    P.O. 360 700     IV Piggyback  97.3     Total Intake(mL/kg) 360 (4.4) 797.3 (9.8)     Urine (mL/kg/hr) 800 2500 (1.3)     Emesis/NG output  0     Drains 200 295 50    Stool 0 0     Total Output 1000 2795 50    Net -640 -1997.7 -50           Urine Occurrence  0 x     Stool Occurrence 0 x 0 x     Emesis Occurrence  0 x              Review of Systems   Constitutional:  Negative for activity change, appetite change, chills and fever.   Respiratory:  Negative for shortness of breath.    Cardiovascular:  Negative for leg swelling.   Gastrointestinal:  Negative for abdominal distention, nausea and vomiting.   Genitourinary:  Negative for decreased urine volume and dysuria.   Skin:  Positive for wound.   Allergic/Immunologic: Positive for immunocompromised state.   Psychiatric/Behavioral:  Negative for confusion and decreased concentration.       Objective:     Vital Signs (Most Recent):  Temp: 97.9 °F (36.6 °C) (08/18/24 0715)  Pulse: 82 (08/18/24 0715)  Resp: 18 (08/18/24 0715)  BP: 136/63 (08/18/24 0715)  SpO2: 99 % (08/18/24 0715) Vital Signs (24h Range):  Temp:  [97.5 °F (36.4 °C)-98 °F (36.7 °C)] 97.9 °F (36.6 °C)  Pulse:  [63-82] 82  Resp:  [18-28] 18  SpO2:  [95 %-99 %] 99 %  BP: (131-186)/(60-79) 136/63     Weight: 81.6 kg (180 lb)  Height: 5' 5" (165.1 cm)  Body mass index is 29.95 kg/m².     Physical Exam  Vitals reviewed.   Cardiovascular:      Rate and Rhythm: Normal rate.   Pulmonary:      Effort: Pulmonary effort is normal.   Abdominal:      General: There is no distension.      Palpations: Abdomen is soft.      Tenderness: There is abdominal tenderness. There is no guarding or rebound.          " Comments: RLQ incision wound vac present with minimal output in tubing. RLQ MALKA drain with suction and thin milky serous output present   Musculoskeletal:      Right lower leg: No edema.      Left lower leg: No edema.   Skin:     General: Skin is warm and dry.   Neurological:      General: No focal deficit present.      Mental Status: She is alert. Mental status is at baseline.   Psychiatric:         Mood and Affect: Mood normal.         Thought Content: Thought content normal.          Laboratory:  CBC:   Recent Labs   Lab 08/17/24  0006 08/17/24  0550 08/18/24  0620   WBC 7.40 6.96 7.22   RBC 3.06* 2.89* 3.11*   HGB 9.4* 9.1* 9.8*   HCT 29.0* 27.5* 30.0*    292 360   MCV 95 95 97   MCH 30.7 31.5* 31.5*   MCHC 32.4 33.1 32.7     BMP:   Recent Labs   Lab 08/17/24  0006 08/17/24  0550 08/18/24  0620   * 228* 146*    136 137   K 4.4 4.4 4.2    107 108   CO2 22* 19* 22*   BUN 19 20 18   CREATININE 1.9* 1.8* 1.6*   CALCIUM 10.2 9.8 10.0       Diagnostic Results:  US - Kidney: Results for orders placed during the hospital encounter of 07/29/24    US Transplant Kidney With Doppler    Narrative  EXAMINATION:  US TRANSPLANT KIDNEY WITH DOPPLER    CLINICAL HISTORY:  complication after renal transplant;    TECHNIQUE:  Real time gray scale and doppler ultrasound was performed over the patient's renal allograft.    COMPARISON:  CT abdomen pelvis 07/29/2024; u/S transplant kidney with Doppler 07/25/2024    FINDINGS:  Patient is status post renal allograft in the right lower quadrant on 07/24/2024.  Hilum is located posterior medially, previously posterolaterally.  The allograft measures 9.7 cm. Normal perfusion. No hydronephrosis.  Ureteral stent is possibly visualized near the hilum; however, it is not visualized within the bladder.    Minimally complex collection adjacent to the inferior pole measuring 3.0 x 3.2 x 3.0 cm.    Minimally complex collection superficial to the renal allograft measuring 3.5  "x 1.6 x 1.0 cm.    Small amount of peritoneal free fluid.    Vasculature:    Resistive indices of the arteries:    Interlobar: 0.78 with normal waveform, previously 0.76    Upper segment: 0.72 with normal waveform, previously 0.89    Mid segment: 0.81 with normal waveform, previously 0.82    Lower segment: 0.81 with normal waveform, previously 0.87    Main renal artery peak systolic velocity: 230cm/sec with normal waveform, previously 175cm/sec.    Renal artery/iliac ratio: 1.4.    The main renal vein is patent.    Impression  Slight improvement of segmental renal arterial velocities, which remain mildly elevated within the mid and lower segments.    Peritransplant fluid collections as detailed above.    Electronically signed by resident: Kyle Nieto  Date:    08/01/2024  Time:    13:15    Electronically signed by: Kevyn Reynoso MD  Date:    08/01/2024  Time:    13:56  Assessment/Plan:     * Wound drainage  - Transfer from OSH d/t large wound drainage from kidney transplant incision  - Recently admitted 8/8-8/13 for wound drainage, s/p IR aspiration and drain placement of perinephric collection  - Perc drain now w/ milky purulent output; recent studies show infected fluid collecton  - OSH CT reviewed and shows persistent collection with foci of air  - Incision opened bedside on 8/17; fascia intact and old hematoma evacuated. Wound packed and wound care consulted for wound vac placement  - Wound vac placed 8/17 and tolerating well; will need SW assistance with home health wound care and wound vac  - ID consult Monday for outpatient abx plan; currently on cefepime with cultures NGTD but positive cell count from initial drain placement      Anemia of chronic disease  - daily CBC      Anxiety  - continue home xanax      Hypertension associated with transplantation  - continue home antihypertensives with hold parameters    Long-term use of immunosuppressant medication  - see Prophylactic immunotherapy"      Prophylactic " immunotherapy  - Continue prograf, cellcept, and steroid taper  - Check prograf level daily and adjust for therapeutic dosage. Monitor for toxic side effects       S/P kidney transplant  - S/p Ktxp 7/24/24  - Cr trended down with excellent UOP  - Multiple Readmit d/t wound complications      Other hyperlipidemia  - continue home statin      Diabetes mellitus due to underlying condition with chronic kidney disease on chronic dialysis, with long-term current use of insulin  - Endocrine consulted  - sliding scale          Discharge Planning:  Not yet ready for discharge    Medical decision making for this encounter includes review of pertinent labs and diagnostic studies, assessment and planning, discussions with consulting providers, discussion with patient/family, and participation in multidisciplinary rounds. Time spent caring for patient: 60 minutes    Clemencia Doss MD  Kidney Transplant  [facility]

## 2024-08-18 NOTE — PLAN OF CARE
Patient AAOx4. VSS on RA. Afebrile. Accucheck orders maintained, coverage given per sliding scale. Pt denied pain this shift. RLQ inc w/ WV in place- free from leaking; RLQ MALKA drain in place- milky pink output noted. Voids per hat, no BM reported this shift- see I&O for details. Up w/ SBA. Bed locked and lowered, call bell in reach, nonskid socks on when OOB. Reviewed plan of care and fall precautions w/ pt- pt verbalized understanding. Reminded to call for assistance. Standard precautions maintained. ABX continued per orders.

## 2024-08-18 NOTE — ASSESSMENT & PLAN NOTE
BG goal 140-180    Increase Novolog to 6 units TID with meals (home dose)   Low Dose Correction Scale  BG monitoring ac/hs    ** Please call Endocrine for any BG related issues **

## 2024-08-19 ENCOUNTER — SOCIAL WORK (OUTPATIENT)
Dept: TRANSPLANT | Facility: HOSPITAL | Age: 64
End: 2024-08-19
Payer: MEDICARE

## 2024-08-19 PROBLEM — T81.49XA WOUND INFECTION AFTER SURGERY: Status: ACTIVE | Noted: 2024-08-19

## 2024-08-19 LAB
ALBUMIN SERPL BCP-MCNC: 2.3 G/DL (ref 3.5–5.2)
ANION GAP SERPL CALC-SCNC: 7 MMOL/L (ref 8–16)
BACTERIA UR CULT: ABNORMAL
BASOPHILS # BLD AUTO: 0.06 K/UL (ref 0–0.2)
BASOPHILS NFR BLD: 1 % (ref 0–1.9)
BUN SERPL-MCNC: 15 MG/DL (ref 8–23)
CALCIUM SERPL-MCNC: 8.9 MG/DL (ref 8.7–10.5)
CHLORIDE SERPL-SCNC: 108 MMOL/L (ref 95–110)
CO2 SERPL-SCNC: 21 MMOL/L (ref 23–29)
CREAT SERPL-MCNC: 1.5 MG/DL (ref 0.5–1.4)
DIFFERENTIAL METHOD BLD: ABNORMAL
EOSINOPHIL # BLD AUTO: 0.2 K/UL (ref 0–0.5)
EOSINOPHIL NFR BLD: 2.6 % (ref 0–8)
ERYTHROCYTE [DISTWIDTH] IN BLOOD BY AUTOMATED COUNT: 16.4 % (ref 11.5–14.5)
EST. GFR  (NO RACE VARIABLE): 38.4 ML/MIN/1.73 M^2
GLUCOSE SERPL-MCNC: 131 MG/DL (ref 70–110)
HCT VFR BLD AUTO: 25.8 % (ref 37–48.5)
HGB BLD-MCNC: 8.6 G/DL (ref 12–16)
IMM GRANULOCYTES # BLD AUTO: 0.04 K/UL (ref 0–0.04)
IMM GRANULOCYTES NFR BLD AUTO: 0.7 % (ref 0–0.5)
LYMPHOCYTES # BLD AUTO: 0.4 K/UL (ref 1–4.8)
LYMPHOCYTES NFR BLD: 6.3 % (ref 18–48)
MAGNESIUM SERPL-MCNC: 1.5 MG/DL (ref 1.6–2.6)
MCH RBC QN AUTO: 31.5 PG (ref 27–31)
MCHC RBC AUTO-ENTMCNC: 33.3 G/DL (ref 32–36)
MCV RBC AUTO: 95 FL (ref 82–98)
MONOCYTES # BLD AUTO: 0.4 K/UL (ref 0.3–1)
MONOCYTES NFR BLD: 6 % (ref 4–15)
NEUTROPHILS # BLD AUTO: 4.9 K/UL (ref 1.8–7.7)
NEUTROPHILS NFR BLD: 83.4 % (ref 38–73)
NRBC BLD-RTO: 0 /100 WBC
PHOSPHATE SERPL-MCNC: 1.7 MG/DL (ref 2.7–4.5)
PLATELET # BLD AUTO: 307 K/UL (ref 150–450)
PMV BLD AUTO: 10.5 FL (ref 9.2–12.9)
POCT GLUCOSE: 148 MG/DL (ref 70–110)
POCT GLUCOSE: 151 MG/DL (ref 70–110)
POCT GLUCOSE: 187 MG/DL (ref 70–110)
POTASSIUM SERPL-SCNC: 3.9 MMOL/L (ref 3.5–5.1)
RBC # BLD AUTO: 2.73 M/UL (ref 4–5.4)
SODIUM SERPL-SCNC: 136 MMOL/L (ref 136–145)
TACROLIMUS BLD-MCNC: 9.3 NG/ML (ref 5–15)
TRIGL FLD-MCNC: 24 MG/DL
WBC # BLD AUTO: 5.88 K/UL (ref 3.9–12.7)

## 2024-08-19 PROCEDURE — 25000003 PHARM REV CODE 250: Performed by: INTERNAL MEDICINE

## 2024-08-19 PROCEDURE — 25000003 PHARM REV CODE 250: Performed by: TRANSPLANT SURGERY

## 2024-08-19 PROCEDURE — 99222 1ST HOSP IP/OBS MODERATE 55: CPT | Mod: GC,,, | Performed by: INTERNAL MEDICINE

## 2024-08-19 PROCEDURE — 25000003 PHARM REV CODE 250: Performed by: NURSE PRACTITIONER

## 2024-08-19 PROCEDURE — 63600175 PHARM REV CODE 636 W HCPCS: Performed by: PHYSICIAN ASSISTANT

## 2024-08-19 PROCEDURE — 99233 SBSQ HOSP IP/OBS HIGH 50: CPT | Mod: 24,,,

## 2024-08-19 PROCEDURE — 25000003 PHARM REV CODE 250: Performed by: PHYSICIAN ASSISTANT

## 2024-08-19 PROCEDURE — 99232 SBSQ HOSP IP/OBS MODERATE 35: CPT | Mod: ,,, | Performed by: NURSE PRACTITIONER

## 2024-08-19 PROCEDURE — 20600001 HC STEP DOWN PRIVATE ROOM

## 2024-08-19 PROCEDURE — 83735 ASSAY OF MAGNESIUM: CPT | Performed by: PHYSICIAN ASSISTANT

## 2024-08-19 PROCEDURE — 36415 COLL VENOUS BLD VENIPUNCTURE: CPT | Performed by: PHYSICIAN ASSISTANT

## 2024-08-19 PROCEDURE — 63600175 PHARM REV CODE 636 W HCPCS

## 2024-08-19 PROCEDURE — 80069 RENAL FUNCTION PANEL: CPT | Performed by: PHYSICIAN ASSISTANT

## 2024-08-19 PROCEDURE — 94761 N-INVAS EAR/PLS OXIMETRY MLT: CPT

## 2024-08-19 PROCEDURE — 99900035 HC TECH TIME PER 15 MIN (STAT)

## 2024-08-19 PROCEDURE — C1751 CATH, INF, PER/CENT/MIDLINE: HCPCS

## 2024-08-19 PROCEDURE — 36410 VNPNXR 3YR/> PHY/QHP DX/THER: CPT

## 2024-08-19 PROCEDURE — 94660 CPAP INITIATION&MGMT: CPT

## 2024-08-19 PROCEDURE — 63600175 PHARM REV CODE 636 W HCPCS: Performed by: TRANSPLANT SURGERY

## 2024-08-19 PROCEDURE — 76937 US GUIDE VASCULAR ACCESS: CPT

## 2024-08-19 PROCEDURE — 25000003 PHARM REV CODE 250

## 2024-08-19 PROCEDURE — 80197 ASSAY OF TACROLIMUS: CPT | Performed by: PHYSICIAN ASSISTANT

## 2024-08-19 PROCEDURE — 85025 COMPLETE CBC W/AUTO DIFF WBC: CPT | Performed by: PHYSICIAN ASSISTANT

## 2024-08-19 RX ORDER — SODIUM CHLORIDE 0.9 % (FLUSH) 0.9 %
10 SYRINGE (ML) INJECTION
Status: DISCONTINUED | OUTPATIENT
Start: 2024-08-19 | End: 2024-08-26 | Stop reason: HOSPADM

## 2024-08-19 RX ORDER — DOXYCYCLINE HYCLATE 100 MG
100 TABLET ORAL EVERY 12 HOURS
Status: DISCONTINUED | OUTPATIENT
Start: 2024-08-19 | End: 2024-08-26 | Stop reason: HOSPADM

## 2024-08-19 RX ORDER — SODIUM CHLORIDE 0.9 % (FLUSH) 0.9 %
10 SYRINGE (ML) INJECTION EVERY 6 HOURS
Status: DISCONTINUED | OUTPATIENT
Start: 2024-08-20 | End: 2024-08-26 | Stop reason: HOSPADM

## 2024-08-19 RX ORDER — BISACODYL 5 MG
10 TABLET, DELAYED RELEASE (ENTERIC COATED) ORAL DAILY
Status: DISCONTINUED | OUTPATIENT
Start: 2024-08-20 | End: 2024-08-26 | Stop reason: HOSPADM

## 2024-08-19 RX ADMIN — Medication 800 MG: at 08:08

## 2024-08-19 RX ADMIN — ERTAPENEM 1 G: 1 INJECTION INTRAMUSCULAR; INTRAVENOUS at 01:08

## 2024-08-19 RX ADMIN — INSULIN ASPART 6 UNITS: 100 INJECTION, SOLUTION INTRAVENOUS; SUBCUTANEOUS at 09:08

## 2024-08-19 RX ADMIN — CEFEPIME 1 G: 1 INJECTION, POWDER, FOR SOLUTION INTRAMUSCULAR; INTRAVENOUS at 11:08

## 2024-08-19 RX ADMIN — SULFAMETHOXAZOLE AND TRIMETHOPRIM 1 TABLET: 400; 80 TABLET ORAL at 07:08

## 2024-08-19 RX ADMIN — SODIUM PHOSPHATE, MONOBASIC, MONOHYDRATE AND SODIUM PHOSPHATE, DIBASIC, ANHYDROUS 30 MMOL: 142; 276 INJECTION, SOLUTION INTRAVENOUS at 03:08

## 2024-08-19 RX ADMIN — DOXYCYCLINE HYCLATE 100 MG: 100 TABLET, COATED ORAL at 08:08

## 2024-08-19 RX ADMIN — ALPRAZOLAM 2 MG: 0.5 TABLET ORAL at 08:08

## 2024-08-19 RX ADMIN — PREDNISONE 5 MG: 5 TABLET ORAL at 09:08

## 2024-08-19 RX ADMIN — NIFEDIPINE 60 MG: 30 TABLET, FILM COATED, EXTENDED RELEASE ORAL at 08:08

## 2024-08-19 RX ADMIN — DIBASIC SODIUM PHOSPHATE, MONOBASIC POTASSIUM PHOSPHATE AND MONOBASIC SODIUM PHOSPHATE 1 TABLET: 852; 155; 130 TABLET ORAL at 09:08

## 2024-08-19 RX ADMIN — DULOXETINE 30 MG: 30 CAPSULE, DELAYED RELEASE ORAL at 08:08

## 2024-08-19 RX ADMIN — DOCUSATE SODIUM 100 MG: 100 CAPSULE, LIQUID FILLED ORAL at 03:08

## 2024-08-19 RX ADMIN — DIPHENHYDRAMINE HYDROCHLORIDE, ZINC ACETATE: 2; .1 CREAM TOPICAL at 03:08

## 2024-08-19 RX ADMIN — MINOXIDIL 5 MG: 2.5 TABLET ORAL at 08:08

## 2024-08-19 RX ADMIN — EZETIMIBE 10 MG: 10 TABLET ORAL at 09:08

## 2024-08-19 RX ADMIN — VALGANCICLOVIR HYDROCHLORIDE 450 MG: 450 TABLET ORAL at 05:08

## 2024-08-19 RX ADMIN — DOCUSATE SODIUM 100 MG: 100 CAPSULE, LIQUID FILLED ORAL at 09:08

## 2024-08-19 RX ADMIN — ONDANSETRON 8 MG: 8 TABLET, ORALLY DISINTEGRATING ORAL at 03:08

## 2024-08-19 RX ADMIN — CINACALCET 60 MG: 30 TABLET, FILM COATED ORAL at 09:08

## 2024-08-19 RX ADMIN — CARVEDILOL 6.25 MG: 6.25 TABLET, FILM COATED ORAL at 08:08

## 2024-08-19 RX ADMIN — ATORVASTATIN CALCIUM 20 MG: 20 TABLET, FILM COATED ORAL at 08:08

## 2024-08-19 RX ADMIN — DOCUSATE SODIUM 100 MG: 100 CAPSULE, LIQUID FILLED ORAL at 08:08

## 2024-08-19 RX ADMIN — Medication 800 MG: at 09:08

## 2024-08-19 RX ADMIN — Medication 2 TABLET: at 08:08

## 2024-08-19 RX ADMIN — BISACODYL 5 MG: 5 TABLET, COATED ORAL at 09:08

## 2024-08-19 RX ADMIN — CETIRIZINE HYDROCHLORIDE 5 MG: 5 TABLET, FILM COATED ORAL at 08:08

## 2024-08-19 RX ADMIN — PANTOPRAZOLE SODIUM 40 MG: 40 TABLET, DELAYED RELEASE ORAL at 08:08

## 2024-08-19 NOTE — HPI
"Ms. Kenney is a 63F with PMH of solitary kidney, diabetic nephropathy s/p kidney transplant 7/24/24, DM2, HTN, CAD, afib, prior CVA, ALEJA, recent admission for peritransplant fluid collection s/p drain placement, now presents as a transfer from OSH for drainage from transplant incision site. Infectious disease consulted for "GNR in urine, poss infected fluid collection (drain in place). ABX recs".     Fluid studies from initial drain placement showed WBC 47, 10% segs, and cultures were negative. After discharge here, patient presented to her nearby ED for drainage from the incision site, CTAP there showed large fluid collection 17 x 4 x 3 cm. Here underwent bedside evacuation of old hematoma at incision site by surgery team, wound vac now in place.   "

## 2024-08-19 NOTE — PROGRESS NOTES
[facility] Step[facility]  Endocrinology  Progress Note    Admit Date: 8/16/2024     Reason for Consult: Management of T2DM, Hyperglycemia     Surgical Procedure and Date:  kidney transplant 7/24/2024     Diabetes diagnosis year: 2004    Home Diabetes Medications:    - Novolog 6 units TIDWM  - Novolog SSI for BG excursions:  Add correction scale if needed.  Blood sugar 150 to 200 add 2 units  Blood sugar 201 to 250 add 4 units  Blood sugar 251 to 300 add 6 units  Blood sugar 301 to 350 add 8 units  Blood sugar greater than 350 add 10 units    Lab Results   Component Value Date    HGBA1C 4.8 07/24/2024    HGBA1C 4.8 07/24/2024       How often checking glucose at home?  3x daily     BG readings on regimen: 140-<200  Hypoglycemia on the regimen?  No  Missed doses on regimen?  No    Diabetes Complications include:     none    Complicating diabetes co morbidities:   S/p kidney txp, glucocorticoid use       HPI:   Patient is a 63 y.o. 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 PMHx born w solitary kidney, DM2, HTN, incontience (s/p bladder sling), CAD with stents (on ASA), atrial fibrillation, CVA (no residual deficits), ALEJA, and anemia. Surgery without complications. PD cath removed. Post op course notable for down trending Cr with excellent UOP. Patient recently admitted 8/8 to 8/13 d/t incisional drainage. CT A/P noted enlarged fluid collection around the right renal transplant and extending into the RLQ abdominal wall. She underwent IR fluid aspiration and drain placement on 8/9. Fluid studies neg for infection or urine leak (WBC 47, segs 10, lymph 83, Cr 1.6). Repeat CT scan done 8/12 reviewed by surgeons, plan to discharge pt with drain in place for continued drainage per MALKA.      Patient now presents as a transfer from OSH due to incisional drainage. Pt presented to her local ED with large amount of bloody drainage from her transplant incision. Wound has 3 cm opening in  "medial aspect with old bloody drainage CT A/P at OSH w/ large collection 17 x 4 x 3 cm. Her percutaneous drain remains in place and per patient turned milky and purulent appearing day prior to presenting to the OSH.  Pt reports overall feeling well. Pt denies fever/chills, decreased UOP, dysuria. Plan for infectious work-up, IV abx, wound care consult. Endocrinology consulted for management of T2DM.         Interval HPI:   Overnight events: Remains in TSU. BG reasonably well controlled on current SQ insulin regimen. Remains on Prednisone 5 mg daily. Diet diabetic Renal;  Calorie    Eatin%  Nausea: No  Hypoglycemia and intervention: No  Fever: No  TPN and/or TF: No  If yes, type of TF/TPN and rate: n/a    BP (!) 170/67 (BP Location: Right arm, Patient Position: Standing)   Pulse 63   Temp 98.1 °F (36.7 °C) (Oral)   Resp 18   Ht 5' 5" (1.651 m)   Wt 81.6 kg (180 lb)   SpO2 95%   BMI 29.95 kg/m²     Labs Reviewed and Include    Recent Labs   Lab 24  0550   *   CALCIUM 8.9   ALBUMIN 2.3*      K 3.9   CO2 21*      BUN 15   CREATININE 1.5*     Lab Results   Component Value Date    WBC 5.88 2024    HGB 8.6 (L) 2024    HCT 25.8 (L) 2024    MCV 95 2024     2024     No results for input(s): "TSH", "FREET4" in the last 168 hours.  Lab Results   Component Value Date    HGBA1C 4.8 2024    HGBA1C 4.8 2024       Nutritional status:   Body mass index is 29.95 kg/m².  Lab Results   Component Value Date    ALBUMIN 2.3 (L) 2024    ALBUMIN 2.5 (L) 2024    ALBUMIN 2.3 (L) 2024     No results found for: "PREALBUMIN"    Estimated Creatinine Clearance: 39.4 mL/min (A) (based on SCr of 1.5 mg/dL (H)).    Accu-Checks  Recent Labs     24  0841 24  1239 24  1740 24  2259 24  0804 24  1241 24  1728 24  2211 24  0808   POCTGLUCOSE 223* 283* 240* 233* 159* 216* 146* 169* 148* "       Current Medications and/or Treatments Impacting Glycemic Control  Immunotherapy:    Immunosuppressants       None          Steroids:   Hormones (From admission, onward)      Start     Stop Route Frequency Ordered    08/17/24 0900  predniSONE tablet 5 mg         -- Oral Daily 08/16/24 2320 08/16/24 2320  melatonin tablet 6 mg         -- Oral Nightly PRN 08/16/24 2320          Pressors:    Autonomic Drugs (From admission, onward)      None          Hyperglycemia/Diabetes Medications:   Antihyperglycemics (From admission, onward)      Start     Stop Route Frequency Ordered    08/18/24 1200  insulin aspart U-100 pen 6 Units         -- SubQ 3 times daily with meals 08/18/24 0923 08/16/24 2320  insulin aspart U-100 pen 0-5 Units         -- SubQ Before meals & nightly PRN 08/16/24 2320            ASSESSMENT and PLAN    Renal/  S/P kidney transplant  Managed per primary team  Avoid hypoglycemia        Immunology/Multi System  Prophylactic immunotherapy  May increase insulin resistance.         Endocrine  Diabetes mellitus due to underlying condition with chronic kidney disease on chronic dialysis, with long-term current use of insulin  BG goal 140-180    Continue Novolog 6 units TID with meals (home dose)   Low Dose Correction Scale  BG monitoring ac/hs    ** Please call Endocrine for any BG related issues **        Orthopedic  * Wound drainage  Managed per primary team              Tatiana Garcia, NP  Endocrinology  [facility]

## 2024-08-19 NOTE — ASSESSMENT & PLAN NOTE
63F with PMH of solitary kidney, diabetic nephropathy s/p kidney transplant 7/24/24, DM2, HTN, CAD, afib, prior CVA, ALEJA, recent admission for peritransplant fluid collection s/p drain placement (cultures negative) now presents as a transfer from OSH for drainage from transplant incision site. CTAP from OSH showed large fluid collection 17 x 4 x 3 cm. Here underwent bedside evacuation of old hematoma at incision site by surgery team, wound vac now in place. UCX with ESBL kleb pneumo.     Recommendations  Continue ertapenem  Start doxycycline  Duration 2 weeks (end 9/2/24)

## 2024-08-19 NOTE — PLAN OF CARE
AAOx4. Afebrile. On RA/ CPAP O/N. Standing Bps taken. Systolic 160s-170s. VSS. C/o itching d/t psoriasis. Benadryl cream PRN given. Woundvac in place with suction @ 75; no output. MALKA drain to suction, with milky, pink tinged output. Staples intact.  Cefepime given, per MAR. Accucheck AC&HS. No coverage required. Bed in low position. Side rails raisedx2, wheels locked. Nonskid shoes when OOB. Call bell and personal items within reach.

## 2024-08-19 NOTE — ASSESSMENT & PLAN NOTE
- S/p Ktxp 7/24/24  - Cr trended down with excellent UOP  - Multiple Readmit d/t wound complications  - Cr stable   - strict Is and Os   - monitor

## 2024-08-19 NOTE — ASSESSMENT & PLAN NOTE
BG goal 140-180    Continue Novolog 6 units TID with meals (home dose)   Low Dose Correction Scale  BG monitoring ac/hs    ** Please call Endocrine for any BG related issues **

## 2024-08-19 NOTE — SUBJECTIVE & OBJECTIVE
Subjective:   History of Present Illness:  Joann Kenney is a 64 y/o F s/p DBD kidney transplant 7/24/2024 for ESRD 2/2 Type II DM. PMHx born w solitary kidney, DM2, HTN, incontience (s/p bladder sling), CAD with stents (on ASA), atrial fibrillation, CVA (no residual deficits), ALEJA, and anemia. Surgery without complications. PD cath removed. Post op course notable for down trending Cr with excellent UOP. Patient recently admitted 8/8 to 8/13 d/t incisional drainage. CT A/P noted enlarged fluid collection around the right renal transplant and extending into the RLQ abdominal wall. She underwent IR fluid aspiration and drain placement on 8/9. Fluid studies neg for infection or urine leak (WBC 47, segs 10, lymph 83, Cr 1.6). Repeat CT scan done 8/12 reviewed by surgeons, plan to discharge pt with drain in place for continued drainage per MALKA.     Patient now presents as a transfer from OSH due to incisional drainage. Pt presented to her local ED with large amount of bloody drainage from her transplant incision. Wound has 3 cm opening in medial aspect with old bloody drainage CT A/P at OSH w/ large collection 17 x 4 x 3 cm. Her percutaneous drain remains in place and per patient turned milky and purulent appearing day prior to presenting to the OSH.  Pt reports overall feeling well. Pt denies fever/chills, decreased UOP, dysuria. Plan for infectious work-up, IV abx, wound care consult. Will review CT imaging with transplant surgeon with likely plan for IR consult         Hospital Course:  Patient admitted from OSH for wound leakage. Once in TSU, 2/3 of the medial part of her incision was opened, old hematoma evacuated. Several staples remain on the lateral side of the incision. Wound vac was placed. OSH CT images reviewed; persistent subcutaneous collection with foci of air present. Wound was then investigated for fascia dehiscence; fascia found intact. Wound was irrigated, packed, vac placed by WC. Drain had turned a  milky serous color. Contents were sent for WBC, cx, Cr, triglycerides. Negative for leak, >81,000 WBC w 98% segs. Started on zosyn. Cx NGTD. Urine cx + for GNR, sensitivities pending. BC NGTD. Plan for drain to remain for a while, may need repeat imaging prior to removing. Small HILLARY on admit resolved on its own, great UOP.     Interval History   NAEON. Patient doing well this morning, no complaints. ID consulted for infected collection with drain in place for abx recommendations. Urine cx also positive for ESBL kleb pneumo. Cefepime switched to erta and doxy per ID. All other cx NGTD. Wound vac scheduled to be changed tomorrow. Cr stable 1.5. Hgb stable 8.6. VSS. SW discussing caregiver situation with patient, made clear that caregiver is necessary especially with several more needs (vac, IV abx, etc). Cont to monitor         Past Medical, Surgical, Family, and Social History:   Unchanged from H&P.    Scheduled Meds:   ALPRAZolam  2 mg Oral QHS    atorvastatin  20 mg Oral QHS    [START ON 8/20/2024] bisacodyL  10 mg Oral Daily    carvediloL  6.25 mg Oral BID    cetirizine  5 mg Oral QHS    cinacalcet  60 mg Oral Daily with breakfast    docusate sodium  100 mg Oral TID PC    doxycycline  100 mg Oral Q12H    DULoxetine  30 mg Oral QHS    ertapenem (INVanz) IV (PEDS and ADULTS)  1 g Intravenous Q24H    ezetimibe  10 mg Oral Daily    insulin aspart U-100  6 Units Subcutaneous TID WM    k phos di & mono-sod phos mono  500 mg Oral BID    magnesium oxide  800 mg Oral BID    minoxidiL  5 mg Oral Daily    NIFEdipine  60 mg Oral BID    pantoprazole  40 mg Oral Daily    predniSONE  5 mg Oral Daily    sodium phosphate 30 mmol in D5W 250 mL IVPB  30 mmol Intravenous Once    sulfamethoxazole-trimethoprim 400-80mg  1 tablet Oral QAM    valGANciclovir  450 mg Oral Every Mon, Wed, Fri     Continuous Infusions:  PRN Meds:  Current Facility-Administered Medications:     acetaminophen, 650 mg, Oral, Q8H PRN    dextrose 10%, 12.5 g,  Intravenous, PRN    dextrose 10%, 25 g, Intravenous, PRN    diphenhydrAMINE-zinc acetate 2-0.1%, , Topical (Top), BID PRN    glucagon (human recombinant), 1 mg, Intramuscular, PRN    glucose, 16 g, Oral, PRN    glucose, 24 g, Oral, PRN    hydrOXYzine HCL, 25 mg, Oral, TID PRN    insulin aspart U-100, 0-5 Units, Subcutaneous, QID (AC + HS) PRN    melatonin, 6 mg, Oral, Nightly PRN    ondansetron, 8 mg, Oral, Q8H PRN    oxybutynin, 5 mg, Oral, TID PRN    oxyCODONE, 5 mg, Oral, Q6H PRN    sodium chloride 0.9%, 10 mL, Intravenous, PRN    Intake/Output - Last 3 Shifts         08/17 0700  08/18 0659 08/18 0700 08/19 0659 08/19 0700 08/20 0659    P.O. 700 942 780    IV Piggyback 97.3  100    Total Intake(mL/kg) 797.3 (9.8) 942 (11.5) 880 (10.8)    Urine (mL/kg/hr) 2500 (1.3) 2150 (1.1) 300 (0.4)    Emesis/NG output 0 0     Drains 295 355 100    Other  0     Stool 0 0     Total Output 2795 2505 400    Net -1997.7 -1563 +480           Urine Occurrence 0 x 0 x     Stool Occurrence 0 x 0 x     Emesis Occurrence 0 x 0 x              Review of Systems   Constitutional:  Negative for activity change, appetite change, chills and fever.   Respiratory:  Negative for cough and shortness of breath.    Cardiovascular:  Negative for chest pain and leg swelling.   Gastrointestinal:  Positive for abdominal pain. Negative for abdominal distention, nausea and vomiting.   Genitourinary:  Positive for frequency and urgency. Negative for decreased urine volume, difficulty urinating and dysuria.   Skin:  Positive for wound.   Allergic/Immunologic: Positive for immunocompromised state.   Neurological:  Negative for weakness.   Psychiatric/Behavioral:  Negative for confusion and decreased concentration.       Objective:     Vital Signs (Most Recent):  Temp: 98.5 °F (36.9 °C) (08/19/24 1126)  Pulse: 67 (08/19/24 1126)  Resp: 18 (08/19/24 1126)  BP: 125/68 (08/19/24 1126)  SpO2: 97 % (08/19/24 1126) Vital Signs (24h Range):  Temp:  [97.8 °F (36.6  "°C)-98.7 °F (37.1 °C)] 98.5 °F (36.9 °C)  Pulse:  [61-80] 67  Resp:  [18-22] 18  SpO2:  [95 %-100 %] 97 %  BP: (125-198)/(65-83) 125/68     Weight: 81.6 kg (180 lb)  Height: 5' 5" (165.1 cm)  Body mass index is 29.95 kg/m².     Physical Exam  Vitals reviewed.   Constitutional:       General: She is not in acute distress.     Appearance: She is not ill-appearing.   HENT:      Head: Normocephalic and atraumatic.      Mouth/Throat:      Mouth: Mucous membranes are moist.   Eyes:      Extraocular Movements: Extraocular movements intact.      Conjunctiva/sclera: Conjunctivae normal.      Pupils: Pupils are equal, round, and reactive to light.   Cardiovascular:      Rate and Rhythm: Normal rate.      Pulses: Normal pulses.   Pulmonary:      Effort: Pulmonary effort is normal. No respiratory distress.   Abdominal:      General: Bowel sounds are normal. There is no distension.      Palpations: Abdomen is soft.      Tenderness: There is abdominal tenderness. There is no guarding or rebound.       Musculoskeletal:         General: No swelling. Normal range of motion.      Cervical back: Normal range of motion.   Skin:     General: Skin is warm and dry.   Neurological:      Mental Status: She is alert and oriented to person, place, and time.      Motor: No weakness.   Psychiatric:         Mood and Affect: Mood normal.         Behavior: Behavior normal.         Thought Content: Thought content normal.         Judgment: Judgment normal.          Laboratory:  CBC:   Recent Labs   Lab 08/17/24  0550 08/18/24  0620 08/19/24  0550   WBC 6.96 7.22 5.88   RBC 2.89* 3.11* 2.73*   HGB 9.1* 9.8* 8.6*   HCT 27.5* 30.0* 25.8*    360 307   MCV 95 97 95   MCH 31.5* 31.5* 31.5*   MCHC 33.1 32.7 33.3     CMP:   Recent Labs   Lab 08/17/24  0550 08/18/24  0620 08/19/24  0550   * 146* 131*   CALCIUM 9.8 10.0 8.9   ALBUMIN 2.3* 2.5* 2.3*    137 136   K 4.4 4.2 3.9   CO2 19* 22* 21*    108 108   BUN 20 18 15   CREATININE " 1.8* 1.6* 1.5*     Labs within the past 24 hours have been reviewed.    Diagnostic Results:  US - Kidney: Results for orders placed during the hospital encounter of 07/29/24    US Transplant Kidney With Doppler    Narrative  EXAMINATION:  US TRANSPLANT KIDNEY WITH DOPPLER    CLINICAL HISTORY:  complication after renal transplant;    TECHNIQUE:  Real time gray scale and doppler ultrasound was performed over the patient's renal allograft.    COMPARISON:  CT abdomen pelvis 07/29/2024; u/S transplant kidney with Doppler 07/25/2024    FINDINGS:  Patient is status post renal allograft in the right lower quadrant on 07/24/2024.  Hilum is located posterior medially, previously posterolaterally.  The allograft measures 9.7 cm. Normal perfusion. No hydronephrosis.  Ureteral stent is possibly visualized near the hilum; however, it is not visualized within the bladder.    Minimally complex collection adjacent to the inferior pole measuring 3.0 x 3.2 x 3.0 cm.    Minimally complex collection superficial to the renal allograft measuring 3.5 x 1.6 x 1.0 cm.    Small amount of peritoneal free fluid.    Vasculature:    Resistive indices of the arteries:    Interlobar: 0.78 with normal waveform, previously 0.76    Upper segment: 0.72 with normal waveform, previously 0.89    Mid segment: 0.81 with normal waveform, previously 0.82    Lower segment: 0.81 with normal waveform, previously 0.87    Main renal artery peak systolic velocity: 230cm/sec with normal waveform, previously 175cm/sec.    Renal artery/iliac ratio: 1.4.    The main renal vein is patent.    Impression  Slight improvement of segmental renal arterial velocities, which remain mildly elevated within the mid and lower segments.    Peritransplant fluid collections as detailed above.    Electronically signed by resident: Kyle Nieto  Date:    08/01/2024  Time:    13:15    Electronically signed by: Kevyn Reynoso MD  Date:    08/01/2024  Time:    13:56

## 2024-08-19 NOTE — SUBJECTIVE & OBJECTIVE
Past Medical History:   Diagnosis Date    Anemia     Anxiety     Atrial fibrillation     Bleeding 08/08/2024    Coronary artery disease     Depression     Diabetes mellitus, type 2     Disorder of kidney and ureter     Heart murmur     Hyperlipidemia     Hypertension     Obesity     ALEJA (obstructive sleep apnea)     Proteinuria     Solitary kidney     Stroke     Transient neurological symptoms 11/13/2018       Past Surgical History:   Procedure Laterality Date    CORONARY ANGIOPLASTY WITH STENT PLACEMENT      HYSTERECTOMY      INCONTINENCE SURGERY      INSERTION OF IMPLANTABLE LOOP RECORDER      internal heart monitor      KIDNEY TRANSPLANT Right 7/24/2024    Procedure: TRANSPLANT, KIDNEY;  Surgeon: Alphonso Mon MD;  Location: [facility] OR [address] [place] [address];  Service: Transplant;  Laterality: Right;    PERITONEAL CATHETER INSERTION      PERITONEAL CATHETER REMOVAL Left 7/24/2024    Procedure: REMOVAL, CATHETER, DIALYSIS, PERITONEAL;  Surgeon: Alphonso Mon MD;  Location: [facility] OR [address] [place] [address];  Service: Transplant;  Laterality: Left;       Review of patient's allergies indicates:  No Known Allergies    Medications:  Medications Prior to Admission   Medication Sig    ALPRAZolam (XANAX) 2 MG Tab Take 2 mg by mouth every evening.    atorvastatin (LIPITOR) 20 MG tablet Take 1 tablet (20 mg total) by mouth every evening.    blood sugar diagnostic Strp use 1 strip to check blood glucose 3 (three) times daily.    blood-glucose meter Misc use as directed to check blood glucose    carvediloL (COREG) 6.25 MG tablet Take 1 tablet (6.25 mg total) by mouth 2 (two) times daily.    cetirizine (ZYRTEC) 5 MG tablet Take 1 tablet (5 mg total) by mouth once daily.    cinacalcet (SENSIPAR) 60 MG Tab Take 1 tablet (60 mg total) by mouth daily with breakfast.    DULoxetine (CYMBALTA) 30 MG capsule Take 30 mg by mouth once daily.    ezetimibe (ZETIA) 10 mg tablet Take 1 tablet (10 mg total) by mouth once daily.    insulin aspart U-100 (NOVOLOG) 100 unit/mL  "(3 mL) InPn pen Inject 6 Units into the skin 3 (three) times daily with meals. + SSI (TDD: 48)    lancets Misc 1 lancet each to check blood glucose 3 (three) times daily.    magnesium oxide (MAG-OX) 400 mg (241.3 mg magnesium) tablet Take 2 tablets (800 mg total) by mouth 2 (two) times daily.    minoxidiL (LONITEN) 2.5 MG tablet Take 2 tablets (5 mg total) by mouth once daily.    multivitamin Tab Take 1 tablet by mouth once daily.    mycophenolate (CELLCEPT) 250 mg Cap Take 4 capsules (1,000 mg total) by mouth 2 (two) times daily.    NIFEdipine (PROCARDIA-XL) 60 MG (OSM) 24 hr tablet Take 1 tablet (60 mg total) by mouth 2 (two) times a day.    ondansetron (ZOFRAN-ODT) 4 MG TbDL Dissolve 1 tablet (4 mg total) by mouth every 8 (eight) hours as needed.    oxybutynin (DITROPAN) 5 MG Tab Take 1 tablet (5 mg total) by mouth 3 (three) times daily as needed (bladder spasms).    oxyCODONE (ROXICODONE) 5 MG immediate release tablet Take 1 tablet (5 mg total) by mouth every 6 (six) hours as needed for Pain.    pantoprazole (PROTONIX) 40 MG tablet Take 1 tablet (40 mg total) by mouth once daily.    pen needle, diabetic 32 gauge x 5/32" Ndle 1 each for use with insulin pen 3 (three) times daily.    predniSONE (DELTASONE) 5 MG tablet Take by mouth daily; 7/27/2024-8/2/2024: 20 mg, 8/3/2024-8/9/2024: 15 mg; 8/10/2024-8/16/2024: 10 mg; 8/17/2024- forever: 5 mg; do not stop    sodium bicarbonate 650 MG tablet Take 2 tablets (1,300 mg total) by mouth 2 (two) times daily.    sulfamethoxazole-trimethoprim 400-80mg (BACTRIM,SEPTRA) 400-80 mg per tablet Take 1 tablet by mouth every morning. Stop 1/21/25    tacrolimus (PROGRAF) 1 MG Cap Take 5 capsules (5 mg total) by mouth every 12 (twelve) hours.    valGANciclovir (VALCYTE) 450 mg Tab Take 1 tablet (450 mg total) by mouth once daily. Stop 10/23/24     Antibiotics (From admission, onward)      Start     Stop Route Frequency Ordered    08/19/24 2100  doxycycline tablet 100 mg         -- " Oral Every 12 hours 08/19/24 1403    08/19/24 1300  ertapenem (INVANZ) 1 g in 0.9% NaCl 100 mL IVPB (MB+)         -- IV Every 24 hours (non-standard times) 08/19/24 1147    08/17/24 0700  sulfamethoxazole-trimethoprim 400-80mg per tablet 1 tablet         -- Oral Every morning 08/16/24 2320          Antifungals (From admission, onward)      None          Antivirals (From admission, onward)          Stop Route Frequency     valGANciclovir         -- Oral Every Mon, Wed, Fri               There is no immunization history on file for this patient.    Family History    None       Social History     Socioeconomic History    Marital status: Single   Tobacco Use    Smoking status: Former    Smokeless tobacco: Never   Substance and Sexual Activity    Alcohol use: Not Currently    Drug use: Never    Sexual activity: Not Currently     Partners: Male   Social History Narrative    Caregiver Son Ranjan     Review of Systems   Constitutional:  Negative for chills and fever.   Respiratory:  Negative for cough and shortness of breath.    Cardiovascular:  Negative for chest pain.   Gastrointestinal:  Positive for abdominal pain. Negative for constipation, diarrhea, nausea and vomiting.   Musculoskeletal:  Negative for arthralgias and myalgias.   Skin:  Negative for rash.   Neurological:  Negative for weakness and headaches.     Objective:     Vital Signs (Most Recent):  Temp: 98 °F (36.7 °C) (08/19/24 1537)  Pulse: 70 (08/19/24 1537)  Resp: 19 (08/19/24 1537)  BP: (!) 182/60 (08/19/24 1537)  SpO2: 97 % (08/19/24 1537) Vital Signs (24h Range):  Temp:  [97.8 °F (36.6 °C)-98.7 °F (37.1 °C)] 98 °F (36.7 °C)  Pulse:  [63-80] 70  Resp:  [18-22] 19  SpO2:  [95 %-100 %] 97 %  BP: (125-198)/(60-83) 182/60     Weight: 81.6 kg (180 lb)  Body mass index is 29.95 kg/m².    Estimated Creatinine Clearance: 39.4 mL/min (A) (based on SCr of 1.5 mg/dL (H)).     Physical Exam  Vitals reviewed.   Constitutional:       General: She is not in acute  distress.     Appearance: Normal appearance. She is not ill-appearing.   HENT:      Head: Normocephalic and atraumatic.   Eyes:      Extraocular Movements: Extraocular movements intact.      Conjunctiva/sclera: Conjunctivae normal.   Cardiovascular:      Rate and Rhythm: Normal rate and regular rhythm.      Heart sounds: No murmur heard.  Pulmonary:      Effort: Pulmonary effort is normal. No respiratory distress.      Breath sounds: Normal breath sounds. No wheezing.   Abdominal:      General: Abdomen is flat. Bowel sounds are normal.      Palpations: Abdomen is soft.      Tenderness: There is abdominal tenderness.      Comments: Drain and wound vac in place   Musculoskeletal:      Cervical back: Normal range of motion.   Skin:     General: Skin is warm and dry.   Neurological:      General: No focal deficit present.      Mental Status: She is alert and oriented to person, place, and time.   Psychiatric:         Mood and Affect: Mood normal.         Behavior: Behavior normal.         Thought Content: Thought content normal.          Significant Labs: All pertinent labs within the past 24 hours have been reviewed.  Recent Lab Results  (Last 5 results in the past 24 hours)        08/19/24  1322   08/19/24  0808   08/19/24  0550   08/18/24  2211   08/18/24  1728        Albumin     2.3           Anion Gap     7           Baso #     0.06           Basophil %     1.0           BUN     15           Calcium     8.9           Chloride     108           CO2     21           Creatinine     1.5           Differential Method     Automated           eGFR     38.4           Eos #     0.2           Eos %     2.6           Glucose     131           Gran # (ANC)     4.9           Gran %     83.4           Hematocrit     25.8           Hemoglobin     8.6           Immature Grans (Abs)     0.04  Comment: Mild elevation in immature granulocytes is non specific and   can be seen in a variety of conditions including stress response,    acute inflammation, trauma and pregnancy. Correlation with other   laboratory and clinical findings is essential.             Immature Granulocytes     0.7           Lymph #     0.4           Lymph %     6.3           Magnesium      1.5           MCH     31.5           MCHC     33.3           MCV     95           Mono #     0.4           Mono %     6.0           MPV     10.5           nRBC     0           Phosphorus Level     1.7           Platelet Count     307           POCT Glucose 151   148     169   146       Potassium     3.9           RBC     2.73           RDW     16.4           Sodium     136           Tacrolimus Lvl     9.3  Comment: Testing performed by a chemiluminescent microparticle   immunoassay on the [employer] i System.    CAUTION: No firm therapeutic range exists for tacrolimus in whole   blood. The   complexity of the clinical state, individual differences in   sensitivity to   immunosuppressive and nephrotoxic effects of tacrolimus,   co-administration   of other immunosuppressants, type of transplant, time post-transplant   and a   number of other factors contribute to different requirements for   optimal   blood levels of tacrolimus. Therefore, individual tacrolimus values   cannot   be used as the sole indicator for making changes in treatment regimen   and   each patient should be thoroughly evaluated clinically before changes   in   treatment regimens are made. Each user must establish his or her own   ranges   based on clinical experience.  Therapeutic ranges vary according to the commercial test used, and   therefore   should be established for each commercial test. Values obtained with   different assay methods cannot be used interchangeably due to   differences in   assay methods and cross-reactivity with metabolites, nor should   correction   factors be applied. Therefore, consistent use of one assay for   individual   patients is recommended.             WBC     5.88                                   Significant Imaging: I have reviewed all pertinent imaging results/findings within the past 24 hours.

## 2024-08-19 NOTE — ASSESSMENT & PLAN NOTE
- Transfer from OSH d/t large wound drainage from kidney transplant incision  - Recently admitted 8/8-8/13 for wound drainage, s/p IR aspiration and drain placement of perinephric collection  - Perc drain now w/ milky purulent output; recent studies show infected fluid collecton  - OSH CT reviewed and shows persistent collection with foci of air  - Incision opened bedside on 8/17; fascia intact and old hematoma evacuated. Wound packed and wound care consulted for wound vac placement  - Wound vac placed 8/17 and tolerating well; will need SW assistance with home health wound care and wound vac  - ID consult Monday for outpatient abx plan; cefepime switched to erta and doxy with cultures NGTD but positive cell count from initial drain placement and UA + for ESBL kleb UTI

## 2024-08-19 NOTE — SUBJECTIVE & OBJECTIVE
"Interval HPI:   Overnight events: Remains in TSU. BG reasonably well controlled on current SQ insulin regimen. Remains on Prednisone 5 mg daily. Diet diabetic Renal;  Calorie    Eatin%  Nausea: No  Hypoglycemia and intervention: No  Fever: No  TPN and/or TF: No  If yes, type of TF/TPN and rate: n/a    BP (!) 170/67 (BP Location: Right arm, Patient Position: Standing)   Pulse 63   Temp 98.1 °F (36.7 °C) (Oral)   Resp 18   Ht 5' 5" (1.651 m)   Wt 81.6 kg (180 lb)   SpO2 95%   BMI 29.95 kg/m²     Labs Reviewed and Include    Recent Labs   Lab 24  0550   *   CALCIUM 8.9   ALBUMIN 2.3*      K 3.9   CO2 21*      BUN 15   CREATININE 1.5*     Lab Results   Component Value Date    WBC 5.88 2024    HGB 8.6 (L) 2024    HCT 25.8 (L) 2024    MCV 95 2024     2024     No results for input(s): "TSH", "FREET4" in the last 168 hours.  Lab Results   Component Value Date    HGBA1C 4.8 2024    HGBA1C 4.8 2024       Nutritional status:   Body mass index is 29.95 kg/m².  Lab Results   Component Value Date    ALBUMIN 2.3 (L) 2024    ALBUMIN 2.5 (L) 2024    ALBUMIN 2.3 (L) 2024     No results found for: "PREALBUMIN"    Estimated Creatinine Clearance: 39.4 mL/min (A) (based on SCr of 1.5 mg/dL (H)).    Accu-Checks  Recent Labs     24  0841 24  1239 24  1740 24  2259 24  0804 24  1241 24  1728 24  2211 24  0808   POCTGLUCOSE 223* 283* 240* 233* 159* 216* 146* 169* 148*       Current Medications and/or Treatments Impacting Glycemic Control  Immunotherapy:    Immunosuppressants       None          Steroids:   Hormones (From admission, onward)      Start     Stop Route Frequency Ordered    24 0900  predniSONE tablet 5 mg         -- Oral Daily 24  melatonin tablet 6 mg         -- Oral Nightly PRN 24          Pressors:    Autonomic Drugs " (From admission, onward)      None          Hyperglycemia/Diabetes Medications:   Antihyperglycemics (From admission, onward)      Start     Stop Route Frequency Ordered    08/18/24 1200  insulin aspart U-100 pen 6 Units         -- SubQ 3 times daily with meals 08/18/24 0923 08/16/24 2320  insulin aspart U-100 pen 0-5 Units         -- SubQ Before meals & nightly PRN 08/16/24 2320

## 2024-08-19 NOTE — CONSULTS
[facility] - Transplant Stepdown  Wound Care    Patient Name:  Joann Kenney   MRN:  [id number]  Date: 8/19/2024  Diagnosis: Wound drainage    History:     Past Medical History:   Diagnosis Date    Anemia     Anxiety     Atrial fibrillation     Bleeding 08/08/2024    Coronary artery disease     Depression     Diabetes mellitus, type 2     Disorder of kidney and ureter     Heart murmur     Hyperlipidemia     Hypertension     Obesity     ALEJA (obstructive sleep apnea)     Proteinuria     Solitary kidney     Stroke     Transient neurological symptoms 11/13/2018       Social History     Socioeconomic History    Marital status: Single   Tobacco Use    Smoking status: Former    Smokeless tobacco: Never   Substance and Sexual Activity    Alcohol use: Not Currently    Drug use: Never    Sexual activity: Not Currently     Partners: Male   Social History Narrative    Caregiver Son Ranjan       Precautions:     Allergies as of 08/16/2024    (No Known Allergies)       WOC Assessment Details/Treatment       Patient seen for consult placed by Np for abdominal incision with secondary request today to apply wound vac therapy to now open abdominal incision to support healing.        Reviewed chart for this encounter.  See flowsheet for findings.      Recommendations:  Abdominal incision- NPWT: setting @ 75 mmHg continuous suction with low intensity pressure.   IP wound care to change the dressing 2x/ week- Tuesday/ Friday.      Discussed POC with patient and primary nurse. KEN Ross contacted re care.  See EMR for orders and patient education.    Bedside nursing to continue care and monitoring.  Bedside to maintain pressure injury prevention interventions.  Current documented Akhil score is 20- nutrition sub scale score is 3.     08/17/24 1345        Wound 07/24/24 1354 Incision Right lower Abdomen low transverse   Date First Assessed/Time First Assessed: 07/24/24 1354   Present on Original Admission: No  Primary Wound Type: Incision   Side: Right  Orientation: lower  Location: Abdomen  Incision Type: low transverse  Closure Method: (c) Staples;Sutures;Other (see...   Wound Image    Dressing Appearance Moist drainage   Drainage Amount Moderate   Drainage Characteristics/Odor Sanguineous;No odor   Periwound Area Dry;Intact   Wound Edges Open   Care Cleansed with:;Antimicrobial agent  (Vashe solution)   Dressing Changed  (NPWT initiated.)   Periwound Care Dry periwound area maintained   Dressing Change Due 08/20/24        Negative Pressure Wound Therapy  08/17/24 1345 Right   Placement Date/Time: 08/17/24 1345   Side: Right  Location: Lower quadrant   NPWT Type Vacuum Therapy   Therapy Setting NPWT Continuous therapy   Pressure Setting NPWT 75 mmHg   Sponges Inserted NPWT White;1;Black;2        08/17/24 1400   WOCN Assessment   WOCN Total Time (mins) 45   Visit Date 08/17/24   Visit Time 1400   Consult Type New   WOCN Speciality Wound   Procedure wound vac   Intervention chart review;assessed;applied   Teaching on-going  (pt- wound assmt/ rec for NPWT.)   Positioning   Body Position position changed independently   Head of Bed (HOB) Positioning HOB elevated   Pressure Injury Prevention    Check Moisture Management Pad Done   Check Medical Devices Done     Will continue to follow as needed and/ or as directed until discharge.    Sonia Talamantes BSN, RN, CWOCN  08/19/2024

## 2024-08-19 NOTE — PROGRESS NOTES
"  Per rounds this morning patient will need a wound vac at time of discharge. Patient notified of this during walking rounds with MD and RIANNA. RIANNA KAYCEE Gudino will complete wound vac forms and notify  when they are completed. Patient reports her daughter is unable to continue being her caregiver at this time. Patient called her daughter while transplant team was in patients room and patients daughter confirmed this. Patient and daughter report that patients caregiver will be Olena Beckham ([phone]).      and  called patients caregiver (Olena) to provide medical updates and confirm ability to be caregiver. Olena reports she will be able to present to [place] for teaching on Wednesday 8/21/24 and will be able to act as caregiver. Patient provided time to ask questions, all questions answered. No other concerns reported at this time.    Patients daughter called this  and stated "my mother is difficult and this lady won't be able to deal with her". Olena called this  stating "Sarah called me and told me that her mother is difficult and doesn't take her medications correctly or eat right, Sarah said she (the patient) qualifies for 2 weeks in a skilled nursing facility".  discussed caregiver role and responsibilities with Olena, and explained MD informed patient and daughter (over the phone) that SNF may be an option if no caregiver is available. Olena reports that she is expecting patient to pay her. Olena states that she will call patient to determine whether she will be able to pay her.      notified  and  of above.  called Olena back to discuss further. Olena reported that she spoke with patient and will be available to act as caregiver for 2 weeks "whether she can pay me or not, she needs help". Olena stated " I have a friend who can also help if she needs some one 24 " "hours".     met with patient in room. Patient reports she knows Olena, and feels safe with her as caregiver. Patient reports she is comfortable with current care giving plan and reports she does not want to go to a skilled nursing or other facility at time of discharge.  asked patient who her caregiver would be if she needed assistance for longer than two weeks. Patient stated "I'll figure something out".  Patient denied additional needs or concerns at this time. Full transplant social work admit note to be completed tomorrow 8/20/24.     Laura Wood LMSW   "

## 2024-08-19 NOTE — PLAN OF CARE
Plan of care reviewed on am rounds, afrebrile, vss with anti-hypertensives continued wbc 5  h/h stable Cr 1.5 Ph 1.7 ( replaced ) Blood glucoses conrtrolled on current regimen although not much of an appetite. Voiding OK, no bm since 8/15, laxative dose increased.ID cx for antibiotic coverage, changed to Erta and po Doxy added.Wound vac intact with scant drainage, MALKA to bulb sx with milky pale yellow output, denies any c/o pain, up with standby assist,.Emotional support provided throughout shift

## 2024-08-19 NOTE — CONSULTS
Attestation signed by Brigid Sutherland MD at 8/19/2024  9:23 PM     I have seen the patient, reviewed the Fellow's history and physical, assessment, and plan. I have personally interviewed and examined the patient at bedside and agree with the findings.     Start ertapenem 1g IV q24 hours for coverage of ESBL in urine culture, doxycycyline 100 mg PO BID for coverage of drainage of incision site. Ertapenem and doxycycline with also provide coverage of intraabdominal fluid collection with drainage.    On discharge okay to switch ertapenem to levoflox 750 mg PO q48 hours    Plan for 4 week course of antibiotics to treat intraabdominal fluid collection - ultimate duration depends on resolution of collection on imaging     Will arrange ID virtual visit     55 minutes of total time spent on the encounter, which includes face to face time and non-face to face time preparing to see the patient (eg, review of tests), obtaining and reviewing separately obtained history, documenting clinical information in the electronic or other health record, independently interpreting results (not separately reported) and communicating results to the patient/family/caregiver, and care coordination (not separately reported).            Brigid Sutherland MD  Infectious Diseases  [facility] - Transplant Stepdown          [facility] - Transplant Stepdown  Infectious Disease  Consult Note    Patient Name: Joann Kenney  MRN: [id number]  Admission Date: 8/16/2024  Hospital Length of Stay: 3 days  Attending Physician: Freddy Ruelas Jr., *  Primary Care Provider: Vijay Blake MD     Isolation Status: No active isolations    Patient information was obtained from patient and ER records.      Inpatient consult to Infectious Diseases  Consult performed by: Dayan Escalante DO  Consult ordered by: Morena Gudino PA-C        Assessment/Plan:     Orthopedic  * Wound drainage  63F with PMH of solitary kidney, diabetic nephropathy s/p  "kidney transplant 7/24/24, DM2, HTN, CAD, afib, prior CVA, ALEJA, recent admission for peritransplant fluid collection s/p drain placement (cultures negative) now presents as a transfer from OSH for drainage from transplant incision site. CTAP from OSH showed large fluid collection 17 x 4 x 3 cm. Here underwent bedside evacuation of old hematoma at incision site by surgery team, wound vac now in place. UCX with ESBL kleb pneumo.     Recommendations  Continue ertapenem IV while inpatient, then ok to switch to levoflox 750 mg PO q48 hours   Start doxycycline 100 mg PO BID  Duration 4 weeks, ultimate duration pending follow-up imaging        Thank you for your consult. I will sign off. Please contact us if you have any additional questions.    Dayan Escalante,   Infectious Disease  [facility] - Transplant Stepdown    Subjective:     Principal Problem: Wound drainage    HPI: Ms. Kenney is a 63F with PMH of solitary kidney, diabetic nephropathy s/p kidney transplant 7/24/24, DM2, HTN, CAD, afib, prior CVA, ALEJA, recent admission for peritransplant fluid collection s/p drain placement, now presents as a transfer from OSH for drainage from transplant incision site. Infectious disease consulted for "GNR in urine, poss infected fluid collection (drain in place). ABX recs".     Fluid studies from initial drain placement showed WBC 47, 10% segs, and cultures were negative. After discharge here, patient presented to her nearby ED for drainage from the incision site, CTAP there showed large fluid collection 17 x 4 x 3 cm. Here underwent bedside evacuation of old hematoma at incision site by surgery team, wound vac now in place.     Past Medical History:   Diagnosis Date    Anemia     Anxiety     Atrial fibrillation     Bleeding 08/08/2024    Coronary artery disease     Depression     Diabetes mellitus, type 2     Disorder of kidney and ureter     Heart murmur     Hyperlipidemia     Hypertension     Obesity     ALEJA (obstructive sleep " apnea)     Proteinuria     Solitary kidney     Stroke     Transient neurological symptoms 11/13/2018       Past Surgical History:   Procedure Laterality Date    CORONARY ANGIOPLASTY WITH STENT PLACEMENT      HYSTERECTOMY      INCONTINENCE SURGERY      INSERTION OF IMPLANTABLE LOOP RECORDER      internal heart monitor      KIDNEY TRANSPLANT Right 7/24/2024    Procedure: TRANSPLANT, KIDNEY;  Surgeon: Alphonso Mon MD;  Location: [address];  Service: Transplant;  Laterality: Right;    PERITONEAL CATHETER INSERTION      PERITONEAL CATHETER REMOVAL Left 7/24/2024    Procedure: REMOVAL, CATHETER, DIALYSIS, PERITONEAL;  Surgeon: Alphonso Mon MD;  Location: [address];  Service: Transplant;  Laterality: Left;       Review of patient's allergies indicates:  No Known Allergies    Medications:  Medications Prior to Admission   Medication Sig    ALPRAZolam (XANAX) 2 MG Tab Take 2 mg by mouth every evening.    atorvastatin (LIPITOR) 20 MG tablet Take 1 tablet (20 mg total) by mouth every evening.    blood sugar diagnostic Strp use 1 strip to check blood glucose 3 (three) times daily.    blood-glucose meter Misc use as directed to check blood glucose    carvediloL (COREG) 6.25 MG tablet Take 1 tablet (6.25 mg total) by mouth 2 (two) times daily.    cetirizine (ZYRTEC) 5 MG tablet Take 1 tablet (5 mg total) by mouth once daily.    cinacalcet (SENSIPAR) 60 MG Tab Take 1 tablet (60 mg total) by mouth daily with breakfast.    DULoxetine (CYMBALTA) 30 MG capsule Take 30 mg by mouth once daily.    ezetimibe (ZETIA) 10 mg tablet Take 1 tablet (10 mg total) by mouth once daily.    insulin aspart U-100 (NOVOLOG) 100 unit/mL (3 mL) InPn pen Inject 6 Units into the skin 3 (three) times daily with meals. + SSI (TDD: 48)    lancets Misc 1 lancet each to check blood glucose 3 (three) times daily.    magnesium oxide (MAG-OX) 400 mg (241.3 mg magnesium) tablet Take 2 tablets (800 mg total) by mouth 2 (two) times daily.    minoxidiL  "(LONITEN) 2.5 MG tablet Take 2 tablets (5 mg total) by mouth once daily.    multivitamin Tab Take 1 tablet by mouth once daily.    mycophenolate (CELLCEPT) 250 mg Cap Take 4 capsules (1,000 mg total) by mouth 2 (two) times daily.    NIFEdipine (PROCARDIA-XL) 60 MG (OSM) 24 hr tablet Take 1 tablet (60 mg total) by mouth 2 (two) times a day.    ondansetron (ZOFRAN-ODT) 4 MG TbDL Dissolve 1 tablet (4 mg total) by mouth every 8 (eight) hours as needed.    oxybutynin (DITROPAN) 5 MG Tab Take 1 tablet (5 mg total) by mouth 3 (three) times daily as needed (bladder spasms).    oxyCODONE (ROXICODONE) 5 MG immediate release tablet Take 1 tablet (5 mg total) by mouth every 6 (six) hours as needed for Pain.    pantoprazole (PROTONIX) 40 MG tablet Take 1 tablet (40 mg total) by mouth once daily.    pen needle, diabetic 32 gauge x 5/32" Ndle 1 each for use with insulin pen 3 (three) times daily.    predniSONE (DELTASONE) 5 MG tablet Take by mouth daily; 7/27/2024-8/2/2024: 20 mg, 8/3/2024-8/9/2024: 15 mg; 8/10/2024-8/16/2024: 10 mg; 8/17/2024- forever: 5 mg; do not stop    sodium bicarbonate 650 MG tablet Take 2 tablets (1,300 mg total) by mouth 2 (two) times daily.    sulfamethoxazole-trimethoprim 400-80mg (BACTRIM,SEPTRA) 400-80 mg per tablet Take 1 tablet by mouth every morning. Stop 1/21/25    tacrolimus (PROGRAF) 1 MG Cap Take 5 capsules (5 mg total) by mouth every 12 (twelve) hours.    valGANciclovir (VALCYTE) 450 mg Tab Take 1 tablet (450 mg total) by mouth once daily. Stop 10/23/24     Antibiotics (From admission, onward)      Start     Stop Route Frequency Ordered    08/19/24 2100  doxycycline tablet 100 mg         -- Oral Every 12 hours 08/19/24 1403    08/19/24 1300  ertapenem (INVANZ) 1 g in 0.9% NaCl 100 mL IVPB (MB+)         -- IV Every 24 hours (non-standard times) 08/19/24 1147    08/17/24 0700  sulfamethoxazole-trimethoprim 400-80mg per tablet 1 tablet         -- Oral Every morning 08/16/24 9510      "     Antifungals (From admission, onward)      None          Antivirals (From admission, onward)          Stop Route Frequency     valGANciclovir         -- Oral Every Mon, Wed, Fri               There is no immunization history on file for this patient.    Family History    None       Social History     Socioeconomic History    Marital status: Single   Tobacco Use    Smoking status: Former    Smokeless tobacco: Never   Substance and Sexual Activity    Alcohol use: Not Currently    Drug use: Never    Sexual activity: Not Currently     Partners: Male   Social History Narrative    Caregiver Son Ranjan     Review of Systems   Constitutional:  Negative for chills and fever.   Respiratory:  Negative for cough and shortness of breath.    Cardiovascular:  Negative for chest pain.   Gastrointestinal:  Positive for abdominal pain. Negative for constipation, diarrhea, nausea and vomiting.   Musculoskeletal:  Negative for arthralgias and myalgias.   Skin:  Negative for rash.   Neurological:  Negative for weakness and headaches.     Objective:     Vital Signs (Most Recent):  Temp: 98 °F (36.7 °C) (08/19/24 1537)  Pulse: 70 (08/19/24 1537)  Resp: 19 (08/19/24 1537)  BP: (!) 182/60 (08/19/24 1537)  SpO2: 97 % (08/19/24 1537) Vital Signs (24h Range):  Temp:  [97.8 °F (36.6 °C)-98.7 °F (37.1 °C)] 98 °F (36.7 °C)  Pulse:  [63-80] 70  Resp:  [18-22] 19  SpO2:  [95 %-100 %] 97 %  BP: (125-198)/(60-83) 182/60     Weight: 81.6 kg (180 lb)  Body mass index is 29.95 kg/m².    Estimated Creatinine Clearance: 39.4 mL/min (A) (based on SCr of 1.5 mg/dL (H)).     Physical Exam  Vitals reviewed.   Constitutional:       General: She is not in acute distress.     Appearance: Normal appearance. She is not ill-appearing.   HENT:      Head: Normocephalic and atraumatic.   Eyes:      Extraocular Movements: Extraocular movements intact.      Conjunctiva/sclera: Conjunctivae normal.   Cardiovascular:      Rate and Rhythm: Normal rate and regular rhythm.       Heart sounds: No murmur heard.  Pulmonary:      Effort: Pulmonary effort is normal. No respiratory distress.      Breath sounds: Normal breath sounds. No wheezing.   Abdominal:      General: Abdomen is flat. Bowel sounds are normal.      Palpations: Abdomen is soft.      Tenderness: There is abdominal tenderness.      Comments: Drain and wound vac in place   Musculoskeletal:      Cervical back: Normal range of motion.   Skin:     General: Skin is warm and dry.   Neurological:      General: No focal deficit present.      Mental Status: She is alert and oriented to person, place, and time.   Psychiatric:         Mood and Affect: Mood normal.         Behavior: Behavior normal.         Thought Content: Thought content normal.          Significant Labs: All pertinent labs within the past 24 hours have been reviewed.  Recent Lab Results  (Last 5 results in the past 24 hours)        08/19/24  1322   08/19/24  0808   08/19/24  0550   08/18/24  2211   08/18/24  1728        Albumin     2.3           Anion Gap     7           Baso #     0.06           Basophil %     1.0           BUN     15           Calcium     8.9           Chloride     108           CO2     21           Creatinine     1.5           Differential Method     Automated           eGFR     38.4           Eos #     0.2           Eos %     2.6           Glucose     131           Gran # (ANC)     4.9           Gran %     83.4           Hematocrit     25.8           Hemoglobin     8.6           Immature Grans (Abs)     0.04  Comment: Mild elevation in immature granulocytes is non specific and   can be seen in a variety of conditions including stress response,   acute inflammation, trauma and pregnancy. Correlation with other   laboratory and clinical findings is essential.             Immature Granulocytes     0.7           Lymph #     0.4           Lymph %     6.3           Magnesium      1.5           MCH     31.5           MCHC     33.3           MCV     95            Mono #     0.4           Mono %     6.0           MPV     10.5           nRBC     0           Phosphorus Level     1.7           Platelet Count     307           POCT Glucose 151   148     169   146       Potassium     3.9           RBC     2.73           RDW     16.4           Sodium     136           Tacrolimus Lvl     9.3  Comment: Testing performed by a chemiluminescent microparticle   immunoassay on the [employer] i System.    CAUTION: No firm therapeutic range exists for tacrolimus in whole   blood. The   complexity of the clinical state, individual differences in   sensitivity to   immunosuppressive and nephrotoxic effects of tacrolimus,   co-administration   of other immunosuppressants, type of transplant, time post-transplant   and a   number of other factors contribute to different requirements for   optimal   blood levels of tacrolimus. Therefore, individual tacrolimus values   cannot   be used as the sole indicator for making changes in treatment regimen   and   each patient should be thoroughly evaluated clinically before changes   in   treatment regimens are made. Each user must establish his or her own   ranges   based on clinical experience.  Therapeutic ranges vary according to the commercial test used, and   therefore   should be established for each commercial test. Values obtained with   different assay methods cannot be used interchangeably due to   differences in   assay methods and cross-reactivity with metabolites, nor should   correction   factors be applied. Therefore, consistent use of one assay for   individual   patients is recommended.             WBC     5.88                                  Significant Imaging: I have reviewed all pertinent imaging results/findings within the past 24 hours.      Estimated Creatinine Clearance: 39.4 mL/min (A) (based on SCr of 1.5 mg/dL (H)).

## 2024-08-19 NOTE — HOSPITAL COURSE
Patient admitted from OSH for wound leakage.  Delayed wound healing: OSH CT images reviewed; persistent subcutaneous collection with foci of air present. Staples removed from 2/3 of medial part of incision, old hematoma evacuated. Opened part of incision irrigated and assessed, no active bleeding or fascial dehiscence noted. Rest of staples removed 8/20. Wound care consulted, vac placed. Vac changes Tues/Friday. CT AP 8/21 with collections decreasing in size.   Infection: Drain had turned a milky serous color. Fluid sent for studies. Cell count pos for infection >81,000 WBCs 98% segs. No urine leak. Cx NGTD. Triglycerides negative. Cefepime started. Rest of infectious w/u revealed ESBL klebsiella pneumoniae UTI. ID consulted, plan for 1 week erta + doxy, midline placed then levoflox 750mg PO q48hrs at discharge with 2 additional weeks of doxy    Interval History   NAEON. Patient feeling well this morning, no complaints. Cr stable 1.4. RLQ drain with 170ml SS drainage in last 24 hours. Keep drain charged. VSS. Patient's current discharge plan includes hiring someone who will  help her during the day at the patient's home and her daughter will check on her every evening and provider the patient groceries and dinners. Patient's daughter also to do a pill box to ensure patient is taking her medication appropriately. Cont to monitor

## 2024-08-19 NOTE — PROGRESS NOTES
[facility] - Transplant Stepdown  Kidney Transplant  Progress Note      Reason for Follow-up: Reassessment of Kidney Transplant - 7/24/2024  (#1) recipient and management of immunosuppression.    ORGAN:   RIGHT KIDNEY    Donor Type:   Donation after Brain Death    Donor CMV Status: Positive  Donor HBcAB:Negative  Donor HCV Status:Negative  Donor HBV RAH:   Donor HCV RAH: Negative      Subjective:   History of Present Illness:  Joann Kenney is a 64 y/o F s/p DBD kidney transplant 7/24/2024 for ESRD 2/2 Type II DM. PMHx born w solitary kidney, DM2, HTN, incontience (s/p bladder sling), CAD with stents (on ASA), atrial fibrillation, CVA (no residual deficits), ALEJA, and anemia. Surgery without complications. PD cath removed. Post op course notable for down trending Cr with excellent UOP. Patient recently admitted 8/8 to 8/13 d/t incisional drainage. CT A/P noted enlarged fluid collection around the right renal transplant and extending into the RLQ abdominal wall. She underwent IR fluid aspiration and drain placement on 8/9. Fluid studies neg for infection or urine leak (WBC 47, segs 10, lymph 83, Cr 1.6). Repeat CT scan done 8/12 reviewed by surgeons, plan to discharge pt with drain in place for continued drainage per MALKA.     Patient now presents as a transfer from OSH due to incisional drainage. Pt presented to her local ED with large amount of bloody drainage from her transplant incision. Wound has 3 cm opening in medial aspect with old bloody drainage CT A/P at OSH w/ large collection 17 x 4 x 3 cm. Her percutaneous drain remains in place and per patient turned milky and purulent appearing day prior to presenting to the OSH.  Pt reports overall feeling well. Pt denies fever/chills, decreased UOP, dysuria. Plan for infectious work-up, IV abx, wound care consult. Will review CT imaging with transplant surgeon with likely plan for IR consult         Hospital Course:  Patient admitted from OSH for wound leakage. Once in  TSU, 2/3 of the medial part of her incision was opened, old hematoma evacuated. Several staples remain on the lateral side of the incision. Wound vac was placed. OSH CT images reviewed; persistent subcutaneous collection with foci of air present. Wound was then investigated for fascia dehiscence; fascia found intact. Wound was irrigated, packed, vac placed by WC. Drain had turned a milky serous color. Contents were sent for WBC, cx, Cr, triglycerides. Negative for leak, >81,000 WBC w 98% segs. Started on zosyn. Cx NGTD. Urine cx + for GNR, sensitivities pending. BC NGTD. Plan for drain to remain for a while, may need repeat imaging prior to removing. Small HILLARY on admit resolved on its own, great UOP.     Interval History   NAEON. Patient doing well this morning, no complaints. ID consulted for infected collection with drain in place for abx recommendations. Urine cx also positive for ESBL kleb pneumo. Cefepime switched to erta and doxy per ID. All other cx NGTD. Wound vac scheduled to be changed tomorrow. Cr stable 1.5. Hgb stable 8.6. VSS. SW discussing caregiver situation with patient, made clear that caregiver is necessary especially with several more needs (vac, IV abx, etc). Cont to monitor         Past Medical, Surgical, Family, and Social History:   Unchanged from H&P.    Scheduled Meds:   ALPRAZolam  2 mg Oral QHS    atorvastatin  20 mg Oral QHS    [START ON 8/20/2024] bisacodyL  10 mg Oral Daily    carvediloL  6.25 mg Oral BID    cetirizine  5 mg Oral QHS    cinacalcet  60 mg Oral Daily with breakfast    docusate sodium  100 mg Oral TID PC    doxycycline  100 mg Oral Q12H    DULoxetine  30 mg Oral QHS    ertapenem (INVanz) IV (PEDS and ADULTS)  1 g Intravenous Q24H    ezetimibe  10 mg Oral Daily    insulin aspart U-100  6 Units Subcutaneous TID WM    k phos di & mono-sod phos mono  500 mg Oral BID    magnesium oxide  800 mg Oral BID    minoxidiL  5 mg Oral Daily    NIFEdipine  60 mg Oral BID    pantoprazole   40 mg Oral Daily    predniSONE  5 mg Oral Daily    sodium phosphate 30 mmol in D5W 250 mL IVPB  30 mmol Intravenous Once    sulfamethoxazole-trimethoprim 400-80mg  1 tablet Oral QAM    valGANciclovir  450 mg Oral Every Mon, Wed, Fri     Continuous Infusions:  PRN Meds:  Current Facility-Administered Medications:     acetaminophen, 650 mg, Oral, Q8H PRN    dextrose 10%, 12.5 g, Intravenous, PRN    dextrose 10%, 25 g, Intravenous, PRN    diphenhydrAMINE-zinc acetate 2-0.1%, , Topical (Top), BID PRN    glucagon (human recombinant), 1 mg, Intramuscular, PRN    glucose, 16 g, Oral, PRN    glucose, 24 g, Oral, PRN    hydrOXYzine HCL, 25 mg, Oral, TID PRN    insulin aspart U-100, 0-5 Units, Subcutaneous, QID (AC + HS) PRN    melatonin, 6 mg, Oral, Nightly PRN    ondansetron, 8 mg, Oral, Q8H PRN    oxybutynin, 5 mg, Oral, TID PRN    oxyCODONE, 5 mg, Oral, Q6H PRN    sodium chloride 0.9%, 10 mL, Intravenous, PRN    Intake/Output - Last 3 Shifts         08/17 0700  08/18 0659 08/18 0700 08/19 0659 08/19 0700 08/20 0659    P.O. 700 942 780    IV Piggyback 97.3  100    Total Intake(mL/kg) 797.3 (9.8) 942 (11.5) 880 (10.8)    Urine (mL/kg/hr) 2500 (1.3) 2150 (1.1) 300 (0.4)    Emesis/NG output 0 0     Drains 295 355 100    Other  0     Stool 0 0     Total Output 2795 2505 400    Net -1997.7 -1563 +480           Urine Occurrence 0 x 0 x     Stool Occurrence 0 x 0 x     Emesis Occurrence 0 x 0 x              Review of Systems   Constitutional:  Negative for activity change, appetite change, chills and fever.   Respiratory:  Negative for cough and shortness of breath.    Cardiovascular:  Negative for chest pain and leg swelling.   Gastrointestinal:  Positive for abdominal pain. Negative for abdominal distention, nausea and vomiting.   Genitourinary:  Positive for frequency and urgency. Negative for decreased urine volume, difficulty urinating and dysuria.   Skin:  Positive for wound.   Allergic/Immunologic: Positive for  "immunocompromised state.   Neurological:  Negative for weakness.   Psychiatric/Behavioral:  Negative for confusion and decreased concentration.       Objective:     Vital Signs (Most Recent):  Temp: 98.5 °F (36.9 °C) (08/19/24 1126)  Pulse: 67 (08/19/24 1126)  Resp: 18 (08/19/24 1126)  BP: 125/68 (08/19/24 1126)  SpO2: 97 % (08/19/24 1126) Vital Signs (24h Range):  Temp:  [97.8 °F (36.6 °C)-98.7 °F (37.1 °C)] 98.5 °F (36.9 °C)  Pulse:  [61-80] 67  Resp:  [18-22] 18  SpO2:  [95 %-100 %] 97 %  BP: (125-198)/(65-83) 125/68     Weight: 81.6 kg (180 lb)  Height: 5' 5" (165.1 cm)  Body mass index is 29.95 kg/m².     Physical Exam  Vitals reviewed.   Constitutional:       General: She is not in acute distress.     Appearance: She is not ill-appearing.   HENT:      Head: Normocephalic and atraumatic.      Mouth/Throat:      Mouth: Mucous membranes are moist.   Eyes:      Extraocular Movements: Extraocular movements intact.      Conjunctiva/sclera: Conjunctivae normal.      Pupils: Pupils are equal, round, and reactive to light.   Cardiovascular:      Rate and Rhythm: Normal rate.      Pulses: Normal pulses.   Pulmonary:      Effort: Pulmonary effort is normal. No respiratory distress.   Abdominal:      General: Bowel sounds are normal. There is no distension.      Palpations: Abdomen is soft.      Tenderness: There is abdominal tenderness. There is no guarding or rebound.       Musculoskeletal:         General: No swelling. Normal range of motion.      Cervical back: Normal range of motion.   Skin:     General: Skin is warm and dry.   Neurological:      Mental Status: She is alert and oriented to person, place, and time.      Motor: No weakness.   Psychiatric:         Mood and Affect: Mood normal.         Behavior: Behavior normal.         Thought Content: Thought content normal.         Judgment: Judgment normal.          Laboratory:  CBC:   Recent Labs   Lab 08/17/24  0550 08/18/24  0620 08/19/24  0550   WBC 6.96 7.22 " 5.88   RBC 2.89* 3.11* 2.73*   HGB 9.1* 9.8* 8.6*   HCT 27.5* 30.0* 25.8*    360 307   MCV 95 97 95   MCH 31.5* 31.5* 31.5*   MCHC 33.1 32.7 33.3     CMP:   Recent Labs   Lab 08/17/24  0550 08/18/24  0620 08/19/24  0550   * 146* 131*   CALCIUM 9.8 10.0 8.9   ALBUMIN 2.3* 2.5* 2.3*    137 136   K 4.4 4.2 3.9   CO2 19* 22* 21*    108 108   BUN 20 18 15   CREATININE 1.8* 1.6* 1.5*     Labs within the past 24 hours have been reviewed.    Diagnostic Results:  US - Kidney: Results for orders placed during the hospital encounter of 07/29/24    US Transplant Kidney With Doppler    Narrative  EXAMINATION:  US TRANSPLANT KIDNEY WITH DOPPLER    CLINICAL HISTORY:  complication after renal transplant;    TECHNIQUE:  Real time gray scale and doppler ultrasound was performed over the patient's renal allograft.    COMPARISON:  CT abdomen pelvis 07/29/2024; u/S transplant kidney with Doppler 07/25/2024    FINDINGS:  Patient is status post renal allograft in the right lower quadrant on 07/24/2024.  Hilum is located posterior medially, previously posterolaterally.  The allograft measures 9.7 cm. Normal perfusion. No hydronephrosis.  Ureteral stent is possibly visualized near the hilum; however, it is not visualized within the bladder.    Minimally complex collection adjacent to the inferior pole measuring 3.0 x 3.2 x 3.0 cm.    Minimally complex collection superficial to the renal allograft measuring 3.5 x 1.6 x 1.0 cm.    Small amount of peritoneal free fluid.    Vasculature:    Resistive indices of the arteries:    Interlobar: 0.78 with normal waveform, previously 0.76    Upper segment: 0.72 with normal waveform, previously 0.89    Mid segment: 0.81 with normal waveform, previously 0.82    Lower segment: 0.81 with normal waveform, previously 0.87    Main renal artery peak systolic velocity: 230cm/sec with normal waveform, previously 175cm/sec.    Renal artery/iliac ratio: 1.4.    The main renal vein is  "patent.    Impression  Slight improvement of segmental renal arterial velocities, which remain mildly elevated within the mid and lower segments.    Peritransplant fluid collections as detailed above.    Electronically signed by resident: Kyle Nieto  Date:    08/01/2024  Time:    13:15    Electronically signed by: Kevyn Reynoso MD  Date:    08/01/2024  Time:    13:56  Assessment/Plan:     * Wound drainage  - Transfer from OSH d/t large wound drainage from kidney transplant incision  - Recently admitted 8/8-8/13 for wound drainage, s/p IR aspiration and drain placement of perinephric collection  - Perc drain now w/ milky purulent output; recent studies show infected fluid collecton  - OSH CT reviewed and shows persistent collection with foci of air  - Incision opened bedside on 8/17; fascia intact and old hematoma evacuated. Wound packed and wound care consulted for wound vac placement  - Wound vac placed 8/17 and tolerating well; will need SW assistance with home health wound care and wound vac  - ID consult Monday for outpatient abx plan; cefepime switched to erta and doxy with cultures NGTD but positive cell count from initial drain placement and UA + for ESBL kleb UTI       Anemia of chronic disease  - daily CBC      Anxiety  - continue home xanax      Hypertension associated with transplantation  - continue home antihypertensives with hold parameters    Long-term use of immunosuppressant medication  - see Prophylactic immunotherapy"      Prophylactic immunotherapy  - Continue prograf, cellcept, and steroid taper  - Check prograf level daily and adjust for therapeutic dosage. Monitor for toxic side effects       S/P kidney transplant  - S/p Ktxp 7/24/24  - Cr trended down with excellent UOP  - Multiple Readmit d/t wound complications  - Cr stable   - strict Is and Os   - monitor       Other hyperlipidemia  - continue home statin      Diabetes mellitus due to underlying condition with chronic kidney disease on " chronic dialysis, with long-term current use of insulin  - Endocrine consulted  - sliding scale          Discharge Planning:  Not yet stable for dc. Possibly dc Wednesday when caregiver comes learn medications     Medical decision making for this encounter includes review of pertinent labs and diagnostic studies, assessment and planning, discussions with consulting providers, discussion with patient/family, and participation in multidisciplinary rounds. Time spent caring for patient: 60 minutes    Morena Gudino PA-C  Kidney Transplant  [facility] - Transplant Stepdown

## 2024-08-20 PROBLEM — Z16.12 INFECTION DUE TO ESBL-PRODUCING KLEBSIELLA PNEUMONIAE: Status: ACTIVE | Noted: 2024-08-20

## 2024-08-20 PROBLEM — T14.8XXA WOUND DRAINAGE: Status: RESOLVED | Noted: 2024-08-08 | Resolved: 2024-08-20

## 2024-08-20 PROBLEM — T81.49XA WOUND INFECTION AFTER SURGERY: Status: RESOLVED | Noted: 2024-08-19 | Resolved: 2024-08-20

## 2024-08-20 PROBLEM — A49.8 INFECTION DUE TO ESBL-PRODUCING KLEBSIELLA PNEUMONIAE: Status: ACTIVE | Noted: 2024-08-20

## 2024-08-20 LAB
ALBUMIN SERPL BCP-MCNC: 2.4 G/DL (ref 3.5–5.2)
ANION GAP SERPL CALC-SCNC: 6 MMOL/L (ref 8–16)
BASOPHILS # BLD AUTO: 0.07 K/UL (ref 0–0.2)
BASOPHILS NFR BLD: 1.3 % (ref 0–1.9)
BUN SERPL-MCNC: 12 MG/DL (ref 8–23)
CALCIUM SERPL-MCNC: 8.6 MG/DL (ref 8.7–10.5)
CHLORIDE SERPL-SCNC: 107 MMOL/L (ref 95–110)
CO2 SERPL-SCNC: 23 MMOL/L (ref 23–29)
CREAT SERPL-MCNC: 1.6 MG/DL (ref 0.5–1.4)
DIFFERENTIAL METHOD BLD: ABNORMAL
EOSINOPHIL # BLD AUTO: 0.3 K/UL (ref 0–0.5)
EOSINOPHIL NFR BLD: 4.9 % (ref 0–8)
ERYTHROCYTE [DISTWIDTH] IN BLOOD BY AUTOMATED COUNT: 16.2 % (ref 11.5–14.5)
EST. GFR  (NO RACE VARIABLE): 35.6 ML/MIN/1.73 M^2
GLUCOSE SERPL-MCNC: 108 MG/DL (ref 70–110)
HCT VFR BLD AUTO: 26.4 % (ref 37–48.5)
HGB BLD-MCNC: 9 G/DL (ref 12–16)
IMM GRANULOCYTES # BLD AUTO: 0.04 K/UL (ref 0–0.04)
IMM GRANULOCYTES NFR BLD AUTO: 0.8 % (ref 0–0.5)
LYMPHOCYTES # BLD AUTO: 0.3 K/UL (ref 1–4.8)
LYMPHOCYTES NFR BLD: 5.7 % (ref 18–48)
MAGNESIUM SERPL-MCNC: 1.5 MG/DL (ref 1.6–2.6)
MCH RBC QN AUTO: 31.6 PG (ref 27–31)
MCHC RBC AUTO-ENTMCNC: 34.1 G/DL (ref 32–36)
MCV RBC AUTO: 93 FL (ref 82–98)
MONOCYTES # BLD AUTO: 0.3 K/UL (ref 0.3–1)
MONOCYTES NFR BLD: 5.9 % (ref 4–15)
NEUTROPHILS # BLD AUTO: 4.3 K/UL (ref 1.8–7.7)
NEUTROPHILS NFR BLD: 81.4 % (ref 38–73)
NRBC BLD-RTO: 0 /100 WBC
PHOSPHATE SERPL-MCNC: 2.7 MG/DL (ref 2.7–4.5)
PLATELET # BLD AUTO: 316 K/UL (ref 150–450)
PMV BLD AUTO: 10.4 FL (ref 9.2–12.9)
POCT GLUCOSE: 126 MG/DL (ref 70–110)
POCT GLUCOSE: 132 MG/DL (ref 70–110)
POCT GLUCOSE: 201 MG/DL (ref 70–110)
POTASSIUM SERPL-SCNC: 3.6 MMOL/L (ref 3.5–5.1)
RBC # BLD AUTO: 2.85 M/UL (ref 4–5.4)
SODIUM SERPL-SCNC: 136 MMOL/L (ref 136–145)
TACROLIMUS BLD-MCNC: 4.4 NG/ML (ref 5–15)
WBC # BLD AUTO: 5.28 K/UL (ref 3.9–12.7)

## 2024-08-20 PROCEDURE — 99233 SBSQ HOSP IP/OBS HIGH 50: CPT | Mod: 24,,,

## 2024-08-20 PROCEDURE — 63600175 PHARM REV CODE 636 W HCPCS: Performed by: PHYSICIAN ASSISTANT

## 2024-08-20 PROCEDURE — A4216 STERILE WATER/SALINE, 10 ML: HCPCS | Performed by: TRANSPLANT SURGERY

## 2024-08-20 PROCEDURE — 63600175 PHARM REV CODE 636 W HCPCS

## 2024-08-20 PROCEDURE — 99900035 HC TECH TIME PER 15 MIN (STAT)

## 2024-08-20 PROCEDURE — 85025 COMPLETE CBC W/AUTO DIFF WBC: CPT | Performed by: PHYSICIAN ASSISTANT

## 2024-08-20 PROCEDURE — 94660 CPAP INITIATION&MGMT: CPT

## 2024-08-20 PROCEDURE — 25000003 PHARM REV CODE 250: Performed by: PHYSICIAN ASSISTANT

## 2024-08-20 PROCEDURE — 36415 COLL VENOUS BLD VENIPUNCTURE: CPT | Performed by: PHYSICIAN ASSISTANT

## 2024-08-20 PROCEDURE — 94761 N-INVAS EAR/PLS OXIMETRY MLT: CPT

## 2024-08-20 PROCEDURE — 25000003 PHARM REV CODE 250

## 2024-08-20 PROCEDURE — 20600001 HC STEP DOWN PRIVATE ROOM

## 2024-08-20 PROCEDURE — 80197 ASSAY OF TACROLIMUS: CPT | Performed by: PHYSICIAN ASSISTANT

## 2024-08-20 PROCEDURE — 27000190 HC CPAP FULL FACE MASK W/VALVE

## 2024-08-20 PROCEDURE — 27100171 HC OXYGEN HIGH FLOW UP TO 24 HOURS

## 2024-08-20 PROCEDURE — 80069 RENAL FUNCTION PANEL: CPT | Performed by: PHYSICIAN ASSISTANT

## 2024-08-20 PROCEDURE — 83735 ASSAY OF MAGNESIUM: CPT | Performed by: PHYSICIAN ASSISTANT

## 2024-08-20 PROCEDURE — 25000003 PHARM REV CODE 250: Performed by: TRANSPLANT SURGERY

## 2024-08-20 PROCEDURE — 99232 SBSQ HOSP IP/OBS MODERATE 35: CPT | Mod: ,,, | Performed by: NURSE PRACTITIONER

## 2024-08-20 RX ORDER — TACROLIMUS 1 MG/1
3 CAPSULE ORAL 2 TIMES DAILY
Status: DISCONTINUED | OUTPATIENT
Start: 2024-08-20 | End: 2024-08-26

## 2024-08-20 RX ORDER — MAGNESIUM SULFATE HEPTAHYDRATE 40 MG/ML
2 INJECTION, SOLUTION INTRAVENOUS ONCE
Status: COMPLETED | OUTPATIENT
Start: 2024-08-20 | End: 2024-08-20

## 2024-08-20 RX ADMIN — DOCUSATE SODIUM 100 MG: 100 CAPSULE, LIQUID FILLED ORAL at 07:08

## 2024-08-20 RX ADMIN — DOXYCYCLINE HYCLATE 100 MG: 100 TABLET, COATED ORAL at 09:08

## 2024-08-20 RX ADMIN — PANTOPRAZOLE SODIUM 40 MG: 40 TABLET, DELAYED RELEASE ORAL at 09:08

## 2024-08-20 RX ADMIN — DOCUSATE SODIUM 100 MG: 100 CAPSULE, LIQUID FILLED ORAL at 09:08

## 2024-08-20 RX ADMIN — TACROLIMUS 3 MG: 1 CAPSULE ORAL at 10:08

## 2024-08-20 RX ADMIN — Medication 10 ML: at 12:08

## 2024-08-20 RX ADMIN — CARVEDILOL 6.25 MG: 6.25 TABLET, FILM COATED ORAL at 09:08

## 2024-08-20 RX ADMIN — MINOXIDIL 5 MG: 2.5 TABLET ORAL at 09:08

## 2024-08-20 RX ADMIN — DOCUSATE SODIUM 100 MG: 100 CAPSULE, LIQUID FILLED ORAL at 01:08

## 2024-08-20 RX ADMIN — PREDNISONE 5 MG: 5 TABLET ORAL at 09:08

## 2024-08-20 RX ADMIN — TACROLIMUS 3 MG: 1 CAPSULE ORAL at 06:08

## 2024-08-20 RX ADMIN — Medication 2 TABLET: at 09:08

## 2024-08-20 RX ADMIN — ERTAPENEM 1 G: 1 INJECTION INTRAMUSCULAR; INTRAVENOUS at 01:08

## 2024-08-20 RX ADMIN — SULFAMETHOXAZOLE AND TRIMETHOPRIM 1 TABLET: 400; 80 TABLET ORAL at 09:08

## 2024-08-20 RX ADMIN — ALPRAZOLAM 2 MG: 0.5 TABLET ORAL at 09:08

## 2024-08-20 RX ADMIN — CINACALCET 60 MG: 30 TABLET, FILM COATED ORAL at 09:08

## 2024-08-20 RX ADMIN — CETIRIZINE HYDROCHLORIDE 5 MG: 5 TABLET, FILM COATED ORAL at 09:08

## 2024-08-20 RX ADMIN — INSULIN ASPART 6 UNITS: 100 INJECTION, SOLUTION INTRAVENOUS; SUBCUTANEOUS at 05:08

## 2024-08-20 RX ADMIN — Medication 10 ML: at 06:08

## 2024-08-20 RX ADMIN — DULOXETINE 30 MG: 30 CAPSULE, DELAYED RELEASE ORAL at 09:08

## 2024-08-20 RX ADMIN — EZETIMIBE 10 MG: 10 TABLET ORAL at 09:08

## 2024-08-20 RX ADMIN — MAGNESIUM SULFATE HEPTAHYDRATE 2 G: 40 INJECTION, SOLUTION INTRAVENOUS at 10:08

## 2024-08-20 RX ADMIN — NIFEDIPINE 60 MG: 30 TABLET, FILM COATED, EXTENDED RELEASE ORAL at 09:08

## 2024-08-20 RX ADMIN — ATORVASTATIN CALCIUM 20 MG: 20 TABLET, FILM COATED ORAL at 09:08

## 2024-08-20 RX ADMIN — Medication 800 MG: at 09:08

## 2024-08-20 RX ADMIN — BISACODYL 10 MG: 5 TABLET, COATED ORAL at 09:08

## 2024-08-20 NOTE — ASSESSMENT & PLAN NOTE
- Transfer from OSH d/t large wound drainage from kidney transplant incision  - Recently admitted 8/8-8/13 for wound drainage, s/p IR aspiration and drain placement of perinephric collection  - Perc drain now w/ milky purulent output; recent studies show infected fluid collecton  - OSH CT reviewed and shows persistent collection with foci of air  - Incision opened bedside on 8/17; fascia intact and old hematoma evacuated. Wound packed and wound care consulted for wound vac placement  - Wound vac placed 8/17 and tolerating well; will need SW assistance with home health wound care and wound vac  - ID consult Monday for outpatient abx plan; cefepime switched to erta and doxy with cultures NGTD but positive cell count from initial drain placement and UA + for ESBL kleb UTI   - repeat CT AP 8/21 to reassess collection/drain

## 2024-08-20 NOTE — CONSULTS
Single lumen 18G, 10CM midline placed in the right brachial vein. Needle advanced into the vessel under real time ultrasound guidance. Image recorded and saved.    Max dwell date 09/17/24.  Lot number: [id number]

## 2024-08-20 NOTE — PROGRESS NOTES
[facility] - Transplant Stepdown  Wound Care    Patient Name:  Joann Kenney   MRN:  [id number]  Date: 8/20/2024  Diagnosis: Wound drainage    History:     Past Medical History:   Diagnosis Date    Anemia     Anxiety     Atrial fibrillation     Bleeding 08/08/2024    Coronary artery disease     Depression     Diabetes mellitus, type 2     Disorder of kidney and ureter     Heart murmur     Hyperlipidemia     Hypertension     Obesity     ALEJA (obstructive sleep apnea)     Proteinuria     Solitary kidney     Stroke     Transient neurological symptoms 11/13/2018       Social History     Socioeconomic History    Marital status: Single   Tobacco Use    Smoking status: Former    Smokeless tobacco: Never   Substance and Sexual Activity    Alcohol use: Not Currently    Drug use: Never    Sexual activity: Not Currently     Partners: Male   Social History Narrative    Caregiver Son Ranjan       Precautions:     Allergies as of 08/16/2024    (No Known Allergies)       [facility] Assessment Details/Treatment     Patient seen for wound vac dressing change at abdominal incision site. Last changed 8/17 (-initial application).    Pt awake- up in bedside recliner. Able to transfer self back to bed and reposition independently. Denies need for pain med prior to dressing change. Wound vac dressing changed with MD and NP present at bedside. Maxwell removed per Dr Bright. Other MD and clinical staff present to discuss discharge plans- pt's daughter on phone for conversation. Wound vac dressing change continued with use of white foam to fill wound space- covered with black foam- good seal easily achieved at ordered settings. Pt tolerated well without c/o pain- and continues to voice good understanding of continued plan of care.    Reviewed chart for this encounter.  See flowsheet for findings.      Recommendations:  Abdominal incision- NPWT: setting @ 75 mmHg continuous suction with low intensity pressure.   IP wound care to change the dressing 2x/  week- Tuesday/ Friday.      Discussed POC with patient   See EMR for orders and patient education.    Bedside nursing to continue care and monitoring.  Bedside to maintain pressure injury prevention interventions.  Current documented Akhil score is 20.- with a nutrition sub scale score of 3.     08/20/24 0915   WOCN Assessment   WOCN Total Time (mins) 45   Visit Date 08/20/24   Visit Time 0915   Consult Type Follow Up   WOCN Speciality Wound   Procedure wound vac   Intervention chart review;changed;coordination of care;orders   Teaching on-going  (pt- continued NPWT to abdominal incision.)   Skin Interventions   Dehiscence Prevention/Management wound/incision splinting encouraged   Pressure Reduction Techniques frequent weight shift encouraged   Positioning   Body Position position changed independently   Head of Bed (HOB) Positioning HOB elevated   Positioning/Transfer Devices pillows   Pressure Injury Prevention    Check Moisture Management Pad Done   Check Medical Devices Done        Wound 07/24/24 1354 Incision Right lower Abdomen low transverse   Date First Assessed/Time First Assessed: 07/24/24 1354   Present on Original Admission: No  Primary Wound Type: Incision  Side: Right  Orientation: lower  Location: Abdomen  Incision Type: low transverse  Closure Method: (c) Staples;Sutures;Other (see...   Wound Image    Incision WDL ex   Dressing Appearance Moist drainage   Drainage Amount Small   Drainage Characteristics/Odor Serosanguineous   Appearance Pink;Red;Tan   Yellow (%), Wound Tissue Color   (yellow adipose tissue present- scattered.)   Periwound Area Dry;Intact   Wound Edges Open   Wound Length (cm) 5 cm   Wound Width (cm) 11.5 cm   Wound Depth (cm) 7.8 cm   Wound Volume (cm^3) 448.5 cm^3   Wound Surface Area (cm^2) 57.5 cm^2   Undermining (depth (cm)/location) 7-11:00: 5.8cm @ 8:00 position   Care Cleansed with:;Antimicrobial agent  (Vashe solution)   Dressing Changed  (wound vac dressing changed.)    Dressing Change Due 08/23/24  (Tuesay/ Friday schedule.)        Negative Pressure Wound Therapy  08/17/24 1345 Right   Placement Date/Time: 08/17/24 1345   Side: Right  Location: Lower quadrant   NPWT Type Vacuum Therapy   Therapy Setting NPWT Continuous therapy   Pressure Setting NPWT 75 mmHg   Sponges Inserted NPWT White;1;Black;3   Sponges Removed NPWT White;1;Black;2   General Output (mL)   (approx 200cc noted within canister.)     Will continue to follow as needed and/ or as directed until discharge.    Sonia Talamantes BSN, RN, CWOCN  08/20/2024

## 2024-08-20 NOTE — PLAN OF CARE
Pt AAOx4. Independent. VS stable. BG stable. Wound vac in place and intact. Wound vac changed today. Mag x1 hung - mag 1.5. CT abd to be done. Up in the chair all day. HH referral placed.

## 2024-08-20 NOTE — PROGRESS NOTES
Admit Note     Met with patient to assess needs. Patient is a 63 y.o. single female, admitted for:    Wound infection after surgery [T81.49XA]      Patient admitted from [facility] on 8/16/2024 .  At this time, patient presents as alert and oriented x 4, pleasant, good eye contact, well groomed, recall good, concentration/judgement good, average intelligence, calm, communicative, cooperative, and asking and answering questions appropriately.  At this time, patients caregiver presents as  not present at this time .    Household/Family Systems     Patient resides with patient's  self , at [address]  [facility] [address].  Patients daughter previously acted as caregiver. Patients daughter now reports she unable to continue acting as caregiver. Patient reported Olena Beckham ([phone]) would be able to act as caregiver. Patient now reports she is unable to afford paying Olena for care giving.      Patients primary caregiver is self.  Confirmed patients contact information is [phone] (home);   Telephone Information:   Mobile [phone]   .  Confirmed patient and patients caregivers do not have access to reliable transportation. At this time patient does not have a confirmed caregiver. Patient resides in [place], [place] and reports she was transported to [facility] via ambulance.     Cognitive Status/Learning     Patient reports reading ability as 12th grade and states patient does have difficulty with seeing.  Patient reports patient learns best by seeing and hearing.   Needed: No.   Highest education level: High School (9-12) or GED    Vocation/Disability   .  Working for Income: No  If no, reason not working: Disability  Patient is disabled due to back injury since 2001.      Adherence     Patient reports a high level of adherence to patients health care regimen.  Adherence counseling and education provided. Patient verbalizes understanding.    Substance Use    Patient reports the following  substance usage.    Tobacco: none, patient denies any use.  Alcohol: none, patient denies any use.  Illicit Drugs/Non-prescribed Medications: none, patient denies any use.  Patient states clear understanding of the potential impact of substance use.  Substance abstinence/cessation counseling, education and resources provided and reviewed.     Services Utilizing/ADLS    Infusion Service: Prior to admission, patient utilizing? no  Home Health: Prior to admission, patient utilizing? Patient referred to home health after previous admissions. Patient now requiring wound care (wound vac) and IV abx. Patient reports limited caregiver support.   DME: Prior to admission, yes shower chair, rollator, wheel chair, CPAP  Pulmonary/Cardiac Rehab: Prior to admission, no  Dialysis:  Prior to admission, no  Transplant Specialty Pharmacy:  Prior to admission, yes; [facility].    Prior to admission, patient reports patient was independent with ADLS (previously using home aide for additional assistance) and was driving.  Patient reports patient is able to care for self at this time..  Patient indicates a willingness to care for self once medically cleared to do so.    Insurance/Medications    Insured by   Payer/Plan Subscr  Sex Relation Sub. Ins. ID Effective Group Num   1. HU[employer]NA MANAGE* DAVID CASILLAS 1960 Female Self [id number] 3/1/20 [id number]                                   [address] [address]   2. [place]* DAVID CASILLAS 1960 Female Self [id number]* 10/1/04                                    [address] [address]      Primary Insurance (for UNOS reporting): Public Insurance - Medicare & Choice  Secondary Insurance (for UNOS reporting): Public Insurance - Medicaid    Patient reports patient is able to obtain and afford medications at this time and at time of discharge.    Living Will/Healthcare Power of     Patient states patient does not have a LW and/or HCPA.   provided education regarding LW and  "HCPA and the completion of forms.    Coping/Mental Health    Patient reports experiencing stress related to care giving concerns and need for outpatient IV abx.  met with patient through out today and yesterday to assist in brainstorming potential caregivers and to provide support.     Discharge Planning    At time of discharge, patient plans to return to  patients home or post acute facility . Transportation pending discharge plan. Per rounds today, expected discharge date has not been medically determined at this time. Patient and patients caregiver  verbalize understanding and are involved in treatment planning and discharge process.    Additional Concerns    Discussed options for discharge with patient during walking rounds. Patient reports she would be willing to discharge to SNF.  sent referral to [facility]. Notified by Isidoro GO via secure chat "Renetta will say that she does not need to be skilled bc she is too high level bc she is ambulating independently".     Also received the following via careport regarding [facility] "Referral Received high functioning and wound vac is not a skillable service". Referral sent to [facility], and patient, MD, and RIANNA notified of above.      providing ongoing psychosocial support, education, resources and d/c planning as needed.  SW remains available.  remains available. Patient denies additional needs and/or concerns at this time. Patient verbalizes understanding and agreement with information reviewed, social work availability, and how to access available resources as needed.    "

## 2024-08-20 NOTE — PROGRESS NOTES
[facility]  Kidney Transplant  Progress Note      Reason for Follow-up: Reassessment of Kidney Transplant - 7/24/2024  (#1) recipient and management of immunosuppression.    ORGAN:   RIGHT KIDNEY    Donor Type:   Donation after Brain Death    Donor CMV Status: Positive  Donor HBcAB:Negative  Donor HCV Status:Negative  Donor HBV RAH:   Donor HCV RAH: Negative      Subjective:   History of Present Illness:  Joann Kenney is a 64 y/o F s/p DBD kidney transplant 7/24/2024 for ESRD 2/2 Type II DM. PMHx born w solitary kidney, DM2, HTN, incontience (s/p bladder sling), CAD with stents (on ASA), atrial fibrillation, CVA (no residual deficits), ALEJA, and anemia. Surgery without complications. PD cath removed. Post op course notable for down trending Cr with excellent UOP. Patient recently admitted 8/8 to 8/13 d/t incisional drainage. CT A/P noted enlarged fluid collection around the right renal transplant and extending into the RLQ abdominal wall. She underwent IR fluid aspiration and drain placement on 8/9. Fluid studies neg for infection or urine leak (WBC 47, segs 10, lymph 83, Cr 1.6). Repeat CT scan done 8/12 reviewed by surgeons, plan to discharge pt with drain in place for continued drainage per MALKA.     Patient now presents as a transfer from OSH due to incisional drainage. Pt presented to her local ED with large amount of bloody drainage from her transplant incision. Wound has 3 cm opening in medial aspect with old bloody drainage CT A/P at OSH w/ large collection 17 x 4 x 3 cm. Her percutaneous drain remains in place and per patient turned milky and purulent appearing day prior to presenting to the OSH.  Pt reports overall feeling well. Pt denies fever/chills, decreased UOP, dysuria. Plan for infectious work-up, IV abx, wound care consult. Will review CT imaging with transplant surgeon with likely plan for IR consult       Hospital Course:  Patient admitted from OSH for wound leakage.  Delayed  wound healing: OSH CT images reviewed; persistent subcutaneous collection with foci of air present. Staples removed from 2/3 of medial part of incision, old hematoma evacuated. Opened part of incision irrigated and assessed, no active bleeding or fascial dehiscence noted. Rest of staples removed 8/20. Wound care consulted, vac placed.   Infection: Drain had turned a milky serous color. Fluid sent for studies. Cell count pos for infection >81,000 WBCs 98% segs. No urine leak. Cx NGTD. Triglycerides negative. Cefepime started. Rest of infectious w/u revealed ESBL klebsiella pneumoniae UTI. ID consulted, plan for 2 weeks erta + doxy, midline placed.     Interval History   NAEON. Patient doing well this morning, no complaints. Cr stable at 1.6, encourage po hydration. Wound vac replaced this morning, tolerated well. Hgb stable at 9.0. Plan to repeat CT AP tomorrow to assess collection sizes. VSS. Cont to monitor       Past Medical, Surgical, Family, and Social History:   Unchanged from H&P.    Scheduled Meds:   ALPRAZolam  2 mg Oral QHS    atorvastatin  20 mg Oral QHS    bisacodyL  10 mg Oral Daily    carvediloL  6.25 mg Oral BID    cetirizine  5 mg Oral QHS    cinacalcet  60 mg Oral Daily with breakfast    docusate sodium  100 mg Oral TID PC    doxycycline  100 mg Oral Q12H    DULoxetine  30 mg Oral QHS    ertapenem (INVanz) IV (PEDS and ADULTS)  1 g Intravenous Q24H    ezetimibe  10 mg Oral Daily    insulin aspart U-100  6 Units Subcutaneous TID WM    k phos di & mono-sod phos mono  500 mg Oral BID    magnesium oxide  800 mg Oral BID    magnesium sulfate IVPB  2 g Intravenous Once    minoxidiL  5 mg Oral Daily    NIFEdipine  60 mg Oral BID    pantoprazole  40 mg Oral Daily    predniSONE  5 mg Oral Daily    sodium chloride 0.9%  10 mL Intravenous Q6H    sulfamethoxazole-trimethoprim 400-80mg  1 tablet Oral QAM    tacrolimus  3 mg Oral BID    valGANciclovir  450 mg Oral Every Mon, Wed, Fri     Continuous Infusions:  PRN  Meds:  Current Facility-Administered Medications:     acetaminophen, 650 mg, Oral, Q8H PRN    dextrose 10%, 12.5 g, Intravenous, PRN    dextrose 10%, 25 g, Intravenous, PRN    diphenhydrAMINE-zinc acetate 2-0.1%, , Topical (Top), BID PRN    glucagon (human recombinant), 1 mg, Intramuscular, PRN    glucose, 16 g, Oral, PRN    glucose, 24 g, Oral, PRN    hydrOXYzine HCL, 25 mg, Oral, TID PRN    insulin aspart U-100, 0-5 Units, Subcutaneous, QID (AC + HS) PRN    melatonin, 6 mg, Oral, Nightly PRN    ondansetron, 8 mg, Oral, Q8H PRN    oxybutynin, 5 mg, Oral, TID PRN    oxyCODONE, 5 mg, Oral, Q6H PRN    sodium chloride 0.9%, 10 mL, Intravenous, PRN    Flushing PICC/Midline Protocol, , , Until Discontinued **AND** sodium chloride 0.9%, 10 mL, Intravenous, Q6H **AND** sodium chloride 0.9%, 10 mL, Intravenous, PRN    Intake/Output - Last 3 Shifts         08/18 0700  08/19 0659 08/19 0700  08/20 0659 08/20 0700  08/21 0659    P.O. 942 1260     IV Piggyback  100     Total Intake(mL/kg) 942 (11.5) 1360 (16.7)     Urine (mL/kg/hr) 2150 (1.1) 1700 (0.9)     Emesis/NG output 0      Drains 355 250     Other 0      Stool 0 0     Total Output 2505 1950     Net -1563 -590            Urine Occurrence 0 x      Stool Occurrence 0 x 0 x     Emesis Occurrence 0 x               Review of Systems   Constitutional:  Negative for activity change, appetite change, chills and fever.   Respiratory:  Negative for cough and shortness of breath.    Cardiovascular:  Negative for chest pain and leg swelling.   Gastrointestinal:  Positive for abdominal pain. Negative for abdominal distention, nausea and vomiting.   Genitourinary:  Negative for decreased urine volume, difficulty urinating, dysuria, frequency and urgency.   Skin:  Positive for wound.   Allergic/Immunologic: Positive for immunocompromised state.   Neurological:  Negative for weakness.   Psychiatric/Behavioral:  Negative for confusion and decreased concentration.       Objective:  "    Vital Signs (Most Recent):  Temp: 97.9 °F (36.6 °C) (08/20/24 0730)  Pulse: 75 (08/20/24 0730)  Resp: 18 (08/20/24 0730)  BP: (!) 122/58 (08/20/24 0918)  SpO2: 96 % (08/20/24 0730) Vital Signs (24h Range):  Temp:  [97.9 °F (36.6 °C)-98.5 °F (36.9 °C)] 97.9 °F (36.6 °C)  Pulse:  [64-75] 75  Resp:  [18-19] 18  SpO2:  [95 %-98 %] 96 %  BP: (122-182)/(58-70) 122/58     Weight: 81.6 kg (180 lb)  Height: 5' 5" (165.1 cm)  Body mass index is 29.95 kg/m².     Physical Exam  Vitals and nursing note reviewed.   Constitutional:       General: She is not in acute distress.     Appearance: She is not ill-appearing.   HENT:      Head: Normocephalic and atraumatic.      Mouth/Throat:      Mouth: Mucous membranes are moist.   Eyes:      Extraocular Movements: Extraocular movements intact.      Conjunctiva/sclera: Conjunctivae normal.      Pupils: Pupils are equal, round, and reactive to light.   Cardiovascular:      Rate and Rhythm: Normal rate.      Pulses: Normal pulses.   Pulmonary:      Effort: Pulmonary effort is normal. No respiratory distress.   Abdominal:      General: Bowel sounds are normal. There is no distension.      Palpations: Abdomen is soft.      Tenderness: There is abdominal tenderness. There is no guarding or rebound.       Musculoskeletal:         General: No swelling. Normal range of motion.      Cervical back: Normal range of motion.   Skin:     General: Skin is warm and dry.   Neurological:      Mental Status: She is alert and oriented to person, place, and time.      Motor: No weakness.   Psychiatric:         Mood and Affect: Mood normal.         Behavior: Behavior normal.         Thought Content: Thought content normal.         Judgment: Judgment normal.        Laboratory:  CBC:   Recent Labs   Lab 08/18/24  0620 08/19/24  0550 08/20/24  0604   WBC 7.22 5.88 5.28   RBC 3.11* 2.73* 2.85*   HGB 9.8* 8.6* 9.0*   HCT 30.0* 25.8* 26.4*    307 316   MCV 97 95 93   MCH 31.5* 31.5* 31.6*   MCHC 32.7 " "33.3 34.1     CMP:   Recent Labs   Lab 08/18/24  0620 08/19/24  0550 08/20/24  0604   * 131* 108   CALCIUM 10.0 8.9 8.6*   ALBUMIN 2.5* 2.3* 2.4*    136 136   K 4.2 3.9 3.6   CO2 22* 21* 23    108 107   BUN 18 15 12   CREATININE 1.6* 1.5* 1.6*     Labs within the past 24 hours have been reviewed.    Diagnostic Results:  OSH CT AP disc reviewed   Assessment/Plan:     * Wound drainage  - Transfer from OSH d/t large wound drainage from kidney transplant incision  - Recently admitted 8/8-8/13 for wound drainage, s/p IR aspiration and drain placement of perinephric collection  - Perc drain now w/ milky purulent output; recent studies show infected fluid collecton  - OSH CT reviewed and shows persistent collection with foci of air  - Incision opened bedside on 8/17; fascia intact and old hematoma evacuated. Wound packed and wound care consulted for wound vac placement  - Wound vac placed 8/17 and tolerating well; will need  assistance with home health wound care and wound vac  - ID consult Monday for outpatient abx plan; cefepime switched to erta and doxy with cultures NGTD but positive cell count from initial drain placement and UA + for ESBL kleb UTI   - repeat CT AP 8/21 to reassess collection/drain     Infection due to ESBL-producing Klebsiella pneumoniae  - urine culture +   - ID on board   - on erta + doxy x at least 14 days  - cont to monitor     Wound infection after surgery  See wound drainage       Anemia of chronic disease  - daily CBC      Anxiety  - continue home xanax      Hypertension associated with transplantation  - continue home antihypertensives with hold parameters    Long-term use of immunosuppressant medication  - see Prophylactic immunotherapy"      Prophylactic immunotherapy  - Continue prograf, cellcept, and steroid taper  - Check prograf level daily and adjust for therapeutic dosage. Monitor for toxic side effects       S/P kidney transplant  - S/p Ktxp 7/24/24  - Cr trended " down with excellent UOP  - Multiple Readmit d/t wound complications  - Cr stable   - strict Is and Os   - monitor       Other hyperlipidemia  - continue home statin      Diabetes mellitus due to underlying condition with chronic kidney disease on chronic dialysis, with long-term current use of insulin  - Endocrine consulted  - sliding scale          Discharge Planning:  Not yet stable for dc     Medical decision making for this encounter includes review of pertinent labs and diagnostic studies, assessment and planning, discussions with consulting providers, discussion with patient/family, and participation in multidisciplinary rounds. Time spent caring for patient: 60 minutes    Morena Gudino PA-C  Kidney Transplant  [facility]

## 2024-08-20 NOTE — SUBJECTIVE & OBJECTIVE
"Interval HPI:   Overnight events: Remains in TSU. BG reasonably controlled on current SQ insulin regimen. Remains on Prednisone 5 mg daily. Diet diabetic Renal; 2000 Calorie    Eating:    variable 25-50%  Nausea: No  Hypoglycemia and intervention: No  Fever: No  TPN and/or TF: No  If yes, type of TF/TPN and rate: n/a    BP (!) 122/58   Pulse 75   Temp 97.9 °F (36.6 °C) (Oral)   Resp 18   Ht 5' 5" (1.651 m)   Wt 81.6 kg (180 lb)   SpO2 96%   BMI 29.95 kg/m²     Labs Reviewed and Include    Recent Labs   Lab 08/20/24  0604      CALCIUM 8.6*   ALBUMIN 2.4*      K 3.6   CO2 23      BUN 12   CREATININE 1.6*     Lab Results   Component Value Date    WBC 5.28 08/20/2024    HGB 9.0 (L) 08/20/2024    HCT 26.4 (L) 08/20/2024    MCV 93 08/20/2024     08/20/2024     No results for input(s): "TSH", "FREET4" in the last 168 hours.  Lab Results   Component Value Date    HGBA1C 4.8 07/24/2024    HGBA1C 4.8 07/24/2024       Nutritional status:   Body mass index is 29.95 kg/m².  Lab Results   Component Value Date    ALBUMIN 2.4 (L) 08/20/2024    ALBUMIN 2.3 (L) 08/19/2024    ALBUMIN 2.5 (L) 08/18/2024     No results found for: "PREALBUMIN"    Estimated Creatinine Clearance: 37 mL/min (A) (based on SCr of 1.6 mg/dL (H)).    Accu-Checks  Recent Labs     08/17/24  2259 08/18/24  0804 08/18/24  1241 08/18/24  1728 08/18/24  2211 08/19/24  0808 08/19/24  1322 08/19/24  1747 08/19/24  2028 08/20/24  0916   POCTGLUCOSE 233* 159* 216* 146* 169* 148* 151* 187* 201* 132*       Current Medications and/or Treatments Impacting Glycemic Control  Immunotherapy:    Immunosuppressants       None          Steroids:   Hormones (From admission, onward)      Start     Stop Route Frequency Ordered    08/17/24 0900  predniSONE tablet 5 mg         -- Oral Daily 08/16/24 2320 08/16/24 2320  melatonin tablet 6 mg         -- Oral Nightly PRN 08/16/24 2320          Pressors:    Autonomic Drugs (From admission, onward)      " None          Hyperglycemia/Diabetes Medications:   Antihyperglycemics (From admission, onward)      Start     Stop Route Frequency Ordered    08/18/24 1200  insulin aspart U-100 pen 6 Units         -- SubQ 3 times daily with meals 08/18/24 0923 08/16/24 2320  insulin aspart U-100 pen 0-5 Units         -- SubQ Before meals & nightly PRN 08/16/24 0380

## 2024-08-20 NOTE — PROGRESS NOTES
[facility] - [facility]  Endocrinology  Progress Note    Admit Date: 8/16/2024     Reason for Consult: Management of T2DM, Hyperglycemia     Surgical Procedure and Date:  kidney transplant 7/24/2024     Diabetes diagnosis year: 2004    Home Diabetes Medications:    - Novolog 6 units TIDWM  - Novolog SSI for BG excursions:  Add correction scale if needed.  Blood sugar 150 to 200 add 2 units  Blood sugar 201 to 250 add 4 units  Blood sugar 251 to 300 add 6 units  Blood sugar 301 to 350 add 8 units  Blood sugar greater than 350 add 10 units    Lab Results   Component Value Date    HGBA1C 4.8 07/24/2024    HGBA1C 4.8 07/24/2024       How often checking glucose at home?  3x daily     BG readings on regimen: 140-<200  Hypoglycemia on the regimen?  No  Missed doses on regimen?  No    Diabetes Complications include:     none    Complicating diabetes co morbidities:   S/p kidney txp, glucocorticoid use       HPI:   Patient is a 63 y.o. 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 PMHx born w solitary kidney, DM2, HTN, incontience (s/p bladder sling), CAD with stents (on ASA), atrial fibrillation, CVA (no residual deficits), ALEJA, and anemia. Surgery without complications. PD cath removed. Post op course notable for down trending Cr with excellent UOP. Patient recently admitted 8/8 to 8/13 d/t incisional drainage. CT A/P noted enlarged fluid collection around the right renal transplant and extending into the RLQ abdominal wall. She underwent IR fluid aspiration and drain placement on 8/9. Fluid studies neg for infection or urine leak (WBC 47, segs 10, lymph 83, Cr 1.6). Repeat CT scan done 8/12 reviewed by surgeons, plan to discharge pt with drain in place for continued drainage per MALKA.      Patient now presents as a transfer from OSH due to incisional drainage. Pt presented to her local ED with large amount of bloody drainage from her transplant incision. Wound has 3 cm opening in  "medial aspect with old bloody drainage CT A/P at OSH w/ large collection 17 x 4 x 3 cm. Her percutaneous drain remains in place and per patient turned milky and purulent appearing day prior to presenting to the OSH.  Pt reports overall feeling well. Pt denies fever/chills, decreased UOP, dysuria. Plan for infectious work-up, IV abx, wound care consult. Endocrinology consulted for management of T2DM.         Interval HPI:   Overnight events: Remains in TSU. BG reasonably controlled on current SQ insulin regimen. Remains on Prednisone 5 mg daily. Diet diabetic Renal; 2000 Calorie    Eating:    variable 25-50%  Nausea: No  Hypoglycemia and intervention: No  Fever: No  TPN and/or TF: No  If yes, type of TF/TPN and rate: n/a    BP (!) 122/58   Pulse 75   Temp 97.9 °F (36.6 °C) (Oral)   Resp 18   Ht 5' 5" (1.651 m)   Wt 81.6 kg (180 lb)   SpO2 96%   BMI 29.95 kg/m²     Labs Reviewed and Include    Recent Labs   Lab 08/20/24  0604      CALCIUM 8.6*   ALBUMIN 2.4*      K 3.6   CO2 23      BUN 12   CREATININE 1.6*     Lab Results   Component Value Date    WBC 5.28 08/20/2024    HGB 9.0 (L) 08/20/2024    HCT 26.4 (L) 08/20/2024    MCV 93 08/20/2024     08/20/2024     No results for input(s): "TSH", "FREET4" in the last 168 hours.  Lab Results   Component Value Date    HGBA1C 4.8 07/24/2024    HGBA1C 4.8 07/24/2024       Nutritional status:   Body mass index is 29.95 kg/m².  Lab Results   Component Value Date    ALBUMIN 2.4 (L) 08/20/2024    ALBUMIN 2.3 (L) 08/19/2024    ALBUMIN 2.5 (L) 08/18/2024     No results found for: "PREALBUMIN"    Estimated Creatinine Clearance: 37 mL/min (A) (based on SCr of 1.6 mg/dL (H)).    Accu-Checks  Recent Labs     08/17/24  2259 08/18/24  0804 08/18/24  1241 08/18/24  1728 08/18/24  2211 08/19/24  0808 08/19/24  1322 08/19/24  1747 08/19/24  2028 08/20/24  0916   POCTGLUCOSE 233* 159* 216* 146* 169* 148* 151* 187* 201* 132*       Current Medications and/or " Treatments Impacting Glycemic Control  Immunotherapy:    Immunosuppressants       None          Steroids:   Hormones (From admission, onward)      Start     Stop Route Frequency Ordered    08/17/24 0900  predniSONE tablet 5 mg         -- Oral Daily 08/16/24 2320 08/16/24 2320  melatonin tablet 6 mg         -- Oral Nightly PRN 08/16/24 2320          Pressors:    Autonomic Drugs (From admission, onward)      None          Hyperglycemia/Diabetes Medications:   Antihyperglycemics (From admission, onward)      Start     Stop Route Frequency Ordered    08/18/24 1200  insulin aspart U-100 pen 6 Units         -- SubQ 3 times daily with meals 08/18/24 0923 08/16/24 2320  insulin aspart U-100 pen 0-5 Units         -- SubQ Before meals & nightly PRN 08/16/24 2320            ASSESSMENT and PLAN    Renal/  S/P kidney transplant  Managed per primary team  Avoid hypoglycemia        Immunology/Multi System  Prophylactic immunotherapy  May increase insulin resistance.         Endocrine  Diabetes mellitus due to underlying condition with chronic kidney disease on chronic dialysis, with long-term current use of insulin  BG goal 140-180    Continue Novolog 6 units TID with meals (home dose)   Low Dose Correction Scale  BG monitoring ac/hs    ** Please call Endocrine for any BG related issues **        Orthopedic  * Wound drainage  Managed per primary team              Tatiana Garcia NP  Endocrinology  [facility] - Trans[facility] Step[facility]

## 2024-08-20 NOTE — SUBJECTIVE & OBJECTIVE
Subjective:   History of Present Illness:  Joann Kenney is a 64 y/o F s/p DBD kidney transplant 7/24/2024 for ESRD 2/2 Type II DM. PMHx born w solitary kidney, DM2, HTN, incontience (s/p bladder sling), CAD with stents (on ASA), atrial fibrillation, CVA (no residual deficits), ALEJA, and anemia. Surgery without complications. PD cath removed. Post op course notable for down trending Cr with excellent UOP. Patient recently admitted 8/8 to 8/13 d/t incisional drainage. CT A/P noted enlarged fluid collection around the right renal transplant and extending into the RLQ abdominal wall. She underwent IR fluid aspiration and drain placement on 8/9. Fluid studies neg for infection or urine leak (WBC 47, segs 10, lymph 83, Cr 1.6). Repeat CT scan done 8/12 reviewed by surgeons, plan to discharge pt with drain in place for continued drainage per MALKA.     Patient now presents as a transfer from OSH due to incisional drainage. Pt presented to her local ED with large amount of bloody drainage from her transplant incision. Wound has 3 cm opening in medial aspect with old bloody drainage CT A/P at OSH w/ large collection 17 x 4 x 3 cm. Her percutaneous drain remains in place and per patient turned milky and purulent appearing day prior to presenting to the OSH.  Pt reports overall feeling well. Pt denies fever/chills, decreased UOP, dysuria. Plan for infectious work-up, IV abx, wound care consult. Will review CT imaging with transplant surgeon with likely plan for IR consult       Hospital Course:  Patient admitted from OSH for wound leakage.  Delayed wound healing: OSH CT images reviewed; persistent subcutaneous collection with foci of air present. Staples removed from 2/3 of medial part of incision, old hematoma evacuated. Opened part of incision irrigated and assessed, no active bleeding or fascial dehiscence noted. Rest of staples removed 8/20. Wound care consulted, vac placed.   Infection: Drain had turned a milky serous  color. Fluid sent for studies. Cell count pos for infection >81,000 WBCs 98% segs. No urine leak. Cx NGTD. Triglycerides negative. Cefepime started. Rest of infectious w/u revealed ESBL klebsiella pneumoniae UTI. ID consulted, plan for 2 weeks erta + doxy, midline placed.     Interval History   NAEON. Patient doing well this morning, no complaints. Cr stable at 1.6, encourage po hydration. Wound vac replaced this morning, tolerated well. Hgb stable at 9.0. Plan to repeat CT AP tomorrow to assess collection sizes. VSS. Cont to monitor       Past Medical, Surgical, Family, and Social History:   Unchanged from H&P.    Scheduled Meds:   ALPRAZolam  2 mg Oral QHS    atorvastatin  20 mg Oral QHS    bisacodyL  10 mg Oral Daily    carvediloL  6.25 mg Oral BID    cetirizine  5 mg Oral QHS    cinacalcet  60 mg Oral Daily with breakfast    docusate sodium  100 mg Oral TID PC    doxycycline  100 mg Oral Q12H    DULoxetine  30 mg Oral QHS    ertapenem (INVanz) IV (PEDS and ADULTS)  1 g Intravenous Q24H    ezetimibe  10 mg Oral Daily    insulin aspart U-100  6 Units Subcutaneous TID WM    k phos di & mono-sod phos mono  500 mg Oral BID    magnesium oxide  800 mg Oral BID    magnesium sulfate IVPB  2 g Intravenous Once    minoxidiL  5 mg Oral Daily    NIFEdipine  60 mg Oral BID    pantoprazole  40 mg Oral Daily    predniSONE  5 mg Oral Daily    sodium chloride 0.9%  10 mL Intravenous Q6H    sulfamethoxazole-trimethoprim 400-80mg  1 tablet Oral QAM    tacrolimus  3 mg Oral BID    valGANciclovir  450 mg Oral Every Mon, Wed, Fri     Continuous Infusions:  PRN Meds:  Current Facility-Administered Medications:     acetaminophen, 650 mg, Oral, Q8H PRN    dextrose 10%, 12.5 g, Intravenous, PRN    dextrose 10%, 25 g, Intravenous, PRN    diphenhydrAMINE-zinc acetate 2-0.1%, , Topical (Top), BID PRN    glucagon (human recombinant), 1 mg, Intramuscular, PRN    glucose, 16 g, Oral, PRN    glucose, 24 g, Oral,  PRN    hydrOXYzine HCL, 25 mg, Oral, TID PRN    insulin aspart U-100, 0-5 Units, Subcutaneous, QID (AC + HS) PRN    melatonin, 6 mg, Oral, Nightly PRN    ondansetron, 8 mg, Oral, Q8H PRN    oxybutynin, 5 mg, Oral, TID PRN    oxyCODONE, 5 mg, Oral, Q6H PRN    sodium chloride 0.9%, 10 mL, Intravenous, PRN    Flushing PICC/Midline Protocol, , , Until Discontinued **AND** sodium chloride 0.9%, 10 mL, Intravenous, Q6H **AND** sodium chloride 0.9%, 10 mL, Intravenous, PRN    Intake/Output - Last 3 Shifts         08/18 0700 08/19 0659 08/19 0700 08/20 0659 08/20 0700 08/21 0659    P.O. 942 1260     IV Piggyback  100     Total Intake(mL/kg) 942 (11.5) 1360 (16.7)     Urine (mL/kg/hr) 2150 (1.1) 1700 (0.9)     Emesis/NG output 0      Drains 355 250     Other 0      Stool 0 0     Total Output 2505 1950     Net -1563 -590            Urine Occurrence 0 x      Stool Occurrence 0 x 0 x     Emesis Occurrence 0 x               Review of Systems   Constitutional:  Negative for activity change, appetite change, chills and fever.   Respiratory:  Negative for cough and shortness of breath.    Cardiovascular:  Negative for chest pain and leg swelling.   Gastrointestinal:  Positive for abdominal pain. Negative for abdominal distention, nausea and vomiting.   Genitourinary:  Negative for decreased urine volume, difficulty urinating, dysuria, frequency and urgency.   Skin:  Positive for wound.   Allergic/Immunologic: Positive for immunocompromised state.   Neurological:  Negative for weakness.   Psychiatric/Behavioral:  Negative for confusion and decreased concentration.       Objective:     Vital Signs (Most Recent):  Temp: 97.9 °F (36.6 °C) (08/20/24 0730)  Pulse: 75 (08/20/24 0730)  Resp: 18 (08/20/24 0730)  BP: (!) 122/58 (08/20/24 0918)  SpO2: 96 % (08/20/24 0730) Vital Signs (24h Range):  Temp:  [97.9 °F (36.6 °C)-98.5 °F (36.9 °C)] 97.9 °F (36.6 °C)  Pulse:  [64-75] 75  Resp:  [18-19] 18  SpO2:  [95 %-98 %] 96 %  BP:  "(122-182)/(58-70) 122/58     Weight: 81.6 kg (180 lb)  Height: 5' 5" (165.1 cm)  Body mass index is 29.95 kg/m².     Physical Exam  Vitals and nursing note reviewed.   Constitutional:       General: She is not in acute distress.     Appearance: She is not ill-appearing.   HENT:      Head: Normocephalic and atraumatic.      Mouth/Throat:      Mouth: Mucous membranes are moist.   Eyes:      Extraocular Movements: Extraocular movements intact.      Conjunctiva/sclera: Conjunctivae normal.      Pupils: Pupils are equal, round, and reactive to light.   Cardiovascular:      Rate and Rhythm: Normal rate.      Pulses: Normal pulses.   Pulmonary:      Effort: Pulmonary effort is normal. No respiratory distress.   Abdominal:      General: Bowel sounds are normal. There is no distension.      Palpations: Abdomen is soft.      Tenderness: There is abdominal tenderness. There is no guarding or rebound.       Musculoskeletal:         General: No swelling. Normal range of motion.      Cervical back: Normal range of motion.   Skin:     General: Skin is warm and dry.   Neurological:      Mental Status: She is alert and oriented to person, place, and time.      Motor: No weakness.   Psychiatric:         Mood and Affect: Mood normal.         Behavior: Behavior normal.         Thought Content: Thought content normal.         Judgment: Judgment normal.        Laboratory:  CBC:   Recent Labs   Lab 08/18/24  0620 08/19/24  0550 08/20/24  0604   WBC 7.22 5.88 5.28   RBC 3.11* 2.73* 2.85*   HGB 9.8* 8.6* 9.0*   HCT 30.0* 25.8* 26.4*    307 316   MCV 97 95 93   MCH 31.5* 31.5* 31.6*   MCHC 32.7 33.3 34.1     CMP:   Recent Labs   Lab 08/18/24  0620 08/19/24  0550 08/20/24  0604   * 131* 108   CALCIUM 10.0 8.9 8.6*   ALBUMIN 2.5* 2.3* 2.4*    136 136   K 4.2 3.9 3.6   CO2 22* 21* 23    108 107   BUN 18 15 12   CREATININE 1.6* 1.5* 1.6*     Labs within the past 24 hours have been reviewed.    Diagnostic Results:  OSH " CT AP disc reviewed

## 2024-08-20 NOTE — PLAN OF CARE
Pt aaox4 vswnl and no c/o pain. Bed in low position and callbell within reach. Pt ambulates independently. Accu ck at [phone]. Rt wound vac with seal intact and continuous sxn. Rt MALKA charged/patent/intact with milky drainage. Rt incision with staples intact and wound vac. Wound vac due to be changed on 8/20. Standing BP's only. JOSE RAMON midline placed yesterday for outpt antibiotics.

## 2024-08-21 LAB
ALBUMIN SERPL BCP-MCNC: 2.2 G/DL (ref 3.5–5.2)
ANION GAP SERPL CALC-SCNC: 10 MMOL/L (ref 8–16)
BASOPHILS # BLD AUTO: 0.05 K/UL (ref 0–0.2)
BASOPHILS NFR BLD: 1.1 % (ref 0–1.9)
BUN SERPL-MCNC: 15 MG/DL (ref 8–23)
CALCIUM SERPL-MCNC: 8.6 MG/DL (ref 8.7–10.5)
CHLORIDE SERPL-SCNC: 109 MMOL/L (ref 95–110)
CO2 SERPL-SCNC: 20 MMOL/L (ref 23–29)
CREAT SERPL-MCNC: 1.5 MG/DL (ref 0.5–1.4)
DIFFERENTIAL METHOD BLD: ABNORMAL
EOSINOPHIL # BLD AUTO: 0.3 K/UL (ref 0–0.5)
EOSINOPHIL NFR BLD: 6.5 % (ref 0–8)
ERYTHROCYTE [DISTWIDTH] IN BLOOD BY AUTOMATED COUNT: 16 % (ref 11.5–14.5)
EST. GFR  (NO RACE VARIABLE): 38.4 ML/MIN/1.73 M^2
GLUCOSE SERPL-MCNC: 114 MG/DL (ref 70–110)
HCT VFR BLD AUTO: 26.7 % (ref 37–48.5)
HGB BLD-MCNC: 8.9 G/DL (ref 12–16)
IMM GRANULOCYTES # BLD AUTO: 0.03 K/UL (ref 0–0.04)
IMM GRANULOCYTES NFR BLD AUTO: 0.7 % (ref 0–0.5)
LYMPHOCYTES # BLD AUTO: 0.4 K/UL (ref 1–4.8)
LYMPHOCYTES NFR BLD: 8.6 % (ref 18–48)
MAGNESIUM SERPL-MCNC: 1.8 MG/DL (ref 1.6–2.6)
MCH RBC QN AUTO: 31.6 PG (ref 27–31)
MCHC RBC AUTO-ENTMCNC: 33.3 G/DL (ref 32–36)
MCV RBC AUTO: 95 FL (ref 82–98)
MONOCYTES # BLD AUTO: 0.3 K/UL (ref 0.3–1)
MONOCYTES NFR BLD: 7.2 % (ref 4–15)
NEUTROPHILS # BLD AUTO: 3.4 K/UL (ref 1.8–7.7)
NEUTROPHILS NFR BLD: 75.9 % (ref 38–73)
NRBC BLD-RTO: 0 /100 WBC
PHOSPHATE SERPL-MCNC: 3.2 MG/DL (ref 2.7–4.5)
PLATELET # BLD AUTO: 312 K/UL (ref 150–450)
PMV BLD AUTO: 10.7 FL (ref 9.2–12.9)
POCT GLUCOSE: 111 MG/DL (ref 70–110)
POCT GLUCOSE: 125 MG/DL (ref 70–110)
POCT GLUCOSE: 144 MG/DL (ref 70–110)
POCT GLUCOSE: 152 MG/DL (ref 70–110)
POCT GLUCOSE: 196 MG/DL (ref 70–110)
POCT GLUCOSE: 209 MG/DL (ref 70–110)
POTASSIUM SERPL-SCNC: 3.4 MMOL/L (ref 3.5–5.1)
RBC # BLD AUTO: 2.82 M/UL (ref 4–5.4)
SODIUM SERPL-SCNC: 139 MMOL/L (ref 136–145)
TACROLIMUS BLD-MCNC: 6 NG/ML (ref 5–15)
WBC # BLD AUTO: 4.43 K/UL (ref 3.9–12.7)

## 2024-08-21 PROCEDURE — 25000003 PHARM REV CODE 250: Performed by: TRANSPLANT SURGERY

## 2024-08-21 PROCEDURE — 80069 RENAL FUNCTION PANEL: CPT | Performed by: PHYSICIAN ASSISTANT

## 2024-08-21 PROCEDURE — 94761 N-INVAS EAR/PLS OXIMETRY MLT: CPT

## 2024-08-21 PROCEDURE — 99232 SBSQ HOSP IP/OBS MODERATE 35: CPT | Mod: ,,, | Performed by: NURSE PRACTITIONER

## 2024-08-21 PROCEDURE — 63600175 PHARM REV CODE 636 W HCPCS

## 2024-08-21 PROCEDURE — 83735 ASSAY OF MAGNESIUM: CPT | Performed by: PHYSICIAN ASSISTANT

## 2024-08-21 PROCEDURE — 80197 ASSAY OF TACROLIMUS: CPT | Performed by: PHYSICIAN ASSISTANT

## 2024-08-21 PROCEDURE — 25000003 PHARM REV CODE 250: Performed by: PHYSICIAN ASSISTANT

## 2024-08-21 PROCEDURE — 25000003 PHARM REV CODE 250

## 2024-08-21 PROCEDURE — A4216 STERILE WATER/SALINE, 10 ML: HCPCS | Performed by: TRANSPLANT SURGERY

## 2024-08-21 PROCEDURE — 99900035 HC TECH TIME PER 15 MIN (STAT)

## 2024-08-21 PROCEDURE — 36415 COLL VENOUS BLD VENIPUNCTURE: CPT | Performed by: PHYSICIAN ASSISTANT

## 2024-08-21 PROCEDURE — 99233 SBSQ HOSP IP/OBS HIGH 50: CPT | Mod: 24,,,

## 2024-08-21 PROCEDURE — 85025 COMPLETE CBC W/AUTO DIFF WBC: CPT | Performed by: PHYSICIAN ASSISTANT

## 2024-08-21 PROCEDURE — 20600001 HC STEP DOWN PRIVATE ROOM

## 2024-08-21 PROCEDURE — 25000003 PHARM REV CODE 250: Performed by: NURSE PRACTITIONER

## 2024-08-21 PROCEDURE — 63600175 PHARM REV CODE 636 W HCPCS: Performed by: PHYSICIAN ASSISTANT

## 2024-08-21 RX ORDER — POLYETHYLENE GLYCOL 3350 17 G/17G
17 POWDER, FOR SOLUTION ORAL 3 TIMES DAILY PRN
Status: DISCONTINUED | OUTPATIENT
Start: 2024-08-21 | End: 2024-08-26 | Stop reason: HOSPADM

## 2024-08-21 RX ORDER — NIFEDIPINE 30 MG/1
30 TABLET, EXTENDED RELEASE ORAL 2 TIMES DAILY
Status: DISCONTINUED | OUTPATIENT
Start: 2024-08-21 | End: 2024-08-24

## 2024-08-21 RX ORDER — BISACODYL 10 MG/1
10 SUPPOSITORY RECTAL DAILY PRN
Status: DISCONTINUED | OUTPATIENT
Start: 2024-08-21 | End: 2024-08-26 | Stop reason: HOSPADM

## 2024-08-21 RX ADMIN — CARVEDILOL 6.25 MG: 6.25 TABLET, FILM COATED ORAL at 09:08

## 2024-08-21 RX ADMIN — Medication 2 TABLET: at 09:08

## 2024-08-21 RX ADMIN — NIFEDIPINE 60 MG: 30 TABLET, FILM COATED, EXTENDED RELEASE ORAL at 09:08

## 2024-08-21 RX ADMIN — TACROLIMUS 3 MG: 1 CAPSULE ORAL at 05:08

## 2024-08-21 RX ADMIN — POTASSIUM BICARBONATE 50 MEQ: 978 TABLET, EFFERVESCENT ORAL at 11:08

## 2024-08-21 RX ADMIN — POLYETHYLENE GLYCOL 3350 17 G: 17 POWDER, FOR SOLUTION ORAL at 10:08

## 2024-08-21 RX ADMIN — VALGANCICLOVIR HYDROCHLORIDE 450 MG: 450 TABLET ORAL at 05:08

## 2024-08-21 RX ADMIN — Medication 800 MG: at 09:08

## 2024-08-21 RX ADMIN — Medication 10 ML: at 06:08

## 2024-08-21 RX ADMIN — CETIRIZINE HYDROCHLORIDE 5 MG: 5 TABLET, FILM COATED ORAL at 09:08

## 2024-08-21 RX ADMIN — BISACODYL 10 MG: 10 SUPPOSITORY RECTAL at 11:08

## 2024-08-21 RX ADMIN — DOXYCYCLINE HYCLATE 100 MG: 100 TABLET, COATED ORAL at 09:08

## 2024-08-21 RX ADMIN — ALPRAZOLAM 2 MG: 0.5 TABLET ORAL at 09:08

## 2024-08-21 RX ADMIN — ATORVASTATIN CALCIUM 20 MG: 20 TABLET, FILM COATED ORAL at 09:08

## 2024-08-21 RX ADMIN — Medication 10 ML: at 12:08

## 2024-08-21 RX ADMIN — INSULIN ASPART 1 UNITS: 100 INJECTION, SOLUTION INTRAVENOUS; SUBCUTANEOUS at 09:08

## 2024-08-21 RX ADMIN — PREDNISONE 5 MG: 5 TABLET ORAL at 09:08

## 2024-08-21 RX ADMIN — TACROLIMUS 3 MG: 1 CAPSULE ORAL at 09:08

## 2024-08-21 RX ADMIN — MINOXIDIL 5 MG: 2.5 TABLET ORAL at 09:08

## 2024-08-21 RX ADMIN — DOCUSATE SODIUM 100 MG: 100 CAPSULE, LIQUID FILLED ORAL at 09:08

## 2024-08-21 RX ADMIN — INSULIN ASPART 6 UNITS: 100 INJECTION, SOLUTION INTRAVENOUS; SUBCUTANEOUS at 01:08

## 2024-08-21 RX ADMIN — DOCUSATE SODIUM 100 MG: 100 CAPSULE, LIQUID FILLED ORAL at 01:08

## 2024-08-21 RX ADMIN — EZETIMIBE 10 MG: 10 TABLET ORAL at 09:08

## 2024-08-21 RX ADMIN — BISACODYL 10 MG: 5 TABLET, COATED ORAL at 09:08

## 2024-08-21 RX ADMIN — DOCUSATE SODIUM 100 MG: 100 CAPSULE, LIQUID FILLED ORAL at 07:08

## 2024-08-21 RX ADMIN — ERTAPENEM 1 G: 1 INJECTION INTRAMUSCULAR; INTRAVENOUS at 01:08

## 2024-08-21 RX ADMIN — DULOXETINE 30 MG: 30 CAPSULE, DELAYED RELEASE ORAL at 09:08

## 2024-08-21 RX ADMIN — CINACALCET 60 MG: 30 TABLET, FILM COATED ORAL at 09:08

## 2024-08-21 RX ADMIN — SULFAMETHOXAZOLE AND TRIMETHOPRIM 1 TABLET: 400; 80 TABLET ORAL at 09:08

## 2024-08-21 RX ADMIN — SULFAMETHOXAZOLE AND TRIMETHOPRIM 1 TABLET: 400; 80 TABLET ORAL at 06:08

## 2024-08-21 RX ADMIN — PANTOPRAZOLE SODIUM 40 MG: 40 TABLET, DELAYED RELEASE ORAL at 09:08

## 2024-08-21 NOTE — PROGRESS NOTES
[facility] - [facility]  Kidney Transplant  Progress Note      Reason for Follow-up: Reassessment of Kidney Transplant - 7/24/2024  (#1) recipient and management of immunosuppression.    ORGAN:   RIGHT KIDNEY    Donor Type:   Donation after Brain Death    Donor CMV Status: Positive  Donor HBcAB:Negative  Donor HCV Status:Negative  Donor HBV RAH:   Donor HCV RAH: Negative      Subjective:   History of Present Illness:  Joann Kenney is a 64 y/o F s/p DBD kidney transplant 7/24/2024 for ESRD 2/2 Type II DM. PMHx born w solitary kidney, DM2, HTN, incontience (s/p bladder sling), CAD with stents (on ASA), atrial fibrillation, CVA (no residual deficits), ALEJA, and anemia. Surgery without complications. PD cath removed. Post op course notable for down trending Cr with excellent UOP. Patient recently admitted 8/8 to 8/13 d/t incisional drainage. CT A/P noted enlarged fluid collection around the right renal transplant and extending into the RLQ abdominal wall. She underwent IR fluid aspiration and drain placement on 8/9. Fluid studies neg for infection or urine leak (WBC 47, segs 10, lymph 83, Cr 1.6). Repeat CT scan done 8/12 reviewed by surgeons, plan to discharge pt with drain in place for continued drainage per MALKA.     Patient now presents as a transfer from OSH due to incisional drainage. Pt presented to her local ED with large amount of bloody drainage from her transplant incision. Wound has 3 cm opening in medial aspect with old bloody drainage CT A/P at OSH w/ large collection 17 x 4 x 3 cm. Her percutaneous drain remains in place and per patient turned milky and purulent appearing day prior to presenting to the OSH.  Pt reports overall feeling well. Pt denies fever/chills, decreased UOP, dysuria. Plan for infectious work-up, IV abx, wound care consult. Will review CT imaging with transplant surgeon with likely plan for IR consult       Hospital Course:  Patient admitted from OSH for wound leakage.  Delayed  wound healing: OSH CT images reviewed; persistent subcutaneous collection with foci of air present. Staples removed from 2/3 of medial part of incision, old hematoma evacuated. Opened part of incision irrigated and assessed, no active bleeding or fascial dehiscence noted. Rest of staples removed 8/20. Wound care consulted, vac placed.   Infection: Drain had turned a milky serous color. Fluid sent for studies. Cell count pos for infection >81,000 WBCs 98% segs. No urine leak. Cx NGTD. Triglycerides negative. Cefepime started. Rest of infectious w/u revealed ESBL klebsiella pneumoniae UTI. ID consulted, plan for 2 weeks erta + doxy, midline placed.     Interval History   NAEON. Patient doing well this morning, no complaints. Cr stable at 1.5, encourage po hydration. Wound vac replaced yesterday, next 8/23. Hgb stable at 9.0. CT AP today to assess collections. VSS. Cont to monitor     Past Medical, Surgical, Family, and Social History:   Unchanged from H&P.    Scheduled Meds:   ALPRAZolam  2 mg Oral QHS    atorvastatin  20 mg Oral QHS    bisacodyL  10 mg Oral Daily    carvediloL  6.25 mg Oral BID    cetirizine  5 mg Oral QHS    cinacalcet  60 mg Oral Daily with breakfast    docusate sodium  100 mg Oral TID PC    doxycycline  100 mg Oral Q12H    DULoxetine  30 mg Oral QHS    ertapenem (INVanz) IV (PEDS and ADULTS)  1 g Intravenous Q24H    ezetimibe  10 mg Oral Daily    insulin aspart U-100  6 Units Subcutaneous TID WM    k phos di & mono-sod phos mono  500 mg Oral BID    magnesium oxide  800 mg Oral BID    minoxidiL  5 mg Oral Daily    NIFEdipine  30 mg Oral BID    pantoprazole  40 mg Oral Daily    predniSONE  5 mg Oral Daily    sodium chloride 0.9%  10 mL Intravenous Q6H    sulfamethoxazole-trimethoprim 400-80mg  1 tablet Oral QAM    tacrolimus  3 mg Oral BID    valGANciclovir  450 mg Oral Every Mon, Wed, Fri     Continuous Infusions:  PRN Meds:  Current Facility-Administered Medications:     acetaminophen, 650 mg,  Oral, Q8H PRN    dextrose 10%, 12.5 g, Intravenous, PRN    dextrose 10%, 25 g, Intravenous, PRN    diphenhydrAMINE-zinc acetate 2-0.1%, , Topical (Top), BID PRN    glucagon (human recombinant), 1 mg, Intramuscular, PRN    glucose, 16 g, Oral, PRN    glucose, 24 g, Oral, PRN    hydrOXYzine HCL, 25 mg, Oral, TID PRN    insulin aspart U-100, 0-5 Units, Subcutaneous, QID (AC + HS) PRN    melatonin, 6 mg, Oral, Nightly PRN    ondansetron, 8 mg, Oral, Q8H PRN    oxybutynin, 5 mg, Oral, TID PRN    oxyCODONE, 5 mg, Oral, Q6H PRN    sodium chloride 0.9%, 10 mL, Intravenous, PRN    Flushing PICC/Midline Protocol, , , Until Discontinued **AND** sodium chloride 0.9%, 10 mL, Intravenous, Q6H **AND** sodium chloride 0.9%, 10 mL, Intravenous, PRN    Intake/Output - Last 3 Shifts         08/19 0700  08/20 0659 08/20 0700 08/21 0659 08/21 0700  08/22 0659    P.O. 1260 957 617    IV Piggyback 100      Total Intake(mL/kg) 1360 (16.7) 957 (11.7) 617 (7.6)    Urine (mL/kg/hr) 1700 (0.9) 150 (0.1) 0 (0)    Emesis/NG output       Drains 250 150 60    Other       Stool 0      Total Output 1950 300 60    Net -590 +657 +557           Urine Occurrence  3 x 3 x    Stool Occurrence 0 x 0 x              Review of Systems   Constitutional:  Negative for activity change, appetite change, chills and fever.   Respiratory:  Negative for cough and shortness of breath.    Cardiovascular:  Negative for chest pain and leg swelling.   Gastrointestinal:  Positive for abdominal pain (around drain site). Negative for abdominal distention, nausea and vomiting.   Genitourinary:  Negative for decreased urine volume, difficulty urinating, dysuria, frequency and urgency.   Skin:  Positive for wound.   Allergic/Immunologic: Positive for immunocompromised state.   Neurological:  Negative for weakness.   Psychiatric/Behavioral:  Negative for confusion and decreased concentration.       Objective:     Vital Signs (Most Recent):  Temp: 98.1 °F (36.7 °C) (08/21/24  "1134)  Pulse: 72 (08/21/24 1134)  Resp: 18 (08/21/24 1134)  BP: 138/60 (08/21/24 1134)  SpO2: 97 % (08/21/24 1134) Vital Signs (24h Range):  Temp:  [97.7 °F (36.5 °C)-98.6 °F (37 °C)] 98.1 °F (36.7 °C)  Pulse:  [66-83] 72  Resp:  [18-22] 18  SpO2:  [96 %-99 %] 97 %  BP: (108-175)/(52-72) 138/60     Weight: 81.6 kg (180 lb)  Height: 5' 5" (165.1 cm)  Body mass index is 29.95 kg/m².     Physical Exam  Vitals and nursing note reviewed.   Constitutional:       General: She is not in acute distress.     Appearance: She is not ill-appearing.   HENT:      Head: Normocephalic and atraumatic.      Mouth/Throat:      Mouth: Mucous membranes are moist.   Eyes:      Extraocular Movements: Extraocular movements intact.      Conjunctiva/sclera: Conjunctivae normal.      Pupils: Pupils are equal, round, and reactive to light.   Cardiovascular:      Rate and Rhythm: Normal rate.      Pulses: Normal pulses.   Pulmonary:      Effort: Pulmonary effort is normal. No respiratory distress.   Abdominal:      General: Bowel sounds are normal. There is no distension.      Palpations: Abdomen is soft.      Tenderness: There is abdominal tenderness. There is no guarding or rebound.       Musculoskeletal:         General: No swelling. Normal range of motion.      Cervical back: Normal range of motion.   Skin:     General: Skin is warm and dry.   Neurological:      Mental Status: She is alert and oriented to person, place, and time.      Motor: No weakness.   Psychiatric:         Mood and Affect: Mood normal.         Behavior: Behavior normal.         Thought Content: Thought content normal.         Judgment: Judgment normal.          Laboratory:  CBC:   Recent Labs   Lab 08/19/24  0550 08/20/24  0604 08/21/24  0542   WBC 5.88 5.28 4.43   RBC 2.73* 2.85* 2.82*   HGB 8.6* 9.0* 8.9*   HCT 25.8* 26.4* 26.7*    316 312   MCV 95 93 95   MCH 31.5* 31.6* 31.6*   MCHC 33.3 34.1 33.3     CMP:   Recent Labs   Lab 08/19/24  0550 08/20/24  0604 " 08/21/24  0542   * 108 114*   CALCIUM 8.9 8.6* 8.6*   ALBUMIN 2.3* 2.4* 2.2*    136 139   K 3.9 3.6 3.4*   CO2 21* 23 20*    107 109   BUN 15 12 15   CREATININE 1.5* 1.6* 1.5*     Labs within the past 24 hours have been reviewed.    Diagnostic Results:  OSH CT AP disc reviewed   Assessment/Plan:     * Wound infection after surgery  - Transfer from OS d/t large wound drainage from kidney transplant incision  - Recently admitted 8/8-8/13 for wound drainage, s/p IR aspiration and drain placement of perinephric collection  - Perc drain now w/ milky purulent output; recent studies show infected fluid collecton  - OSH CT reviewed and shows persistent collection with foci of air  - Incision opened bedside on 8/17; fascia intact and old hematoma evacuated. Wound packed and wound care consulted for wound vac placement  - Wound vac placed 8/17 and tolerating well; will need SW assistance with home health wound care and wound vac  - ID consult Monday for outpatient abx plan; cefepime switched to erta and doxy with cultures NGTD but positive cell count from initial drain placement and UA + for ESBL kleb UTI   - repeat CT AP 8/21 to reassess collection/drain       Infection due to ESBL-producing Klebsiella pneumoniae  - urine culture +   - ID on board   - on erta + doxy x at least 14 days  - cont to monitor     Anemia of chronic disease  - daily CBC      Wound drainage  - Transfer from OS d/t large wound drainage from kidney transplant incision  - Recently admitted 8/8-8/13 for wound drainage, s/p IR aspiration and drain placement of perinephric collection  - Perc drain now w/ milky purulent output; recent studies show infected fluid collecton  - OSH CT reviewed and shows persistent collection with foci of air  - Incision opened bedside on 8/17; fascia intact and old hematoma evacuated. Wound packed and wound care consulted for wound vac placement  - Wound vac placed 8/17 and tolerating well; will need SW  "assistance with home health wound care and wound vac  - ID consult Monday for outpatient abx plan; cefepime switched to erta and doxy with cultures NGTD but positive cell count from initial drain placement and UA + for ESBL kleb UTI   - repeat CT AP 8/21 to reassess collection/drain     Anxiety  - continue home xanax      Hypertension associated with transplantation  - continue home antihypertensives with hold parameters    Long-term use of immunosuppressant medication  - see Prophylactic immunotherapy"      Prophylactic immunotherapy  - Continue prograf, cellcept, and steroid taper  - Check prograf level daily and adjust for therapeutic dosage. Monitor for toxic side effects       S/P kidney transplant  - S/p Ktxp 7/24/24  - Cr trended down with excellent UOP  - Multiple Readmit d/t wound complications  - Cr stable   - strict Is and Os   - monitor       Other hyperlipidemia  - continue home statin      Diabetes mellitus due to underlying condition with chronic kidney disease on chronic dialysis, with long-term current use of insulin  - Endocrine consulted  - sliding scale          Discharge Planning:  Not yet stable for dc     Medical decision making for this encounter includes review of pertinent labs and diagnostic studies, assessment and planning, discussions with consulting providers, discussion with patient/family, and participation in multidisciplinary rounds. Time spent caring for patient: 60 minutes    Morena Gudino PA-C  Kidney Transplant  [facility] - [facility] Step[facility]  "

## 2024-08-21 NOTE — PLAN OF CARE
Pt AAOx4. Independent. VS stable. BG stable. Wound vac in place and intact. Wound vac changed yesterday. CT abd done - showed improved fluid collections and gallstones present. Up in the chair all day. K 3.4 - PO given. Standing BP only done. Q4 MALKA drain empty. MALKA output cleared up some - less milky and more cloudy in appearence. 1 BM.

## 2024-08-21 NOTE — SUBJECTIVE & OBJECTIVE
"Interval HPI:   Overnight events: Remains in TSU. BG within goal ranges on current SQ insulin regimen. Remains on Prednisone 5 mg daily. Diet diabetic Renal;  Calorie    Eatin%  Nausea: No  Hypoglycemia and intervention: No  Fever: No  TPN and/or TF: No  If yes, type of TF/TPN and rate: n/a    /60 (Patient Position: Standing)   Pulse 72   Temp 98.1 °F (36.7 °C) (Oral)   Resp 18   Ht 5' 5" (1.651 m)   Wt 81.6 kg (180 lb)   SpO2 97%   BMI 29.95 kg/m²     Labs Reviewed and Include    Recent Labs   Lab 24  0542   *   CALCIUM 8.6*   ALBUMIN 2.2*      K 3.4*   CO2 20*      BUN 15   CREATININE 1.5*     Lab Results   Component Value Date    WBC 4.43 2024    HGB 8.9 (L) 2024    HCT 26.7 (L) 2024    MCV 95 2024     2024     No results for input(s): "TSH", "FREET4" in the last 168 hours.  Lab Results   Component Value Date    HGBA1C 4.8 2024    HGBA1C 4.8 2024       Nutritional status:   Body mass index is 29.95 kg/m².  Lab Results   Component Value Date    ALBUMIN 2.2 (L) 2024    ALBUMIN 2.4 (L) 2024    ALBUMIN 2.3 (L) 2024     No results found for: "PREALBUMIN"    Estimated Creatinine Clearance: 39.4 mL/min (A) (based on SCr of 1.5 mg/dL (H)).    Accu-Checks  Recent Labs     24  0808 24  1322 24  1747 24  2028 24  0916 24  1323 24  1806 24  2209 24  0857 24  1308   POCTGLUCOSE 148* 151* 187* 201* 132* 126* 196* 144* 111* 152*       Current Medications and/or Treatments Impacting Glycemic Control  Immunotherapy:    Immunosuppressants           Stop Route Frequency     tacrolimus capsule 3 mg         -- Oral 2 times daily          Steroids:   Hormones (From admission, onward)      Start     Stop Route Frequency Ordered    24 0900  predniSONE tablet 5 mg         -- Oral Daily 24  melatonin tablet 6 mg         -- Oral " Nightly PRN 08/16/24 2320          Pressors:    Autonomic Drugs (From admission, onward)      None          Hyperglycemia/Diabetes Medications:   Antihyperglycemics (From admission, onward)      Start     Stop Route Frequency Ordered    08/18/24 1200  insulin aspart U-100 pen 6 Units         -- SubQ 3 times daily with meals 08/18/24 0923 08/16/24 2320  insulin aspart U-100 pen 0-5 Units         -- SubQ Before meals & nightly PRN 08/16/24 2320

## 2024-08-21 NOTE — PLAN OF CARE
AAOx4. VSS. No c/o pain. MALKA drain continues to produce milky pink tinged fluid. Doxycycline given per MAR. NAEON. Blood sugar monitored @ bedtime and meals. Xanax given to assist with sleep. Bps hypertensive. Bed in low position. Side rails raisedx2, wheels locked. Nonskid shoes when OOB. Call bell and personal items within reach.

## 2024-08-21 NOTE — SUBJECTIVE & OBJECTIVE
Subjective:   History of Present Illness:  Joann Kenney is a 64 y/o F s/p DBD kidney transplant 7/24/2024 for ESRD 2/2 Type II DM. PMHx born w solitary kidney, DM2, HTN, incontience (s/p bladder sling), CAD with stents (on ASA), atrial fibrillation, CVA (no residual deficits), ALEJA, and anemia. Surgery without complications. PD cath removed. Post op course notable for down trending Cr with excellent UOP. Patient recently admitted 8/8 to 8/13 d/t incisional drainage. CT A/P noted enlarged fluid collection around the right renal transplant and extending into the RLQ abdominal wall. She underwent IR fluid aspiration and drain placement on 8/9. Fluid studies neg for infection or urine leak (WBC 47, segs 10, lymph 83, Cr 1.6). Repeat CT scan done 8/12 reviewed by surgeons, plan to discharge pt with drain in place for continued drainage per MALKA.     Patient now presents as a transfer from OSH due to incisional drainage. Pt presented to her local ED with large amount of bloody drainage from her transplant incision. Wound has 3 cm opening in medial aspect with old bloody drainage CT A/P at OSH w/ large collection 17 x 4 x 3 cm. Her percutaneous drain remains in place and per patient turned milky and purulent appearing day prior to presenting to the OSH.  Pt reports overall feeling well. Pt denies fever/chills, decreased UOP, dysuria. Plan for infectious work-up, IV abx, wound care consult. Will review CT imaging with transplant surgeon with likely plan for IR consult       Hospital Course:  Patient admitted from OSH for wound leakage.  Delayed wound healing: OSH CT images reviewed; persistent subcutaneous collection with foci of air present. Staples removed from 2/3 of medial part of incision, old hematoma evacuated. Opened part of incision irrigated and assessed, no active bleeding or fascial dehiscence noted. Rest of staples removed 8/20. Wound care consulted, vac placed.   Infection: Drain had turned a milky serous  color. Fluid sent for studies. Cell count pos for infection >81,000 WBCs 98% segs. No urine leak. Cx NGTD. Triglycerides negative. Cefepime started. Rest of infectious w/u revealed ESBL klebsiella pneumoniae UTI. ID consulted, plan for 2 weeks erta + doxy, midline placed.     Interval History   NAEON. Patient doing well this morning, no complaints. Cr stable at 1.5, encourage po hydration. Wound vac replaced yesterday, next 8/23. Hgb stable at 9.0. CT AP today to assess collections. VSS. Cont to monitor     Past Medical, Surgical, Family, and Social History:   Unchanged from H&P.    Scheduled Meds:   ALPRAZolam  2 mg Oral QHS    atorvastatin  20 mg Oral QHS    bisacodyL  10 mg Oral Daily    carvediloL  6.25 mg Oral BID    cetirizine  5 mg Oral QHS    cinacalcet  60 mg Oral Daily with breakfast    docusate sodium  100 mg Oral TID PC    doxycycline  100 mg Oral Q12H    DULoxetine  30 mg Oral QHS    ertapenem (INVanz) IV (PEDS and ADULTS)  1 g Intravenous Q24H    ezetimibe  10 mg Oral Daily    insulin aspart U-100  6 Units Subcutaneous TID WM    k phos di & mono-sod phos mono  500 mg Oral BID    magnesium oxide  800 mg Oral BID    minoxidiL  5 mg Oral Daily    NIFEdipine  30 mg Oral BID    pantoprazole  40 mg Oral Daily    predniSONE  5 mg Oral Daily    sodium chloride 0.9%  10 mL Intravenous Q6H    sulfamethoxazole-trimethoprim 400-80mg  1 tablet Oral QAM    tacrolimus  3 mg Oral BID    valGANciclovir  450 mg Oral Every Mon, Wed, Fri     Continuous Infusions:  PRN Meds:  Current Facility-Administered Medications:     acetaminophen, 650 mg, Oral, Q8H PRN    dextrose 10%, 12.5 g, Intravenous, PRN    dextrose 10%, 25 g, Intravenous, PRN    diphenhydrAMINE-zinc acetate 2-0.1%, , Topical (Top), BID PRN    glucagon (human recombinant), 1 mg, Intramuscular, PRN    glucose, 16 g, Oral, PRN    glucose, 24 g, Oral, PRN    hydrOXYzine HCL, 25 mg, Oral, TID PRN    insulin aspart U-100, 0-5 Units, Subcutaneous, QID (AC + HS) PRN    " melatonin, 6 mg, Oral, Nightly PRN    ondansetron, 8 mg, Oral, Q8H PRN    oxybutynin, 5 mg, Oral, TID PRN    oxyCODONE, 5 mg, Oral, Q6H PRN    sodium chloride 0.9%, 10 mL, Intravenous, PRN    Flushing PICC/Midline Protocol, , , Until Discontinued **AND** sodium chloride 0.9%, 10 mL, Intravenous, Q6H **AND** sodium chloride 0.9%, 10 mL, Intravenous, PRN    Intake/Output - Last 3 Shifts         08/19 0700 08/20 0659 08/20 0700 08/21 0659 08/21 0700 08/22 0659    P.O. 1260 957 617    IV Piggyback 100      Total Intake(mL/kg) 1360 (16.7) 957 (11.7) 617 (7.6)    Urine (mL/kg/hr) 1700 (0.9) 150 (0.1) 0 (0)    Emesis/NG output       Drains 250 150 60    Other       Stool 0      Total Output 1950 300 60    Net -590 +657 +557           Urine Occurrence  3 x 3 x    Stool Occurrence 0 x 0 x              Review of Systems   Constitutional:  Negative for activity change, appetite change, chills and fever.   Respiratory:  Negative for cough and shortness of breath.    Cardiovascular:  Negative for chest pain and leg swelling.   Gastrointestinal:  Positive for abdominal pain (around drain site). Negative for abdominal distention, nausea and vomiting.   Genitourinary:  Negative for decreased urine volume, difficulty urinating, dysuria, frequency and urgency.   Skin:  Positive for wound.   Allergic/Immunologic: Positive for immunocompromised state.   Neurological:  Negative for weakness.   Psychiatric/Behavioral:  Negative for confusion and decreased concentration.       Objective:     Vital Signs (Most Recent):  Temp: 98.1 °F (36.7 °C) (08/21/24 1134)  Pulse: 72 (08/21/24 1134)  Resp: 18 (08/21/24 1134)  BP: 138/60 (08/21/24 1134)  SpO2: 97 % (08/21/24 1134) Vital Signs (24h Range):  Temp:  [97.7 °F (36.5 °C)-98.6 °F (37 °C)] 98.1 °F (36.7 °C)  Pulse:  [66-83] 72  Resp:  [18-22] 18  SpO2:  [96 %-99 %] 97 %  BP: (108-175)/(52-72) 138/60     Weight: 81.6 kg (180 lb)  Height: 5' 5" (165.1 cm)  Body mass index is 29.95 kg/m².   "   Physical Exam  Vitals and nursing note reviewed.   Constitutional:       General: She is not in acute distress.     Appearance: She is not ill-appearing.   HENT:      Head: Normocephalic and atraumatic.      Mouth/Throat:      Mouth: Mucous membranes are moist.   Eyes:      Extraocular Movements: Extraocular movements intact.      Conjunctiva/sclera: Conjunctivae normal.      Pupils: Pupils are equal, round, and reactive to light.   Cardiovascular:      Rate and Rhythm: Normal rate.      Pulses: Normal pulses.   Pulmonary:      Effort: Pulmonary effort is normal. No respiratory distress.   Abdominal:      General: Bowel sounds are normal. There is no distension.      Palpations: Abdomen is soft.      Tenderness: There is abdominal tenderness. There is no guarding or rebound.       Musculoskeletal:         General: No swelling. Normal range of motion.      Cervical back: Normal range of motion.   Skin:     General: Skin is warm and dry.   Neurological:      Mental Status: She is alert and oriented to person, place, and time.      Motor: No weakness.   Psychiatric:         Mood and Affect: Mood normal.         Behavior: Behavior normal.         Thought Content: Thought content normal.         Judgment: Judgment normal.          Laboratory:  CBC:   Recent Labs   Lab 08/19/24  0550 08/20/24  0604 08/21/24  0542   WBC 5.88 5.28 4.43   RBC 2.73* 2.85* 2.82*   HGB 8.6* 9.0* 8.9*   HCT 25.8* 26.4* 26.7*    316 312   MCV 95 93 95   MCH 31.5* 31.6* 31.6*   MCHC 33.3 34.1 33.3     CMP:   Recent Labs   Lab 08/19/24  0550 08/20/24  0604 08/21/24  0542   * 108 114*   CALCIUM 8.9 8.6* 8.6*   ALBUMIN 2.3* 2.4* 2.2*    136 139   K 3.9 3.6 3.4*   CO2 21* 23 20*    107 109   BUN 15 12 15   CREATININE 1.5* 1.6* 1.5*     Labs within the past 24 hours have been reviewed.    Diagnostic Results:  OSH CT AP disc reviewed

## 2024-08-21 NOTE — PROGRESS NOTES
[facility] - Transplant Stepdown  Endocrinology  Progress Note    Admit Date: 8/16/2024     Reason for Consult: Management of T2DM, Hyperglycemia     Surgical Procedure and Date:  kidney transplant 7/24/2024     Diabetes diagnosis year: 2004    Home Diabetes Medications:    - Novolog 6 units TIDWM  - Novolog SSI for BG excursions:  Add correction scale if needed.  Blood sugar 150 to 200 add 2 units  Blood sugar 201 to 250 add 4 units  Blood sugar 251 to 300 add 6 units  Blood sugar 301 to 350 add 8 units  Blood sugar greater than 350 add 10 units    Lab Results   Component Value Date    HGBA1C 4.8 07/24/2024    HGBA1C 4.8 07/24/2024       How often checking glucose at home?  3x daily     BG readings on regimen: 140-<200  Hypoglycemia on the regimen?  No  Missed doses on regimen?  No    Diabetes Complications include:     none    Complicating diabetes co morbidities:   S/p kidney txp, glucocorticoid use       HPI:   Patient is a 63 y.o. 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 PMHx born w solitary kidney, DM2, HTN, incontience (s/p bladder sling), CAD with stents (on ASA), atrial fibrillation, CVA (no residual deficits), ALEJA, and anemia. Surgery without complications. PD cath removed. Post op course notable for down trending Cr with excellent UOP. Patient recently admitted 8/8 to 8/13 d/t incisional drainage. CT A/P noted enlarged fluid collection around the right renal transplant and extending into the RLQ abdominal wall. She underwent IR fluid aspiration and drain placement on 8/9. Fluid studies neg for infection or urine leak (WBC 47, segs 10, lymph 83, Cr 1.6). Repeat CT scan done 8/12 reviewed by surgeons, plan to discharge pt with drain in place for continued drainage per MALKA.      Patient now presents as a transfer from OSH due to incisional drainage. Pt presented to her local ED with large amount of bloody drainage from her transplant incision. Wound has 3 cm opening in  "medial aspect with old bloody drainage CT A/P at OSH w/ large collection 17 x 4 x 3 cm. Her percutaneous drain remains in place and per patient turned milky and purulent appearing day prior to presenting to the OSH.  Pt reports overall feeling well. Pt denies fever/chills, decreased UOP, dysuria. Plan for infectious work-up, IV abx, wound care consult. Endocrinology consulted for management of T2DM.         Interval HPI:   Overnight events: Remains in TSU. BG within goal ranges on current SQ insulin regimen. Remains on Prednisone 5 mg daily. Diet diabetic Renal;  Calorie    Eatin%  Nausea: No  Hypoglycemia and intervention: No  Fever: No  TPN and/or TF: No  If yes, type of TF/TPN and rate: n/a    /60 (Patient Position: Standing)   Pulse 72   Temp 98.1 °F (36.7 °C) (Oral)   Resp 18   Ht 5' 5" (1.651 m)   Wt 81.6 kg (180 lb)   SpO2 97%   BMI 29.95 kg/m²     Labs Reviewed and Include    Recent Labs   Lab 24  0542   *   CALCIUM 8.6*   ALBUMIN 2.2*      K 3.4*   CO2 20*      BUN 15   CREATININE 1.5*     Lab Results   Component Value Date    WBC 4.43 2024    HGB 8.9 (L) 2024    HCT 26.7 (L) 2024    MCV 95 2024     2024     No results for input(s): "TSH", "FREET4" in the last 168 hours.  Lab Results   Component Value Date    HGBA1C 4.8 2024    HGBA1C 4.8 2024       Nutritional status:   Body mass index is 29.95 kg/m².  Lab Results   Component Value Date    ALBUMIN 2.2 (L) 2024    ALBUMIN 2.4 (L) 2024    ALBUMIN 2.3 (L) 2024     No results found for: "PREALBUMIN"    Estimated Creatinine Clearance: 39.4 mL/min (A) (based on SCr of 1.5 mg/dL (H)).    Accu-Checks  Recent Labs     24  0808 24  1322 24  1747 24  2028 24  0916 24  1323 24  1806 24  2209 24  0857 24  1308   POCTGLUCOSE 148* 151* 187* 201* 132* 126* 196* 144* 111* 152*       Current " Medications and/or Treatments Impacting Glycemic Control  Immunotherapy:    Immunosuppressants           Stop Route Frequency     tacrolimus capsule 3 mg         -- Oral 2 times daily          Steroids:   Hormones (From admission, onward)      Start     Stop Route Frequency Ordered    08/17/24 0900  predniSONE tablet 5 mg         -- Oral Daily 08/16/24 2320 08/16/24 2320  melatonin tablet 6 mg         -- Oral Nightly PRN 08/16/24 2320          Pressors:    Autonomic Drugs (From admission, onward)      None          Hyperglycemia/Diabetes Medications:   Antihyperglycemics (From admission, onward)      Start     Stop Route Frequency Ordered    08/18/24 1200  insulin aspart U-100 pen 6 Units         -- SubQ 3 times daily with meals 08/18/24 0923 08/16/24 2320  insulin aspart U-100 pen 0-5 Units         -- SubQ Before meals & nightly PRN 08/16/24 2320            ASSESSMENT and PLAN    Renal/  S/P kidney transplant  Managed per primary team  Avoid hypoglycemia        ID  * Wound infection after surgery  Managed per primary team  Optimize BG control        Immunology/Multi System  Prophylactic immunotherapy  May increase insulin resistance.         Endocrine  Diabetes mellitus due to underlying condition with chronic kidney disease on chronic dialysis, with long-term current use of insulin  BG goal 140-180    Continue Novolog 6 units TID with meals (home dose)   Low Dose Correction Scale  BG monitoring ac/hs    ** Please call Endocrine for any BG related issues **            Tatiana Garcia NP  Endocrinology  [facility] - Transplant Step[facility]

## 2024-08-22 LAB
ALBUMIN SERPL BCP-MCNC: 2.2 G/DL (ref 3.5–5.2)
ANION GAP SERPL CALC-SCNC: 6 MMOL/L (ref 8–16)
BACTERIA BLD CULT: NORMAL
BACTERIA BLD CULT: NORMAL
BASOPHILS # BLD AUTO: 0.05 K/UL (ref 0–0.2)
BASOPHILS NFR BLD: 1 % (ref 0–1.9)
BUN SERPL-MCNC: 15 MG/DL (ref 8–23)
CALCIUM SERPL-MCNC: 8.5 MG/DL (ref 8.7–10.5)
CHLORIDE SERPL-SCNC: 109 MMOL/L (ref 95–110)
CO2 SERPL-SCNC: 24 MMOL/L (ref 23–29)
CREAT SERPL-MCNC: 1.5 MG/DL (ref 0.5–1.4)
DIFFERENTIAL METHOD BLD: ABNORMAL
EOSINOPHIL # BLD AUTO: 0.3 K/UL (ref 0–0.5)
EOSINOPHIL NFR BLD: 6 % (ref 0–8)
ERYTHROCYTE [DISTWIDTH] IN BLOOD BY AUTOMATED COUNT: 16.4 % (ref 11.5–14.5)
EST. GFR  (NO RACE VARIABLE): 38.4 ML/MIN/1.73 M^2
GLUCOSE SERPL-MCNC: 117 MG/DL (ref 70–110)
HCT VFR BLD AUTO: 24.7 % (ref 37–48.5)
HGB BLD-MCNC: 8 G/DL (ref 12–16)
IMM GRANULOCYTES # BLD AUTO: 0.03 K/UL (ref 0–0.04)
IMM GRANULOCYTES NFR BLD AUTO: 0.6 % (ref 0–0.5)
LYMPHOCYTES # BLD AUTO: 0.4 K/UL (ref 1–4.8)
LYMPHOCYTES NFR BLD: 7.6 % (ref 18–48)
MAGNESIUM SERPL-MCNC: 1.6 MG/DL (ref 1.6–2.6)
MCH RBC QN AUTO: 30.5 PG (ref 27–31)
MCHC RBC AUTO-ENTMCNC: 32.4 G/DL (ref 32–36)
MCV RBC AUTO: 94 FL (ref 82–98)
MONOCYTES # BLD AUTO: 0.3 K/UL (ref 0.3–1)
MONOCYTES NFR BLD: 6 % (ref 4–15)
NEUTROPHILS # BLD AUTO: 3.8 K/UL (ref 1.8–7.7)
NEUTROPHILS NFR BLD: 78.8 % (ref 38–73)
NRBC BLD-RTO: 0 /100 WBC
PHOSPHATE SERPL-MCNC: 2.6 MG/DL (ref 2.7–4.5)
PLATELET # BLD AUTO: 303 K/UL (ref 150–450)
PMV BLD AUTO: 10.1 FL (ref 9.2–12.9)
POCT GLUCOSE: 114 MG/DL (ref 70–110)
POCT GLUCOSE: 117 MG/DL (ref 70–110)
POCT GLUCOSE: 121 MG/DL (ref 70–110)
POCT GLUCOSE: 148 MG/DL (ref 70–110)
POTASSIUM SERPL-SCNC: 3.2 MMOL/L (ref 3.5–5.1)
RBC # BLD AUTO: 2.62 M/UL (ref 4–5.4)
SODIUM SERPL-SCNC: 139 MMOL/L (ref 136–145)
TACROLIMUS BLD-MCNC: 5.8 NG/ML (ref 5–15)
WBC # BLD AUTO: 4.86 K/UL (ref 3.9–12.7)

## 2024-08-22 PROCEDURE — 99232 SBSQ HOSP IP/OBS MODERATE 35: CPT | Mod: ,,,

## 2024-08-22 PROCEDURE — 80197 ASSAY OF TACROLIMUS: CPT | Performed by: PHYSICIAN ASSISTANT

## 2024-08-22 PROCEDURE — 20600001 HC STEP DOWN PRIVATE ROOM

## 2024-08-22 PROCEDURE — A4216 STERILE WATER/SALINE, 10 ML: HCPCS | Performed by: TRANSPLANT SURGERY

## 2024-08-22 PROCEDURE — 94761 N-INVAS EAR/PLS OXIMETRY MLT: CPT

## 2024-08-22 PROCEDURE — 63600175 PHARM REV CODE 636 W HCPCS: Performed by: PHYSICIAN ASSISTANT

## 2024-08-22 PROCEDURE — 25000003 PHARM REV CODE 250

## 2024-08-22 PROCEDURE — 27100171 HC OXYGEN HIGH FLOW UP TO 24 HOURS

## 2024-08-22 PROCEDURE — 36415 COLL VENOUS BLD VENIPUNCTURE: CPT | Performed by: PHYSICIAN ASSISTANT

## 2024-08-22 PROCEDURE — 25000003 PHARM REV CODE 250: Performed by: PHYSICIAN ASSISTANT

## 2024-08-22 PROCEDURE — 94660 CPAP INITIATION&MGMT: CPT

## 2024-08-22 PROCEDURE — 63600175 PHARM REV CODE 636 W HCPCS

## 2024-08-22 PROCEDURE — 80069 RENAL FUNCTION PANEL: CPT | Performed by: PHYSICIAN ASSISTANT

## 2024-08-22 PROCEDURE — 25000003 PHARM REV CODE 250: Performed by: TRANSPLANT SURGERY

## 2024-08-22 PROCEDURE — 94799 UNLISTED PULMONARY SVC/PX: CPT

## 2024-08-22 PROCEDURE — 85025 COMPLETE CBC W/AUTO DIFF WBC: CPT | Performed by: PHYSICIAN ASSISTANT

## 2024-08-22 PROCEDURE — 99900035 HC TECH TIME PER 15 MIN (STAT)

## 2024-08-22 PROCEDURE — 83735 ASSAY OF MAGNESIUM: CPT | Performed by: PHYSICIAN ASSISTANT

## 2024-08-22 RX ORDER — VALGANCICLOVIR 450 MG/1
450 TABLET, FILM COATED ORAL DAILY
Status: DISCONTINUED | OUTPATIENT
Start: 2024-08-23 | End: 2024-08-26 | Stop reason: HOSPADM

## 2024-08-22 RX ORDER — POTASSIUM CHLORIDE 20 MEQ/1
40 TABLET, EXTENDED RELEASE ORAL ONCE
Status: COMPLETED | OUTPATIENT
Start: 2024-08-22 | End: 2024-08-22

## 2024-08-22 RX ADMIN — ERTAPENEM 1 G: 1 INJECTION INTRAMUSCULAR; INTRAVENOUS at 01:08

## 2024-08-22 RX ADMIN — CARVEDILOL 6.25 MG: 6.25 TABLET, FILM COATED ORAL at 08:08

## 2024-08-22 RX ADMIN — Medication 800 MG: at 09:08

## 2024-08-22 RX ADMIN — POTASSIUM CHLORIDE 40 MEQ: 1500 TABLET, EXTENDED RELEASE ORAL at 09:08

## 2024-08-22 RX ADMIN — Medication 800 MG: at 08:08

## 2024-08-22 RX ADMIN — DOXYCYCLINE HYCLATE 100 MG: 100 TABLET, COATED ORAL at 08:08

## 2024-08-22 RX ADMIN — INSULIN ASPART 6 UNITS: 100 INJECTION, SOLUTION INTRAVENOUS; SUBCUTANEOUS at 05:08

## 2024-08-22 RX ADMIN — Medication 2 TABLET: at 09:08

## 2024-08-22 RX ADMIN — CINACALCET 60 MG: 30 TABLET, FILM COATED ORAL at 08:08

## 2024-08-22 RX ADMIN — INSULIN ASPART 6 UNITS: 100 INJECTION, SOLUTION INTRAVENOUS; SUBCUTANEOUS at 01:08

## 2024-08-22 RX ADMIN — Medication 10 ML: at 05:08

## 2024-08-22 RX ADMIN — ALPRAZOLAM 2 MG: 0.5 TABLET ORAL at 10:08

## 2024-08-22 RX ADMIN — DULOXETINE 30 MG: 30 CAPSULE, DELAYED RELEASE ORAL at 09:08

## 2024-08-22 RX ADMIN — TACROLIMUS 3 MG: 1 CAPSULE ORAL at 05:08

## 2024-08-22 RX ADMIN — SULFAMETHOXAZOLE AND TRIMETHOPRIM 1 TABLET: 400; 80 TABLET ORAL at 05:08

## 2024-08-22 RX ADMIN — INSULIN ASPART 6 UNITS: 100 INJECTION, SOLUTION INTRAVENOUS; SUBCUTANEOUS at 08:08

## 2024-08-22 RX ADMIN — Medication 2 TABLET: at 08:08

## 2024-08-22 RX ADMIN — DOCUSATE SODIUM 100 MG: 100 CAPSULE, LIQUID FILLED ORAL at 08:08

## 2024-08-22 RX ADMIN — BISACODYL 10 MG: 5 TABLET, COATED ORAL at 08:08

## 2024-08-22 RX ADMIN — TACROLIMUS 3 MG: 1 CAPSULE ORAL at 08:08

## 2024-08-22 RX ADMIN — PREDNISONE 5 MG: 5 TABLET ORAL at 08:08

## 2024-08-22 RX ADMIN — DOCUSATE SODIUM 100 MG: 100 CAPSULE, LIQUID FILLED ORAL at 01:08

## 2024-08-22 RX ADMIN — CARVEDILOL 6.25 MG: 6.25 TABLET, FILM COATED ORAL at 09:08

## 2024-08-22 RX ADMIN — PANTOPRAZOLE SODIUM 40 MG: 40 TABLET, DELAYED RELEASE ORAL at 08:08

## 2024-08-22 RX ADMIN — CETIRIZINE HYDROCHLORIDE 5 MG: 5 TABLET, FILM COATED ORAL at 09:08

## 2024-08-22 RX ADMIN — DOXYCYCLINE HYCLATE 100 MG: 100 TABLET, COATED ORAL at 09:08

## 2024-08-22 RX ADMIN — EZETIMIBE 10 MG: 10 TABLET ORAL at 08:08

## 2024-08-22 RX ADMIN — ATORVASTATIN CALCIUM 20 MG: 20 TABLET, FILM COATED ORAL at 09:08

## 2024-08-22 RX ADMIN — Medication 10 ML: at 01:08

## 2024-08-22 NOTE — ASSESSMENT & PLAN NOTE
- urine culture +   - ID on board   - currently on erta and doxy   - Plan to switch ertapenem to levoflox 750mg PO q48hrs at discharge per ID recs.  - continue doxy for 14 day course of treatment  - cont to monitor

## 2024-08-22 NOTE — PROGRESS NOTES
[facility] - Transplant Stepdown  Kidney Transplant  Progress Note      Reason for Follow-up: Reassessment of Kidney Transplant - 7/24/2024  (#1) recipient and management of immunosuppression.    ORGAN:   RIGHT KIDNEY    Donor Type:   Donation after Brain Death    Donor CMV Status: Positive  Donor HBcAB:Negative  Donor HCV Status:Negative  Donor HBV RAH:   Donor HCV RAH: Negative      Subjective:   History of Present Illness:  Joann Kenney is a 62 y/o F s/p DBD kidney transplant 7/24/2024 for ESRD 2/2 Type II DM. PMHx born w solitary kidney, DM2, HTN, incontience (s/p bladder sling), CAD with stents (on ASA), atrial fibrillation, CVA (no residual deficits), ALEJA, and anemia. Surgery without complications. PD cath removed. Post op course notable for down trending Cr with excellent UOP. Patient recently admitted 8/8 to 8/13 d/t incisional drainage. CT A/P noted enlarged fluid collection around the right renal transplant and extending into the RLQ abdominal wall. She underwent IR fluid aspiration and drain placement on 8/9. Fluid studies neg for infection or urine leak (WBC 47, segs 10, lymph 83, Cr 1.6). Repeat CT scan done 8/12 reviewed by surgeons, plan to discharge pt with drain in place for continued drainage per MALKA.     Patient now presents as a transfer from OSH due to incisional drainage. Pt presented to her local ED with large amount of bloody drainage from her transplant incision. Wound has 3 cm opening in medial aspect with old bloody drainage CT A/P at OSH w/ large collection 17 x 4 x 3 cm. Her percutaneous drain remains in place and per patient turned milky and purulent appearing day prior to presenting to the OSH.  Pt reports overall feeling well. Pt denies fever/chills, decreased UOP, dysuria. Plan for infectious work-up, IV abx, wound care consult. Will review CT imaging with transplant surgeon with likely plan for IR consult       Hospital Course:  Patient admitted from OSH for wound leakage.  Delayed  wound healing: OSH CT images reviewed; persistent subcutaneous collection with foci of air present. Staples removed from 2/3 of medial part of incision, old hematoma evacuated. Opened part of incision irrigated and assessed, no active bleeding or fascial dehiscence noted. Rest of staples removed 8/20. Wound care consulted, vac placed.   Infection: Drain had turned a milky serous color. Fluid sent for studies. Cell count pos for infection >81,000 WBCs 98% segs. No urine leak. Cx NGTD. Triglycerides negative. Cefepime started. Rest of infectious w/u revealed ESBL klebsiella pneumoniae UTI. ID consulted, plan for 2 weeks erta + doxy, midline placed.     Interval History   NAEON. Patient reports feeling well this morning, no complaints. Cr stable at 1.5, encourage PO hydration. Wound vac changes every Tuesday/ Friday. RLQ MALKA drain with 180ml light pink serous fluid within the last 24 hours. CT AP done 8/21 with improving collections. Plan to switch ertapenem to levoflox 750mg PO q48hrs at discharge per ID recs. VSS. Patient does not qualify for LTAC. Patient's current discharge plan includes hiring someone who will  help her during the day at the patient's home and her daughter will check on her every evening and provider the patient groceries and dinners. Patient's daughter also to do a pill box to ensure patient is taking her medication appropriately. Cont to monitor. Will continue to monitor.       Past Medical, Surgical, Family, and Social History:   Unchanged from H&P.    Scheduled Meds:   ALPRAZolam  2 mg Oral QHS    atorvastatin  20 mg Oral QHS    bisacodyL  10 mg Oral Daily    carvediloL  6.25 mg Oral BID    cetirizine  5 mg Oral QHS    cinacalcet  60 mg Oral Daily with breakfast    docusate sodium  100 mg Oral TID PC    doxycycline  100 mg Oral Q12H    DULoxetine  30 mg Oral QHS    ertapenem (INVanz) IV (PEDS and ADULTS)  1 g Intravenous Q24H    ezetimibe  10 mg Oral Daily    insulin aspart U-100  6 Units  Subcutaneous TID WM    k phos di & mono-sod phos mono  500 mg Oral BID    magnesium oxide  800 mg Oral BID    minoxidiL  5 mg Oral Daily    NIFEdipine  30 mg Oral BID    pantoprazole  40 mg Oral Daily    predniSONE  5 mg Oral Daily    sodium chloride 0.9%  10 mL Intravenous Q6H    sulfamethoxazole-trimethoprim 400-80mg  1 tablet Oral QAM    tacrolimus  3 mg Oral BID    [START ON 8/23/2024] valGANciclovir  450 mg Oral Daily     Continuous Infusions:  PRN Meds:  Current Facility-Administered Medications:     acetaminophen, 650 mg, Oral, Q8H PRN    bisacodyL, 10 mg, Rectal, Daily PRN    dextrose 10%, 12.5 g, Intravenous, PRN    dextrose 10%, 25 g, Intravenous, PRN    diphenhydrAMINE-zinc acetate 2-0.1%, , Topical (Top), BID PRN    glucagon (human recombinant), 1 mg, Intramuscular, PRN    glucose, 16 g, Oral, PRN    glucose, 24 g, Oral, PRN    hydrOXYzine HCL, 25 mg, Oral, TID PRN    insulin aspart U-100, 0-5 Units, Subcutaneous, QID (AC + HS) PRN    melatonin, 6 mg, Oral, Nightly PRN    ondansetron, 8 mg, Oral, Q8H PRN    oxybutynin, 5 mg, Oral, TID PRN    oxyCODONE, 5 mg, Oral, Q6H PRN    polyethylene glycol, 17 g, Oral, TID PRN    sodium chloride 0.9%, 10 mL, Intravenous, PRN    Flushing PICC/Midline Protocol, , , Until Discontinued **AND** sodium chloride 0.9%, 10 mL, Intravenous, Q6H **AND** sodium chloride 0.9%, 10 mL, Intravenous, PRN    Intake/Output - Last 3 Shifts         08/20 0700  08/21 0659 08/21 0700 08/22 0659 08/22 0700 08/23 0659    P.O. 957 1597     IV Piggyback       Total Intake(mL/kg) 957 (11.7) 1597 (19.6)     Urine (mL/kg/hr) 150 (0.1) 0 (0)     Drains 150 180 30    Stool  0     Total Output 300 180 30    Net +657 +1417 -30           Urine Occurrence 3 x 9 x     Stool Occurrence 0 x 4 x              Review of Systems   Constitutional:  Negative for activity change, appetite change, chills and fever.   HENT:  Negative for facial swelling and trouble swallowing.    Respiratory:  Negative for  "cough and shortness of breath.    Cardiovascular:  Negative for chest pain and leg swelling.   Gastrointestinal:  Positive for abdominal pain (around drain site). Negative for abdominal distention, nausea and vomiting.   Genitourinary:  Negative for decreased urine volume, difficulty urinating, dysuria, frequency and urgency.   Skin:  Positive for wound.   Allergic/Immunologic: Positive for immunocompromised state.   Neurological:  Negative for weakness.   Psychiatric/Behavioral:  Negative for agitation, behavioral problems, confusion and decreased concentration.       Objective:     Vital Signs (Most Recent):  Temp: 97.7 °F (36.5 °C) (08/22/24 0807)  Pulse: 77 (08/22/24 0807)  Resp: 18 (08/22/24 0454)  BP: (!) 114/56 (08/22/24 0807)  SpO2: 97 % (08/22/24 0807) Vital Signs (24h Range):  Temp:  [97.6 °F (36.4 °C)-98.4 °F (36.9 °C)] 97.7 °F (36.5 °C)  Pulse:  [63-79] 77  Resp:  [18-19] 18  SpO2:  [97 %-98 %] 97 %  BP: (114-142)/(52-64) 114/56     Weight: 81.6 kg (180 lb)  Height: 5' 5" (165.1 cm)  Body mass index is 29.95 kg/m².     Physical Exam  Vitals and nursing note reviewed.   Constitutional:       General: She is not in acute distress.     Appearance: She is not ill-appearing.   HENT:      Head: Normocephalic and atraumatic.      Mouth/Throat:      Mouth: Mucous membranes are moist.   Eyes:      Extraocular Movements: Extraocular movements intact.      Conjunctiva/sclera: Conjunctivae normal.      Pupils: Pupils are equal, round, and reactive to light.   Cardiovascular:      Rate and Rhythm: Normal rate.      Pulses: Normal pulses.   Pulmonary:      Effort: Pulmonary effort is normal. No respiratory distress.      Breath sounds: Examination of the right-lower field reveals decreased breath sounds. Examination of the left-lower field reveals decreased breath sounds. Decreased breath sounds present.   Abdominal:      General: Bowel sounds are normal. There is no distension.      Palpations: Abdomen is soft.      " Tenderness: There is abdominal tenderness. There is no guarding or rebound.       Musculoskeletal:         General: No swelling. Normal range of motion.      Cervical back: Normal range of motion.   Skin:     General: Skin is warm and dry.   Neurological:      Mental Status: She is alert and oriented to person, place, and time.      Motor: No weakness.   Psychiatric:         Attention and Perception: Attention normal.         Mood and Affect: Mood normal.         Speech: Speech normal.         Behavior: Behavior normal.         Thought Content: Thought content normal.         Judgment: Judgment normal.          Laboratory:  CBC:   Recent Labs   Lab 08/20/24  0604 08/21/24  0542 08/22/24  0700   WBC 5.28 4.43 4.86   RBC 2.85* 2.82* 2.62*   HGB 9.0* 8.9* 8.0*   HCT 26.4* 26.7* 24.7*    312 303   MCV 93 95 94   MCH 31.6* 31.6* 30.5   MCHC 34.1 33.3 32.4     CMP:   Recent Labs   Lab 08/20/24 0604 08/21/24  0542 08/22/24  0700    114* 117*   CALCIUM 8.6* 8.6* 8.5*   ALBUMIN 2.4* 2.2* 2.2*    139 139   K 3.6 3.4* 3.2*   CO2 23 20* 24    109 109   BUN 12 15 15   CREATININE 1.6* 1.5* 1.5*     Labs within the past 24 hours have been reviewed.    Diagnostic Results:  US - Kidney: Results for orders placed during the hospital encounter of 07/29/24    US Transplant Kidney With Doppler    Narrative  EXAMINATION:  US TRANSPLANT KIDNEY WITH DOPPLER    CLINICAL HISTORY:  complication after renal transplant;    TECHNIQUE:  Real time gray scale and doppler ultrasound was performed over the patient's renal allograft.    COMPARISON:  CT abdomen pelvis 07/29/2024; u/S transplant kidney with Doppler 07/25/2024    FINDINGS:  Patient is status post renal allograft in the right lower quadrant on 07/24/2024.  Hilum is located posterior medially, previously posterolaterally.  The allograft measures 9.7 cm. Normal perfusion. No hydronephrosis.  Ureteral stent is possibly visualized near the hilum; however, it is not  visualized within the bladder.    Minimally complex collection adjacent to the inferior pole measuring 3.0 x 3.2 x 3.0 cm.    Minimally complex collection superficial to the renal allograft measuring 3.5 x 1.6 x 1.0 cm.    Small amount of peritoneal free fluid.    Vasculature:    Resistive indices of the arteries:    Interlobar: 0.78 with normal waveform, previously 0.76    Upper segment: 0.72 with normal waveform, previously 0.89    Mid segment: 0.81 with normal waveform, previously 0.82    Lower segment: 0.81 with normal waveform, previously 0.87    Main renal artery peak systolic velocity: 230cm/sec with normal waveform, previously 175cm/sec.    Renal artery/iliac ratio: 1.4.    The main renal vein is patent.    Impression  Slight improvement of segmental renal arterial velocities, which remain mildly elevated within the mid and lower segments.    Peritransplant fluid collections as detailed above.    Electronically signed by resident: Kyle Nieto  Date:    08/01/2024  Time:    13:15    Electronically signed by: Kevyn Reynoso MD  Date:    08/01/2024  Time:    13:56  Assessment/Plan:     * Wound infection after surgery  - Transfer from OSH d/t large wound drainage from kidney transplant incision  - Recently admitted 8/8-8/13 for wound drainage, s/p IR aspiration and drain placement of perinephric collection  - Perc drain now w/ milky purulent output; recent studies show infected fluid collecton  - OSH CT reviewed and shows persistent collection with foci of air  - Incision opened bedside on 8/17; fascia intact and old hematoma evacuated. Wound packed and wound care consulted for wound vac placement  - Wound vac placed 8/17 and tolerating well; will need  assistance with dispo  - ID consult Monday for outpatient abx plan; cefepime switched to erta and doxy with cultures NGTD but positive cell count from initial drain placement and UA + for ESBL kleb UTI   - Wound vac changes every Tuesday/ Friday.  -  MELODY ACE  "drain with 180ml light pink serous fluid within the last 24 hours.  - CT AP done 8/21 with improving collections.  - Plan to switch ertapenem to levoflox 750mg PO q48hrs at discharge per ID recs.      Infection due to ESBL-producing Klebsiella pneumoniae  - urine culture +   - ID on board   - currently on erta and doxy   - Plan to switch ertapenem to levoflox 750mg PO q48hrs at discharge per ID recs.  - continue doxy for 14 day course of treatment  - cont to monitor     Anemia of chronic disease  - H&H stable   - continue to monitor with daily CBC      Wound drainage  - Transfer from OSH d/t large wound drainage from kidney transplant incision  - Recently admitted 8/8-8/13 for wound drainage, s/p IR aspiration and drain placement of perinephric collection  - Perc drain now w/ milky purulent output; recent studies show infected fluid collecton  - OSH CT reviewed and shows persistent collection with foci of air  - Incision opened bedside on 8/17; fascia intact and old hematoma evacuated. Wound packed and wound care consulted for wound vac placement  - Wound vac placed 8/17 and tolerating well; will need SW assistance with home health wound care and wound vac  - ID consult Monday for outpatient abx plan; cefepime switched to erta and doxy with cultures NGTD but positive cell count from initial drain placement and UA + for ESBL kleb UTI   - repeat CT AP 8/21 to reassess collection/drain     Anxiety  - continue home xanax      Hypertension associated with transplantation  - continue home antihypertensives with hold parameters    Long-term use of immunosuppressant medication  - see Prophylactic immunotherapy"      Prophylactic immunotherapy  - Continue prograf, cellcept, and steroid taper  - Check prograf level daily and adjust for therapeutic dosage. Monitor for toxic side effects       S/P kidney transplant  - S/p Ktxp 7/24/24  - Cr trended down with excellent UOP  - Multiple Readmit d/t wound complications  - Cr stable "   - strict Is and Os   - monitor       Other hyperlipidemia  - continue home statin      Diabetes mellitus due to underlying condition with chronic kidney disease on chronic dialysis, with long-term current use of insulin  - Endocrine consulted  - sliding scale          Discharge Planning:  Not a candidate at this time    Medical decision making for this encounter includes review of pertinent labs and diagnostic studies, assessment and planning, discussions with consulting providers, discussion with patient/family, and participation in multidisciplinary rounds. Time spent caring for patient: 60 minutes    Aniyah Pedraza NP  Kidney Transplant  [facility] - Transplant Step[facility]

## 2024-08-22 NOTE — ASSESSMENT & PLAN NOTE
- Transfer from OSH d/t large wound drainage from kidney transplant incision  - Recently admitted 8/8-8/13 for wound drainage, s/p IR aspiration and drain placement of perinephric collection  - Perc drain now w/ milky purulent output; recent studies show infected fluid collecton  - OSH CT reviewed and shows persistent collection with foci of air  - Incision opened bedside on 8/17; fascia intact and old hematoma evacuated. Wound packed and wound care consulted for wound vac placement  - Wound vac placed 8/17 and tolerating well; will need SW assistance with dispo  - ID consult Monday for outpatient abx plan; cefepime switched to erta and doxy with cultures NGTD but positive cell count from initial drain placement and UA + for ESBL kleb UTI   - Wound vac changes every Tuesday/ Friday.  -  RLQ MALKA drain with 180ml light pink serous fluid within the last 24 hours.  - CT AP done 8/21 with improving collections.  - Plan to switch ertapenem to levoflox 750mg PO q48hrs at discharge per ID recs.

## 2024-08-22 NOTE — SUBJECTIVE & OBJECTIVE
Subjective:   History of Present Illness:  Joann Kenney is a 64 y/o F s/p DBD kidney transplant 7/24/2024 for ESRD 2/2 Type II DM. PMHx born w solitary kidney, DM2, HTN, incontience (s/p bladder sling), CAD with stents (on ASA), atrial fibrillation, CVA (no residual deficits), ALEJA, and anemia. Surgery without complications. PD cath removed. Post op course notable for down trending Cr with excellent UOP. Patient recently admitted 8/8 to 8/13 d/t incisional drainage. CT A/P noted enlarged fluid collection around the right renal transplant and extending into the RLQ abdominal wall. She underwent IR fluid aspiration and drain placement on 8/9. Fluid studies neg for infection or urine leak (WBC 47, segs 10, lymph 83, Cr 1.6). Repeat CT scan done 8/12 reviewed by surgeons, plan to discharge pt with drain in place for continued drainage per MALKA.     Patient now presents as a transfer from OSH due to incisional drainage. Pt presented to her local ED with large amount of bloody drainage from her transplant incision. Wound has 3 cm opening in medial aspect with old bloody drainage CT A/P at OSH w/ large collection 17 x 4 x 3 cm. Her percutaneous drain remains in place and per patient turned milky and purulent appearing day prior to presenting to the OSH.  Pt reports overall feeling well. Pt denies fever/chills, decreased UOP, dysuria. Plan for infectious work-up, IV abx, wound care consult. Will review CT imaging with transplant surgeon with likely plan for IR consult       Hospital Course:  Patient admitted from OSH for wound leakage.  Delayed wound healing: OSH CT images reviewed; persistent subcutaneous collection with foci of air present. Staples removed from 2/3 of medial part of incision, old hematoma evacuated. Opened part of incision irrigated and assessed, no active bleeding or fascial dehiscence noted. Rest of staples removed 8/20. Wound care consulted, vac placed.   Infection: Drain had turned a milky serous  color. Fluid sent for studies. Cell count pos for infection >81,000 WBCs 98% segs. No urine leak. Cx NGTD. Triglycerides negative. Cefepime started. Rest of infectious w/u revealed ESBL klebsiella pneumoniae UTI. ID consulted, plan for 2 weeks erta + doxy, midline placed.     Interval History   NAEON. Patient reports feeling well this morning, no complaints. Cr stable at 1.5, encourage PO hydration. Wound vac changes every Tuesday/ Friday. RLQ MALKA drain with 180ml light pink serous fluid within the last 24 hours. CT AP done 8/21 with improving collections. Plan to switch ertapenem to levoflox 750mg PO q48hrs at discharge per ID recs. VSS. Patient does not qualify for LTAC. Patient's current discharge plan includes hiring someone who will  help her during the day at the patient's home and her daughter will check on her every evening and provider the patient groceries and dinners. Patient's daughter also to do a pill box to ensure patient is taking her medication appropriately. Cont to monitor. Will continue to monitor.       Past Medical, Surgical, Family, and Social History:   Unchanged from H&P.    Scheduled Meds:   ALPRAZolam  2 mg Oral QHS    atorvastatin  20 mg Oral QHS    bisacodyL  10 mg Oral Daily    carvediloL  6.25 mg Oral BID    cetirizine  5 mg Oral QHS    cinacalcet  60 mg Oral Daily with breakfast    docusate sodium  100 mg Oral TID PC    doxycycline  100 mg Oral Q12H    DULoxetine  30 mg Oral QHS    ertapenem (INVanz) IV (PEDS and ADULTS)  1 g Intravenous Q24H    ezetimibe  10 mg Oral Daily    insulin aspart U-100  6 Units Subcutaneous TID WM    k phos di & mono-sod phos mono  500 mg Oral BID    magnesium oxide  800 mg Oral BID    minoxidiL  5 mg Oral Daily    NIFEdipine  30 mg Oral BID    pantoprazole  40 mg Oral Daily    predniSONE  5 mg Oral Daily    sodium chloride 0.9%  10 mL Intravenous Q6H    sulfamethoxazole-trimethoprim 400-80mg  1 tablet Oral QAM    tacrolimus  3 mg Oral BID    [START ON  8/23/2024] valGANciclovir  450 mg Oral Daily     Continuous Infusions:  PRN Meds:  Current Facility-Administered Medications:     acetaminophen, 650 mg, Oral, Q8H PRN    bisacodyL, 10 mg, Rectal, Daily PRN    dextrose 10%, 12.5 g, Intravenous, PRN    dextrose 10%, 25 g, Intravenous, PRN    diphenhydrAMINE-zinc acetate 2-0.1%, , Topical (Top), BID PRN    glucagon (human recombinant), 1 mg, Intramuscular, PRN    glucose, 16 g, Oral, PRN    glucose, 24 g, Oral, PRN    hydrOXYzine HCL, 25 mg, Oral, TID PRN    insulin aspart U-100, 0-5 Units, Subcutaneous, QID (AC + HS) PRN    melatonin, 6 mg, Oral, Nightly PRN    ondansetron, 8 mg, Oral, Q8H PRN    oxybutynin, 5 mg, Oral, TID PRN    oxyCODONE, 5 mg, Oral, Q6H PRN    polyethylene glycol, 17 g, Oral, TID PRN    sodium chloride 0.9%, 10 mL, Intravenous, PRN    Flushing PICC/Midline Protocol, , , Until Discontinued **AND** sodium chloride 0.9%, 10 mL, Intravenous, Q6H **AND** sodium chloride 0.9%, 10 mL, Intravenous, PRN    Intake/Output - Last 3 Shifts         08/20 0700  08/21 0659 08/21 0700  08/22 0659 08/22 0700  08/23 0659    P.O. 957 1597     IV Piggyback       Total Intake(mL/kg) 957 (11.7) 1597 (19.6)     Urine (mL/kg/hr) 150 (0.1) 0 (0)     Drains 150 180 30    Stool  0     Total Output 300 180 30    Net +657 +1417 -30           Urine Occurrence 3 x 9 x     Stool Occurrence 0 x 4 x              Review of Systems   Constitutional:  Negative for activity change, appetite change, chills and fever.   HENT:  Negative for facial swelling and trouble swallowing.    Respiratory:  Negative for cough and shortness of breath.    Cardiovascular:  Negative for chest pain and leg swelling.   Gastrointestinal:  Positive for abdominal pain (around drain site). Negative for abdominal distention, nausea and vomiting.   Genitourinary:  Negative for decreased urine volume, difficulty urinating, dysuria, frequency and urgency.   Skin:  Positive for wound.   Allergic/Immunologic:  "Positive for immunocompromised state.   Neurological:  Negative for weakness.   Psychiatric/Behavioral:  Negative for agitation, behavioral problems, confusion and decreased concentration.       Objective:     Vital Signs (Most Recent):  Temp: 97.7 °F (36.5 °C) (08/22/24 0807)  Pulse: 77 (08/22/24 0807)  Resp: 18 (08/22/24 0454)  BP: (!) 114/56 (08/22/24 0807)  SpO2: 97 % (08/22/24 0807) Vital Signs (24h Range):  Temp:  [97.6 °F (36.4 °C)-98.4 °F (36.9 °C)] 97.7 °F (36.5 °C)  Pulse:  [63-79] 77  Resp:  [18-19] 18  SpO2:  [97 %-98 %] 97 %  BP: (114-142)/(52-64) 114/56     Weight: 81.6 kg (180 lb)  Height: 5' 5" (165.1 cm)  Body mass index is 29.95 kg/m².     Physical Exam  Vitals and nursing note reviewed.   Constitutional:       General: She is not in acute distress.     Appearance: She is not ill-appearing.   HENT:      Head: Normocephalic and atraumatic.      Mouth/Throat:      Mouth: Mucous membranes are moist.   Eyes:      Extraocular Movements: Extraocular movements intact.      Conjunctiva/sclera: Conjunctivae normal.      Pupils: Pupils are equal, round, and reactive to light.   Cardiovascular:      Rate and Rhythm: Normal rate.      Pulses: Normal pulses.   Pulmonary:      Effort: Pulmonary effort is normal. No respiratory distress.      Breath sounds: Examination of the right-lower field reveals decreased breath sounds. Examination of the left-lower field reveals decreased breath sounds. Decreased breath sounds present.   Abdominal:      General: Bowel sounds are normal. There is no distension.      Palpations: Abdomen is soft.      Tenderness: There is abdominal tenderness. There is no guarding or rebound.       Musculoskeletal:         General: No swelling. Normal range of motion.      Cervical back: Normal range of motion.   Skin:     General: Skin is warm and dry.   Neurological:      Mental Status: She is alert and oriented to person, place, and time.      Motor: No weakness.   Psychiatric:         " Attention and Perception: Attention normal.         Mood and Affect: Mood normal.         Speech: Speech normal.         Behavior: Behavior normal.         Thought Content: Thought content normal.         Judgment: Judgment normal.          Laboratory:  CBC:   Recent Labs   Lab 08/20/24  0604 08/21/24  0542 08/22/24  0700   WBC 5.28 4.43 4.86   RBC 2.85* 2.82* 2.62*   HGB 9.0* 8.9* 8.0*   HCT 26.4* 26.7* 24.7*    312 303   MCV 93 95 94   MCH 31.6* 31.6* 30.5   MCHC 34.1 33.3 32.4     CMP:   Recent Labs   Lab 08/20/24  0604 08/21/24  0542 08/22/24  0700    114* 117*   CALCIUM 8.6* 8.6* 8.5*   ALBUMIN 2.4* 2.2* 2.2*    139 139   K 3.6 3.4* 3.2*   CO2 23 20* 24    109 109   BUN 12 15 15   CREATININE 1.6* 1.5* 1.5*     Labs within the past 24 hours have been reviewed.    Diagnostic Results:  US - Kidney: Results for orders placed during the hospital encounter of 07/29/24    US Transplant Kidney With Doppler    Narrative  EXAMINATION:  US TRANSPLANT KIDNEY WITH DOPPLER    CLINICAL HISTORY:  complication after renal transplant;    TECHNIQUE:  Real time gray scale and doppler ultrasound was performed over the patient's renal allograft.    COMPARISON:  CT abdomen pelvis 07/29/2024; u/S transplant kidney with Doppler 07/25/2024    FINDINGS:  Patient is status post renal allograft in the right lower quadrant on 07/24/2024.  Hilum is located posterior medially, previously posterolaterally.  The allograft measures 9.7 cm. Normal perfusion. No hydronephrosis.  Ureteral stent is possibly visualized near the hilum; however, it is not visualized within the bladder.    Minimally complex collection adjacent to the inferior pole measuring 3.0 x 3.2 x 3.0 cm.    Minimally complex collection superficial to the renal allograft measuring 3.5 x 1.6 x 1.0 cm.    Small amount of peritoneal free fluid.    Vasculature:    Resistive indices of the arteries:    Interlobar: 0.78 with normal waveform, previously  0.76    Upper segment: 0.72 with normal waveform, previously 0.89    Mid segment: 0.81 with normal waveform, previously 0.82    Lower segment: 0.81 with normal waveform, previously 0.87    Main renal artery peak systolic velocity: 230cm/sec with normal waveform, previously 175cm/sec.    Renal artery/iliac ratio: 1.4.    The main renal vein is patent.    Impression  Slight improvement of segmental renal arterial velocities, which remain mildly elevated within the mid and lower segments.    Peritransplant fluid collections as detailed above.    Electronically signed by resident: Kyle Nieto  Date:    08/01/2024  Time:    13:15    Electronically signed by: Kevyn Reynoso MD  Date:    08/01/2024  Time:    13:56

## 2024-08-22 NOTE — SUBJECTIVE & OBJECTIVE
"Interval HPI:   No acute events overnight. Patient in room 07447/51522 A. Blood glucose  variable . BG at, above, and below goal on current insulin regimen (SSI and prandial insulin). Steroid use- Prednisone 5 mg.    Renal function- Abnormal    Lab Results   Component Value Date    CREATININE 1.5 (H) 08/22/2024     Vasopressors-  None     Endocrine will continue to follow and manage insulin orders inpatient.     Diet diabetic Renal; 2000 Calorie     Eating:   <25%  Nausea: No  Hypoglycemia and intervention: No  Fever: No  TPN and/or TF: No  If yes, type of TF/TPN and rate: N/A    BP (!) 114/56 (BP Location: Right arm, Patient Position: Standing)   Pulse 77   Temp 97.7 °F (36.5 °C) (Oral)   Resp 18   Ht 5' 5" (1.651 m)   Wt 81.6 kg (180 lb)   SpO2 97%   BMI 29.95 kg/m²     Labs Reviewed and Include    Recent Labs   Lab 08/22/24  0700   *   CALCIUM 8.5*   ALBUMIN 2.2*      K 3.2*   CO2 24      BUN 15   CREATININE 1.5*     Lab Results   Component Value Date    WBC 4.86 08/22/2024    HGB 8.0 (L) 08/22/2024    HCT 24.7 (L) 08/22/2024    MCV 94 08/22/2024     08/22/2024     No results for input(s): "TSH", "FREET4" in the last 168 hours.  Lab Results   Component Value Date    HGBA1C 4.8 07/24/2024    HGBA1C 4.8 07/24/2024       Nutritional status:   Body mass index is 29.95 kg/m².  Lab Results   Component Value Date    ALBUMIN 2.2 (L) 08/22/2024    ALBUMIN 2.2 (L) 08/21/2024    ALBUMIN 2.4 (L) 08/20/2024     No results found for: "PREALBUMIN"    Estimated Creatinine Clearance: 39.4 mL/min (A) (based on SCr of 1.5 mg/dL (H)).    Accu-Checks  Recent Labs     08/19/24 2028 08/20/24  0916 08/20/24  1323 08/20/24  1806 08/20/24  2209 08/21/24  0857 08/21/24  1308 08/21/24  1735 08/21/24  2113 08/22/24  0814   POCTGLUCOSE 201* 132* 126* 196* 144* 111* 152* 125* 209* 114*       Current Medications and/or Treatments Impacting Glycemic Control  Immunotherapy:    Immunosuppressants           Stop " Route Frequency     tacrolimus capsule 3 mg         -- Oral 2 times daily          Steroids:   Hormones (From admission, onward)      Start     Stop Route Frequency Ordered    08/17/24 0900  predniSONE tablet 5 mg         -- Oral Daily 08/16/24 2320 08/16/24 2320  melatonin tablet 6 mg         -- Oral Nightly PRN 08/16/24 2320          Pressors:    Autonomic Drugs (From admission, onward)      None          Hyperglycemia/Diabetes Medications:   Antihyperglycemics (From admission, onward)      Start     Stop Route Frequency Ordered    08/18/24 1200  insulin aspart U-100 pen 6 Units         -- SubQ 3 times daily with meals 08/18/24 0923 08/16/24 2320  insulin aspart U-100 pen 0-5 Units         -- SubQ Before meals & nightly PRN 08/16/24 2320

## 2024-08-22 NOTE — PLAN OF CARE
Pt aaox4 vswnl and c/o constipation. Sudha VALE notified and Miralax and Dulcolax suppository ordered. Pt stated had a small bm after suppository. Bed in low position and callbell within reach. Standing BP's maintained. Accu ck at 7280=084. Rt JPcharged/patent/intact.  Left wound vac intact at 75mmHG. Cpap at night in use. Pt ambulates independently.

## 2024-08-22 NOTE — PROGRESS NOTES
[facility] - Transplant Stepdown  Endocrinology  Progress Note    Admit Date: 8/16/2024     Reason for Consult: Management of T2DM, Hyperglycemia     Surgical Procedure and Date:  kidney transplant 7/24/2024     Diabetes diagnosis year: 2004    Home Diabetes Medications:    - Novolog 6 units TIDWM  - Novolog SSI for BG excursions:  Add correction scale if needed.  Blood sugar 150 to 200 add 2 units  Blood sugar 201 to 250 add 4 units  Blood sugar 251 to 300 add 6 units  Blood sugar 301 to 350 add 8 units  Blood sugar greater than 350 add 10 units    Lab Results   Component Value Date    HGBA1C 4.8 07/24/2024    HGBA1C 4.8 07/24/2024       How often checking glucose at home?  3x daily     BG readings on regimen: 140-<200  Hypoglycemia on the regimen?  No  Missed doses on regimen?  No    Diabetes Complications include:     none    Complicating diabetes co morbidities:   S/p kidney txp, glucocorticoid use       HPI:   Patient is a 63 y.o. 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 PMHx born w solitary kidney, DM2, HTN, incontience (s/p bladder sling), CAD with stents (on ASA), atrial fibrillation, CVA (no residual deficits), ALEJA, and anemia. Surgery without complications. PD cath removed. Post op course notable for down trending Cr with excellent UOP. Patient recently admitted 8/8 to 8/13 d/t incisional drainage. CT A/P noted enlarged fluid collection around the right renal transplant and extending into the RLQ abdominal wall. She underwent IR fluid aspiration and drain placement on 8/9. Fluid studies neg for infection or urine leak (WBC 47, segs 10, lymph 83, Cr 1.6). Repeat CT scan done 8/12 reviewed by surgeons, plan to discharge pt with drain in place for continued drainage per MALKA.      Patient now presents as a transfer from OSH due to incisional drainage. Pt presented to her local ED with large amount of bloody drainage from her transplant incision. Wound has 3 cm opening in  "medial aspect with old bloody drainage CT A/P at OSH w/ large collection 17 x 4 x 3 cm. Her percutaneous drain remains in place and per patient turned milky and purulent appearing day prior to presenting to the OSH.  Pt reports overall feeling well. Pt denies fever/chills, decreased UOP, dysuria. Plan for infectious work-up, IV abx, wound care consult. Endocrinology consulted for management of T2DM.         Interval HPI:   No acute events overnight. Patient in room 06764/58770 A. Blood glucose  variable . BG at, above, and below goal on current insulin regimen (SSI and prandial insulin). Steroid use- Prednisone 5 mg.    Renal function- Abnormal    Lab Results   Component Value Date    CREATININE 1.5 (H) 08/22/2024     Vasopressors-  None     Endocrine will continue to follow and manage insulin orders inpatient.     Diet diabetic Renal; 2000 Calorie     Eating:   <25%  Nausea: No  Hypoglycemia and intervention: No  Fever: No  TPN and/or TF: No  If yes, type of TF/TPN and rate: N/A    BP (!) 114/56 (BP Location: Right arm, Patient Position: Standing)   Pulse 77   Temp 97.7 °F (36.5 °C) (Oral)   Resp 18   Ht 5' 5" (1.651 m)   Wt 81.6 kg (180 lb)   SpO2 97%   BMI 29.95 kg/m²     Labs Reviewed and Include    Recent Labs   Lab 08/22/24  0700   *   CALCIUM 8.5*   ALBUMIN 2.2*      K 3.2*   CO2 24      BUN 15   CREATININE 1.5*     Lab Results   Component Value Date    WBC 4.86 08/22/2024    HGB 8.0 (L) 08/22/2024    HCT 24.7 (L) 08/22/2024    MCV 94 08/22/2024     08/22/2024     No results for input(s): "TSH", "FREET4" in the last 168 hours.  Lab Results   Component Value Date    HGBA1C 4.8 07/24/2024    HGBA1C 4.8 07/24/2024       Nutritional status:   Body mass index is 29.95 kg/m².  Lab Results   Component Value Date    ALBUMIN 2.2 (L) 08/22/2024    ALBUMIN 2.2 (L) 08/21/2024    ALBUMIN 2.4 (L) 08/20/2024     No results found for: "PREALBUMIN"    Estimated Creatinine Clearance: 39.4 " mL/min (A) (based on SCr of 1.5 mg/dL (H)).    Accu-Checks  Recent Labs     08/19/24  2028 08/20/24  0916 08/20/24  1323 08/20/24  1806 08/20/24  2209 08/21/24  0857 08/21/24  1308 08/21/24  1735 08/21/24  2113 08/22/24  0814   POCTGLUCOSE 201* 132* 126* 196* 144* 111* 152* 125* 209* 114*       Current Medications and/or Treatments Impacting Glycemic Control  Immunotherapy:    Immunosuppressants           Stop Route Frequency     tacrolimus capsule 3 mg         -- Oral 2 times daily          Steroids:   Hormones (From admission, onward)      Start     Stop Route Frequency Ordered    08/17/24 0900  predniSONE tablet 5 mg         -- Oral Daily 08/16/24 2320 08/16/24 2320  melatonin tablet 6 mg         -- Oral Nightly PRN 08/16/24 2320          Pressors:    Autonomic Drugs (From admission, onward)      None          Hyperglycemia/Diabetes Medications:   Antihyperglycemics (From admission, onward)      Start     Stop Route Frequency Ordered    08/18/24 1200  insulin aspart U-100 pen 6 Units         -- SubQ 3 times daily with meals 08/18/24 0923    08/16/24 2320  insulin aspart U-100 pen 0-5 Units         -- SubQ Before meals & nightly PRN 08/16/24 2320            ASSESSMENT and PLAN    Renal/  S/P kidney transplant  Managed per primary team  Avoid hypoglycemia        ID  * Wound infection after surgery  Managed per primary team  Optimize BG control        Immunology/Multi System  Prophylactic immunotherapy  May increase insulin resistance.         Endocrine  Diabetes mellitus due to underlying condition with chronic kidney disease on chronic dialysis, with long-term current use of insulin  BG goal 140-180    Continue Novolog 6 units TID with meals (home dose)   Low Dose Correction Scale  BG monitoring ac/hs    ** Please call Endocrine for any BG related issues **            Ranjana Knott PA-C  Endocrinology  [facility] - Transplant Stepdown

## 2024-08-22 NOTE — PLAN OF CARE
Patient aao, independent with ambulation. Vitals stable. Afebrile. Per CT scan fluid collections improving. WV remains, dressing change due tomorrow. MALKA with small amount cloudy drainage. K replaced. BG controlled. Awaiting DC plan.

## 2024-08-22 NOTE — PROGRESS NOTES
Update:       met with patient during rounds (patients daughter on speaker phone).  present during this encounter. Per rounds this morning patient will be able to discharge with PO abx.  discussed this with patient and daughter, both report understanding.  also discussed importance of medication compliance with patient and daughter. Patient's daughter reports she will assist her mother with her medicine. Patient voiced understanding the importance of taking medication on time and correctly. Patients daughter reports she will be able to check in on her mother nightly to assist with medications, meals and other household tasks. Patient reports plan to hire [employer] to assist her as needed during the day. Home wound vac referral sent to [facility] (ph:[phone]/fax: [phone]).  contacted patients home [facility] [address] [place] [address] ([phone] ph / [phone]). Patient denied mental health concerns at this time. Patient reports taking xanax and cymbalta which she reports work well for her. Patient and daughter denied additional needs or concerns at this time.

## 2024-08-23 PROBLEM — E87.6 HYPOKALEMIA: Status: ACTIVE | Noted: 2024-08-23

## 2024-08-23 LAB
ALBUMIN SERPL BCP-MCNC: 2.3 G/DL (ref 3.5–5.2)
ANION GAP SERPL CALC-SCNC: 8 MMOL/L (ref 8–16)
BASOPHILS # BLD AUTO: 0.05 K/UL (ref 0–0.2)
BASOPHILS NFR BLD: 1.2 % (ref 0–1.9)
BUN SERPL-MCNC: 15 MG/DL (ref 8–23)
CALCIUM SERPL-MCNC: 8.7 MG/DL (ref 8.7–10.5)
CHLORIDE SERPL-SCNC: 108 MMOL/L (ref 95–110)
CO2 SERPL-SCNC: 23 MMOL/L (ref 23–29)
CREAT SERPL-MCNC: 1.4 MG/DL (ref 0.5–1.4)
DIFFERENTIAL METHOD BLD: ABNORMAL
EOSINOPHIL # BLD AUTO: 0.3 K/UL (ref 0–0.5)
EOSINOPHIL NFR BLD: 6.9 % (ref 0–8)
ERYTHROCYTE [DISTWIDTH] IN BLOOD BY AUTOMATED COUNT: 16 % (ref 11.5–14.5)
EST. GFR  (NO RACE VARIABLE): 41.8 ML/MIN/1.73 M^2
GLUCOSE SERPL-MCNC: 87 MG/DL (ref 70–110)
HCT VFR BLD AUTO: 24 % (ref 37–48.5)
HGB BLD-MCNC: 8 G/DL (ref 12–16)
IMM GRANULOCYTES # BLD AUTO: 0.02 K/UL (ref 0–0.04)
IMM GRANULOCYTES NFR BLD AUTO: 0.5 % (ref 0–0.5)
LYMPHOCYTES # BLD AUTO: 0.5 K/UL (ref 1–4.8)
LYMPHOCYTES NFR BLD: 10.4 % (ref 18–48)
MAGNESIUM SERPL-MCNC: 1.6 MG/DL (ref 1.6–2.6)
MCH RBC QN AUTO: 31.4 PG (ref 27–31)
MCHC RBC AUTO-ENTMCNC: 33.3 G/DL (ref 32–36)
MCV RBC AUTO: 94 FL (ref 82–98)
MONOCYTES # BLD AUTO: 0.3 K/UL (ref 0.3–1)
MONOCYTES NFR BLD: 6.7 % (ref 4–15)
NEUTROPHILS # BLD AUTO: 3.2 K/UL (ref 1.8–7.7)
NEUTROPHILS NFR BLD: 74.3 % (ref 38–73)
NRBC BLD-RTO: 0 /100 WBC
PHOSPHATE SERPL-MCNC: 3 MG/DL (ref 2.7–4.5)
PLATELET # BLD AUTO: 304 K/UL (ref 150–450)
PMV BLD AUTO: 10 FL (ref 9.2–12.9)
POCT GLUCOSE: 116 MG/DL (ref 70–110)
POCT GLUCOSE: 144 MG/DL (ref 70–110)
POCT GLUCOSE: 96 MG/DL (ref 70–110)
POTASSIUM SERPL-SCNC: 3.4 MMOL/L (ref 3.5–5.1)
RBC # BLD AUTO: 2.55 M/UL (ref 4–5.4)
SODIUM SERPL-SCNC: 139 MMOL/L (ref 136–145)
TACROLIMUS BLD-MCNC: 6.1 NG/ML (ref 5–15)
WBC # BLD AUTO: 4.33 K/UL (ref 3.9–12.7)

## 2024-08-23 PROCEDURE — 63600175 PHARM REV CODE 636 W HCPCS

## 2024-08-23 PROCEDURE — 99900035 HC TECH TIME PER 15 MIN (STAT)

## 2024-08-23 PROCEDURE — 25000003 PHARM REV CODE 250: Performed by: PHYSICIAN ASSISTANT

## 2024-08-23 PROCEDURE — A4216 STERILE WATER/SALINE, 10 ML: HCPCS | Performed by: TRANSPLANT SURGERY

## 2024-08-23 PROCEDURE — 80197 ASSAY OF TACROLIMUS: CPT | Performed by: PHYSICIAN ASSISTANT

## 2024-08-23 PROCEDURE — 94761 N-INVAS EAR/PLS OXIMETRY MLT: CPT

## 2024-08-23 PROCEDURE — 25000003 PHARM REV CODE 250: Performed by: TRANSPLANT SURGERY

## 2024-08-23 PROCEDURE — 94660 CPAP INITIATION&MGMT: CPT

## 2024-08-23 PROCEDURE — 20600001 HC STEP DOWN PRIVATE ROOM

## 2024-08-23 PROCEDURE — 83735 ASSAY OF MAGNESIUM: CPT | Performed by: PHYSICIAN ASSISTANT

## 2024-08-23 PROCEDURE — 63600175 PHARM REV CODE 636 W HCPCS: Performed by: PHYSICIAN ASSISTANT

## 2024-08-23 PROCEDURE — 27100171 HC OXYGEN HIGH FLOW UP TO 24 HOURS

## 2024-08-23 PROCEDURE — 97605 NEG PRS WND THER DME<=50SQCM: CPT

## 2024-08-23 PROCEDURE — 25000003 PHARM REV CODE 250

## 2024-08-23 PROCEDURE — 85025 COMPLETE CBC W/AUTO DIFF WBC: CPT | Performed by: PHYSICIAN ASSISTANT

## 2024-08-23 PROCEDURE — 99232 SBSQ HOSP IP/OBS MODERATE 35: CPT | Mod: ,,,

## 2024-08-23 PROCEDURE — 80069 RENAL FUNCTION PANEL: CPT | Performed by: PHYSICIAN ASSISTANT

## 2024-08-23 PROCEDURE — 36415 COLL VENOUS BLD VENIPUNCTURE: CPT | Performed by: PHYSICIAN ASSISTANT

## 2024-08-23 RX ORDER — POTASSIUM CHLORIDE 20 MEQ/1
40 TABLET, EXTENDED RELEASE ORAL ONCE
Status: COMPLETED | OUTPATIENT
Start: 2024-08-23 | End: 2024-08-23

## 2024-08-23 RX ORDER — INSULIN ASPART 100 [IU]/ML
5 INJECTION, SOLUTION INTRAVENOUS; SUBCUTANEOUS
Status: DISCONTINUED | OUTPATIENT
Start: 2024-08-23 | End: 2024-08-25

## 2024-08-23 RX ADMIN — ERTAPENEM 1 G: 1 INJECTION INTRAMUSCULAR; INTRAVENOUS at 01:08

## 2024-08-23 RX ADMIN — CARVEDILOL 6.25 MG: 6.25 TABLET, FILM COATED ORAL at 08:08

## 2024-08-23 RX ADMIN — DOXYCYCLINE HYCLATE 100 MG: 100 TABLET, COATED ORAL at 09:08

## 2024-08-23 RX ADMIN — Medication 800 MG: at 08:08

## 2024-08-23 RX ADMIN — PANTOPRAZOLE SODIUM 40 MG: 40 TABLET, DELAYED RELEASE ORAL at 09:08

## 2024-08-23 RX ADMIN — CETIRIZINE HYDROCHLORIDE 5 MG: 5 TABLET, FILM COATED ORAL at 08:08

## 2024-08-23 RX ADMIN — VALGANCICLOVIR HYDROCHLORIDE 450 MG: 450 TABLET ORAL at 09:08

## 2024-08-23 RX ADMIN — NIFEDIPINE 30 MG: 30 TABLET, FILM COATED, EXTENDED RELEASE ORAL at 09:08

## 2024-08-23 RX ADMIN — MINOXIDIL 5 MG: 2.5 TABLET ORAL at 09:08

## 2024-08-23 RX ADMIN — DOCUSATE SODIUM 100 MG: 100 CAPSULE, LIQUID FILLED ORAL at 09:08

## 2024-08-23 RX ADMIN — PREDNISONE 5 MG: 5 TABLET ORAL at 09:08

## 2024-08-23 RX ADMIN — BISACODYL 10 MG: 5 TABLET, COATED ORAL at 09:08

## 2024-08-23 RX ADMIN — CINACALCET 60 MG: 30 TABLET, FILM COATED ORAL at 07:08

## 2024-08-23 RX ADMIN — TACROLIMUS 3 MG: 1 CAPSULE ORAL at 07:08

## 2024-08-23 RX ADMIN — ALPRAZOLAM 2 MG: 0.5 TABLET ORAL at 08:08

## 2024-08-23 RX ADMIN — Medication 2 TABLET: at 09:08

## 2024-08-23 RX ADMIN — CARVEDILOL 6.25 MG: 6.25 TABLET, FILM COATED ORAL at 09:08

## 2024-08-23 RX ADMIN — Medication 800 MG: at 09:08

## 2024-08-23 RX ADMIN — TACROLIMUS 3 MG: 1 CAPSULE ORAL at 06:08

## 2024-08-23 RX ADMIN — EZETIMIBE 10 MG: 10 TABLET ORAL at 09:08

## 2024-08-23 RX ADMIN — DOCUSATE SODIUM 100 MG: 100 CAPSULE, LIQUID FILLED ORAL at 05:08

## 2024-08-23 RX ADMIN — DULOXETINE 30 MG: 30 CAPSULE, DELAYED RELEASE ORAL at 08:08

## 2024-08-23 RX ADMIN — POTASSIUM CHLORIDE 40 MEQ: 1500 TABLET, EXTENDED RELEASE ORAL at 09:08

## 2024-08-23 RX ADMIN — Medication 10 ML: at 05:08

## 2024-08-23 RX ADMIN — ONDANSETRON 8 MG: 8 TABLET, ORALLY DISINTEGRATING ORAL at 01:08

## 2024-08-23 RX ADMIN — SULFAMETHOXAZOLE AND TRIMETHOPRIM 1 TABLET: 400; 80 TABLET ORAL at 09:08

## 2024-08-23 RX ADMIN — INSULIN ASPART 5 UNITS: 100 INJECTION, SOLUTION INTRAVENOUS; SUBCUTANEOUS at 10:08

## 2024-08-23 RX ADMIN — Medication 2 TABLET: at 08:08

## 2024-08-23 RX ADMIN — ATORVASTATIN CALCIUM 20 MG: 20 TABLET, FILM COATED ORAL at 08:08

## 2024-08-23 RX ADMIN — INSULIN ASPART 5 UNITS: 100 INJECTION, SOLUTION INTRAVENOUS; SUBCUTANEOUS at 06:08

## 2024-08-23 RX ADMIN — NIFEDIPINE 30 MG: 30 TABLET, FILM COATED, EXTENDED RELEASE ORAL at 08:08

## 2024-08-23 RX ADMIN — DOXYCYCLINE HYCLATE 100 MG: 100 TABLET, COATED ORAL at 08:08

## 2024-08-23 NOTE — ASSESSMENT & PLAN NOTE
- Transfer from OSH d/t large wound drainage from kidney transplant incision  - Recently admitted 8/8-8/13 for wound drainage, s/p IR aspiration and drain placement of perinephric collection  - Perc drain now w/ milky purulent output; recent studies show infected fluid collecton  - OSH CT reviewed and shows persistent collection with foci of air  - Incision opened bedside on 8/17; fascia intact and old hematoma evacuated. Wound packed and wound care consulted for wound vac placement  - Wound vac placed 8/17 and tolerating well; will need SW assistance with dispo  - ID consult Monday for outpatient abx plan; cefepime switched to erta and doxy with cultures NGTD but positive cell count from initial drain placement and UA + for ESBL kleb UTI   - Wound vac changes every Tuesday/ Friday.  -  RLQ MALKA drain with 170ml light pink serous fluid within the last 24 hours.  - CT AP 8/21 with improving collections.  - Plan to switch ertapenem to levoflox 750mg PO q48hrs at discharge per ID recs.

## 2024-08-23 NOTE — SUBJECTIVE & OBJECTIVE
"Interval HPI:   No acute events overnight. Patient in room 23082/56071 A. Blood glucose stable. BG at and below goal on current insulin regimen (SSI and prandial insulin). Steroid use- Prednisone 5 mg.    Renal function- Abnormal    Lab Results   Component Value Date    CREATININE 1.4 08/23/2024     Vasopressors-  None     Endocrine will continue to follow and manage insulin orders inpatient.     Diet diabetic 2000 Calorie     Eating:   <25%; expect appetite to improve off of renal diet per patient  Nausea: No  Hypoglycemia and intervention: N/A  Fever: No  TPN and/or TF: No  If yes, type of TF/TPN and rate: N/A    /60 (Patient Position: Standing)   Pulse 69   Temp 97.9 °F (36.6 °C) (Oral)   Resp 18   Ht 5' 5" (1.651 m)   Wt 81.6 kg (180 lb)   SpO2 96%   BMI 29.95 kg/m²     Labs Reviewed and Include    Recent Labs   Lab 08/23/24  0557   GLU 87   CALCIUM 8.7   ALBUMIN 2.3*      K 3.4*   CO2 23      BUN 15   CREATININE 1.4     Lab Results   Component Value Date    WBC 4.33 08/23/2024    HGB 8.0 (L) 08/23/2024    HCT 24.0 (L) 08/23/2024    MCV 94 08/23/2024     08/23/2024     No results for input(s): "TSH", "FREET4" in the last 168 hours.  Lab Results   Component Value Date    HGBA1C 4.8 07/24/2024    HGBA1C 4.8 07/24/2024       Nutritional status:   Body mass index is 29.95 kg/m².  Lab Results   Component Value Date    ALBUMIN 2.3 (L) 08/23/2024    ALBUMIN 2.2 (L) 08/22/2024    ALBUMIN 2.2 (L) 08/21/2024     No results found for: "PREALBUMIN"    Estimated Creatinine Clearance: 42.2 mL/min (based on SCr of 1.4 mg/dL).    Accu-Checks  Recent Labs     08/20/24  2209 08/21/24  0857 08/21/24  1308 08/21/24  1735 08/21/24  2113 08/22/24  0814 08/22/24  1317 08/22/24  1737 08/22/24  2337 08/23/24  0806   POCTGLUCOSE 144* 111* 152* 125* 209* 114* 148* 121* 117* 96       Current Medications and/or Treatments Impacting Glycemic Control  Immunotherapy:    Immunosuppressants           Stop Route " Frequency     tacrolimus capsule 3 mg         -- Oral 2 times daily          Steroids:   Hormones (From admission, onward)      Start     Stop Route Frequency Ordered    08/17/24 0900  predniSONE tablet 5 mg         -- Oral Daily 08/16/24 2320 08/16/24 2320  melatonin tablet 6 mg         -- Oral Nightly PRN 08/16/24 2320          Pressors:    Autonomic Drugs (From admission, onward)      None          Hyperglycemia/Diabetes Medications:   Antihyperglycemics (From admission, onward)      Start     Stop Route Frequency Ordered    08/23/24 1130  insulin aspart U-100 pen 5 Units         -- SubQ 3 times daily with meals 08/23/24 0910 08/16/24 2320  insulin aspart U-100 pen 0-5 Units         -- SubQ Before meals & nightly PRN 08/16/24 2320

## 2024-08-23 NOTE — ASSESSMENT & PLAN NOTE
BG goal 140-180  T2DM. Controlled on home regimen. On steroids    Decrease Novolog to 5 units TID with meals (20% decrease due to decreasing prandial blood glucose levels). HOLD if patient is NPO, unable to eat, eats <25% of meal, or if Blood Glucose is less than 100 mg/dL.   Continue Low Dose Correction Scale  BG monitoring ac/hs    ** Please call Endocrine for any BG related issues **

## 2024-08-23 NOTE — PROGRESS NOTES
Per Andreas (ph: [phone] /fax: [phone]) patient's wound vac has been delivered to room.  confirmed this with bedside RN. Patients home [employer], [facility] [address] [place], [place] [address] ([phone]) report they will see patient on Tuesday 8/27/24. Patient reports her caregivers (her daughter Sarah and paid caregiver Olena) will be available to start care giving this Monday 8/26/24.  spoke with Sarah with MD on speaker phone during rounds who confirmed this.  also called Olena who confirmed this. Patient reports she will speak with family and friends to determine who will be able to transport her on Monday. No other needs reported at this time.       Laura Wood LMSW

## 2024-08-23 NOTE — PROGRESS NOTES
[facility] - Transplant Stepdown  Wound Care    Patient Name:  Joann Keneny   MRN:  [id number]  Date: 8/23/2024  Diagnosis: Wound infection after surgery    History:     Past Medical History:   Diagnosis Date    Anemia     Anxiety     Atrial fibrillation     Bleeding 08/08/2024    Coronary artery disease     Depression     Diabetes mellitus, type 2     Disorder of kidney and ureter     Heart murmur     Hyperlipidemia     Hypertension     Obesity     ALEJA (obstructive sleep apnea)     Proteinuria     Solitary kidney     Stroke     Transient neurological symptoms 11/13/2018       Social History     Socioeconomic History    Marital status: Single   Tobacco Use    Smoking status: Former    Smokeless tobacco: Never   Substance and Sexual Activity    Alcohol use: Not Currently    Drug use: Never    Sexual activity: Not Currently     Partners: Male   Social History Narrative    Caregiver Son Ranjan       Precautions:     Allergies as of 08/16/2024    (No Known Allergies)       [facility] Assessment Details/Treatment   Wound care consult received from MD for wound vac dressing changes.     Gently removed the wound vac drape with adhesive remover and soaked wound vac sponge with vashe before removal. Cleansed wound bed with vashe wound cleanser. Applied no sting skin barrier and drape to the periwound. Applied 1 piece of white sponge and 1 piece of black sponge to wound bed and obtained a seal at the ordered 75mmHg. Patient tolerated wound vac dressing change well. Assessment and picture listed below.     Partial thickness skin loss ot the left buttock with a pink and moist wound bed - likely related to moisture and friction. The periwound is intact, pink, dry and blanchable.     Recommendations:  - Wound care to complete wound vac dressing changes on Tuesdays/Fridays   - If wound vac machine malfunctions for up to 2 hours: 1) reach out to MD or wound care 2) remove wound vac dressing 3) apply gauze moistened with vashe into the wound  bed and cover with a dry dressing bid  - Turning every 2 hours  - Buttocks: bedside nursing to cleanse with bath wipes, pat dry and apply white/zinc barrier cream bid  - Chair cushion      08/23/24 1121        Negative Pressure Wound Therapy  08/17/24 1345 Right   Placement Date/Time: 08/17/24 1345   Side: Right  Location: Lower quadrant   NPWT Type Vacuum Therapy   Therapy Setting NPWT Continuous therapy   Pressure Setting NPWT 75 mmHg   Sponges Inserted NPWT Black;White   Sponges Removed NPWT White;Black        Wound 07/24/24 1354 Incision Right lower Abdomen low transverse   Date First Assessed/Time First Assessed: 07/24/24 1354   Present on Original Admission: No  Primary Wound Type: Incision  Side: Right  Orientation: lower  Location: Abdomen  Incision Type: low transverse  Closure Method: (c) Staples;Sutures;Other (see...   Wound Image    Dressing Appearance Intact;Moist drainage   Drainage Amount Moderate   Drainage Characteristics/Odor Serosanguineous   Appearance Pink;Red;Moist;Yellow;Tan;Adipose   Periwound Area Macerated;Pink   Wound Length (cm) 1.1 cm   Wound Width (cm) 11.2 cm   Wound Depth (cm) 6.1 cm   Wound Volume (cm^3) 75.152 cm^3   Wound Surface Area (cm^2) 12.32 cm^2   Care Cleansed with:;Wound cleanser;Other (see comments)  (vashe)   Dressing Changed;Other (comment)  (black and white wound vac sponge)   Periwound Care Skin barrier film applied        Wound 08/23/24 1121 Moisture associated dermatitis Buttocks   Date First Assessed/Time First Assessed: 08/23/24 1121   Present on Original Admission: No  Primary Wound Type: Moisture associated dermatitis  Location: Buttocks   Wound Image    Dressing Appearance Open to air   Drainage Amount Scant   Appearance Pink;Moist   Periwound Area Intact;Moist;Pink;Other (see comments)  (blanchable)       Recommendations made to primary team for above plan via secure chat. Wound care will follow-up as needed.     08/23/2024

## 2024-08-23 NOTE — SUBJECTIVE & OBJECTIVE
Subjective:   History of Present Illness:  Joann Kenney is a 62 y/o F s/p DBD kidney transplant 7/24/2024 for ESRD 2/2 Type II DM. PMHx born w solitary kidney, DM2, HTN, incontience (s/p bladder sling), CAD with stents (on ASA), atrial fibrillation, CVA (no residual deficits), ALEJA, and anemia. Surgery without complications. PD cath removed. Post op course notable for down trending Cr with excellent UOP. Patient recently admitted 8/8 to 8/13 d/t incisional drainage. CT A/P noted enlarged fluid collection around the right renal transplant and extending into the RLQ abdominal wall. She underwent IR fluid aspiration and drain placement on 8/9. Fluid studies neg for infection or urine leak (WBC 47, segs 10, lymph 83, Cr 1.6). Repeat CT scan done 8/12 reviewed by surgeons, plan to discharge pt with drain in place for continued drainage per MALKA.     Patient now presents as a transfer from OSH due to incisional drainage. Pt presented to her local ED with large amount of bloody drainage from her transplant incision. Wound has 3 cm opening in medial aspect with old bloody drainage CT A/P at OSH w/ large collection 17 x 4 x 3 cm. Her percutaneous drain remains in place and per patient turned milky and purulent appearing day prior to presenting to the OSH.  Pt reports overall feeling well. Pt denies fever/chills, decreased UOP, dysuria. Plan for infectious work-up, IV abx, wound care consult. Will review CT imaging with transplant surgeon with likely plan for IR consult         Hospital Course:  Patient admitted from OSH for wound leakage.  Delayed wound healing: OSH CT images reviewed; persistent subcutaneous collection with foci of air present. Staples removed from 2/3 of medial part of incision, old hematoma evacuated. Opened part of incision irrigated and assessed, no active bleeding or fascial dehiscence noted. Rest of staples removed 8/20. Wound care consulted, vac placed. Vac changes Tues/Friday. CT AP 8/21 with  collections decreasing in size.   Infection: Drain had turned a milky serous color. Fluid sent for studies. Cell count pos for infection >81,000 WBCs 98% segs. No urine leak. Cx NGTD. Triglycerides negative. Cefepime started. Rest of infectious w/u revealed ESBL klebsiella pneumoniae UTI. ID consulted, plan for 1 week erta + doxy, midline placed then levoflox 750mg PO q48hrs at discharge with 2 additional weeks of doxy    Interval History   NAEON. Patient feeling well this morning, no complaints. Cr stable 1.4. RLQ drain with 170ml SS drainage in last 24 hours. Keep drain charged. VSS. Patient's current discharge plan includes hiring someone who will  help her during the day at the patient's home and her daughter will check on her every evening and provider the patient groceries and dinners. Patient's daughter also to do a pill box to ensure patient is taking her medication appropriately. Cont to monitor        Past Medical, Surgical, Family, and Social History:   Unchanged from H&P.    Scheduled Meds:   ALPRAZolam  2 mg Oral QHS    atorvastatin  20 mg Oral QHS    bisacodyL  10 mg Oral Daily    carvediloL  6.25 mg Oral BID    cetirizine  5 mg Oral QHS    cinacalcet  60 mg Oral Daily with breakfast    docusate sodium  100 mg Oral TID PC    doxycycline  100 mg Oral Q12H    DULoxetine  30 mg Oral QHS    ertapenem (INVanz) IV (PEDS and ADULTS)  1 g Intravenous Q24H    ezetimibe  10 mg Oral Daily    insulin aspart U-100  5 Units Subcutaneous TIDWM    k phos di & mono-sod phos mono  500 mg Oral BID    magnesium oxide  800 mg Oral BID    minoxidiL  5 mg Oral Daily    NIFEdipine  30 mg Oral BID    pantoprazole  40 mg Oral Daily    predniSONE  5 mg Oral Daily    sodium chloride 0.9%  10 mL Intravenous Q6H    sulfamethoxazole-trimethoprim 400-80mg  1 tablet Oral QAM    tacrolimus  3 mg Oral BID    valGANciclovir  450 mg Oral Daily     Continuous Infusions:  PRN Meds:  Current Facility-Administered Medications:      acetaminophen, 650 mg, Oral, Q8H PRN    bisacodyL, 10 mg, Rectal, Daily PRN    dextrose 10%, 12.5 g, Intravenous, PRN    dextrose 10%, 25 g, Intravenous, PRN    diphenhydrAMINE-zinc acetate 2-0.1%, , Topical (Top), BID PRN    glucagon (human recombinant), 1 mg, Intramuscular, PRN    glucose, 16 g, Oral, PRN    glucose, 24 g, Oral, PRN    hydrOXYzine HCL, 25 mg, Oral, TID PRN    insulin aspart U-100, 0-5 Units, Subcutaneous, QID (AC + HS) PRN    melatonin, 6 mg, Oral, Nightly PRN    ondansetron, 8 mg, Oral, Q8H PRN    oxybutynin, 5 mg, Oral, TID PRN    oxyCODONE, 5 mg, Oral, Q6H PRN    polyethylene glycol, 17 g, Oral, TID PRN    sodium chloride 0.9%, 10 mL, Intravenous, PRN    Flushing PICC/Midline Protocol, , , Until Discontinued **AND** sodium chloride 0.9%, 10 mL, Intravenous, Q6H **AND** sodium chloride 0.9%, 10 mL, Intravenous, PRN    Intake/Output - Last 3 Shifts         08/21 0700  08/22 0659 08/22 0700  08/23 0659 08/23 0700  08/24 0659    P.O. 1597 700 477    Total Intake(mL/kg) 1597 (19.6) 700 (8.6) 477 (5.8)    Urine (mL/kg/hr) 0 (0) 0 (0)     Drains 180 170     Other  125     Stool 0 0     Total Output 180 295     Net +1417 +405 +477           Urine Occurrence 9 x 6 x 1 x    Stool Occurrence 4 x 1 x              Review of Systems   Constitutional:  Negative for activity change, appetite change and fever.   HENT:  Negative for trouble swallowing.    Respiratory:  Negative for cough and shortness of breath.    Cardiovascular:  Negative for chest pain and leg swelling.   Gastrointestinal:  Negative for abdominal distention, abdominal pain, nausea and vomiting.   Genitourinary:  Negative for decreased urine volume, difficulty urinating, dysuria, frequency and urgency.   Skin:  Positive for wound.   Allergic/Immunologic: Positive for immunocompromised state.   Neurological:  Negative for weakness.   Psychiatric/Behavioral:  Negative for agitation, behavioral problems, confusion and decreased concentration.      "  Objective:     Vital Signs (Most Recent):  Temp: 97.9 °F (36.6 °C) (08/23/24 0717)  Pulse: 69 (08/23/24 0717)  Resp: 18 (08/23/24 0717)  BP: 131/60 (08/23/24 0717)  SpO2: 96 % (08/23/24 0717) Vital Signs (24h Range):  Temp:  [97.6 °F (36.4 °C)-98.5 °F (36.9 °C)] 97.9 °F (36.6 °C)  Pulse:  [63-75] 69  Resp:  [16-18] 18  SpO2:  [95 %-98 %] 96 %  BP: (120-149)/(51-66) 131/60     Weight: 81.6 kg (180 lb)  Height: 5' 5" (165.1 cm)  Body mass index is 29.95 kg/m².     Physical Exam  Vitals and nursing note reviewed.   Constitutional:       General: She is not in acute distress.     Appearance: She is not ill-appearing.   HENT:      Head: Normocephalic and atraumatic.      Mouth/Throat:      Mouth: Mucous membranes are moist.   Eyes:      Extraocular Movements: Extraocular movements intact.      Conjunctiva/sclera: Conjunctivae normal.   Cardiovascular:      Rate and Rhythm: Normal rate.      Pulses: Normal pulses.   Pulmonary:      Effort: Pulmonary effort is normal. No respiratory distress.      Breath sounds: Examination of the right-lower field reveals decreased breath sounds. Examination of the left-lower field reveals decreased breath sounds. Decreased breath sounds present.   Abdominal:      General: Bowel sounds are normal. There is no distension.      Palpations: Abdomen is soft.      Tenderness: There is no abdominal tenderness. There is no guarding or rebound.       Musculoskeletal:         General: No swelling. Normal range of motion.      Cervical back: Normal range of motion.   Skin:     General: Skin is warm and dry.   Neurological:      Mental Status: She is alert and oriented to person, place, and time.      Motor: No weakness.   Psychiatric:         Attention and Perception: Attention normal.         Mood and Affect: Mood normal.         Speech: Speech normal.         Behavior: Behavior normal.         Thought Content: Thought content normal.         Judgment: Judgment normal.          Laboratory:  CBC: "   Recent Labs   Lab 08/21/24  0542 08/22/24  0700 08/23/24  0557   WBC 4.43 4.86 4.33   RBC 2.82* 2.62* 2.55*   HGB 8.9* 8.0* 8.0*   HCT 26.7* 24.7* 24.0*    303 304   MCV 95 94 94   MCH 31.6* 30.5 31.4*   MCHC 33.3 32.4 33.3     CMP:   Recent Labs   Lab 08/21/24  0542 08/22/24  0700 08/23/24  0557   * 117* 87   CALCIUM 8.6* 8.5* 8.7   ALBUMIN 2.2* 2.2* 2.3*    139 139   K 3.4* 3.2* 3.4*   CO2 20* 24 23    109 108   BUN 15 15 15   CREATININE 1.5* 1.5* 1.4     Labs within the past 24 hours have been reviewed.    Diagnostic Results:  None past 24 hours

## 2024-08-23 NOTE — PROGRESS NOTES
[facility]  Kidney Transplant  Progress Note      Reason for Follow-up: Reassessment of Kidney Transplant - 7/24/2024  (#1) recipient and management of immunosuppression.    ORGAN:   RIGHT KIDNEY    Donor Type:   Donation after Brain Death    Donor CMV Status: Positive  Donor HBcAB:Negative  Donor HCV Status:Negative  Donor HBV RAH:   Donor HCV RAH: Negative      Subjective:   History of Present Illness:  Joann Kenney is a 64 y/o F s/p DBD kidney transplant 7/24/2024 for ESRD 2/2 Type II DM. PMHx born w solitary kidney, DM2, HTN, incontience (s/p bladder sling), CAD with stents (on ASA), atrial fibrillation, CVA (no residual deficits), ALEJA, and anemia. Surgery without complications. PD cath removed. Post op course notable for down trending Cr with excellent UOP. Patient recently admitted 8/8 to 8/13 d/t incisional drainage. CT A/P noted enlarged fluid collection around the right renal transplant and extending into the RLQ abdominal wall. She underwent IR fluid aspiration and drain placement on 8/9. Fluid studies neg for infection or urine leak (WBC 47, segs 10, lymph 83, Cr 1.6). Repeat CT scan done 8/12 reviewed by surgeons, plan to discharge pt with drain in place for continued drainage per MALKA.     Patient now presents as a transfer from OSH due to incisional drainage. Pt presented to her local ED with large amount of bloody drainage from her transplant incision. Wound has 3 cm opening in medial aspect with old bloody drainage CT A/P at OSH w/ large collection 17 x 4 x 3 cm. Her percutaneous drain remains in place and per patient turned milky and purulent appearing day prior to presenting to the OSH.  Pt reports overall feeling well. Pt denies fever/chills, decreased UOP, dysuria. Plan for infectious work-up, IV abx, wound care consult. Will review CT imaging with transplant surgeon with likely plan for IR consult         Hospital Course:  Patient admitted from OSH for wound leakage.  Delayed  wound healing: OSH CT images reviewed; persistent subcutaneous collection with foci of air present. Staples removed from 2/3 of medial part of incision, old hematoma evacuated. Opened part of incision irrigated and assessed, no active bleeding or fascial dehiscence noted. Rest of staples removed 8/20. Wound care consulted, vac placed. Vac changes Tues/Friday. CT AP 8/21 with collections decreasing in size.   Infection: Drain had turned a milky serous color. Fluid sent for studies. Cell count pos for infection >81,000 WBCs 98% segs. No urine leak. Cx NGTD. Triglycerides negative. Cefepime started. Rest of infectious w/u revealed ESBL klebsiella pneumoniae UTI. ID consulted, plan for 1 week erta + doxy, midline placed then levoflox 750mg PO q48hrs at discharge with 2 additional weeks of doxy    Interval History   NAEON. Patient feeling well this morning, no complaints. Cr stable 1.4. RLQ drain with 170ml SS drainage in last 24 hours. Keep drain charged. VSS. Patient's current discharge plan includes hiring someone who will  help her during the day at the patient's home and her daughter will check on her every evening and provider the patient groceries and dinners. Patient's daughter also to do a pill box to ensure patient is taking her medication appropriately. Cont to monitor        Past Medical, Surgical, Family, and Social History:   Unchanged from H&P.    Scheduled Meds:   ALPRAZolam  2 mg Oral QHS    atorvastatin  20 mg Oral QHS    bisacodyL  10 mg Oral Daily    carvediloL  6.25 mg Oral BID    cetirizine  5 mg Oral QHS    cinacalcet  60 mg Oral Daily with breakfast    docusate sodium  100 mg Oral TID PC    doxycycline  100 mg Oral Q12H    DULoxetine  30 mg Oral QHS    ertapenem (INVanz) IV (PEDS and ADULTS)  1 g Intravenous Q24H    ezetimibe  10 mg Oral Daily    insulin aspart U-100  5 Units Subcutaneous TIDWM    k phos di & mono-sod phos mono  500 mg Oral BID    magnesium oxide  800 mg Oral BID    minoxidiL  5  mg Oral Daily    NIFEdipine  30 mg Oral BID    pantoprazole  40 mg Oral Daily    predniSONE  5 mg Oral Daily    sodium chloride 0.9%  10 mL Intravenous Q6H    sulfamethoxazole-trimethoprim 400-80mg  1 tablet Oral QAM    tacrolimus  3 mg Oral BID    valGANciclovir  450 mg Oral Daily     Continuous Infusions:  PRN Meds:  Current Facility-Administered Medications:     acetaminophen, 650 mg, Oral, Q8H PRN    bisacodyL, 10 mg, Rectal, Daily PRN    dextrose 10%, 12.5 g, Intravenous, PRN    dextrose 10%, 25 g, Intravenous, PRN    diphenhydrAMINE-zinc acetate 2-0.1%, , Topical (Top), BID PRN    glucagon (human recombinant), 1 mg, Intramuscular, PRN    glucose, 16 g, Oral, PRN    glucose, 24 g, Oral, PRN    hydrOXYzine HCL, 25 mg, Oral, TID PRN    insulin aspart U-100, 0-5 Units, Subcutaneous, QID (AC + HS) PRN    melatonin, 6 mg, Oral, Nightly PRN    ondansetron, 8 mg, Oral, Q8H PRN    oxybutynin, 5 mg, Oral, TID PRN    oxyCODONE, 5 mg, Oral, Q6H PRN    polyethylene glycol, 17 g, Oral, TID PRN    sodium chloride 0.9%, 10 mL, Intravenous, PRN    Flushing PICC/Midline Protocol, , , Until Discontinued **AND** sodium chloride 0.9%, 10 mL, Intravenous, Q6H **AND** sodium chloride 0.9%, 10 mL, Intravenous, PRN    Intake/Output - Last 3 Shifts         08/21 0700 08/22 0659 08/22 0700 08/23 0659 08/23 0700 08/24 0659    P.O. 1597 700 477    Total Intake(mL/kg) 1597 (19.6) 700 (8.6) 477 (5.8)    Urine (mL/kg/hr) 0 (0) 0 (0)     Drains 180 170     Other  125     Stool 0 0     Total Output 180 295     Net +1417 +405 +477           Urine Occurrence 9 x 6 x 1 x    Stool Occurrence 4 x 1 x              Review of Systems   Constitutional:  Negative for activity change, appetite change and fever.   HENT:  Negative for trouble swallowing.    Respiratory:  Negative for cough and shortness of breath.    Cardiovascular:  Negative for chest pain and leg swelling.   Gastrointestinal:  Negative for abdominal distention, abdominal pain, nausea  "and vomiting.   Genitourinary:  Negative for decreased urine volume, difficulty urinating, dysuria, frequency and urgency.   Skin:  Positive for wound.   Allergic/Immunologic: Positive for immunocompromised state.   Neurological:  Negative for weakness.   Psychiatric/Behavioral:  Negative for agitation, behavioral problems, confusion and decreased concentration.       Objective:     Vital Signs (Most Recent):  Temp: 97.9 °F (36.6 °C) (08/23/24 0717)  Pulse: 69 (08/23/24 0717)  Resp: 18 (08/23/24 0717)  BP: 131/60 (08/23/24 0717)  SpO2: 96 % (08/23/24 0717) Vital Signs (24h Range):  Temp:  [97.6 °F (36.4 °C)-98.5 °F (36.9 °C)] 97.9 °F (36.6 °C)  Pulse:  [63-75] 69  Resp:  [16-18] 18  SpO2:  [95 %-98 %] 96 %  BP: (120-149)/(51-66) 131/60     Weight: 81.6 kg (180 lb)  Height: 5' 5" (165.1 cm)  Body mass index is 29.95 kg/m².     Physical Exam  Vitals and nursing note reviewed.   Constitutional:       General: She is not in acute distress.     Appearance: She is not ill-appearing.   HENT:      Head: Normocephalic and atraumatic.      Mouth/Throat:      Mouth: Mucous membranes are moist.   Eyes:      Extraocular Movements: Extraocular movements intact.      Conjunctiva/sclera: Conjunctivae normal.   Cardiovascular:      Rate and Rhythm: Normal rate.      Pulses: Normal pulses.   Pulmonary:      Effort: Pulmonary effort is normal. No respiratory distress.      Breath sounds: Examination of the right-lower field reveals decreased breath sounds. Examination of the left-lower field reveals decreased breath sounds. Decreased breath sounds present.   Abdominal:      General: Bowel sounds are normal. There is no distension.      Palpations: Abdomen is soft.      Tenderness: There is no abdominal tenderness. There is no guarding or rebound.       Musculoskeletal:         General: No swelling. Normal range of motion.      Cervical back: Normal range of motion.   Skin:     General: Skin is warm and dry.   Neurological:      Mental " Status: She is alert and oriented to person, place, and time.      Motor: No weakness.   Psychiatric:         Attention and Perception: Attention normal.         Mood and Affect: Mood normal.         Speech: Speech normal.         Behavior: Behavior normal.         Thought Content: Thought content normal.         Judgment: Judgment normal.          Laboratory:  CBC:   Recent Labs   Lab 08/21/24  0542 08/22/24  0700 08/23/24  0557   WBC 4.43 4.86 4.33   RBC 2.82* 2.62* 2.55*   HGB 8.9* 8.0* 8.0*   HCT 26.7* 24.7* 24.0*    303 304   MCV 95 94 94   MCH 31.6* 30.5 31.4*   MCHC 33.3 32.4 33.3     CMP:   Recent Labs   Lab 08/21/24  0542 08/22/24  0700 08/23/24  0557   * 117* 87   CALCIUM 8.6* 8.5* 8.7   ALBUMIN 2.2* 2.2* 2.3*    139 139   K 3.4* 3.2* 3.4*   CO2 20* 24 23    109 108   BUN 15 15 15   CREATININE 1.5* 1.5* 1.4     Labs within the past 24 hours have been reviewed.    Diagnostic Results:  None past 24 hours   Assessment/Plan:     * Wound infection after surgery  - Transfer from OSH d/t large wound drainage from kidney transplant incision  - Recently admitted 8/8-8/13 for wound drainage, s/p IR aspiration and drain placement of perinephric collection  - Perc drain now w/ milky purulent output; recent studies show infected fluid collecton  - OSH CT reviewed and shows persistent collection with foci of air  - Incision opened bedside on 8/17; fascia intact and old hematoma evacuated. Wound packed and wound care consulted for wound vac placement  - Wound vac placed 8/17 and tolerating well; will need  assistance with dispo  - ID consult Monday for outpatient abx plan; cefepime switched to erta and doxy with cultures NGTD but positive cell count from initial drain placement and UA + for ESBL kleb UTI   - Wound vac changes every Tuesday/ Friday.  -  RLQ MALKA drain with 170ml light pink serous fluid within the last 24 hours.  - CT AP 8/21 with improving collections.  - Plan to switch ertapenem  "to levoflox 750mg PO q48hrs at discharge per ID recs.      Hypokalemia  - replacing PO prn   - adjusting diet to include potassium      Infection due to ESBL-producing Klebsiella pneumoniae  - urine culture +   - ID on board   - currently on erta and doxy   - Plan to switch ertapenem to levoflox 750mg PO q48hrs at discharge per ID recs.  - continue doxy for 14 day course of treatment  - cont to monitor     Anemia of chronic disease  - H&H stable   - continue to monitor with daily CBC      Wound drainage  - Transfer from OSH d/t large wound drainage from kidney transplant incision  - Recently admitted 8/8-8/13 for wound drainage, s/p IR aspiration and drain placement of perinephric collection  - Perc drain now w/ milky purulent output; recent studies show infected fluid collecton  - OSH CT reviewed and shows persistent collection with foci of air  - Incision opened bedside on 8/17; fascia intact and old hematoma evacuated. Wound packed and wound care consulted for wound vac placement  - Wound vac placed 8/17 and tolerating well; will need SW assistance with home health wound care and wound vac  - ID consult Monday for outpatient abx plan; cefepime switched to erta and doxy with cultures NGTD but positive cell count from initial drain placement and UA + for ESBL kleb UTI   - repeat CT AP 8/21 to reassess collection/drain     Anxiety  - continue home xanax      Hypertension associated with transplantation  - continue home antihypertensives with hold parameters    Long-term use of immunosuppressant medication  - see Prophylactic immunotherapy"      Prophylactic immunotherapy  - Continue prograf, cellcept, and steroid taper  - Check prograf level daily and adjust for therapeutic dosage. Monitor for toxic side effects       S/P kidney transplant  - S/p Ktxp 7/24/24  - Cr trended down with excellent UOP  - Multiple Readmit d/t wound complications  - Cr stable   - strict Is and Os   - monitor       Other hyperlipidemia  - " continue home statin      Diabetes mellitus due to underlying condition with chronic kidney disease on chronic dialysis, with long-term current use of insulin  - Endocrine consulted  - sliding scale          Discharge Planning:  Poss VT Monday     Medical decision making for this encounter includes review of pertinent labs and diagnostic studies, assessment and planning, discussions with consulting providers, discussion with patient/family, and participation in multidisciplinary rounds. Time spent caring for patient: 60 minutes    Morena Gudino PA-C  Kidney Transplant  [facility]

## 2024-08-23 NOTE — PROGRESS NOTES
[facility] Step[facility]  Endocrinology  Progress Note    Admit Date: 8/16/2024     Reason for Consult: Management of T2DM, Hyperglycemia     Surgical Procedure and Date:  kidney transplant 7/24/2024     Diabetes diagnosis year: 2004    Home Diabetes Medications:    - Novolog 6 units TIDWM  - Novolog SSI for BG excursions:  Add correction scale if needed.  Blood sugar 150 to 200 add 2 units  Blood sugar 201 to 250 add 4 units  Blood sugar 251 to 300 add 6 units  Blood sugar 301 to 350 add 8 units  Blood sugar greater than 350 add 10 units    Lab Results   Component Value Date    HGBA1C 4.8 07/24/2024    HGBA1C 4.8 07/24/2024       How often checking glucose at home?  3x daily     BG readings on regimen: 140-<200  Hypoglycemia on the regimen?  No  Missed doses on regimen?  No    Diabetes Complications include:     none    Complicating diabetes co morbidities:   S/p kidney txp, glucocorticoid use       HPI:   Patient is a 63 y.o. 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 PMHx born w solitary kidney, DM2, HTN, incontience (s/p bladder sling), CAD with stents (on ASA), atrial fibrillation, CVA (no residual deficits), ALEJA, and anemia. Surgery without complications. PD cath removed. Post op course notable for down trending Cr with excellent UOP. Patient recently admitted 8/8 to 8/13 d/t incisional drainage. CT A/P noted enlarged fluid collection around the right renal transplant and extending into the RLQ abdominal wall. She underwent IR fluid aspiration and drain placement on 8/9. Fluid studies neg for infection or urine leak (WBC 47, segs 10, lymph 83, Cr 1.6). Repeat CT scan done 8/12 reviewed by surgeons, plan to discharge pt with drain in place for continued drainage per MALKA.      Patient now presents as a transfer from OSH due to incisional drainage. Pt presented to her local ED with large amount of bloody drainage from her transplant incision. Wound has 3 cm opening in  "medial aspect with old bloody drainage CT A/P at OSH w/ large collection 17 x 4 x 3 cm. Her percutaneous drain remains in place and per patient turned milky and purulent appearing day prior to presenting to the OSH.  Pt reports overall feeling well. Pt denies fever/chills, decreased UOP, dysuria. Plan for infectious work-up, IV abx, wound care consult. Endocrinology consulted for management of T2DM.         Interval HPI:   No acute events overnight. Patient in room 83145/49789 A. Blood glucose stable. BG at and below goal on current insulin regimen (SSI and prandial insulin). Steroid use- Prednisone 5 mg.    Renal function- Abnormal    Lab Results   Component Value Date    CREATININE 1.4 08/23/2024     Vasopressors-  None     Endocrine will continue to follow and manage insulin orders inpatient.     Diet diabetic 2000 Calorie     Eating:   <25%; expect appetite to improve off of renal diet per patient  Nausea: No  Hypoglycemia and intervention: N/A  Fever: No  TPN and/or TF: No  If yes, type of TF/TPN and rate: N/A    /60 (Patient Position: Standing)   Pulse 69   Temp 97.9 °F (36.6 °C) (Oral)   Resp 18   Ht 5' 5" (1.651 m)   Wt 81.6 kg (180 lb)   SpO2 96%   BMI 29.95 kg/m²     Labs Reviewed and Include    Recent Labs   Lab 08/23/24  0557   GLU 87   CALCIUM 8.7   ALBUMIN 2.3*      K 3.4*   CO2 23      BUN 15   CREATININE 1.4     Lab Results   Component Value Date    WBC 4.33 08/23/2024    HGB 8.0 (L) 08/23/2024    HCT 24.0 (L) 08/23/2024    MCV 94 08/23/2024     08/23/2024     No results for input(s): "TSH", "FREET4" in the last 168 hours.  Lab Results   Component Value Date    HGBA1C 4.8 07/24/2024    HGBA1C 4.8 07/24/2024       Nutritional status:   Body mass index is 29.95 kg/m².  Lab Results   Component Value Date    ALBUMIN 2.3 (L) 08/23/2024    ALBUMIN 2.2 (L) 08/22/2024    ALBUMIN 2.2 (L) 08/21/2024     No results found for: "PREALBUMIN"    Estimated Creatinine Clearance: 42.2 " mL/min (based on SCr of 1.4 mg/dL).    Accu-Checks  Recent Labs     08/20/24  2209 08/21/24  0857 08/21/24  1308 08/21/24  1735 08/21/24  2113 08/22/24  0814 08/22/24  1317 08/22/24  1737 08/22/24  2337 08/23/24  0806   POCTGLUCOSE 144* 111* 152* 125* 209* 114* 148* 121* 117* 96       Current Medications and/or Treatments Impacting Glycemic Control  Immunotherapy:    Immunosuppressants           Stop Route Frequency     tacrolimus capsule 3 mg         -- Oral 2 times daily          Steroids:   Hormones (From admission, onward)      Start     Stop Route Frequency Ordered    08/17/24 0900  predniSONE tablet 5 mg         -- Oral Daily 08/16/24 2320    08/16/24 2320  melatonin tablet 6 mg         -- Oral Nightly PRN 08/16/24 2320          Pressors:    Autonomic Drugs (From admission, onward)      None          Hyperglycemia/Diabetes Medications:   Antihyperglycemics (From admission, onward)      Start     Stop Route Frequency Ordered    08/23/24 1130  insulin aspart U-100 pen 5 Units         -- SubQ 3 times daily with meals 08/23/24 0910    08/16/24 2320  insulin aspart U-100 pen 0-5 Units         -- SubQ Before meals & nightly PRN 08/16/24 2320            ASSESSMENT and PLAN    Renal/  S/P kidney transplant  Managed per primary team  Avoid hypoglycemia        ID  * Wound infection after surgery  Managed per primary team  Optimize BG control        Immunology/Multi System  Prophylactic immunotherapy  May increase insulin resistance.         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 Controlled on home regimen. On steroids    Decrease Novolog to 5 units TID with meals (20% decrease due to decreasing prandial blood glucose levels). HOLD if patient is NPO, unable to eat, eats <25% of meal, or if Blood Glucose is less than 100 mg/dL.   Continue Low Dose Correction Scale  BG monitoring ac/hs    **  Please call Endocrine for any BG related issues **            Ranjana Knott PA-C  Endocrinology  [facility] - Trans[facility]

## 2024-08-23 NOTE — NURSING
Patient is AAOx4, VSS, Afebrile,   Patient is on RA   Ambulates independently, stand by assist   MALKA drain dressing clean dry and intact, drainage is serosanguinous and cloudy   Accuchecks HS CB  Non skid socks when ambulating,call light within reach, bed in lowest postion

## 2024-08-24 LAB
ALBUMIN SERPL BCP-MCNC: 2.4 G/DL (ref 3.5–5.2)
ANION GAP SERPL CALC-SCNC: 7 MMOL/L (ref 8–16)
BASOPHILS # BLD AUTO: 0.09 K/UL (ref 0–0.2)
BASOPHILS NFR BLD: 1.6 % (ref 0–1.9)
BUN SERPL-MCNC: 15 MG/DL (ref 8–23)
CALCIUM SERPL-MCNC: 8.8 MG/DL (ref 8.7–10.5)
CHLORIDE SERPL-SCNC: 109 MMOL/L (ref 95–110)
CO2 SERPL-SCNC: 25 MMOL/L (ref 23–29)
CREAT SERPL-MCNC: 1.6 MG/DL (ref 0.5–1.4)
DIFFERENTIAL METHOD BLD: ABNORMAL
EOSINOPHIL # BLD AUTO: 0.3 K/UL (ref 0–0.5)
EOSINOPHIL NFR BLD: 4.7 % (ref 0–8)
ERYTHROCYTE [DISTWIDTH] IN BLOOD BY AUTOMATED COUNT: 16.2 % (ref 11.5–14.5)
EST. GFR  (NO RACE VARIABLE): 35.6 ML/MIN/1.73 M^2
GLUCOSE SERPL-MCNC: 99 MG/DL (ref 70–110)
HCT VFR BLD AUTO: 25.3 % (ref 37–48.5)
HGB BLD-MCNC: 8.3 G/DL (ref 12–16)
IMM GRANULOCYTES # BLD AUTO: 0.04 K/UL (ref 0–0.04)
IMM GRANULOCYTES NFR BLD AUTO: 0.7 % (ref 0–0.5)
LYMPHOCYTES # BLD AUTO: 0.5 K/UL (ref 1–4.8)
LYMPHOCYTES NFR BLD: 9.6 % (ref 18–48)
MAGNESIUM SERPL-MCNC: 1.6 MG/DL (ref 1.6–2.6)
MCH RBC QN AUTO: 30.6 PG (ref 27–31)
MCHC RBC AUTO-ENTMCNC: 32.8 G/DL (ref 32–36)
MCV RBC AUTO: 93 FL (ref 82–98)
MONOCYTES # BLD AUTO: 0.4 K/UL (ref 0.3–1)
MONOCYTES NFR BLD: 6.7 % (ref 4–15)
NEUTROPHILS # BLD AUTO: 4.2 K/UL (ref 1.8–7.7)
NEUTROPHILS NFR BLD: 76.7 % (ref 38–73)
NRBC BLD-RTO: 0 /100 WBC
PHOSPHATE SERPL-MCNC: 2.7 MG/DL (ref 2.7–4.5)
PLATELET # BLD AUTO: 327 K/UL (ref 150–450)
PMV BLD AUTO: 10.3 FL (ref 9.2–12.9)
POCT GLUCOSE: 128 MG/DL (ref 70–110)
POCT GLUCOSE: 146 MG/DL (ref 70–110)
POCT GLUCOSE: 156 MG/DL (ref 70–110)
POCT GLUCOSE: 167 MG/DL (ref 70–110)
POCT GLUCOSE: 92 MG/DL (ref 70–110)
POTASSIUM SERPL-SCNC: 3.9 MMOL/L (ref 3.5–5.1)
RBC # BLD AUTO: 2.71 M/UL (ref 4–5.4)
SODIUM SERPL-SCNC: 141 MMOL/L (ref 136–145)
TACROLIMUS BLD-MCNC: 6.4 NG/ML (ref 5–15)
WBC # BLD AUTO: 5.53 K/UL (ref 3.9–12.7)

## 2024-08-24 PROCEDURE — 63600175 PHARM REV CODE 636 W HCPCS: Performed by: PHYSICIAN ASSISTANT

## 2024-08-24 PROCEDURE — 63600175 PHARM REV CODE 636 W HCPCS

## 2024-08-24 PROCEDURE — 27000221 HC OXYGEN, UP TO 24 HOURS

## 2024-08-24 PROCEDURE — 25000003 PHARM REV CODE 250: Performed by: PHYSICIAN ASSISTANT

## 2024-08-24 PROCEDURE — 85025 COMPLETE CBC W/AUTO DIFF WBC: CPT | Performed by: PHYSICIAN ASSISTANT

## 2024-08-24 PROCEDURE — 99232 SBSQ HOSP IP/OBS MODERATE 35: CPT | Mod: ,,,

## 2024-08-24 PROCEDURE — 99232 SBSQ HOSP IP/OBS MODERATE 35: CPT | Mod: ,,, | Performed by: INTERNAL MEDICINE

## 2024-08-24 PROCEDURE — 80197 ASSAY OF TACROLIMUS: CPT | Performed by: PHYSICIAN ASSISTANT

## 2024-08-24 PROCEDURE — A4216 STERILE WATER/SALINE, 10 ML: HCPCS | Performed by: TRANSPLANT SURGERY

## 2024-08-24 PROCEDURE — 25000003 PHARM REV CODE 250

## 2024-08-24 PROCEDURE — 99900035 HC TECH TIME PER 15 MIN (STAT)

## 2024-08-24 PROCEDURE — 94761 N-INVAS EAR/PLS OXIMETRY MLT: CPT

## 2024-08-24 PROCEDURE — 63600175 PHARM REV CODE 636 W HCPCS: Performed by: TRANSPLANT SURGERY

## 2024-08-24 PROCEDURE — 20600001 HC STEP DOWN PRIVATE ROOM

## 2024-08-24 PROCEDURE — 94660 CPAP INITIATION&MGMT: CPT

## 2024-08-24 PROCEDURE — 25000003 PHARM REV CODE 250: Performed by: TRANSPLANT SURGERY

## 2024-08-24 PROCEDURE — 80069 RENAL FUNCTION PANEL: CPT | Performed by: PHYSICIAN ASSISTANT

## 2024-08-24 PROCEDURE — 83735 ASSAY OF MAGNESIUM: CPT | Performed by: PHYSICIAN ASSISTANT

## 2024-08-24 RX ORDER — LEVOFLOXACIN 250 MG/1
750 TABLET ORAL EVERY OTHER DAY
Status: DISCONTINUED | OUTPATIENT
Start: 2024-08-25 | End: 2024-08-26 | Stop reason: HOSPADM

## 2024-08-24 RX ORDER — HEPARIN SODIUM 5000 [USP'U]/ML
5000 INJECTION, SOLUTION INTRAVENOUS; SUBCUTANEOUS EVERY 8 HOURS
Status: DISCONTINUED | OUTPATIENT
Start: 2024-08-24 | End: 2024-08-26 | Stop reason: HOSPADM

## 2024-08-24 RX ORDER — MINOXIDIL 2.5 MG/1
2.5 TABLET ORAL DAILY
Status: DISCONTINUED | OUTPATIENT
Start: 2024-08-25 | End: 2024-08-25

## 2024-08-24 RX ORDER — POLYETHYLENE GLYCOL 3350 17 G/17G
17 POWDER, FOR SOLUTION ORAL DAILY PRN
Status: DISCONTINUED | OUTPATIENT
Start: 2024-08-24 | End: 2024-08-26 | Stop reason: HOSPADM

## 2024-08-24 RX ADMIN — TACROLIMUS 3 MG: 1 CAPSULE ORAL at 06:08

## 2024-08-24 RX ADMIN — SULFAMETHOXAZOLE AND TRIMETHOPRIM 1 TABLET: 400; 80 TABLET ORAL at 05:08

## 2024-08-24 RX ADMIN — Medication 10 ML: at 12:08

## 2024-08-24 RX ADMIN — DOXYCYCLINE HYCLATE 100 MG: 100 TABLET, COATED ORAL at 08:08

## 2024-08-24 RX ADMIN — INSULIN ASPART 5 UNITS: 100 INJECTION, SOLUTION INTRAVENOUS; SUBCUTANEOUS at 06:08

## 2024-08-24 RX ADMIN — Medication 2 TABLET: at 08:08

## 2024-08-24 RX ADMIN — MINOXIDIL 2.5 MG: 2.5 TABLET ORAL at 11:08

## 2024-08-24 RX ADMIN — CARVEDILOL 6.25 MG: 6.25 TABLET, FILM COATED ORAL at 09:08

## 2024-08-24 RX ADMIN — Medication 10 ML: at 06:08

## 2024-08-24 RX ADMIN — DOCUSATE SODIUM 100 MG: 100 CAPSULE, LIQUID FILLED ORAL at 01:08

## 2024-08-24 RX ADMIN — INSULIN ASPART 5 UNITS: 100 INJECTION, SOLUTION INTRAVENOUS; SUBCUTANEOUS at 09:08

## 2024-08-24 RX ADMIN — VALGANCICLOVIR HYDROCHLORIDE 450 MG: 450 TABLET ORAL at 08:08

## 2024-08-24 RX ADMIN — BISACODYL 10 MG: 5 TABLET, COATED ORAL at 08:08

## 2024-08-24 RX ADMIN — CETIRIZINE HYDROCHLORIDE 5 MG: 5 TABLET, FILM COATED ORAL at 09:08

## 2024-08-24 RX ADMIN — PANTOPRAZOLE SODIUM 40 MG: 40 TABLET, DELAYED RELEASE ORAL at 08:08

## 2024-08-24 RX ADMIN — Medication 800 MG: at 08:08

## 2024-08-24 RX ADMIN — EZETIMIBE 10 MG: 10 TABLET ORAL at 08:08

## 2024-08-24 RX ADMIN — PREDNISONE 5 MG: 5 TABLET ORAL at 08:08

## 2024-08-24 RX ADMIN — DOXYCYCLINE HYCLATE 100 MG: 100 TABLET, COATED ORAL at 09:08

## 2024-08-24 RX ADMIN — CINACALCET 60 MG: 30 TABLET, FILM COATED ORAL at 08:08

## 2024-08-24 RX ADMIN — DOCUSATE SODIUM 100 MG: 100 CAPSULE, LIQUID FILLED ORAL at 08:08

## 2024-08-24 RX ADMIN — TACROLIMUS 3 MG: 1 CAPSULE ORAL at 08:08

## 2024-08-24 RX ADMIN — INSULIN ASPART 5 UNITS: 100 INJECTION, SOLUTION INTRAVENOUS; SUBCUTANEOUS at 01:08

## 2024-08-24 RX ADMIN — HEPARIN SODIUM 5000 UNITS: 5000 INJECTION INTRAVENOUS; SUBCUTANEOUS at 09:08

## 2024-08-24 RX ADMIN — Medication 10 ML: at 11:08

## 2024-08-24 RX ADMIN — DOCUSATE SODIUM 100 MG: 100 CAPSULE, LIQUID FILLED ORAL at 09:08

## 2024-08-24 RX ADMIN — DULOXETINE 30 MG: 30 CAPSULE, DELAYED RELEASE ORAL at 09:08

## 2024-08-24 RX ADMIN — ALPRAZOLAM 2 MG: 0.5 TABLET ORAL at 09:08

## 2024-08-24 RX ADMIN — HEPARIN SODIUM 5000 UNITS: 5000 INJECTION INTRAVENOUS; SUBCUTANEOUS at 01:08

## 2024-08-24 RX ADMIN — Medication 2 TABLET: at 09:08

## 2024-08-24 RX ADMIN — ATORVASTATIN CALCIUM 20 MG: 20 TABLET, FILM COATED ORAL at 09:08

## 2024-08-24 RX ADMIN — CARVEDILOL 6.25 MG: 6.25 TABLET, FILM COATED ORAL at 08:08

## 2024-08-24 RX ADMIN — Medication 800 MG: at 09:08

## 2024-08-24 NOTE — PLAN OF CARE
K transplant incision to RLQ with delayed wound healing and infection--wound vac in place, 75 mmHg continuous suction. MALKA drain to RLQ.     ACHS, SSI not indicated.     Moisture associated dermatitis to bottom--wound care provided per order.     Orientedx4. VSS. Afebrile. Room air. Up ad ben, steady gait. Denies pain. Fall precautions maintained. Call light within reach. Pt verbalized understanding to call nurse as needed. No concerns voiced at this time.

## 2024-08-24 NOTE — SUBJECTIVE & OBJECTIVE
"Interval HPI:   No acute events overnight. Patient in room 72097/82553 A. Blood glucose stable. BG at and below goal on current insulin regimen (SSI and prandial insulin). Steroid use- Prednisone 5 mg.    Renal function- Abnormal    Lab Results   Component Value Date    CREATININE 1.6 (H) 2024     Vasopressors-  None     Endocrine will continue to follow and manage insulin orders inpatient.     Diet diabetic  Calorie     Eatin%  Nausea: No  Hypoglycemia and intervention: No  Fever: No  TPN and/or TF: No  If yes, type of TF/TPN and rate: N/A    /65 (BP Location: Right arm, Patient Position: Standing)   Pulse 62   Temp 97.8 °F (36.6 °C) (Oral)   Resp 18   Ht 5' 5" (1.651 m)   Wt 81.6 kg (180 lb)   SpO2 (!) 94%   BMI 29.95 kg/m²     Labs Reviewed and Include    Recent Labs   Lab 24  0506   GLU 99   CALCIUM 8.8   ALBUMIN 2.4*      K 3.9   CO2 25      BUN 15   CREATININE 1.6*     Lab Results   Component Value Date    WBC 5.53 2024    HGB 8.3 (L) 2024    HCT 25.3 (L) 2024    MCV 93 2024     2024     No results for input(s): "TSH", "FREET4" in the last 168 hours.  Lab Results   Component Value Date    HGBA1C 4.8 2024    HGBA1C 4.8 2024       Nutritional status:   Body mass index is 29.95 kg/m².  Lab Results   Component Value Date    ALBUMIN 2.4 (L) 2024    ALBUMIN 2.3 (L) 2024    ALBUMIN 2.2 (L) 2024     No results found for: "PREALBUMIN"    Estimated Creatinine Clearance: 37 mL/min (A) (based on SCr of 1.6 mg/dL (H)).    Accu-Checks  Recent Labs     24  2113 24  0814 24  1317 24  1737 24  2337 24  0806 24  1301 24  1724 24  2253 24  0815   POCTGLUCOSE 209* 114* 148* 121* 117* 96 116* 144* 167* 92       Current Medications and/or Treatments Impacting Glycemic Control  Immunotherapy:    Immunosuppressants           Stop Route Frequency     tacrolimus " capsule 3 mg         -- Oral 2 times daily          Steroids:   Hormones (From admission, onward)      Start     Stop Route Frequency Ordered    08/17/24 0900  predniSONE tablet 5 mg         -- Oral Daily 08/16/24 2320 08/16/24 2320  melatonin tablet 6 mg         -- Oral Nightly PRN 08/16/24 2320          Pressors:    Autonomic Drugs (From admission, onward)      None          Hyperglycemia/Diabetes Medications:   Antihyperglycemics (From admission, onward)      Start     Stop Route Frequency Ordered    08/23/24 1130  insulin aspart U-100 pen 5 Units         -- SubQ 3 times daily with meals 08/23/24 0910 08/16/24 2320  insulin aspart U-100 pen 0-5 Units         -- SubQ Before meals & nightly PRN 08/16/24 2320

## 2024-08-24 NOTE — PLAN OF CARE
Cr=1.4. CO2=23. Mg+2=1.6. Magnesium Oxide po continues BID. K+=3.4. K-Dur 40meq po given. Afebrile. Ertapenem and Doxycycline continues daily. Wound vac remains intact. Dressing changed by Wound Care RN today. Abd drain draining cloudy serous drainage. Denies pain. Glucoses monitored. Insulin given as indicated. Reinforced to wear non-skid socks and call for assistance with ambulation to prevent falling. Verbalized understanding.

## 2024-08-24 NOTE — PROGRESS NOTES
[facility]  Endocrinology  Progress Note    Admit Date: 8/16/2024     Reason for Consult: Management of T2DM, Hyperglycemia     Surgical Procedure and Date:  kidney transplant 7/24/2024     Diabetes diagnosis year: 2004    Home Diabetes Medications:    - Novolog 6 units TIDWM  - Novolog SSI for BG excursions:  Add correction scale if needed.  Blood sugar 150 to 200 add 2 units  Blood sugar 201 to 250 add 4 units  Blood sugar 251 to 300 add 6 units  Blood sugar 301 to 350 add 8 units  Blood sugar greater than 350 add 10 units    Lab Results   Component Value Date    HGBA1C 4.8 07/24/2024    HGBA1C 4.8 07/24/2024       How often checking glucose at home?  3x daily     BG readings on regimen: 140-<200  Hypoglycemia on the regimen?  No  Missed doses on regimen?  No    Diabetes Complications include:     none    Complicating diabetes co morbidities:   S/p kidney txp, glucocorticoid use       HPI:   Patient is a 63 y.o. 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 PMHx born w solitary kidney, DM2, HTN, incontience (s/p bladder sling), CAD with stents (on ASA), atrial fibrillation, CVA (no residual deficits), ALEAJ, and anemia. Surgery without complications. PD cath removed. Post op course notable for down trending Cr with excellent UOP. Patient recently admitted 8/8 to 8/13 d/t incisional drainage. CT A/P noted enlarged fluid collection around the right renal transplant and extending into the RLQ abdominal wall. She underwent IR fluid aspiration and drain placement on 8/9. Fluid studies neg for infection or urine leak (WBC 47, segs 10, lymph 83, Cr 1.6). Repeat CT scan done 8/12 reviewed by surgeons, plan to discharge pt with drain in place for continued drainage per MALKA.      Patient now presents as a transfer from OSH due to incisional drainage. Pt presented to her local ED with large amount of bloody drainage from her transplant incision. Wound has 3 cm opening in  "medial aspect with old bloody drainage CT A/P at OSH w/ large collection 17 x 4 x 3 cm. Her percutaneous drain remains in place and per patient turned milky and purulent appearing day prior to presenting to the OSH.  Pt reports overall feeling well. Pt denies fever/chills, decreased UOP, dysuria. Plan for infectious work-up, IV abx, wound care consult. Endocrinology consulted for management of T2DM.         Interval HPI:   No acute events overnight. Patient in room 03359/64798 A. Blood glucose stable. BG at and below goal on current insulin regimen (SSI and prandial insulin). Steroid use- Prednisone 5 mg.    Renal function- Abnormal    Lab Results   Component Value Date    CREATININE 1.6 (H) 2024     Vasopressors-  None     Endocrine will continue to follow and manage insulin orders inpatient.     Diet diabetic  Calorie     Eatin%  Nausea: No  Hypoglycemia and intervention: No  Fever: No  TPN and/or TF: No  If yes, type of TF/TPN and rate: N/A    /65 (BP Location: Right arm, Patient Position: Standing)   Pulse 62   Temp 97.8 °F (36.6 °C) (Oral)   Resp 18   Ht 5' 5" (1.651 m)   Wt 81.6 kg (180 lb)   SpO2 (!) 94%   BMI 29.95 kg/m²     Labs Reviewed and Include    Recent Labs   Lab 24  0506   GLU 99   CALCIUM 8.8   ALBUMIN 2.4*      K 3.9   CO2 25      BUN 15   CREATININE 1.6*     Lab Results   Component Value Date    WBC 5.53 2024    HGB 8.3 (L) 2024    HCT 25.3 (L) 2024    MCV 93 2024     2024     No results for input(s): "TSH", "FREET4" in the last 168 hours.  Lab Results   Component Value Date    HGBA1C 4.8 2024    HGBA1C 4.8 2024       Nutritional status:   Body mass index is 29.95 kg/m².  Lab Results   Component Value Date    ALBUMIN 2.4 (L) 2024    ALBUMIN 2.3 (L) 2024    ALBUMIN 2.2 (L) 2024     No results found for: "PREALBUMIN"    Estimated Creatinine Clearance: 37 mL/min (A) (based on SCr of " 1.6 mg/dL (H)).    Accu-Checks  Recent Labs     08/21/24  2113 08/22/24  0814 08/22/24  1317 08/22/24  1737 08/22/24  2337 08/23/24  0806 08/23/24  1301 08/23/24  1724 08/23/24  2253 08/24/24  0815   POCTGLUCOSE 209* 114* 148* 121* 117* 96 116* 144* 167* 92       Current Medications and/or Treatments Impacting Glycemic Control  Immunotherapy:    Immunosuppressants           Stop Route Frequency     tacrolimus capsule 3 mg         -- Oral 2 times daily          Steroids:   Hormones (From admission, onward)      Start     Stop Route Frequency Ordered    08/17/24 0900  predniSONE tablet 5 mg         -- Oral Daily 08/16/24 2320    08/16/24 2320  melatonin tablet 6 mg         -- Oral Nightly PRN 08/16/24 2320          Pressors:    Autonomic Drugs (From admission, onward)      None          Hyperglycemia/Diabetes Medications:   Antihyperglycemics (From admission, onward)      Start     Stop Route Frequency Ordered    08/23/24 1130  insulin aspart U-100 pen 5 Units         -- SubQ 3 times daily with meals 08/23/24 0910    08/16/24 2320  insulin aspart U-100 pen 0-5 Units         -- SubQ Before meals & nightly PRN 08/16/24 2320            ASSESSMENT and PLAN    Renal/  S/P kidney transplant  Managed per primary team  Avoid hypoglycemia        ID  * Wound infection after surgery  Managed per primary team  Optimize BG control        Immunology/Multi System  Prophylactic immunotherapy  May increase insulin resistance.         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 Controlled on home regimen. On steroids    Continue Novolog 5 units TID with meals. HOLD if patient is NPO, unable to eat, eats <25% of meal, or if Blood Glucose is less than 100 mg/dL.   Continue Low Dose Correction Scale  BG monitoring ac/hs    ** Please call Endocrine for any BG related issues **            Ranjana Knott,  ADONIS  Endocrinology  [facility]

## 2024-08-24 NOTE — ASSESSMENT & PLAN NOTE
BG goal 140-180  T2DM. Controlled on home regimen. On steroids    Continue Novolog 5 units TID with meals. HOLD if patient is NPO, unable to eat, eats <25% of meal, or if Blood Glucose is less than 100 mg/dL.   Continue Low Dose Correction Scale  BG monitoring ac/hs    ** Please call Endocrine for any BG related issues **

## 2024-08-24 NOTE — PROGRESS NOTES
[facility]  Transplant Nephrology  Progress Note    Patient Name: Joann Kenney  MRN: [id number]  Admission Date: 8/16/2024  Hospital Length of Stay: 8 days  Attending Provider: Freddy Ruelas Jr., *   Primary Care Physician: Vijay Blake MD  Principal Problem:Wound infection after surgery    Consults  Subjective:     Interval History: patient seen and stated that she was doing well. Denies any issues at this time. Still with drain in place which is putting out around 280. Does complain of difficulty swallowing the Mg pills. Noted to be hypotensive this AM but denies feeling any light headedness of dizziness.     Review of patient's allergies indicates:  No Known Allergies  Current Facility-Administered Medications   Medication Frequency    acetaminophen tablet 650 mg Q8H PRN    ALPRAZolam tablet 2 mg QHS    atorvastatin tablet 20 mg QHS    bisacodyL EC tablet 10 mg Daily    bisacodyL suppository 10 mg Daily PRN    carvediloL tablet 6.25 mg BID    cetirizine tablet 5 mg QHS    cinacalcet tablet 60 mg Daily with breakfast    dextrose 10% bolus 125 mL 125 mL PRN    dextrose 10% bolus 250 mL 250 mL PRN    diphenhydrAMINE-zinc acetate 2-0.1% cream BID PRN    docusate sodium capsule 100 mg TID PC    doxycycline tablet 100 mg Q12H    DULoxetine DR capsule 30 mg QHS    ertapenem (INVANZ) 1 g in 0.9% NaCl 100 mL IVPB (MB+) Q24H    ezetimibe tablet 10 mg Daily    glucagon (human recombinant) injection 1 mg PRN    glucose chewable tablet 16 g PRN    glucose chewable tablet 24 g PRN    hydrOXYzine HCL tablet 25 mg TID PRN    insulin aspart U-100 pen 0-5 Units QID (AC + HS) PRN    insulin aspart U-100 pen 5 Units TIDWM    k phos di & mono-sod phos mono 250 mg tablet 2 tablet BID    magnesium oxide tablet 800 mg BID    melatonin tablet 6 mg Nightly PRN    minoxidiL tablet 5 mg Daily    ondansetron disintegrating tablet 8 mg Q8H PRN    oxybutynin tablet 5 mg TID PRN    oxyCODONE immediate release  tablet 5 mg Q6H PRN    pantoprazole EC tablet 40 mg Daily    polyethylene glycol packet 17 g TID PRN    predniSONE tablet 5 mg Daily    sodium chloride 0.9% flush 10 mL PRN    sodium chloride 0.9% flush 10 mL Q6H    And    sodium chloride 0.9% flush 10 mL PRN    sulfamethoxazole-trimethoprim 400-80mg per tablet 1 tablet QAM    tacrolimus capsule 3 mg BID    valGANciclovir tablet 450 mg Daily       Objective:     Vital Signs (Most Recent):  Temp: 97.8 °F (36.6 °C) (08/24/24 0731)  Pulse: 62 (08/24/24 0731)  Resp: 18 (08/24/24 0731)  BP: 129/65 (08/24/24 0731)  SpO2: (!) 94 % (08/24/24 0731) Vital Signs (24h Range):  Temp:  [97.6 °F (36.4 °C)-98.3 °F (36.8 °C)] 97.8 °F (36.6 °C)  Pulse:  [62-79] 62  Resp:  [18] 18  SpO2:  [94 %-98 %] 94 %  BP: (102-147)/(51-66) 129/65     Weight: 81.6 kg (180 lb) (08/16/24 2310)  Body mass index is 29.95 kg/m².  Body surface area is 1.93 meters squared.    I/O last 3 completed shifts:  In: 2122 [P.O.:2022; IV Piggyback:100]  Out: 410 [Drains:330; Other:80]    Physical Exam  Vitals reviewed.   Constitutional:       General: She is not in acute distress.     Appearance: Normal appearance. She is not ill-appearing or toxic-appearing.      Comments: Appears stated age   HENT:      Head: Normocephalic and atraumatic.      Right Ear: External ear normal.      Left Ear: External ear normal.      Nose: Nose normal.   Eyes:      General: No scleral icterus.     Conjunctiva/sclera: Conjunctivae normal.   Cardiovascular:      Rate and Rhythm: Normal rate and regular rhythm.      Pulses: Normal pulses.      Heart sounds: Normal heart sounds. No murmur heard.     No friction rub.   Pulmonary:      Effort: Pulmonary effort is normal. No respiratory distress.      Breath sounds: Normal breath sounds. No wheezing or rhonchi.   Abdominal:      General: Bowel sounds are normal. There is no distension.      Palpations: Abdomen is soft.      Tenderness: There is no abdominal tenderness. There is no  guarding.      Comments: Wound vac in place   Musculoskeletal:         General: No swelling or deformity. Normal range of motion.      Cervical back: Normal range of motion and neck supple. No tenderness.      Right lower leg: No edema.      Left lower leg: No edema.   Skin:     General: Skin is warm and dry.      Coloration: Skin is not jaundiced.      Findings: No erythema or rash.   Neurological:      General: No focal deficit present.      Mental Status: She is alert and oriented to person, place, and time.      Motor: No weakness.   Psychiatric:         Mood and Affect: Mood normal.         Behavior: Behavior normal.         Assessment/Plan:   63 yr old  female with ESRD secondary to solitary kidney/DM s/p a kidney transplant on 24. Crossmatch negative. 24% PRA. Patient transferred from OSH due to significant drainage from her transplant incision. CT performed which showed a large collection with drain in place. WOund explored at the bedside and wound vac placed.     1) Klebsiella UTI:   - switch ertapenem to levaquin today per ID recs  2) Infected merle-nephric fluid collection:   - culture negative but cell count concerning for infection   - cont levaquin for 4 weeks until imaging resolution of fluid collection   - cont doxycycline   - wound management per transplant surgery. Wound van   3) s/p  donor kidney transplant:   - near baseline graft function of 1.6   - acceptable FK level. Cont on FK 3 mg BID   - cont to hold MMF for now   - cont on prednisone 5 mg daily  4) Hypertension:   - stop Procardia   - cont on Coreg 6.25 mg PO BID   - decrease minoxidil to 2.5 mg daily  5) type II DM:   - appreciate endocrine's recs   -cont on Novolog 5 units with meals  6) Hypomagnesemia:    - cont on MgOx 800 mg BID  7) Hypophosphatemia:   - cont on kphos 500 mg BID  8) Anemia:   - stable. Monitor    Dispo: pending wound vac set up      Johnna Whelan DO  Nephrology  [facility]

## 2024-08-25 LAB
ALBUMIN SERPL BCP-MCNC: 2.4 G/DL (ref 3.5–5.2)
ANION GAP SERPL CALC-SCNC: 6 MMOL/L (ref 8–16)
BASOPHILS # BLD AUTO: 0.08 K/UL (ref 0–0.2)
BASOPHILS NFR BLD: 1.5 % (ref 0–1.9)
BUN SERPL-MCNC: 14 MG/DL (ref 8–23)
CALCIUM SERPL-MCNC: 9.2 MG/DL (ref 8.7–10.5)
CHLORIDE SERPL-SCNC: 110 MMOL/L (ref 95–110)
CO2 SERPL-SCNC: 25 MMOL/L (ref 23–29)
CREAT SERPL-MCNC: 1.5 MG/DL (ref 0.5–1.4)
DIFFERENTIAL METHOD BLD: ABNORMAL
EOSINOPHIL # BLD AUTO: 0.2 K/UL (ref 0–0.5)
EOSINOPHIL NFR BLD: 4.1 % (ref 0–8)
ERYTHROCYTE [DISTWIDTH] IN BLOOD BY AUTOMATED COUNT: 15.9 % (ref 11.5–14.5)
EST. GFR  (NO RACE VARIABLE): 38.4 ML/MIN/1.73 M^2
GLUCOSE SERPL-MCNC: 93 MG/DL (ref 70–110)
HCT VFR BLD AUTO: 24.5 % (ref 37–48.5)
HGB BLD-MCNC: 8 G/DL (ref 12–16)
IMM GRANULOCYTES # BLD AUTO: 0.03 K/UL (ref 0–0.04)
IMM GRANULOCYTES NFR BLD AUTO: 0.6 % (ref 0–0.5)
LYMPHOCYTES # BLD AUTO: 0.6 K/UL (ref 1–4.8)
LYMPHOCYTES NFR BLD: 11 % (ref 18–48)
MAGNESIUM SERPL-MCNC: 1.6 MG/DL (ref 1.6–2.6)
MCH RBC QN AUTO: 30.5 PG (ref 27–31)
MCHC RBC AUTO-ENTMCNC: 32.7 G/DL (ref 32–36)
MCV RBC AUTO: 94 FL (ref 82–98)
MONOCYTES # BLD AUTO: 0.4 K/UL (ref 0.3–1)
MONOCYTES NFR BLD: 6.9 % (ref 4–15)
NEUTROPHILS # BLD AUTO: 3.9 K/UL (ref 1.8–7.7)
NEUTROPHILS NFR BLD: 75.9 % (ref 38–73)
NRBC BLD-RTO: 0 /100 WBC
PHOSPHATE SERPL-MCNC: 3 MG/DL (ref 2.7–4.5)
PLATELET # BLD AUTO: 310 K/UL (ref 150–450)
PMV BLD AUTO: 10.2 FL (ref 9.2–12.9)
POCT GLUCOSE: 128 MG/DL (ref 70–110)
POCT GLUCOSE: 134 MG/DL (ref 70–110)
POCT GLUCOSE: 147 MG/DL (ref 70–110)
POCT GLUCOSE: 90 MG/DL (ref 70–110)
POTASSIUM SERPL-SCNC: 3.7 MMOL/L (ref 3.5–5.1)
RBC # BLD AUTO: 2.62 M/UL (ref 4–5.4)
SODIUM SERPL-SCNC: 141 MMOL/L (ref 136–145)
TACROLIMUS BLD-MCNC: 5.7 NG/ML (ref 5–15)
WBC # BLD AUTO: 5.18 K/UL (ref 3.9–12.7)

## 2024-08-25 PROCEDURE — 94761 N-INVAS EAR/PLS OXIMETRY MLT: CPT

## 2024-08-25 PROCEDURE — 80197 ASSAY OF TACROLIMUS: CPT | Performed by: PHYSICIAN ASSISTANT

## 2024-08-25 PROCEDURE — 25000003 PHARM REV CODE 250: Performed by: PHYSICIAN ASSISTANT

## 2024-08-25 PROCEDURE — 63600175 PHARM REV CODE 636 W HCPCS

## 2024-08-25 PROCEDURE — 25000003 PHARM REV CODE 250

## 2024-08-25 PROCEDURE — 20600001 HC STEP DOWN PRIVATE ROOM

## 2024-08-25 PROCEDURE — 63600175 PHARM REV CODE 636 W HCPCS: Mod: JZ,EC,JG | Performed by: PHYSICIAN ASSISTANT

## 2024-08-25 PROCEDURE — 27000221 HC OXYGEN, UP TO 24 HOURS

## 2024-08-25 PROCEDURE — 99232 SBSQ HOSP IP/OBS MODERATE 35: CPT | Mod: ,,,

## 2024-08-25 PROCEDURE — 85025 COMPLETE CBC W/AUTO DIFF WBC: CPT | Performed by: PHYSICIAN ASSISTANT

## 2024-08-25 PROCEDURE — 94660 CPAP INITIATION&MGMT: CPT

## 2024-08-25 PROCEDURE — 80069 RENAL FUNCTION PANEL: CPT | Performed by: PHYSICIAN ASSISTANT

## 2024-08-25 PROCEDURE — 99232 SBSQ HOSP IP/OBS MODERATE 35: CPT | Mod: ,,, | Performed by: INTERNAL MEDICINE

## 2024-08-25 PROCEDURE — A4216 STERILE WATER/SALINE, 10 ML: HCPCS | Performed by: TRANSPLANT SURGERY

## 2024-08-25 PROCEDURE — 63600175 PHARM REV CODE 636 W HCPCS: Performed by: TRANSPLANT SURGERY

## 2024-08-25 PROCEDURE — 63600175 PHARM REV CODE 636 W HCPCS: Performed by: PHYSICIAN ASSISTANT

## 2024-08-25 PROCEDURE — 25000003 PHARM REV CODE 250: Performed by: TRANSPLANT SURGERY

## 2024-08-25 PROCEDURE — 83735 ASSAY OF MAGNESIUM: CPT | Performed by: PHYSICIAN ASSISTANT

## 2024-08-25 PROCEDURE — 99900035 HC TECH TIME PER 15 MIN (STAT)

## 2024-08-25 RX ORDER — LIDOCAINE HYDROCHLORIDE 10 MG/ML
1 INJECTION, SOLUTION INFILTRATION; PERINEURAL ONCE
Status: COMPLETED | OUTPATIENT
Start: 2024-08-25 | End: 2024-08-25

## 2024-08-25 RX ORDER — INSULIN ASPART 100 [IU]/ML
4 INJECTION, SOLUTION INTRAVENOUS; SUBCUTANEOUS
Status: DISCONTINUED | OUTPATIENT
Start: 2024-08-25 | End: 2024-08-26

## 2024-08-25 RX ADMIN — HEPARIN SODIUM 5000 UNITS: 5000 INJECTION INTRAVENOUS; SUBCUTANEOUS at 03:08

## 2024-08-25 RX ADMIN — CARVEDILOL 6.25 MG: 6.25 TABLET, FILM COATED ORAL at 09:08

## 2024-08-25 RX ADMIN — CETIRIZINE HYDROCHLORIDE 5 MG: 5 TABLET, FILM COATED ORAL at 08:08

## 2024-08-25 RX ADMIN — DULOXETINE 30 MG: 30 CAPSULE, DELAYED RELEASE ORAL at 08:08

## 2024-08-25 RX ADMIN — ATORVASTATIN CALCIUM 20 MG: 20 TABLET, FILM COATED ORAL at 09:08

## 2024-08-25 RX ADMIN — Medication 10 ML: at 12:08

## 2024-08-25 RX ADMIN — TACROLIMUS 3 MG: 1 CAPSULE ORAL at 06:08

## 2024-08-25 RX ADMIN — BISACODYL 10 MG: 5 TABLET, COATED ORAL at 09:08

## 2024-08-25 RX ADMIN — POLYETHYLENE GLYCOL 3350 17 G: 17 POWDER, FOR SOLUTION ORAL at 09:08

## 2024-08-25 RX ADMIN — EPOETIN ALFA-EPBX 10000 UNITS: 10000 INJECTION, SOLUTION INTRAVENOUS; SUBCUTANEOUS at 01:08

## 2024-08-25 RX ADMIN — SULFAMETHOXAZOLE AND TRIMETHOPRIM 1 TABLET: 400; 80 TABLET ORAL at 05:08

## 2024-08-25 RX ADMIN — CINACALCET 60 MG: 30 TABLET, FILM COATED ORAL at 09:08

## 2024-08-25 RX ADMIN — Medication 800 MG: at 01:08

## 2024-08-25 RX ADMIN — TACROLIMUS 3 MG: 1 CAPSULE ORAL at 09:08

## 2024-08-25 RX ADMIN — INSULIN ASPART 4 UNITS: 100 INJECTION, SOLUTION INTRAVENOUS; SUBCUTANEOUS at 06:08

## 2024-08-25 RX ADMIN — Medication 10 ML: at 06:08

## 2024-08-25 RX ADMIN — CARVEDILOL 6.25 MG: 6.25 TABLET, FILM COATED ORAL at 08:08

## 2024-08-25 RX ADMIN — VALGANCICLOVIR HYDROCHLORIDE 450 MG: 450 TABLET ORAL at 09:08

## 2024-08-25 RX ADMIN — DOCUSATE SODIUM 100 MG: 100 CAPSULE, LIQUID FILLED ORAL at 09:08

## 2024-08-25 RX ADMIN — Medication 2 TABLET: at 08:08

## 2024-08-25 RX ADMIN — DOXYCYCLINE HYCLATE 100 MG: 100 TABLET, COATED ORAL at 09:08

## 2024-08-25 RX ADMIN — INSULIN ASPART 5 UNITS: 100 INJECTION, SOLUTION INTRAVENOUS; SUBCUTANEOUS at 09:08

## 2024-08-25 RX ADMIN — LIDOCAINE HYDROCHLORIDE 1 ML: 10 INJECTION, SOLUTION INFILTRATION; PERINEURAL at 11:08

## 2024-08-25 RX ADMIN — PREDNISONE 5 MG: 5 TABLET ORAL at 09:08

## 2024-08-25 RX ADMIN — Medication 800 MG: at 11:08

## 2024-08-25 RX ADMIN — HEPARIN SODIUM 5000 UNITS: 5000 INJECTION INTRAVENOUS; SUBCUTANEOUS at 05:08

## 2024-08-25 RX ADMIN — PANTOPRAZOLE SODIUM 40 MG: 40 TABLET, DELAYED RELEASE ORAL at 09:08

## 2024-08-25 RX ADMIN — ALPRAZOLAM 2 MG: 0.5 TABLET ORAL at 09:08

## 2024-08-25 RX ADMIN — EZETIMIBE 10 MG: 10 TABLET ORAL at 09:08

## 2024-08-25 RX ADMIN — INSULIN ASPART 4 UNITS: 100 INJECTION, SOLUTION INTRAVENOUS; SUBCUTANEOUS at 01:08

## 2024-08-25 RX ADMIN — HEPARIN SODIUM 5000 UNITS: 5000 INJECTION INTRAVENOUS; SUBCUTANEOUS at 09:08

## 2024-08-25 RX ADMIN — Medication 2 TABLET: at 09:08

## 2024-08-25 RX ADMIN — LEVOFLOXACIN 750 MG: 250 TABLET, FILM COATED ORAL at 09:08

## 2024-08-25 NOTE — PROGRESS NOTES
[facility]  Transplant Nephrology  Progress Note    Patient Name: Joann Kenney  MRN: [id number]  Admission Date: 8/16/2024  Hospital Length of Stay: 9 days  Attending Provider: Freddy Ruelas Jr., *   Primary Care Physician: Vijay Blake MD  Principal Problem:Wound infection after surgery    Consults  Subjective:     Interval History: patient seen and denies any issues. Patient states that she is feeling well. Denies any SOB, nausea, vomiting.or diarrhea. Wound vac delivered on Saturday     Review of patient's allergies indicates:  No Known Allergies  Current Facility-Administered Medications   Medication Frequency    acetaminophen tablet 650 mg Q8H PRN    ALPRAZolam tablet 2 mg QHS    atorvastatin tablet 20 mg QHS    bisacodyL EC tablet 10 mg Daily    bisacodyL suppository 10 mg Daily PRN    carvediloL tablet 6.25 mg BID    cetirizine tablet 5 mg QHS    cinacalcet tablet 60 mg Daily with breakfast    dextrose 10% bolus 125 mL 125 mL PRN    dextrose 10% bolus 250 mL 250 mL PRN    diphenhydrAMINE-zinc acetate 2-0.1% cream BID PRN    docusate sodium capsule 100 mg TID PC    doxycycline tablet 100 mg Q12H    DULoxetine DR capsule 30 mg QHS    ezetimibe tablet 10 mg Daily    glucagon (human recombinant) injection 1 mg PRN    glucose chewable tablet 16 g PRN    glucose chewable tablet 24 g PRN    heparin (porcine) injection 5,000 Units Q8H    hydrOXYzine HCL tablet 25 mg TID PRN    insulin aspart U-100 pen 0-5 Units QID (AC + HS) PRN    insulin aspart U-100 pen 4 Units TIDWM    k phos di & mono-sod phos mono 250 mg tablet 2 tablet BID    levoFLOXacin tablet 750 mg Every other day    LIDOcaine HCL 10 mg/ml (1%) injection 1 mL Once    magnesium oxide tablet 800 mg BID    melatonin tablet 6 mg Nightly PRN    ondansetron disintegrating tablet 8 mg Q8H PRN    oxybutynin tablet 5 mg TID PRN    oxyCODONE immediate release tablet 5 mg Q6H PRN    pantoprazole EC tablet 40 mg Daily    polyethylene  glycol packet 17 g TID PRN    polyethylene glycol packet 17 g Daily PRN    predniSONE tablet 5 mg Daily    sodium chloride 0.9% flush 10 mL PRN    sodium chloride 0.9% flush 10 mL Q6H    And    sodium chloride 0.9% flush 10 mL PRN    sulfamethoxazole-trimethoprim 400-80mg per tablet 1 tablet QAM    tacrolimus capsule 3 mg BID    valGANciclovir tablet 450 mg Daily       Objective:     Vital Signs (Most Recent):  Temp: 98.2 °F (36.8 °C) (08/25/24 1537)  Pulse: 74 (08/25/24 1537)  Resp: 20 (08/25/24 1537)  BP: (!) 145/69 (08/25/24 1537)  SpO2: 97 % (08/25/24 1537) Vital Signs (24h Range):  Temp:  [97.6 °F (36.4 °C)-99.1 °F (37.3 °C)] 98.2 °F (36.8 °C)  Pulse:  [70-81] 74  Resp:  [16-21] 20  SpO2:  [95 %-98 %] 97 %  BP: (101-145)/(52-69) 145/69     Weight: 81.6 kg (180 lb) (08/16/24 2310)  Body mass index is 29.95 kg/m².  Body surface area is 1.93 meters squared.    I/O last 3 completed shifts:  In: 4224 [P.O.:4224]  Out: 445 [Drains:315; Other:130]    Physical Exam  Vitals reviewed.   Constitutional:       General: She is not in acute distress.     Appearance: Normal appearance. She is not ill-appearing or toxic-appearing.      Comments: Appears stated age   HENT:      Head: Normocephalic and atraumatic.      Right Ear: External ear normal.      Left Ear: External ear normal.      Nose: Nose normal.   Eyes:      General: No scleral icterus.     Conjunctiva/sclera: Conjunctivae normal.   Cardiovascular:      Rate and Rhythm: Normal rate and regular rhythm.      Pulses: Normal pulses.      Heart sounds: Normal heart sounds. No murmur heard.     No friction rub.   Pulmonary:      Effort: Pulmonary effort is normal. No respiratory distress.      Breath sounds: Normal breath sounds. No wheezing or rhonchi.   Abdominal:      General: Bowel sounds are normal. There is no distension.      Palpations: Abdomen is soft.      Tenderness: There is no abdominal tenderness. There is no guarding.      Comments: Wound vac in place    Musculoskeletal:         General: No swelling or deformity. Normal range of motion.      Cervical back: Normal range of motion and neck supple. No tenderness.      Right lower leg: No edema.      Left lower leg: No edema.   Skin:     General: Skin is warm and dry.      Coloration: Skin is not jaundiced.      Findings: No erythema or rash.   Neurological:      General: No focal deficit present.      Mental Status: She is alert and oriented to person, place, and time.      Motor: No weakness.   Psychiatric:         Mood and Affect: Mood normal.         Behavior: Behavior normal.       Assessment/Plan:   63 yr old  female with ESRD secondary to solitary kidney/DM s/p a kidney transplant on 24. Crossmatch negative. 24% PRA. Patient transferred from OSH due to significant drainage from her transplant incision. CT performed which showed a large collection with drain in place. WOund explored at the bedside and wound vac placed.     1) Klebsiella UTI:   - switched ertapenem to levaquin per ID recs  2) Infected merle-nephric fluid collection:   - culture negative but cell count obtained from MALKA drain concerning for infection   - cont levaquin for 4 weeks until imaging resolution of fluid collection   - cont doxycycline   - wound management per transplant surgery. Wound vac arrived and at bedside  3) s/p  donor kidney transplant:   - near baseline graft function of 1.5-1.6   - acceptable FK level. Cont on FK 3 mg BID   - cont to hold MMF for now   - cont on prednisone 5 mg daily  4) Hypertension:   - stop Procardia and minoxidil    - cont on Coreg 6.25 mg PO BID  5) type II DM:   - appreciate endocrine's recs   -cont on Novolog 4 units with meals  6) Hypomagnesemia:    - cont on MgOx 800 mg BID  7) Hypophosphatemia:   - cont on kphos 500 mg BID  8) Anemia:   - stable. Monitor    Dispo: pending wound vac set up with possible d/c tomorrow.      Johnna Whelan DO  Nephrology  [facility]  Stepdown

## 2024-08-25 NOTE — PLAN OF CARE
Wound vac in place over RLQ K tx incision (with perinephric fluid infection), 75 mmHg continuous suction. MALKA drain to RLQ.      ACHS, SSI not indicated.      Moisture associated dermatitis to bottom--wound care provided per order.      Orientedx4. VSS. Afebrile. Room air. Up ad ben, steady gait. Denies pain.     Fall precautions maintained. Call light within reach; encouraged to call for assistance. No concerns voiced at this time.

## 2024-08-25 NOTE — PLAN OF CARE
Pt is AAOx4; afebrile; vital signs stable. BG ranged from . Wound vac in place with serosanguinous drainage. MALKA drain with 130mL cloudy drainage. Minoxidil dose halved as standing BPs running in the 100/50-70s and patient stated that the increased dose makes her BP drop too low. Barrier cream applied to dermatitis on buttocks.

## 2024-08-25 NOTE — PROGRESS NOTES
[facility]  Endocrinology  Progress Note    Admit Date: 8/16/2024     Reason for Consult: Management of T2DM, Hyperglycemia     Surgical Procedure and Date:  kidney transplant 7/24/2024     Diabetes diagnosis year: 2004    Home Diabetes Medications:    - Novolog 6 units TIDWM  - Novolog SSI for BG excursions:  Add correction scale if needed.  Blood sugar 150 to 200 add 2 units  Blood sugar 201 to 250 add 4 units  Blood sugar 251 to 300 add 6 units  Blood sugar 301 to 350 add 8 units  Blood sugar greater than 350 add 10 units    Lab Results   Component Value Date    HGBA1C 4.8 07/24/2024    HGBA1C 4.8 07/24/2024       How often checking glucose at home?  3x daily     BG readings on regimen: 140-<200  Hypoglycemia on the regimen?  No  Missed doses on regimen?  No    Diabetes Complications include:     none    Complicating diabetes co morbidities:   S/p kidney txp, glucocorticoid use       HPI:   Patient is a 63 y.o. 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 PMHx born w solitary kidney, DM2, HTN, incontience (s/p bladder sling), CAD with stents (on ASA), atrial fibrillation, CVA (no residual deficits), ALEJA, and anemia. Surgery without complications. PD cath removed. Post op course notable for down trending Cr with excellent UOP. Patient recently admitted 8/8 to 8/13 d/t incisional drainage. CT A/P noted enlarged fluid collection around the right renal transplant and extending into the RLQ abdominal wall. She underwent IR fluid aspiration and drain placement on 8/9. Fluid studies neg for infection or urine leak (WBC 47, segs 10, lymph 83, Cr 1.6). Repeat CT scan done 8/12 reviewed by surgeons, plan to discharge pt with drain in place for continued drainage per MALKA.      Patient now presents as a transfer from OSH due to incisional drainage. Pt presented to her local ED with large amount of bloody drainage from her transplant incision. Wound has 3 cm opening in  "medial aspect with old bloody drainage CT A/P at OSH w/ large collection 17 x 4 x 3 cm. Her percutaneous drain remains in place and per patient turned milky and purulent appearing day prior to presenting to the OSH.  Pt reports overall feeling well. Pt denies fever/chills, decreased UOP, dysuria. Plan for infectious work-up, IV abx, wound care consult. Endocrinology consulted for management of T2DM.         Interval HPI:   No acute events overnight. Patient in room 96771/09351 A. Blood glucose stable. BG at and below goal on current insulin regimen (SSI and prandial insulin). Steroid use- Prednisone 5 mg.    Renal function- Abnormal    Lab Results   Component Value Date    CREATININE 1.5 (H) 2024     Vasopressors-  None     Endocrine will continue to follow and manage insulin orders inpatient.     Diet diabetic  Calorie     Eatin%  Nausea: No  Hypoglycemia and intervention: No  Fever: No  TPN and/or TF: No  If yes, type of TF/TPN and rate: N/A    BP (!) 112/58 (Patient Position: Standing)   Pulse 77   Temp 97.7 °F (36.5 °C) (Oral)   Resp 18   Ht 5' 5" (1.651 m)   Wt 81.6 kg (180 lb)   SpO2 95%   BMI 29.95 kg/m²     Labs Reviewed and Include    Recent Labs   Lab 24  0526   GLU 93   CALCIUM 9.2   ALBUMIN 2.4*      K 3.7   CO2 25      BUN 14   CREATININE 1.5*     Lab Results   Component Value Date    WBC 5.18 2024    HGB 8.0 (L) 2024    HCT 24.5 (L) 2024    MCV 94 2024     2024     No results for input(s): "TSH", "FREET4" in the last 168 hours.  Lab Results   Component Value Date    HGBA1C 4.8 2024    HGBA1C 4.8 2024       Nutritional status:   Body mass index is 29.95 kg/m².  Lab Results   Component Value Date    ALBUMIN 2.4 (L) 2024    ALBUMIN 2.4 (L) 2024    ALBUMIN 2.3 (L) 2024     No results found for: "PREALBUMIN"    Estimated Creatinine Clearance: 39.4 mL/min (A) (based on SCr of 1.5 mg/dL " (H)).    Accu-Checks  Recent Labs     08/22/24  2337 08/23/24  0806 08/23/24  1301 08/23/24  1724 08/23/24  2253 08/24/24  0815 08/24/24  1136 08/24/24  1807 08/24/24  2224 08/25/24  0907   POCTGLUCOSE 117* 96 116* 144* 167* 92 128* 156* 146* 90       Current Medications and/or Treatments Impacting Glycemic Control  Immunotherapy:    Immunosuppressants           Stop Route Frequency     tacrolimus capsule 3 mg         -- Oral 2 times daily          Steroids:   Hormones (From admission, onward)      Start     Stop Route Frequency Ordered    08/17/24 0900  predniSONE tablet 5 mg         -- Oral Daily 08/16/24 2320    08/16/24 2320  melatonin tablet 6 mg         -- Oral Nightly PRN 08/16/24 2320          Pressors:    Autonomic Drugs (From admission, onward)      None          Hyperglycemia/Diabetes Medications:   Antihyperglycemics (From admission, onward)      Start     Stop Route Frequency Ordered    08/23/24 1130  insulin aspart U-100 pen 5 Units         -- SubQ 3 times daily with meals 08/23/24 0910    08/16/24 2320  insulin aspart U-100 pen 0-5 Units         -- SubQ Before meals & nightly PRN 08/16/24 2320            ASSESSMENT and PLAN    Renal/  S/P kidney transplant  Managed per primary team  Avoid hypoglycemia        ID  * Wound infection after surgery  Managed per primary team  Optimize BG control        Immunology/Multi System  Prophylactic immunotherapy  May increase insulin resistance.         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 Controlled on home regimen. On steroids    Decrease Novolog to 4 units TID with meals (20% decrease). HOLD if patient is NPO, unable to eat, eats <25% of meal, or if Blood Glucose is less than 100 mg/dL.   Continue Low Dose Correction Scale  BG monitoring ac/hs    ** Please call Endocrine for any BG related issues **    Discharge Planning:   TBD.  Please notify endocrinology prior to discharge.  Likely will discharge on Novolog 4 units TID with meals   Add correction scale if needed.  - Blood sugar 180 to 230 add 1 units  - Blood sugar 231 to 280 add 2 units  - Blood sugar 281 to 330 add 3 units  - Blood sugar 331 to 380 add 4 units  - Blood sugar greater than 380 add 5 units     Will provide blood sugar logs to review in 1 week          Ranjana Knott PA-C  Endocrinology  [facility] - [facility]

## 2024-08-25 NOTE — ASSESSMENT & PLAN NOTE
BG goal 140-180  T2DM. Controlled on home regimen. On steroids    Decrease Novolog to 4 units TID with meals (20% decrease). HOLD if patient is NPO, unable to eat, eats <25% of meal, or if Blood Glucose is less than 100 mg/dL.   Continue Low Dose Correction Scale  BG monitoring ac/hs    ** Please call Endocrine for any BG related issues **    Discharge Planning:   TBD. Please notify endocrinology prior to discharge.  Likely will discharge on Novolog 4 units TID with meals   Add correction scale if needed.  - Blood sugar 180 to 230 add 1 units  - Blood sugar 231 to 280 add 2 units  - Blood sugar 281 to 330 add 3 units  - Blood sugar 331 to 380 add 4 units  - Blood sugar greater than 380 add 5 units     Will provide blood sugar logs to review in 1 week

## 2024-08-25 NOTE — PLAN OF CARE
Pt is AAOx4; afebrile; vital signs stable. BG <200. Minoxidil d/c'd this morning d/t low standing BP. MALKA drain removed. Wound vac continuous; home supplies at bedside. Possible d/c tomorrow. She is up independently.

## 2024-08-25 NOTE — PROCEDURES
IR perc drain removed. Site cleansed with alcohol. Drain removed with tip/pigtail intact. Numbed with 1% lidocaine and sutured with 3.0 prolene. Patient tolerated procedure well.

## 2024-08-25 NOTE — PLAN OF CARE
Wound vac in place over RLQ K tx incision (with perinephric fluid infection), 75 mmHg continuous suction. MALKA drain to RLQ with serous, cloudy drainage.      ACHS, SSI not indicated.      Moisture associated dermatitis to bottom--wound care provided per order.      Orientedx4. VSS. Afebrile. Room air. Up ad ben, steady gait. Denies pain.     Fall precautions maintained. Call light within reach; encouraged to call for assistance. No concerns voiced at this time.

## 2024-08-25 NOTE — SUBJECTIVE & OBJECTIVE
"Interval HPI:   No acute events overnight. Patient in room 18615/96061 A. Blood glucose stable. BG at and below goal on current insulin regimen (SSI and prandial insulin). Steroid use- Prednisone 5 mg.    Renal function- Abnormal    Lab Results   Component Value Date    CREATININE 1.5 (H) 2024     Vasopressors-  None     Endocrine will continue to follow and manage insulin orders inpatient.     Diet diabetic  Calorie     Eatin%  Nausea: No  Hypoglycemia and intervention: No  Fever: No  TPN and/or TF: No  If yes, type of TF/TPN and rate: N/A    BP (!) 112/58 (Patient Position: Standing)   Pulse 77   Temp 97.7 °F (36.5 °C) (Oral)   Resp 18   Ht 5' 5" (1.651 m)   Wt 81.6 kg (180 lb)   SpO2 95%   BMI 29.95 kg/m²     Labs Reviewed and Include    Recent Labs   Lab 24  0526   GLU 93   CALCIUM 9.2   ALBUMIN 2.4*      K 3.7   CO2 25      BUN 14   CREATININE 1.5*     Lab Results   Component Value Date    WBC 5.18 2024    HGB 8.0 (L) 2024    HCT 24.5 (L) 2024    MCV 94 2024     2024     No results for input(s): "TSH", "FREET4" in the last 168 hours.  Lab Results   Component Value Date    HGBA1C 4.8 2024    HGBA1C 4.8 2024       Nutritional status:   Body mass index is 29.95 kg/m².  Lab Results   Component Value Date    ALBUMIN 2.4 (L) 2024    ALBUMIN 2.4 (L) 2024    ALBUMIN 2.3 (L) 2024     No results found for: "PREALBUMIN"    Estimated Creatinine Clearance: 39.4 mL/min (A) (based on SCr of 1.5 mg/dL (H)).    Accu-Checks  Recent Labs     24  2337 24  0806 24  1301 24  1724 24  2253 24  0815 24  1136 24  1807 24  2224 24  0907   POCTGLUCOSE 117* 96 116* 144* 167* 92 128* 156* 146* 90       Current Medications and/or Treatments Impacting Glycemic Control  Immunotherapy:    Immunosuppressants           Stop Route Frequency     tacrolimus capsule 3 mg         -- " Oral 2 times daily          Steroids:   Hormones (From admission, onward)      Start     Stop Route Frequency Ordered    08/17/24 0900  predniSONE tablet 5 mg         -- Oral Daily 08/16/24 2320 08/16/24 2320  melatonin tablet 6 mg         -- Oral Nightly PRN 08/16/24 2320          Pressors:    Autonomic Drugs (From admission, onward)      None          Hyperglycemia/Diabetes Medications:   Antihyperglycemics (From admission, onward)      Start     Stop Route Frequency Ordered    08/23/24 1130  insulin aspart U-100 pen 5 Units         -- SubQ 3 times daily with meals 08/23/24 0910 08/16/24 2320  insulin aspart U-100 pen 0-5 Units         -- SubQ Before meals & nightly PRN 08/16/24 2320

## 2024-08-26 VITALS
HEIGHT: 65 IN | OXYGEN SATURATION: 96 % | BODY MASS INDEX: 29.99 KG/M2 | SYSTOLIC BLOOD PRESSURE: 109 MMHG | DIASTOLIC BLOOD PRESSURE: 70 MMHG | RESPIRATION RATE: 16 BRPM | TEMPERATURE: 98 F | HEART RATE: 75 BPM | WEIGHT: 180 LBS

## 2024-08-26 LAB
ALBUMIN SERPL BCP-MCNC: 2.4 G/DL (ref 3.5–5.2)
ANION GAP SERPL CALC-SCNC: 7 MMOL/L (ref 8–16)
BASOPHILS # BLD AUTO: 0.09 K/UL (ref 0–0.2)
BASOPHILS NFR BLD: 1.2 % (ref 0–1.9)
BUN SERPL-MCNC: 16 MG/DL (ref 8–23)
CALCIUM SERPL-MCNC: 8.9 MG/DL (ref 8.7–10.5)
CHLORIDE SERPL-SCNC: 109 MMOL/L (ref 95–110)
CO2 SERPL-SCNC: 24 MMOL/L (ref 23–29)
CREAT SERPL-MCNC: 1.6 MG/DL (ref 0.5–1.4)
DIFFERENTIAL METHOD BLD: ABNORMAL
EOSINOPHIL # BLD AUTO: 0.1 K/UL (ref 0–0.5)
EOSINOPHIL NFR BLD: 1.9 % (ref 0–8)
ERYTHROCYTE [DISTWIDTH] IN BLOOD BY AUTOMATED COUNT: 15.9 % (ref 11.5–14.5)
EST. GFR  (NO RACE VARIABLE): 35.6 ML/MIN/1.73 M^2
GLUCOSE SERPL-MCNC: 85 MG/DL (ref 70–110)
HCT VFR BLD AUTO: 24.6 % (ref 37–48.5)
HGB BLD-MCNC: 8.1 G/DL (ref 12–16)
IMM GRANULOCYTES # BLD AUTO: 0.05 K/UL (ref 0–0.04)
IMM GRANULOCYTES NFR BLD AUTO: 0.7 % (ref 0–0.5)
LYMPHOCYTES # BLD AUTO: 0.6 K/UL (ref 1–4.8)
LYMPHOCYTES NFR BLD: 8 % (ref 18–48)
MAGNESIUM SERPL-MCNC: 1.4 MG/DL (ref 1.6–2.6)
MCH RBC QN AUTO: 31.4 PG (ref 27–31)
MCHC RBC AUTO-ENTMCNC: 32.9 G/DL (ref 32–36)
MCV RBC AUTO: 95 FL (ref 82–98)
MONOCYTES # BLD AUTO: 0.4 K/UL (ref 0.3–1)
MONOCYTES NFR BLD: 4.9 % (ref 4–15)
NEUTROPHILS # BLD AUTO: 6.2 K/UL (ref 1.8–7.7)
NEUTROPHILS NFR BLD: 83.3 % (ref 38–73)
NRBC BLD-RTO: 0 /100 WBC
PHOSPHATE SERPL-MCNC: 3 MG/DL (ref 2.7–4.5)
PLATELET # BLD AUTO: 319 K/UL (ref 150–450)
PMV BLD AUTO: 10.4 FL (ref 9.2–12.9)
POCT GLUCOSE: 176 MG/DL (ref 70–110)
POCT GLUCOSE: 91 MG/DL (ref 70–110)
POTASSIUM SERPL-SCNC: 3.7 MMOL/L (ref 3.5–5.1)
RBC # BLD AUTO: 2.58 M/UL (ref 4–5.4)
SODIUM SERPL-SCNC: 140 MMOL/L (ref 136–145)
TACROLIMUS BLD-MCNC: 5.7 NG/ML (ref 5–15)
WBC # BLD AUTO: 7.41 K/UL (ref 3.9–12.7)

## 2024-08-26 PROCEDURE — 63600175 PHARM REV CODE 636 W HCPCS

## 2024-08-26 PROCEDURE — 80197 ASSAY OF TACROLIMUS: CPT | Performed by: PHYSICIAN ASSISTANT

## 2024-08-26 PROCEDURE — 63600175 PHARM REV CODE 636 W HCPCS: Performed by: TRANSPLANT SURGERY

## 2024-08-26 PROCEDURE — 25000003 PHARM REV CODE 250: Performed by: PHYSICIAN ASSISTANT

## 2024-08-26 PROCEDURE — 25000003 PHARM REV CODE 250: Performed by: TRANSPLANT SURGERY

## 2024-08-26 PROCEDURE — 99232 SBSQ HOSP IP/OBS MODERATE 35: CPT | Mod: ,,,

## 2024-08-26 PROCEDURE — 25000003 PHARM REV CODE 250

## 2024-08-26 PROCEDURE — 94761 N-INVAS EAR/PLS OXIMETRY MLT: CPT

## 2024-08-26 PROCEDURE — 27100171 HC OXYGEN HIGH FLOW UP TO 24 HOURS

## 2024-08-26 PROCEDURE — 63600175 PHARM REV CODE 636 W HCPCS: Performed by: PHYSICIAN ASSISTANT

## 2024-08-26 PROCEDURE — 85025 COMPLETE CBC W/AUTO DIFF WBC: CPT | Performed by: PHYSICIAN ASSISTANT

## 2024-08-26 PROCEDURE — A4216 STERILE WATER/SALINE, 10 ML: HCPCS | Performed by: TRANSPLANT SURGERY

## 2024-08-26 PROCEDURE — 83735 ASSAY OF MAGNESIUM: CPT | Performed by: PHYSICIAN ASSISTANT

## 2024-08-26 PROCEDURE — 80069 RENAL FUNCTION PANEL: CPT | Performed by: PHYSICIAN ASSISTANT

## 2024-08-26 PROCEDURE — 94660 CPAP INITIATION&MGMT: CPT

## 2024-08-26 PROCEDURE — 99900035 HC TECH TIME PER 15 MIN (STAT)

## 2024-08-26 RX ORDER — TACROLIMUS 1 MG/1
4 CAPSULE ORAL EVERY 12 HOURS
Qty: 240 CAPSULE | Refills: 11 | Status: SHIPPED | OUTPATIENT
Start: 2024-08-26 | End: 2025-08-26

## 2024-08-26 RX ORDER — DOXYCYCLINE HYCLATE 100 MG
100 TABLET ORAL EVERY 12 HOURS
Qty: 60 TABLET | Refills: 1 | Status: SHIPPED | OUTPATIENT
Start: 2024-08-26

## 2024-08-26 RX ORDER — INSULIN ASPART 100 [IU]/ML
INJECTION, SOLUTION INTRAVENOUS; SUBCUTANEOUS
Start: 2024-08-26 | End: 2024-08-26

## 2024-08-26 RX ORDER — INSULIN ASPART 100 [IU]/ML
4 INJECTION, SOLUTION INTRAVENOUS; SUBCUTANEOUS
Qty: 6 ML | Refills: 0 | Status: SHIPPED | OUTPATIENT
Start: 2024-08-26 | End: 2024-08-26

## 2024-08-26 RX ORDER — MAGNESIUM SULFATE HEPTAHYDRATE 40 MG/ML
2 INJECTION, SOLUTION INTRAVENOUS ONCE
Status: COMPLETED | OUTPATIENT
Start: 2024-08-26 | End: 2024-08-26

## 2024-08-26 RX ORDER — INSULIN ASPART 100 [IU]/ML
INJECTION, SOLUTION INTRAVENOUS; SUBCUTANEOUS
Qty: 3 ML | Refills: 5 | Status: SHIPPED | OUTPATIENT
Start: 2024-08-26

## 2024-08-26 RX ORDER — TACROLIMUS 1 MG/1
4 CAPSULE ORAL 2 TIMES DAILY
Status: DISCONTINUED | OUTPATIENT
Start: 2024-08-26 | End: 2024-08-26 | Stop reason: HOSPADM

## 2024-08-26 RX ORDER — LEVOFLOXACIN 750 MG/1
750 TABLET ORAL EVERY OTHER DAY
Qty: 15 TABLET | Refills: 1 | Status: SHIPPED | OUTPATIENT
Start: 2024-08-27

## 2024-08-26 RX ORDER — TACROLIMUS 1 MG/1
1 CAPSULE ORAL ONCE
Status: COMPLETED | OUTPATIENT
Start: 2024-08-26 | End: 2024-08-26

## 2024-08-26 RX ADMIN — HEPARIN SODIUM 5000 UNITS: 5000 INJECTION INTRAVENOUS; SUBCUTANEOUS at 05:08

## 2024-08-26 RX ADMIN — Medication 10 ML: at 12:08

## 2024-08-26 RX ADMIN — VALGANCICLOVIR HYDROCHLORIDE 450 MG: 450 TABLET ORAL at 08:08

## 2024-08-26 RX ADMIN — CINACALCET 60 MG: 30 TABLET, FILM COATED ORAL at 08:08

## 2024-08-26 RX ADMIN — MAGNESIUM SULFATE HEPTAHYDRATE 2 G: 40 INJECTION, SOLUTION INTRAVENOUS at 12:08

## 2024-08-26 RX ADMIN — PREDNISONE 5 MG: 5 TABLET ORAL at 08:08

## 2024-08-26 RX ADMIN — SULFAMETHOXAZOLE AND TRIMETHOPRIM 1 TABLET: 400; 80 TABLET ORAL at 05:08

## 2024-08-26 RX ADMIN — Medication 800 MG: at 08:08

## 2024-08-26 RX ADMIN — DOXYCYCLINE HYCLATE 100 MG: 100 TABLET, COATED ORAL at 08:08

## 2024-08-26 RX ADMIN — TACROLIMUS 3 MG: 1 CAPSULE ORAL at 08:08

## 2024-08-26 RX ADMIN — TACROLIMUS 1 MG: 1 CAPSULE ORAL at 12:08

## 2024-08-26 RX ADMIN — EZETIMIBE 10 MG: 10 TABLET ORAL at 08:08

## 2024-08-26 RX ADMIN — PANTOPRAZOLE SODIUM 40 MG: 40 TABLET, DELAYED RELEASE ORAL at 08:08

## 2024-08-26 RX ADMIN — CARVEDILOL 6.25 MG: 6.25 TABLET, FILM COATED ORAL at 09:08

## 2024-08-26 RX ADMIN — Medication 2 TABLET: at 08:08

## 2024-08-26 NOTE — PROGRESS NOTES
"Transplant Social Work Discharge Note:    Pt will discharge to  her daughters home at [address]  [place] [address]  under the care of Sarah Lott, patient's daughter, phone number [phone].       Patient reports readiness to discharge at this time. Per patient plan is now for her to discharge to her daughters home for one week. Then return to her own home. Patients daughter reports the same. Patient also reports starting next week she will begin utilizing her medicaid waiver hours (18 hours per patient) to assist with caregiving. MD and RIANNA aware of above information. Patient will receive home health services through [facility] [place] ([phone] ph / [phone]) starting tomorrow (confirmed with Chetna). Patient will discharge with a home wound vac via [facility] (ph: [phone] /fax: [phone]).  Patient provided with information for her local [employer].Patient and daughter denied additional needs or concerns at this time. Patients case discussed with  VANESA Woods MPH.  reached out to patients daughter by phone to follow up.     3:20pm:      notified via secure chat that patient may not have a ride home.  spoke with patient who reports her daughter "had a hard weekend with her son" and may not be able to come to transport her from the hospital. With patients permission this  called patients daughter. Patients daughter reports she is en route to [facility]. Patients daughter reports she had a difficult weekend but she is still willing and able to act as caregiver to her mother this week. Patient denied additional concerns. Patient, RIANNA, and bedside RN informed that patients daughter is en route. Discussed with  VANESA Woods, MPH.     Pt aware of, involved in, and coping well with this discharge plan. Pt did not have any concerns with the discharge plan " at this time. SW remains available at [phone].      Laura Wood LMSW

## 2024-08-26 NOTE — PLAN OF CARE
Patient met with SW, spoke with  Pharmacy regarding medication updates both new and current, AVS given and reviewed with her. Home wound vac connected, Home health to meet with patient at home tomorrow.  Patient verbalizes understanding of who and when  to call if complications as well as follow up appointments. Midline removed with catheter intact.

## 2024-08-26 NOTE — PLAN OF CARE
AAO x4. VSS. SpO2 > 95% on room air. Afebrile. AC/HS- no coverage needed.Wound vac to RLQ at 75 mmHg. Plan to d/c with home wound vac. Wears CPAP at night. Ambulates independently. Bed locked and in lowest setting. Call bell in reach. NSR on telemetry.

## 2024-08-26 NOTE — SUBJECTIVE & OBJECTIVE
"Interval HPI:   No acute events overnight. Patient in room 51815/65548 A. Blood glucose stable. BG at and below goal on current insulin regimen (SSI and prandial insulin). Steroid use- Prednisone.    Renal function- Abnormal    Lab Results   Component Value Date    CREATININE 1.6 (H) 2024     Vasopressors-  None     Endocrine will continue to follow and manage insulin orders inpatient.     Diet diabetic  Calorie     Eatin%  Nausea: No  Hypoglycemia and intervention: No  Fever: No  TPN and/or TF: No  If yes, type of TF/TPN and rate: N/A    BP (!) 116/49 (Patient Position: Standing)   Pulse 73   Temp 98.4 °F (36.9 °C) (Oral)   Resp 18   Ht 5' 5" (1.651 m)   Wt 81.6 kg (180 lb)   SpO2 97%   BMI 29.95 kg/m²     Labs Reviewed and Include    Recent Labs   Lab 24  0533   GLU 85   CALCIUM 8.9   ALBUMIN 2.4*      K 3.7   CO2 24      BUN 16   CREATININE 1.6*     Lab Results   Component Value Date    WBC 7.41 2024    HGB 8.1 (L) 2024    HCT 24.6 (L) 2024    MCV 95 2024     2024     No results for input(s): "TSH", "FREET4" in the last 168 hours.  Lab Results   Component Value Date    HGBA1C 4.8 2024    HGBA1C 4.8 2024       Nutritional status:   Body mass index is 29.95 kg/m².  Lab Results   Component Value Date    ALBUMIN 2.4 (L) 2024    ALBUMIN 2.4 (L) 2024    ALBUMIN 2.4 (L) 2024     No results found for: "PREALBUMIN"    Estimated Creatinine Clearance: 37 mL/min (A) (based on SCr of 1.6 mg/dL (H)).    Accu-Checks  Recent Labs     24  1724 24  2253 24  0815 24  1136 24  1807 24  2224 24  0907 24  1321 24  1755 24  2155   POCTGLUCOSE 144* 167* 92 128* 156* 146* 90 147* 134* 128*       Current Medications and/or Treatments Impacting Glycemic Control  Immunotherapy:    Immunosuppressants           Stop Route Frequency     tacrolimus capsule 3 mg         -- Oral 2 " times daily          Steroids:   Hormones (From admission, onward)      Start     Stop Route Frequency Ordered    08/17/24 0900  predniSONE tablet 5 mg         -- Oral Daily 08/16/24 2320 08/16/24 2320  melatonin tablet 6 mg         -- Oral Nightly PRN 08/16/24 2320          Pressors:    Autonomic Drugs (From admission, onward)      None          Hyperglycemia/Diabetes Medications:   Antihyperglycemics (From admission, onward)      Start     Stop Route Frequency Ordered    08/25/24 1130  insulin aspart U-100 pen 4 Units         -- SubQ 3 times daily with meals 08/25/24 0950    08/16/24 2320  insulin aspart U-100 pen 0-5 Units         -- SubQ Before meals & nightly PRN 08/16/24 2320

## 2024-08-26 NOTE — PROGRESS NOTES
[facility] - [facility]  Endocrinology  Progress Note    Admit Date: 8/16/2024     Reason for Consult: Management of T2DM, Hyperglycemia     Surgical Procedure and Date:  kidney transplant 7/24/2024     Diabetes diagnosis year: 2004    Home Diabetes Medications:    - Novolog 6 units TIDWM  - Novolog SSI for BG excursions:  Add correction scale if needed.  Blood sugar 150 to 200 add 2 units  Blood sugar 201 to 250 add 4 units  Blood sugar 251 to 300 add 6 units  Blood sugar 301 to 350 add 8 units  Blood sugar greater than 350 add 10 units    Lab Results   Component Value Date    HGBA1C 4.8 07/24/2024    HGBA1C 4.8 07/24/2024       How often checking glucose at home?  3x daily     BG readings on regimen: 140-<200  Hypoglycemia on the regimen?  No  Missed doses on regimen?  No    Diabetes Complications include:     none    Complicating diabetes co morbidities:   S/p kidney txp, glucocorticoid use       HPI:   Patient is a 63 y.o. 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 PMHx born w solitary kidney, DM2, HTN, incontience (s/p bladder sling), CAD with stents (on ASA), atrial fibrillation, CVA (no residual deficits), ALEJA, and anemia. Surgery without complications. PD cath removed. Post op course notable for down trending Cr with excellent UOP. Patient recently admitted 8/8 to 8/13 d/t incisional drainage. CT A/P noted enlarged fluid collection around the right renal transplant and extending into the RLQ abdominal wall. She underwent IR fluid aspiration and drain placement on 8/9. Fluid studies neg for infection or urine leak (WBC 47, segs 10, lymph 83, Cr 1.6). Repeat CT scan done 8/12 reviewed by surgeons, plan to discharge pt with drain in place for continued drainage per MALKA.      Patient now presents as a transfer from OSH due to incisional drainage. Pt presented to her local ED with large amount of bloody drainage from her transplant incision. Wound has 3 cm opening in  "medial aspect with old bloody drainage CT A/P at OSH w/ large collection 17 x 4 x 3 cm. Her percutaneous drain remains in place and per patient turned milky and purulent appearing day prior to presenting to the OSH.  Pt reports overall feeling well. Pt denies fever/chills, decreased UOP, dysuria. Plan for infectious work-up, IV abx, wound care consult. Endocrinology consulted for management of T2DM.         Interval HPI:   No acute events overnight. Patient in room 19364/34333 A. Blood glucose stable. BG at and below goal on current insulin regimen (SSI and prandial insulin). Steroid use- Prednisone.    Renal function- Abnormal    Lab Results   Component Value Date    CREATININE 1.6 (H) 2024     Vasopressors-  None     Endocrine will continue to follow and manage insulin orders inpatient.     Diet diabetic  Calorie     Eatin%  Nausea: No  Hypoglycemia and intervention: No  Fever: No  TPN and/or TF: No  If yes, type of TF/TPN and rate: N/A    BP (!) 116/49 (Patient Position: Standing)   Pulse 73   Temp 98.4 °F (36.9 °C) (Oral)   Resp 18   Ht 5' 5" (1.651 m)   Wt 81.6 kg (180 lb)   SpO2 97%   BMI 29.95 kg/m²     Labs Reviewed and Include    Recent Labs   Lab 24  0533   GLU 85   CALCIUM 8.9   ALBUMIN 2.4*      K 3.7   CO2 24      BUN 16   CREATININE 1.6*     Lab Results   Component Value Date    WBC 7.41 2024    HGB 8.1 (L) 2024    HCT 24.6 (L) 2024    MCV 95 2024     2024     No results for input(s): "TSH", "FREET4" in the last 168 hours.  Lab Results   Component Value Date    HGBA1C 4.8 2024    HGBA1C 4.8 2024       Nutritional status:   Body mass index is 29.95 kg/m².  Lab Results   Component Value Date    ALBUMIN 2.4 (L) 2024    ALBUMIN 2.4 (L) 2024    ALBUMIN 2.4 (L) 2024     No results found for: "PREALBUMIN"    Estimated Creatinine Clearance: 37 mL/min (A) (based on SCr of 1.6 mg/dL " (H)).    Accu-Checks  Recent Labs     08/23/24  1724 08/23/24  2253 08/24/24  0815 08/24/24  1136 08/24/24  1807 08/24/24  2224 08/25/24  0907 08/25/24  1321 08/25/24  1755 08/25/24  2155   POCTGLUCOSE 144* 167* 92 128* 156* 146* 90 147* 134* 128*       Current Medications and/or Treatments Impacting Glycemic Control  Immunotherapy:    Immunosuppressants           Stop Route Frequency     tacrolimus capsule 3 mg         -- Oral 2 times daily          Steroids:   Hormones (From admission, onward)      Start     Stop Route Frequency Ordered    08/17/24 0900  predniSONE tablet 5 mg         -- Oral Daily 08/16/24 2320    08/16/24 2320  melatonin tablet 6 mg         -- Oral Nightly PRN 08/16/24 2320          Pressors:    Autonomic Drugs (From admission, onward)      None          Hyperglycemia/Diabetes Medications:   Antihyperglycemics (From admission, onward)      Start     Stop Route Frequency Ordered    08/25/24 1130  insulin aspart U-100 pen 4 Units         -- SubQ 3 times daily with meals 08/25/24 0950    08/16/24 2320  insulin aspart U-100 pen 0-5 Units         -- SubQ Before meals & nightly PRN 08/16/24 2320            ASSESSMENT and PLAN    Renal/  S/P kidney transplant  Managed per primary team  Avoid hypoglycemia        ID  * Wound infection after surgery  Managed per primary team  Optimize BG control        Immunology/Multi System  Prophylactic immunotherapy  May increase insulin resistance.         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 Controlled on home regimen. On steroids. Fasting BG below goal this morning.    Discontinue scheduled Novolog  Continue Low Dose Correction Scale  BG monitoring ac/hs    ** Please call Endocrine for any BG related issues **    Discharge Planning:   Will discharge on Novolog SSI while patient is on steroids.  Add correction scale if needed.  Blood  sugar 150 to 200 add 2 units  Blood sugar 201 to 250 add 4 units  Blood sugar 251 to 300 add 6 units  Blood sugar 301 to 350 add 8 units  Blood sugar greater than 350 add 10 units    Will provide blood sugar logs to review in 1 week  Follow-up in discharge clinic          Ranjana Knott PA-C  Endocrinology  [facility] - [facility] Step[facility]

## 2024-08-26 NOTE — ASSESSMENT & PLAN NOTE
BG goal 140-180  T2DM. Controlled on home regimen. On steroids. Fasting BG below goal this morning.    Discontinue scheduled Novolog  Continue Low Dose Correction Scale  BG monitoring ac/hs    ** Please call Endocrine for any BG related issues **    Discharge Planning:   Will discharge on Novolog SSI while patient is on steroids.  Add correction scale if needed.  Blood sugar 150 to 200 add 2 units  Blood sugar 201 to 250 add 4 units  Blood sugar 251 to 300 add 6 units  Blood sugar 301 to 350 add 8 units  Blood sugar greater than 350 add 10 units    Will provide blood sugar logs to review in 1 week  Follow-up in discharge clinic

## 2024-08-26 NOTE — DISCHARGE SUMMARY
[facility] Step[facility]  Kidney Transplant  Discharge Summary    Patient Name: Joann Kenney  MRN: [id number]  Admission Date: 8/16/2024  Hospital Length of Stay: 10 days  Discharge Date and Time:  08/26/2024 02:10 PM  Attending Physician: Freddy Ruelas Jr., *   Discharging Provider: Aniyah Pedraza NP  Primary Care Provider: Vijay Blake MD    HPI:   Joann Kenney is a 64 y/o F s/p DBD kidney transplant 7/24/2024 for ESRD 2/2 Type II DM. PMHx born w solitary kidney, DM2, HTN, incontience (s/p bladder sling), CAD with stents (on ASA), atrial fibrillation, CVA (no residual deficits), ALEJA, and anemia. Surgery without complications. PD cath removed. Post op course notable for down trending Cr with excellent UOP. Patient recently admitted 8/8 to 8/13 d/t incisional drainage. CT A/P noted enlarged fluid collection around the right renal transplant and extending into the RLQ abdominal wall. She underwent IR fluid aspiration and drain placement on 8/9. Fluid studies neg for infection or urine leak (WBC 47, segs 10, lymph 83, Cr 1.6). Repeat CT scan done 8/12 reviewed by surgeons, plan to discharge pt with drain in place for continued drainage per MALKA.     Patient now presents as a transfer from OSH due to incisional drainage. Pt presented to her local ED with large amount of bloody drainage from her transplant incision. Wound has 3 cm opening in medial aspect with old bloody drainage CT A/P at OSH w/ large collection 17 x 4 x 3 cm. Her percutaneous drain remains in place and per patient turned milky and purulent appearing day prior to presenting to the OSH.  Pt reports overall feeling well. Pt denies fever/chills, decreased UOP, dysuria. Plan for infectious work-up, IV abx, wound care consult. Will review CT imaging with transplant surgeon with likely plan for IR consult   * No surgery found *     Hospital Course:    Patient admitted from OSH for wound leakage.  Delayed wound healing: OSH CT images  reviewed; persistent subcutaneous collection with foci of air present. Staples removed from 2/3 of medial part of incision, old hematoma evacuated. Opened part of incision irrigated and assessed, no active bleeding or fascial dehiscence noted. Rest of staples removed 8/20. Wound care consulted, vac placed. Vac changes Tues/Friday. CT AP 8/21 with collections decreasing in size. MALKA drain dc'd 8/25. Wound care placed home vac on day of discharge.   Infection: Drain had turned a milky serous color. Fluid sent for studies. Cell count pos for infection >81,000 WBCs 98% segs. No urine leak. Cx NGTD. Triglycerides negative. Cefepime started. Rest of infectious w/u revealed ESBL klebsiella pneumoniae UTI. ID consulted, plan for 1 week erta + doxy, midline placed then levoflox 750mg PO q48hrs at discharge with 2 additional weeks of doxy.    Patient is now medically stable for discharge. She reports feeling well on day of discharge. She denies any nausea, vomiting, diarrhea, abdominal pain, or SOB. Patient's home wound vac placed on day of discharge prior to discharge. Patient's discharge plan includes having a care taker coming  during the day at the patient's home and her daughter will check on her every evening and provider the patient groceries and dinners. Patient's daughter also to do a pill box to ensure patient is taking her medication appropriately. Patient will follow up with labs next week (coordinator to schedule). Patient has an appointment on 9/4/24 with urology for stent removal. Plan for patient to be seen in surgery clinic on 9/4/24. All VSS prior to discharge. Patient verbalizes understanding and importance of follow up.    Length of Stay was longer than expected due to: Infection or required IV antibiotics , Social reasons (housing, caregiver, transportation), and Other: wound incision with wound vac    Goals of Care Treatment Preferences:  Code Status: Full Code      Final Active Diagnoses:    Diagnosis  Date Noted POA    PRINCIPAL PROBLEM:  Wound infection after surgery [T81.49XA] 08/19/2024 Yes    Hypokalemia [E87.6] 08/23/2024 No    Infection due to ESBL-producing Klebsiella pneumoniae [A49.8, Z16.12] 08/20/2024 Yes    Anemia of chronic disease [D63.8] 08/16/2024 Yes    Anxiety [F41.9] 07/31/2024 Yes    Hypertension associated with transplantation [I15.8, Z94.9] 07/28/2024 Not Applicable    Long-term use of immunosuppressant medication [Z79.60] 07/25/2024 Not Applicable    Prophylactic immunotherapy [Z29.89] 07/25/2024 Not Applicable    S/P kidney transplant [Z94.0] 07/25/2024 Not Applicable    Diabetes mellitus due to underlying condition with chronic kidney disease on chronic dialysis, with long-term current use of insulin [E08.22, N18.6, Z79.4, Z99.2] 04/01/2021 Not Applicable    Other hyperlipidemia [E78.49] 04/01/2021 Yes      Problems Resolved During this Admission:         Consults (From admission, onward)          Status Ordering Provider     Inpatient consult to Midline team  Once        Provider:  (Not yet assigned)    Completed GLENDY HILL     Inpatient consult to Infectious Diseases  Once        Provider:  (Not yet assigned)    Completed GLENDY HILL     Inpatient consult to Endocrinology  Once        Provider:  (Not yet assigned)    Completed RAVINDER VASQUEZ            Pending Diagnostic Studies:       None          Significant Diagnostic Studies: Labs: CMP   Recent Labs   Lab 08/25/24  0526 08/26/24  0533    140   K 3.7 3.7    109   CO2 25 24   GLU 93 85   BUN 14 16   CREATININE 1.5* 1.6*   CALCIUM 9.2 8.9   ALBUMIN 2.4* 2.4*   ANIONGAP 6* 7*   , CBC   Recent Labs   Lab 08/25/24  0526 08/26/24  0533   WBC 5.18 7.41   HGB 8.0* 8.1*   HCT 24.5* 24.6*    319   , and All labs within the past 24 hours have been reviewed    Discharged Condition: stable    Disposition: Home or Self Care    Follow Up: see above    Patient Instructions:      Ambulatory  referral/consult to Home Health   Standing Status: Future   Referral Priority: Routine Referral Type: Home Health Care   Referral Reason: Specialty Services Required   Requested Specialty: Home Health Services   Number of Visits Requested: 1     Diet diabetic     Diet renal     Lifting restrictions   Order Comments: Do not lift anything greater than 10lbs while having wound vac     Notify your health care provider if you experience any of the following:  temperature >100.4     Notify your health care provider if you experience any of the following:  persistent nausea and vomiting or diarrhea     Notify your health care provider if you experience any of the following:  severe uncontrolled pain     Notify your health care provider if you experience any of the following:  redness, tenderness, or signs of infection (pain, swelling, redness, odor or green/yellow discharge around incision site)     Notify your health care provider if you experience any of the following:  difficulty breathing or increased cough     Notify your health care provider if you experience any of the following:  severe persistent headache     Notify your health care provider if you experience any of the following:  worsening rash     Notify your health care provider if you experience any of the following:  persistent dizziness, light-headedness, or visual disturbances     Notify your health care provider if you experience any of the following:  increased confusion or weakness     Notify your health care provider if you experience any of the following:   Order Comments: Any other concerning signs or symptoms     Medications:  Reconciled Home Medications:      Medication List        START taking these medications      doxycycline 100 MG tablet  Commonly known as: VIBRA-TABS  Take 1 tablet (100 mg total) by mouth every 12 (twelve) hours.     k phos di & mono-sod phos mono 250 mg Tab  Commonly known as: K-PHOS-NEUTRAL  Take 2 tablets by mouth 2 (two)  "times a day.     levoFLOXacin 750 MG tablet  Commonly known as: LEVAQUIN  Take 1 tablet (750 mg total) by mouth every other day.  Start taking on: August 27, 2024            CHANGE how you take these medications      insulin aspart U-100 100 unit/mL (3 mL) Inpn pen  Commonly known as: NovoLOG  Inject subcutaneously as needed for sliding scale. Total daily dose: 15 units/day  What changed:   how much to take  how to take this  when to take this  additional instructions     tacrolimus 1 MG Cap  Commonly known as: PROGRAF  Take 4 capsules (4 mg total) by mouth every 12 (twelve) hours.  What changed: how much to take            CONTINUE taking these medications      ACCU-CHEK GUIDE GLUCOSE METER Misc  Generic drug: blood-glucose meter  use as directed to check blood glucose     ACCU-CHEK GUIDE TEST STRIPS Strp  Generic drug: blood sugar diagnostic  use 1 strip to check blood glucose 3 (three) times daily.     ACCU-CHEK SOFTCLIX LANCETS Misc  Generic drug: lancets  1 lancet each to check blood glucose 3 (three) times daily.     ALPRAZolam 2 MG Tab  Commonly known as: XANAX  Take 2 mg by mouth every evening.     atorvastatin 20 MG tablet  Commonly known as: LIPITOR  Take 1 tablet (20 mg total) by mouth every evening.     BD ULTRA-FINE KIRSTIN PEN NEEDLE 32 gauge x 5/32" Ndle  Generic drug: pen needle, diabetic  1 each for use with insulin pen 3 (three) times daily.     carvediloL 6.25 MG tablet  Commonly known as: COREG  Take 1 tablet (6.25 mg total) by mouth 2 (two) times daily.     cetirizine 5 MG tablet  Commonly known as: ZYRTEC  Take 1 tablet (5 mg total) by mouth once daily.     cinacalcet 60 MG Tab  Commonly known as: SENSIPAR  Take 1 tablet (60 mg total) by mouth daily with breakfast.     DULoxetine 30 MG capsule  Commonly known as: CYMBALTA  Take 30 mg by mouth once daily.     ezetimibe 10 mg tablet  Commonly known as: ZETIA  Take 1 tablet (10 mg total) by mouth once daily.     magnesium oxide 400 mg (241.3 mg " magnesium) tablet  Commonly known as: MAG-OX  Take 2 tablets (800 mg total) by mouth 2 (two) times daily.     multivitamin Tab  Take 1 tablet by mouth once daily.     ondansetron 4 MG Tbdl  Commonly known as: ZOFRAN-ODT  Dissolve 1 tablet (4 mg total) by mouth every 8 (eight) hours as needed.     oxybutynin 5 MG Tab  Commonly known as: DITROPAN  Take 1 tablet (5 mg total) by mouth 3 (three) times daily as needed (bladder spasms).     oxyCODONE 5 MG immediate release tablet  Commonly known as: ROXICODONE  Take 1 tablet (5 mg total) by mouth every 6 (six) hours as needed for Pain.     pantoprazole 40 MG tablet  Commonly known as: PROTONIX  Take 1 tablet (40 mg total) by mouth once daily.     predniSONE 5 MG tablet  Commonly known as: DELTASONE  Take by mouth daily; 7/27/2024-8/2/2024: 20 mg, 8/3/2024-8/9/2024: 15 mg; 8/10/2024-8/16/2024: 10 mg; 8/17/2024- forever: 5 mg; do not stop     sulfamethoxazole-trimethoprim 400-80mg 400-80 mg per tablet  Commonly known as: BACTRIM,SEPTRA  Take 1 tablet by mouth every morning. Stop 1/21/25     valGANciclovir 450 mg Tab  Commonly known as: VALCYTE  Take 1 tablet (450 mg total) by mouth once daily. Stop 10/23/24            STOP taking these medications      minoxidiL 2.5 MG tablet  Commonly known as: LONITEN     mycophenolate 250 mg Cap  Commonly known as: CELLCEPT     NIFEdipine 60 MG (OSM) 24 hr tablet  Commonly known as: PROCARDIA-XL     sodium bicarbonate 650 MG tablet            Time spent caring for patient (Greater than 1/2 spent in direct face-to-face contact): > 30 minutes    Aniyah Pedraza NP  Kidney Transplant  [facility] - Transplant Stepdown

## 2024-08-26 NOTE — PROGRESS NOTES
"Discharge Medication Note:    Hospital Course:  Patient admitted with for wound dehiscence. Patient also found to have bacteremia. ID consulted and patient to complete at least 1 month course of levofloxacin and doxycycline. EOT to be determined by ID/imaging.     Met with Joann Kenney at discharge to review discharge medications and to update the blue medication card.           Medication List        START taking these medications      doxycycline 100 MG tablet  Commonly known as: VIBRA-TABS  Take 1 tablet (100 mg total) by mouth every 12 (twelve) hours.     k phos di & mono-sod phos mono 250 mg Tab  Commonly known as: K-PHOS-NEUTRAL  Take 2 tablets by mouth 2 (two) times a day.     levoFLOXacin 750 MG tablet  Commonly known as: LEVAQUIN  Take 1 tablet (750 mg total) by mouth every other day.  Start taking on: August 27, 2024            CHANGE how you take these medications      insulin aspart U-100 100 unit/mL (3 mL) Inpn pen  Commonly known as: NovoLOG  Inject subcutaneously as needed for sliding scale. Total daily dose: 15 units/day  What changed:   how much to take  how to take this  when to take this  additional instructions     tacrolimus 1 MG Cap  Commonly known as: PROGRAF  Take 4 capsules (4 mg total) by mouth every 12 (twelve) hours.  What changed: how much to take            CONTINUE taking these medications      ACCU-CHEK GUIDE GLUCOSE METER Misc  Generic drug: blood-glucose meter  use as directed to check blood glucose     ACCU-CHEK GUIDE TEST STRIPS Strp  Generic drug: blood sugar diagnostic  use 1 strip to check blood glucose 3 (three) times daily.     ACCU-CHEK SOFTCLIX LANCETS Misc  Generic drug: lancets  1 lancet each to check blood glucose 3 (three) times daily.     ALPRAZolam 2 MG Tab  Commonly known as: XANAX     atorvastatin 20 MG tablet  Commonly known as: LIPITOR  Take 1 tablet (20 mg total) by mouth every evening.     BD ULTRA-FINE KIRSTIN PEN NEEDLE 32 gauge x 5/32" Ndle  Generic drug: pen " needle, diabetic  1 each for use with insulin pen 3 (three) times daily.     carvediloL 6.25 MG tablet  Commonly known as: COREG  Take 1 tablet (6.25 mg total) by mouth 2 (two) times daily.     cetirizine 5 MG tablet  Commonly known as: ZYRTEC  Take 1 tablet (5 mg total) by mouth once daily.     cinacalcet 60 MG Tab  Commonly known as: SENSIPAR  Take 1 tablet (60 mg total) by mouth daily with breakfast.     DULoxetine 30 MG capsule  Commonly known as: CYMBALTA     ezetimibe 10 mg tablet  Commonly known as: ZETIA  Take 1 tablet (10 mg total) by mouth once daily.     magnesium oxide 400 mg (241.3 mg magnesium) tablet  Commonly known as: MAG-OX  Take 2 tablets (800 mg total) by mouth 2 (two) times daily.     multivitamin Tab     ondansetron 4 MG Tbdl  Commonly known as: ZOFRAN-ODT  Dissolve 1 tablet (4 mg total) by mouth every 8 (eight) hours as needed.     oxybutynin 5 MG Tab  Commonly known as: DITROPAN  Take 1 tablet (5 mg total) by mouth 3 (three) times daily as needed (bladder spasms).     oxyCODONE 5 MG immediate release tablet  Commonly known as: ROXICODONE  Take 1 tablet (5 mg total) by mouth every 6 (six) hours as needed for Pain.     pantoprazole 40 MG tablet  Commonly known as: PROTONIX  Take 1 tablet (40 mg total) by mouth once daily.     predniSONE 5 MG tablet  Commonly known as: DELTASONE  Take by mouth daily; 7/27/2024-8/2/2024: 20 mg, 8/3/2024-8/9/2024: 15 mg; 8/10/2024-8/16/2024: 10 mg; 8/17/2024- forever: 5 mg; do not stop     sulfamethoxazole-trimethoprim 400-80mg 400-80 mg per tablet  Commonly known as: BACTRIM,SEPTRA  Take 1 tablet by mouth every morning. Stop 1/21/25     valGANciclovir 450 mg Tab  Commonly known as: VALCYTE  Take 1 tablet (450 mg total) by mouth once daily. Stop 10/23/24            STOP taking these medications      minoxidiL 2.5 MG tablet  Commonly known as: LONITEN     mycophenolate 250 mg Cap  Commonly known as: CELLCEPT     NIFEdipine 60 MG (OSM) 24 hr tablet  Commonly known  as: PROCARDIA-XL     sodium bicarbonate 650 MG tablet               Where to Get Your Medications        These medications were sent to [facility]  [address] [address]      Hours: Always Open Phone: [phone]   doxycycline 100 MG tablet  insulin aspart U-100 100 unit/mL (3 mL) Inpn pen  k phos di & mono-sod phos mono 250 mg Tab  levoFLOXacin 750 MG tablet  tacrolimus 1 MG Cap            The following medications have been placed on HOLD and should be restarted in the outpatient setting (when appropriate): DAGMAR Kenney verbalized understanding and had the opportunity to ask questions.

## 2024-08-27 ENCOUNTER — TELEPHONE (OUTPATIENT)
Dept: TRANSPLANT | Facility: CLINIC | Age: 64
End: 2024-08-27
Payer: MEDICARE

## 2024-08-27 ENCOUNTER — PATIENT MESSAGE (OUTPATIENT)
Dept: ENDOCRINOLOGY | Facility: HOSPITAL | Age: 64
End: 2024-08-27
Payer: MEDICARE

## 2024-08-27 DIAGNOSIS — Z94.0 S/P KIDNEY TRANSPLANT: ICD-10-CM

## 2024-08-27 RX ORDER — PANTOPRAZOLE SODIUM 40 MG/1
40 TABLET, DELAYED RELEASE ORAL DAILY
Qty: 30 TABLET | Refills: 2 | Status: CANCELLED | OUTPATIENT
Start: 2024-08-27 | End: 2025-08-27

## 2024-08-27 NOTE — TELEPHONE ENCOUNTER
----- Message from Liliam Dawson sent at 8/27/2024  1:16 PM CDT -----  Regarding: Speak to coordinator  Contact: Joann  Regarding:speak to coordinator          Name Of Caller: Joann Kenney          Contact Preference: [phone] (home)        Nature of call:  pt is calling to speak to coordinator regarding some medication, no other info given.

## 2024-09-04 ENCOUNTER — PATIENT MESSAGE (OUTPATIENT)
Dept: TRANSPLANT | Facility: CLINIC | Age: 64
End: 2024-09-04

## 2024-09-04 ENCOUNTER — PROCEDURE VISIT (OUTPATIENT)
Dept: UROLOGY | Facility: CLINIC | Age: 64
End: 2024-09-04
Payer: MEDICARE

## 2024-09-04 ENCOUNTER — OFFICE VISIT (OUTPATIENT)
Dept: TRANSPLANT | Facility: CLINIC | Age: 64
End: 2024-09-04
Payer: MEDICARE

## 2024-09-04 ENCOUNTER — OFFICE VISIT (OUTPATIENT)
Dept: OTOLARYNGOLOGY | Facility: CLINIC | Age: 64
End: 2024-09-04
Payer: MEDICARE

## 2024-09-04 VITALS
HEIGHT: 65 IN | SYSTOLIC BLOOD PRESSURE: 171 MMHG | WEIGHT: 162.94 LBS | HEART RATE: 73 BPM | RESPIRATION RATE: 18 BRPM | DIASTOLIC BLOOD PRESSURE: 83 MMHG | BODY MASS INDEX: 27.15 KG/M2 | TEMPERATURE: 98 F

## 2024-09-04 VITALS
HEART RATE: 84 BPM | HEIGHT: 65 IN | BODY MASS INDEX: 24.57 KG/M2 | WEIGHT: 147.5 LBS | SYSTOLIC BLOOD PRESSURE: 134 MMHG | DIASTOLIC BLOOD PRESSURE: 70 MMHG

## 2024-09-04 VITALS
HEIGHT: 65 IN | BODY MASS INDEX: 24.57 KG/M2 | SYSTOLIC BLOOD PRESSURE: 134 MMHG | DIASTOLIC BLOOD PRESSURE: 70 MMHG | WEIGHT: 147.5 LBS | TEMPERATURE: 97 F | HEART RATE: 84 BPM

## 2024-09-04 DIAGNOSIS — T81.49XA WOUND INFECTION AFTER SURGERY: Primary | ICD-10-CM

## 2024-09-04 DIAGNOSIS — Z79.60 LONG-TERM USE OF IMMUNOSUPPRESSANT MEDICATION: ICD-10-CM

## 2024-09-04 DIAGNOSIS — E08.22 DIABETES MELLITUS DUE TO UNDERLYING CONDITION WITH CHRONIC KIDNEY DISEASE ON CHRONIC DIALYSIS, WITH LONG-TERM CURRENT USE OF INSULIN: ICD-10-CM

## 2024-09-04 DIAGNOSIS — R22.0 SUBMANDIBULAR SWELLING: Primary | ICD-10-CM

## 2024-09-04 DIAGNOSIS — Z94.0 S/P KIDNEY TRANSPLANT: Primary | ICD-10-CM

## 2024-09-04 DIAGNOSIS — N18.6 DIABETES MELLITUS DUE TO UNDERLYING CONDITION WITH CHRONIC KIDNEY DISEASE ON CHRONIC DIALYSIS, WITH LONG-TERM CURRENT USE OF INSULIN: ICD-10-CM

## 2024-09-04 DIAGNOSIS — Z99.2 DIABETES MELLITUS DUE TO UNDERLYING CONDITION WITH CHRONIC KIDNEY DISEASE ON CHRONIC DIALYSIS, WITH LONG-TERM CURRENT USE OF INSULIN: ICD-10-CM

## 2024-09-04 DIAGNOSIS — I15.0 RENOVASCULAR HYPERTENSION: ICD-10-CM

## 2024-09-04 DIAGNOSIS — Z79.4 DIABETES MELLITUS DUE TO UNDERLYING CONDITION WITH CHRONIC KIDNEY DISEASE ON CHRONIC DIALYSIS, WITH LONG-TERM CURRENT USE OF INSULIN: ICD-10-CM

## 2024-09-04 DIAGNOSIS — R22.1 SUBMANDIBULAR SWELLING: Primary | ICD-10-CM

## 2024-09-04 LAB
EXT BK VIRUS DNA QN PCR: NOT DETECTED
EXT HBV DNA QUANT PCR: NOT DETECTED
EXT HCV QUANT: NOT DETECTED
EXT HIV RNA QUANT PCR: NEGATIVE
EXT TACROLIMUS LVL: 8.1

## 2024-09-04 PROCEDURE — 1125F AMNT PAIN NOTED PAIN PRSNT: CPT | Mod: CPTII,S$GLB,, | Performed by: TRANSPLANT SURGERY

## 2024-09-04 PROCEDURE — 4010F ACE/ARB THERAPY RXD/TAKEN: CPT | Mod: CPTII,S$GLB,, | Performed by: NURSE PRACTITIONER

## 2024-09-04 PROCEDURE — 3044F HG A1C LEVEL LT 7.0%: CPT | Mod: CPTII,S$GLB,, | Performed by: TRANSPLANT SURGERY

## 2024-09-04 PROCEDURE — 4010F ACE/ARB THERAPY RXD/TAKEN: CPT | Mod: CPTII,S$GLB,, | Performed by: PHYSICIAN ASSISTANT

## 2024-09-04 PROCEDURE — 1101F PT FALLS ASSESS-DOCD LE1/YR: CPT | Mod: CPTII,S$GLB,, | Performed by: TRANSPLANT SURGERY

## 2024-09-04 PROCEDURE — 3078F DIAST BP <80 MM HG: CPT | Mod: CPTII,S$GLB,, | Performed by: PHYSICIAN ASSISTANT

## 2024-09-04 PROCEDURE — 3075F SYST BP GE 130 - 139MM HG: CPT | Mod: CPTII,S$GLB,, | Performed by: PHYSICIAN ASSISTANT

## 2024-09-04 PROCEDURE — 99999 PR PBB SHADOW E&M-EST. PATIENT-LVL V: CPT | Mod: PBBFAC,,, | Performed by: PHYSICIAN ASSISTANT

## 2024-09-04 PROCEDURE — 1160F RVW MEDS BY RX/DR IN RCRD: CPT | Mod: CPTII,S$GLB,, | Performed by: PHYSICIAN ASSISTANT

## 2024-09-04 PROCEDURE — 1111F DSCHRG MED/CURRENT MED MERGE: CPT | Mod: CPTII,S$GLB,, | Performed by: PHYSICIAN ASSISTANT

## 2024-09-04 PROCEDURE — 99999 PR PBB SHADOW E&M-EST. PATIENT-LVL III: CPT | Mod: PBBFAC,,,

## 2024-09-04 PROCEDURE — 3066F NEPHROPATHY DOC TX: CPT | Mod: CPTII,S$GLB,, | Performed by: PHYSICIAN ASSISTANT

## 2024-09-04 PROCEDURE — 1111F DSCHRG MED/CURRENT MED MERGE: CPT | Mod: CPTII,S$GLB,, | Performed by: NURSE PRACTITIONER

## 2024-09-04 PROCEDURE — 3044F HG A1C LEVEL LT 7.0%: CPT | Mod: CPTII,S$GLB,, | Performed by: NURSE PRACTITIONER

## 2024-09-04 PROCEDURE — 3078F DIAST BP <80 MM HG: CPT | Mod: CPTII,S$GLB,, | Performed by: TRANSPLANT SURGERY

## 2024-09-04 PROCEDURE — 99214 OFFICE O/P EST MOD 30 MIN: CPT | Mod: S$GLB,,, | Performed by: PHYSICIAN ASSISTANT

## 2024-09-04 PROCEDURE — 3075F SYST BP GE 130 - 139MM HG: CPT | Mod: CPTII,S$GLB,, | Performed by: TRANSPLANT SURGERY

## 2024-09-04 PROCEDURE — 1159F MED LIST DOCD IN RCRD: CPT | Mod: CPTII,S$GLB,, | Performed by: PHYSICIAN ASSISTANT

## 2024-09-04 PROCEDURE — 52310 CYSTOSCOPY AND TREATMENT: CPT | Mod: S$GLB,,, | Performed by: UROLOGY

## 2024-09-04 PROCEDURE — 99215 OFFICE O/P EST HI 40 MIN: CPT | Mod: S$GLB,,, | Performed by: NURSE PRACTITIONER

## 2024-09-04 PROCEDURE — 3066F NEPHROPATHY DOC TX: CPT | Mod: CPTII,S$GLB,, | Performed by: TRANSPLANT SURGERY

## 2024-09-04 PROCEDURE — 4010F ACE/ARB THERAPY RXD/TAKEN: CPT | Mod: CPTII,S$GLB,, | Performed by: TRANSPLANT SURGERY

## 2024-09-04 PROCEDURE — 99024 POSTOP FOLLOW-UP VISIT: CPT | Mod: S$GLB,,, | Performed by: TRANSPLANT SURGERY

## 2024-09-04 PROCEDURE — 3288F FALL RISK ASSESSMENT DOCD: CPT | Mod: CPTII,S$GLB,, | Performed by: TRANSPLANT SURGERY

## 2024-09-04 PROCEDURE — 3066F NEPHROPATHY DOC TX: CPT | Mod: CPTII,S$GLB,, | Performed by: NURSE PRACTITIONER

## 2024-09-04 PROCEDURE — 3044F HG A1C LEVEL LT 7.0%: CPT | Mod: CPTII,S$GLB,, | Performed by: PHYSICIAN ASSISTANT

## 2024-09-04 PROCEDURE — 3008F BODY MASS INDEX DOCD: CPT | Mod: CPTII,S$GLB,, | Performed by: PHYSICIAN ASSISTANT

## 2024-09-04 RX ORDER — LIDOCAINE HYDROCHLORIDE 20 MG/ML
JELLY TOPICAL ONCE
Status: COMPLETED | OUTPATIENT
Start: 2024-09-04 | End: 2024-09-04

## 2024-09-04 RX ADMIN — LIDOCAINE HYDROCHLORIDE 5 ML: 20 JELLY TOPICAL at 02:09

## 2024-09-04 NOTE — LETTER
September 4, 2024        Brice Mike  [address] [address] [address]  Phone: [phone]  Fax: [phone]             [facility]  [address]  [facility] [address]  Phone: [phone]   Patient: Joann Kenney   MR Number: [id number]   YOB: 1960   Date of Visit: 9/4/2024       Dear Dr. Brice Mike    Thank you for referring Joann Kenney to me for evaluation. Attached you will find relevant portions of my assessment and plan of care.    If you have questions, please do not hesitate to call me. I look forward to following Joann Kenney along with you.    Sincerely,    Trinity Adames, NP    Enclosure    If you would like to receive this communication electronically, please contact [email or web address] or [phone] to request [facility] access.    [facility] is a tool which provides read-only access to select patient information with whom you have a relationship. Its easy to use and provides real time access to review your patients record including encounter summaries, notes, results, and demographic information.    If you feel you have received this communication in error or would no longer like to receive these types of communications, please e-mail [email or web address]

## 2024-09-04 NOTE — PROCEDURES
Procedure: Flexible cysto-uretheroscopy and stent removal   Pre Procedure Diagnosis:s/p kidney transplant   Post Procedure Diagnosis:same   Surgeon: Jesús Villegas MD   Anesthesia: 2% uro-jet lidocaine jelly for local analgesia   Flexible cysto-urethroscopy was performed after consent was obtained. The risks and benefits were explained.   2% lidocaine urojet was used for local analgesia.   The genitalia was prepped and draped in the sterile fashion with betadine.   The flexible scope was advanced into the urethra and into the bladder. The stent was removed without difficulty.   The patient tolerated the procedure well without complication.   They will follow up with transplant.

## 2024-09-04 NOTE — PROGRESS NOTES
No chief complaint on file.        63 y.o. female presents for follow up of submandibular swelling. She is s/p kidney transplant on 7/24/24. During post op course, developed a salivary gland infection that was treated with augmentin.  She was evaluated by ENT during admission - recommend warm compresses, gland massage, sour candies. Symptoms completely resolved and have not recurred since then. She does have a history of salivary stones prior to kidney transplant. Denies any current pain, swelling, erythema, warmth, tenderness.  No trauma to the face.  No prior head/neck imaging.     She is currently being followed closely by ID and the transplant team s/p kidney transplant.       Review of Systems     Constitutional: +fatigue  HENT: per HPI  Eyes: Negative for visual disturbance.   Respiratory: Negative for shortness of breath  Cardiovascular: Negative for chest pain   Musculoskeletal: Negative for decreased ROM  Neurological: +lightheadedness    Answers submitted by the patient for this visit:  Review of Symptoms Questionnaire  (Submitted on 8/28/2024)  Fatigue (Tiredness)?: Yes  postnasal drip: Yes  mouth sores: Yes  Tooth/Dental Problems?: Yes  trouble swallowing: Yes  eye itching: Yes  Snoring?: Yes  Sleep Apnea?: Yes  cough: Yes  Foot swelling?: Yes  diarrhea: Yes  constipation: Yes  Vomiting?: Yes  Urinating too frequently?: Yes  Muscle aches / pain?: Yes  back pain: Yes  rash: Yes  None of these: Yes  Cold all of the time? : Yes  Hot all of the time? : Yes  dizziness: Yes  tremors: Yes  Light-headedness: Yes  Bruises or bleeds easily: Yes  decreased concentration: Yes  Feeling depressed?: Yes      Past Medical History:   Diagnosis Date    Anemia     Anxiety     Atrial fibrillation     Bleeding 08/08/2024    Coronary artery disease     Depression     Diabetes mellitus, type 2     Disorder of kidney and ureter     Heart murmur     Hyperlipidemia     Hypertension     Obesity     ALEJA (obstructive sleep apnea)      Proteinuria     Solitary kidney     Stroke     Transient neurological symptoms 11/13/2018       Past Surgical History:   Procedure Laterality Date    CORONARY ANGIOPLASTY WITH STENT PLACEMENT      HYSTERECTOMY      INCONTINENCE SURGERY      INSERTION OF IMPLANTABLE LOOP RECORDER      internal heart monitor      KIDNEY TRANSPLANT Right 7/24/2024    Procedure: TRANSPLANT, KIDNEY;  Surgeon: Alphonso Mon MD;  Location: [address];  Service: Transplant;  Laterality: Right;    PERITONEAL CATHETER INSERTION      PERITONEAL CATHETER REMOVAL Left 7/24/2024    Procedure: REMOVAL, CATHETER, DIALYSIS, PERITONEAL;  Surgeon: Alphonso Mon MD;  Location: [address];  Service: Transplant;  Laterality: Left;       family history is not on file.    Pt  reports that she has quit smoking. She has never used smokeless tobacco. She reports that she does not currently use alcohol. She reports that she does not use drugs.    Review of patient's allergies indicates:   Allergen Reactions    Sevelamer carbonate Other (See Comments)     Severe constipation        Physical Exam    Vitals:    09/04/24 1548   BP: 134/70   Pulse: 84     Body mass index is 24.54 kg/m².    General: AOx3, NAD  Right Ear: External Auditory Canal WNL,Unable to visualize TM due to cerumen.  Left Ear:  External Auditory Canal WNL,TM w/o masses/lesions/perforations  Nose: No gross nasal septal deviation.  Inferior Turbinates WNL bilaterally.  No septal perforation.  No masses/lesions.  Oral Cavity: FOM Soft, no masses palpated.  Oral Tongue mobile.  Hard Palate WNL.  Oropharynx: BOT WNL.  No masses/lesions noted.  Tonsillar fossa without lesions.  Soft palate without masses.  Midline uvula.  Neck: No palpable lymphadenopathy at I - VI.  No tenderness or masses to the submandibular glands noted. No tenderness to the parotid glands.   Face: House Brackmann I bilaterally.  Eyes: Normal extra ocular motion bilaterally.      Assessment     1. Submandibular  swelling          Plan    Problem List Items Addressed This Visit    None  Visit Diagnoses       Submandibular swelling    -  Primary    Relevant Orders    CT Soft Tissue Neck WO Contrast            Kidney transplant 7/24/24. During post op course, developed sialadenitis, treated with augmentin and symptoms completely resolve and have not recurred. .  No current symptoms concerning for sialadenitis.  She has not had an head/neck imaging in the past.  If recurrence of symptoms, warm compresses and sour candies. She is on doxycyline and bactrim post transplant. Will get CT neck, due to recent kidney transplant, without contrast to r/o any underlying masses or lesions.

## 2024-09-04 NOTE — PROGRESS NOTES
Kidney Post-Transplant Assessment    Referring Physician: Brice Mike  Current Nephrologist: Brice Mike    ORGAN: RIGHT KIDNEY  Donor Type: donation after brain death  PHS Increased Risk: no  Cold Ischemia: 1,402 mins  Induction Medications: thymo    Subjective:     CC:  Reassessment of renal allograft function and management of chronic immunosuppression.    HPI:  Ms. Kenney is a 63 y.o. 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 Her most recent creatinine is 3.7. She takes mycophenolate mofetil, prednisone, and tacrolimus for maintenance immunosuppression. Her post transplant course has been  see below .    Anemia, atrial fibrillation, CAD, type 2 DM, HLD, ALEJA     Post op course notable for down trending Cr with excellent UOP. Also notable for   - Salivary gland infection. Per ID,discharged on Augmentin for a 10 day antibiotic course, stop date 8/3/24. Should she develop submandibular swelling, recommend CT neck with ENT consult   - Anemia requiring prbc transfusion on 7/27/24 with adequate response   - ER visit on 7/28 for hypertension - PO meds adjusted and d/c' from ED  - wound infection. MMF on hold during antibiotic regimen     Seen urology today for stent removal  MMF on hold during wound infection treatment-- NO show for ID appointment 8/29 but removes doing virtual/phone visit. Plans on doing imaging studies next week  Seen surgeon today for wound check  Reports BP low when standing with dizziness. Reports was happing prior to transplant. Encourage to monitor BP only take coreg if BP > 150 when standing. Encourage adequate fluid intake and food( not eating enough). Discussed using compression hose.   Blood sugar elevated eating sweets. Will sen blood glucose log in to endocrine.   Reports urinating well. Unable to make it the bathroom at times. Incontinence per patient was an issue before transplant No pain over there allograft.     Review of  Systems   Constitutional:  Negative for appetite change, chills, fatigue and fever.   HENT:  Negative for trouble swallowing.    Respiratory:  Negative for cough, chest tightness, shortness of breath and wheezing.    Cardiovascular:  Negative for chest pain, palpitations and leg swelling.   Gastrointestinal:  Negative for abdominal pain, constipation, diarrhea and nausea.   Genitourinary:  Negative for difficulty urinating, frequency and urgency.   Musculoskeletal:  Negative for arthralgias and myalgias.   Skin:  Negative for rash.   Allergic/Immunologic: Positive for immunocompromised state.   Neurological:  Negative for dizziness, weakness, light-headedness and headaches.   Psychiatric/Behavioral:  Negative for sleep disturbance.        Objective:   There were no vitals taken for this visit.body mass index is unknown because there is no height or weight on file.    Physical Exam  Constitutional:       General: She is not in acute distress.     Appearance: She is well-developed. She is not diaphoretic.   Cardiovascular:      Rate and Rhythm: Normal rate and regular rhythm.      Heart sounds: Normal heart sounds.   Pulmonary:      Effort: Pulmonary effort is normal.      Breath sounds: Normal breath sounds.   Abdominal:      General: Bowel sounds are normal.      Palpations: Abdomen is soft.   Musculoskeletal:         General: No tenderness. Normal range of motion.   Skin:     General: Skin is warm and dry.      Findings: No rash.      Nails: There is no clubbing.   Neurological:      Mental Status: She is alert and oriented to person, place, and time.   Psychiatric:         Behavior: Behavior normal.         Labs:  Lab Results   Component Value Date    WBC 7.41 08/26/2024    HGB 8.1 (L) 08/26/2024    HCT 24.6 (L) 08/26/2024    LABPLAT 291 03/13/2020     08/26/2024    K 3.7 08/26/2024     08/26/2024    CO2 24 08/26/2024    BUN 16 08/26/2024    CREATININE 1.6 (H) 08/26/2024    EGFRNORACEVR 35.6 (A)  "08/26/2024    CALCIUM 8.9 08/26/2024    PHOS 3.0 08/26/2024    MG 1.4 (L) 08/26/2024    ALBUMIN 2.4 (L) 08/26/2024    AST 15 08/09/2024    ALT 19 08/09/2024    .4 (H) 07/29/2024    TACROLIMUS 5.7 08/26/2024       No results found for: "EXTANC", "EXTWBC", "EXTSEGS", "EXTPLATELETS", "EXTHEMOGLOBI", "EXTHEMATOCRI", "EXTCREATININ", "EXTSODIUM", "EXTPOTASSIUM", "EXTBUN", "EXTCO2", "EXTCALCIUM", "EXTPHOSPHORU", "EXTGLUCOSE", "EXTALBUMIN", "EXTAST", "EXTALT", "EXTBILITOTAL", "EXTLIPASE", "EXTAMYLASE"    No results found for: "EXTCYCLOSLVL", "EXTSIROLIMUS", "EXTTACROLVL", "EXTPROTCRE", "EXTPTHINTACT", "EXTPROTEINUA", "EXTWBCUA", "EXTRBCUA"    Labs were reviewed with the patient    Assessment:     1. S/P kidney transplant    2. Renovascular hypertension    3. 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 Long-term use of immunosuppressant medication        Plan:   Pending labs  monitor BP only take coreg if BP > 150 when standing. Encourage adequate fluid intake and food( not eating enough). Discussed using compression hose.   Blood sugar elevated eating sweets. Will sen blood glucose log in to endocrine.       1. CKD stage: will continue follow up as per our center guidelines. patient to continue close follow up with the local General nephrologist. Education provided in appropriate fluid intake, potassium intake. Continue with oral hydration.      2. Immunosuppression: Prograf trough 8.8, therapeutic target 8-10. Continue Prograf 4/4, MMF 1000 Mg BID, and Prednisone 5 mg QD  Lab Results   Component Value Date    TACROLIMUS 5.7 08/26/2024    TACROLIMUS 5.7 08/25/2024    TACROLIMUS 6.4 08/24/2024   Will closely monitor for toxicities, education provided about adherence to medicines and need to communicate any side effect to the transplant nurse or physician.    3. Allograft Function:stable at baseline for the patient. Continue " "follow up as per our guidelines and with the local General nephrologist. Communication will be sent today.  Lab Results   Component Value Date    CREATININE 1.6 (H) 08/26/2024    CREATININE 1.5 (H) 08/25/2024    CREATININE 1.6 (H) 08/24/2024     No results found for: "AMYLASE", "LIPASE"  Lab Results   Component Value Date    HGBA1C 4.8 07/24/2024    HGBA1C 4.8 07/24/2024   insulin    4. Hypertension management:  Continue with home blood pressure monitoring, low salt and healthy life discussed with the patient.  BP Readings from Last 3 Encounters:   08/26/24 109/70   08/16/24 (!) 168/64   08/13/24 (!) 166/71   Coreg, minoxidil, nifedipine    5. Metabolic Bone Disease/Secondary Hyperparathyroidism:calcium and phosphorus level discussed with the patient, patient will continue follow up with the general nephrologist for management of metabolic bone disease calcium and phosphorus as per our center protocol. Will monitor PTH, Vit D level, calcium.   Lab Results   Component Value Date    .4 (H) 07/29/2024    CALCIUM 8.9 08/26/2024    PHOS 3.0 08/26/2024    PHOS 3.0 08/25/2024    PHOS 2.7 08/24/2024   cinacalcet    6. Electrolytes: reviewed with the patient, essentially within the normal range no need for acute changes today, will monitor as per our center guidelines.  Lab Results   Component Value Date     08/26/2024    K 3.7 08/26/2024     08/26/2024    CO2 24 08/26/2024    CO2 25 08/25/2024    CO2 25 08/24/2024   NaBicarb    7. Anemia: will continue monitoring as per our center guidelines. No indication for acute intervention today.  Lab Results   Component Value Date    WBC 7.41 08/26/2024    HGB 8.1 (L) 08/26/2024    HCT 24.6 (L) 08/26/2024    MCV 95 08/26/2024     08/26/2024       8.Proteinuria: will continue with pr/cr ratio as per our center guidelines  No results found for: "PROTEINURINE", "CREATRANDUR", "UTPCR"     9. BK virus infection screening: will continue with urine or blood PCR as " "per our guidelines to prevent BK virus viremia and allograft dysfunction  No results found for: "BKVIRUSDNAUR", "BKQUANTURINE", "BKVIRUSLOG", "BKVIRUSURINE", "BKVIRUSPCRQB"      10. Weight education: provided during the clinic visit.  There is no height or weight on file to calculate BMI.     11.Patient safety education regarding immunosuppression including prophylaxis posttransplant for CMV, PCP : Education provided about vaccination and prevention of infections.    12.  Cytopenias: no significant cytopenias will monitor as per our guidelines. Medicine list reviewed including potential causes of drug-induced cytopenias  Lab Results   Component Value Date    WBC 7.41 08/26/2024    HGB 8.1 (L) 08/26/2024    HCT 24.6 (L) 08/26/2024    MCV 95 08/26/2024     08/26/2024       13. Post-transplant Prophylaxis; CMV Infection, PJP and Candida mucosistis and other indicated for this particular patient.   PJP PROPHYLAXIS: Bactrim 1/21/25  CMV PROPHYLAXIS: Valcyte 10/23/24     Exercise: reminded Randall of the importance of regular exercise for weight management, blood sugar and blood pressure management.  I also explained exercise has been shown to improve cardiovascular health, energy level, and sleep hygiene.  Lastly, I advised him that cardiovascular complications are leading cause of death for renal transplant recipients, and regular exercise can help lower this risk.    Follow-up:   Clinic: return to transplant clinic weekly for the first month after transplant; every 2 weeks during months 2-3; then at 6-, 9-, 12-, 18-, 24-, and 36- months post-transplant to reassess for complications from immunosuppression toxicity and monitor for rejection.  Annually thereafter.    Labs: since patient remains at high risk for rejection and drug-related complications that warrant close monitoring, labs will be ordered as follows: continue twice weekly CBC, renal panel, and drug level for first month; then same labs once weekly " through 3rd month post-transplant.  Urine for UA and protein/creatinine ratio monthly.  Serum BK - PCR at 1-, 3-, 6-, 9-, 12-, 18-, 24-, 36-, 48-, and 60 months post-transplant.  Hepatic panel at 1-, 2-, 3-, 6-, 9-, 12-, 18-, 24-, and 36- months post-transplant.    Trinity Adames NP       Education:   Material provided to the patient.  Patient reminded to call with any health changes since these can be early signs of significant complications.  Also, I advised the patient to be sure any new medications or changes of old medications are discussed with either a pharmacist or physician knowledgeable with transplant to avoid rejection/drug toxicity related to significant drug interactions.    Patient advised that it is recommended that all transplanted patients, and their close contacts and household members receive Covid vaccination.

## 2024-09-04 NOTE — PATIENT INSTRUCTIONS
What to Expect After a Cystoscopy with Stent Removal  For the next 24-48 hours, you may feel a mild burning when you urinate. This burning is normal and expected. Drink plenty of water to dilute the urine to help relieve the burning sensation. You may also see a small amount of blood in your urine after the procedure.    Unless you are already taking antibiotics, you may be given an antibiotic after the test to prevent infection.    Signs and Symptoms to Report  Call the [facility] at [phone] if you develop any of the following:  Fever of 101 degrees or higher  Chills or persistent bleeding  Inability to urinate    After hours or on weekends, you may reach a urology resident on call at this number: [phone].

## 2024-09-04 NOTE — PROGRESS NOTES
63 F s/p kidney transplant complicated by wound infection. Pt reports to clinic today for wound assessment on VAC therapy. Clean and shallow granulation bed noted. No signs of infection or tunneling. Firmness around incision consistent with hematoma resolution. Schedule for CT scan next week per ID. Recommend discontinuation of wound VAC and daily dressing changes. Pt scheduled for wound care tomorrow at home.    Alphonso Mon MD

## 2024-09-06 ENCOUNTER — TELEPHONE (OUTPATIENT)
Dept: TRANSPLANT | Facility: CLINIC | Age: 64
End: 2024-09-06
Payer: MEDICARE

## 2024-09-06 ENCOUNTER — TELEPHONE (OUTPATIENT)
Dept: ENDOCRINOLOGY | Facility: HOSPITAL | Age: 64
End: 2024-09-06
Payer: MEDICARE

## 2024-09-06 ENCOUNTER — DOCUMENTATION ONLY (OUTPATIENT)
Dept: TRANSPLANT | Facility: CLINIC | Age: 64
End: 2024-09-06
Payer: MEDICARE

## 2024-09-06 DIAGNOSIS — Z94.0 KIDNEY REPLACED BY TRANSPLANT: Primary | ICD-10-CM

## 2024-09-06 LAB
EXT ALBUMIN: 3.8
EXT ALKALINE PHOSPHATASE: 90
EXT ALLOSURE: ABNORMAL
EXT ALT: 19
EXT AMYLASE: ABNORMAL
EXT ANC: ABNORMAL
EXT AST: 29
EXT BACTERIA UA: ABNORMAL
EXT BANDS%: 0
EXT BILIRUBIN DIRECT: ABNORMAL
EXT BILIRUBIN TOTAL: 0.7
EXT BK VIRUS DNA QN PCR: ABNORMAL
EXT BUN: 14
EXT C PEPTIDE: ABNORMAL
EXT CALCIUM: 9.6
EXT CHLORIDE: 105
EXT CHOLESTEROL: ABNORMAL
EXT CMV DNA QUANT. BY PCR: ABNORMAL
EXT CO2: 29
EXT CREATININE UA: 54.3
EXT CREATININE: 1.9 MG/DL
EXT CYCLOSPORINE LVL: ABNORMAL
EXT EBV DNA BY PCR: ABNORMAL
EXT EBV IGG: ABNORMAL
EXT EGFR NO RACE VARIABLE: 28.77
EXT EOSINOPHIL%: 2
EXT FERRITIN: ABNORMAL
EXT GFR MDRD AF AMER: ABNORMAL
EXT GFR MDRD NON AF AMER: ABNORMAL
EXT GLUCOSE UA: ABNORMAL
EXT GLUCOSE: 1.8
EXT HBV DNA QUANT PCR: ABNORMAL
EXT HCV QUANT: ABNORMAL
EXT HDL: ABNORMAL
EXT HEMATOCRIT: 32.9
EXT HEMOGLOBIN A1C: ABNORMAL
EXT HEMOGLOBIN: 11.1
EXT HIV RNA QUANT PCR: NEGATIVE
EXT IMMUNKNOW (STIMULATED): ABNORMAL
EXT IRON SATURATION: ABNORMAL
EXT LDH, TOTAL: ABNORMAL
EXT LDL CHOLESTEROL: ABNORMAL
EXT LEFLUNOMIDE METABOLITE: ABNORMAL
EXT LIPASE: ABNORMAL
EXT LYMPH%: 9.8
EXT MAGNESIUM: 1.3
EXT MONOCYTES%: 4.5
EXT NITRITES UA: NEGATIVE
EXT PHOSPHORUS: 3.8
EXT PLATELETS: 307
EXT POTASSIUM: 3.5
EXT PROT/CREAT RATIO UR: 1.36
EXT PROTEIN TOTAL: 6.4
EXT PROTEIN UA: ABNORMAL
EXT PTH, INTACT: ABNORMAL
EXT RBC UA: ABNORMAL
EXT SEGS%: 81.5
EXT SERUM IRON: ABNORMAL
EXT SIROLIMUS LVL: ABNORMAL
EXT SODIUM: 143 MMOL/L
EXT TACROLIMUS LVL: ABNORMAL
EXT TIBC: ABNORMAL
EXT TRIGLYCERIDES: ABNORMAL
EXT URIC ACID: ABNORMAL
EXT URINE CULTURE: ABNORMAL
EXT URINE PROTEIN: 74
EXT VIT D 25 HYDROXY: ABNORMAL
EXT WBC UA: ABNORMAL
EXT WBC: 7.38
PARVOVIRUS B19 DNA BY PCR: ABNORMAL
QUANTIFERON GOLD TB: ABNORMAL

## 2024-09-06 NOTE — TELEPHONE ENCOUNTER
Spoke to patient on how to send blood sugar logs. Explained to patient that sending a picture through the patient portal would be faster than sending through the mail. Patient stated that blood sugars have been running higher at night and asked if she could administer insulin before bed. Advised patient to avoid administering insulin before bed without eating due to concerns of hypoglycemia during sleep. Reviewed patient's logs that she sent on 09/05/2024. Recommended patient resume taking Novolog 3 units before meals in addition to sliding scale as needed for better blood sugar control during the day. Will review blood sugars again in 1 week. If blood sugars continue to run higher in the evening, will consider increasing Novolog at dinner. Answered all of patient's questions and patient stated understanding.

## 2024-09-06 NOTE — TELEPHONE ENCOUNTER
Patient taking magnesium and kphos together. Advised patient to try to take them apart by an hour or more to allow for better absorption. Advised patient to review list of foods high in magnesium on page 100 of transplant book. Patient states understanding.    ----- Message from Joce Landry MD sent at 9/6/2024  3:55 PM CDT -----  If she is taking mag oxide correctly increase to mag oxide 800 tid

## 2024-09-06 NOTE — TELEPHONE ENCOUNTER
Spoke with home health nurse regarding wound care needs. Wound vac discontinued. Patient/caregiver to do daily dressing changes. Home health to go twice weekly to assess wound. Coordinator spoke with patient who states she cannot do the dressing changes herself. Home health nurse spoke with daughter who stated she cannot go to patient's home every day to do dressing changes. Home health nurse explained importance of daily dressing changes to patient's daughter. Coordinator left voicemail for patient's daughter to discuss why it is necessary for dressing change to be done daily.

## 2024-09-06 NOTE — TELEPHONE ENCOUNTER
----- Message from Krystyna Jurado sent at 9/6/2024  2:31 PM CDT -----  Regarding: Consult/Advisory  Contact: Joann Kenney     Consult/Advisory     Name Of Caller:Joann Kenney         Contact Preference:[phone] (home)       Nature of call:Patient is calling to speak to doctor about her insulin. Requesting a call back

## 2024-09-09 ENCOUNTER — DOCUMENTATION ONLY (OUTPATIENT)
Dept: TRANSPLANT | Facility: CLINIC | Age: 64
End: 2024-09-09
Payer: MEDICARE

## 2024-09-10 ENCOUNTER — DOCUMENTATION ONLY (OUTPATIENT)
Dept: TRANSPLANT | Facility: CLINIC | Age: 64
End: 2024-09-10
Payer: MEDICARE

## 2024-09-10 LAB
EXT ALBUMIN: 3.9 (ref 3.5–5)
EXT BUN: 31 (ref 7–20)
EXT CO2: 25 (ref 22–30)
EXT CREATININE: 1.7 MG/DL (ref 0.52–1.5)
EXT EGFR NO RACE VARIABLE: 32.88
EXT EOSINOPHIL%: 2.3 (ref 1–5)
EXT GLUCOSE: 201 (ref 74–106)
EXT HEMATOCRIT: 36.1 (ref 37–47)
EXT HEMOGLOBIN: 11.8 (ref 12–16)
EXT LYMPH%: 8.2 (ref 20–40)
EXT MAGNESIUM: 1.5 (ref 1.6–2.3)
EXT MONOCYTES%: 5.5 (ref 3.5–12.5)
EXT PHOSPHORUS: 2.9 (ref 2.5–4.5)
EXT PLATELETS: 219 (ref 130–400)
EXT SEGS%: 6.1 (ref 1.9–7)
EXT TACROLIMUS LVL: 5.7
EXT WBC: 7.44

## 2024-09-12 ENCOUNTER — DOCUMENTATION ONLY (OUTPATIENT)
Dept: TRANSPLANT | Facility: CLINIC | Age: 64
End: 2024-09-12
Payer: MEDICARE

## 2024-09-18 ENCOUNTER — TELEPHONE (OUTPATIENT)
Dept: TRANSPLANT | Facility: CLINIC | Age: 64
End: 2024-09-18
Payer: MEDICARE

## 2024-09-18 NOTE — TELEPHONE ENCOUNTER
Patient reports SOB x1 week and that she was unable to take meds for 5-6 days last week due to vomiting. However reports that the vomiting has now resolved and she has been able to take her medications as prescribed. Advised patient to go to local ED to be assessed.

## 2024-09-24 ENCOUNTER — DOCUMENTATION ONLY (OUTPATIENT)
Dept: TRANSPLANT | Facility: CLINIC | Age: 64
End: 2024-09-24
Payer: MEDICARE

## 2024-09-24 LAB
EXT ALBUMIN: ABNORMAL
EXT ALKALINE PHOSPHATASE: ABNORMAL
EXT ALLOSURE: ABNORMAL
EXT ALT: 21
EXT AMYLASE: ABNORMAL
EXT ANC: ABNORMAL
EXT AST: 31 (ref 15–46)
EXT BACTERIA UA: ABNORMAL
EXT BANDS%: 7 (ref 2–6)
EXT BILIRUBIN DIRECT: ABNORMAL
EXT BILIRUBIN TOTAL: ABNORMAL
EXT BK VIRUS DNA QN PCR: ABNORMAL
EXT BUN: 21 (ref 7–20)
EXT C PEPTIDE: ABNORMAL
EXT CALCIUM: ABNORMAL
EXT CHLORIDE: 109 (ref 100–112)
EXT CHOLESTEROL: ABNORMAL
EXT CMV DNA QUANT. BY PCR: ABNORMAL
EXT CO2: 26 (ref 22–30)
EXT CREATININE UA: 49.1 (ref 22–328)
EXT CREATININE: 1.6 MG/DL (ref 0.52–1.5)
EXT CYCLOSPORINE LVL: ABNORMAL
EXT EBV DNA BY PCR: ABNORMAL
EXT EBV IGG: ABNORMAL
EXT EGFR NO RACE VARIABLE: ABNORMAL
EXT EOSINOPHIL%: 3 (ref 0–3)
EXT FERRITIN: ABNORMAL
EXT GFR MDRD AF AMER: ABNORMAL
EXT GFR MDRD NON AF AMER: ABNORMAL
EXT GLUCOSE UA: ABNORMAL
EXT GLUCOSE: 195 (ref 74–106)
EXT HBV DNA QUANT PCR: ABNORMAL
EXT HCV QUANT: ABNORMAL
EXT HDL: ABNORMAL
EXT HEMATOCRIT: 32.9 (ref 37–47)
EXT HEMOGLOBIN A1C: ABNORMAL
EXT HEMOGLOBIN: 11.3 (ref 4.2–5)
EXT HIV RNA QUANT PCR: ABNORMAL
EXT IMMUNKNOW (STIMULATED): ABNORMAL
EXT IRON SATURATION: ABNORMAL
EXT LDH, TOTAL: ABNORMAL
EXT LDL CHOLESTEROL: ABNORMAL
EXT LEFLUNOMIDE METABOLITE: ABNORMAL
EXT LIPASE: ABNORMAL
EXT LYMPH%: 15 (ref 20–40)
EXT MAGNESIUM: ABNORMAL
EXT MONOCYTES%: 6 (ref 0–12)
EXT NITRITES UA: NEGATIVE
EXT PHOSPHORUS: 2.8 (ref 2.5–4.5)
EXT PLATELETS: 217 (ref 130–400)
EXT POTASSIUM: 3.4 (ref 3.5–5.1)
EXT PROT/CREAT RATIO UR: 1.75
EXT PROTEIN TOTAL: ABNORMAL
EXT PROTEIN UA: ABNORMAL
EXT PTH, INTACT: ABNORMAL
EXT RBC UA: ABNORMAL
EXT SEGS%: 65 (ref 40–65)
EXT SERUM IRON: ABNORMAL
EXT SIROLIMUS LVL: ABNORMAL
EXT SODIUM: 139 MMOL/L (ref 137–148)
EXT TACROLIMUS LVL: ABNORMAL
EXT TIBC: ABNORMAL
EXT TRIGLYCERIDES: ABNORMAL
EXT URIC ACID: ABNORMAL
EXT URINE CULTURE: ABNORMAL
EXT URINE PROTEIN: 86
EXT VIT D 25 HYDROXY: ABNORMAL
EXT WBC UA: ABNORMAL
EXT WBC: 5.2 (ref 4.8–10.8)
PARVOVIRUS B19 DNA BY PCR: ABNORMAL
QUANTIFERON GOLD TB: ABNORMAL

## 2024-09-25 ENCOUNTER — DOCUMENTATION ONLY (OUTPATIENT)
Dept: TRANSPLANT | Facility: CLINIC | Age: 64
End: 2024-09-25
Payer: MEDICARE

## 2024-09-25 DIAGNOSIS — R80.9 PROTEINURIA, UNSPECIFIED TYPE: Primary | ICD-10-CM

## 2024-09-25 RX ORDER — OLMESARTAN MEDOXOMIL 40 MG/1
20 TABLET ORAL DAILY
Qty: 45 TABLET | Refills: 3 | Status: SHIPPED | OUTPATIENT
Start: 2024-09-25 | End: 2025-09-25

## 2024-09-25 NOTE — TELEPHONE ENCOUNTER
Patient states understanding of plan.      ----- Message from Joce Landry MD sent at 9/25/2024 10:23 AM CDT -----  Please start Olmesartan 20 mg daily for proteinuria management  Repeat pr/cr ratio in 6 weeks  Encourage hydration  Repeat BMP in 10 days

## 2024-09-25 NOTE — PROGRESS NOTES
Please start Olmesartan 20 mg daily for proteinuria management  Repeat pr/cr ratio in 6 weeks  Encourage hydration  Repeat BMP in 10 days

## 2024-09-30 ENCOUNTER — TELEPHONE (OUTPATIENT)
Dept: TRANSPLANT | Facility: CLINIC | Age: 64
End: 2024-09-30
Payer: MEDICARE

## 2024-09-30 ENCOUNTER — DOCUMENTATION ONLY (OUTPATIENT)
Dept: TRANSPLANT | Facility: CLINIC | Age: 64
End: 2024-09-30
Payer: MEDICARE

## 2024-09-30 DIAGNOSIS — Z94.0 S/P KIDNEY TRANSPLANT: ICD-10-CM

## 2024-09-30 DIAGNOSIS — Z94.0 S/P KIDNEY TRANSPLANT: Primary | ICD-10-CM

## 2024-09-30 LAB
EXT ALBUMIN: 3.7
EXT ANC: 649.4
EXT BANDS%: 4
EXT BUN: 20
EXT CALCIUM: 11.5
EXT CHLORIDE: 109
EXT CO2: 22
EXT CREATININE: 1.6 MG/DL
EXT EGFR NO RACE VARIABLE: 35.35
EXT EOSINOPHIL%: 12
EXT GLUCOSE: 209
EXT HEMATOCRIT: 33.7
EXT HEMOGLOBIN: 11.5
EXT LYMPH%: 42
EXT MAGNESIUM: 1.4
EXT MONOCYTES%: 8
EXT PHOSPHORUS: 3
EXT PLATELETS: 214
EXT POTASSIUM: 4
EXT SEGS%: 30
EXT SODIUM: 137 MMOL/L
EXT TACROLIMUS LVL: 4.4
EXT WBC: 1.91

## 2024-09-30 RX ORDER — CINACALCET 60 MG/1
60 TABLET, FILM COATED ORAL
Qty: 30 TABLET | Refills: 11 | Status: SHIPPED | OUTPATIENT
Start: 2024-09-30 | End: 2025-09-30

## 2024-09-30 RX ORDER — FILGRASTIM-SNDZ 480 UG/.8ML
480 INJECTION, SOLUTION INTRAVENOUS; SUBCUTANEOUS DAILY PRN
Qty: 4 ML | Refills: 0 | Status: ACTIVE | OUTPATIENT
Start: 2024-09-30

## 2024-09-30 NOTE — TELEPHONE ENCOUNTER
Patient not near an [facility] infusion clinic for Neupogen. PharmDs investigating if patient can get insurance coverage for home administration. Patient states she has not been taking sensipar- confirms it is on blue med card but the medication was not present in her bag of medications. Requesting a new prescription be sent to her local [facility]. Not currently taking any calcium or vitamin D supplements. Advised to stop Valcyte and reviewed neutropenic precautions. Repeating CBC and drawing CMV PCR on Thursday.     ----- Message from Joce Landry MD sent at 9/30/2024  1:22 PM CDT -----  Please proceed with neupogen per protocol  Increase sensipar to 90 mg daily  Encourage more water hydration   Repeat cbc with diff on Thursday   Make sure she is not taking calcium or Vit D supplements

## 2024-09-30 NOTE — PROGRESS NOTES
Please proceed with neupogen per protocol  Increase sensipar to 90 mg daily  Encourage more water hydration   Repeat cbc with diff on Thursday   Make sure she is not taking calcium or Vit D supplements

## 2024-10-01 ENCOUNTER — TELEPHONE (OUTPATIENT)
Dept: TRANSPLANT | Facility: CLINIC | Age: 64
End: 2024-10-01
Payer: MEDICARE

## 2024-10-01 DIAGNOSIS — Z94.0 S/P KIDNEY TRANSPLANT: ICD-10-CM

## 2024-10-01 RX ORDER — FILGRASTIM-SNDZ 480 UG/.8ML
480 INJECTION, SOLUTION INTRAVENOUS; SUBCUTANEOUS DAILY PRN
Qty: 4 ML | Refills: 0 | Status: CANCELLED | OUTPATIENT
Start: 2024-10-01

## 2024-10-02 ENCOUNTER — DOCUMENTATION ONLY (OUTPATIENT)
Dept: TRANSPLANT | Facility: CLINIC | Age: 64
End: 2024-10-02
Payer: MEDICARE

## 2024-10-04 ENCOUNTER — DOCUMENTATION ONLY (OUTPATIENT)
Dept: TRANSPLANT | Facility: CLINIC | Age: 64
End: 2024-10-04
Payer: MEDICARE

## 2024-10-04 LAB
EXT ALBUMIN: 3.8
EXT ALKALINE PHOSPHATASE: ABNORMAL
EXT ALLOSURE: ABNORMAL
EXT ALT: ABNORMAL
EXT AMYLASE: ABNORMAL
EXT ANC: ABNORMAL
EXT AST: ABNORMAL
EXT BACTERIA UA: ABNORMAL
EXT BANDS%: 7
EXT BILIRUBIN DIRECT: ABNORMAL
EXT BILIRUBIN TOTAL: ABNORMAL
EXT BK VIRUS DNA QN PCR: ABNORMAL
EXT BUN: 15
EXT C PEPTIDE: ABNORMAL
EXT CALCIUM: 11.9
EXT CHLORIDE: 109
EXT CHOLESTEROL: ABNORMAL
EXT CMV DNA QUANT. BY PCR: ABNORMAL
EXT CO2: 24
EXT CREATININE UA: 63.3
EXT CREATININE: 1.6 MG/DL
EXT CYCLOSPORINE LVL: ABNORMAL
EXT EBV DNA BY PCR: ABNORMAL
EXT EBV IGG: ABNORMAL
EXT EGFR NO RACE VARIABLE: 35.34
EXT EOSINOPHIL%: 2
EXT FERRITIN: ABNORMAL
EXT GFR MDRD AF AMER: ABNORMAL
EXT GFR MDRD NON AF AMER: ABNORMAL
EXT GLUCOSE UA: ABNORMAL
EXT GLUCOSE: 165
EXT HBV DNA QUANT PCR: ABNORMAL
EXT HCV QUANT: ABNORMAL
EXT HDL: ABNORMAL
EXT HEMATOCRIT: 33.4
EXT HEMOGLOBIN A1C: ABNORMAL
EXT HEMOGLOBIN: 11.3
EXT HIV RNA QUANT PCR: ABNORMAL
EXT IMMUNKNOW (STIMULATED): ABNORMAL
EXT IRON SATURATION: ABNORMAL
EXT LDH, TOTAL: ABNORMAL
EXT LDL CHOLESTEROL: ABNORMAL
EXT LEFLUNOMIDE METABOLITE: ABNORMAL
EXT LIPASE: ABNORMAL
EXT LYMPH%: 6
EXT MAGNESIUM: 1.6
EXT MONOCYTES%: 4
EXT NITRITES UA: NEGATIVE
EXT PHOSPHORUS: 22.7
EXT PLATELETS: 196
EXT POTASSIUM: 3.9
EXT PROT/CREAT RATIO UR: 1.45
EXT PROTEIN TOTAL: ABNORMAL
EXT PROTEIN UA: ABNORMAL
EXT PTH, INTACT: ABNORMAL
EXT RBC UA: NEGATIVE
EXT SEGS%: 79
EXT SERUM IRON: ABNORMAL
EXT SIROLIMUS LVL: ABNORMAL
EXT SODIUM: 138 MMOL/L
EXT TACROLIMUS LVL: ABNORMAL
EXT TIBC: ABNORMAL
EXT TRIGLYCERIDES: ABNORMAL
EXT URIC ACID: ABNORMAL
EXT URINE CULTURE: ABNORMAL
EXT URINE PROTEIN: 92
EXT VIT D 25 HYDROXY: ABNORMAL
EXT WBC UA: NEGATIVE
EXT WBC: 11.48
PARVOVIRUS B19 DNA BY PCR: ABNORMAL
QUANTIFERON GOLD TB: ABNORMAL

## 2024-10-08 NOTE — PROGRESS NOTES
Kidney Post-Transplant Assessment    Referring Physician: Brice Mike  Current Nephrologist: Brice Mike    ORGAN: RIGHT KIDNEY  Donor Type: donation after brain death  PHS Increased Risk: no  Cold Ischemia: 1,402 mins  Induction Medications: thymo    Subjective:   The patient location is: [place], [place]  The chief complaint leading to consultation is: Kidney transplant follow-up and immunosuppression management    Visit type: audiovisual    Face to Face time with patient: 20minutes of total time spent on the encounter, which includes face to face time and non-face to face time preparing to see the patient (eg, review of tests), Obtaining and/or reviewing separately obtained history, Documenting clinical information in the electronic or other health record, Independently interpreting results (not separately reported) and communicating results to the patient/family/caregiver, or Care coordination (not separately reported).     Each patient to whom he or she provides medical services by telemedicine is:  (1) informed of the relationship between the physician and patient and the respective role of any other health care provider with respect to management of the patient; and (2) notified that he or she may decline to receive medical services by telemedicine and may withdraw from such care at any time.    CC:  Reassessment of renal allograft function and management of chronic immunosuppression.    HPI:  Ms. Kenney is a 64 y.o. 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 Her most recent creatinine is 3.7. She takes mycophenolate mofetil, prednisone, and tacrolimus for maintenance immunosuppression. Her post transplant course has been  see below .    Anemia, atrial fibrillation, CAD, type 2 DM, HLD, ALEJA     Post op course notable for down trending Cr with excellent UOP. Also notable for   - Salivary gland infection. Per ID,discharged on Augmentin for a 10 day antibiotic  course, stop date 8/3/24. Should she develop submandibular swelling, recommend CT neck with ENT consult   - Anemia requiring prbc transfusion on 7/27/24 with adequate response   - ER visit on 7/28 for hypertension - PO meds adjusted and d/c' from ED  - wound infection. MMF on hold during antibiotic regimen     MMF on hold during wound infection treatment-- NO show for ID appointment 8/29 but reports doing virtual/phone visit. Reports needing to doing imaging studies but never scheduled   Wound vac removed 9/4/2024. Still doing wet to dry dressing. Reports maybe 1 more week of dressing changes per patient.   Blood sugars in the 300s. Next endocrine appointment next week  Sitting SBP 200s  Standing BPs 120/80. Reports BP low when standing with dizziness. Reports was happing prior to transplant. Encourage to monitor BP only take coreg if BP > 150 when standing. Encourage adequate fluid intake and food( not eating enough). Discussed using compression hose.  Reports urinating well. Unable to make it the bathroom at times. Incontinence per patient was an issue before transplant No pain over there allograft.       Review of Systems   Constitutional:  Negative for appetite change, chills, fatigue and fever.   HENT:  Negative for trouble swallowing.    Respiratory:  Negative for cough, chest tightness, shortness of breath and wheezing.    Cardiovascular:  Negative for chest pain, palpitations and leg swelling.   Gastrointestinal:  Negative for abdominal pain, constipation, diarrhea and nausea.   Genitourinary:  Negative for difficulty urinating, frequency and urgency.   Musculoskeletal:  Negative for arthralgias and myalgias.   Skin:  Negative for rash.   Allergic/Immunologic: Positive for immunocompromised state.   Neurological:  Negative for dizziness, weakness, light-headedness and headaches.   Psychiatric/Behavioral:  Negative for sleep disturbance.        Objective:   There were no vitals taken for this visit.body mass  index is unknown because there is no height or weight on file.    Physical Exam    Labs:  Lab Results   Component Value Date    WBC 7.41 08/26/2024    HGB 8.1 (L) 08/26/2024    HCT 24.6 (L) 08/26/2024    LABPLAT 291 03/13/2020     08/26/2024    K 3.7 08/26/2024     08/26/2024    CO2 24 08/26/2024    BUN 16 08/26/2024    CREATININE 1.6 (H) 08/26/2024    EGFRNORACEVR 35.6 (A) 08/26/2024    CALCIUM 8.9 08/26/2024    PHOS 3.0 08/26/2024    MG 1.4 (L) 08/26/2024    ALBUMIN 2.4 (L) 08/26/2024    AST 15 08/09/2024    ALT 19 08/09/2024    .4 (H) 07/29/2024    TACROLIMUS 5.7 08/26/2024       Lab Results   Component Value Date    EXTANC 649.4 09/30/2024    EXTWBC 11.48 (H) 10/04/2024    EXTSEGS 79 (H) 10/04/2024    EXTPLATELETS 196 10/04/2024    EXTHEMOGLOBI 11.3 (L) 10/04/2024    EXTHEMATOCRI 33.4 (L) 10/04/2024    EXTCREATININ 1.6 (H) 10/04/2024    EXTCREATININ 63.3 10/04/2024    EXTSODIUM 138 10/04/2024    EXTPOTASSIUM 3.9 10/04/2024    EXTBUN 15 10/04/2024    EXTCO2 24 10/04/2024    EXTCALCIUM 11.9 (H) 10/04/2024    EXTPHOSPHORU 22.7 10/04/2024    EXTGLUCOSE 165 (H) 10/04/2024    EXTALBUMIN 3.8 10/04/2024    EXTAST 31.0 09/23/2024    EXTALT 21.0 09/23/2024    EXTBILITOTAL 0.7 09/04/2024       Lab Results   Component Value Date    EXTTACROLVL pending 10/04/2024    EXTPROTCRE 1.45 10/04/2024    EXTPROTEINUA 1+ 10/04/2024    EXTWBCUA negative 10/04/2024    EXTRBCUA negative 10/04/2024       Labs were reviewed with the patient    Assessment:     1. S/P kidney transplant    2. Hypertension associated with transplantation    3. 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 Long-term use of immunosuppressant medication        Plan:   MMF on hold during wound infection treatment-- NO show for ID appointment 8/29 but reports doing virtual/phone visit. Reports needing to doing imaging studies but never scheduled---NEEDS  "ID follow-up    Neutropenic. Has Neupogen--continue hold valcyte and MMF---needs weekly CMV PCR    monitor BP only take coreg if BP > 150 when standing. Encourage adequate fluid intake and food( not eating enough). Discussed using compression hose.-- Instructed patient to follow-up if her Cardiologist back home    DM, blood sugars in 300s. Has appointment with her endocrinologist next week      1. CKD stage: will continue follow up as per our center guidelines. patient to continue close follow up with the local General nephrologist. Education provided in appropriate fluid intake, potassium intake. Continue with oral hydration.      2. Immunosuppression: Prograf trough pending, therapeutic target 8-10. Continue Prograf 4/4, MMF 1000 Mg BID--holding infection and neutropenic , and Prednisone 5 mg QD  Lab Results   Component Value Date    TACROLIMUS 5.7 08/26/2024    TACROLIMUS 5.7 08/25/2024    TACROLIMUS 6.4 08/24/2024   Will closely monitor for toxicities, education provided about adherence to medicines and need to communicate any side effect to the transplant nurse or physician.    3. Allograft Function:stable at baseline for the patient. Continue follow up as per our guidelines and with the local General nephrologist. Communication will be sent today.  Lab Results   Component Value Date    CREATININE 1.6 (H) 08/26/2024    CREATININE 1.5 (H) 08/25/2024    CREATININE 1.6 (H) 08/24/2024     No results found for: "AMYLASE", "LIPASE"  Lab Results   Component Value Date    HGBA1C 4.8 07/24/2024    HGBA1C 4.8 07/24/2024   insulin    4. Hypertension management:  Continue with home blood pressure monitoring, low salt and healthy life discussed with the patient.  BP Readings from Last 3 Encounters:   09/04/24 134/70   09/04/24 134/70   09/04/24 (!) 171/83   Coreg, minoxidil, nifedipine    5. Metabolic Bone Disease/Secondary Hyperparathyroidism:calcium and phosphorus level discussed with the patient, patient will continue " "follow up with the general nephrologist for management of metabolic bone disease calcium and phosphorus as per our center protocol. Will monitor PTH, Vit D level, calcium.   Lab Results   Component Value Date    .4 (H) 07/29/2024    CALCIUM 8.9 08/26/2024    PHOS 3.0 08/26/2024    PHOS 3.0 08/25/2024    PHOS 2.7 08/24/2024   cinacalcet    6. Electrolytes: reviewed with the patient, essentially within the normal range no need for acute changes today, will monitor as per our center guidelines.  Lab Results   Component Value Date     08/26/2024    K 3.7 08/26/2024     08/26/2024    CO2 24 08/26/2024    CO2 25 08/25/2024    CO2 25 08/24/2024   NaBicarb    7. Anemia: will continue monitoring as per our center guidelines. No indication for acute intervention today.  Lab Results   Component Value Date    WBC 7.41 08/26/2024    HGB 8.1 (L) 08/26/2024    HCT 24.6 (L) 08/26/2024    MCV 95 08/26/2024     08/26/2024       8.Proteinuria: will continue with pr/cr ratio as per our center guidelines  No results found for: "PROTEINURINE", "CREATRANDUR", "UTPCR"     9. BK virus infection screening: will continue with urine or blood PCR as per our guidelines to prevent BK virus viremia and allograft dysfunction  No results found for: "BKVIRUSDNAUR", "BKQUANTURINE", "BKVIRUSLOG", "BKVIRUSURINE", "BKVIRUSPCRQB"      10. Weight education: provided during the clinic visit.  There is no height or weight on file to calculate BMI.     11.Patient safety education regarding immunosuppression including prophylaxis posttransplant for CMV, PCP : Education provided about vaccination and prevention of infections.    12.  Cytopenias: no significant cytopenias will monitor as per our guidelines. Medicine list reviewed including potential causes of drug-induced cytopenias  Lab Results   Component Value Date    WBC 7.41 08/26/2024    HGB 8.1 (L) 08/26/2024    HCT 24.6 (L) 08/26/2024    MCV 95 08/26/2024     08/26/2024 "       13. Post-transplant Prophylaxis; CMV Infection, PJP and Candida mucosistis and other indicated for this particular patient.   PJP PROPHYLAXIS: Bactrim 1/21/25  CMV PROPHYLAXIS: Valcyte 10/23/24     Exercise: reminded Randall of the importance of regular exercise for weight management, blood sugar and blood pressure management.  I also explained exercise has been shown to improve cardiovascular health, energy level, and sleep hygiene.  Lastly, I advised him that cardiovascular complications are leading cause of death for renal transplant recipients, and regular exercise can help lower this risk.    Follow-up:   Clinic: return to transplant clinic weekly for the first month after transplant; every 2 weeks during months 2-3; then at 6-, 9-, 12-, 18-, 24-, and 36- months post-transplant to reassess for complications from immunosuppression toxicity and monitor for rejection.  Annually thereafter.    Labs: since patient remains at high risk for rejection and drug-related complications that warrant close monitoring, labs will be ordered as follows: continue twice weekly CBC, renal panel, and drug level for first month; then same labs once weekly through 3rd month post-transplant.  Urine for UA and protein/creatinine ratio monthly.  Serum BK - PCR at 1-, 3-, 6-, 9-, 12-, 18-, 24-, 36-, 48-, and 60 months post-transplant.  Hepatic panel at 1-, 2-, 3-, 6-, 9-, 12-, 18-, 24-, and 36- months post-transplant.    Trinity Adames NP       Education:   Material provided to the patient.  Patient reminded to call with any health changes since these can be early signs of significant complications.  Also, I advised the patient to be sure any new medications or changes of old medications are discussed with either a pharmacist or physician knowledgeable with transplant to avoid rejection/drug toxicity related to significant drug interactions.    Patient advised that it is recommended that all transplanted patients, and their close  contacts and household members receive Covid vaccination.

## 2024-10-09 ENCOUNTER — PATIENT MESSAGE (OUTPATIENT)
Dept: TRANSPLANT | Facility: CLINIC | Age: 64
End: 2024-10-09
Payer: MEDICARE

## 2024-10-09 ENCOUNTER — OFFICE VISIT (OUTPATIENT)
Dept: TRANSPLANT | Facility: CLINIC | Age: 64
End: 2024-10-09
Payer: MEDICARE

## 2024-10-09 ENCOUNTER — DOCUMENTATION ONLY (OUTPATIENT)
Dept: TRANSPLANT | Facility: CLINIC | Age: 64
End: 2024-10-09

## 2024-10-09 DIAGNOSIS — Z99.2 DIABETES MELLITUS DUE TO UNDERLYING CONDITION WITH CHRONIC KIDNEY DISEASE ON CHRONIC DIALYSIS, WITH LONG-TERM CURRENT USE OF INSULIN: ICD-10-CM

## 2024-10-09 DIAGNOSIS — Z79.60 LONG-TERM USE OF IMMUNOSUPPRESSANT MEDICATION: ICD-10-CM

## 2024-10-09 DIAGNOSIS — Z79.4 DIABETES MELLITUS DUE TO UNDERLYING CONDITION WITH CHRONIC KIDNEY DISEASE ON CHRONIC DIALYSIS, WITH LONG-TERM CURRENT USE OF INSULIN: ICD-10-CM

## 2024-10-09 DIAGNOSIS — E08.22 DIABETES MELLITUS DUE TO UNDERLYING CONDITION WITH CHRONIC KIDNEY DISEASE ON CHRONIC DIALYSIS, WITH LONG-TERM CURRENT USE OF INSULIN: ICD-10-CM

## 2024-10-09 DIAGNOSIS — I15.8 HYPERTENSION ASSOCIATED WITH TRANSPLANTATION: ICD-10-CM

## 2024-10-09 DIAGNOSIS — Z94.0 S/P KIDNEY TRANSPLANT: Primary | ICD-10-CM

## 2024-10-09 DIAGNOSIS — N18.6 DIABETES MELLITUS DUE TO UNDERLYING CONDITION WITH CHRONIC KIDNEY DISEASE ON CHRONIC DIALYSIS, WITH LONG-TERM CURRENT USE OF INSULIN: ICD-10-CM

## 2024-10-09 DIAGNOSIS — Z94.9 HYPERTENSION ASSOCIATED WITH TRANSPLANTATION: ICD-10-CM

## 2024-10-09 LAB
EXT ALBUMIN: 4
EXT BANDS%: 0
EXT BUN: 16
EXT CALCIUM: 11.7
EXT CHLORIDE: 105
EXT CMV DNA QUANT. BY PCR: NOT DETECTED
EXT CO2: 27
EXT CREATININE: 1.7 MG/DL
EXT EGFR NO RACE VARIABLE: 32.86
EXT EOSINOPHIL%: 1.6
EXT GLUCOSE: 162
EXT HEMATOCRIT: 35.4
EXT HEMOGLOBIN: 12.1
EXT LYMPH%: 13.4
EXT MAGNESIUM: 1.8
EXT MONOCYTES%: 8.7
EXT PHOSPHORUS: 2.7
EXT PLATELETS: 208
EXT POTASSIUM: 3.8
EXT SEGS%: 71.3
EXT SODIUM: 139 MMOL/L
EXT TACROLIMUS LVL: 2.6
EXT WBC: 7.48

## 2024-10-09 NOTE — LETTER
October 9, 2024        Brice Mike  [address] [facility] [address]  Phone: [phone]  Fax: [phone]             [facility]  [address]  [facility] [address]  Phone: [phone]   Patient: Joann Kenney   MR Number: [id number]   YOB: 1960   Date of Visit: 10/9/2024       Dear Dr. Brice Mike    Thank you for referring Joann Kenney to me for evaluation. Attached you will find relevant portions of my assessment and plan of care.    If you have questions, please do not hesitate to call me. I look forward to following Joann Kenney along with you.    Sincerely,    Trinity Adames, NP    Enclosure    If you would like to receive this communication electronically, please contact [email or web address] or [phone] to request [employer] access.    [employer] is a tool which provides read-only access to select patient information with whom you have a relationship. Its easy to use and provides real time access to review your patients record including encounter summaries, notes, results, and demographic information.    If you feel you have received this communication in error or would no longer like to receive these types of communications, please e-mail [email or web address]

## 2024-10-09 NOTE — PROGRESS NOTES
Please increase sensipar to 90 mg daily  Is she taking sensipar correctly?  Encourage hydration  Refer the lady to endocrine due to hypercalcemia

## 2024-10-10 DIAGNOSIS — E83.52 HYPERCALCEMIA: ICD-10-CM

## 2024-10-10 DIAGNOSIS — Z94.0 S/P KIDNEY TRANSPLANT: ICD-10-CM

## 2024-10-10 DIAGNOSIS — K65.9 ABDOMINAL INFECTION: Primary | ICD-10-CM

## 2024-10-10 NOTE — TELEPHONE ENCOUNTER
Patient reports that she has been throwing up her medications again. Advised patient if she is unable to keep her medications down then she is putting herself at risk for rejection. She states understanding. Discussed concerns with patient's daughter who states that she has discussed this with her mom and that her mom has been eating a lot of fried/sugary foods that are known to upset her stomach. Patient's daughter said the patient restarted her Sensipar last week.     ----- Message from Joce Landry MD sent at 10/9/2024  5:30 PM CDT -----  Please increase sensipar to 90 mg daily  Is she taking sensipar correctly?  Encourage hydration  Refer the lady to endocrine due to hypercalcemia

## 2024-10-11 DIAGNOSIS — Z94.0 KIDNEY REPLACED BY TRANSPLANT: ICD-10-CM

## 2024-10-11 DIAGNOSIS — Z94.0 KIDNEY REPLACED BY TRANSPLANT: Primary | ICD-10-CM

## 2024-10-11 RX ORDER — ONDANSETRON 4 MG/1
4 TABLET, ORALLY DISINTEGRATING ORAL EVERY 8 HOURS PRN
Qty: 1 TABLET | Refills: 0 | Status: SHIPPED | OUTPATIENT
Start: 2024-10-11

## 2024-10-11 RX ORDER — CINACALCET 90 MG/1
90 TABLET, FILM COATED ORAL
Qty: 30 TABLET | Refills: 11 | Status: SHIPPED | OUTPATIENT
Start: 2024-10-11 | End: 2025-10-11

## 2024-10-11 NOTE — TELEPHONE ENCOUNTER
----- Message from Joce Landry MD sent at 10/11/2024  2:30 PM CDT -----  Let give her some PRN Zofran for n/V.  ----- Message -----  From: Ariella Valdez, RN  Sent: 10/10/2024  10:24 AM CDT  To: Joce Landry MD    Patient states she's been throwing up her medications again which is likely why her prograf level is low. This was an issue right after transplant. Her daughter said her mom has been eating a lot of fried and sugary foods/drinks that patient knows upsets her stomach. I told the patient if she keeps throwing up her transplant meds she's going to reject the kidney. As far as the sensipar she said she restarted it last week.  ----- Message -----  From: Joce Landry MD  Sent: 10/9/2024   5:30 PM CDT  To: [facility] Post-Kidney Transplant Clinical    Please increase sensipar to 90 mg daily  Is she taking sensipar correctly?  Encourage hydration  Refer the lady to endocrine due to hypercalcemia

## 2024-10-14 RX ORDER — ONDANSETRON 4 MG/1
4 TABLET, ORALLY DISINTEGRATING ORAL EVERY 8 HOURS PRN
Qty: 90 TABLET | Refills: 0 | Status: SHIPPED | OUTPATIENT
Start: 2024-10-14

## 2024-10-16 ENCOUNTER — DOCUMENTATION ONLY (OUTPATIENT)
Dept: TRANSPLANT | Facility: CLINIC | Age: 64
End: 2024-10-16
Payer: MEDICARE

## 2024-10-16 LAB
EXT ALBUMIN: 4
EXT BUN: 26
EXT CALCIUM: 10.2
EXT CHLORIDE: 108
EXT CMV DNA QUANT. BY PCR: NOT DETECTED
EXT CO2: 21
EXT CREATININE: 1.5 MG/DL
EXT EGFR NO RACE VARIABLE: 38.18
EXT EOSINOPHIL%: 1.2
EXT GLUCOSE: 191
EXT HEMATOCRIT: 35.6
EXT HEMOGLOBIN: 12.2
EXT LYMPH%: 9.3
EXT MAGNESIUM: 1.3
EXT MONOCYTES%: 3.6
EXT PHOSPHORUS: 3.9
EXT PLATELETS: 233
EXT POTASSIUM: 4
EXT SEGS%: 83.6
EXT SODIUM: 139 MMOL/L
EXT TACROLIMUS LVL: 8.6
EXT WBC: 8.86

## 2024-10-16 NOTE — PROGRESS NOTES
Please verify with original lab report sodium and potassium level.   Encourage hydration. Please verify if she taking correct dose of magnesium oxide

## 2024-10-18 ENCOUNTER — DOCUMENTATION ONLY (OUTPATIENT)
Dept: TRANSPLANT | Facility: CLINIC | Age: 64
End: 2024-10-18
Payer: MEDICARE

## 2024-10-22 ENCOUNTER — DOCUMENTATION ONLY (OUTPATIENT)
Dept: TRANSPLANT | Facility: CLINIC | Age: 64
End: 2024-10-22
Payer: MEDICARE

## 2024-10-22 DIAGNOSIS — Z94.0 KIDNEY REPLACED BY TRANSPLANT: Primary | ICD-10-CM

## 2024-10-22 DIAGNOSIS — R53.1 WEAKNESS: ICD-10-CM

## 2024-10-22 LAB
EXT ALBUMIN: 3.8 (ref 3.5–5)
EXT ALKALINE PHOSPHATASE: 149 (ref 38–126)
EXT ALT: 22
EXT AST: 24 (ref 15–46)
EXT BACTERIA UA: NEGATIVE
EXT BILIRUBIN TOTAL: 0.4 (ref 0.2–1.3)
EXT BK VIRUS DNA QN PCR: NOT DETECTED
EXT BUN: 26 (ref 7–20)
EXT CALCIUM: 11.3 (ref 8.4–10.2)
EXT CHLORIDE: 108 (ref 100–112)
EXT CMV DNA QUANT. BY PCR: NOT DETECTED
EXT CO2: 26 (ref 22–30)
EXT CREATININE UA: 23.5 (ref 22–328)
EXT CREATININE: 1.8 MG/DL (ref 0.52–1.5)
EXT EGFR NO RACE VARIABLE: 30.68
EXT EOSINOPHIL%: 4.6 (ref 1–5)
EXT GLUCOSE UA: ABNORMAL
EXT GLUCOSE: 194 (ref 74–106)
EXT HEMATOCRIT: 34.5 (ref 37–47)
EXT HEMOGLOBIN: 11.5 (ref 12–16)
EXT LYMPH%: 14.3 (ref 20–40)
EXT MAGNESIUM: 1.8 (ref 1.6–2.3)
EXT MONOCYTES%: 10 (ref 3.5–12.5)
EXT NITRITES UA: NEGATIVE
EXT PHOSPHORUS: 3.2 (ref 2.5–4.5)
EXT PLATELETS: 261 (ref 130–400)
EXT POTASSIUM: 4.6 (ref 3.5–5.1)
EXT PROT/CREAT RATIO UR: 2.2
EXT PROTEIN TOTAL: 6.2 (ref 6.3–8.2)
EXT PROTEIN UA: ABNORMAL
EXT RBC UA: ABNORMAL
EXT SEGS%: 68.1 (ref 40–65)
EXT SODIUM: 137 MMOL/L (ref 137–148)
EXT TACROLIMUS LVL: 4.3
EXT URINE PROTEIN: 53
EXT WBC UA: ABNORMAL
EXT WBC: 6.03 (ref 4.8–10.8)

## 2024-10-24 ENCOUNTER — DOCUMENTATION ONLY (OUTPATIENT)
Dept: TRANSPLANT | Facility: CLINIC | Age: 64
End: 2024-10-24
Payer: MEDICARE

## 2024-10-28 ENCOUNTER — TELEPHONE (OUTPATIENT)
Dept: TRANSPLANT | Facility: CLINIC | Age: 64
End: 2024-10-28
Payer: MEDICARE

## 2024-10-28 ENCOUNTER — DOCUMENTATION ONLY (OUTPATIENT)
Dept: TRANSPLANT | Facility: CLINIC | Age: 64
End: 2024-10-28
Payer: MEDICARE

## 2024-10-28 LAB
EXT BK VIRUS DNA QN PCR: NOT DETECTED
EXT CMV DNA QUANT. BY PCR: NOT DETECTED
EXT TACROLIMUS LVL: 4.3

## 2024-10-30 ENCOUNTER — DOCUMENTATION ONLY (OUTPATIENT)
Dept: TRANSPLANT | Facility: CLINIC | Age: 64
End: 2024-10-30
Payer: MEDICARE

## 2024-10-30 LAB
EXT ALBUMIN: 3.8
EXT BANDS%: 0
EXT BUN: 22
EXT CALCIUM: 10.1
EXT CHLORIDE: 107
EXT CO2: 25
EXT CREATININE: 1.5 MG/DL
EXT EGFR NO RACE VARIABLE: 38.18
EXT EOSINOPHIL%: 4.4
EXT GLUCOSE: 151
EXT HEMATOCRIT: 33
EXT HEMOGLOBIN: 11.1
EXT LYMPH%: 15.5
EXT MAGNESIUM: 1.7
EXT MONOCYTES%: 8
EXT PHOSPHORUS: 3
EXT PLATELETS: 285
EXT POTASSIUM: 3.8
EXT SEGS%: 70.7
EXT SODIUM: 138 MMOL/L
EXT TACROLIMUS LVL: 4.9
EXT WBC: 6.4

## 2024-11-01 ENCOUNTER — TELEPHONE (OUTPATIENT)
Dept: TRANSPLANT | Facility: CLINIC | Age: 64
End: 2024-11-01
Payer: MEDICARE

## 2024-11-01 ENCOUNTER — DOCUMENTATION ONLY (OUTPATIENT)
Dept: TRANSPLANT | Facility: CLINIC | Age: 64
End: 2024-11-01
Payer: MEDICARE

## 2024-11-05 ENCOUNTER — DOCUMENTATION ONLY (OUTPATIENT)
Dept: TRANSPLANT | Facility: CLINIC | Age: 64
End: 2024-11-05
Payer: MEDICARE

## 2024-11-05 LAB
CMV IGG SERPL QL IA: 9
EXT ALBUMIN: 4 (ref 3.5–5)
EXT ALKALINE PHOSPHATASE: ABNORMAL
EXT ALLOSURE: ABNORMAL
EXT ALT: ABNORMAL
EXT AMYLASE: ABNORMAL
EXT ANC: ABNORMAL
EXT AST: ABNORMAL
EXT BACTERIA UA: ABNORMAL
EXT BANDS%: ABNORMAL
EXT BILIRUBIN DIRECT: ABNORMAL
EXT BILIRUBIN TOTAL: ABNORMAL
EXT BK VIRUS DNA QN PCR: ABNORMAL
EXT BUN: 31 (ref 7–20)
EXT C PEPTIDE: ABNORMAL
EXT CALCIUM: 10.2 (ref 8.4–10.2)
EXT CHLORIDE: 104 (ref 100–112)
EXT CHOLESTEROL: ABNORMAL
EXT CMV DNA QUANT. BY PCR: ABNORMAL
EXT CO2: 25 (ref 22–30)
EXT CREATININE UA: 44.7 (ref 22–328)
EXT CREATININE: 1.8 MG/DL (ref 0.52–1.5)
EXT CYCLOSPORINE LVL: ABNORMAL
EXT EBV DNA BY PCR: ABNORMAL
EXT EBV IGG: ABNORMAL
EXT EGFR NO RACE VARIABLE: 30.67
EXT EOSINOPHIL%: 2.1 (ref 1–5)
EXT FERRITIN: ABNORMAL
EXT GFR MDRD AF AMER: ABNORMAL
EXT GFR MDRD NON AF AMER: ABNORMAL
EXT GLUCOSE UA: ABNORMAL
EXT GLUCOSE: 175 (ref 74–106)
EXT HBV DNA QUANT PCR: ABNORMAL
EXT HCV QUANT: ABNORMAL
EXT HDL: ABNORMAL
EXT HEMATOCRIT: 37 (ref 37–47)
EXT HEMOGLOBIN A1C: ABNORMAL
EXT HEMOGLOBIN: 12.3 (ref 12–16)
EXT HIV RNA QUANT PCR: ABNORMAL
EXT IMMUNKNOW (STIMULATED): ABNORMAL
EXT IRON SATURATION: ABNORMAL
EXT LDH, TOTAL: ABNORMAL
EXT LDL CHOLESTEROL: ABNORMAL
EXT LEFLUNOMIDE METABOLITE: ABNORMAL
EXT LIPASE: ABNORMAL
EXT LYMPH%: 14.5 (ref 20–40)
EXT MAGNESIUM: 1.7 (ref 1.6–2.3)
EXT MONOCYTES%: 8 (ref 3.5–12.5)
EXT NITRITES UA: NEGATIVE
EXT PHOSPHORUS: 3.4 (ref 2.5–4.5)
EXT PLATELETS: 256 (ref 130–400)
EXT POTASSIUM: 4.2 (ref 3.5–5.1)
EXT PROT/CREAT RATIO UR: 1.41
EXT PROTEIN TOTAL: ABNORMAL
EXT PROTEIN UA: ABNORMAL
EXT PTH, INTACT: ABNORMAL
EXT RBC UA: ABNORMAL
EXT SEGS%: 74 (ref 40–65)
EXT SERUM IRON: ABNORMAL
EXT SIROLIMUS LVL: ABNORMAL
EXT SODIUM: 138 MMOL/L (ref 137–148)
EXT TACROLIMUS LVL: ABNORMAL
EXT TIBC: ABNORMAL
EXT TRIGLYCERIDES: ABNORMAL
EXT URIC ACID: ABNORMAL
EXT URINE CULTURE: ABNORMAL
EXT URINE PROTEIN: 63
EXT VIT D 25 HYDROXY: ABNORMAL
EXT WBC UA: ABNORMAL
EXT WBC: 6.62 (ref 4.8–10.8)
PARVOVIRUS B19 DNA BY PCR: ABNORMAL
QUANTIFERON GOLD TB: ABNORMAL

## 2024-11-07 ENCOUNTER — DOCUMENTATION ONLY (OUTPATIENT)
Dept: TRANSPLANT | Facility: CLINIC | Age: 64
End: 2024-11-07
Payer: MEDICARE

## 2024-11-08 LAB
EXT ALBUMIN: NORMAL
EXT ALKALINE PHOSPHATASE: NORMAL
EXT ALLOSURE: NORMAL
EXT ALT: NORMAL
EXT AMYLASE: NORMAL
EXT ANC: NORMAL
EXT AST: NORMAL
EXT BACTERIA UA: NORMAL
EXT BANDS%: NORMAL
EXT BILIRUBIN DIRECT: NORMAL
EXT BILIRUBIN TOTAL: NORMAL
EXT BK VIRUS DNA QN PCR: NORMAL
EXT BUN: NORMAL
EXT C PEPTIDE: NORMAL
EXT CALCIUM: NORMAL
EXT CHLORIDE: NORMAL
EXT CHOLESTEROL: NORMAL
EXT CMV DNA QUANT. BY PCR: NORMAL
EXT CO2: NORMAL
EXT CREATININE UA: NORMAL
EXT CREATININE: NORMAL
EXT CYCLOSPORINE LVL: NORMAL
EXT EBV DNA BY PCR: NORMAL
EXT EBV IGG: NORMAL
EXT EGFR NO RACE VARIABLE: NORMAL
EXT EOSINOPHIL%: NORMAL
EXT FERRITIN: NORMAL
EXT GFR MDRD AF AMER: NORMAL
EXT GFR MDRD NON AF AMER: NORMAL
EXT GLUCOSE UA: NORMAL
EXT GLUCOSE: NORMAL
EXT HBV DNA QUANT PCR: NORMAL
EXT HCV QUANT: NORMAL
EXT HDL: NORMAL
EXT HEMATOCRIT: NORMAL
EXT HEMOGLOBIN A1C: NORMAL
EXT HEMOGLOBIN: NORMAL
EXT HIV RNA QUANT PCR: NORMAL
EXT IMMUNKNOW (STIMULATED): NORMAL
EXT IRON SATURATION: NORMAL
EXT LDH, TOTAL: NORMAL
EXT LDL CHOLESTEROL: NORMAL
EXT LEFLUNOMIDE METABOLITE: NORMAL
EXT LIPASE: NORMAL
EXT LYMPH%: NORMAL
EXT MAGNESIUM: NORMAL
EXT MONOCYTES%: NORMAL
EXT NITRITES UA: NORMAL
EXT PHOSPHORUS: NORMAL
EXT PLATELETS: NORMAL
EXT POTASSIUM: NORMAL
EXT PROT/CREAT RATIO UR: NORMAL
EXT PROTEIN TOTAL: NORMAL
EXT PROTEIN UA: NORMAL
EXT PTH, INTACT: NORMAL
EXT RBC UA: NORMAL
EXT SEGS%: NORMAL
EXT SERUM IRON: NORMAL
EXT SIROLIMUS LVL: NORMAL
EXT SODIUM: NORMAL
EXT TACROLIMUS LVL: 7.4
EXT TIBC: NORMAL
EXT TRIGLYCERIDES: NORMAL
EXT URIC ACID: NORMAL
EXT URINE CULTURE: NORMAL
EXT URINE PROTEIN: NORMAL
EXT VIT D 25 HYDROXY: NORMAL
EXT WBC UA: NORMAL
EXT WBC: NORMAL
PARVOVIRUS B19 DNA BY PCR: NORMAL
QUANTIFERON GOLD TB: NORMAL

## 2024-11-08 NOTE — PROGRESS NOTES
Labs and diagnostic tests were reviewed. No action/changes indicated.to be seen in clinic to consider kidney biopsy due to proteinuria and fluctuating creatinine

## 2024-11-11 ENCOUNTER — DOCUMENTATION ONLY (OUTPATIENT)
Dept: TRANSPLANT | Facility: CLINIC | Age: 64
End: 2024-11-11
Payer: MEDICARE

## 2024-11-11 DIAGNOSIS — Z94.0 KIDNEY REPLACED BY TRANSPLANT: Primary | ICD-10-CM

## 2024-11-11 RX ORDER — MYCOPHENOLATE MOFETIL 250 MG/1
500 CAPSULE ORAL 2 TIMES DAILY
Qty: 120 CAPSULE | Refills: 11 | Status: SHIPPED | OUTPATIENT
Start: 2024-11-11 | End: 2025-11-11

## 2024-11-11 NOTE — TELEPHONE ENCOUNTER
Patient states understanding of medication changes. Advised patient to have ultrasound rescheduled ASAP.       ----- Message from Joce Landry MD sent at 11/11/2024 12:57 PM CST -----  Lets try to do the repeat imaging study ASAP.   D/c doxy and Levaquin  Start mmf 500 BID  ----- Message -----  From: Ariella Valdez RN  Sent: 11/11/2024  11:57 AM CST  To: Joce Landry MD; Brigid Sutherland MD    Patient has been off of Cellcept since August and on Doxycycline and Levaquin for ESBL. Her local doctor in [place] was supposed to get a f/u abd u/s done for ID here to decide when abx could be completed. I just talked to patient and she said when they did the u/s on 11/1 they didn't ultrasound low enough on her abdomen and will have to repeat the u/s. At this point do we need to continue the antibiotics and keep her off cellcept? They haven't rescheduled her u/s and patient is unable to physically come here for tests/visits due to transportation issues.

## 2024-11-18 ENCOUNTER — OFFICE VISIT (OUTPATIENT)
Dept: ENDOCRINOLOGY | Facility: CLINIC | Age: 64
End: 2024-11-18
Payer: MEDICARE

## 2024-11-18 ENCOUNTER — PATIENT MESSAGE (OUTPATIENT)
Dept: ENDOCRINOLOGY | Facility: CLINIC | Age: 64
End: 2024-11-18

## 2024-11-18 DIAGNOSIS — T38.0X5A ADRENAL CORTICOSTEROID CAUSING ADVERSE EFFECT IN THERAPEUTIC USE: ICD-10-CM

## 2024-11-18 DIAGNOSIS — I12.0 TYPE 2 DM WITH HYPERTENSION AND ESRD ON DIALYSIS: Primary | ICD-10-CM

## 2024-11-18 DIAGNOSIS — E11.22 TYPE 2 DM WITH HYPERTENSION AND ESRD ON DIALYSIS: Primary | ICD-10-CM

## 2024-11-18 DIAGNOSIS — N18.6 DIABETES MELLITUS DUE TO UNDERLYING CONDITION WITH CHRONIC KIDNEY DISEASE ON CHRONIC DIALYSIS, WITH LONG-TERM CURRENT USE OF INSULIN: ICD-10-CM

## 2024-11-18 DIAGNOSIS — Z94.0 S/P KIDNEY TRANSPLANT: ICD-10-CM

## 2024-11-18 DIAGNOSIS — Z79.4 DIABETES MELLITUS DUE TO UNDERLYING CONDITION WITH CHRONIC KIDNEY DISEASE ON CHRONIC DIALYSIS, WITH LONG-TERM CURRENT USE OF INSULIN: ICD-10-CM

## 2024-11-18 DIAGNOSIS — E08.22 DIABETES MELLITUS DUE TO UNDERLYING CONDITION WITH CHRONIC KIDNEY DISEASE ON CHRONIC DIALYSIS, WITH LONG-TERM CURRENT USE OF INSULIN: ICD-10-CM

## 2024-11-18 DIAGNOSIS — Z99.2 DIABETES MELLITUS DUE TO UNDERLYING CONDITION WITH CHRONIC KIDNEY DISEASE ON CHRONIC DIALYSIS, WITH LONG-TERM CURRENT USE OF INSULIN: ICD-10-CM

## 2024-11-18 DIAGNOSIS — Z99.2 TYPE 2 DM WITH HYPERTENSION AND ESRD ON DIALYSIS: Primary | ICD-10-CM

## 2024-11-18 DIAGNOSIS — N18.6 TYPE 2 DM WITH HYPERTENSION AND ESRD ON DIALYSIS: Primary | ICD-10-CM

## 2024-11-18 PROCEDURE — 3044F HG A1C LEVEL LT 7.0%: CPT | Mod: CPTII,95,, | Performed by: NURSE PRACTITIONER

## 2024-11-18 PROCEDURE — 3066F NEPHROPATHY DOC TX: CPT | Mod: CPTII,95,, | Performed by: NURSE PRACTITIONER

## 2024-11-18 PROCEDURE — 99213 OFFICE O/P EST LOW 20 MIN: CPT | Mod: 95,,, | Performed by: NURSE PRACTITIONER

## 2024-11-18 PROCEDURE — 4010F ACE/ARB THERAPY RXD/TAKEN: CPT | Mod: CPTII,95,, | Performed by: NURSE PRACTITIONER

## 2024-11-18 RX ORDER — LINAGLIPTIN 5 MG/1
5 TABLET, FILM COATED ORAL DAILY
Qty: 90 TABLET | Refills: 3 | Status: SHIPPED | OUTPATIENT
Start: 2024-11-18 | End: 2025-11-18

## 2024-11-18 NOTE — PROGRESS NOTES
Subjective:      Patient ID: Joann Kenney is a 64 y.o. female.    Chief Complaint:    No chief complaint on file.    History of Present Illness  This is a follow up visit after recent hospitalization  Endocrinology was consulted for management of hyperglycemia. Consult was documented as follow:    Reason for Consult: Management of T2DM, Hyperglycemia      Surgical Procedure and Date:  kidney transplant 7/24/2024      Diabetes diagnosis year: 2004     Home Diabetes Medications:    - Novolog 6 units TIDWM  - Novolog SSI for BG excursions:  Add correction scale if needed.  Blood sugar 150 to 200 add 2 units  Blood sugar 201 to 250 add 4 units  Blood sugar 251 to 300 add 6 units  Blood sugar 301 to 350 add 8 units  Blood sugar greater than 350 add 10 units     How often checking glucose at home?  3x daily     BG readings on regimen: 140-<200  Hypoglycemia on the regimen?  No  Missed doses on regimen?  No     Diabetes Complications include:     none     Complicating diabetes co morbidities:   S/p kidney txp, glucocorticoid use         HPI: Patient is a 63 y.o. 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 PMHx born w solitary kidney, DM2, HTN, incontience (s/p bladder sling), CAD with stents (on ASA), atrial fibrillation, CVA (no residual deficits), ALEJA, and anemia. Surgery without complications. PD cath removed. Post op course notable for down trending Cr with excellent UOP. Patient recently admitted 8/8 to 8/13 d/t incisional drainage. CT A/P noted enlarged fluid collection around the right renal transplant and extending into the RLQ abdominal wall. She underwent IR fluid aspiration and drain placement on 8/9. Fluid studies neg for infection or urine leak (WBC 47, segs 10, lymph 83, Cr 1.6). Repeat CT scan done 8/12 reviewed by surgeons, plan to discharge pt with drain in place for continued drainage per MALKA.      Patient now presents as a transfer from OSH due to incisional  drainage. Pt presented to her local ED with large amount of bloody drainage from her transplant incision. Wound has 3 cm opening in medial aspect with old bloody drainage CT A/P at OSH w/ large collection 17 x 4 x 3 cm. Her percutaneous drain remains in place and per patient turned milky and purulent appearing day prior to presenting to the OSH.  Pt reports overall feeling well. Pt denies fever/chills, decreased UOP, dysuria. Plan for infectious work-up, IV abx, wound care consult. Endocrinology consulted for management of T2DM.      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 Controlled on home regimen. On steroids. Fasting BG below goal this morning.     Discontinue scheduled Novolog  Continue Low Dose Correction Scale  BG monitoring ac/hs     ** Please call Endocrine for any BG related issues **     Discharge Planning:   Will discharge on Novolog SSI while patient is on steroids.  Add correction scale if needed.  Blood sugar 150 to 200 add 2 units  Blood sugar 201 to 250 add 4 units  Blood sugar 251 to 300 add 6 units  Blood sugar 301 to 350 add 8 units  Blood sugar greater than 350 add 10 units     Will provide blood sugar logs to review in 1 week  Follow-up in discharge clinic        Etiology of the hyperglycemia: known T2DM   Discharge Date:08/26/2024  Home Glucocorticoid Regimen: Prednisone 5 mg  Discharge DM Regimen:  - Novolog 3 units with meals + SSI  Blood sugar 150 to 200 add 2 units  Blood sugar 201 to 250 add 4 units  Blood sugar 251 to 300 add 6 units  Blood sugar 301 to 350 add 8 units  Blood sugar greater than 350 add 10 units  Was able to fill prescription for medications and supplies.     Missed doses?         Current Home DM management after discharge:    # times a day testing 3  Log reviewed: yes - see picture    No log provided - patient reported to be:   -120's    Lunch- 130's  Dinner- 150's    Hypoglycemic event at home? None       Typical meals at home:   Breakfast- Toast, eggs   Lunch-  Chicken (baked), protein and a starch (baked potatoes)   Dinner- Protein and Vegetables    Snacks- None   Drink-  Juice. Lemondae. Water. Tea        With regards to reason for admit:  Doing better       Review of Systems   Constitutional:  Negative for unexpected weight change.   Eyes:  Negative for visual disturbance.   Respiratory:  Negative for cough.    Cardiovascular:  Negative for chest pain.   Gastrointestinal:  Positive for nausea and vomiting.   Endocrine: Negative for polydipsia and polyuria.   Musculoskeletal:  Negative for back pain.   Skin:  Negative for rash.   Neurological:  Negative for syncope.   Psychiatric/Behavioral:  Negative for agitation and dysphoric mood.        Objective:   Physical Exam  Deferred due to virtual visit.     There is no height or weight on file to calculate BMI.    Lab Review:   Lab Results   Component Value Date    HGBA1C 4.8 07/24/2024    HGBA1C 4.8 07/24/2024     Lab Results   Component Value Date    CHOL 123 04/20/2022    HDL 33 (L) 04/20/2022    LDLCALC 63.6 04/20/2022    TRIG 132 04/20/2022    CHOLHDL 26.8 04/20/2022     Lab Results   Component Value Date     08/26/2024    K 3.7 08/26/2024     08/26/2024    CO2 24 08/26/2024    GLU 85 08/26/2024    BUN 16 08/26/2024    CREATININE 1.6 (H) 08/26/2024    CALCIUM 8.9 08/26/2024    PROT 5.6 (L) 08/09/2024    ALBUMIN 2.4 (L) 08/26/2024    BILITOT 0.5 08/09/2024    ALKPHOS 75 08/09/2024    AST 15 08/09/2024    ALT 19 08/09/2024    ANIONGAP 7 (L) 08/26/2024    ESTGFRAFRICA 6.8 (A) 04/20/2022    EGFRNONAA 5.9 (A) 04/20/2022       Assessment and Plan   Reviewed goals of therapy are to get the best control we can without hypoglycemia    Refer to education    Reviewed patient's current insulin regimen. Clarified proper insulin dose and timing in relation to meals, etc. Insulin injection sites  and proper rotation instructed.       -Advised frequent self blood glucose monitoring.  Patient encouraged to document glucose results and bring them to every clinic visit      -Hypoglycemia precautions discussed. Instructed on precautions before driving.      -Close adherence to lifestyle changes recommended.      -Periodic follow ups for eye evaluations, foot care and dental care suggested.    - F/U with endocrine in [place]    Problem List Items Addressed This Visit          Renal/    S/P kidney transplant    Current Assessment & Plan     Titrate insulin slowly to avoid hypoglycemia as the risk of hypoglycemia increases with decreased creatinine clearance.  CrCl cannot be calculated (Patient's most recent lab result is older than the maximum 7 days allowed.).              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 Of pancreatitis).   - Novolog SSI    150 - 200 + 2 unit    201 - 250 + 4 units    251 - 300 + 6 units    301 - 350 + 8 units       > 350   + 10 units  - Send logs in 3 days            Type 2 DM with hypertension and ESRD on dialysis - Primary       Other    Adrenal corticosteroid causing adverse effect in therapeutic use    Current Assessment & Plan     Glucocorticoids markedly increase glucose levels. Expect the steroid taper will help glucose control.                  Quincy Lyles DNP, FNP-C  Department of Endocrinology  Inpatient Glycemic Management

## 2024-11-18 NOTE — ASSESSMENT & PLAN NOTE
- Stop scheduled Novolog 3  - Start Tradjenta 5 mg daily (no hx. Of pancreatitis).   - Novolog SSI    150 - 200 + 2 unit    201 - 250 + 4 units    251 - 300 + 6 units    301 - 350 + 8 units       > 350   + 10 units  - Send logs in 3 days

## 2024-11-18 NOTE — ASSESSMENT & PLAN NOTE
Titrate insulin slowly to avoid hypoglycemia as the risk of hypoglycemia increases with decreased creatinine clearance.  CrCl cannot be calculated (Patient's most recent lab result is older than the maximum 7 days allowed.).

## 2024-11-18 NOTE — ASSESSMENT & PLAN NOTE
Glucocorticoids markedly increase glucose levels. Expect the steroid taper will help glucose control.

## 2024-11-19 ENCOUNTER — OFFICE VISIT (OUTPATIENT)
Dept: TRANSPLANT | Facility: CLINIC | Age: 64
End: 2024-11-19
Payer: MEDICARE

## 2024-11-19 ENCOUNTER — DOCUMENTATION ONLY (OUTPATIENT)
Dept: TRANSPLANT | Facility: CLINIC | Age: 64
End: 2024-11-19
Payer: MEDICARE

## 2024-11-19 VITALS — DIASTOLIC BLOOD PRESSURE: 82 MMHG | SYSTOLIC BLOOD PRESSURE: 137 MMHG

## 2024-11-19 DIAGNOSIS — E08.22 DIABETES MELLITUS DUE TO UNDERLYING CONDITION WITH CHRONIC KIDNEY DISEASE ON CHRONIC DIALYSIS, WITH LONG-TERM CURRENT USE OF INSULIN: ICD-10-CM

## 2024-11-19 DIAGNOSIS — N18.32 CKD STAGE 3B, GFR 30-44 ML/MIN: ICD-10-CM

## 2024-11-19 DIAGNOSIS — Z94.0 S/P KIDNEY TRANSPLANT: ICD-10-CM

## 2024-11-19 DIAGNOSIS — A49.8 INFECTION DUE TO ESBL-PRODUCING KLEBSIELLA PNEUMONIAE: Primary | ICD-10-CM

## 2024-11-19 DIAGNOSIS — Z16.12 INFECTION DUE TO ESBL-PRODUCING KLEBSIELLA PNEUMONIAE: Primary | ICD-10-CM

## 2024-11-19 DIAGNOSIS — Z79.899 IMMUNOSUPPRESSIVE MANAGEMENT ENCOUNTER FOLLOWING KIDNEY TRANSPLANT: ICD-10-CM

## 2024-11-19 DIAGNOSIS — Z99.2 DIABETES MELLITUS DUE TO UNDERLYING CONDITION WITH CHRONIC KIDNEY DISEASE ON CHRONIC DIALYSIS, WITH LONG-TERM CURRENT USE OF INSULIN: ICD-10-CM

## 2024-11-19 DIAGNOSIS — N18.6 DIABETES MELLITUS DUE TO UNDERLYING CONDITION WITH CHRONIC KIDNEY DISEASE ON CHRONIC DIALYSIS, WITH LONG-TERM CURRENT USE OF INSULIN: ICD-10-CM

## 2024-11-19 DIAGNOSIS — Z94.0 IMMUNOSUPPRESSIVE MANAGEMENT ENCOUNTER FOLLOWING KIDNEY TRANSPLANT: ICD-10-CM

## 2024-11-19 DIAGNOSIS — Z79.4 DIABETES MELLITUS DUE TO UNDERLYING CONDITION WITH CHRONIC KIDNEY DISEASE ON CHRONIC DIALYSIS, WITH LONG-TERM CURRENT USE OF INSULIN: ICD-10-CM

## 2024-11-19 LAB
EXT ALBUMIN: 3.9 (ref 3.5–5)
EXT ALKALINE PHOSPHATASE: ABNORMAL
EXT ALLOSURE: ABNORMAL
EXT ALT: ABNORMAL
EXT AMYLASE: ABNORMAL
EXT ANC: ABNORMAL
EXT AST: ABNORMAL
EXT BACTERIA UA: ABNORMAL
EXT BANDS%: ABNORMAL
EXT BILIRUBIN DIRECT: ABNORMAL
EXT BILIRUBIN TOTAL: ABNORMAL
EXT BK VIRUS DNA QN PCR: ABNORMAL
EXT BUN: 24 (ref 7–20)
EXT C PEPTIDE: ABNORMAL
EXT CALCIUM: ABNORMAL
EXT CHLORIDE: 107 (ref 100–112)
EXT CHOLESTEROL: ABNORMAL
EXT CMV DNA QUANT. BY PCR: NOT DETECTED
EXT CO2: 25 (ref 22–30)
EXT CREATININE UA: ABNORMAL
EXT CREATININE: 1.5 MG/DL (ref 0.52–1.5)
EXT CYCLOSPORINE LVL: ABNORMAL
EXT EBV DNA BY PCR: ABNORMAL
EXT EBV IGG: ABNORMAL
EXT EGFR NO RACE VARIABLE: 38.16
EXT EOSINOPHIL%: 4.9 (ref 1–5)
EXT FERRITIN: ABNORMAL
EXT GFR MDRD AF AMER: ABNORMAL
EXT GFR MDRD NON AF AMER: ABNORMAL
EXT GLUCOSE UA: ABNORMAL
EXT GLUCOSE: 260 (ref 74–106)
EXT HBV DNA QUANT PCR: ABNORMAL
EXT HCV QUANT: ABNORMAL
EXT HDL: ABNORMAL
EXT HEMATOCRIT: 38.1 (ref 37–47)
EXT HEMOGLOBIN A1C: ABNORMAL
EXT HEMOGLOBIN: 13 (ref 12–16)
EXT HIV RNA QUANT PCR: ABNORMAL
EXT IMMUNKNOW (STIMULATED): ABNORMAL
EXT IRON SATURATION: ABNORMAL
EXT LDH, TOTAL: ABNORMAL
EXT LDL CHOLESTEROL: ABNORMAL
EXT LEFLUNOMIDE METABOLITE: ABNORMAL
EXT LIPASE: ABNORMAL
EXT LYMPH%: 17.1 (ref 20–40)
EXT MAGNESIUM: 1.9 (ref 1.6–2.3)
EXT MONOCYTES%: 8 (ref 3.5–12.5)
EXT NITRITES UA: ABNORMAL
EXT PHOSPHORUS: 3.2 (ref 2.5–4.5)
EXT PLATELETS: 217 (ref 130–400)
EXT POTASSIUM: 4.5 (ref 3.5–5.1)
EXT PROT/CREAT RATIO UR: ABNORMAL
EXT PROTEIN TOTAL: ABNORMAL
EXT PROTEIN UA: ABNORMAL
EXT PTH, INTACT: ABNORMAL
EXT RBC UA: ABNORMAL
EXT SEGS%: 68.6 (ref 40–65)
EXT SERUM IRON: ABNORMAL
EXT SIROLIMUS LVL: ABNORMAL
EXT SODIUM: 138 MMOL/L (ref 137–148)
EXT TACROLIMUS LVL: 6.4
EXT TIBC: ABNORMAL
EXT TRIGLYCERIDES: ABNORMAL
EXT URIC ACID: ABNORMAL
EXT URINE CULTURE: ABNORMAL
EXT URINE PROTEIN: ABNORMAL
EXT VIT D 25 HYDROXY: ABNORMAL
EXT WBC UA: ABNORMAL
EXT WBC: 4.86 (ref 4.8–10.8)
PARVOVIRUS B19 DNA BY PCR: ABNORMAL
QUANTIFERON GOLD TB: ABNORMAL

## 2024-11-19 PROCEDURE — 3066F NEPHROPATHY DOC TX: CPT | Mod: CPTII,95,, | Performed by: INTERNAL MEDICINE

## 2024-11-19 PROCEDURE — 3079F DIAST BP 80-89 MM HG: CPT | Mod: CPTII,95,, | Performed by: INTERNAL MEDICINE

## 2024-11-19 PROCEDURE — 4010F ACE/ARB THERAPY RXD/TAKEN: CPT | Mod: CPTII,95,, | Performed by: INTERNAL MEDICINE

## 2024-11-19 PROCEDURE — 99215 OFFICE O/P EST HI 40 MIN: CPT | Mod: 95,,, | Performed by: INTERNAL MEDICINE

## 2024-11-19 PROCEDURE — 3075F SYST BP GE 130 - 139MM HG: CPT | Mod: CPTII,95,, | Performed by: INTERNAL MEDICINE

## 2024-11-19 PROCEDURE — 3044F HG A1C LEVEL LT 7.0%: CPT | Mod: CPTII,95,, | Performed by: INTERNAL MEDICINE

## 2024-11-19 NOTE — LETTER
November 19, 2024        Brice Mike  [address] [address]  Phone: [phone]  Fax: [phone]             [facility]  [address]  [facility] [address]  Phone: [phone]   Patient: Joann Kenney   MR Number: [id number]   YOB: 1960   Date of Visit: 11/19/2024       Dear Dr. Brice Mike    Thank you for referring Joann Kenney to me for evaluation. Attached you will find relevant portions of my assessment and plan of care.    If you have questions, please do not hesitate to call me. I look forward to following Joann Kenney along with you.    Sincerely,    Joce Landry MD    Enclosure    If you would like to receive this communication electronically, please contact [email or web address] or [phone] to request [employer] Link access.    [employer] is a tool which provides read-only access to select patient information with whom you have a relationship. Its easy to use and provides real time access to review your patients record including encounter summaries, notes, results, and demographic information.    If you feel you have received this communication in error or would no longer like to receive these types of communications, please e-mail [email or web address]

## 2024-11-19 NOTE — PROGRESS NOTES
"   Kidney Post-Transplant Assessment    Referring Physician: Brice Mike  Current Nephrologist: Brice Mike    ORGAN: RIGHT KIDNEY  Donor Type: donation after brain death  PHS Increased Risk: no  Cold Ischemia: 1,402 mins  Induction Medications: 20    Subjective:     CC:  Reassessment of renal allograft function and management of chronic immunosuppression.    HPI:  Ms. Kenney is a 64 y.o. 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  She has CKD stage 3 - GFR 30-59 and her baseline creatinine please see chart below. Last creatinine 1.5 and GFR 38 ml/min. She takes mycophenolate mofetil, prednisone, and tacrolimus for maintenance immunosuppression. She denies any recent hospitalizations or ER visits since her previous clinic visit.    She says that she saw a neurologist last week due to her frequent "falls" which are not really falls she says. It's like my feet do not work to work" . She had  a "scan" but the results are pending and she does not know exactly what type of scan.  She refers constipation for the last 5 weeks, she is taking myralex and some PO meds. She states that she defecates twice a day. She describes constipation "my whole life" suddenly the constipation got worse 4 weeks ago.     She completed the treatment for the ESBL UTI. She refers some UTI symptoms. She refers dysuria.   She did not take  olmesartan for proteinuria because she was unaware of the new medication.         From last VV:   64 y.o. 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 Her most recent creatinine is 3.7. She takes mycophenolate mofetil, prednisone, and tacrolimus for maintenance immunosuppression. Her post transplant course has been  see below .     Anemia, atrial fibrillation, CAD, type 2 DM, HLD, ALEJA      Post op course notable for down trending Cr with excellent UOP. Also notable for   - Salivary gland infection. Per " ID,discharged on Augmentin for a 10 day antibiotic course, stop date 8/3/24. Should she develop submandibular swelling, recommend CT neck with ENT consult   - Anemia requiring prbc transfusion on 7/27/24 with adequate response   - ER visit on 7/28 for hypertension - PO meds adjusted and d/c' from ED  - wound infection. MMF on hold during antibiotic regimen      MMF on hold during wound infection treatment-- NO show for ID appointment 8/29 but reports doing virtual/phone visit. Reports needing to doing imaging studies but never scheduled   Wound vac removed 9/4/2024. Still doing wet to dry dressing. Reports maybe 1 more week of dressing changes per patient.   Blood sugars in the 300s. Next endocrine appointment next week  Sitting SBP 200s  Standing BPs 120/80. Reports BP low when standing with dizziness. Reports was happing prior to transplant. Encourage to monitor BP only take coreg if BP > 150 when standing. Encourage adequate fluid intake and food( not eating enough). Discussed using compression hose.  Reports urinating well. Unable to make it the bathroom at times. Incontinence per patient was an issue before transplant No pain over there allograft.     Current Outpatient Medications on File Prior to Visit   Medication Sig Dispense Refill    ALPRAZolam (XANAX) 2 MG Tab Take 2 mg by mouth every evening.      atorvastatin (LIPITOR) 20 MG tablet Take 1 tablet (20 mg total) by mouth every evening. 90 tablet 3    blood sugar diagnostic Strp use 1 strip to check blood glucose 3 (three) times daily. 100 strip 1    blood-glucose meter Misc use as directed to check blood glucose 1 each 0    carvediloL (COREG) 6.25 MG tablet Take 1 tablet (6.25 mg total) by mouth 2 (two) times daily. 180 tablet 3    cetirizine (ZYRTEC) 5 MG tablet Take 1 tablet (5 mg total) by mouth once daily. 30 tablet 11    cinacalcet (SENSIPAR) 90 MG Tab Take 1 tablet (90 mg total) by mouth daily with breakfast. 30 tablet 11    DULoxetine (CYMBALTA) 30  "MG capsule Take 30 mg by mouth once daily.      ezetimibe (ZETIA) 10 mg tablet Take 1 tablet (10 mg total) by mouth once daily. 90 tablet 3    filgrastim-sndz (ZARXIO) 480 mcg/0.8 mL Syrg Inject 0.8 mLs (480 mcg total) into the skin daily as needed (ANC <1000). 4 mL 0    insulin aspart U-100 (NOVOLOG) 100 unit/mL (3 mL) InPn pen Inject subcutaneously as needed for sliding scale. Total daily dose: 15 units/day 3 mL 5    k phos di & mono-sod phos mono (K-PHOS-NEUTRAL) 250 mg Tab Take 2 tablets by mouth 2 (two) times a day. 120 tablet 5    lancets Misc 1 lancet each to check blood glucose 3 (three) times daily. 100 each 1    linaGLIPtin (TRADJENTA) 5 mg Tab tablet Take 1 tablet (5 mg total) by mouth once daily. 90 tablet 3    magnesium oxide (MAG-OX) 400 mg (241.3 mg magnesium) tablet Take 2 tablets (800 mg total) by mouth 2 (two) times daily. 60 tablet 5    multivitamin Tab Take 1 tablet by mouth once daily.      mycophenolate (CELLCEPT) 250 mg Cap Take 2 capsules (500 mg total) by mouth 2 (two) times daily. 120 capsule 11    olmesartan (BENICAR) 40 MG tablet Take 0.5 tablets (20 mg total) by mouth once daily. 45 tablet 3    ondansetron (ZOFRAN-ODT) 4 MG TbDL Take 1 tablet (4 mg total) by mouth every 8 (eight) hours as needed (as needed for nausea). 90 tablet 0    oxybutynin (DITROPAN) 5 MG Tab Take 1 tablet (5 mg total) by mouth 3 (three) times daily as needed (bladder spasms). 12 tablet 0    oxyCODONE (ROXICODONE) 5 MG immediate release tablet Take 1 tablet (5 mg total) by mouth every 6 (six) hours as needed for Pain. 21 tablet 0    pantoprazole (PROTONIX) 40 MG tablet Take 1 tablet (40 mg total) by mouth once daily. 30 tablet 2    pen needle, diabetic 32 gauge x 5/32" Ndle 1 each for use with insulin pen 3 (three) times daily. 100 each 1    predniSONE (DELTASONE) 5 MG tablet Take by mouth daily; 7/27/2024-8/2/2024: 20 mg, 8/3/2024-8/9/2024: 15 mg; 8/10/2024-8/16/2024: 10 mg; 8/17/2024- forever: 5 mg; do not stop 70 " tablet 11    sulfamethoxazole-trimethoprim 400-80mg (BACTRIM,SEPTRA) 400-80 mg per tablet Take 1 tablet by mouth every morning. Stop 1/21/25 30 tablet 5    tacrolimus (PROGRAF) 1 MG Cap Take 4 capsules (4 mg total) by mouth every 12 (twelve) hours. 240 capsule 11     No current facility-administered medications on file prior to visit.            Review of Systems    Skin: no skin rash  CNS; no headaches, blurred vision, seizure, or syncope  ENT: No JVD,  Adenopathies,  nasal congestion. No oral lesions  Cardiac: No chest pain, dyspnea, claudication, edema or palpitations  Respiratory: No SOB, cough, hemoptysis   Gastro-intestinal: No diarrhea, constipation, abdominal pain, nausea, vomit. No ascitis  Genitourinary: no hematuria, dysuria, frequency, frequency  Musculoskeletal: joint pain, arthritis or vasculitic changes  Psych: alert awake, oriented, No cranial nerves deficit.      Objective:         Physical Exam. Video visit.     Labs:  Lab Results   Component Value Date    WBC 7.41 08/26/2024    HGB 8.1 (L) 08/26/2024    HCT 24.6 (L) 08/26/2024    LABPLAT 291 03/13/2020     08/26/2024    K 3.7 08/26/2024     08/26/2024    CO2 24 08/26/2024    BUN 16 08/26/2024    CREATININE 1.6 (H) 08/26/2024    EGFRNORACEVR 35.6 (A) 08/26/2024    CALCIUM 8.9 08/26/2024    PHOS 3.0 08/26/2024    MG 1.4 (L) 08/26/2024    ALBUMIN 2.4 (L) 08/26/2024    AST 15 08/09/2024    ALT 19 08/09/2024    .4 (H) 07/29/2024    TACROLIMUS 5.7 08/26/2024       Lab Results   Component Value Date    EXTANC 649.4 09/30/2024    EXTWBC 4.86 11/19/2024    EXTSEGS 68.6 (H) 11/19/2024    EXTPLATELETS 217 11/19/2024    EXTHEMOGLOBI 13.0 11/19/2024    EXTHEMATOCRI 38.1 11/19/2024    EXTCREATININ 1.50 11/19/2024    EXTSODIUM 138.0 11/05/2024    EXTPOTASSIUM 4.20 11/05/2024    EXTBUN 24.0 (H) 11/19/2024    EXTCO2 25.0 11/19/2024    EXTCALCIUM 10.20 11/05/2024    EXTPHOSPHORU 3.2 11/19/2024    EXTGLUCOSE 260 (H) 11/19/2024    EXTALBUMIN 3.90  "11/19/2024    EXTAST 24.0 10/21/2024    EXTALT 22.0 10/21/2024    EXTBILITOTAL 0.40 10/21/2024       Lab Results   Component Value Date    EXTTACROLVL pending 11/19/2024    EXTPROTCRE 1.409 (H) 11/05/2024    EXTPROTEINUA 1+ (A) 11/05/2024    EXTWBCUA 0-2 11/05/2024    EXTRBCUA none seen 11/05/2024       Labs were reviewed with the patient.    Assessment:     1. Infection due to ESBL-producing Klebsiella pneumoniae    2. 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    1. CKD stage 3b with GFR 38 ml/min : will continue follow up as per our center guidelines. patient to continue close follow up with the local General nephrologist. Education provided in appropriate fluid intake, potassium intake. Continue with oral hydration.    1A. Pancreatic Function: N/A for non-pancreas allograft recipients:     2. High risk immunosuppression medication for organ transplantation, requiring regular intensive follow up and monitoring : tacro at target recently resumen  mg po bid prednisone 5   Lab Results   Component Value Date    TACROLIMUS 5.7 08/26/2024    TACROLIMUS 5.7 08/25/2024    TACROLIMUS 6.4 08/24/2024     No results found for: "CYCLOSPORINE"  @   Will closely monitor for toxicities, education provided about adherence to medicines and need to communicate any side effects to the transplant nurse or physician.    3. Allograft Function:stable at baseline for the patient. Continue follow up as per our " "guidelines and with the local General nephrologist. Communication will be sent today.  Lab Results   Component Value Date    CREATININE 1.6 (H) 08/26/2024    CREATININE 1.5 (H) 08/25/2024    CREATININE 1.6 (H) 08/24/2024     No results found for: "AMYLASE", "LIPASE"    4. Hypertension management:  Continue with home blood pressure monitoring, low salt and healthy life discussed with the patient..    5. Metabolic Bone Disease/Secondary Hyperparathyroidism:calcium and phosphorus level discussed with the patient, patient will continue follow up with the general nephrologist for management of metabolic bone disease. Will monitor PTH and Vit D per our transplant center guidelines.      Lab Results   Component Value Date    .4 (H) 07/29/2024    CALCIUM 8.9 08/26/2024    PHOS 3.0 08/26/2024    PHOS 3.0 08/25/2024    PHOS 2.7 08/24/2024       6. Electrolytes and acid base balance: reviewed with the patient, essentially within the normal range no need for acute changes today, will monitor as per our center guidelines.     Lab Results   Component Value Date     08/26/2024    K 3.7 08/26/2024     08/26/2024    CO2 24 08/26/2024    CO2 25 08/25/2024    CO2 25 08/24/2024       7. Anemia: will continue monitoring as per our center guidelines. No indication for acute intervention today.     Lab Results   Component Value Date    WBC 7.41 08/26/2024    HGB 8.1 (L) 08/26/2024    HCT 24.6 (L) 08/26/2024    MCV 95 08/26/2024     08/26/2024       8.Proteinuria: will continue with pr/cr ratio as per our center guidelines.  No results found for: "PROTEINURINE", "CREATRANDUR", "UTPCR"     9. BK virus infection screening: will continue with urine or blood PCR as per our guidelines to prevent BK virus viremia and allograft dysfunction  No results found for: "BKVIRUSDNAUR", "BKQUANTURINE", "BKVIRUSLOG", "BKVIRUSURINE", "BKVIRUSPCRQB"      10. Weight and metabolic management: education provided provided during the " clinic visit.   There is no height or weight on file to calculate BMI.       11.Patient safety education regarding immunosuppression including prophylaxis posttransplant for CMV, PCP : Education provided about vaccination and prevention of infections.    12.  Cytopenias: no significant cytopenias will monitor as per our guidelines. Medicine list reviewed including potential causes of drug-induced cytopenias     Lab Results   Component Value Date    WBC 7.41 08/26/2024    HGB 8.1 (L) 08/26/2024    HCT 24.6 (L) 08/26/2024    MCV 95 08/26/2024     08/26/2024       13. Post-transplant Prophylaxis; CMV Infection, PJP and Candida mucosistis and other indicated for this particular patient. Bactrim until 1/2025 per protocol.     I spoke with the patient for 30 minutes. More than half dedicated to counseling and education. All questions answered    Joce Landry MD  Transplant Nephrology            Follow-up:   Clinic: return to transplant clinic weekly for the first month after transplant; every 2 weeks during months 2-3; then at 6-, 9-, 12-, 18-, 24-, and 36- months post-transplant to reassess for complications from immunosuppression toxicity and monitor for rejection.  Annually thereafter.    Labs: since patient remains at high risk for rejection and drug-related complications that warrant close monitoring, labs will be ordered as follows: continue twice weekly CBC, renal panel, and drug level for first month; then same labs once weekly through 3rd month post-transplant.  Urine for UA and protein/creatinine ratio monthly.  Serum BK - PCR at 1-, 3-, 6-, 9-, 12-, 18-, 24-, 36- 48-, and 60 months post-transplant.  Hepatic panel at 1-, 2-, 3-, 6-, 9-, 12-, 18-, 24-, and 36- months post-transplant.    Joce Landry MD       Education:   Material provided to the patient.  Patient reminded to call with any health changes since these can be early signs of significant complications.  Also, I advised the patient to be  sure any new medications or changes of old medications are discussed with either a pharmacist or physician knowledgeable with transplant to avoid rejection/drug toxicity related to significant drug interactions.    Patient advised that it is recommended that all transplanted patients, and their close contacts and household members receive Covid vaccination.    UNOS Patient Status  Functional Status: 50% - Requires considerable assistance and frequent medical care  Physical Capacity: Limited Mobility

## 2024-11-25 ENCOUNTER — DOCUMENTATION ONLY (OUTPATIENT)
Dept: TRANSPLANT | Facility: CLINIC | Age: 64
End: 2024-11-25
Payer: MEDICARE

## 2024-11-25 ENCOUNTER — TELEPHONE (OUTPATIENT)
Dept: TRANSPLANT | Facility: CLINIC | Age: 64
End: 2024-11-25
Payer: MEDICARE

## 2024-11-25 LAB
EXT ALBUMIN: 3.6
EXT ALKALINE PHOSPHATASE: ABNORMAL
EXT ALLOSURE: ABNORMAL
EXT ALT: ABNORMAL
EXT AMYLASE: ABNORMAL
EXT ANC: ABNORMAL
EXT AST: ABNORMAL
EXT BACTERIA UA: ABNORMAL
EXT BANDS%: ABNORMAL
EXT BILIRUBIN DIRECT: ABNORMAL
EXT BILIRUBIN TOTAL: ABNORMAL
EXT BK VIRUS DNA QN PCR: ABNORMAL
EXT BUN: 24
EXT C PEPTIDE: ABNORMAL
EXT CALCIUM: 10.7
EXT CHLORIDE: 106
EXT CHOLESTEROL: ABNORMAL
EXT CMV DNA QUANT. BY PCR: ABNORMAL
EXT CO2: 23
EXT CREATININE UA: ABNORMAL
EXT CREATININE: 1.8 MG/DL
EXT CYCLOSPORINE LVL: ABNORMAL
EXT EBV DNA BY PCR: ABNORMAL
EXT EBV IGG: ABNORMAL
EXT EGFR NO RACE VARIABLE: 30.66
EXT EOSINOPHIL%: 4.5
EXT FERRITIN: ABNORMAL
EXT GFR MDRD AF AMER: ABNORMAL
EXT GFR MDRD NON AF AMER: ABNORMAL
EXT GLUCOSE UA: ABNORMAL
EXT GLUCOSE: 254
EXT HBV DNA QUANT PCR: ABNORMAL
EXT HCV QUANT: ABNORMAL
EXT HDL: ABNORMAL
EXT HEMATOCRIT: 34
EXT HEMOGLOBIN A1C: ABNORMAL
EXT HEMOGLOBIN: 11.4
EXT HIV RNA QUANT PCR: ABNORMAL
EXT IMMUNKNOW (STIMULATED): ABNORMAL
EXT IRON SATURATION: ABNORMAL
EXT LDH, TOTAL: ABNORMAL
EXT LDL CHOLESTEROL: ABNORMAL
EXT LEFLUNOMIDE METABOLITE: ABNORMAL
EXT LIPASE: ABNORMAL
EXT LYMPH%: 15
EXT MAGNESIUM: 1.8
EXT MONOCYTES%: 8.6
EXT NITRITES UA: ABNORMAL
EXT PHOSPHORUS: 2.9
EXT PLATELETS: 226
EXT POTASSIUM: 4.5
EXT PROT/CREAT RATIO UR: ABNORMAL
EXT PROTEIN TOTAL: ABNORMAL
EXT PROTEIN UA: ABNORMAL
EXT PTH, INTACT: ABNORMAL
EXT RBC UA: ABNORMAL
EXT SEGS%: 70.8
EXT SERUM IRON: ABNORMAL
EXT SIROLIMUS LVL: ABNORMAL
EXT SODIUM: 136 MMOL/L
EXT TACROLIMUS LVL: ABNORMAL
EXT TIBC: ABNORMAL
EXT TRIGLYCERIDES: ABNORMAL
EXT URIC ACID: ABNORMAL
EXT URINE CULTURE: ABNORMAL
EXT URINE PROTEIN: ABNORMAL
EXT VIT D 25 HYDROXY: ABNORMAL
EXT WBC UA: ABNORMAL
EXT WBC: 5.34
PARVOVIRUS B19 DNA BY PCR: ABNORMAL
QUANTIFERON GOLD TB: ABNORMAL

## 2024-11-25 NOTE — TELEPHONE ENCOUNTER
----- Message from Joce Landry MD sent at 11/25/2024  9:29 AM CST -----  If she was taking sensipar correctly please increase sensipar dose to 120 mg daily and refer to endocrine  Please verify with patient and care giver. I am not sure she checks messages on a regular basis. Thanks  Encourage more water hydration  Thi report is from last week and the calcium was not  there. Make sure she is not taking any calcium or Vit D supplements. Please see if we can add a phosphorus to the labs, she is on phosphorus supplements and that can alter calcium level.

## 2024-12-02 ENCOUNTER — PATIENT MESSAGE (OUTPATIENT)
Dept: TRANSPLANT | Facility: CLINIC | Age: 64
End: 2024-12-02
Payer: MEDICARE

## 2024-12-02 DIAGNOSIS — Z94.0 S/P KIDNEY TRANSPLANT: ICD-10-CM

## 2024-12-02 RX ORDER — TACROLIMUS 1 MG/1
3 CAPSULE ORAL EVERY 12 HOURS
Qty: 180 CAPSULE | Refills: 11 | Status: SHIPPED | OUTPATIENT
Start: 2024-12-02 | End: 2025-12-02

## 2024-12-02 NOTE — TELEPHONE ENCOUNTER
Pt made aware of below orders            ----- Message from Joce Landry MD sent at 12/2/2024 12:09 PM CST -----  Please lower prograf to 3 mg PO bid labs in 1 week

## 2024-12-03 ENCOUNTER — DOCUMENTATION ONLY (OUTPATIENT)
Dept: TRANSPLANT | Facility: CLINIC | Age: 64
End: 2024-12-03
Payer: MEDICARE

## 2024-12-06 ENCOUNTER — DOCUMENTATION ONLY (OUTPATIENT)
Dept: TRANSPLANT | Facility: CLINIC | Age: 64
End: 2024-12-06
Payer: MEDICARE

## 2024-12-06 LAB — EXT TACROLIMUS LVL: 6.3

## 2024-12-09 ENCOUNTER — DOCUMENTATION ONLY (OUTPATIENT)
Dept: TRANSPLANT | Facility: CLINIC | Age: 64
End: 2024-12-09
Payer: MEDICARE

## 2024-12-09 LAB
EXT ALBUMIN: 3.7 (ref 3.5–5)
EXT ALKALINE PHOSPHATASE: ABNORMAL
EXT ALLOSURE: ABNORMAL
EXT ALT: ABNORMAL
EXT AMYLASE: ABNORMAL
EXT ANC: ABNORMAL
EXT AST: ABNORMAL
EXT BACTERIA UA: ABNORMAL
EXT BANDS%: ABNORMAL
EXT BILIRUBIN DIRECT: ABNORMAL
EXT BILIRUBIN TOTAL: ABNORMAL
EXT BK VIRUS DNA QN PCR: ABNORMAL
EXT BUN: 21 (ref 7–20)
EXT C PEPTIDE: ABNORMAL
EXT CALCIUM: ABNORMAL
EXT CHLORIDE: 103
EXT CHOLESTEROL: ABNORMAL
EXT CMV DNA QUANT. BY PCR: ABNORMAL
EXT CO2: 23 (ref 22–30)
EXT CREATININE UA: ABNORMAL
EXT CREATININE: 1.9 MG/DL (ref 0.52–1.5)
EXT CYCLOSPORINE LVL: ABNORMAL
EXT EBV DNA BY PCR: ABNORMAL
EXT EBV IGG: ABNORMAL
EXT EGFR NO RACE VARIABLE: 28.73
EXT EOSINOPHIL%: 2.4 (ref 1–5)
EXT FERRITIN: ABNORMAL
EXT GFR MDRD AF AMER: ABNORMAL
EXT GFR MDRD NON AF AMER: ABNORMAL
EXT GLUCOSE UA: ABNORMAL
EXT GLUCOSE: 262 (ref 74–106)
EXT HBV DNA QUANT PCR: ABNORMAL
EXT HCV QUANT: ABNORMAL
EXT HDL: ABNORMAL
EXT HEMATOCRIT: 37 (ref 37–47)
EXT HEMOGLOBIN A1C: ABNORMAL
EXT HEMOGLOBIN: 12.9 (ref 12–16)
EXT HIV RNA QUANT PCR: ABNORMAL
EXT IMMUNKNOW (STIMULATED): ABNORMAL
EXT IRON SATURATION: ABNORMAL
EXT LDH, TOTAL: ABNORMAL
EXT LDL CHOLESTEROL: ABNORMAL
EXT LEFLUNOMIDE METABOLITE: ABNORMAL
EXT LIPASE: ABNORMAL
EXT LYMPH%: 16.4 (ref 20–40)
EXT MAGNESIUM: 1.7 (ref 1.6–2.3)
EXT MONOCYTES%: 8.7 (ref 3.5–12.5)
EXT NITRITES UA: ABNORMAL
EXT PHOSPHORUS: 2.7 (ref 2.5–4.5)
EXT PLATELETS: 246 (ref 130–400)
EXT POTASSIUM: 3.7 (ref 3.5–5.1)
EXT PROT/CREAT RATIO UR: ABNORMAL
EXT PROTEIN TOTAL: ABNORMAL
EXT PROTEIN UA: ABNORMAL
EXT PTH, INTACT: ABNORMAL
EXT RBC UA: ABNORMAL
EXT SEGS%: 70.1
EXT SERUM IRON: ABNORMAL
EXT SIROLIMUS LVL: ABNORMAL
EXT SODIUM: 137 MMOL/L (ref 137–148)
EXT TACROLIMUS LVL: ABNORMAL
EXT TIBC: ABNORMAL
EXT TRIGLYCERIDES: ABNORMAL
EXT URIC ACID: ABNORMAL
EXT URINE CULTURE: ABNORMAL
EXT URINE PROTEIN: ABNORMAL
EXT VIT D 25 HYDROXY: ABNORMAL
EXT WBC UA: ABNORMAL
EXT WBC: 5.72 (ref 4.8–10.8)
PARVOVIRUS B19 DNA BY PCR: ABNORMAL
QUANTIFERON GOLD TB: ABNORMAL

## 2024-12-12 ENCOUNTER — DOCUMENTATION ONLY (OUTPATIENT)
Dept: TRANSPLANT | Facility: CLINIC | Age: 64
End: 2024-12-12
Payer: MEDICARE

## 2024-12-12 ENCOUNTER — TELEPHONE (OUTPATIENT)
Dept: TRANSPLANT | Facility: CLINIC | Age: 64
End: 2024-12-12
Payer: MEDICARE

## 2024-12-12 DIAGNOSIS — Z94.0 S/P KIDNEY TRANSPLANT: ICD-10-CM

## 2024-12-12 NOTE — TELEPHONE ENCOUNTER
Voicemail left for patient advising her to increase water intake.     ----- Message from Joce Landry MD sent at 12/12/2024  9:49 AM CST -----  Encourage more water intake.

## 2024-12-13 ENCOUNTER — DOCUMENTATION ONLY (OUTPATIENT)
Dept: TRANSPLANT | Facility: CLINIC | Age: 64
End: 2024-12-13
Payer: MEDICARE

## 2024-12-13 LAB
EXT ALBUMIN: NORMAL
EXT ALKALINE PHOSPHATASE: NORMAL
EXT ALLOSURE: NORMAL
EXT ALT: NORMAL
EXT AMYLASE: NORMAL
EXT ANC: NORMAL
EXT AST: NORMAL
EXT BACTERIA UA: NORMAL
EXT BANDS%: NORMAL
EXT BILIRUBIN DIRECT: NORMAL
EXT BILIRUBIN TOTAL: NORMAL
EXT BK VIRUS DNA QN PCR: NOT DETECTED
EXT BUN: NORMAL
EXT C PEPTIDE: NORMAL
EXT CALCIUM: NORMAL
EXT CHLORIDE: NORMAL
EXT CHOLESTEROL: NORMAL
EXT CMV DNA QUANT. BY PCR: NORMAL
EXT CO2: NORMAL
EXT CREATININE UA: NORMAL
EXT CREATININE: NORMAL
EXT CYCLOSPORINE LVL: NORMAL
EXT EBV DNA BY PCR: NORMAL
EXT EBV IGG: NORMAL
EXT EGFR NO RACE VARIABLE: NORMAL
EXT EOSINOPHIL%: NORMAL
EXT FERRITIN: NORMAL
EXT GFR MDRD AF AMER: NORMAL
EXT GFR MDRD NON AF AMER: NORMAL
EXT GLUCOSE UA: NORMAL
EXT GLUCOSE: NORMAL
EXT HBV DNA QUANT PCR: NORMAL
EXT HCV QUANT: NORMAL
EXT HDL: NORMAL
EXT HEMATOCRIT: NORMAL
EXT HEMOGLOBIN A1C: NORMAL
EXT HEMOGLOBIN: NORMAL
EXT HIV RNA QUANT PCR: NORMAL
EXT IMMUNKNOW (STIMULATED): NORMAL
EXT IRON SATURATION: NORMAL
EXT LDH, TOTAL: NORMAL
EXT LDL CHOLESTEROL: NORMAL
EXT LEFLUNOMIDE METABOLITE: NORMAL
EXT LIPASE: NORMAL
EXT LYMPH%: NORMAL
EXT MAGNESIUM: NORMAL
EXT MONOCYTES%: NORMAL
EXT NITRITES UA: NORMAL
EXT PHOSPHORUS: NORMAL
EXT PLATELETS: NORMAL
EXT POTASSIUM: NORMAL
EXT PROT/CREAT RATIO UR: NORMAL
EXT PROTEIN TOTAL: NORMAL
EXT PROTEIN UA: NORMAL
EXT PTH, INTACT: NORMAL
EXT RBC UA: NORMAL
EXT SEGS%: NORMAL
EXT SERUM IRON: NORMAL
EXT SIROLIMUS LVL: NORMAL
EXT SODIUM: NORMAL
EXT TACROLIMUS LVL: 5.9
EXT TIBC: NORMAL
EXT TRIGLYCERIDES: NORMAL
EXT URIC ACID: NORMAL
EXT URINE CULTURE: NORMAL
EXT URINE PROTEIN: NORMAL
EXT VIT D 25 HYDROXY: NORMAL
EXT WBC UA: NORMAL
EXT WBC: NORMAL
PARVOVIRUS B19 DNA BY PCR: NORMAL
QUANTIFERON GOLD TB: NORMAL

## 2024-12-13 RX ORDER — TACROLIMUS 1 MG/1
3 CAPSULE ORAL EVERY 12 HOURS
Qty: 42 CAPSULE | Refills: 0 | Status: SHIPPED | OUTPATIENT
Start: 2024-12-13 | End: 2024-12-20

## 2024-12-13 RX ORDER — CINACALCET 90 MG/1
90 TABLET, FILM COATED ORAL
Qty: 30 TABLET | Refills: 11 | Status: SHIPPED | OUTPATIENT
Start: 2024-12-13

## 2024-12-13 NOTE — TELEPHONE ENCOUNTER
"Pt stated she got prograf filled and cannot find the refills, asking if we can send 7 days worth of prograf to local pharmacy in hopes she will find the prescription at home.              ----- Message from Luca sent at 12/13/2024  2:52 PM CST -----  Consult/Advisory    Name Of Caller: Self    Contact Preference?: [phone]     What is the nature of the call?: Calling to speak w/ Ariella about "all kinds of different things like medication and appts"     Additional Notes:  "Thank you for all that you do for our patients"  "

## 2024-12-16 ENCOUNTER — PATIENT MESSAGE (OUTPATIENT)
Dept: ENDOCRINOLOGY | Facility: CLINIC | Age: 64
End: 2024-12-16
Payer: MEDICARE

## 2024-12-16 ENCOUNTER — DOCUMENTATION ONLY (OUTPATIENT)
Dept: TRANSPLANT | Facility: CLINIC | Age: 64
End: 2024-12-16
Payer: MEDICARE

## 2024-12-16 LAB
EXT BANDS%: 67.7 (ref 40–65)
EXT BUN: 19 (ref 7–20)
EXT CALCIUM: 11 (ref 8.4–10.2)
EXT CHLORIDE: 105 (ref 100–112)
EXT CO2: 26 (ref 22–30)
EXT CREATININE: 1.7 MG/DL (ref 0.52–1.5)
EXT EGFR NO RACE VARIABLE: 32.82
EXT EOSINOPHIL%: 2.4 (ref 1–5)
EXT GLUCOSE: 237 (ref 74–106)
EXT HEMATOCRIT: 38.6 (ref 37–47)
EXT HEMOGLOBIN: 13.2 (ref 12–16)
EXT LYMPH%: 18.8 (ref 20–40)
EXT MONOCYTES%: 8.8 (ref 3.5–12.5)
EXT PLATELETS: 226 (ref 9.2–12.2)
EXT POTASSIUM: 4.4 (ref 3.5–5.1)
EXT SODIUM: 135 MMOL/L (ref 137–148)
EXT TACROLIMUS LVL: 5.7
EXT WBC: 5.32 (ref 4.8–10.8)

## 2024-12-17 NOTE — TELEPHONE ENCOUNTER
Called and spoke to patient who was inquiring about insulin recommendations. She states that she established care with a doctor in [place] who recommended she start 12 units of insulin. However, patient is unsure of what type of insulin. She was seen by ANGELIA Lyles NP, 11/18/2024 and was recommended to start Tradjenta 5 mg and Novolog SSI (150/25). However, she has been vomiting for 3 weeks and has not had an appetite. She has been having difficulty leaving the bed and has not checked her blood sugars nor taken insulin for 3 weeks. The blood sugar data that was sent was from after her follow-up with [facility]. Patient confirmed that she is taking Prednisone 5 mg and Prograf. She denies symptoms of hypoglycemia.     Advised patient to resume checking her blood sugars before meals and before bed and continue using Novolog SSI. Discussed recording blood sugar data and quantity of insulin used. Patient to have daughter send a copy of blood sugar logs to review by 11/20/2024. In the meantime, advised patient to stay well-hydrated with water or sugar-free electrolyte drinks. Educated patient to go to the Emergency Department if she is feeling worsening or new symptoms. .

## 2024-12-18 ENCOUNTER — DOCUMENTATION ONLY (OUTPATIENT)
Dept: TRANSPLANT | Facility: CLINIC | Age: 64
End: 2024-12-18
Payer: MEDICARE

## 2024-12-18 DIAGNOSIS — Z94.0 KIDNEY REPLACED BY TRANSPLANT: ICD-10-CM

## 2024-12-18 DIAGNOSIS — E83.52 HYPERCALCEMIA: Primary | ICD-10-CM

## 2024-12-18 DIAGNOSIS — Z94.0 KIDNEY REPLACED BY TRANSPLANT: Primary | ICD-10-CM

## 2024-12-18 RX ORDER — MYCOPHENOLIC ACID 180 MG/1
360 TABLET, DELAYED RELEASE ORAL 2 TIMES DAILY
Qty: 360 TABLET | Refills: 3 | Status: SHIPPED | OUTPATIENT
Start: 2024-12-18 | End: 2025-12-18

## 2024-12-18 RX ORDER — CINACALCET 60 MG/1
60 TABLET, FILM COATED ORAL 2 TIMES DAILY WITH MEALS
Qty: 180 TABLET | Refills: 3 | Status: SHIPPED | OUTPATIENT
Start: 2024-12-18 | End: 2025-12-18

## 2024-12-18 NOTE — TELEPHONE ENCOUNTER
----- Message from Joce Landry MD sent at 12/18/2024  2:51 PM CST -----  Please see note below and proceed with cinacalcet change 60 bid  ----- Message -----  From: Ranjana Knott PA-C  Sent: 12/18/2024   2:26 PM CST  To: Joce Landry MD; #    Hello Dr. Landry,    Thank you for reaching out. [facility] is currently off service, but I have also been following this patient for diabetes management. I also consulted one of our endocrinologists, Dr. Mon, for recommendations. He recommended splitting the Cinacalcet dose to BID for post-transplant patients. If Cinacalcet is the source of the patient's GI symptoms, we can try 60 mg BID. If the hyperparathyroidism/hypercalcemia does not improve within 1 year post-transplant, we may have her see us to discuss other options.     Thank you,  Ranjana Knott PA-C  Endocrinology  ----- Message -----  From: Joce Landry MD  Sent: 12/18/2024  11:19 AM CST  To: [facility] Lyles, DNP, FNP; #    Hi,    This lady had a kidney transplant early this year with a stable kidney function DM and persistent hypercalcemia. I was hoping your team can help us with the management of her elevated calcium. She is already on sensipar 90 mg daily, but some persistent GI symptoms, so no sure we can safely increase more cinacalcet. I appreciate your help with this patient    Regards,    Joce Landry MD  Kidney Transplant

## 2024-12-18 NOTE — PROGRESS NOTES
Let's switch Cellcept to myfortic 360 bid perhaps it helps with the GI symptoms  Encourage more hydration if she can  Let's see if endocrine can help us with the hypercalcemia. Ill see the NP Rafal   If she is really sick she may lorenzo to go to be seen in a local ER for more aggressive symptomatic treatment a GI consult over there and IV fluids

## 2024-12-18 NOTE — TELEPHONE ENCOUNTER
Patient states understanding of medication change. Patient to let coordinator know which GI doctor is local to her that she'd like a referral for.      ----- Message from Joce Landry MD sent at 12/18/2024 11:16 AM CST -----  Let's switch Cellcept to myfortic 360 bid perhaps it helps with the GI symptoms  Encourage more hydration if she can  Let's see if endocrine can help us with the hypercalcemia. Ill see the NP Rafal   If she is really sick she may lorenzo to go to be seen in a local ER for more aggressive symptomatic treatment a GI consult over there and IV fluids

## 2024-12-20 ENCOUNTER — PATIENT MESSAGE (OUTPATIENT)
Dept: ENDOCRINOLOGY | Facility: CLINIC | Age: 64
End: 2024-12-20
Payer: MEDICARE

## 2024-12-23 DIAGNOSIS — N18.6 DIABETES MELLITUS DUE TO UNDERLYING CONDITION WITH CHRONIC KIDNEY DISEASE ON CHRONIC DIALYSIS, WITH LONG-TERM CURRENT USE OF INSULIN: Primary | ICD-10-CM

## 2024-12-23 DIAGNOSIS — Z79.4 DIABETES MELLITUS DUE TO UNDERLYING CONDITION WITH CHRONIC KIDNEY DISEASE ON CHRONIC DIALYSIS, WITH LONG-TERM CURRENT USE OF INSULIN: Primary | ICD-10-CM

## 2024-12-23 DIAGNOSIS — Z99.2 DIABETES MELLITUS DUE TO UNDERLYING CONDITION WITH CHRONIC KIDNEY DISEASE ON CHRONIC DIALYSIS, WITH LONG-TERM CURRENT USE OF INSULIN: Primary | ICD-10-CM

## 2024-12-23 DIAGNOSIS — E08.22 DIABETES MELLITUS DUE TO UNDERLYING CONDITION WITH CHRONIC KIDNEY DISEASE ON CHRONIC DIALYSIS, WITH LONG-TERM CURRENT USE OF INSULIN: Primary | ICD-10-CM

## 2024-12-23 RX ORDER — INSULIN GLARGINE 100 [IU]/ML
13 INJECTION, SOLUTION SUBCUTANEOUS DAILY
Qty: 11.7 ML | Refills: 3 | Status: SHIPPED | OUTPATIENT
Start: 2024-12-23 | End: 2025-12-23

## 2024-12-30 ENCOUNTER — TELEPHONE (OUTPATIENT)
Dept: ENDOCRINOLOGY | Facility: HOSPITAL | Age: 64
End: 2024-12-30
Payer: MEDICARE

## 2024-12-30 DIAGNOSIS — I12.0 TYPE 2 DM WITH HYPERTENSION AND ESRD ON DIALYSIS: ICD-10-CM

## 2024-12-30 DIAGNOSIS — Z99.2 TYPE 2 DM WITH HYPERTENSION AND ESRD ON DIALYSIS: ICD-10-CM

## 2024-12-30 DIAGNOSIS — N18.6 TYPE 2 DM WITH HYPERTENSION AND ESRD ON DIALYSIS: ICD-10-CM

## 2024-12-30 DIAGNOSIS — E11.22 TYPE 2 DM WITH HYPERTENSION AND ESRD ON DIALYSIS: ICD-10-CM

## 2024-12-30 RX ORDER — LANCETS
1 EACH MISCELLANEOUS 3 TIMES DAILY
Qty: 100 EACH | Refills: 1 | Status: SHIPPED | OUTPATIENT
Start: 2024-12-30 | End: 2025-12-30

## 2024-12-30 RX ORDER — DEXTROSE 4 G
TABLET,CHEWABLE ORAL
Qty: 1 EACH | Refills: 0 | Status: SHIPPED | OUTPATIENT
Start: 2024-12-30 | End: 2025-12-30

## 2024-12-30 RX ORDER — PEN NEEDLE, DIABETIC 30 GX3/16"
1 NEEDLE, DISPOSABLE MISCELLANEOUS 3 TIMES DAILY
Qty: 100 EACH | Refills: 1 | Status: SHIPPED | OUTPATIENT
Start: 2024-12-30

## 2024-12-31 NOTE — TELEPHONE ENCOUNTER
Patient Phone Call:    The patient requests insulin pen needles be sent to her local pharmacy. Request completed.      [facility] Lyles DNP, FNP-C  Department of Endocrinology  Inpatient Glycemic Management

## 2025-01-03 DIAGNOSIS — Z94.0 S/P KIDNEY TRANSPLANT: ICD-10-CM

## 2025-01-09 ENCOUNTER — PATIENT MESSAGE (OUTPATIENT)
Dept: DIABETES | Facility: CLINIC | Age: 65
End: 2025-01-09

## 2025-01-09 ENCOUNTER — CLINICAL SUPPORT (OUTPATIENT)
Dept: DIABETES | Facility: CLINIC | Age: 65
End: 2025-01-09
Payer: MEDICARE

## 2025-01-09 DIAGNOSIS — I12.0 TYPE 2 DM WITH HYPERTENSION AND ESRD ON DIALYSIS: ICD-10-CM

## 2025-01-09 DIAGNOSIS — N18.6 TYPE 2 DM WITH HYPERTENSION AND ESRD ON DIALYSIS: ICD-10-CM

## 2025-01-09 DIAGNOSIS — Z99.2 TYPE 2 DM WITH HYPERTENSION AND ESRD ON DIALYSIS: ICD-10-CM

## 2025-01-09 DIAGNOSIS — E11.22 TYPE 2 DM WITH HYPERTENSION AND ESRD ON DIALYSIS: ICD-10-CM

## 2025-01-09 NOTE — PROGRESS NOTES
Diabetes Care Specialist Virtual Visit Note   The patient location is: Home in [place]  The chief complaint leading to consultation is: Diabetes  Visit type: audiovisual  Total time spent with patient: 30 min   Each patient to whom he or she provides medical services by telemedicine is:  (1) informed of the relationship between the physician and patient and the respective role of any other health care provider with respect to management of the patient; and (2) notified that he or she may decline to receive medical services by telemedicine and may withdraw from such care at any time.    Diabetes Care Specialist Progress Note  Author: Tatiana Camp RN, CDE  Date: 1/9/2025    Intake    Program Intake  Reason for Diabetes Program Visit:: Initial Diabetes Assessment  Current diabetes risk level:: high (no current a1c; transplant pt)  In the last month, have you used the ER or been admitted to the hospital: No  Permission to speak with others about care:: no    Current Diabetes Treatment: Insulin  Method of insulin delivery?: Injections  Injection Type: Pens  Pen Type/Dose: Lantus 13 units in am; Novolog 5 units ac meals plus s/s 150 +2 (missed doses)    Continuous Glucose Monitoring  Patient has CGM: No    Lab Results   Component Value Date    HGBA1C 4.8 07/24/2024    HGBA1C 4.8 07/24/2024               There is no height or weight on file to calculate BMI.    Lifestyle Coping Support & Clinical    Lifestyle/Coping/Support  Compared to other people your age, how would you rate your health?: Poor  How do you deal with stress/distress?: support from family  Culture or Spiritism beliefs that may impact ability to access healthcare: No  Psychosocial/Coping Skills Assessment Completed: : Yes  Assessment indicates:: Adequate understanding  Area of need?: No    Problem Review  Active Comorbidities: Stroke, Organ Transplant, Hypertension, Cardiovascular Disease    Diabetes Self-Management Skills Assessment    Medication  Skills Assessment  Patient is able to identify current diabetes medications, dosages, and appropriate timing of medications.: yes  Patient reports problems or concerns with current medication regimen.: yes  Medication regimen problems/concerns:: other (see comments) (noncompliance)  Patient is  aware that some diabetes medications can cause low blood sugar?: Yes  Medication Skills Assessment Completed:: Yes  Assessment indicates:: Instruction Needed, Knowledge deficit  Area of need?: Yes    Diabetes Disease Process/Treatment Options  Diabetes Type?: Type II  When were you diagnosed?: can not recall  Is patient aware of what causes diabetes?: Yes  Does patient understand the pathophysiology of diabetes?: Yes  Diabetes Disease Process/Treatment Options: Skills Assessment Completed: Yes  Assessment indicates:: Adequate understanding  Area of need?: No    Nutrition/Healthy Eating  Meal Plan 24 Hour Recall - Breakfast: usually skips, eggs  Meal Plan 24 Hour Recall - Lunch: chicken, hamburger leonora  Meal Plan 24 Hour Recall - Dinner: largest meal; salad, veggies, meat  Meal Plan 24 Hour Recall - Snack: denies  Meal Plan 24 Hour Recall - Beverage: sips on regular soda, water tea, trying to reduce consumption of sugars  Who shops/cooks?: Family  Patient can identify foods that impact blood sugar.: yes  Nutrition/Healthy Eating Skills Assessment Completed:: Yes  Assessment indicates:: Knowledge deficit  Area of need?: Yes    Physical Activity/Exercise  Patient's daily activity level:: sedentary  Patient formally exercises outside of work.: no  Reasons for not exercising:: physically unable to exercise currently  Physical Activity/Exercise Skills Assessment Completed: : Yes  Assessment indicates:: Adequate understanding  Area of need?: No    Home Blood Glucose Monitoring  Patient states that blood sugar is checked at home daily.: yes  Monitoring Method:: home glucometer  Fasting BG range history:: 200's  Home Blood Glucose  "Monitoring Skills Assessment Completed: : Yes  Assessment indicates:: Knowledge deficit  Area of need?: Yes    Acute Complications  Have you ever had hypoglycemia (low BG 70 or less)?: no  Do you know the symptoms of low blood sugar and how to treat?: feels "out of it" confusion; drinks juice  Have you ever had hyperglycemia (high  or more)?: no  Have you ever had DKA?: no  Do you ever test for ketones?: no  Do you have a sick day plan?: no  Acute Complications Skills Assessment Completed: : Yes  Assessment indicates:: Knowledge deficit  Area of need?: Yes    Chronic Complications  Chronic Complications Skills Assessment Completed: : No  Deferred due to:: Time      Assessment Summary and Plan    Based on today's diabetes care assessment, the following areas of need were identified:      Identified Areas of Need      Medication/Current Diabetes Treatment: Yes, see care planning   Lifestyle Coping/Support: No   Diabetes Disease Process/Treatment Options: No   Nutrition/Healthy Eating: Yes, Discussed carb sources  Advised to use measuring cups for portion control  Plate method for carb counting  Avoidance of sugary drinks and foods  Appropriate amounts of carbs per meal  Advised patient to add a source of protein with each meal   Physical Activity/Exercise: No    Home Blood Glucose Monitoring: Yes, advised to check glucoses 3 x per day   Acute Complications: Yes, Reviewed hypoglycemia, s/s and appropriate tx. Have/get quick acting glucose tablets at hand.   Chronic Complications:  Deferred       Today's interventions were provided through individual discussion, instruction, and written materials were provided.      Patient verbalized understanding of instruction and written materials.  Pt was able to return back demonstration of instructions today. Patient understood key points, needs reinforcement and further instruction.     Diabetes Self-Management Care Plan:    Today's Diabetes Self-Management Care Plan was " developed with Joann's input. Joann has agreed to work toward the following goal(s) to improve his/her overall diabetes control.      Care Plan: Diabetes Management   Updates made since 1/10/2024 12:00 AM        Problem: Medications         Long-Range Goal: Patient Agrees to take Diabetes Medication(s) Lantus and Novolog with each meal and use sliding scale as needed    Start Date: 1/9/2025   Expected End Date: 2/11/2025   Priority: High   Barriers: Lack of Motivation to Change   Note:    Reviewed insulin with patient.  Blood sugar readings 252, 231, 212.  Not checking consistently.  States waiting for pharmacy to fill order for testing supplies.  Advised to check with pharmacy today since order was sent in December.  Suggested she can get reasonable priced meter and supplies from [employer].  Admits to missed doses of both Lantus and Novolog.  Advised to take all insulin as instructed. Reviewed how to use her sliding scale.  Send a log sheet via [employer] and advised to fill out and return for review by NP team.        Task: Reviewed with patient all current diabetes medications and provided basic review of the purpose, dosage, frequency, side effects, and storage of both oral and injectable diabetes medications. Completed 1/9/2025        Task: Reviewed possible resources for acquiring cost prohibitive medication. Completed 1/9/2025     Task: Discussed guidelines for preventing, detecting and treating hypoglycemia and hyperglycemia and reviewed the importance of meal and medication timing with diabetes mediations for prevention of hypoglycemia and maximum drug benefit. Completed 1/9/2025          Follow Up Plan     No follow-ups on file.    Today's care plan and follow up schedule was discussed with patient.  Joann verbalized understanding of the care plan, goals, and agrees to follow up plan.        The patient was encouraged to communicate with his/her health care provider/physician and care team regarding his/her  condition(s) and treatment.  I provided the patient with my contact information today and encouraged to contact me via phone or [facility] Patient Portal as needed.     Length of Visit   Total Time: 30 Minutes

## 2025-01-10 RX ORDER — TACROLIMUS 1 MG/1
3 CAPSULE ORAL EVERY 12 HOURS
Qty: 540 CAPSULE | Refills: 3 | Status: SHIPPED | OUTPATIENT
Start: 2025-01-10 | End: 2026-01-10

## 2025-01-14 ENCOUNTER — DOCUMENTATION ONLY (OUTPATIENT)
Dept: TRANSPLANT | Facility: CLINIC | Age: 65
End: 2025-01-14
Payer: MEDICARE

## 2025-01-14 LAB
EXT ALBUMIN: 3.8 (ref 3.5–5)
EXT ALKALINE PHOSPHATASE: 232 (ref 38–126)
EXT ALT: 27
EXT AST: 32 (ref 15–46)
EXT BILIRUBIN TOTAL: 0.4 (ref 0.2–1.3)
EXT BK VIRUS DNA QN PCR: NOT DETECTED
EXT BUN: 16 (ref 7–20)
EXT CALCIUM: 10.8 (ref 8.4–10.2)
EXT CHLORIDE: 105 (ref 100–112)
EXT CO2: 24 (ref 22–30)
EXT CREATININE: 1.3 MG/DL (ref 0.52–1.5)
EXT EGFR NO RACE VARIABLE: 45.27
EXT EOSINOPHIL%: 2.9 (ref 1–5)
EXT GLUCOSE: 211 (ref 74–106)
EXT HEMATOCRIT: 35.9 (ref 37–47)
EXT HEMOGLOBIN: 12.6 (ref 12–16)
EXT LYMPH%: 19.1 (ref 20–40)
EXT MAGNESIUM: 1.6 (ref 1.6–2.3)
EXT MONOCYTES%: 8.1 (ref 3.5–12.5)
EXT PHOSPHORUS: 2.8 (ref 2.5–4.5)
EXT PLATELETS: 326 (ref 130–400)
EXT POTASSIUM: 4.5 (ref 3.5–5.1)
EXT PROTEIN TOTAL: 6.6 (ref 6.3–8.2)
EXT SEGS%: 67.6 (ref 40–65)
EXT SODIUM: 135 MMOL/L (ref 137–148)
EXT TACROLIMUS LVL: 9.9
EXT WBC: 5.8 (ref 4.8–10.8)

## 2025-01-17 ENCOUNTER — DOCUMENTATION ONLY (OUTPATIENT)
Dept: TRANSPLANT | Facility: CLINIC | Age: 65
End: 2025-01-17
Payer: MEDICARE

## 2025-01-22 ENCOUNTER — PATIENT MESSAGE (OUTPATIENT)
Dept: ENDOCRINOLOGY | Facility: CLINIC | Age: 65
End: 2025-01-22
Payer: MEDICARE

## 2025-01-24 ENCOUNTER — DOCUMENTATION ONLY (OUTPATIENT)
Dept: TRANSPLANT | Facility: CLINIC | Age: 65
End: 2025-01-24
Payer: MEDICARE

## 2025-01-25 ENCOUNTER — DOCUMENTATION ONLY (OUTPATIENT)
Dept: TRANSPLANT | Facility: CLINIC | Age: 65
End: 2025-01-25
Payer: MEDICARE

## 2025-01-25 LAB
EXT ALBUMIN: 3.9 (ref 3.5–5)
EXT ALKALINE PHOSPHATASE: 209 (ref 38–126)
EXT ALT: 18
EXT AST: 23 (ref 15–46)
EXT BILIRUBIN TOTAL: 0.7 (ref 0.2–1.3)
EXT BK VIRUS DNA QN PCR: NOT DETECTED
EXT BUN: 29 (ref 7–20)
EXT CALCIUM: 10.7 (ref 8.4–10.2)
EXT CHLORIDE: 104 (ref 100–112)
EXT CO2: 23 (ref 22–30)
EXT CREATININE: 1.7 MG/DL (ref 0.52–1.5)
EXT EGFR NO RACE VARIABLE: 32.8
EXT EOSINOPHIL%: 5.3 (ref 1–5)
EXT GLUCOSE: 173 (ref 74–106)
EXT HEMATOCRIT: 36.7 (ref 37–47)
EXT HEMOGLOBIN: 12.6 (ref 12–16)
EXT LYMPH%: 21.8 (ref 20–40)
EXT MAGNESIUM: 1.9 (ref 1.6–2.3)
EXT MONOCYTES%: 10 (ref 3.5–12.5)
EXT PHOSPHORUS: 3.7 (ref 2.5–4.5)
EXT PLATELETS: 239 (ref 130–400)
EXT POTASSIUM: 4.5 (ref 3.5–5.1)
EXT PROTEIN TOTAL: 6.9 (ref 6.3–8.2)
EXT SEGS%: 60.9 (ref 40–65)
EXT SODIUM: 135 MMOL/L (ref 137–148)
EXT TACROLIMUS LVL: 5.2
EXT WBC: 4 (ref 4.8–10.8)

## 2025-01-27 ENCOUNTER — DOCUMENTATION ONLY (OUTPATIENT)
Dept: TRANSPLANT | Facility: CLINIC | Age: 65
End: 2025-01-27
Payer: MEDICARE

## 2025-01-27 LAB
EXT BACTERIA UA: NEGATIVE
EXT CREATININE UA: 25.7 (ref 22–328)
EXT GLUCOSE UA: ABNORMAL
EXT NITRITES UA: NEGATIVE
EXT PROT/CREAT RATIO UR: 1.67
EXT PROTEIN UA: ABNORMAL
EXT RBC UA: ABNORMAL
EXT URINE PROTEIN: 43
EXT WBC UA: ABNORMAL

## 2025-01-28 ENCOUNTER — TELEPHONE (OUTPATIENT)
Dept: TRANSPLANT | Facility: CLINIC | Age: 65
End: 2025-01-28
Payer: MEDICARE

## 2025-01-28 DIAGNOSIS — Z94.0 KIDNEY REPLACED BY TRANSPLANT: Primary | ICD-10-CM

## 2025-01-28 NOTE — PROGRESS NOTES
"I would like to do a kidney biopsy on this lady due to her proteinuria.  Let's get an allosure and DSA's  Does she have any active foot ulcer or infection?  Was she able to see a neurologist for her "frequent falls" ?  And finally  Does she have any UTI symptoms?  "

## 2025-01-28 NOTE — TELEPHONE ENCOUNTER
"Patient reports still feeling poorly after having covid earlier this month. States she has had some burning on urination and will submit new urine sample to lab tomorrow for u/a and cx. Has neurology appointment scheduled for next week. States understanding of plan for biopsy. Patient aware that DSA and Allosure tubes will be mailed to her home in order for her local lab to draw them.    ----- Message from Joce Landry MD sent at 1/28/2025  8:07 AM CST -----  I would like to do a kidney biopsy on this lady due to her proteinuria.  Let's get an allosure and DSA's  Does she have any active foot ulcer or infection?  Was she able to see a neurologist for her "frequent falls" ?  And finally  Does she have any UTI symptoms?    "

## 2025-01-29 ENCOUNTER — PATIENT MESSAGE (OUTPATIENT)
Dept: ENDOCRINOLOGY | Facility: CLINIC | Age: 65
End: 2025-01-29
Payer: MEDICARE

## 2025-01-29 ENCOUNTER — DOCUMENTATION ONLY (OUTPATIENT)
Dept: TRANSPLANT | Facility: CLINIC | Age: 65
End: 2025-01-29
Payer: MEDICARE

## 2025-01-29 ENCOUNTER — TELEPHONE (OUTPATIENT)
Dept: INTERVENTIONAL RADIOLOGY/VASCULAR | Facility: CLINIC | Age: 65
End: 2025-01-29
Payer: MEDICARE

## 2025-01-29 DIAGNOSIS — Z94.0 S/P KIDNEY TRANSPLANT: ICD-10-CM

## 2025-01-29 LAB
EXT ALBUMIN: ABNORMAL
EXT ALKALINE PHOSPHATASE: ABNORMAL
EXT ALLOSURE: ABNORMAL
EXT ALT: ABNORMAL
EXT AMYLASE: ABNORMAL
EXT ANC: ABNORMAL
EXT AST: ABNORMAL
EXT BACTERIA UA: NEGATIVE
EXT BANDS%: ABNORMAL
EXT BILIRUBIN DIRECT: ABNORMAL
EXT BILIRUBIN TOTAL: ABNORMAL
EXT BK VIRUS DNA QN PCR: ABNORMAL
EXT BUN: ABNORMAL
EXT C PEPTIDE: ABNORMAL
EXT CALCIUM: ABNORMAL
EXT CHLORIDE: ABNORMAL
EXT CHOLESTEROL: ABNORMAL
EXT CMV DNA QUANT. BY PCR: ABNORMAL
EXT CO2: ABNORMAL
EXT CREATININE UA: 80.5
EXT CREATININE: ABNORMAL
EXT CYCLOSPORINE LVL: ABNORMAL
EXT EBV DNA BY PCR: ABNORMAL
EXT EBV IGG: ABNORMAL
EXT EGFR NO RACE VARIABLE: ABNORMAL
EXT EOSINOPHIL%: ABNORMAL
EXT FERRITIN: ABNORMAL
EXT GFR MDRD AF AMER: ABNORMAL
EXT GFR MDRD NON AF AMER: ABNORMAL
EXT GLUCOSE UA: ABNORMAL
EXT GLUCOSE: ABNORMAL
EXT HBV DNA QUANT PCR: ABNORMAL
EXT HCV QUANT: ABNORMAL
EXT HDL: ABNORMAL
EXT HEMATOCRIT: ABNORMAL
EXT HEMOGLOBIN A1C: ABNORMAL
EXT HEMOGLOBIN: ABNORMAL
EXT HIV RNA QUANT PCR: ABNORMAL
EXT IMMUNKNOW (STIMULATED): ABNORMAL
EXT IRON SATURATION: ABNORMAL
EXT LDH, TOTAL: ABNORMAL
EXT LDL CHOLESTEROL: ABNORMAL
EXT LEFLUNOMIDE METABOLITE: ABNORMAL
EXT LIPASE: ABNORMAL
EXT LYMPH%: ABNORMAL
EXT MAGNESIUM: ABNORMAL
EXT MONOCYTES%: ABNORMAL
EXT NITRITES UA: NEGATIVE
EXT PHOSPHORUS: ABNORMAL
EXT PLATELETS: ABNORMAL
EXT POTASSIUM: ABNORMAL
EXT PROT/CREAT RATIO UR: 0.45
EXT PROTEIN TOTAL: ABNORMAL
EXT PROTEIN UA: ABNORMAL
EXT PTH, INTACT: ABNORMAL
EXT RBC UA: ABNORMAL
EXT SEGS%: ABNORMAL
EXT SERUM IRON: ABNORMAL
EXT SIROLIMUS LVL: ABNORMAL
EXT SODIUM: ABNORMAL
EXT TACROLIMUS LVL: ABNORMAL
EXT TIBC: ABNORMAL
EXT TRIGLYCERIDES: ABNORMAL
EXT URIC ACID: ABNORMAL
EXT URINE CULTURE: ABNORMAL
EXT URINE PROTEIN: 36
EXT VIT D 25 HYDROXY: ABNORMAL
EXT WBC UA: ABNORMAL
EXT WBC: ABNORMAL
PARVOVIRUS B19 DNA BY PCR: ABNORMAL
QUANTIFERON GOLD TB: ABNORMAL

## 2025-01-29 NOTE — TELEPHONE ENCOUNTER
Patient states understanding. Message sent to IR schedulers regarding no longer needing biopsy at this time.     ----- Message from Joce Landry MD sent at 1/29/2025  4:04 PM CST -----  Now the PC ratio come down. No kidney biopsy for now. Increase prograf to 4/3 labs in 2 weeks

## 2025-01-30 ENCOUNTER — TELEPHONE (OUTPATIENT)
Dept: ENDOCRINOLOGY | Facility: HOSPITAL | Age: 65
End: 2025-01-30
Payer: MEDICARE

## 2025-01-30 DIAGNOSIS — N18.6 DIABETES MELLITUS DUE TO UNDERLYING CONDITION WITH CHRONIC KIDNEY DISEASE ON CHRONIC DIALYSIS, WITH LONG-TERM CURRENT USE OF INSULIN: Primary | ICD-10-CM

## 2025-01-30 DIAGNOSIS — E08.22 DIABETES MELLITUS DUE TO UNDERLYING CONDITION WITH CHRONIC KIDNEY DISEASE ON CHRONIC DIALYSIS, WITH LONG-TERM CURRENT USE OF INSULIN: Primary | ICD-10-CM

## 2025-01-30 DIAGNOSIS — Z99.2 DIABETES MELLITUS DUE TO UNDERLYING CONDITION WITH CHRONIC KIDNEY DISEASE ON CHRONIC DIALYSIS, WITH LONG-TERM CURRENT USE OF INSULIN: Primary | ICD-10-CM

## 2025-01-30 DIAGNOSIS — Z79.4 DIABETES MELLITUS DUE TO UNDERLYING CONDITION WITH CHRONIC KIDNEY DISEASE ON CHRONIC DIALYSIS, WITH LONG-TERM CURRENT USE OF INSULIN: Primary | ICD-10-CM

## 2025-01-30 RX ORDER — BLOOD-GLUCOSE SENSOR
EACH MISCELLANEOUS
Qty: 3 EACH | Refills: 11 | Status: SHIPPED | OUTPATIENT
Start: 2025-01-30

## 2025-01-30 RX ORDER — TACROLIMUS 1 MG/1
CAPSULE ORAL
Qty: 630 CAPSULE | Refills: 3 | Status: SHIPPED | OUTPATIENT
Start: 2025-01-30 | End: 2026-01-30

## 2025-01-30 NOTE — TELEPHONE ENCOUNTER
----- Message from Scot Norwood sent at 1/30/2025  4:15 PM CST -----  Regarding: FW: pt advice  Contact: [phone]    ----- Message -----  From: Felicitas Edwards  Sent: 1/30/2025   3:56 PM CST  To: Nikos Fulton Staff  Subject: pt advice                                        .Type:  Needs Medical Advice    Who Called: pt   reason (please be specific): asking to ask the provider another question     Would the patient rather a call back or a response via [facility]? Call   Best Call Back Number: [phone]  Additional Information: n/a

## 2025-01-30 NOTE — TELEPHONE ENCOUNTER
Patient was inquiring about restarting Mounjaro. She was on it but discontinued prior to kidney transplant. She states blood sugar was better controlled on Mounjaro. She states she has gained weight and has been constantly eating. Kidney transplant was was in July 2024. Will confirm with transplant team if patient is ok to restart.

## 2025-01-30 NOTE — TELEPHONE ENCOUNTER
----- Message from Nurse Acosta sent at 1/30/2025  8:20 AM CST -----    ----- Message -----  From: Ariella Valdez RN  Sent: 1/29/2025   4:07 PM CST  To: Rafal RITCHIE Staff    Patient would like to speak with someone regarding her blood sugar management. Can someone please reach out to her?

## 2025-01-30 NOTE — TELEPHONE ENCOUNTER
Called patient back. Patient reported her PCP wants to put her back on an insulin pump but she is unsure what pump she was on before. She was better controlled on a pump. PCP also recommended she increase Lantus to 20 units. Recommend patient increase Lantus to 18 units and Novolog to 7 units. Patient has an endocrinologist and will be meeting with a diabetes clinic. Discussed starting a Dexcom G7 (she has Medicare and uses insulin) to monitor blood sugar in between meals. Recommended after she's used Dexcom, she can consider using a pump with her endocrinologist. Patient stated she does not need a diabetes educator appointment and that the clinic will help her set Dexcom up. Sent Dexcom G7 to pharmacy.

## 2025-01-31 ENCOUNTER — TELEPHONE (OUTPATIENT)
Dept: ENDOCRINOLOGY | Facility: HOSPITAL | Age: 65
End: 2025-01-31
Payer: MEDICARE

## 2025-01-31 DIAGNOSIS — E11.22 TYPE 2 DM WITH HYPERTENSION AND ESRD ON DIALYSIS: Primary | ICD-10-CM

## 2025-01-31 DIAGNOSIS — N18.6 TYPE 2 DM WITH HYPERTENSION AND ESRD ON DIALYSIS: Primary | ICD-10-CM

## 2025-01-31 DIAGNOSIS — I12.0 TYPE 2 DM WITH HYPERTENSION AND ESRD ON DIALYSIS: Primary | ICD-10-CM

## 2025-01-31 DIAGNOSIS — Z99.2 TYPE 2 DM WITH HYPERTENSION AND ESRD ON DIALYSIS: Primary | ICD-10-CM

## 2025-01-31 RX ORDER — TIRZEPATIDE 2.5 MG/.5ML
2.5 INJECTION, SOLUTION SUBCUTANEOUS
Qty: 6 ML | Refills: 0 | Status: SHIPPED | OUTPATIENT
Start: 2025-01-31 | End: 2025-05-01

## 2025-01-31 NOTE — TELEPHONE ENCOUNTER
----- Message from HILARIO Krishnan sent at 1/31/2025 11:56 AM CST -----  Regarding: FW: Resuming Mounjaro   and our transplant pharmacists are fine with her restarting the Mounjaro.  ----- Message -----  From: Елена Bacon, PharmD  Sent: 1/31/2025  11:50 AM CST  To: Joce Landry MD; Ariella Valdez RN  Subject: RE: Resuming Mounjaro                            Yes that is okay.     Елена  ----- Message -----  From: Ariella Valdez RN  Sent: 1/31/2025  11:39 AM CST  To: Joce Landry MD; #  Subject: FW: Resuming Mounjaro                            Is it ok for patient to resume her Mounjaro from a transplant perspective?  ----- Message -----  From: Joce Landry MD  Sent: 1/31/2025  11:00 AM CST  To: [facility] Transplant Clinical  Subject: FW: Resuming Mounjaro                            I think should be ok but please ask our transplant Pharm D  ----- Message -----  From: Ranjana Knott PA-C  Sent: 1/30/2025   4:36 PM CST  To: Joce Landry MD  Subject: Resuming Mounjaro                                Good afternoon Dr. Landry,    This patient reached out to me to inquire about resuming Mounjaro. She was on this medication along with insulin prior to her transplant and her blood glucose was better controlled. Lately, she has been having difficulty controlling her blood glucose. It has been >6 months since her transplant and I believe this would be appropriate as long as she's staying well hydrated. However, I would like to confirm this from a kidney transplant perspective. I would appreciate any advice you may provide.    Thank you,  Ranjana Knott PA-C  Endocrinology

## 2025-01-31 NOTE — TELEPHONE ENCOUNTER
Informed patient that it's ok to restart Mounjaro according to transplant team. Will send in Mounjaro 2.5 mg weekly. Informed patient's of side effects of nausea, vomiting, diarrhea, and abdominal cramping. She is to hold Mounjaro if she experiences side effects. Also advised patient to call the office when she starts to review blood sugars and titrate down on insulin dose. Confirmed that patient never started Tradjenta.

## 2025-02-04 PROBLEM — I12.9 TYPE 2 DM WITH CKD STAGE 3 AND HYPERTENSION: Status: ACTIVE | Noted: 2024-02-01

## 2025-02-04 PROBLEM — T14.8XXA WOUND DRAINAGE: Status: RESOLVED | Noted: 2024-08-08 | Resolved: 2025-02-04

## 2025-02-04 PROBLEM — N18.30 TYPE 2 DM WITH CKD STAGE 3 AND HYPERTENSION: Status: ACTIVE | Noted: 2024-02-01

## 2025-02-04 PROBLEM — E87.8 DISORDER OF ELECTROLYTES: Status: RESOLVED | Noted: 2024-08-13 | Resolved: 2025-02-04

## 2025-02-04 PROBLEM — D84.9 IMMUNOSUPPRESSED STATUS: Status: ACTIVE | Noted: 2024-07-25

## 2025-02-05 ENCOUNTER — PATIENT MESSAGE (OUTPATIENT)
Dept: TRANSPLANT | Facility: CLINIC | Age: 65
End: 2025-02-05
Payer: MEDICARE

## 2025-02-12 ENCOUNTER — DOCUMENTATION ONLY (OUTPATIENT)
Dept: TRANSPLANT | Facility: CLINIC | Age: 65
End: 2025-02-12
Payer: MEDICARE

## 2025-02-12 LAB
EXT ALBUMIN: 4 (ref 3.5–5)
EXT BUN: 30 (ref 7–20)
EXT CALCIUM: 10.3
EXT CHLORIDE: 104 (ref 100–112)
EXT CO2: 29 (ref 22–30)
EXT CREATININE: 1.3 MG/DL (ref 0.52–1.5)
EXT EGFR NO RACE VARIABLE: 45.25
EXT EOSINOPHIL%: 2.5 (ref 1–5)
EXT GLUCOSE: 223 (ref 74–106)
EXT HEMATOCRIT: 34.4 (ref 37–47)
EXT HEMOGLOBIN: 11.7 (ref 12–16)
EXT LYMPH%: 18.2 (ref 20–40)
EXT MAGNESIUM: 1.8 (ref 1.6–2.3)
EXT MONOCYTES%: 6.4 (ref 3.5–12.5)
EXT PHOSPHORUS: 2.8 (ref 2.5–4.5)
EXT PLATELETS: 248 (ref 130–400)
EXT POTASSIUM: 4.1 (ref 3.5–5.1)
EXT SEGS%: 71.4 (ref 40–65)
EXT SODIUM: 140 MMOL/L (ref 137–148)
EXT TACROLIMUS LVL: 10.2
EXT WBC: 5.95 (ref 4.8–10.8)

## 2025-02-13 ENCOUNTER — DOCUMENTATION ONLY (OUTPATIENT)
Dept: TRANSPLANT | Facility: CLINIC | Age: 65
End: 2025-02-13
Payer: MEDICARE

## 2025-02-17 ENCOUNTER — TELEPHONE (OUTPATIENT)
Dept: TRANSPLANT | Facility: CLINIC | Age: 65
End: 2025-02-17
Payer: MEDICARE

## 2025-02-17 DIAGNOSIS — Z94.0 KIDNEY REPLACED BY TRANSPLANT: Primary | ICD-10-CM

## 2025-02-17 NOTE — TELEPHONE ENCOUNTER
Coordinator informed that Allosure tubes drawn at [facility] on 2/11/25 were not labeled and therefore Allosure cannot be resulted. Will have to be reordered and redrawn. New orders placed.

## 2025-03-06 ENCOUNTER — DOCUMENTATION ONLY (OUTPATIENT)
Dept: TRANSPLANT | Facility: CLINIC | Age: 65
End: 2025-03-06
Payer: MEDICARE

## 2025-03-06 LAB
EXT ALBUMIN: 4.3
EXT ALKALINE PHOSPHATASE: ABNORMAL
EXT ALLOSURE: ABNORMAL
EXT ALT: ABNORMAL
EXT AMYLASE: ABNORMAL
EXT ANC: ABNORMAL
EXT AST: ABNORMAL
EXT BACTERIA UA: ABNORMAL
EXT BANDS%: 0
EXT BILIRUBIN DIRECT: ABNORMAL
EXT BILIRUBIN TOTAL: ABNORMAL
EXT BK VIRUS DNA QN PCR: ABNORMAL
EXT BUN: 27
EXT C PEPTIDE: ABNORMAL
EXT CALCIUM: 11
EXT CHLORIDE: 104
EXT CHOLESTEROL: ABNORMAL
EXT CMV DNA QUANT. BY PCR: ABNORMAL
EXT CO2: 28
EXT CREATININE UA: ABNORMAL
EXT CREATININE: 1.4 MG/DL
EXT CYCLOSPORINE LVL: ABNORMAL
EXT EBV DNA BY PCR: ABNORMAL
EXT EBV IGG: ABNORMAL
EXT EGFR NO RACE VARIABLE: 41.38
EXT EOSINOPHIL%: 2.6
EXT FERRITIN: ABNORMAL
EXT GFR MDRD AF AMER: ABNORMAL
EXT GFR MDRD NON AF AMER: ABNORMAL
EXT GLUCOSE UA: ABNORMAL
EXT GLUCOSE: 97
EXT HBV DNA QUANT PCR: ABNORMAL
EXT HCV QUANT: ABNORMAL
EXT HDL: ABNORMAL
EXT HEMATOCRIT: 36.7
EXT HEMOGLOBIN A1C: ABNORMAL
EXT HEMOGLOBIN: 12.4
EXT HIV RNA QUANT PCR: ABNORMAL
EXT IMMUNKNOW (STIMULATED): ABNORMAL
EXT IRON SATURATION: ABNORMAL
EXT LDH, TOTAL: ABNORMAL
EXT LDL CHOLESTEROL: ABNORMAL
EXT LEFLUNOMIDE METABOLITE: ABNORMAL
EXT LIPASE: ABNORMAL
EXT LYMPH%: 21
EXT MAGNESIUM: 1.9
EXT MONOCYTES%: 8.5
EXT NITRITES UA: ABNORMAL
EXT PHOSPHORUS: 3.1
EXT PLATELETS: 273
EXT POTASSIUM: 3.9
EXT PROT/CREAT RATIO UR: ABNORMAL
EXT PROTEIN TOTAL: ABNORMAL
EXT PROTEIN UA: ABNORMAL
EXT PTH, INTACT: ABNORMAL
EXT RBC UA: ABNORMAL
EXT SEGS%: 66.5
EXT SERUM IRON: ABNORMAL
EXT SIROLIMUS LVL: ABNORMAL
EXT SODIUM: 140 MMOL/L
EXT TACROLIMUS LVL: ABNORMAL
EXT TIBC: ABNORMAL
EXT TRIGLYCERIDES: ABNORMAL
EXT URIC ACID: ABNORMAL
EXT URINE CULTURE: ABNORMAL
EXT URINE PROTEIN: ABNORMAL
EXT VIT D 25 HYDROXY: ABNORMAL
EXT WBC UA: ABNORMAL
EXT WBC: 4.95
PARVOVIRUS B19 DNA BY PCR: ABNORMAL
QUANTIFERON GOLD TB: ABNORMAL

## 2025-03-10 ENCOUNTER — DOCUMENTATION ONLY (OUTPATIENT)
Dept: TRANSPLANT | Facility: CLINIC | Age: 65
End: 2025-03-10
Payer: MEDICARE

## 2025-03-10 LAB
EXT ALBUMIN: 4.3 (ref 3.5–5)
EXT ALLOSURE: 0.28
EXT BUN: 27 (ref 7–20)
EXT CHLORIDE: 104 (ref 100–112)
EXT CO2: 28 (ref 22–30)
EXT CREATININE: 1.4 MG/DL (ref 0.52–1.5)
EXT EOSINOPHIL%: 2.6 (ref 1–5)
EXT GLUCOSE: 97 (ref 74–106)
EXT HEMATOCRIT: 36.7 (ref 37–47)
EXT HEMOGLOBIN: 12.4 (ref 12–16)
EXT LYMPH%: 21 (ref 20–40)
EXT MAGNESIUM: 1.9 (ref 1.6–2.3)
EXT MONOCYTES%: 8.5 (ref 3.5–12.5)
EXT PHOSPHORUS: 3.1 (ref 2.5–4.5)
EXT PLATELETS: 273 (ref 130–400)
EXT POTASSIUM: 3.9 (ref 3.5–5.1)
EXT SEGS%: 66.5 (ref 40–65)
EXT SODIUM: 140 MMOL/L (ref 137–148)
EXT TACROLIMUS LVL: 7.4
EXT WBC: 4.95 (ref 4.8–10.8)

## 2025-03-11 ENCOUNTER — RESULTS FOLLOW-UP (OUTPATIENT)
Dept: TRANSPLANT | Facility: CLINIC | Age: 65
End: 2025-03-11
Payer: MEDICARE

## 2025-03-11 ENCOUNTER — PATIENT MESSAGE (OUTPATIENT)
Dept: TRANSPLANT | Facility: CLINIC | Age: 65
End: 2025-03-11
Payer: MEDICARE

## 2025-03-11 DIAGNOSIS — Z94.0 KIDNEY REPLACED BY TRANSPLANT: ICD-10-CM

## 2025-03-11 DIAGNOSIS — E83.52 HYPERCALCEMIA: ICD-10-CM

## 2025-03-11 NOTE — PROGRESS NOTES
Please encourage more water hydration  Verify if she is taking correctly sensipar 60 mg po bid and let me know. Thanks very much

## 2025-03-11 NOTE — PROGRESS NOTES
Kidney Post-Transplant Assessment    Referring Physician: Brice Mike  Current Nephrologist: Brice Miek    ORGAN: RIGHT KIDNEY  Donor Type: donation after brain death  PHS Increased Risk: no  Cold Ischemia: 1,402 mins  Induction Medications: thymo    Subjective:   The patient location is: [place]  The chief complaint leading to consultation is: Kidney Post-Transplant Assessment    Visit type: audiovisual    Face to Face time with patient:    [place] minutes of total time spent on the encounter, which includes face to face time and non-face to face time preparing to see the patient (eg, review of tests), Obtaining and/or reviewing separately obtained history, Documenting clinical information in the electronic or other health record, Independently interpreting results (not separately reported) and communicating results to the patient/family/caregiver, or Care coordination (not separately reported).     Each patient to whom he or she provides medical services by telemedicine is:  (1) informed of the relationship between the physician and patient and the respective role of any other health care provider with respect to management of the patient; and (2) notified that he or she may decline to receive medical services by telemedicine and may withdraw from such care at any time.      CC:  Reassessment of renal allograft function and management of chronic immunosuppression.    HPI:  Ms. Kenney is a 64 y.o. year old White female with history of ESRD secondary to DM who received a donation after brain death kidney transplant on 7/24/24 (KDPI 85%, CIT >23 hrs, Thymo induction, CMV ++).  She has CKD stage 3 - GFR 30-59 and her baseline creatinine is between 1.3-1.7. She takes mycophenolic acid, prednisone, and tacrolimus for maintenance immunosuppression.     Has re-established with general nephrology Dr. Mike post transplant. Started on mounjaro last month and has been tolerating well. Feels well today without complaints.  Staying hydrated, no problems with urination. Home BP at goal, no peripheral edema. No pain over allograft. Tolerating IS without issue, no diarrhea or vomiting.     Current Medications[1]    Past Medical History:   Diagnosis Date    Anemia     Anxiety     Atrial fibrillation     Bleeding 08/08/2024    Coronary artery disease     Depression     Diabetes mellitus, type 2     Disorder of kidney and ureter     Heart murmur     Hyperlipidemia     Hypertension     Obesity     ALEJA (obstructive sleep apnea)     Proteinuria     Solitary kidney     Stroke     Transient neurological symptoms 11/13/2018       Review of Systems   Constitutional:  Negative for activity change and fever.   Eyes:  Negative for visual disturbance.   Respiratory:  Negative for shortness of breath.    Cardiovascular:  Negative for chest pain and leg swelling.   Gastrointestinal:  Negative for constipation, diarrhea and nausea.   Genitourinary:  Negative for difficulty urinating, frequency and hematuria.   Musculoskeletal:  Negative for arthralgias and myalgias.   Skin:  Negative for wound.   Allergic/Immunologic: Positive for immunocompromised state.   Neurological:  Negative for weakness and numbness.   Psychiatric/Behavioral:  Negative for sleep disturbance. The patient is not nervous/anxious.        Objective:     There were no vitals taken for this visit.body mass index is unknown because there is no height or weight on file.    Physical Exam - VIRTUAL VISIT    Labs:  Lab Results   Component Value Date    WBC 7.41 08/26/2024    HGB 8.1 (L) 08/26/2024    HCT 24.6 (L) 08/26/2024    LABPLAT 291 03/13/2020     08/26/2024    K 3.7 08/26/2024     08/26/2024    CO2 24 08/26/2024    BUN 16 08/26/2024    CREATININE 1.6 (H) 08/26/2024    EGFRNORACEVR 35.6 (A) 08/26/2024    CALCIUM 8.9 08/26/2024    PHOS 3.0 08/26/2024    MG 1.4 (L) 08/26/2024    ALBUMIN 2.4 (L) 08/26/2024    AST 15 08/09/2024    ALT 19 08/09/2024    .4 (H) 07/29/2024     TACROLIMUS 5.7 08/26/2024       Lab Results   Component Value Date    EXTANC 649.4 09/30/2024    EXTWBC 4.95 03/06/2025    EXTSEGS 66.5 (H) 03/06/2025    EXTPLATELETS 273 03/06/2025    EXTHEMOGLOBI 12.4 03/06/2025    EXTHEMATOCRI 36.7 (L) 03/06/2025    EXTCREATININ 1.40 03/06/2025    EXTSODIUM 140.0 03/06/2025    EXTPOTASSIUM 3.90 03/06/2025    EXTBUN 27.0 (H) 03/06/2025    EXTCO2 28.0 03/06/2025    EXTCALCIUM 11 (H) 03/06/2025    EXTPHOSPHORU 3.1 03/06/2025    EXTGLUCOSE 97 03/06/2025    EXTALBUMIN 4.30 03/06/2025    EXTAST 23.0 01/24/2025    EXTALT 18.0 01/24/2025    EXTBILITOTAL 0.70 01/24/2025       Lab Results   Component Value Date    EXTTACROLVL 7.4 03/06/2025    EXTPROTCRE 0.447 01/29/2025    EXTPROTEINUA 1+ (A) 01/29/2025    EXTWBCUA 0-2 01/29/2025    EXTRBCUA none seen 01/29/2025       Labs were reviewed with the patient.    Assessment:     1. S/P kidney transplant    2. Immunosuppressive management encounter following kidney transplant    3. Hypercalcemia    4. Long-term use of immunosuppressant medication    5. Diabetic nephropathy associated with type 2 diabetes mellitus    6. At risk for opportunistic infections      Plan:       1. Immunosuppression: Prograf trough 7.4. Continue Prograf 4/3, MyF 360 mg BID, and Prednisone 5 mg QD. Will continue to monitor for drug toxicities    2. Allograft Function: Stable. Continue good oral hydration.   - ESRD secondary to DM s/p donation after brain death kidney transplant on 7/24/24 (KDPI 85%, CIT >23 hrs, Thymo induction, CMV ++).    - baseline creatinine is between 1.3-1.7.    Latest Reference Range & Units 7mo 1wk,  Kidney-Post 1 Year  03/06/25 07:05   EXT Creatinine 0.52 - 1.50 mg/dL 1.40 (P) (E)       3. Hypertension management:   - advise low salt diet and home BP monitoring      4. Hypercalcemia   - sensipar 60 mg BID      5. Hypomagnesemia    - MagOx 800 mg BID   - high magnesium diet      6. Anemia: stable. No need for intervention    Latest Reference  Range & Units 7mo 1wk,  Kidney-Post 1 Year  03/06/25 07:05   EXT Hemoglobin 12.0 - 16.0  12.4 (P) (E)   EXT Hematocrit 37.0 - 47.0  36.7 (L) (P) (E)     7. Proteinuria: continue p/c ratio as per guidelines    - last  (0-200)    8. BK virus infection screening:  BK PCR per protocol    - last PCR not detected    9. Weight education: provided during the clinic visit   There is no height or weight on file to calculate BMI.     10. Patient safety education regarding immunosuppression including prophylaxis posttransplant for CMV, PCP  - PJP PROPHYLAXIS: Bactrim 1/21/25 (completed)   - CMV PROPHYLAXIS: Valcyte 10/23/24 (completed)         Follow-up:   Clinic: return to transplant clinic weekly for the first month after transplant; every 2 weeks during months 2-3; then at 6-, 9-, 12-, 18-, 24-, and 36- months post-transplant to reassess for complications from immunosuppression toxicity and monitor for rejection.  Annually thereafter.    Labs: since patient remains at high risk for rejection and drug-related complications that warrant close monitoring, labs will be ordered as follows: continue twice weekly CBC, renal panel, and drug level for first month; then same labs once weekly through 3rd month post-transplant.  Urine for UA and protein/creatinine ratio monthly.  Serum BK - PCR at 1-, 3-, 6-, 9-, 12-, 18-, 24-, 36- 48-, and 60 months post-transplant.  Hepatic panel at 1-, 2-, 3-, 6-, 9-, 12-, 18-, 24-, and 36- months post-transplant.    Lila Mccurdy NP            [1]   Current Outpatient Medications   Medication Sig Dispense Refill    ALPRAZolam (XANAX) 2 MG Tab Take 2 mg by mouth every evening.      atorvastatin (LIPITOR) 20 MG tablet Take 1 tablet (20 mg total) by mouth every evening. 90 tablet 3    blood sugar diagnostic Strp use 1 strip to check blood glucose 3 (three) times daily. 100 strip 1    blood-glucose meter Misc use as directed to check blood glucose 1 each 0    blood-glucose sensor (DEXCOM G7  "SENSOR) Blessing Change every 10 days. 3 each 11    carvediloL (COREG) 6.25 MG tablet Take 1 tablet (6.25 mg total) by mouth 2 (two) times daily. 180 tablet 3    cetirizine (ZYRTEC) 5 MG tablet Take 1 tablet (5 mg total) by mouth once daily. 30 tablet 11    cinacalcet (SENSIPAR) 60 MG Tab Take 1 tablet (60 mg total) by mouth 2 (two) times daily with meals. 180 tablet 3    DULoxetine (CYMBALTA) 30 MG capsule Take 30 mg by mouth once daily.      ezetimibe (ZETIA) 10 mg tablet Take 1 tablet (10 mg total) by mouth once daily. 90 tablet 3    insulin aspart U-100 (NOVOLOG) 100 unit/mL (3 mL) InPn pen Inject subcutaneously as needed for sliding scale. Total daily dose: 15 units/day 3 mL 5    insulin glargine U-100, Lantus, (LANTUS SOLOSTAR U-100 INSULIN) 100 unit/mL (3 mL) InPn pen Inject 13 Units into the skin once daily. 11.7 mL 3    k phos di & mono-sod phos mono (K-PHOS-NEUTRAL) 250 mg Tab Take 2 tablets by mouth 2 (two) times a day. 120 tablet 5    lancets Misc 1 lancet each to check blood glucose 3 (three) times daily. 100 each 1    magnesium oxide (MAG-OX) 400 mg (241.3 mg magnesium) tablet Take 2 tablets (800 mg total) by mouth 2 (two) times daily. 60 tablet 5    multivitamin Tab Take 1 tablet by mouth once daily.      mycophenolate sodium (MYFORTIC) 180 MG TbEC Take 2 tablets (360 mg total) by mouth 2 (two) times daily. 360 tablet 3    olmesartan (BENICAR) 40 MG tablet Take 0.5 tablets (20 mg total) by mouth once daily. 45 tablet 3    ondansetron (ZOFRAN-ODT) 4 MG TbDL Take 1 tablet (4 mg total) by mouth every 8 (eight) hours as needed (as needed for nausea). 90 tablet 0    oxybutynin (DITROPAN) 5 MG Tab Take 1 tablet (5 mg total) by mouth 3 (three) times daily as needed (bladder spasms). 12 tablet 0    pantoprazole (PROTONIX) 40 MG tablet Take 1 tablet (40 mg total) by mouth once daily. 30 tablet 2    pen needle, diabetic 32 gauge x 5/32" Ndle 1 each for use with insulin pen 3 (three) times daily. 100 each 1    " predniSONE (DELTASONE) 5 MG tablet Take by mouth daily; 7/27/2024-8/2/2024: 20 mg, 8/3/2024-8/9/2024: 15 mg; 8/10/2024-8/16/2024: 10 mg; 8/17/2024- forever: 5 mg; do not stop 70 tablet 11    tacrolimus (PROGRAF) 1 MG Cap Take 4 capsules (4 mg total) by mouth once daily AND 3 capsules (3 mg total) every evening. 630 capsule 3    tirzepatide (MOUNJARO) 2.5 mg/0.5 mL PnIj Inject 2.5 mg into the skin every 7 days. 6 mL 0     No current facility-administered medications for this visit.

## 2025-03-12 ENCOUNTER — DOCUMENTATION ONLY (OUTPATIENT)
Dept: TRANSPLANT | Facility: CLINIC | Age: 65
End: 2025-03-12
Payer: MEDICARE

## 2025-03-12 ENCOUNTER — OFFICE VISIT (OUTPATIENT)
Dept: TRANSPLANT | Facility: CLINIC | Age: 65
End: 2025-03-12
Payer: MEDICARE

## 2025-03-12 ENCOUNTER — PATIENT MESSAGE (OUTPATIENT)
Dept: TRANSPLANT | Facility: CLINIC | Age: 65
End: 2025-03-12
Payer: MEDICARE

## 2025-03-12 ENCOUNTER — RESULTS FOLLOW-UP (OUTPATIENT)
Dept: TRANSPLANT | Facility: CLINIC | Age: 65
End: 2025-03-12

## 2025-03-12 DIAGNOSIS — Z94.0 S/P KIDNEY TRANSPLANT: Primary | ICD-10-CM

## 2025-03-12 DIAGNOSIS — Z79.899 IMMUNOSUPPRESSIVE MANAGEMENT ENCOUNTER FOLLOWING KIDNEY TRANSPLANT: ICD-10-CM

## 2025-03-12 DIAGNOSIS — Z91.89 AT RISK FOR OPPORTUNISTIC INFECTIONS: ICD-10-CM

## 2025-03-12 DIAGNOSIS — Z94.0 IMMUNOSUPPRESSIVE MANAGEMENT ENCOUNTER FOLLOWING KIDNEY TRANSPLANT: ICD-10-CM

## 2025-03-12 DIAGNOSIS — Z79.60 LONG-TERM USE OF IMMUNOSUPPRESSANT MEDICATION: ICD-10-CM

## 2025-03-12 DIAGNOSIS — E83.52 HYPERCALCEMIA: ICD-10-CM

## 2025-03-12 DIAGNOSIS — E11.21 DIABETIC NEPHROPATHY ASSOCIATED WITH TYPE 2 DIABETES MELLITUS: ICD-10-CM

## 2025-03-12 RX ORDER — CINACALCET 60 MG/1
60 TABLET, FILM COATED ORAL 2 TIMES DAILY WITH MEALS
Qty: 180 TABLET | Refills: 3 | Status: SHIPPED | OUTPATIENT
Start: 2025-03-12 | End: 2026-03-12

## 2025-03-12 NOTE — TELEPHONE ENCOUNTER
Voicemail left for patient as well as message sent to patient via portal to assess if patient has been taking Senispar 60mg BID. Message read by patient at 2:10pm on 3/11/25 but has not replied. Awaiting call back or reply.    ----- Message from HILARIO Krishnan sent at 3/11/2025  3:45 PM CDT -----    ----- Message -----  From: Joce Landry MD  Sent: 3/11/2025   8:37 AM CDT  To: [facility] Post-Kidney Transplant Clinical    Please encourage more water hydration  Verify if she is taking correctly sensipar 60 mg po bid and let me know. Thanks very much   ----- Message -----  From: Ariella Valdez RN  Sent: 3/6/2025   1:05 PM CDT  To: Joce Landry MD

## 2025-03-12 NOTE — TELEPHONE ENCOUNTER
Patient's daughter states patient has been taking Sensipar 45mg BID because she did not have 60mg tablets. Advised patient's daughter that we sent an rx for 60mg tablets to [facility] pharmacy in December. Will resend rx to [facility] today so patient can take correct dose.    ----- Message from HILARIO Krishnan sent at 3/11/2025  3:45 PM CDT -----    ----- Message -----  From: Joce Landry MD  Sent: 3/11/2025   8:37 AM CDT  To: [facility] Post-Kidney Transplant Clinical    Please encourage more water hydration  Verify if she is taking correctly sensipar 60 mg po bid and let me know. Thanks very much   ----- Message -----  From: Ariella Valdez RN  Sent: 3/6/2025   1:05 PM CDT  To: Joce Landry MD

## 2025-03-12 NOTE — LETTER
March 12, 2025        Brice Mike  [address] [facility] [address]  Phone: [phone]  Fax: [phone]             [facility] [address]  [address]  [facility] [address]  Phone: [phone]   Patient: Joann Kenney   MR Number: [id number]   YOB: 1960   Date of Visit: 3/12/2025       Dear Dr. Brice Mike    Thank you for referring Joann Kenney to me for evaluation. Attached you will find relevant portions of my assessment and plan of care.    If you have questions, please do not hesitate to call me. I look forward to following Joann Kenney along with you.    Sincerely,    Lila Mccurdy, KEN    Enclosure    If you would like to receive this communication electronically, please contact [email or web address] or [phone] to request [employer] access.    [employer] is a tool which provides read-only access to select patient information with whom you have a relationship. Its easy to use and provides real time access to review your patients record including encounter summaries, notes, results, and demographic information.    If you feel you have received this communication in error or would no longer like to receive these types of communications, please e-mail [email or web address]

## 2025-04-03 ENCOUNTER — NURSE TRIAGE (OUTPATIENT)
Dept: ADMINISTRATIVE | Facility: CLINIC | Age: 65
End: 2025-04-03
Payer: MEDICARE

## 2025-04-04 NOTE — TELEPHONE ENCOUNTER
Pt's daughter, Sarah, reports pt is currently at their local hospital in [place] and has been admitted for a gallbladder infection and will likely need surgery. Daughter states the MDs at [place] were discussing whether the surgery should be done there or if pt needed to be sent to [facility] [place] since pt is a Kidney Transplant pt. Daughter states pt's coordinator advised to reach out once they knew what was going on with the pt, so the daughter is calling to see where pt should have the surgery done. Daughter advised that if the pt is currently admitted in a hospital it would need to be the doctors there reaching out to the Transplant On Call MD to discuss what care setting is needed for the pt. Daughter was given the number [phone] to give to the staff taking care of the pt so a MD to MD consult can be made. Daughter was encouraged to call back with any worsening symptoms or questions, and that a message will be routed to Kidney Transplant and pt's coordinator as well to notify them of the situation. She verbalized understanding.     Reason for Disposition   [1] Caller is not with the adult (patient) AND [2] probable NON-URGENT symptoms    Protocols used: Information Only Call - No Triage-A-

## 2025-04-05 ENCOUNTER — HOSPITAL ENCOUNTER (INPATIENT)
Facility: HOSPITAL | Age: 65
LOS: 4 days | Discharge: HOME HEALTH | DRG: 392 | End: 2025-04-09
Attending: INTERNAL MEDICINE | Admitting: INTERNAL MEDICINE
Payer: MEDICARE

## 2025-04-05 DIAGNOSIS — E08.22 DIABETES MELLITUS DUE TO UNDERLYING CONDITION WITH CHRONIC KIDNEY DISEASE ON CHRONIC DIALYSIS, WITH LONG-TERM CURRENT USE OF INSULIN: ICD-10-CM

## 2025-04-05 DIAGNOSIS — Z99.2 DIABETES MELLITUS DUE TO UNDERLYING CONDITION WITH CHRONIC KIDNEY DISEASE ON CHRONIC DIALYSIS, WITH LONG-TERM CURRENT USE OF INSULIN: ICD-10-CM

## 2025-04-05 DIAGNOSIS — D63.8 ANEMIA OF CHRONIC DISEASE: ICD-10-CM

## 2025-04-05 DIAGNOSIS — Z79.60 LONG-TERM USE OF IMMUNOSUPPRESSANT MEDICATION: ICD-10-CM

## 2025-04-05 DIAGNOSIS — N18.6 DIABETES MELLITUS DUE TO UNDERLYING CONDITION WITH CHRONIC KIDNEY DISEASE ON CHRONIC DIALYSIS, WITH LONG-TERM CURRENT USE OF INSULIN: ICD-10-CM

## 2025-04-05 DIAGNOSIS — Z29.89 PROPHYLACTIC IMMUNOTHERAPY: ICD-10-CM

## 2025-04-05 DIAGNOSIS — I15.0 RENOVASCULAR HYPERTENSION: ICD-10-CM

## 2025-04-05 DIAGNOSIS — R10.11 RIGHT UPPER QUADRANT ABDOMINAL PAIN: Primary | ICD-10-CM

## 2025-04-05 DIAGNOSIS — I15.8 HYPERTENSION ASSOCIATED WITH TRANSPLANTATION: ICD-10-CM

## 2025-04-05 DIAGNOSIS — R11.2 NAUSEA AND VOMITING, UNSPECIFIED VOMITING TYPE: ICD-10-CM

## 2025-04-05 DIAGNOSIS — K59.00 CONSTIPATION, UNSPECIFIED CONSTIPATION TYPE: ICD-10-CM

## 2025-04-05 DIAGNOSIS — R11.0 CHRONIC NAUSEA: ICD-10-CM

## 2025-04-05 DIAGNOSIS — Z94.9 HYPERTENSION ASSOCIATED WITH TRANSPLANTATION: ICD-10-CM

## 2025-04-05 DIAGNOSIS — F41.9 ANXIETY: ICD-10-CM

## 2025-04-05 DIAGNOSIS — R80.9 PROTEINURIA, UNSPECIFIED TYPE: ICD-10-CM

## 2025-04-05 DIAGNOSIS — R10.9 ABDOMINAL PAIN: ICD-10-CM

## 2025-04-05 DIAGNOSIS — R10.31 RLQ ABDOMINAL PAIN: ICD-10-CM

## 2025-04-05 DIAGNOSIS — Z94.0 S/P KIDNEY TRANSPLANT: ICD-10-CM

## 2025-04-05 DIAGNOSIS — N25.81 SECONDARY HYPERPARATHYROIDISM: ICD-10-CM

## 2025-04-05 DIAGNOSIS — Z79.4 DIABETES MELLITUS DUE TO UNDERLYING CONDITION WITH CHRONIC KIDNEY DISEASE ON CHRONIC DIALYSIS, WITH LONG-TERM CURRENT USE OF INSULIN: ICD-10-CM

## 2025-04-05 DIAGNOSIS — K59.04 CHRONIC IDIOPATHIC CONSTIPATION: ICD-10-CM

## 2025-04-05 DIAGNOSIS — Z94.0 KIDNEY REPLACED BY TRANSPLANT: ICD-10-CM

## 2025-04-05 PROCEDURE — 20600001 HC STEP DOWN PRIVATE ROOM

## 2025-04-05 NOTE — HPI
Joann Kenney is a 65 y/o F s/p DBD kidney transplant 7/24/2024 for ESRD 2/2 Type II DM-(KDPI 85%, CIT >23 hrs, Thymo induction, CMV ++). She has CKD stage 3 - GFR 30-59 and her baseline creatinine is between 1.3-1.7. PMHx born w solitary kidney, DM2, HTN, incontinence (s/p bladder sling), CAD with stents (on ASA), atrial fibrillation, CVA (no residual deficits), ALEJA, and anemia. Surgery without complications.    On 4/4 patient presented to OSH for severe RU ABD pain- she underwent CT abd which was not consistent with cholecystitis.  Cr 1.7 ( baseline) . D/T on going Abd pain. Patient discussed with Dr Landry will admit for further abd pain evaluation UA, Kidney Transplant US . She denies recent cold or dysuria fever , n/v/d

## 2025-04-06 LAB
ABSOLUTE EOSINOPHIL (OHS): 0.23 K/UL
ABSOLUTE MONOCYTE (OHS): 0.34 K/UL (ref 0.3–1)
ABSOLUTE NEUTROPHIL COUNT (OHS): 3.68 K/UL (ref 1.8–7.7)
ALBUMIN SERPL BCP-MCNC: 3.5 G/DL (ref 3.5–5.2)
ALP SERPL-CCNC: 179 UNIT/L (ref 40–150)
ALT SERPL W/O P-5'-P-CCNC: 17 UNIT/L (ref 10–44)
ANION GAP (OHS): 8 MMOL/L (ref 8–16)
AST SERPL-CCNC: 18 UNIT/L (ref 11–45)
BASOPHILS # BLD AUTO: 0.04 K/UL
BASOPHILS NFR BLD AUTO: 0.8 %
BILIRUB SERPL-MCNC: 0.4 MG/DL (ref 0.1–1)
BUN SERPL-MCNC: 17 MG/DL (ref 8–23)
CALCIUM SERPL-MCNC: 10.8 MG/DL (ref 8.7–10.5)
CHLORIDE SERPL-SCNC: 114 MMOL/L (ref 95–110)
CO2 SERPL-SCNC: 20 MMOL/L (ref 23–29)
CREAT SERPL-MCNC: 1.2 MG/DL (ref 0.5–1.4)
ERYTHROCYTE [DISTWIDTH] IN BLOOD BY AUTOMATED COUNT: 12.4 % (ref 11.5–14.5)
GFR SERPLBLD CREATININE-BSD FMLA CKD-EPI: 50 ML/MIN/1.73/M2
GLUCOSE SERPL-MCNC: 133 MG/DL (ref 70–110)
HCT VFR BLD AUTO: 35.8 % (ref 37–48.5)
HGB BLD-MCNC: 12 GM/DL (ref 12–16)
IMM GRANULOCYTES # BLD AUTO: 0.02 K/UL (ref 0–0.04)
IMM GRANULOCYTES NFR BLD AUTO: 0.4 % (ref 0–0.5)
LYMPHOCYTES # BLD AUTO: 0.74 K/UL (ref 1–4.8)
MAGNESIUM SERPL-MCNC: 1.5 MG/DL (ref 1.6–2.6)
MCH RBC QN AUTO: 31.1 PG (ref 27–31)
MCHC RBC AUTO-ENTMCNC: 33.5 G/DL (ref 32–36)
MCV RBC AUTO: 93 FL (ref 82–98)
NUCLEATED RBC (/100WBC) (OHS): 0 /100 WBC
PLATELET # BLD AUTO: 230 K/UL (ref 150–450)
PMV BLD AUTO: 10.4 FL (ref 9.2–12.9)
POCT GLUCOSE: 125 MG/DL (ref 70–110)
POCT GLUCOSE: 137 MG/DL (ref 70–110)
POTASSIUM SERPL-SCNC: 3.8 MMOL/L (ref 3.5–5.1)
PROT SERPL-MCNC: 6.3 GM/DL (ref 6–8.4)
RBC # BLD AUTO: 3.86 M/UL (ref 4–5.4)
RELATIVE EOSINOPHIL (OHS): 4.6 %
RELATIVE LYMPHOCYTE (OHS): 14.7 % (ref 18–48)
RELATIVE MONOCYTE (OHS): 6.7 % (ref 4–15)
RELATIVE NEUTROPHIL (OHS): 72.8 % (ref 38–73)
SODIUM SERPL-SCNC: 142 MMOL/L (ref 136–145)
TACROLIMUS BLD-MCNC: 4.9 NG/ML (ref 5–15)
WBC # BLD AUTO: 5.05 K/UL (ref 3.9–12.7)

## 2025-04-06 PROCEDURE — 25000003 PHARM REV CODE 250: Performed by: NURSE PRACTITIONER

## 2025-04-06 PROCEDURE — 99223 1ST HOSP IP/OBS HIGH 75: CPT | Mod: 25,,, | Performed by: STUDENT IN AN ORGANIZED HEALTH CARE EDUCATION/TRAINING PROGRAM

## 2025-04-06 PROCEDURE — 82040 ASSAY OF SERUM ALBUMIN: CPT | Performed by: INTERNAL MEDICINE

## 2025-04-06 PROCEDURE — 63600175 PHARM REV CODE 636 W HCPCS: Performed by: NURSE PRACTITIONER

## 2025-04-06 PROCEDURE — 25000242 PHARM REV CODE 250 ALT 637 W/ HCPCS: Performed by: INTERNAL MEDICINE

## 2025-04-06 PROCEDURE — 85025 COMPLETE CBC W/AUTO DIFF WBC: CPT | Performed by: INTERNAL MEDICINE

## 2025-04-06 PROCEDURE — A4216 STERILE WATER/SALINE, 10 ML: HCPCS | Performed by: NURSE PRACTITIONER

## 2025-04-06 PROCEDURE — 87086 URINE CULTURE/COLONY COUNT: CPT | Performed by: NURSE PRACTITIONER

## 2025-04-06 PROCEDURE — 20600001 HC STEP DOWN PRIVATE ROOM

## 2025-04-06 PROCEDURE — 25000003 PHARM REV CODE 250: Performed by: INTERNAL MEDICINE

## 2025-04-06 PROCEDURE — 36415 COLL VENOUS BLD VENIPUNCTURE: CPT | Performed by: INTERNAL MEDICINE

## 2025-04-06 PROCEDURE — 83735 ASSAY OF MAGNESIUM: CPT | Performed by: INTERNAL MEDICINE

## 2025-04-06 PROCEDURE — 80197 ASSAY OF TACROLIMUS: CPT | Performed by: INTERNAL MEDICINE

## 2025-04-06 RX ORDER — MAGNESIUM SULFATE HEPTAHYDRATE 40 MG/ML
2 INJECTION, SOLUTION INTRAVENOUS ONCE
Status: COMPLETED | OUTPATIENT
Start: 2025-04-06 | End: 2025-04-06

## 2025-04-06 RX ORDER — TACROLIMUS 1 MG/1
4 CAPSULE ORAL EVERY MORNING
Status: DISCONTINUED | OUTPATIENT
Start: 2025-04-06 | End: 2025-04-07

## 2025-04-06 RX ORDER — PREDNISONE 5 MG/1
5 TABLET ORAL DAILY
Status: DISCONTINUED | OUTPATIENT
Start: 2025-04-06 | End: 2025-04-09 | Stop reason: HOSPADM

## 2025-04-06 RX ORDER — PANTOPRAZOLE SODIUM 40 MG/1
40 TABLET, DELAYED RELEASE ORAL DAILY
Status: DISCONTINUED | OUTPATIENT
Start: 2025-04-06 | End: 2025-04-09 | Stop reason: HOSPADM

## 2025-04-06 RX ORDER — ONDANSETRON 4 MG/1
4 TABLET, ORALLY DISINTEGRATING ORAL EVERY 8 HOURS PRN
Status: DISCONTINUED | OUTPATIENT
Start: 2025-04-06 | End: 2025-04-09 | Stop reason: HOSPADM

## 2025-04-06 RX ORDER — SODIUM CHLORIDE 0.9 % (FLUSH) 0.9 %
3 SYRINGE (ML) INJECTION EVERY 8 HOURS
Status: DISCONTINUED | OUTPATIENT
Start: 2025-04-06 | End: 2025-04-09 | Stop reason: HOSPADM

## 2025-04-06 RX ORDER — CARVEDILOL 6.25 MG/1
6.25 TABLET ORAL ONCE
Status: COMPLETED | OUTPATIENT
Start: 2025-04-06 | End: 2025-04-06

## 2025-04-06 RX ORDER — IBUPROFEN 200 MG
16 TABLET ORAL
Status: DISCONTINUED | OUTPATIENT
Start: 2025-04-06 | End: 2025-04-09 | Stop reason: HOSPADM

## 2025-04-06 RX ORDER — SODIUM CHLORIDE 0.9 G/100ML
500 IRRIGANT IRRIGATION
Status: COMPLETED | OUTPATIENT
Start: 2025-04-06 | End: 2025-04-06

## 2025-04-06 RX ORDER — MUPIROCIN 20 MG/G
OINTMENT TOPICAL 2 TIMES DAILY
Status: DISCONTINUED | OUTPATIENT
Start: 2025-04-06 | End: 2025-04-09 | Stop reason: HOSPADM

## 2025-04-06 RX ORDER — ACETAMINOPHEN 325 MG/1
650 TABLET ORAL EVERY 6 HOURS PRN
Status: DISCONTINUED | OUTPATIENT
Start: 2025-04-06 | End: 2025-04-07

## 2025-04-06 RX ORDER — LOSARTAN POTASSIUM 50 MG/1
50 TABLET ORAL DAILY
Status: DISCONTINUED | OUTPATIENT
Start: 2025-04-06 | End: 2025-04-09

## 2025-04-06 RX ORDER — MYCOPHENOLIC ACID 180 MG/1
360 TABLET, DELAYED RELEASE ORAL 2 TIMES DAILY
Status: DISCONTINUED | OUTPATIENT
Start: 2025-04-06 | End: 2025-04-09 | Stop reason: HOSPADM

## 2025-04-06 RX ORDER — CARVEDILOL 6.25 MG/1
6.25 TABLET ORAL 2 TIMES DAILY WITH MEALS
Status: DISCONTINUED | OUTPATIENT
Start: 2025-04-06 | End: 2025-04-06

## 2025-04-06 RX ORDER — IBUPROFEN 200 MG
24 TABLET ORAL
Status: DISCONTINUED | OUTPATIENT
Start: 2025-04-06 | End: 2025-04-09 | Stop reason: HOSPADM

## 2025-04-06 RX ORDER — SYRING-NEEDL,DISP,INSUL,0.3 ML 29 G X1/2"
296 SYRINGE, EMPTY DISPOSABLE MISCELLANEOUS
Status: COMPLETED | OUTPATIENT
Start: 2025-04-06 | End: 2025-04-06

## 2025-04-06 RX ORDER — HYDROMORPHONE HYDROCHLORIDE 2 MG/ML
0.5 INJECTION, SOLUTION INTRAMUSCULAR; INTRAVENOUS; SUBCUTANEOUS EVERY 6 HOURS PRN
Status: DISCONTINUED | OUTPATIENT
Start: 2025-04-06 | End: 2025-04-07

## 2025-04-06 RX ORDER — EZETIMIBE 10 MG/1
10 TABLET ORAL DAILY
Status: DISCONTINUED | OUTPATIENT
Start: 2025-04-06 | End: 2025-04-09 | Stop reason: HOSPADM

## 2025-04-06 RX ORDER — ATORVASTATIN CALCIUM 20 MG/1
20 TABLET, FILM COATED ORAL NIGHTLY
Status: DISCONTINUED | OUTPATIENT
Start: 2025-04-06 | End: 2025-04-09 | Stop reason: HOSPADM

## 2025-04-06 RX ORDER — CARVEDILOL 12.5 MG/1
12.5 TABLET ORAL 2 TIMES DAILY WITH MEALS
Status: DISCONTINUED | OUTPATIENT
Start: 2025-04-06 | End: 2025-04-09 | Stop reason: HOSPADM

## 2025-04-06 RX ORDER — OXYCODONE HYDROCHLORIDE 5 MG/1
5 TABLET ORAL EVERY 4 HOURS PRN
Status: DISCONTINUED | OUTPATIENT
Start: 2025-04-06 | End: 2025-04-07

## 2025-04-06 RX ORDER — HYDRALAZINE HYDROCHLORIDE 20 MG/ML
10 INJECTION INTRAMUSCULAR; INTRAVENOUS EVERY 8 HOURS PRN
Status: DISCONTINUED | OUTPATIENT
Start: 2025-04-06 | End: 2025-04-09 | Stop reason: HOSPADM

## 2025-04-06 RX ORDER — INSULIN ASPART 100 [IU]/ML
0-5 INJECTION, SOLUTION INTRAVENOUS; SUBCUTANEOUS
Status: DISCONTINUED | OUTPATIENT
Start: 2025-04-06 | End: 2025-04-09 | Stop reason: HOSPADM

## 2025-04-06 RX ORDER — FLUTICASONE PROPIONATE 50 MCG
2 SPRAY, SUSPENSION (ML) NASAL DAILY
Status: DISCONTINUED | OUTPATIENT
Start: 2025-04-06 | End: 2025-04-09 | Stop reason: HOSPADM

## 2025-04-06 RX ORDER — ALPRAZOLAM 0.25 MG/1
2 TABLET ORAL NIGHTLY
Status: DISCONTINUED | OUTPATIENT
Start: 2025-04-06 | End: 2025-04-09 | Stop reason: HOSPADM

## 2025-04-06 RX ORDER — GLUCAGON 1 MG
1 KIT INJECTION
Status: DISCONTINUED | OUTPATIENT
Start: 2025-04-06 | End: 2025-04-09 | Stop reason: HOSPADM

## 2025-04-06 RX ORDER — CINACALCET 30 MG/1
60 TABLET, FILM COATED ORAL 2 TIMES DAILY WITH MEALS
Status: DISCONTINUED | OUTPATIENT
Start: 2025-04-06 | End: 2025-04-09 | Stop reason: HOSPADM

## 2025-04-06 RX ORDER — TACROLIMUS 1 MG/1
3 CAPSULE ORAL EVERY EVENING
Status: DISCONTINUED | OUTPATIENT
Start: 2025-04-06 | End: 2025-04-07

## 2025-04-06 RX ORDER — HEPARIN SODIUM 5000 [USP'U]/ML
5000 INJECTION, SOLUTION INTRAVENOUS; SUBCUTANEOUS EVERY 8 HOURS
Status: DISCONTINUED | OUTPATIENT
Start: 2025-04-06 | End: 2025-04-09 | Stop reason: HOSPADM

## 2025-04-06 RX ORDER — ONDANSETRON HYDROCHLORIDE 2 MG/ML
4 INJECTION, SOLUTION INTRAVENOUS EVERY 8 HOURS PRN
Status: DISCONTINUED | OUTPATIENT
Start: 2025-04-06 | End: 2025-04-09 | Stop reason: HOSPADM

## 2025-04-06 RX ORDER — PSEUDOEPHEDRINE/ACETAMINOPHEN 30MG-500MG
100 TABLET ORAL
Status: COMPLETED | OUTPATIENT
Start: 2025-04-06 | End: 2025-04-06

## 2025-04-06 RX ADMIN — EZETIMIBE 10 MG: 10 TABLET ORAL at 08:04

## 2025-04-06 RX ADMIN — HYDRALAZINE HYDROCHLORIDE 10 MG: 20 INJECTION, SOLUTION INTRAMUSCULAR; INTRAVENOUS at 05:04

## 2025-04-06 RX ADMIN — HEPARIN SODIUM 5000 UNITS: 5000 INJECTION INTRAVENOUS; SUBCUTANEOUS at 09:04

## 2025-04-06 RX ADMIN — MYCOPHENOLIC ACID 360 MG: 180 TABLET, DELAYED RELEASE ORAL at 09:04

## 2025-04-06 RX ADMIN — PREDNISONE 5 MG: 5 TABLET ORAL at 08:04

## 2025-04-06 RX ADMIN — ONDANSETRON 4 MG: 2 INJECTION INTRAMUSCULAR; INTRAVENOUS at 11:04

## 2025-04-06 RX ADMIN — ALPRAZOLAM 1 MG: 0.25 TABLET ORAL at 08:04

## 2025-04-06 RX ADMIN — CARVEDILOL 12.5 MG: 6.25 TABLET, FILM COATED ORAL at 05:04

## 2025-04-06 RX ADMIN — MAGNESIUM SULFATE HEPTAHYDRATE 2 G: 40 INJECTION, SOLUTION INTRAVENOUS at 12:04

## 2025-04-06 RX ADMIN — LOSARTAN POTASSIUM 50 MG: 50 TABLET, FILM COATED ORAL at 11:04

## 2025-04-06 RX ADMIN — CARVEDILOL 6.25 MG: 6.25 TABLET, FILM COATED ORAL at 11:04

## 2025-04-06 RX ADMIN — OXYCODONE 5 MG: 5 TABLET ORAL at 09:04

## 2025-04-06 RX ADMIN — TACROLIMUS 4 MG: 1 CAPSULE ORAL at 06:04

## 2025-04-06 RX ADMIN — Medication 3 ML: at 10:04

## 2025-04-06 RX ADMIN — FLUTICASONE PROPIONATE 100 MCG: 50 SPRAY, METERED NASAL at 11:04

## 2025-04-06 RX ADMIN — SODIUM CHLORIDE 500 ML: 0.9 IRRIGANT IRRIGATION at 09:04

## 2025-04-06 RX ADMIN — MUPIROCIN: 20 OINTMENT TOPICAL at 08:04

## 2025-04-06 RX ADMIN — CARVEDILOL 6.25 MG: 6.25 TABLET, FILM COATED ORAL at 06:04

## 2025-04-06 RX ADMIN — TACROLIMUS 3 MG: 1 CAPSULE ORAL at 05:04

## 2025-04-06 RX ADMIN — CINACALCET HYDROCHLORIDE 60 MG: 30 TABLET, FILM COATED ORAL at 06:04

## 2025-04-06 RX ADMIN — Medication 100 ML: at 11:04

## 2025-04-06 RX ADMIN — MYCOPHENOLIC ACID 360 MG: 180 TABLET, DELAYED RELEASE ORAL at 08:04

## 2025-04-06 RX ADMIN — HEPARIN SODIUM 5000 UNITS: 5000 INJECTION INTRAVENOUS; SUBCUTANEOUS at 01:04

## 2025-04-06 RX ADMIN — MAGNESIUM CITRATE 296 ML: 1.75 LIQUID ORAL at 11:04

## 2025-04-06 RX ADMIN — PANTOPRAZOLE SODIUM 40 MG: 40 TABLET, DELAYED RELEASE ORAL at 08:04

## 2025-04-06 RX ADMIN — CINACALCET HYDROCHLORIDE 60 MG: 30 TABLET, FILM COATED ORAL at 05:04

## 2025-04-06 NOTE — PROCEDURES
"  Pre Op Diagnosis: Peritransplant fluid collection  Post Op Diagnosis: resolved    Procedure: aspiration    Procedure performed by: Alexey    Written Informed Consent Obtained: Yes  Specimen Removed: NO  Estimated Blood Loss: Minimal    Findings:   Under US guidance a one step needle and catheter was advanced into presumed fluid collection around renal allograft. THe collection appeared much smaller and probably soft tissue in echogenicity. Nothing could be aspirated from the collection after 2 attempts. Needle was removed.     Patient tolerated procedure well.    Cholo Blackburn MD ([facility])  Interventional Radiology  [phone]      "

## 2025-04-06 NOTE — PLAN OF CARE
No fluid aspirated per MD. Pt to return to inpt room via stretcher w/ RN.  Post procedure report called to primary nurseSudhir.

## 2025-04-06 NOTE — PROGRESS NOTES
Admit Note     Met with patient to assess needs. Patient is a 64 y.o. single female, admitted for: Abdominal pain [R10.9]. Pt had kidney txp on 7/24/24.      Patient admitted on 4/5/2025 .  At this time, patient presents as alert and oriented x 4, pleasant, good eye contact, recall good, concentration/judgement good, calm, communicative, cooperative, and asking and answering questions appropriately.  At this time, patients caregiver presents as  not present at this time .    Household/Family Systems     Patient resides with patient's  self , at     [address]   [place] [address]    Mail to [employer]   [address]  [place] [address].         Patients primary caregiver is self.  Confirmed patients contact information is [phone] (home);   Telephone Information:   Mobile [phone]   .  Confirmed patient  do not have access to reliable transportation, Pt is requesting help with transportation home when discharged.  Patient resides in [place], [place] and reports she was transported to [facility] via ambulance.     Cognitive Status/Learning     Patient reports reading ability as 12th grade and states patient does have difficulty with seeing.  Patient reports patient learns best by seeing and hearing.   Needed: No.   Highest education level: High School (9-12) or GED    Vocation/Disability   .  Working for Income: No  If no, reason not working: Disability  Patient is disabled due to back injury since 2001.      Adherence     Patient reports a high level of adherence to patients health care regimen.  Adherence counseling and education provided. Patient verbalizes understanding.    Substance Use    Patient reports the following substance usage.    Tobacco: none, patient denies any use.  Alcohol: none, patient denies any use.  Illicit Drugs/Non-prescribed Medications: none, patient denies any use.  Patient states clear understanding of the potential impact of substance use.  Substance  abstinence/cessation counseling, education and resources provided and reviewed.     Services Utilizing/ADLS    Infusion Service: Prior to admission, patient utilizing? no  Home Health: Prior to admission, patient utilizing? No. Pt states she should have a sitter provided by the [facility] but hasn't has one since 2024.   DME: Prior to admission, yes shower chair, rollator, wheel chair, CPAP(currently not working)  Pulmonary/Cardiac Rehab: Prior to admission, no  Dialysis:  Prior to admission, no  Transplant Specialty Pharmacy:  Prior to admission, yes; [facility].    Prior to admission, patient reports patient was independent with ADLS and was driving.  Patient reports patient is not able to care for self at this time due to compromised medical condition (as documented in medical record) and physical weakness..  Patient indicates a willingness to care for self once medically cleared to do so.    Insurance/Medications    Insured by   Payer/Plan Subscr  Sex Relation Sub. Ins. ID Effective Group Num   1. [employer] MANAGE* DAVID CASILLAS 1960 Female Self [id number] 3/1/20 [id number]                                   [address] [address]   2. [place]* DAVID CASILLAS 1960 Female Self [id number]* 10/1/04                                    [address] [address]      Primary Insurance (for UNOS reporting): Public Insurance - Medicare & Choice  Secondary Insurance (for UNOS reporting): Public Insurance - Medicaid    Patient reports patient is able to obtain and afford medications at this time and at time of discharge.    Living Will/Healthcare Power of     Patient states patient does not have a LW and/or HCPA.   provided education regarding LW and HCPA and the completion of forms.    Coping/Mental Health    Pt reports having long term depression and takes medications prescribed by her PCP. Pt states she doesn't feel like the medications are helping. Pt declined psych consult. Pt not seeing therapist.  Pt denies any SI/HI/AVH during meeting.     Discharge Planning    At time of discharge, patient plans to return to patient's home. Pt requesting a ride upon discharge.  Per rounds today, expected discharge date has not been medically determined at this time. Patient verbalize understanding and are involved in treatment planning and discharge process.    Additional Concerns      Patient is being followed for needs, education, resources, information, emotional support, supportive counseling, and for supportive and skilled discharge plan of care.  providing ongoing psychosocial support, education, resources and d/c planning as needed.  SW remains available.  provided resource list, patient choice, psychosocial and supportive counseling, resources, education, assistance and discharge planning with patient and caregiver involvement, ongoing SW availability and services as appropriate.  remains available. Patient reports difficulty with transportation and requesting a ride home upon discharge. Patient verbalizes understanding and agreement with information reviewed, social work availability, and how to access available resources as needed.

## 2025-04-06 NOTE — H&P
[facility] - Transplant Stepdown  Kidney Transplant  H&P      Subjective:     Chief Complaint/Reason for Admission: Abdominal Pain      History of Present Illness:  Joann Kenney is a 63 y/o F s/p DBD kidney transplant 7/24/2024 for ESRD 2/2 Type II DM-(KDPI 85%, CIT >23 hrs, Thymo induction, CMV ++). She has CKD stage 3 - GFR 30-59 and her baseline creatinine is between 1.3-1.7. PMHx born w solitary kidney, DM2, HTN, incontinence (s/p bladder sling), CAD with stents (on ASA), atrial fibrillation, CVA (no residual deficits), ALEJA, and anemia. Surgery without complications.    On 4/4 patient presented to OSH for severe RU ABD pain- she underwent CT abd which was not consistent with cholecystitis.  Cr 1.7 ( baseline) . D/T on going Abd pain. Patient discussed with Dr Landry will admit for further abd pain evaluation UA, Kidney Transplant US .     PTA Medications   Medication Sig    ALPRAZolam (XANAX) 2 MG Tab Take 2 mg by mouth every evening.    atorvastatin (LIPITOR) 20 MG tablet Take 1 tablet (20 mg total) by mouth every evening.    blood sugar diagnostic Strp use 1 strip to check blood glucose 3 (three) times daily.    blood-glucose meter Misc use as directed to check blood glucose    blood-glucose sensor (DEXCOM G7 SENSOR) Blessing Change every 10 days.    carvediloL (COREG) 6.25 MG tablet Take 1 tablet (6.25 mg total) by mouth 2 (two) times daily.    cetirizine (ZYRTEC) 5 MG tablet Take 1 tablet (5 mg total) by mouth once daily.    cinacalcet (SENSIPAR) 60 MG Tab Take 1 tablet (60 mg total) by mouth 2 (two) times daily with meals.    DULoxetine (CYMBALTA) 30 MG capsule Take 30 mg by mouth once daily.    ezetimibe (ZETIA) 10 mg tablet Take 1 tablet (10 mg total) by mouth once daily.    insulin aspart U-100 (NOVOLOG) 100 unit/mL (3 mL) InPn pen Inject subcutaneously as needed for sliding scale. Total daily dose: 15 units/day    insulin glargine U-100, Lantus, (LANTUS SOLOSTAR U-100 INSULIN) 100 unit/mL (3 mL) InPn pen  "Inject 13 Units into the skin once daily.    k phos di & mono-sod phos mono (K-PHOS-NEUTRAL) 250 mg Tab Take 2 tablets by mouth 2 (two) times a day.    lancets Misc 1 lancet each to check blood glucose 3 (three) times daily.    magnesium oxide (MAG-OX) 400 mg (241.3 mg magnesium) tablet Take 2 tablets (800 mg total) by mouth 2 (two) times daily.    multivitamin Tab Take 1 tablet by mouth once daily.    mycophenolate sodium (MYFORTIC) 180 MG TbEC Take 2 tablets (360 mg total) by mouth 2 (two) times daily.    olmesartan (BENICAR) 40 MG tablet Take 0.5 tablets (20 mg total) by mouth once daily.    ondansetron (ZOFRAN-ODT) 4 MG TbDL Take 1 tablet (4 mg total) by mouth every 8 (eight) hours as needed (as needed for nausea).    oxybutynin (DITROPAN) 5 MG Tab Take 1 tablet (5 mg total) by mouth 3 (three) times daily as needed (bladder spasms).    pantoprazole (PROTONIX) 40 MG tablet Take 1 tablet (40 mg total) by mouth once daily.    pen needle, diabetic 32 gauge x 5/32" Ndle 1 each for use with insulin pen 3 (three) times daily.    predniSONE (DELTASONE) 5 MG tablet Take by mouth daily; 7/27/2024-8/2/2024: 20 mg, 8/3/2024-8/9/2024: 15 mg; 8/10/2024-8/16/2024: 10 mg; 8/17/2024- forever: 5 mg; do not stop    tacrolimus (PROGRAF) 1 MG Cap Take 4 capsules (4 mg total) by mouth once daily AND 3 capsules (3 mg total) every evening.    tirzepatide (MOUNJARO) 2.5 mg/0.5 mL PnIj Inject 2.5 mg into the skin every 7 days.       Review of patient's allergies indicates:   Allergen Reactions    Sevelamer carbonate Other (See Comments)     Severe constipation       Past Medical History:   Diagnosis Date    Anemia     Anxiety     Atrial fibrillation     Bleeding 08/08/2024    Coronary artery disease     Depression     Diabetes mellitus, type 2     Disorder of kidney and ureter     Heart murmur     Hyperlipidemia     Hypertension     Obesity     ALEJA (obstructive sleep apnea)     Proteinuria     Solitary kidney     Stroke     Transient " neurological symptoms 11/13/2018     Past Surgical History:   Procedure Laterality Date    CORONARY ANGIOPLASTY WITH STENT PLACEMENT      HYSTERECTOMY      INCONTINENCE SURGERY      INSERTION OF IMPLANTABLE LOOP RECORDER      internal heart monitor      KIDNEY TRANSPLANT Right 7/24/2024    Procedure: TRANSPLANT, KIDNEY;  Surgeon: Alphonso Mon MD;  Location: [address];  Service: Transplant;  Laterality: Right;    PERITONEAL CATHETER INSERTION      PERITONEAL CATHETER REMOVAL Left 7/24/2024    Procedure: REMOVAL, CATHETER, DIALYSIS, PERITONEAL;  Surgeon: Alphonso Mon MD;  Location: [address];  Service: Transplant;  Laterality: Left;     Family History    None       Tobacco Use    Smoking status: Former    Smokeless tobacco: Never   Substance and Sexual Activity    Alcohol use: Not Currently    Drug use: Never    Sexual activity: Not Currently     Partners: Male        Review of Systems   Constitutional:  Negative for activity change and appetite change.   HENT: Negative.     Eyes:  Negative for visual disturbance.   Respiratory:  Negative for shortness of breath.    Cardiovascular:  Negative for chest pain, palpitations and leg swelling.   Gastrointestinal:  Positive for abdominal pain. Negative for abdominal distention, diarrhea, nausea and vomiting.        Tenderness at kidney transplant ( mid lower abd )    Genitourinary:  Negative for difficulty urinating.   Musculoskeletal:  Negative for arthralgias, back pain and joint swelling.   Skin:  Positive for wound.   Allergic/Immunologic: Positive for immunocompromised state.   Neurological:  Negative for dizziness and facial asymmetry.   Hematological:  Negative for adenopathy. Does not bruise/bleed easily.   Psychiatric/Behavioral:  Negative for agitation and behavioral problems.    All other systems reviewed and are negative.    Objective:     Vital Signs (Most Recent):  Temp: 98.7 °F (37.1 °C) (04/06/25 0515)  Pulse: 67 (04/06/25 0515)  Resp: 18  "(04/06/25 0515)  BP: (!) 147/74 (04/06/25 0630)  SpO2: 100 % (04/06/25 0515)  Height: 5' 5" (165.1 cm)  Weight: 78.9 kg (173 lb 15.1 oz)  Body mass index is 28.95 kg/m².      Physical Exam     Laboratory  CBC: No results for input(s): "WBC", "RBC", "HGB", "HCT", "PLT", "MCV", "MCH", "MCHC" in the last 168 hours.  BMP: No results for input(s): "GLU", "NA", "K", "CL", "CO2", "BUN", "CREATININE", "CALCIUM" in the last 168 hours.  CMP: No results for input(s): "GLU", "CALCIUM", "ALBUMIN", "PROT", "NA", "K", "CO2", "CL", "BUN", "CREATININE", "ALKPHOS", "ALT", "AST" in the last 168 hours.    Invalid input(s): "BILITO"    Diagnostic Results:  US - Kidney: Results for orders placed during the hospital encounter of 07/29/24    US Transplant Kidney With Doppler    Narrative  EXAMINATION:  US TRANSPLANT KIDNEY WITH DOPPLER    CLINICAL HISTORY:  complication after renal transplant;    TECHNIQUE:  Real time gray scale and doppler ultrasound was performed over the patient's renal allograft.    COMPARISON:  CT abdomen pelvis 07/29/2024; u/S transplant kidney with Doppler 07/25/2024    FINDINGS:  Patient is status post renal allograft in the right lower quadrant on 07/24/2024.  Hilum is located posterior medially, previously posterolaterally.  The allograft measures 9.7 cm. Normal perfusion. No hydronephrosis.  Ureteral stent is possibly visualized near the hilum; however, it is not visualized within the bladder.    Minimally complex collection adjacent to the inferior pole measuring 3.0 x 3.2 x 3.0 cm.    Minimally complex collection superficial to the renal allograft measuring 3.5 x 1.6 x 1.0 cm.    Small amount of peritoneal free fluid.    Vasculature:    Resistive indices of the arteries:    Interlobar: 0.78 with normal waveform, previously 0.76    Upper segment: 0.72 with normal waveform, previously 0.89    Mid segment: 0.81 with normal waveform, previously 0.82    Lower segment: 0.81 with normal waveform, previously " 0.87    Main renal artery peak systolic velocity: 230cm/sec with normal waveform, previously 175cm/sec.    Renal artery/iliac ratio: 1.4.    The main renal vein is patent.    Impression  Slight improvement of segmental renal arterial velocities, which remain mildly elevated within the mid and lower segments.    Peritransplant fluid collections as detailed above.    Electronically signed by resident: Kyle Nieto  Date:    08/01/2024  Time:    13:15    Electronically signed by: Kevyn Reynoso MD  Date:    08/01/2024  Time:    13:56    Assessment/Plan:     Renal/  S/P kidney transplant  s/p DBD kidney transplant 7/24/2024 for ESRD 2/2 Type II DM-(KDPI 85%, CIT >23 hrs, Thymo induction, CMV ++). She has CKD stage 3 - GFR 30-59 and her baseline creatinine is between 1.3-1.7. PMHx born w solitary kidney, DM2, HTN, incontinence (s/p bladder sling), CAD with stents (on ASA), atrial fibrillation, CVA (no residual deficits), ALEJA, and anemia. Surgery without complications.        Immunology/Multi System  Prophylactic immunotherapy  - continue prograf  - continue to monitor for toxic side effects and check daily levels. Will adjust for therapeutic dose      Oncology  Anemia of chronic disease  - H/H stable  - no overt signs of bleed  - continue to monitor with daily CBC      Endocrine  Diabetes mellitus due to underlying condition with chronic kidney disease on chronic dialysis, with long-term current use of insulin  - consulted Endo       GI  * Right upper quadrant abdominal pain  - assess CT scan from OSH  - Obtain KTX US  - UA in process   - pain control   - monitor       Palliative Care  Long-term use of immunosuppressant medication  - see prophylactic immunotherapy            Discharge Planning:  Not candidate for discharge at this time     Medical decision making for this encounter includes review of pertinent labs and diagnostic studies, assessment and planning, discussions with consulting providers, discussion with  patient/family, and participation in multidisciplinary rounds. Time spent caring for patient: 60 minutes    MONTY Flores  Kidney Transplant  [facility] - Transplant Stepdown

## 2025-04-06 NOTE — CONSULTS
[facility] VASCULAR ACCESS NOTE       Bed:66490/99866 A    20G [facility] 2.5IN [facility]    Indication: PVA  Attempts: 1    Martine Galan RN

## 2025-04-06 NOTE — CONSULTS
Inpatient Radiology Pre-procedure Note    History of Present Illness:  Joann Kenney is a 64 y.o. female who presents for fluid collection surrounding rlq renal allograft.    Admission H&P reviewed.  Past Medical History:   Diagnosis Date    Anemia     Anxiety     Atrial fibrillation     Bleeding 08/08/2024    Coronary artery disease     Depression     Diabetes mellitus, type 2     Disorder of kidney and ureter     Heart murmur     Hyperlipidemia     Hypertension     Obesity     ALEJA (obstructive sleep apnea)     Proteinuria     Solitary kidney     Stroke     Transient neurological symptoms 11/13/2018     Past Surgical History:   Procedure Laterality Date    CORONARY ANGIOPLASTY WITH STENT PLACEMENT      HYSTERECTOMY      INCONTINENCE SURGERY      INSERTION OF IMPLANTABLE LOOP RECORDER      internal heart monitor      KIDNEY TRANSPLANT Right 7/24/2024    Procedure: TRANSPLANT, KIDNEY;  Surgeon: Alphonso Mon MD;  Location: [facility] OR [address] [place] [address];  Service: Transplant;  Laterality: Right;    PERITONEAL CATHETER INSERTION      PERITONEAL CATHETER REMOVAL Left 7/24/2024    Procedure: REMOVAL, CATHETER, DIALYSIS, PERITONEAL;  Surgeon: Alphonso Mon MD;  Location: [facility] OR [address] [place] [address];  Service: Transplant;  Laterality: Left;       Review of Systems:   As documented in primary team H&P    Home Meds:   Prior to Admission medications    Medication Sig Start Date End Date Taking? Authorizing Provider   ALPRAZolam (XANAX) 2 MG Tab Take 2 mg by mouth every evening.    Provider, Historical   atorvastatin (LIPITOR) 20 MG tablet Take 1 tablet (20 mg total) by mouth every evening. 8/5/24 8/5/25  Joce Landry MD   blood sugar diagnostic Strp use 1 strip to check blood glucose 3 (three) times daily. 12/30/24   Quincy Lyles DNP, FNP   blood-glucose meter Misc use as directed to check blood glucose 12/30/24 12/30/25  Quincy Lyles DNP, FNP   blood-glucose sensor (DEXCOM G7 SENSOR) Blessing Change every 10 days. 1/30/25   , [facility]  Kirstin RITCHIE PA-C   carvediloL (COREG) 6.25 MG tablet Take 1 tablet (6.25 mg total) by mouth 2 (two) times daily. 8/4/24   Roberth Broderick MD   cetirizine (ZYRTEC) 5 MG tablet Take 1 tablet (5 mg total) by mouth once daily. 7/28/24 7/28/25  Elena Wise MD   cinacalcet (SENSIPAR) 60 MG Tab Take 1 tablet (60 mg total) by mouth 2 (two) times daily with meals. 3/12/25 3/12/26  Joce Landry MD   DULoxetine (CYMBALTA) 30 MG capsule Take 30 mg by mouth once daily.    Provider, Historical   ezetimibe (ZETIA) 10 mg tablet Take 1 tablet (10 mg total) by mouth once daily. 8/5/24 8/5/25  Joce Landry MD   insulin aspart U-100 (NOVOLOG) 100 unit/mL (3 mL) InPn pen Inject subcutaneously as needed for sliding scale. Total daily dose: 15 units/day 8/26/24   Freddy Ruelas Jr., MD   insulin glargine U-100, Lantus, (LANTUS SOLOSTAR U-100 INSULIN) 100 unit/mL (3 mL) InPn pen Inject 13 Units into the skin once daily. 12/23/24 12/23/25  Nikos, Ranjana RITCHIE PA-C   k phos di & mono-sod phos mono (K-PHOS-NEUTRAL) 250 mg Tab Take 2 tablets by mouth 2 (two) times a day. 8/26/24 8/26/25  Freddy Ruelas Jr., MD   lancets Misc 1 lancet each to check blood glucose 3 (three) times daily. 12/30/24 12/30/25  Quincy Lyles, VEE, FNP   magnesium oxide (MAG-OX) 400 mg (241.3 mg magnesium) tablet Take 2 tablets (800 mg total) by mouth 2 (two) times daily. 8/13/24   Joce Landry MD   multivitamin Tab Take 1 tablet by mouth once daily. 7/26/24   Elena Wise MD   mycophenolate sodium (MYFORTIC) 180 MG TbEC Take 2 tablets (360 mg total) by mouth 2 (two) times daily. 12/18/24 12/18/25  Joce Landry MD   olmesartan (BENICAR) 40 MG tablet Take 0.5 tablets (20 mg total) by mouth once daily. 9/25/24 9/25/25  Joce Landry MD   ondansetron (ZOFRAN-ODT) 4 MG TbDL Take 1 tablet (4 mg total) by mouth every 8 (eight) hours as needed (as needed for nausea). 10/14/24   Joce Landry MD   oxybutynin (DITROPAN) 5 MG Tab  "Take 1 tablet (5 mg total) by mouth 3 (three) times daily as needed (bladder spasms). 7/27/24 7/27/25  Elena Wise MD   pantoprazole (PROTONIX) 40 MG tablet Take 1 tablet (40 mg total) by mouth once daily. 7/25/24 7/25/25  Elena Wise MD   pen needle, diabetic 32 gauge x 5/32" Ndle 1 each for use with insulin pen 3 (three) times daily. 12/30/24   Quincy Lyles, DNP, FNP   predniSONE (DELTASONE) 5 MG tablet Take by mouth daily; 7/27/2024-8/2/2024: 20 mg, 8/3/2024-8/9/2024: 15 mg; 8/10/2024-8/16/2024: 10 mg; 8/17/2024- forever: 5 mg; do not stop 7/27/24   Elena Wise MD   tacrolimus (PROGRAF) 1 MG Cap Take 4 capsules (4 mg total) by mouth once daily AND 3 capsules (3 mg total) every evening. 1/30/25 1/30/26  Joce Landry MD   tirzepatide (MOUNJARO) 2.5 mg/0.5 mL PnIj Inject 2.5 mg into the skin every 7 days. 1/31/25 5/1/25  Nikos, Ranjana RITCHIE, PA-C     Scheduled Meds:    ALPRAZolam  2 mg Oral QHS    atorvastatin  20 mg Oral QHS    carvediloL  12.5 mg Oral BID WM    cinacalcet  60 mg Oral BID WM    ezetimibe  10 mg Oral Daily    fluticasone propionate  2 spray Each Nostril Daily    heparin (porcine)  5,000 Units Subcutaneous Q8H    losartan  50 mg Oral Daily    mycophenolate sodium  360 mg Oral BID    pantoprazole  40 mg Oral Daily    predniSONE  5 mg Oral Daily    sodium chloride 0.9%  3 mL Intravenous Q8H    tacrolimus  4 mg Oral Daily AM    And    tacrolimus  3 mg Oral Daily PM     Continuous Infusions:   PRN Meds:  Current Facility-Administered Medications:     acetaminophen, 650 mg, Oral, Q6H PRN    dextrose 50%, 12.5 g, Intravenous, PRN    dextrose 50%, 25 g, Intravenous, PRN    glucagon (human recombinant), 1 mg, Intramuscular, PRN    glucose, 16 g, Oral, PRN    glucose, 24 g, Oral, PRN    hydrALAZINE, 10 mg, Intravenous, Q8H PRN    HYDROmorphone, 0.5 mg, Intravenous, Q6H PRN    insulin aspart U-100, 0-5 Units, Subcutaneous, QID (AC + HS) PRN    ondansetron, 4 mg, Oral, Q8H PRN    " "ondansetron, 4 mg, Intravenous, Q8H PRN    oxyCODONE, 5 mg, Oral, Q4H PRN  Anticoagulants/Antiplatelets: no anticoagulation    Allergies:   Review of patient's allergies indicates:   Allergen Reactions    Sevelamer carbonate Other (See Comments)     Severe constipation     Sedation Hx: have not been any systemic reactions    Labs:  No results for input(s): "INR", "PT", "PTT" in the last 168 hours.    Recent Labs   Lab 04/06/25  0900   WBC 5.05   HGB 12.0   HCT 35.8*   MCV 93         Recent Labs   Lab 04/06/25  0900      K 3.8   *   CO2 20*   BUN 17   CREATININE 1.2   CALCIUM 10.8*   MG 1.5*   ALT 17   AST 18   ALBUMIN 3.5   BILITOT 0.4         Vitals:  Temp: 96.3 °F (35.7 °C) (04/06/25 1500)  Pulse: 65 (04/06/25 1635)  Resp: 16 (04/06/25 1635)  BP: (!) 213/98 (04/06/25 1635)  SpO2: 99 % (04/06/25 1635)     Physical Exam:  ASA: 2  Mallampati: 2    General: no acute distress  Mental Status: alert and oriented to person, place and time  HEENT: normocephalic, atraumatic  Chest: unlabored breathing  Heart: regular heart rate  Abdomen: nondistended  Extremity: moves all extremities    Plan: Aspiration of fluid surrounding renal allograft,  Sedation Plan: local    Cholo Blackburn MD ([place])  Interventional Radiology          " [Negative] : Heme/Lymph

## 2025-04-06 NOTE — SUBJECTIVE & OBJECTIVE
Subjective:     Chief Complaint/Reason for Admission: Abdominal Pain      History of Present Illness:  Joann Kenney is a 63 y/o F s/p DBD kidney transplant 7/24/2024 for ESRD 2/2 Type II DM-(KDPI 85%, CIT >23 hrs, Thymo induction, CMV ++). She has CKD stage 3 - GFR 30-59 and her baseline creatinine is between 1.3-1.7. PMHx born w solitary kidney, DM2, HTN, incontinence (s/p bladder sling), CAD with stents (on ASA), atrial fibrillation, CVA (no residual deficits), ALEJA, and anemia. Surgery without complications.    On 4/4 patient presented to OSH for severe RU ABD pain- she underwent CT abd which was not consistent with cholecystitis.  Cr 1.7 ( baseline) . D/T on going Abd pain. Patient discussed with Dr Landry will admit for further abd pain evaluation UA, Kidney Transplant US .     PTA Medications   Medication Sig    ALPRAZolam (XANAX) 2 MG Tab Take 2 mg by mouth every evening.    atorvastatin (LIPITOR) 20 MG tablet Take 1 tablet (20 mg total) by mouth every evening.    blood sugar diagnostic Strp use 1 strip to check blood glucose 3 (three) times daily.    blood-glucose meter Misc use as directed to check blood glucose    blood-glucose sensor (DEXCOM G7 SENSOR) Blessing Change every 10 days.    carvediloL (COREG) 6.25 MG tablet Take 1 tablet (6.25 mg total) by mouth 2 (two) times daily.    cetirizine (ZYRTEC) 5 MG tablet Take 1 tablet (5 mg total) by mouth once daily.    cinacalcet (SENSIPAR) 60 MG Tab Take 1 tablet (60 mg total) by mouth 2 (two) times daily with meals.    DULoxetine (CYMBALTA) 30 MG capsule Take 30 mg by mouth once daily.    ezetimibe (ZETIA) 10 mg tablet Take 1 tablet (10 mg total) by mouth once daily.    insulin aspart U-100 (NOVOLOG) 100 unit/mL (3 mL) InPn pen Inject subcutaneously as needed for sliding scale. Total daily dose: 15 units/day    insulin glargine U-100, Lantus, (LANTUS SOLOSTAR U-100 INSULIN) 100 unit/mL (3 mL) InPn pen Inject 13 Units into the skin once daily.    k phos di &  "mono-sod phos mono (K-PHOS-NEUTRAL) 250 mg Tab Take 2 tablets by mouth 2 (two) times a day.    lancets Misc 1 lancet each to check blood glucose 3 (three) times daily.    magnesium oxide (MAG-OX) 400 mg (241.3 mg magnesium) tablet Take 2 tablets (800 mg total) by mouth 2 (two) times daily.    multivitamin Tab Take 1 tablet by mouth once daily.    mycophenolate sodium (MYFORTIC) 180 MG TbEC Take 2 tablets (360 mg total) by mouth 2 (two) times daily.    olmesartan (BENICAR) 40 MG tablet Take 0.5 tablets (20 mg total) by mouth once daily.    ondansetron (ZOFRAN-ODT) 4 MG TbDL Take 1 tablet (4 mg total) by mouth every 8 (eight) hours as needed (as needed for nausea).    oxybutynin (DITROPAN) 5 MG Tab Take 1 tablet (5 mg total) by mouth 3 (three) times daily as needed (bladder spasms).    pantoprazole (PROTONIX) 40 MG tablet Take 1 tablet (40 mg total) by mouth once daily.    pen needle, diabetic 32 gauge x 5/32" Ndle 1 each for use with insulin pen 3 (three) times daily.    predniSONE (DELTASONE) 5 MG tablet Take by mouth daily; 7/27/2024-8/2/2024: 20 mg, 8/3/2024-8/9/2024: 15 mg; 8/10/2024-8/16/2024: 10 mg; 8/17/2024- forever: 5 mg; do not stop    tacrolimus (PROGRAF) 1 MG Cap Take 4 capsules (4 mg total) by mouth once daily AND 3 capsules (3 mg total) every evening.    tirzepatide (MOUNJARO) 2.5 mg/0.5 mL PnIj Inject 2.5 mg into the skin every 7 days.       Review of patient's allergies indicates:   Allergen Reactions    Sevelamer carbonate Other (See Comments)     Severe constipation       Past Medical History:   Diagnosis Date    Anemia     Anxiety     Atrial fibrillation     Bleeding 08/08/2024    Coronary artery disease     Depression     Diabetes mellitus, type 2     Disorder of kidney and ureter     Heart murmur     Hyperlipidemia     Hypertension     Obesity     ALEJA (obstructive sleep apnea)     Proteinuria     Solitary kidney     Stroke     Transient neurological symptoms 11/13/2018     Past Surgical History: "   Procedure Laterality Date    CORONARY ANGIOPLASTY WITH STENT PLACEMENT      HYSTERECTOMY      INCONTINENCE SURGERY      INSERTION OF IMPLANTABLE LOOP RECORDER      internal heart monitor      KIDNEY TRANSPLANT Right 7/24/2024    Procedure: TRANSPLANT, KIDNEY;  Surgeon: Alphonso Mon MD;  Location: [facility] OR [address] [place] [address];  Service: Transplant;  Laterality: Right;    PERITONEAL CATHETER INSERTION      PERITONEAL CATHETER REMOVAL Left 7/24/2024    Procedure: REMOVAL, CATHETER, DIALYSIS, PERITONEAL;  Surgeon: Alphonso Mon MD;  Location: [facility] OR [address] [place] [address];  Service: Transplant;  Laterality: Left;     Family History    None       Tobacco Use    Smoking status: Former    Smokeless tobacco: Never   Substance and Sexual Activity    Alcohol use: Not Currently    Drug use: Never    Sexual activity: Not Currently     Partners: Male        Review of Systems   Constitutional:  Negative for activity change and appetite change.   HENT: Negative.     Eyes:  Negative for visual disturbance.   Respiratory:  Negative for shortness of breath.    Cardiovascular:  Negative for chest pain, palpitations and leg swelling.   Gastrointestinal:  Positive for abdominal pain. Negative for abdominal distention, diarrhea, nausea and vomiting.        Tenderness at kidney transplant ( mid lower abd )    Genitourinary:  Negative for difficulty urinating.   Musculoskeletal:  Negative for arthralgias, back pain and joint swelling.   Skin:  Positive for wound.   Allergic/Immunologic: Positive for immunocompromised state.   Neurological:  Negative for dizziness and facial asymmetry.   Hematological:  Negative for adenopathy. Does not bruise/bleed easily.   Psychiatric/Behavioral:  Negative for agitation and behavioral problems.    All other systems reviewed and are negative.    Objective:     Vital Signs (Most Recent):  Temp: 98.7 °F (37.1 °C) (04/06/25 0515)  Pulse: 67 (04/06/25 0515)  Resp: 18 (04/06/25 0515)  BP: (!) 147/74 (04/06/25 0630)  SpO2: 100 %  "(04/06/25 0515)  Height: 5' 5" (165.1 cm)  Weight: 78.9 kg (173 lb 15.1 oz)  Body mass index is 28.95 kg/m².      Physical Exam     Laboratory  CBC: No results for input(s): "WBC", "RBC", "HGB", "HCT", "PLT", "MCV", "MCH", "MCHC" in the last 168 hours.  BMP: No results for input(s): "GLU", "NA", "K", "CL", "CO2", "BUN", "CREATININE", "CALCIUM" in the last 168 hours.  CMP: No results for input(s): "GLU", "CALCIUM", "ALBUMIN", "PROT", "NA", "K", "CO2", "CL", "BUN", "CREATININE", "ALKPHOS", "ALT", "AST" in the last 168 hours.    Invalid input(s): "BILITO"    Diagnostic Results:  US - Kidney: Results for orders placed during the hospital encounter of 07/29/24    US Transplant Kidney With Doppler    Narrative  EXAMINATION:  US TRANSPLANT KIDNEY WITH DOPPLER    CLINICAL HISTORY:  complication after renal transplant;    TECHNIQUE:  Real time gray scale and doppler ultrasound was performed over the patient's renal allograft.    COMPARISON:  CT abdomen pelvis 07/29/2024; u/S transplant kidney with Doppler 07/25/2024    FINDINGS:  Patient is status post renal allograft in the right lower quadrant on 07/24/2024.  Hilum is located posterior medially, previously posterolaterally.  The allograft measures 9.7 cm. Normal perfusion. No hydronephrosis.  Ureteral stent is possibly visualized near the hilum; however, it is not visualized within the bladder.    Minimally complex collection adjacent to the inferior pole measuring 3.0 x 3.2 x 3.0 cm.    Minimally complex collection superficial to the renal allograft measuring 3.5 x 1.6 x 1.0 cm.    Small amount of peritoneal free fluid.    Vasculature:    Resistive indices of the arteries:    Interlobar: 0.78 with normal waveform, previously 0.76    Upper segment: 0.72 with normal waveform, previously 0.89    Mid segment: 0.81 with normal waveform, previously 0.82    Lower segment: 0.81 with normal waveform, previously 0.87    Main renal artery peak systolic velocity: 230cm/sec with " normal waveform, previously 175cm/sec.    Renal artery/iliac ratio: 1.4.    The main renal vein is patent.    Impression  Slight improvement of segmental renal arterial velocities, which remain mildly elevated within the mid and lower segments.    Peritransplant fluid collections as detailed above.    Electronically signed by resident: Kyle Nieto  Date:    08/01/2024  Time:    13:15    Electronically signed by: Kevyn Reynoso MD  Date:    08/01/2024  Time:    13:56

## 2025-04-06 NOTE — ASSESSMENT & PLAN NOTE
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 She has CKD stage 3 - GFR 30-59 and her baseline creatinine is between 1.3-1.7. PMHx born w solitary kidney, DM2, HTN, incontinence (s/p bladder sling), CAD with stents (on ASA), atrial fibrillation, CVA (no residual deficits), ALEJA, and anemia. Surgery without complications.

## 2025-04-06 NOTE — PLAN OF CARE
Pt arrived to IR room 189 via stretcher w/ RN. HUGO x4. Name//allergies/procedure verified. Pt will be monitored by Rn @ bedside throughout procedure.

## 2025-04-07 PROBLEM — R11.2 NAUSEA & VOMITING: Status: ACTIVE | Noted: 2025-04-07

## 2025-04-07 PROBLEM — R11.0 CHRONIC NAUSEA: Status: ACTIVE | Noted: 2025-04-07

## 2025-04-07 PROBLEM — R11.2 NAUSEA & VOMITING: Status: RESOLVED | Noted: 2025-04-07 | Resolved: 2025-04-07

## 2025-04-07 PROBLEM — R10.31 RLQ ABDOMINAL PAIN: Status: ACTIVE | Noted: 2025-04-07

## 2025-04-07 LAB
ABSOLUTE EOSINOPHIL (OHS): 0.15 K/UL
ABSOLUTE MONOCYTE (OHS): 0.55 K/UL (ref 0.3–1)
ABSOLUTE NEUTROPHIL COUNT (OHS): 4.53 K/UL (ref 1.8–7.7)
ALBUMIN SERPL BCP-MCNC: 3.3 G/DL (ref 3.5–5.2)
ALP SERPL-CCNC: 172 UNIT/L (ref 40–150)
ALT SERPL W/O P-5'-P-CCNC: 18 UNIT/L (ref 10–44)
AMYLASE SERPL-CCNC: 45 UNIT/L (ref 20–110)
ANION GAP (OHS): 8 MMOL/L (ref 8–16)
AST SERPL-CCNC: 17 UNIT/L (ref 11–45)
BACTERIA #/AREA URNS AUTO: NORMAL /HPF
BACTERIA UR CULT: NORMAL
BASOPHILS # BLD AUTO: 0.05 K/UL
BASOPHILS NFR BLD AUTO: 0.8 %
BILIRUB SERPL-MCNC: 0.6 MG/DL (ref 0.1–1)
BILIRUB UR QL STRIP.AUTO: NEGATIVE
BUN SERPL-MCNC: 17 MG/DL (ref 8–23)
CALCIUM SERPL-MCNC: 10.1 MG/DL (ref 8.7–10.5)
CAOX CRY UR QL COMP ASSIST: NORMAL
CHLORIDE SERPL-SCNC: 111 MMOL/L (ref 95–110)
CLARITY UR: CLEAR
CO2 SERPL-SCNC: 19 MMOL/L (ref 23–29)
COLOR UR AUTO: YELLOW
CREAT SERPL-MCNC: 1.3 MG/DL (ref 0.5–1.4)
ERYTHROCYTE [DISTWIDTH] IN BLOOD BY AUTOMATED COUNT: 12.3 % (ref 11.5–14.5)
GFR SERPLBLD CREATININE-BSD FMLA CKD-EPI: 45 ML/MIN/1.73/M2
GGT SERPL-CCNC: 24 U/L (ref 8–55)
GLUCOSE SERPL-MCNC: 130 MG/DL (ref 70–110)
GLUCOSE UR QL STRIP: NEGATIVE
HCT VFR BLD AUTO: 34.4 % (ref 37–48.5)
HGB BLD-MCNC: 11.9 GM/DL (ref 12–16)
HGB UR QL STRIP: NEGATIVE
HYALINE CASTS UR QL AUTO: 1 /LPF (ref 0–1)
IMM GRANULOCYTES # BLD AUTO: 0.02 K/UL (ref 0–0.04)
IMM GRANULOCYTES NFR BLD AUTO: 0.3 % (ref 0–0.5)
KETONES UR QL STRIP: NEGATIVE
LEUKOCYTE ESTERASE UR QL STRIP: NEGATIVE
LIPASE SERPL-CCNC: 36 U/L (ref 4–60)
LYMPHOCYTES # BLD AUTO: 1.04 K/UL (ref 1–4.8)
MAGNESIUM SERPL-MCNC: 1.6 MG/DL (ref 1.6–2.6)
MCH RBC QN AUTO: 32.3 PG (ref 27–31)
MCHC RBC AUTO-ENTMCNC: 34.6 G/DL (ref 32–36)
MCV RBC AUTO: 94 FL (ref 82–98)
MICROSCOPIC COMMENT: NORMAL
NITRITE UR QL STRIP: NEGATIVE
NUCLEATED RBC (/100WBC) (OHS): 0 /100 WBC
PH UR STRIP: 6 [PH]
PLATELET # BLD AUTO: 232 K/UL (ref 150–450)
PMV BLD AUTO: 10.9 FL (ref 9.2–12.9)
POCT GLUCOSE: 122 MG/DL (ref 70–110)
POCT GLUCOSE: 143 MG/DL (ref 70–110)
POCT GLUCOSE: 207 MG/DL (ref 70–110)
POTASSIUM SERPL-SCNC: 3.5 MMOL/L (ref 3.5–5.1)
PROT SERPL-MCNC: 6.1 GM/DL (ref 6–8.4)
PROT UR QL STRIP: ABNORMAL
RBC # BLD AUTO: 3.68 M/UL (ref 4–5.4)
RBC #/AREA URNS AUTO: 2 /HPF (ref 0–4)
RELATIVE EOSINOPHIL (OHS): 2.4 %
RELATIVE LYMPHOCYTE (OHS): 16.4 % (ref 18–48)
RELATIVE MONOCYTE (OHS): 8.7 % (ref 4–15)
RELATIVE NEUTROPHIL (OHS): 71.4 % (ref 38–73)
SODIUM SERPL-SCNC: 138 MMOL/L (ref 136–145)
SP GR UR STRIP: 1.01
SQUAMOUS #/AREA URNS AUTO: <1 /HPF
TACROLIMUS BLD-MCNC: 8.5 NG/ML (ref 5–15)
UROBILINOGEN UR STRIP-ACNC: NEGATIVE EU/DL
WBC # BLD AUTO: 6.34 K/UL (ref 3.9–12.7)
WBC #/AREA URNS AUTO: 1 /HPF (ref 0–5)

## 2025-04-07 PROCEDURE — 36415 COLL VENOUS BLD VENIPUNCTURE: CPT | Performed by: INTERNAL MEDICINE

## 2025-04-07 PROCEDURE — 25000003 PHARM REV CODE 250: Performed by: PHYSICIAN ASSISTANT

## 2025-04-07 PROCEDURE — 85025 COMPLETE CBC W/AUTO DIFF WBC: CPT | Performed by: INTERNAL MEDICINE

## 2025-04-07 PROCEDURE — 99233 SBSQ HOSP IP/OBS HIGH 50: CPT | Mod: ,,, | Performed by: PHYSICIAN ASSISTANT

## 2025-04-07 PROCEDURE — 36415 COLL VENOUS BLD VENIPUNCTURE: CPT | Performed by: PHYSICIAN ASSISTANT

## 2025-04-07 PROCEDURE — 25000003 PHARM REV CODE 250: Performed by: INTERNAL MEDICINE

## 2025-04-07 PROCEDURE — 63600175 PHARM REV CODE 636 W HCPCS: Performed by: NURSE PRACTITIONER

## 2025-04-07 PROCEDURE — 82977 ASSAY OF GGT: CPT

## 2025-04-07 PROCEDURE — 80197 ASSAY OF TACROLIMUS: CPT | Performed by: INTERNAL MEDICINE

## 2025-04-07 PROCEDURE — 83735 ASSAY OF MAGNESIUM: CPT | Performed by: INTERNAL MEDICINE

## 2025-04-07 PROCEDURE — 99222 1ST HOSP IP/OBS MODERATE 55: CPT | Mod: GC,,, | Performed by: INTERNAL MEDICINE

## 2025-04-07 PROCEDURE — 83690 ASSAY OF LIPASE: CPT | Performed by: PHYSICIAN ASSISTANT

## 2025-04-07 PROCEDURE — A4216 STERILE WATER/SALINE, 10 ML: HCPCS | Performed by: NURSE PRACTITIONER

## 2025-04-07 PROCEDURE — 87799 DETECT AGENT NOS DNA QUANT: CPT | Performed by: PHYSICIAN ASSISTANT

## 2025-04-07 PROCEDURE — 20600001 HC STEP DOWN PRIVATE ROOM

## 2025-04-07 PROCEDURE — 25000003 PHARM REV CODE 250: Performed by: NURSE PRACTITIONER

## 2025-04-07 PROCEDURE — 63600175 PHARM REV CODE 636 W HCPCS: Performed by: PHYSICIAN ASSISTANT

## 2025-04-07 PROCEDURE — 82150 ASSAY OF AMYLASE: CPT | Performed by: PHYSICIAN ASSISTANT

## 2025-04-07 PROCEDURE — 81001 URINALYSIS AUTO W/SCOPE: CPT | Performed by: PHYSICIAN ASSISTANT

## 2025-04-07 PROCEDURE — 80053 COMPREHEN METABOLIC PANEL: CPT | Performed by: INTERNAL MEDICINE

## 2025-04-07 RX ORDER — PSEUDOEPHEDRINE/ACETAMINOPHEN 30MG-500MG
100 TABLET ORAL
Status: COMPLETED | OUTPATIENT
Start: 2025-04-07 | End: 2025-04-07

## 2025-04-07 RX ORDER — POLYETHYLENE GLYCOL 3350 17 G/17G
17 POWDER, FOR SOLUTION ORAL 2 TIMES DAILY
Status: DISCONTINUED | OUTPATIENT
Start: 2025-04-07 | End: 2025-04-09 | Stop reason: HOSPADM

## 2025-04-07 RX ORDER — SYRING-NEEDL,DISP,INSUL,0.3 ML 29 G X1/2"
296 SYRINGE, EMPTY DISPOSABLE MISCELLANEOUS
Status: COMPLETED | OUTPATIENT
Start: 2025-04-07 | End: 2025-04-07

## 2025-04-07 RX ORDER — SENNOSIDES 8.6 MG/1
8.6 TABLET ORAL DAILY
Status: DISCONTINUED | OUTPATIENT
Start: 2025-04-08 | End: 2025-04-09 | Stop reason: HOSPADM

## 2025-04-07 RX ORDER — TACROLIMUS 1 MG/1
3 CAPSULE ORAL 2 TIMES DAILY
Status: DISCONTINUED | OUTPATIENT
Start: 2025-04-08 | End: 2025-04-09 | Stop reason: HOSPADM

## 2025-04-07 RX ORDER — POLYETHYLENE GLYCOL 3350 17 G/17G
119 POWDER, FOR SOLUTION ORAL 2 TIMES DAILY
Status: DISCONTINUED | OUTPATIENT
Start: 2025-04-07 | End: 2025-04-07

## 2025-04-07 RX ORDER — BISACODYL 5 MG
10 TABLET, DELAYED RELEASE (ENTERIC COATED) ORAL DAILY
Status: DISCONTINUED | OUTPATIENT
Start: 2025-04-07 | End: 2025-04-09 | Stop reason: HOSPADM

## 2025-04-07 RX ORDER — SODIUM CHLORIDE 0.9 G/100ML
500 IRRIGANT IRRIGATION
Status: COMPLETED | OUTPATIENT
Start: 2025-04-07 | End: 2025-04-07

## 2025-04-07 RX ORDER — SYRING-NEEDL,DISP,INSUL,0.3 ML 29 G X1/2"
296 SYRINGE, EMPTY DISPOSABLE MISCELLANEOUS ONCE
Status: COMPLETED | OUTPATIENT
Start: 2025-04-07 | End: 2025-04-07

## 2025-04-07 RX ORDER — DOCUSATE SODIUM 100 MG/1
100 CAPSULE, LIQUID FILLED ORAL
Status: DISCONTINUED | OUTPATIENT
Start: 2025-04-07 | End: 2025-04-09 | Stop reason: HOSPADM

## 2025-04-07 RX ORDER — TACROLIMUS 1 MG/1
2 CAPSULE ORAL ONCE
Status: COMPLETED | OUTPATIENT
Start: 2025-04-07 | End: 2025-04-07

## 2025-04-07 RX ORDER — BISACODYL 10 MG/1
10 SUPPOSITORY RECTAL ONCE
Status: DISCONTINUED | OUTPATIENT
Start: 2025-04-07 | End: 2025-04-09 | Stop reason: HOSPADM

## 2025-04-07 RX ORDER — ACETAMINOPHEN 325 MG/1
650 TABLET ORAL EVERY 6 HOURS PRN
Status: DISCONTINUED | OUTPATIENT
Start: 2025-04-07 | End: 2025-04-09 | Stop reason: HOSPADM

## 2025-04-07 RX ADMIN — MAGNESIUM CITRATE 296 ML: 1.75 LIQUID ORAL at 02:04

## 2025-04-07 RX ADMIN — MUPIROCIN: 20 OINTMENT TOPICAL at 09:04

## 2025-04-07 RX ADMIN — ACETAMINOPHEN 650 MG: 325 TABLET ORAL at 09:04

## 2025-04-07 RX ADMIN — CARVEDILOL 12.5 MG: 6.25 TABLET, FILM COATED ORAL at 09:04

## 2025-04-07 RX ADMIN — FLUTICASONE PROPIONATE 100 MCG: 50 SPRAY, METERED NASAL at 09:04

## 2025-04-07 RX ADMIN — DOCUSATE SODIUM 100 MG: 100 CAPSULE, LIQUID FILLED ORAL at 02:04

## 2025-04-07 RX ADMIN — ATORVASTATIN CALCIUM 20 MG: 20 TABLET, FILM COATED ORAL at 09:04

## 2025-04-07 RX ADMIN — Medication 3 ML: at 06:04

## 2025-04-07 RX ADMIN — DOCUSATE SODIUM 100 MG: 100 CAPSULE, LIQUID FILLED ORAL at 06:04

## 2025-04-07 RX ADMIN — DOCUSATE SODIUM 100 MG: 100 CAPSULE, LIQUID FILLED ORAL at 09:04

## 2025-04-07 RX ADMIN — BISACODYL 10 MG: 5 TABLET, COATED ORAL at 09:04

## 2025-04-07 RX ADMIN — ALPRAZOLAM 2 MG: 0.25 TABLET ORAL at 08:04

## 2025-04-07 RX ADMIN — MYCOPHENOLIC ACID 360 MG: 180 TABLET, DELAYED RELEASE ORAL at 08:04

## 2025-04-07 RX ADMIN — LOSARTAN POTASSIUM 50 MG: 50 TABLET, FILM COATED ORAL at 09:04

## 2025-04-07 RX ADMIN — MYCOPHENOLIC ACID 360 MG: 180 TABLET, DELAYED RELEASE ORAL at 09:04

## 2025-04-07 RX ADMIN — HEPARIN SODIUM 5000 UNITS: 5000 INJECTION INTRAVENOUS; SUBCUTANEOUS at 08:04

## 2025-04-07 RX ADMIN — SODIUM CHLORIDE 500 ML: 900 IRRIGANT IRRIGATION at 04:04

## 2025-04-07 RX ADMIN — MAGNESIUM CITRATE 296 ML: 1.75 LIQUID ORAL at 04:04

## 2025-04-07 RX ADMIN — CARVEDILOL 12.5 MG: 6.25 TABLET, FILM COATED ORAL at 06:04

## 2025-04-07 RX ADMIN — EZETIMIBE 10 MG: 10 TABLET ORAL at 09:04

## 2025-04-07 RX ADMIN — CINACALCET HYDROCHLORIDE 60 MG: 30 TABLET, FILM COATED ORAL at 06:04

## 2025-04-07 RX ADMIN — TACROLIMUS 2 MG: 1 CAPSULE ORAL at 06:04

## 2025-04-07 RX ADMIN — PANTOPRAZOLE SODIUM 40 MG: 40 TABLET, DELAYED RELEASE ORAL at 09:04

## 2025-04-07 RX ADMIN — Medication 100 ML: at 04:04

## 2025-04-07 RX ADMIN — INSULIN ASPART 1 UNITS: 100 INJECTION, SOLUTION INTRAVENOUS; SUBCUTANEOUS at 10:04

## 2025-04-07 RX ADMIN — Medication 3 ML: at 10:04

## 2025-04-07 RX ADMIN — CINACALCET HYDROCHLORIDE 60 MG: 30 TABLET, FILM COATED ORAL at 09:04

## 2025-04-07 RX ADMIN — PREDNISONE 5 MG: 5 TABLET ORAL at 09:04

## 2025-04-07 RX ADMIN — TACROLIMUS 4 MG: 1 CAPSULE ORAL at 09:04

## 2025-04-07 NOTE — ASSESSMENT & PLAN NOTE
Joann Kenney is a 65 y/o F s/p kidney transplant in 2024 with presenting with sudden onset abdominal pain for the past 2 1/2 weeks and N/V since her transplant. Patient states she has difficulty swallowing her medications d/t nausea and feeling like her pills are stuck in her throat. Pain & N/V are unrelated to meals. Denies bloody vomitus. Reports her most recent EGD was about 2 years ago.     Plan:  - Will offer patient EGD inpatient vs outpatient

## 2025-04-07 NOTE — ASSESSMENT & PLAN NOTE
Joann Kenney is a 63 y/o F s/p kidney transplant in 2024 with presenting with sudden onset abdominal pain for the past 2 1/2 weeks and N/V since her transplant. Patient states she has difficulty swallowing her medications d/t nausea and feeling like her pills are stuck in her throat. Pain & N/V are unrelated to meals. Denies bloody vomitus. Endorses constipation  Reports her most recent EGD was about 2 years ago.     Plan:  - EGD tomorrow 4/8/2025  - NPO at midnight  - will start patient on bowel regimen for constipation

## 2025-04-07 NOTE — CONSULTS
[facility] - Transplant Stepdown  Gastroenterology  Consult Note    Patient Name: Joann Kenney  MRN: [id number]  Admission Date: 4/5/2025  Hospital Length of Stay: 2 days  Code Status: Full Code   Attending Provider: Joce Landry MD   Consulting Provider: Angely Menendez MD  Primary Care Physician: No, Primary Doctor  Principal Problem:Right upper quadrant abdominal pain    Inpatient consult to Gastroenterology  Consult performed by: Angely Menendez MD  Consult ordered by: Gosia Mora PA-C        Subjective:     HPI:  Joann Kenney is a 65 y/o F with a past medical history of DBD kidney transplant 7/24/2024 for ESRD 2/2 Type II DM-(KDPI 85%, CIT >23 hrs, Thymo induction, CMV ++), CKD stage 3, HTN, incontinence (s/p bladder sling), CAD with stents (on ASA), atrial fibrillation, CVA (no residual deficits), ALEJA, and anemia. She is presenting as a transfer for abdominal pain for the past 2 1/2 weeks. GI consulted for off/on nausea and vomiting for the past few months. Patient states she has had nausea and vomiting since her transplant. She states it comes at any time of the day and is not associated with meals. It is especially worse with swallowing pills, which she is unable to tolerate without Zofran. Notably, she states she has a history of strictures and has had esophageal dilations in the past. She states food often gets stuck in her throat. She also states her N/V is worse when she has a post-nasal drip. Her transplant nephrologist prescribed Flonase and she states that has somewhat helped her symptoms. In regard to the abdominal pain, she states is came on suddenly. It is located in the RLQ and has now migrated to the left. She has taken chelle seltzer and sucralfate with minimal relief. Denies any weight loss. Recently started Monjauro a few months ago but self discontinued thinking it could be related to her pain. Denies hemoptysis, diarrhea, fever, chills, chest pain, SOB.     Past Medical  History:   Diagnosis Date    Anemia     Anxiety     Atrial fibrillation     Bleeding 08/08/2024    Coronary artery disease     Depression     Diabetes mellitus, type 2     Disorder of kidney and ureter     Heart murmur     Hyperlipidemia     Hypertension     Obesity     ALEJA (obstructive sleep apnea)     Proteinuria     Solitary kidney     Stroke     Transient neurological symptoms 11/13/2018       Past Surgical History:   Procedure Laterality Date    CORONARY ANGIOPLASTY WITH STENT PLACEMENT      HYSTERECTOMY      INCONTINENCE SURGERY      INSERTION OF IMPLANTABLE LOOP RECORDER      internal heart monitor      KIDNEY TRANSPLANT Right 7/24/2024    Procedure: TRANSPLANT, KIDNEY;  Surgeon: Alphonso Mon MD;  Location: [facility] OR [address] [place] [address];  Service: Transplant;  Laterality: Right;    PERITONEAL CATHETER INSERTION      PERITONEAL CATHETER REMOVAL Left 7/24/2024    Procedure: REMOVAL, CATHETER, DIALYSIS, PERITONEAL;  Surgeon: Alphonso Mon MD;  Location: [facility] OR [address] [place] [address];  Service: Transplant;  Laterality: Left;       Review of patient's allergies indicates:   Allergen Reactions    Sevelamer carbonate Other (See Comments)     Severe constipation     Family History    None       Tobacco Use    Smoking status: Former    Smokeless tobacco: Never   Substance and Sexual Activity    Alcohol use: Not Currently    Drug use: Never    Sexual activity: Not Currently     Partners: Male     Review of Systems   Constitutional:  Negative for chills and fever.   Respiratory:  Negative for shortness of breath.    Cardiovascular:  Negative for chest pain.   Gastrointestinal:  Positive for abdominal pain, constipation, nausea and vomiting. Negative for abdominal distention and diarrhea.     Objective:     Vital Signs (Most Recent):  Temp: 98.3 °F (36.8 °C) (04/07/25 0400)  Pulse: 64 (04/07/25 0805)  Resp: 18 (04/07/25 0805)  BP: (!) 151/73 (04/07/25 0805)  SpO2: 99 % (04/07/25 0805) Vital Signs (24h Range):  Temp:  [96.3 °F (35.7 °C)-98.3 °F  (36.8 °C)] 98.3 °F (36.8 °C)  Pulse:  [64-85] 64  Resp:  [16-20] 18  SpO2:  [98 %-100 %] 99 %  BP: (137-252)/() 151/73     Weight: 78.9 kg (173 lb 15.1 oz) (04/06/25 0309)  Body mass index is 28.95 kg/m².      Intake/Output Summary (Last 24 hours) at 4/7/2025 1040  Last data filed at 4/7/2025 0500  Gross per 24 hour   Intake 530 ml   Output 1100 ml   Net -570 ml       Lines/Drains/Airways       Peripheral Intravenous Line  Duration                  Peripheral IV - Single Lumen 04/06/25 1706 20 G Other (Comments) Anterior;Left Upper Arm <1 day                     Physical Exam  Constitutional:       Appearance: She is not ill-appearing.   Cardiovascular:      Rate and Rhythm: Normal rate and regular rhythm.      Heart sounds: Normal heart sounds.   Pulmonary:      Breath sounds: Normal breath sounds.   Abdominal:      General: Abdomen is flat. Bowel sounds are normal.      Tenderness: There is abdominal tenderness in the right lower quadrant, suprapubic area and left lower quadrant.   Neurological:      Mental Status: She is alert.          Significant Labs:  CBC:   Recent Labs   Lab 04/06/25  0900 04/07/25  0540   WBC 5.05 6.34   HGB 12.0 11.9*   HCT 35.8* 34.4*    232     CMP:   Recent Labs   Lab 04/07/25  0540   CALCIUM 10.1   ALBUMIN 3.3*      K 3.5   CO2 19*   *   BUN 17   CREATININE 1.3   ALKPHOS 172*   ALT 18   AST 17   BILITOT 0.6       Significant Imaging:  Imaging results within the past 24 hours have been reviewed.  Assessment/Plan:     GI  * RLQ abdominal pain  Joann Kenney is a 65 y/o F s/p kidney transplant in 2024 with presenting with sudden onset abdominal pain for the past 2 1/2 weeks and N/V since her transplant. Patient states she has difficulty swallowing her medications d/t nausea and feeling like her pills are stuck in her throat. Pain & N/V are unrelated to meals. Denies bloody vomitus. Endorses constipation  Reports her most recent EGD was about 2 years ago.      Plan:  - EGD tomorrow 4/8/2025  - NPO at midnight  - will start patient on bowel regimen for constipation         Thank you for your consult. I will follow-up with patient. Please contact us if you have any additional questions.    Angely Menendez MD  Gastroenterology  [facility] - Transplant Stepdown

## 2025-04-07 NOTE — ASSESSMENT & PLAN NOTE
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 She has CKD stage 3 - GFR 30-59 and her baseline creatinine is between 1.3-1.7. PMHx born w solitary kidney, DM2, HTN, incontinence (s/p bladder sling), CAD with stents (on ASA), atrial fibrillation, CVA (no residual deficits), ALEJA, and anemia. Surgery without complications.    - Creatinine stable  - Monitor with daily labs

## 2025-04-07 NOTE — HPI
Joann Kenney is a 63 y/o F with a past medical history of DBD kidney transplant 7/24/2024 for ESRD 2/2 Type II DM-(KDPI 85%, CIT >23 hrs, Thymo induction, CMV ++), CKD stage 3, HTN, incontinence (s/p bladder sling), CAD with stents (on ASA), atrial fibrillation, CVA (no residual deficits), ALEJA, and anemia. She is presenting as a transfer for abdominal pain for the past 2 1/2 weeks. GI consulted for off/on nausea and vomiting for the past few months. Patient states she has had nausea and vomiting since her transplant. She states it comes at any time of the day and is not associated with meals. It is especially worse with swallowing pills, which she is unable to tolerate without Zofran. Notably, she states she has a history of strictures and has had esophageal dilations in the past. She states food often gets stuck in her throat. She also states her N/V is worse when she has a post-nasal drip. Her transplant nephrologist prescribed Flonase and she states that has somewhat helped her symptoms. In regard to the abdominal pain, she states is came on suddenly. It is located in the RLQ and has now migrated to the left. She has taken chelle seltzer and sucralfate with minimal relief. Denies any weight loss. Recently started Monjauro a few months ago but self discontinued thinking it could be related to her pain. Denies hemoptysis, diarrhea, fever, chills, chest pain, SOB.

## 2025-04-07 NOTE — PLAN OF CARE
-Patient AAOX4-Afebrile- Independent   -Patient received all meds as scheduled  -Patient rest with complication or complaints of pain   -Patient did not report any BM overnight  -Patient received 2 mg Xanax mood appears flat and a bit more distant compared to admit, at sometimes patient appeared anxious. She has concerns about transportation home in [place].

## 2025-04-07 NOTE — SUBJECTIVE & OBJECTIVE
Subjective:   History of Present Illness:  Joann Kenney is a 65 y/o F s/p DBD kidney transplant 7/24/2024 for ESRD 2/2 Type II DM-(KDPI 85%, CIT >23 hrs, Thymo induction, CMV ++). She has CKD stage 3 - GFR 30-59 and her baseline creatinine is between 1.3-1.7. PMHx born w solitary kidney, DM2, HTN, incontinence (s/p bladder sling), CAD with stents (on ASA), atrial fibrillation, CVA (no residual deficits), ALEJA, and anemia. Surgery without complications.    On 4/4 patient presented to OSH for severe RU ABD pain- she underwent CT abd which was not consistent with cholecystitis.  Cr 1.7 ( baseline) . D/T on going Abd pain. Patient discussed with Dr Landry will admit for further abd pain evaluation UA, Kidney Transplant US . She denies recent cold or dysuria fever , n/v/d         Hospital Course:  Patient admitted with abdominal pain and n/v. She has had nausea and vomiting for months. The abdominal pain is new. CT scan at OSH with constipation. She is feeling ok today but still having nausea/vomiting. KUB still with stool burden. Increased bowel regiment ordered along with suppository (had small BM yesterday with enema). Will check CMV and EBV. GI also consulted given chronic nausea/vomiting to assess need for EGD. Appreciate their assistance.         Past Medical, Surgical, Family, and Social History:   Unchanged from H&P.    Scheduled Meds:   ALPRAZolam  2 mg Oral QHS    atorvastatin  20 mg Oral QHS    bisacodyL  10 mg Oral Daily    bisacodyL  10 mg Rectal Once    carvediloL  12.5 mg Oral BID WM    cinacalcet  60 mg Oral BID WM    docusate sodium  100 mg Oral TID PC    ezetimibe  10 mg Oral Daily    fluticasone propionate  2 spray Each Nostril Daily    heparin (porcine)  5,000 Units Subcutaneous Q8H    losartan  50 mg Oral Daily    magnesium citrate  296 mL Oral Once    mupirocin   Nasal BID    mycophenolate sodium  360 mg Oral BID    pantoprazole  40 mg Oral Daily    predniSONE  5 mg Oral Daily    sodium chloride  "0.9%  3 mL Intravenous Q8H    tacrolimus  4 mg Oral Daily AM    And    tacrolimus  3 mg Oral Daily PM     Continuous Infusions:  PRN Meds:  Current Facility-Administered Medications:     acetaminophen, 650 mg, Oral, Q6H PRN    dextrose 50%, 12.5 g, Intravenous, PRN    dextrose 50%, 25 g, Intravenous, PRN    glucagon (human recombinant), 1 mg, Intramuscular, PRN    glucose, 16 g, Oral, PRN    glucose, 24 g, Oral, PRN    hydrALAZINE, 10 mg, Intravenous, Q8H PRN    insulin aspart U-100, 0-5 Units, Subcutaneous, QID (AC + HS) PRN    ondansetron, 4 mg, Oral, Q8H PRN    ondansetron, 4 mg, Intravenous, Q8H PRN    Intake/Output - Last 3 Shifts         04/05 0700  04/06 0659 04/06 0700 04/07 0659 04/07 0700 04/08 0659    P.O.  480     I.V. (mL/kg)  50 (0.6)     Total Intake(mL/kg)  530 (6.7)     Urine (mL/kg/hr)  1100 (0.6)     Stool  0     Total Output  1100     Net  -570            Stool Occurrence  2 x              Review of Systems   Constitutional:  Negative for chills and fever.   Respiratory:  Negative for shortness of breath.    Gastrointestinal:  Positive for constipation, nausea and vomiting. Negative for abdominal distention.   Genitourinary:  Negative for difficulty urinating.   Allergic/Immunologic: Positive for immunocompromised state.   Neurological:  Negative for weakness.   Psychiatric/Behavioral:  The patient is nervous/anxious.       Objective:     Vital Signs (Most Recent):  Temp: 98.3 °F (36.8 °C) (04/07/25 0400)  Pulse: 64 (04/07/25 0805)  Resp: 18 (04/07/25 0805)  BP: (!) 151/73 (04/07/25 0805)  SpO2: 99 % (04/07/25 0805) Vital Signs (24h Range):  Temp:  [96.3 °F (35.7 °C)-98.3 °F (36.8 °C)] 98.3 °F (36.8 °C)  Pulse:  [64-85] 64  Resp:  [16-20] 18  SpO2:  [98 %-100 %] 99 %  BP: (137-252)/() 151/73     Weight: 78.9 kg (173 lb 15.1 oz)  Height: 5' 5" (165.1 cm)  Body mass index is 28.95 kg/m².     Physical Exam  Vitals and nursing note reviewed.   Cardiovascular:      Rate and Rhythm: Normal " rate.   Pulmonary:      Effort: Pulmonary effort is normal.   Abdominal:      General: There is no distension.      Tenderness: There is no abdominal tenderness.   Neurological:      Mental Status: She is alert and oriented to person, place, and time.   Psychiatric:         Mood and Affect: Mood normal.         Behavior: Behavior normal.         Thought Content: Thought content normal.         Judgment: Judgment normal.          Laboratory:  CBC:   Recent Labs   Lab 04/06/25  0900 04/07/25  0540   WBC 5.05 6.34   RBC 3.86* 3.68*   HGB 12.0 11.9*   HCT 35.8* 34.4*    232   MCV 93 94   MCH 31.1* 32.3*   MCHC 33.5 34.6     BMP:   Recent Labs   Lab 04/06/25  0900 04/07/25  0540    138   K 3.8 3.5   * 111*   CO2 20* 19*   BUN 17 17   CREATININE 1.2 1.3   CALCIUM 10.8* 10.1       Diagnostic Results:  None

## 2025-04-07 NOTE — H&P (VIEW-ONLY)
[facility] - Transplant Stepdown  Gastroenterology  Consult Note    Patient Name: Joann Kenney  MRN: [id number]  Admission Date: 4/5/2025  Hospital Length of Stay: 2 days  Code Status: Full Code   Attending Provider: Joce Landry MD   Consulting Provider: Angely Menendez MD  Primary Care Physician: No, Primary Doctor  Principal Problem:Right upper quadrant abdominal pain    Inpatient consult to Gastroenterology  Consult performed by: Angely Menendez MD  Consult ordered by: Gosia Mora PA-C        Subjective:     HPI:  Joann Kenney is a 63 y/o F with a past medical history of DBD kidney transplant 7/24/2024 for ESRD 2/2 Type II DM-(KDPI 85%, CIT >23 hrs, Thymo induction, CMV ++), CKD stage 3, HTN, incontinence (s/p bladder sling), CAD with stents (on ASA), atrial fibrillation, CVA (no residual deficits), ALEJA, and anemia. She is presenting as a transfer for abdominal pain for the past 2 1/2 weeks. GI consulted for off/on nausea and vomiting for the past few months. Patient states she has had nausea and vomiting since her transplant. She states it comes at any time of the day and is not associated with meals. It is especially worse with swallowing pills, which she is unable to tolerate without Zofran. Notably, she states she has a history of strictures and has had esophageal dilations in the past. She states food often gets stuck in her throat. She also states her N/V is worse when she has a post-nasal drip. Her transplant nephrologist prescribed Flonase and she states that has somewhat helped her symptoms. In regard to the abdominal pain, she states is came on suddenly. It is located in the RLQ and has now migrated to the left. She has taken chelle seltzer and sucralfate with minimal relief. Denies any weight loss. Recently started Monjauro a few months ago but self discontinued thinking it could be related to her pain. Denies hemoptysis, diarrhea, fever, chills, chest pain, SOB.     Past Medical  History:   Diagnosis Date    Anemia     Anxiety     Atrial fibrillation     Bleeding 08/08/2024    Coronary artery disease     Depression     Diabetes mellitus, type 2     Disorder of kidney and ureter     Heart murmur     Hyperlipidemia     Hypertension     Obesity     ALEJA (obstructive sleep apnea)     Proteinuria     Solitary kidney     Stroke     Transient neurological symptoms 11/13/2018       Past Surgical History:   Procedure Laterality Date    CORONARY ANGIOPLASTY WITH STENT PLACEMENT      HYSTERECTOMY      INCONTINENCE SURGERY      INSERTION OF IMPLANTABLE LOOP RECORDER      internal heart monitor      KIDNEY TRANSPLANT Right 7/24/2024    Procedure: TRANSPLANT, KIDNEY;  Surgeon: Alphonso Mon MD;  Location: [facility] OR [address] [place] [address];  Service: Transplant;  Laterality: Right;    PERITONEAL CATHETER INSERTION      PERITONEAL CATHETER REMOVAL Left 7/24/2024    Procedure: REMOVAL, CATHETER, DIALYSIS, PERITONEAL;  Surgeon: Alphonso Mon MD;  Location: [facility] OR [address] [place] [address];  Service: Transplant;  Laterality: Left;       Review of patient's allergies indicates:   Allergen Reactions    Sevelamer carbonate Other (See Comments)     Severe constipation     Family History    None       Tobacco Use    Smoking status: Former    Smokeless tobacco: Never   Substance and Sexual Activity    Alcohol use: Not Currently    Drug use: Never    Sexual activity: Not Currently     Partners: Male     Review of Systems   Constitutional:  Negative for chills and fever.   Respiratory:  Negative for shortness of breath.    Cardiovascular:  Negative for chest pain.   Gastrointestinal:  Positive for abdominal pain, constipation, nausea and vomiting. Negative for abdominal distention and diarrhea.     Objective:     Vital Signs (Most Recent):  Temp: 98.3 °F (36.8 °C) (04/07/25 0400)  Pulse: 64 (04/07/25 0805)  Resp: 18 (04/07/25 0805)  BP: (!) 151/73 (04/07/25 0805)  SpO2: 99 % (04/07/25 0805) Vital Signs (24h Range):  Temp:  [96.3 °F (35.7 °C)-98.3 °F  (36.8 °C)] 98.3 °F (36.8 °C)  Pulse:  [64-85] 64  Resp:  [16-20] 18  SpO2:  [98 %-100 %] 99 %  BP: (137-252)/() 151/73     Weight: 78.9 kg (173 lb 15.1 oz) (04/06/25 0309)  Body mass index is 28.95 kg/m².      Intake/Output Summary (Last 24 hours) at 4/7/2025 1040  Last data filed at 4/7/2025 0500  Gross per 24 hour   Intake 530 ml   Output 1100 ml   Net -570 ml       Lines/Drains/Airways       Peripheral Intravenous Line  Duration                  Peripheral IV - Single Lumen 04/06/25 1706 20 G Other (Comments) Anterior;Left Upper Arm <1 day                     Physical Exam  Constitutional:       Appearance: She is not ill-appearing.   Cardiovascular:      Rate and Rhythm: Normal rate and regular rhythm.      Heart sounds: Normal heart sounds.   Pulmonary:      Breath sounds: Normal breath sounds.   Abdominal:      General: Abdomen is flat. Bowel sounds are normal.      Tenderness: There is abdominal tenderness in the right lower quadrant, suprapubic area and left lower quadrant.   Neurological:      Mental Status: She is alert.          Significant Labs:  CBC:   Recent Labs   Lab 04/06/25  0900 04/07/25  0540   WBC 5.05 6.34   HGB 12.0 11.9*   HCT 35.8* 34.4*    232     CMP:   Recent Labs   Lab 04/07/25  0540   CALCIUM 10.1   ALBUMIN 3.3*      K 3.5   CO2 19*   *   BUN 17   CREATININE 1.3   ALKPHOS 172*   ALT 18   AST 17   BILITOT 0.6       Significant Imaging:  Imaging results within the past 24 hours have been reviewed.  Assessment/Plan:     GI  * RLQ abdominal pain  Joann Kenney is a 63 y/o F s/p kidney transplant in 2024 with presenting with sudden onset abdominal pain for the past 2 1/2 weeks and N/V since her transplant. Patient states she has difficulty swallowing her medications d/t nausea and feeling like her pills are stuck in her throat. Pain & N/V are unrelated to meals. Denies bloody vomitus. Endorses constipation  Reports her most recent EGD was about 2 years ago.      Plan:  - EGD tomorrow 4/8/2025  - NPO at midnight  - will start patient on bowel regimen for constipation         Thank you for your consult. I will follow-up with patient. Please contact us if you have any additional questions.    Angely Menendez MD  Gastroenterology  [facility] - Transplant Stepdown

## 2025-04-07 NOTE — PROGRESS NOTES
[facility] - Transplant Stepdown  Kidney Transplant  Progress Note      Reason for Follow-up: Reassessment of Kidney Transplant - 7/24/2024  (#1) recipient and management of immunosuppression.           Subjective:   History of Present Illness:  Joann Kenney is a 63 y/o F s/p DBD kidney transplant 7/24/2024 for ESRD 2/2 Type II DM-(KDPI 85%, CIT >23 hrs, Thymo induction, CMV ++). She has CKD stage 3 - GFR 30-59 and her baseline creatinine is between 1.3-1.7. PMHx born w solitary kidney, DM2, HTN, incontinence (s/p bladder sling), CAD with stents (on ASA), atrial fibrillation, CVA (no residual deficits), ALEJA, and anemia. Surgery without complications.    On 4/4 patient presented to OSH for severe RU ABD pain- she underwent CT abd which was not consistent with cholecystitis.  Cr 1.7 ( baseline) . D/T on going Abd pain. Patient discussed with Dr Landry will admit for further abd pain evaluation UA, Kidney Transplant US . She denies recent cold or dysuria fever , n/v/d         Hospital Course:  Patient admitted with abdominal pain and n/v. She has had nausea and vomiting for months. The abdominal pain is new. CT scan at OSH with constipation. She is feeling ok today but still having nausea/vomiting. KUB still with stool burden. Increased bowel regiment ordered along with suppository (had small BM yesterday with enema). Will check CMV and EBV. GI also consulted given chronic nausea/vomiting to assess need for EGD. Appreciate their assistance.         Past Medical, Surgical, Family, and Social History:   Unchanged from H&P.    Scheduled Meds:   ALPRAZolam  2 mg Oral QHS    atorvastatin  20 mg Oral QHS    bisacodyL  10 mg Oral Daily    bisacodyL  10 mg Rectal Once    carvediloL  12.5 mg Oral BID WM    cinacalcet  60 mg Oral BID WM    docusate sodium  100 mg Oral TID PC    ezetimibe  10 mg Oral Daily    fluticasone propionate  2 spray Each Nostril Daily    heparin (porcine)  5,000 Units Subcutaneous Q8H    losartan  50 mg  Oral Daily    magnesium citrate  296 mL Oral Once    mupirocin   Nasal BID    mycophenolate sodium  360 mg Oral BID    pantoprazole  40 mg Oral Daily    predniSONE  5 mg Oral Daily    sodium chloride 0.9%  3 mL Intravenous Q8H    tacrolimus  4 mg Oral Daily AM    And    tacrolimus  3 mg Oral Daily PM     Continuous Infusions:  PRN Meds:  Current Facility-Administered Medications:     acetaminophen, 650 mg, Oral, Q6H PRN    dextrose 50%, 12.5 g, Intravenous, PRN    dextrose 50%, 25 g, Intravenous, PRN    glucagon (human recombinant), 1 mg, Intramuscular, PRN    glucose, 16 g, Oral, PRN    glucose, 24 g, Oral, PRN    hydrALAZINE, 10 mg, Intravenous, Q8H PRN    insulin aspart U-100, 0-5 Units, Subcutaneous, QID (AC + HS) PRN    ondansetron, 4 mg, Oral, Q8H PRN    ondansetron, 4 mg, Intravenous, Q8H PRN    Intake/Output - Last 3 Shifts         04/05 0700 04/06 0659 04/06 0700 04/07 0659 04/07 0700  04/08 0659    P.O.  480     I.V. (mL/kg)  50 (0.6)     Total Intake(mL/kg)  530 (6.7)     Urine (mL/kg/hr)  1100 (0.6)     Stool  0     Total Output  1100     Net  -570            Stool Occurrence  2 x              Review of Systems   Constitutional:  Negative for chills and fever.   Respiratory:  Negative for shortness of breath.    Gastrointestinal:  Positive for constipation, nausea and vomiting. Negative for abdominal distention.   Genitourinary:  Negative for difficulty urinating.   Allergic/Immunologic: Positive for immunocompromised state.   Neurological:  Negative for weakness.   Psychiatric/Behavioral:  The patient is nervous/anxious.       Objective:     Vital Signs (Most Recent):  Temp: 98.3 °F (36.8 °C) (04/07/25 0400)  Pulse: 64 (04/07/25 0805)  Resp: 18 (04/07/25 0805)  BP: (!) 151/73 (04/07/25 0805)  SpO2: 99 % (04/07/25 0805) Vital Signs (24h Range):  Temp:  [96.3 °F (35.7 °C)-98.3 °F (36.8 °C)] 98.3 °F (36.8 °C)  Pulse:  [64-85] 64  Resp:  [16-20] 18  SpO2:  [98 %-100 %] 99 %  BP: (137-252)/() 151/73  "    Weight: 78.9 kg (173 lb 15.1 oz)  Height: 5' 5" (165.1 cm)  Body mass index is 28.95 kg/m².     Physical Exam  Vitals and nursing note reviewed.   Cardiovascular:      Rate and Rhythm: Normal rate.   Pulmonary:      Effort: Pulmonary effort is normal.   Abdominal:      General: There is no distension.      Tenderness: There is no abdominal tenderness.   Neurological:      Mental Status: She is alert and oriented to person, place, and time.   Psychiatric:         Mood and Affect: Mood normal.         Behavior: Behavior normal.         Thought Content: Thought content normal.         Judgment: Judgment normal.          Laboratory:  CBC:   Recent Labs   Lab 04/06/25  0900 04/07/25  0540   WBC 5.05 6.34   RBC 3.86* 3.68*   HGB 12.0 11.9*   HCT 35.8* 34.4*    232   MCV 93 94   MCH 31.1* 32.3*   MCHC 33.5 34.6     BMP:   Recent Labs   Lab 04/06/25  0900 04/07/25  0540    138   K 3.8 3.5   * 111*   CO2 20* 19*   BUN 17 17   CREATININE 1.2 1.3   CALCIUM 10.8* 10.1       Diagnostic Results:  None  Assessment/Plan:     * Right upper quadrant abdominal pain  - assess CT scan from OSH  - Kidney US with good flow. Collection seen. IR attempted aspiration but no fluid successfully aspirated   - UA cx pending  - Likely 2/2 to constipation  - Give mag citrate and suppository   - GI consulted given nausea/vomiting  - monitor       Long-term use of immunosuppressant medication  - see prophylactic immunotherapy      Anemia of chronic disease  - H/H stable  - no overt signs of bleed  - continue to monitor with daily CBC      Prophylactic immunotherapy  - continue prograf  - continue to monitor for toxic side effects and check daily levels. Will adjust for therapeutic dose      S/P kidney transplant  s/p DBD kidney transplant 7/24/2024 for ESRD 2/2 Type II DM-(KDPI 85%, CIT >23 hrs, Thymo induction, CMV ++). She has CKD stage 3 - GFR 30-59 and her baseline creatinine is between 1.3-1.7. PMHx born w solitary " kidney, DM2, HTN, incontinence (s/p bladder sling), CAD with stents (on ASA), atrial fibrillation, CVA (no residual deficits), ALEJA, and anemia. Surgery without complications.    - Creatinine stable  - Monitor with daily labs       Diabetes mellitus due to underlying condition with chronic kidney disease on chronic dialysis, with long-term current use of insulin  - consulted Endo           Discharge Planning: Not a candidate for discharge at this time    Medical decision making for this encounter includes review of pertinent labs and diagnostic studies, assessment and planning, discussions with consulting providers, discussion with patient/family, and participation in multidisciplinary rounds. Time spent caring for patient: 60 minutes    Gosia Mora PA-C  Kidney Transplant  [facility] - Transplant Stepdown

## 2025-04-07 NOTE — CONSULTS
"Pulmonary/Critical Care Consult    Joann Kenney is a 64 year old woman with PMH s/p DBD kidney transplant 7/24/2024 for ESRD 2/2 Type II DM-(KDPI 85%, CIT >23 hrs, Thymo induction, CMV ++). She has ALEJA and uses BIPAP at home. Critical Care Medicine and Pulmonary Medicine consulted for "broken home BIPAP".     To obtain home BIPAP, patient should contact her DME company and request replacement. Alternatively, she can contact her outpatient sleep medicine physician to reorder (if no sleep medicine follow up outpatient, would refer her on discharge). If the above is not possible, then you can also place order for home BIPAP on discharge. Social work involvement may also be helpful for coordinating.      Please reach out with any questions or concerns.     Agnieszka Corea MD  [facility]  "

## 2025-04-07 NOTE — PLAN OF CARE
For home Bipap issues---Patient will need to contact her [email or web address] company that she received the Bipap from to discuss the current issues with the device, receive replacement, and or trouble shoot the problem    This is not an IP fixable problem

## 2025-04-07 NOTE — ASSESSMENT & PLAN NOTE
- assess CT scan from OSH  - Kidney US with good flow. Collection seen. IR attempted aspiration but no fluid successfully aspirated   - UA cx pending  - Likely 2/2 to constipation  - Give mag citrate and suppository   - GI consulted given nausea/vomiting  - monitor

## 2025-04-07 NOTE — HOSPITAL COURSE
Patient admitted with abdominal pain and n/v. She has had nausea and vomiting for months. The abdominal pain is new. CT scan at OSH with constipation.  KUB still with stool burden. GI also consulted given chronic nausea/vomiting to assess need for EGD.     Interval History: No acute events overnight. Patient feeling better in terms of abdominal pain. Able to have BMs. Going for EGD today to assess for nausea/vomiting. EBV and CMV PCR pending. Creatinine is rising. Plan for IVF hydration. Continue to monitor.

## 2025-04-08 ENCOUNTER — ANESTHESIA EVENT (OUTPATIENT)
Dept: ENDOSCOPY | Facility: HOSPITAL | Age: 65
DRG: 392 | End: 2025-04-08
Payer: MEDICARE

## 2025-04-08 ENCOUNTER — ANESTHESIA (OUTPATIENT)
Dept: ENDOSCOPY | Facility: HOSPITAL | Age: 65
DRG: 392 | End: 2025-04-08
Payer: MEDICARE

## 2025-04-08 LAB
ABSOLUTE EOSINOPHIL (OHS): 0.17 K/UL
ABSOLUTE MONOCYTE (OHS): 0.48 K/UL (ref 0.3–1)
ABSOLUTE NEUTROPHIL COUNT (OHS): 3.43 K/UL (ref 1.8–7.7)
ALBUMIN SERPL BCP-MCNC: 3.5 G/DL (ref 3.5–5.2)
ALP SERPL-CCNC: 180 UNIT/L (ref 40–150)
ALT SERPL W/O P-5'-P-CCNC: 25 UNIT/L (ref 10–44)
ANION GAP (OHS): 6 MMOL/L (ref 8–16)
AST SERPL-CCNC: 19 UNIT/L (ref 11–45)
BASOPHILS # BLD AUTO: 0.04 K/UL
BASOPHILS NFR BLD AUTO: 0.7 %
BILIRUB SERPL-MCNC: 0.5 MG/DL (ref 0.1–1)
BUN SERPL-MCNC: 18 MG/DL (ref 8–23)
CALCIUM SERPL-MCNC: 10.8 MG/DL (ref 8.7–10.5)
CHLORIDE SERPL-SCNC: 107 MMOL/L (ref 95–110)
CO2 SERPL-SCNC: 25 MMOL/L (ref 23–29)
CREAT SERPL-MCNC: 1.6 MG/DL (ref 0.5–1.4)
CYTOMEGALOVIRUS DNA, QUAL (OHS): NOT DETECTED
EPSTEIN-BARR VIRUS DNA, QUAL (OHS): NORMAL
ERYTHROCYTE [DISTWIDTH] IN BLOOD BY AUTOMATED COUNT: 12.2 % (ref 11.5–14.5)
GFR SERPLBLD CREATININE-BSD FMLA CKD-EPI: 35 ML/MIN/1.73/M2
GLUCOSE SERPL-MCNC: 115 MG/DL (ref 70–110)
HCT VFR BLD AUTO: 34.8 % (ref 37–48.5)
HGB BLD-MCNC: 11.7 GM/DL (ref 12–16)
IMM GRANULOCYTES # BLD AUTO: 0.02 K/UL (ref 0–0.04)
IMM GRANULOCYTES NFR BLD AUTO: 0.4 % (ref 0–0.5)
LYMPHOCYTES # BLD AUTO: 1.2 K/UL (ref 1–4.8)
MAGNESIUM SERPL-MCNC: 1.9 MG/DL (ref 1.6–2.6)
MCH RBC QN AUTO: 30.7 PG (ref 27–31)
MCHC RBC AUTO-ENTMCNC: 33.6 G/DL (ref 32–36)
MCV RBC AUTO: 91 FL (ref 82–98)
NUCLEATED RBC (/100WBC) (OHS): 0 /100 WBC
PLATELET # BLD AUTO: 250 K/UL (ref 150–450)
PMV BLD AUTO: 10.7 FL (ref 9.2–12.9)
POCT GLUCOSE: 143 MG/DL (ref 70–110)
POCT GLUCOSE: 214 MG/DL (ref 70–110)
POCT GLUCOSE: 268 MG/DL (ref 70–110)
POTASSIUM SERPL-SCNC: 3.3 MMOL/L (ref 3.5–5.1)
PROT SERPL-MCNC: 6.3 GM/DL (ref 6–8.4)
RBC # BLD AUTO: 3.81 M/UL (ref 4–5.4)
RELATIVE EOSINOPHIL (OHS): 3.2 %
RELATIVE LYMPHOCYTE (OHS): 22.5 % (ref 18–48)
RELATIVE MONOCYTE (OHS): 9 % (ref 4–15)
RELATIVE NEUTROPHIL (OHS): 64.2 % (ref 38–73)
SODIUM SERPL-SCNC: 138 MMOL/L (ref 136–145)
TACROLIMUS BLD-MCNC: 7.4 NG/ML (ref 5–15)
WBC # BLD AUTO: 5.34 K/UL (ref 3.9–12.7)

## 2025-04-08 PROCEDURE — 25000003 PHARM REV CODE 250: Performed by: NURSE PRACTITIONER

## 2025-04-08 PROCEDURE — 82040 ASSAY OF SERUM ALBUMIN: CPT | Performed by: INTERNAL MEDICINE

## 2025-04-08 PROCEDURE — 25000003 PHARM REV CODE 250: Performed by: PHYSICIAN ASSISTANT

## 2025-04-08 PROCEDURE — 37000008 HC ANESTHESIA 1ST 15 MINUTES: Performed by: INTERNAL MEDICINE

## 2025-04-08 PROCEDURE — A4216 STERILE WATER/SALINE, 10 ML: HCPCS | Performed by: NURSE PRACTITIONER

## 2025-04-08 PROCEDURE — 88305 TISSUE EXAM BY PATHOLOGIST: CPT | Mod: TC | Performed by: INTERNAL MEDICINE

## 2025-04-08 PROCEDURE — 63600175 PHARM REV CODE 636 W HCPCS: Performed by: PHYSICIAN ASSISTANT

## 2025-04-08 PROCEDURE — 27201012 HC FORCEPS, HOT/COLD, DISP: Performed by: INTERNAL MEDICINE

## 2025-04-08 PROCEDURE — 0DB68ZX EXCISION OF STOMACH, VIA NATURAL OR ARTIFICIAL OPENING ENDOSCOPIC, DIAGNOSTIC: ICD-10-PCS | Performed by: INTERNAL MEDICINE

## 2025-04-08 PROCEDURE — 20600001 HC STEP DOWN PRIVATE ROOM

## 2025-04-08 PROCEDURE — 99233 SBSQ HOSP IP/OBS HIGH 50: CPT | Mod: ,,, | Performed by: PHYSICIAN ASSISTANT

## 2025-04-08 PROCEDURE — 63600175 PHARM REV CODE 636 W HCPCS: Performed by: STUDENT IN AN ORGANIZED HEALTH CARE EDUCATION/TRAINING PROGRAM

## 2025-04-08 PROCEDURE — 80197 ASSAY OF TACROLIMUS: CPT | Performed by: INTERNAL MEDICINE

## 2025-04-08 PROCEDURE — 82962 GLUCOSE BLOOD TEST: CPT | Performed by: INTERNAL MEDICINE

## 2025-04-08 PROCEDURE — 85025 COMPLETE CBC W/AUTO DIFF WBC: CPT | Performed by: INTERNAL MEDICINE

## 2025-04-08 PROCEDURE — 25000003 PHARM REV CODE 250

## 2025-04-08 PROCEDURE — 0DB98ZX EXCISION OF DUODENUM, VIA NATURAL OR ARTIFICIAL OPENING ENDOSCOPIC, DIAGNOSTIC: ICD-10-PCS | Performed by: INTERNAL MEDICINE

## 2025-04-08 PROCEDURE — 36415 COLL VENOUS BLD VENIPUNCTURE: CPT | Performed by: INTERNAL MEDICINE

## 2025-04-08 PROCEDURE — 83735 ASSAY OF MAGNESIUM: CPT | Performed by: INTERNAL MEDICINE

## 2025-04-08 PROCEDURE — 0DB78ZX EXCISION OF STOMACH, PYLORUS, VIA NATURAL OR ARTIFICIAL OPENING ENDOSCOPIC, DIAGNOSTIC: ICD-10-PCS | Performed by: INTERNAL MEDICINE

## 2025-04-08 PROCEDURE — 63600175 PHARM REV CODE 636 W HCPCS: Performed by: NURSE PRACTITIONER

## 2025-04-08 PROCEDURE — 37000009 HC ANESTHESIA EA ADD 15 MINS: Performed by: INTERNAL MEDICINE

## 2025-04-08 RX ORDER — SODIUM CHLORIDE 9 MG/ML
INJECTION, SOLUTION INTRAVENOUS CONTINUOUS
Status: DISCONTINUED | OUTPATIENT
Start: 2025-04-08 | End: 2025-04-09

## 2025-04-08 RX ORDER — POTASSIUM CHLORIDE 750 MG/1
10 CAPSULE, EXTENDED RELEASE ORAL ONCE
Status: COMPLETED | OUTPATIENT
Start: 2025-04-08 | End: 2025-04-08

## 2025-04-08 RX ORDER — LABETALOL HYDROCHLORIDE 5 MG/ML
INJECTION, SOLUTION INTRAVENOUS
Status: DISCONTINUED | OUTPATIENT
Start: 2025-04-08 | End: 2025-04-08

## 2025-04-08 RX ORDER — POTASSIUM CHLORIDE 20 MEQ/1
20 TABLET, EXTENDED RELEASE ORAL ONCE
Status: DISCONTINUED | OUTPATIENT
Start: 2025-04-08 | End: 2025-04-08

## 2025-04-08 RX ORDER — ONDANSETRON HYDROCHLORIDE 2 MG/ML
INJECTION, SOLUTION INTRAVENOUS
Status: DISCONTINUED | OUTPATIENT
Start: 2025-04-08 | End: 2025-04-08

## 2025-04-08 RX ORDER — PROPOFOL 10 MG/ML
VIAL (ML) INTRAVENOUS
Status: DISCONTINUED | OUTPATIENT
Start: 2025-04-08 | End: 2025-04-08

## 2025-04-08 RX ORDER — HYDRALAZINE HYDROCHLORIDE 20 MG/ML
INJECTION INTRAMUSCULAR; INTRAVENOUS
Status: DISCONTINUED | OUTPATIENT
Start: 2025-04-08 | End: 2025-04-08

## 2025-04-08 RX ORDER — LIDOCAINE HYDROCHLORIDE 20 MG/ML
INJECTION INTRAVENOUS
Status: DISCONTINUED | OUTPATIENT
Start: 2025-04-08 | End: 2025-04-08

## 2025-04-08 RX ADMIN — Medication 3 ML: at 02:04

## 2025-04-08 RX ADMIN — HEPARIN SODIUM 5000 UNITS: 5000 INJECTION INTRAVENOUS; SUBCUTANEOUS at 08:04

## 2025-04-08 RX ADMIN — ONDANSETRON 4 MG: 4 TABLET, ORALLY DISINTEGRATING ORAL at 02:04

## 2025-04-08 RX ADMIN — INSULIN ASPART 3 UNITS: 100 INJECTION, SOLUTION INTRAVENOUS; SUBCUTANEOUS at 05:04

## 2025-04-08 RX ADMIN — BISACODYL 10 MG: 5 TABLET, COATED ORAL at 06:04

## 2025-04-08 RX ADMIN — PROPOFOL 30 MG: 10 INJECTION, EMULSION INTRAVENOUS at 10:04

## 2025-04-08 RX ADMIN — INSULIN ASPART 1 UNITS: 100 INJECTION, SOLUTION INTRAVENOUS; SUBCUTANEOUS at 10:04

## 2025-04-08 RX ADMIN — DOCUSATE SODIUM 100 MG: 100 CAPSULE, LIQUID FILLED ORAL at 07:04

## 2025-04-08 RX ADMIN — CARVEDILOL 12.5 MG: 6.25 TABLET, FILM COATED ORAL at 05:04

## 2025-04-08 RX ADMIN — PREDNISONE 5 MG: 5 TABLET ORAL at 01:04

## 2025-04-08 RX ADMIN — LABETALOL HYDROCHLORIDE 5 MG: 5 INJECTION INTRAVENOUS at 10:04

## 2025-04-08 RX ADMIN — TACROLIMUS 3 MG: 1 CAPSULE ORAL at 06:04

## 2025-04-08 RX ADMIN — LABETALOL HYDROCHLORIDE 2.5 MG: 5 INJECTION INTRAVENOUS at 10:04

## 2025-04-08 RX ADMIN — POLYETHYLENE GLYCOL 3350 17 G: 17 POWDER, FOR SOLUTION ORAL at 08:04

## 2025-04-08 RX ADMIN — LIDOCAINE HYDROCHLORIDE 100 MG: 20 INJECTION INTRAVENOUS at 10:04

## 2025-04-08 RX ADMIN — Medication 3 ML: at 06:04

## 2025-04-08 RX ADMIN — TACROLIMUS 3 MG: 1 CAPSULE ORAL at 01:04

## 2025-04-08 RX ADMIN — SODIUM CHLORIDE: 9 INJECTION, SOLUTION INTRAVENOUS at 01:04

## 2025-04-08 RX ADMIN — MUPIROCIN: 20 OINTMENT TOPICAL at 01:04

## 2025-04-08 RX ADMIN — ONDANSETRON 4 MG: 2 INJECTION INTRAMUSCULAR; INTRAVENOUS at 10:04

## 2025-04-08 RX ADMIN — CINACALCET HYDROCHLORIDE 60 MG: 30 TABLET, FILM COATED ORAL at 05:04

## 2025-04-08 RX ADMIN — POTASSIUM CHLORIDE 10 MEQ: 750 CAPSULE, EXTENDED RELEASE ORAL at 01:04

## 2025-04-08 RX ADMIN — LOSARTAN POTASSIUM 50 MG: 50 TABLET, FILM COATED ORAL at 05:04

## 2025-04-08 RX ADMIN — EZETIMIBE 10 MG: 10 TABLET ORAL at 01:04

## 2025-04-08 RX ADMIN — PROPOFOL 50 MG: 10 INJECTION, EMULSION INTRAVENOUS at 10:04

## 2025-04-08 RX ADMIN — PANTOPRAZOLE SODIUM 40 MG: 40 TABLET, DELAYED RELEASE ORAL at 01:04

## 2025-04-08 RX ADMIN — POLYETHYLENE GLYCOL 3350 17 G: 17 POWDER, FOR SOLUTION ORAL at 01:04

## 2025-04-08 RX ADMIN — FLUTICASONE PROPIONATE 100 MCG: 50 SPRAY, METERED NASAL at 01:04

## 2025-04-08 RX ADMIN — HYDRALAZINE HYDROCHLORIDE 4 MG: 20 INJECTION, SOLUTION INTRAMUSCULAR; INTRAVENOUS at 10:04

## 2025-04-08 RX ADMIN — HEPARIN SODIUM 5000 UNITS: 5000 INJECTION INTRAVENOUS; SUBCUTANEOUS at 06:04

## 2025-04-08 RX ADMIN — PROPOFOL 150 MCG/KG/MIN: 10 INJECTION, EMULSION INTRAVENOUS at 10:04

## 2025-04-08 RX ADMIN — ALPRAZOLAM 2 MG: 0.25 TABLET ORAL at 08:04

## 2025-04-08 RX ADMIN — HYPROMELLOSE 2910 1 DROP: 5 SOLUTION/ DROPS OPHTHALMIC at 10:04

## 2025-04-08 RX ADMIN — MUPIROCIN: 20 OINTMENT TOPICAL at 08:04

## 2025-04-08 NOTE — ANESTHESIA PREPROCEDURE EVALUATION
[facility]  Anesthesia Pre-Operative Evaluation         Patient Name/: Joann Kenney, 1960  MRN: [id number]    SUBJECTIVE:     Pre-operative evaluation for Procedure(s) (LRB):  EGD (ESOPHAGOGASTRODUODENOSCOPY) (N/A)     2025    Joann Kenney is a 64 y.o. female     Patient now presents for the above procedure(s).    ________________________________________  Results for orders placed during the hospital encounter of 23    Echo Saline Bubble? No    Interpretation Summary  · The left ventricle is normal in size with eccentric hypertrophy and normal systolic function.  · The estimated ejection fraction is 65-70%.  · Grade II left ventricular diastolic dysfunction.  · Normal right ventricular size with normal right ventricular systolic function.  · The estimated PA systolic pressure is 32 mmHg.  · Normal central venous pressure (3 mmHg).  · Mild left atrial enlargement.    ________________________________________    LDA:        Peripheral IV - Single Lumen 25 1706 20 G Other (Comments) Anterior;Left Upper Arm (Active)   Site Assessment Dry 25   Line Securement Device Secured with sutureless device 25   Extremity Assessment Distal to IV No swelling 25   Line Status Saline locked 25   Dressing Status Clean;Dry 25   Dressing Intervention Integrity maintained 25   Dressing Change Due 04/10/25 04/07/25 2039   Site Change Due 04/10/25 04/07/25 2039   Reason Not Rotated Not due 25   Number of days: 1       Drips:    0.9% NaCl   Intravenous Continuous           Problem List[1]    Review of patient's allergies indicates:   Allergen Reactions    Sevelamer carbonate Other (See Comments)     Severe constipation       Current Inpatient Medications:    ALPRAZolam  2 mg Oral QHS    atorvastatin  20 mg Oral QHS    bisacodyL  10 mg Oral Daily    bisacodyL  10 mg Rectal Once    carvediloL  12.5 mg Oral BID WM     cinacalcet  60 mg Oral BID WM    docusate sodium  100 mg Oral TID PC    ezetimibe  10 mg Oral Daily    fluticasone propionate  2 spray Each Nostril Daily    heparin (porcine)  5,000 Units Subcutaneous Q8H    losartan  50 mg Oral Daily    mupirocin   Nasal BID    mycophenolate sodium  360 mg Oral BID    pantoprazole  40 mg Oral Daily    polyethylene glycol  17 g Oral BID    potassium chloride  20 mEq Oral Once    predniSONE  5 mg Oral Daily    senna  8.6 mg Oral Daily    sodium chloride 0.9%  3 mL Intravenous Q8H    tacrolimus  3 mg Oral BID       Medications Ordered Prior to Encounter[2]    Past Surgical History:   Procedure Laterality Date    CORONARY ANGIOPLASTY WITH STENT PLACEMENT      HYSTERECTOMY      INCONTINENCE SURGERY      INSERTION OF IMPLANTABLE LOOP RECORDER      internal heart monitor      KIDNEY TRANSPLANT Right 7/24/2024    Procedure: TRANSPLANT, KIDNEY;  Surgeon: Alphonso Mno MD;  Location: [facility] OR [address] [place] [address];  Service: Transplant;  Laterality: Right;    PERITONEAL CATHETER INSERTION      PERITONEAL CATHETER REMOVAL Left 7/24/2024    Procedure: REMOVAL, CATHETER, DIALYSIS, PERITONEAL;  Surgeon: Alphonso Mon MD;  Location: [facility] OR [address] [place] [address];  Service: Transplant;  Laterality: Left;       Social History:  Tobacco Use: Medium Risk (4/8/2025)    Patient History     Smoking Tobacco Use: Former     Smokeless Tobacco Use: Never     Passive Exposure: Not on file       Alcohol Use: Not At Risk (3/12/2025)    AUDIT-C     Frequency of Alcohol Consumption: Never     Average Number of Drinks: Patient does not drink     Frequency of Binge Drinking: Never       OBJECTIVE:     Vital Signs Range:  BMI Readings from Last 1 Encounters:   04/08/25 27.96 kg/m²       Temp:  [36.3 °C (97.3 °F)-36.9 °C (98.5 °F)]   Pulse:  [50-77]   Resp:  [14-18]   BP: (136-206)/(71-92)   SpO2:  [97 %-100 %]        Significant Labs:        Component Value Date/Time    WBC 5.34 04/08/2025 0607    WBC 8.4 03/13/2020 1039    HGB 11.7 (L)  04/08/2025 0607    HGB 8.1 (L) 08/26/2024 0533    HCT 34.8 (L) 04/08/2025 0607    HCT 24.6 (L) 08/26/2024 0533     04/08/2025 0607     08/26/2024 0533     04/08/2025 0607     08/26/2024 0533    K 3.3 (L) 04/08/2025 0607    K 3.7 08/26/2024 0533     04/08/2025 0607     08/26/2024 0533    CO2 25 04/08/2025 0607    CO2 24 08/26/2024 0533    GLU 85 08/26/2024 0533    BUN 18 04/08/2025 0607    CREATININE 1.6 (H) 04/08/2025 0607    MG 1.9 04/08/2025 0607    PHOS 3.0 08/26/2024 0533    CALCIUM 10.8 (H) 04/08/2025 0607    CALCIUM 8.9 08/26/2024 0533    ALBUMIN 3.5 04/08/2025 0607    ALBUMIN 2.4 (L) 08/26/2024 0533    PROT 5.6 (L) 08/09/2024 0737    ALKPHOS 180 (H) 04/08/2025 0607    ALKPHOS 75 08/09/2024 0737    BILITOT 0.5 04/08/2025 0607    BILITOT 0.5 08/09/2024 0737    AST 19 04/08/2025 0607    AST 15 08/09/2024 0737    ALT 25 04/08/2025 0607    ALT 19 08/09/2024 0737    INR 1.0 08/02/2024 1449    HGBA1C 7.1 (H) 04/01/2025 0850    HGBA1C 4.8 07/24/2024 0317    HGBA1C 4.8 07/24/2024 0317        Please see Results Review for additional labs.     Diagnostic Studies: No relevant studies.    EKG:   Results for orders placed or performed during the hospital encounter of 08/09/24   EKG 12-lead    Collection Time: 08/09/24  1:22 AM   Result Value Ref Range    QRS Duration 96 ms    [facility] QTC Calculation 480 ms    Narrative    Test Reason : E87.8,    Vent. Rate : 069 BPM     Atrial Rate : 069 BPM     P-R Int : 148 ms          QRS Dur : 096 ms      QT Int : 448 ms       P-R-T Axes : 042 045 002 degrees     QTc Int : 480 ms    Normal sinus rhythm  Low septal forces - Cannot rule out Anterior infarct ,age undetermined vs  lead placement  T wave abnormality, consider inferior ischemia  Abnormal ECG  When compared with ECG of 24-JUL-2024 02:37,  Minimal criteria for Anterior infarct are now Present vs lead placement  T wave inversion now evident in Inferior leads  Inverted T waves have replaced  nonspecific T wave abnormality in Lateral  leads  QT has lengthened  Confirmed by Jaden MONTOYA MD ([id number]) on 8/9/2024 3:12:26 PM    Referred By: [email or web address]   SELF           Confirmed By:Jaden MONTOYA MD       ECHO:  See subjective, if available.      ASSESSMENT/PLAN:                                                                                                                 04/08/2025  Joann Kenney is a 64 y.o., female.      Pre-op Assessment          Review of Systems  Cardiovascular:     Hypertension   CAD   CABG/stent                                       Pulmonary:        Sleep Apnea                Renal/:     KIDNEY TRANSPLANT             Neurological:   CVA                                    Endocrine:  Diabetes, type 2           Psych:  Psychiatric History                     Anesthesia Plan  Type of Anesthesia, risks & benefits discussed:    Anesthesia Type: Gen Natural Airway  Intra-op Monitoring Plan: Standard ASA Monitors  Induction:  IV  Informed Consent: Informed consent signed with the Patient and all parties understand the risks and agree with anesthesia plan.  All questions answered.   ASA Score: 3  Day of Surgery Review of History & Physical: H&P Update referred to the surgeon/provider.    Ready For Surgery From Anesthesia Perspective.     .           [1]   Patient Active Problem List  Diagnosis    Diabetes mellitus due to underlying condition with chronic kidney disease on chronic dialysis, with long-term current use of insulin    Renovascular hypertension    Solitary kidney, congenital    Coronary artery disease involving native coronary artery of native heart without angina pectoris    Other hyperlipidemia    Anemia in ESRD (end-stage renal disease)    Class 1 obesity due to excess calories with serious comorbidity and body mass index (BMI) of 30.0 to 30.9 in adult    History of stroke    Type 2 DM with CKD stage 3 and hypertension    Syncope    ALEJA on CPAP    Secondary hyperparathyroidism     Adrenal corticosteroid causing adverse effect in therapeutic use    S/P kidney transplant    Prophylactic immunotherapy    Immunosuppressed status    Acute blood loss anemia    Salivary gland infection    Hypertension associated with transplantation    Anxiety    Constipation    Anemia of chronic disease    Wound infection after surgery    Infection due to ESBL-producing Klebsiella pneumoniae    Hypokalemia    Right upper quadrant abdominal pain    Long-term use of immunosuppressant medication    RLQ abdominal pain    Chronic nausea   [2]   No current facility-administered medications on file prior to encounter.     Current Outpatient Medications on File Prior to Encounter   Medication Sig Dispense Refill    ALPRAZolam (XANAX) 2 MG Tab Take 2 mg by mouth every evening.      atorvastatin (LIPITOR) 20 MG tablet Take 1 tablet (20 mg total) by mouth every evening. 90 tablet 3    blood sugar diagnostic Strp use 1 strip to check blood glucose 3 (three) times daily. 100 strip 1    blood-glucose meter Misc use as directed to check blood glucose 1 each 0    blood-glucose sensor (DEXCOM G7 SENSOR) Blessing Change every 10 days. 3 each 11    carvediloL (COREG) 6.25 MG tablet Take 1 tablet (6.25 mg total) by mouth 2 (two) times daily. 180 tablet 3    cetirizine (ZYRTEC) 5 MG tablet Take 1 tablet (5 mg total) by mouth once daily. 30 tablet 11    cinacalcet (SENSIPAR) 60 MG Tab Take 1 tablet (60 mg total) by mouth 2 (two) times daily with meals. 180 tablet 3    DULoxetine (CYMBALTA) 30 MG capsule Take 30 mg by mouth once daily.      ezetimibe (ZETIA) 10 mg tablet Take 1 tablet (10 mg total) by mouth once daily. 90 tablet 3    insulin aspart U-100 (NOVOLOG) 100 unit/mL (3 mL) InPn pen Inject subcutaneously as needed for sliding scale. Total daily dose: 15 units/day 3 mL 5    insulin glargine U-100, Lantus, (LANTUS SOLOSTAR U-100 INSULIN) 100 unit/mL (3 mL) InPn pen Inject 13 Units into the skin once daily. 11.7 mL 3    k phos di &  "mono-sod phos mono (K-PHOS-NEUTRAL) 250 mg Tab Take 2 tablets by mouth 2 (two) times a day. 120 tablet 5    lancets Misc 1 lancet each to check blood glucose 3 (three) times daily. 100 each 1    magnesium oxide (MAG-OX) 400 mg (241.3 mg magnesium) tablet Take 2 tablets (800 mg total) by mouth 2 (two) times daily. 60 tablet 5    multivitamin Tab Take 1 tablet by mouth once daily.      mycophenolate sodium (MYFORTIC) 180 MG TbEC Take 2 tablets (360 mg total) by mouth 2 (two) times daily. 360 tablet 3    olmesartan (BENICAR) 40 MG tablet Take 0.5 tablets (20 mg total) by mouth once daily. 45 tablet 3    ondansetron (ZOFRAN-ODT) 4 MG TbDL Take 1 tablet (4 mg total) by mouth every 8 (eight) hours as needed (as needed for nausea). 90 tablet 0    oxybutynin (DITROPAN) 5 MG Tab Take 1 tablet (5 mg total) by mouth 3 (three) times daily as needed (bladder spasms). 12 tablet 0    pantoprazole (PROTONIX) 40 MG tablet Take 1 tablet (40 mg total) by mouth once daily. 30 tablet 2    pen needle, diabetic 32 gauge x 5/32" Ndle 1 each for use with insulin pen 3 (three) times daily. 100 each 1    predniSONE (DELTASONE) 5 MG tablet Take by mouth daily; 7/27/2024-8/2/2024: 20 mg, 8/3/2024-8/9/2024: 15 mg; 8/10/2024-8/16/2024: 10 mg; 8/17/2024- forever: 5 mg; do not stop 70 tablet 11    tacrolimus (PROGRAF) 1 MG Cap Take 4 capsules (4 mg total) by mouth once daily AND 3 capsules (3 mg total) every evening. 630 capsule 3    tirzepatide (MOUNJARO) 2.5 mg/0.5 mL PnIj Inject 2.5 mg into the skin every 7 days. 6 mL 0     "

## 2025-04-08 NOTE — PLAN OF CARE
-Patient AAOX4-Afebrile- Independent   -Patient received all meds as scheduled  -Patient rested  well with no complaints of pain   -Patient did not report any BM overnight - Refused MiraLax

## 2025-04-08 NOTE — INTERVAL H&P NOTE
The patient has been examined and the H&P has been reviewed:    Pre-Procedure H and P Addendum    Patient seen and examined.  History and exam unchanged from prior history and physical.      Procedure: EGD  Indication: Chronic nausea  ASA Class: per anesthesiology  Airway: normal  Neck Mobility: full range of motion  Mallampatti score: per anesthesia  History of anesthesia problems: no  Family history of anesthesia problems: no  Anesthesia Plan: MAC      Active Hospital Problems    Diagnosis  POA    *RLQ abdominal pain [R10.31]  Yes    Chronic nausea [R11.0]  Yes    Right upper quadrant abdominal pain [R10.11]  Yes    Long-term use of immunosuppressant medication [Z79.60]  Not Applicable    Anemia of chronic disease [D63.8]  Yes    Anxiety [F41.9]  Yes    Prophylactic immunotherapy [Z29.89]  Not Applicable    S/P kidney transplant [Z94.0]  Not Applicable    Secondary hyperparathyroidism [N25.81]  Yes    Diabetes mellitus due to underlying condition with chronic kidney disease on chronic dialysis, with long-term current use of insulin [E08.22, N18.6, Z79.4, Z99.2]  Not Applicable      Resolved Hospital Problems    Diagnosis Date Resolved POA    Nausea & vomiting [R11.2] 04/07/2025 Yes

## 2025-04-08 NOTE — SUBJECTIVE & OBJECTIVE
Subjective:   History of Present Illness:  Joann Kenney is a 63 y/o F s/p DBD kidney transplant 7/24/2024 for ESRD 2/2 Type II DM-(KDPI 85%, CIT >23 hrs, Thymo induction, CMV ++). She has CKD stage 3 - GFR 30-59 and her baseline creatinine is between 1.3-1.7. PMHx born w solitary kidney, DM2, HTN, incontinence (s/p bladder sling), CAD with stents (on ASA), atrial fibrillation, CVA (no residual deficits), ALEJA, and anemia. Surgery without complications.    On 4/4 patient presented to OSH for severe RU ABD pain- she underwent CT abd which was not consistent with cholecystitis.  Cr 1.7 ( baseline) . D/T on going Abd pain. Patient discussed with Dr Landry will admit for further abd pain evaluation UA, Kidney Transplant US . She denies recent cold or dysuria fever , n/v/d         Hospital Course:  Patient admitted with abdominal pain and n/v. She has had nausea and vomiting for months. The abdominal pain is new. CT scan at OSH with constipation.  KUB still with stool burden. GI also consulted given chronic nausea/vomiting to assess need for EGD.     Interval History: No acute events overnight. Patient feeling better in terms of abdominal pain. Able to have BMs. Going for EGD today to assess for nausea/vomiting. EBV and CMV PCR pending. Creatinine is rising. Plan for IVF hydration. Continue to monitor.         Past Medical, Surgical, Family, and Social History:   Unchanged from H&P.    Scheduled Meds:   ALPRAZolam  2 mg Oral QHS    atorvastatin  20 mg Oral QHS    bisacodyL  10 mg Oral Daily    bisacodyL  10 mg Rectal Once    carvediloL  12.5 mg Oral BID WM    cinacalcet  60 mg Oral BID WM    docusate sodium  100 mg Oral TID PC    ezetimibe  10 mg Oral Daily    fluticasone propionate  2 spray Each Nostril Daily    heparin (porcine)  5,000 Units Subcutaneous Q8H    losartan  50 mg Oral Daily    mupirocin   Nasal BID    mycophenolate sodium  360 mg Oral BID    pantoprazole  40 mg Oral Daily    polyethylene glycol  17 g  Oral BID    potassium chloride  20 mEq Oral Once    predniSONE  5 mg Oral Daily    senna  8.6 mg Oral Daily    sodium chloride 0.9%  3 mL Intravenous Q8H    tacrolimus  3 mg Oral BID     Continuous Infusions:   0.9% NaCl   Intravenous Continuous        0.9% NaCl   Intravenous Continuous         PRN Meds:  Current Facility-Administered Medications:     acetaminophen, 650 mg, Oral, Q6H PRN    dextrose 50%, 12.5 g, Intravenous, PRN    dextrose 50%, 25 g, Intravenous, PRN    glucagon (human recombinant), 1 mg, Intramuscular, PRN    glucose, 16 g, Oral, PRN    glucose, 24 g, Oral, PRN    hydrALAZINE, 10 mg, Intravenous, Q8H PRN    insulin aspart U-100, 0-5 Units, Subcutaneous, QID (AC + HS) PRN    ondansetron, 4 mg, Oral, Q8H PRN    ondansetron, 4 mg, Intravenous, Q8H PRN    Intake/Output - Last 3 Shifts         04/06 0700  04/07 0659 04/07 0700 04/08 0659 04/08 0700 04/09 0659    P.O. 480 1200     I.V. (mL/kg) 50 (0.6)      Total Intake(mL/kg) 530 (6.7) 1200 (15.2)     Urine (mL/kg/hr) 1100 (0.6)      Stool 0      Total Output 1100      Net -570 +1200            Urine Occurrence  6 x     Stool Occurrence 2 x 2 x              Review of Systems   Constitutional:  Negative for chills and fever.   Respiratory:  Negative for shortness of breath.    Gastrointestinal:  Positive for constipation, nausea and vomiting. Negative for abdominal distention.   Genitourinary:  Negative for difficulty urinating.   Allergic/Immunologic: Positive for immunocompromised state.   Neurological:  Negative for weakness.   Psychiatric/Behavioral:  The patient is nervous/anxious.       Objective:     Vital Signs (Most Recent):  Temp: 97.3 °F (36.3 °C) (04/08/25 0837)  Pulse: (!) 50 (04/08/25 0837)  Resp: 14 (04/08/25 0837)  BP: (!) 206/92 (04/08/25 0837)  SpO2: 99 % (04/08/25 0837) Vital Signs (24h Range):  Temp:  [97.3 °F (36.3 °C)-98.6 °F (37 °C)] 97.3 °F (36.3 °C)  Pulse:  [50-77] 50  Resp:  [14-18] 14  SpO2:  [97 %-100 %] 99 %  BP:  "(123-206)/(70-92) 206/92     Weight: 76.2 kg (168 lb)  Height: 5' 5" (165.1 cm)  Body mass index is 27.96 kg/m².     Physical Exam  Vitals and nursing note reviewed.   Cardiovascular:      Rate and Rhythm: Normal rate.   Pulmonary:      Effort: Pulmonary effort is normal.   Abdominal:      General: There is no distension.      Tenderness: There is no abdominal tenderness.   Neurological:      Mental Status: She is alert and oriented to person, place, and time.   Psychiatric:         Mood and Affect: Mood normal.         Behavior: Behavior normal.         Thought Content: Thought content normal.         Judgment: Judgment normal.          Laboratory:  CBC:   Recent Labs   Lab 04/06/25  0900 04/07/25  0540 04/08/25  0607   WBC 5.05 6.34 5.34   RBC 3.86* 3.68* 3.81*   HGB 12.0 11.9* 11.7*   HCT 35.8* 34.4* 34.8*    232 250   MCV 93 94 91   MCH 31.1* 32.3* 30.7   MCHC 33.5 34.6 33.6     BMP:   Recent Labs   Lab 04/06/25  0900 04/07/25  0540 04/08/25  0607    138 138   K 3.8 3.5 3.3*   * 111* 107   CO2 20* 19* 25   BUN 17 17 18   CREATININE 1.2 1.3 1.6*   CALCIUM 10.8* 10.1 10.8*       Diagnostic Results:  None  "

## 2025-04-08 NOTE — PLAN OF CARE
Pt is AAOx4; afebrile; vital signs stable. BG  [phone]. She had an EGD this morning with biopsies taken. Her medications were not taken prior to leaving, because she was scared of vomiting on an empty stomach. Upon arrival back to [facility], it has taken her a long time to take her medications, with some of them being skipped d/t it becoming too late. She has not had any vomiting, but has been reluctant to take her meds today d/t fear of vomiting. NS started at 75/hr. K 3.3; given PO replacement. She is up indpendently and ambulates in the halls.  Fall precautions in place and fall education given. bed in low position, call bell in reach, non-skid socks on when pt not in bed.

## 2025-04-08 NOTE — PROVATION PATIENT INSTRUCTIONS
Discharge Summary/Instructions after an Endoscopic Procedure  Patient Name: Joann Kenney  Patient MRN: [id number]  Patient YOB: 1960 Tuesday, April 8, 2025  Nino Mccray MD  Dear patient,  As a result of recent federal legislation (The Federal Cures Act), you may   receive lab or pathology results from your procedure in your [facility]   account before your physician is able to contact you. Your physician or   their representative will relay the results to you with their   recommendations at their soonest availability.  Thank you,  RESTRICTIONS:  During your procedure today, you received medications for sedation.  These   medications may affect your judgment, balance and coordination.  Therefore,   for 24 hours, you have the following restrictions:   - DO NOT drive a car, operate machinery, make legal/financial decisions,   sign important papers or drink alcohol.    ACTIVITY:  Today: no heavy lifting, straining or running due to procedural   sedation/anesthesia.  The following day: return to full activity including work.  DIET:  Eat and drink normally unless instructed otherwise.     TREATMENT FOR COMMON SIDE EFFECTS:  - Mild abdominal pain, nausea, belching, bloating or excessive gas:  rest,   eat lightly and use a heating pad.  - Sore Throat: treat with throat lozenges and/or gargle with warm salt   water.  - Because air was used during the procedure, expelling large amounts of air   from your rectum or belching is normal.  - If a bowel prep was taken, you may not have a bowel movement for 1-3 days.    This is normal.  SYMPTOMS TO WATCH FOR AND REPORT TO YOUR PHYSICIAN:  1. Abdominal pain or bloating, other than gas cramps.  2. Chest pain.  3. Back pain.  4. Signs of infection such as: chills or fever occurring within 24 hours   after the procedure.  5. Rectal bleeding, which would show as bright red, maroon, or black stools.   (A tablespoon of blood from the rectum is not serious, especially if    Tylenol or Ibuprofen  Fluids  Rest  Go to the Emergency Room if ANY worsening of symptoms!   ENTIRE household should self quarantine until you get your COVID test result back (usually takes 1 - 2 days).  Entire household to continue quarantine for at least  5 - 10 days if positive.  Symptoms and fevers should be resolved.     For family/friends, if needed:  If COVID-19 symptoms or concerns develop or worsen, return and/or self-quarantine for 14 days.  There is a testing site at [address] [place] [address] in [place] for free drive-up testing Monday through Saturdays from 8 am - 4 pm.     Thank you for choosing [facility] for your healthcare needs.    We strive to provide you with excellent service and hope that we have exceeded your expectations.     If you receive a survey in the mail, we hope that you will complete it and agree that we have provided \"Very Good\" care today.  If you would like to speak to us about your visit, please contact our center at:    [facility] [place]  [phone]    [facility]  [phone]    [facility]  [phone]  Patient Education       Enfermedad del coronavirus 2019 (COVID-19): descripción general  La enfermedad del coronavirus 2019 (COVID-19) es jono afección que infecta los pulmones. La causa es un tipo de coronavirus llamado SARS-CoV-2. Hay muchos tipos de coronavirus. Los coronavirus son jono causa muy común de los resfríos y las bronquitis. Estos virus puede producir jono infección en los pulmones llamada neumonía. Los síntomas pueden variar de leves a graves. Algunas personas no presentan síntomas. Estos virus también se encuentran en algunos animales.   [place] todos los virus, el virus que causa la COVID-19 cambia (muta) todo el tiempo. [place] provoca diferentes versiones de un virus, llamadas variantes. Las variantes del virus de la COVID-19 pueden transmitirse con más facilidad de jono persona a otra, y pueden  causar síntomas más leves o más graves.    Aún no se sabe con certeza cómo se propaga el virus. El virus parece propagarse e infectar a las personas con facilidad. Algunas personas no saben cómo contrajeron el virus. Es posible que el virus se propague a través de las pequeñas gotas de líquido que hay en el aire cuando jono persona tose o estornuda. Puede propagarse si toca jono superficie en la que está el virus y luego se toca los ojos, la nariz o la boca. Las manijas, los picaportes y otros objetos pueden tener el virus.      Para ayudar a evitar que la infección se propague, lávese las kirill con frecuencia o utilice un desinfectante para kirill a base de alcohol.     Para obtener la última información de los Centros para el Control y la Prevención de Enfermedades (CDC, por seymour sigla en inglés):     · visite el sitio web de los CDC  · o llame al [phone]-[phone] ([phone]).    ¿Cuáles son los síntomas de la COVID-19?  Algunas personas no presentan síntomas. Algunas tienen síntomas leves, y otras personas pueden tener síntomas graves. Los tipos de síntomas pueden variar de jono persona a otra, y pueden aparecer de 2 a 14 días después de entrar en contacto con el virus. Entre ellos, se incluyen los siguientes:   · Fiebre  · Escalofríos  · Tos  · Dificultad para respirar o sensación de falta de aire  · Dolor de garganta  · Congestión o goteo nasal  · Dolor de mando  · Nicole corporales  · Cansancio  · Náuseas, vómitos, diarrea o dolor abdominal  · Pérdida reciente del sentido del olfato o del gusto  Puede evaluar judah síntomas con el examen de autoevaluación para coronavirus de los CDC.   ¿Cuáles son las complicaciones posibles de la COVID-19?  El virus puede causar jono infección en ambos pulmones llamada neumonía. En algunos casos, esto puede provocar la muerte. Los expertos siguen estudiando las complicaciones de la COVID-19. Con el tiempo, es posible que se vinculen otras complicaciones con el virus.   Algunas  hemorrhoids are present.)  6. Vomiting.  7. Weakness or dizziness.  GO DIRECTLY TO THE NEAREST EMERGENCY ROOM IF YOU HAVE ANY OF THE FOLLOWING:      Difficulty breathing              Chills and/or fever over 101 F   Persistent vomiting and/or vomiting blood   Severe abdominal pain   Severe chest pain   Black, tarry stools   Bleeding- more than one tablespoon   Any other symptom or condition that you feel may need urgent attention  Your doctor recommends these additional instructions:  If any biopsies were taken, your doctors clinic will contact you in 1 to 2   weeks with any results.  - [facility].   - Advance diet as tolerated.   - Continue present medications.   - Await pathology results.  For questions, problems or results please call your physician - Nino Mccray MD at Work:  [phone].  [facility], EMERGENCY ROOM PHONE NUMBER: [phone]  IF A COMPLICATION OR EMERGENCY SITUATION ARISES AND YOU ARE UNABLE TO REACH   YOUR PHYSICIAN - GO DIRECTLY TO THE EMERGENCY ROOM.  Nino Mccray MD  4/8/2025 11:36:19 AM  This report has been verified and signed electronically.  Dear patient,  As a result of recent federal legislation (The Federal Cures Act), you may   receive lab or pathology results from your procedure in your [facility]   account before your physician is able to contact you. Your physician or   their representative will relay the results to you with their   recommendations at their soonest availability.  Thank you,  PROVATION   personas corren un mayor riesgo de sufrir complicaciones. Estas son:  · Adultos mayores  · Personas con enfermedades del corazón o los pulmones  · Personas con diabetes o enfermedad renal  · Personas con afecciones de charmaine que inhiben el sistema inmunitario  · Personas que jailene medicamentos que inhiben el sistema inmunitario  Raras veces, los niños pueden tener jono complicación grave, llamada síndrome inflamatorio multisistémico pediátrico (MIS-C, por seymour sigla en inglés). El MIS-C se parece a la enfermedad de [place], jono enfermedad poco frecuente que produce inflamación en los vasos sanguíneos y los órganos del cuerpo. Aún se desconoce si el MIS-C afecta únicamente a los niños, o si los adultos también están en riesgo. [place] se sabe si está relacionado con la COVID-19. Muchos niños con MIS-C clinton dado positivo para COVID-19. Lisa no todos dieron positivo. Los especialistas continúan estudiando el MIS-C.    ¿Cómo se diagnostica la COVID-19?  Seymour proveedor de atención médica le hará preguntas sobre lo siguiente:  · Qué síntomas tiene  · Dónde vive  · Si ha viajado recientemente  · Si tuvo contacto con personas enfermas   Puede realizarse jono de las siguientes pruebas de detección de COVID-19:  · Prueba viral (molecular). También se la llama prueba RT-PCR. Las pruebas virales son muy precisas. Mediante jono prueba viral, se detecta el ADN del virus SARS-CoV-2. Mediante jono prueba viral también se detectan las variantes del virus de la COVID-19. Hay varias formas de realizarla. Pueden pasarle un hisopo de algodón por el interior de la nariz o la garganta. Otra opción es insertarle un hisopo francheska por la fosa nasal para frotar la parte posterior de la garganta. O podrían tomarle jono muestra de saliva. Los resultados de seymour prueba pueden estar listos aproximadamente en 30 minutos. [place] dependerá del tipo de prueba. Algunas pruebas se deben enviar a un laboratorio y los resultados pueden tardar varios días. Ahora hay  disponibles kits de prueba para realizarse en casa, charlene algunos requieren jono receta. Si usa un kit de prueba en seymour casa, siga cuidadosamente las instrucciones del kit. Con algunos kits, puede obtener resultados en casa rápidamente. Otros se deben enviar a un laboratorio para conocer los resultados.  · Prueba de antígenos. Mediante esta prueba, se detectan las proteínas del virus SARS-CoV-2. Pueden pasarle un hisopo de algodón por el interior de la nariz o la garganta. Otra opción es insertarle un hisopo francheska por la fosa nasal para frotar la parte posterior de la garganta. Algunos resultados están disponibles en aproximadamente 1 hora. [place] dependerá del tipo de prueba. Los resultados positivos son muy precisos, charlene puede malissa casos de falsos positivos. [place] es más común en lugares en donde no muchas personas contrajeron el virus. Con las pruebas de antígenos, hay más probabilidades de pasar por alto jono infección por COVID-19 que con jono prueba viral (molecular). Si seymour prueba de antígenos es negativa, charlene tiene síntomas de COVID-19, es posible que deba realizarse jono prueba viral.  Es posible que le realicen otras pruebas si seymour proveedor confirma que tiene COVID-19. Estas pruebas pueden incluir las siguientes:   · Análisis de anticuerpos. Abena tipo de prueba puede mostrar si se infectó con el virus en el pasado. Muestra los anticuerpos en la reuben que dan algo de inmunidad. La precisión y la disponibilidad de estas pruebas varía. Es posible que jono prueba de anticuerpos no pueda detectar si usted tiene, actualmente, jono infección, ya que el cuerpo podría demorar algunas semanas en desarrollar anticuerpos.  · Cultivo de esputo. Si tiene jono tos productiva, pueden pedirle que tosa para obtener jono pequeña muestra de la mucosidad de los pulmones (esputo). [place] se hace para comprobar la presencia del virus o de neumonía.  · Pruebas de diagnóstico por imágenes. Puede que le realicen jono radiografía de tórax o jono  tomografía computarizada.  ¿Puedo tener COVID-19 otra vez?  En brea momento, no está fuentes si las personas pueden volver a tener la COVID-19. Los CDC indican que si jono persona se recuperó de la COVID-19 y se le vuelve a hacer la prueba dentro de los 3 meses, es posible que continúe teniendo niveles bajos del virus en el cuerpo y dé positivo en COVID-19, aunque no lo esté contagiando. Los expertos aún no saben cuánto tiempo dura la inmunidad después de tener el virus. Tampoco saben si jono persona puede tener COVID-19 otra vez.   Vacunas contra la COVID-19  La Administración de Alimentos y Medicamentos (FDA, por seymour sigla en inglés) aprobó varias vacunas para ayudar a prevenir la COVID-19. Es importante ponerse la vacuna, ya que ayuda a prevenir la propagación de la COVID-19 y judah variantes. También puede ayudar a reducir la gravedad de la COVID-19 si usted se enferma. [place] puede impedir que se enferme de gravedad y que deba ir al hospital. Por ahora, se aprobó solo jono vacuna para jóvenes mayores de 12 años. Las embarazadas o las mujeres que están amamantando pueden vacunarse. Pregúntele a seymour proveedor de atención médica qué vacuna es más adecuada para usted.   Las vacunas se administran mediante jono inyección que, por lo general, se coloca en el brazo. Hay vacunas de jono dosis y de dos dosis. Si le aplican la vacuna de dos dosis, la segunda dosis se administra varias semanas después de la primera.   ¿Cómo se trata la COVID-19?  Actualmente, los tratamientos más comprobados son aquellos que ayudan al cuerpo mientras combate el virus. [place] es lo que se conoce joesph tratamiento de apoyo. Esta información incluye lo siguiente:   · Descanse. [place] ayuda al cuerpo a combatir la enfermedad.  · [place] líquidos. Intente beber de 6 a 8 vasos de líquido al día. Pregúntele a seymour proveedor qué bebidas son más recomendables para usted. No tome bebidas con cafeína o alcohol.  · [place] medicamentos de venta margret. Estos ayudan a aliviar  el dolor y a bajar la fiebre. Pregunte a seymour proveedor qué medicamentos de venta margert puede usar.  Si la enfermedad es grave, quizás deba quedarse en el hospital. La atención puede incluir lo siguiente:   · Líquidos por vía intravenosa (IV). Se administran a través de jono vena. [place]ena procedimiento ayuda a reemplazar los líquidos del cuerpo.  · Oxígeno. Es posible que le den oxígeno suplementario. O quizás necesite un respirador artificial. [place] se hace para que el cuerpo reciba el oxígeno necesario.  · Posicionamiento boca abajo. Seymour equipo de atención médica puede acostarlo boca abajo periódicamente. [place]ena posicionamiento también se conoce joesph decúbito prono. Ayuda a aumentar la cantidad de oxígeno que llega hasta los pulmones. Siga las instrucciones del equipo de atención médica sobre los cambios de posición mientras esté en el hospital. También siga las recomendaciones que le den acerca de las mejores posiciones que ayudan a la respiración cuando regrese a casa.  · Remdesivir. [place] es un medicamento antiviral. La FDA lo aprobó para seymour uso en casos de COVID-19. [place] medicamento detiene la propagación del virus SARS-CoV-2 en el cuerpo. El remdesivir está aprobado solamente para el uso en personas que están en el hospital. Puede administrarse a personas mayores de 12 años que pesan más de 88 libras (40 kg). En algunos casos, puede utilizarse en personas menores de 12 años o que pesan menos de 88 libras (40 kg).  Los expertos están investigando otros tipos de tratamiento. Estos todavía están en jono fase de prueba. No son tratamientos que se usen ampliamente. Son los siguientes:   · Plasma de convaleciente de COVID-19. El plasma es la parte líquida de la reuben. Probablemente se les pida a las personas que tuvieron COVID-19 que donen plasma. [place] se denomina donación de plasma de convaleciente de COVID-19. El plasma puede tener anticuerpos que ayuden a combatir la COVID-19 en personas que estén muy enfermas por el  virus. Los expertos no saben cuán emeka funcionará el plasma. Sin embargo, las investigaciones continúan. La FDA lo aprobó para seymour uso en samira de emergencia en algunas personas con casos graves de COVID-19. Pregunte a seymour proveedor si usted cumple los requisitos para donar.  · Terapia de anticuerpos monoclonales. La FDA la aprobó para seymour uso en samira de emergencia en algunas personas que tienen jono prueba viral positiva en COVID-19 y presentan síntomas leves a moderados, charlene que no están hospitalizadas. Está aprobado para personas mayores de 12 años que pesan más de 88 libras (40 kg) y tienen un alto riesgo de COVID-19 grave con internación. [place] incluye a personas de 65 años o más y personas con algunas afecciones crónicas. Esta terapia no está aprobada para personas que están hospitalizadas con COVID-19 o que necesitan oxígeno. El equipo de atención médica le dirá si usted reúne los requisitos.  ¿Está en riesgo de tener COVID-19?  El riesgo de tener COVID-19 es mayor si algo de lo siguiente corresponde a seymour samira:  · Vive en un área con muchos casos de COVID-19.  · Ha viajado a un área con muchos casos de COVID-19.  · Tuvo contacto estrecho con jono persona que tuvo COVID-19.  Estar en contacto estrecho significa estar a menos de 6 pies (2 metros) de distancia de jono persona contagiada con COVID-19. Abena contacto dura 15 minutos o más. Podría tratarse de períodos breves de contacto que, en seymour totalidad, superen los 15 minutos en un período de 24 horas.   Tenga en cuenta que la COVID-19 puede propagarse a través de personas que no manifiestan síntomas.   Última actualización: 7/1/2021 © 2000-2021 [employer]. All rights reserved. This information is not intended as a substitute for professional medical care. Always follow your healthcare professional's instructions.

## 2025-04-08 NOTE — TREATMENT PLAN
GI Treatment Plan    EGD 4/8/25  Impression:              - LA Grade A reflux esophagitis with no bleeding.                          - Normal gastric body and antrum. Biopsied.                          - Mucosal changes in the duodenum. Biopsied.                          - Mucosal changes in the duodenum. Biopsied.   Recommendation:          - [facility].                          - Advance diet as tolerated.                          - Continue present medications.                          - Await pathology results.       GI will sign off.    Connie Wright MD  Gastroenterology and Hepatology Fellow, PGY-4

## 2025-04-08 NOTE — ASSESSMENT & PLAN NOTE
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 She has CKD stage 3 - GFR 30-59 and her baseline creatinine is between 1.3-1.7. PMHx born w solitary kidney, DM2, HTN, incontinence (s/p bladder sling), CAD with stents (on ASA), atrial fibrillation, CVA (no residual deficits), ALEJA, and anemia. Surgery without complications.    - Creatinine elevated  - Give IVF  - Monitor with daily labs

## 2025-04-08 NOTE — NURSING TRANSFER
Nursing Transfer Note      4/8/2025   11:34 AM    Nurse giving handoff: Zuleika ART RN  Nurse receiving handoff:Zuleika SINGLETON RN    Transfer To: [id number]    Transfer via stretcher    Transfer with N/A    Transported by PCT    Order for Tele Monitor? Yes    Additional Lines: none    Medicines sent: none    Any special needs or follow-up needed: no    Patient belongings transferred with patient: No belongings with pt    Chart send with patient: Yes    Patient reassessed at: 1130

## 2025-04-08 NOTE — TRANSFER OF CARE
"Anesthesia Transfer of Care Note    Patient: Joann Kenney    Procedure(s) Performed: Procedure(s) (LRB):  EGD (ESOPHAGOGASTRODUODENOSCOPY) (N/A)    Patient location: PACU    Anesthesia Type: general    Transport from OR: Transported from OR on room air with adequate spontaneous ventilation    Post pain: adequate analgesia    Post assessment: no apparent anesthetic complications and tolerated procedure well    Post vital signs: stable    Level of consciousness: awake, alert and oriented    Nausea/Vomiting: no nausea/vomiting    Complications: none    Transfer of care protocol was followedComments: Bedside report to PACU RN, opportunity for questions given.       Last vitals: Visit Vitals  BP (!) 122/95 (BP Location: Right arm, Patient Position: Lying)   Pulse (!) 50   Temp 36.3 °C (97.3 °F) (Skin)   Resp 14   Ht 5' 5" (1.651 m)   Wt 76.2 kg (168 lb)   SpO2 98%   Breastfeeding No   BMI 27.96 kg/m²     "

## 2025-04-08 NOTE — ASSESSMENT & PLAN NOTE
- assess CT scan from OSH  - Kidney US with good flow. Collection seen. IR attempted aspiration but no fluid successfully aspirated   - UA cx multiple org none in predominance  - Likely 2/2 to constipation  - Give mag citrate and suppository   - GI consulted given nausea/vomiting. Going for EGD today 4/8.   - monitor

## 2025-04-08 NOTE — DISCHARGE SUMMARY
[facility]  Kidney Transplant  Discharge Summary    Patient Name: Joann Kenney  MRN: [id number]  Admission Date: 4/5/2025  Hospital Length of Stay: 4 days  Discharge Date and Time: 04/09/2025 7:22 AM  Attending Physician: Joce Landry MD   Discharging Provider: Morena Gudino PA-C  Primary Care Provider: Zulma, Primary Doctor    HPI:   Joann Kenney is a 63 y/o F s/p DBD kidney transplant 7/24/2024 for ESRD 2/2 Type II DM-(KDPI 85%, CIT >23 hrs, Thymo induction, CMV ++). She has CKD stage 3 - GFR 30-59 and her baseline creatinine is between 1.3-1.7. PMHx born w solitary kidney, DM2, HTN, incontinence (s/p bladder sling), CAD with stents (on ASA), atrial fibrillation, CVA (no residual deficits), ALEJA, and anemia. Surgery without complications.    On 4/4 patient presented to OSH for severe RU ABD pain- she underwent CT abd which was not consistent with cholecystitis.  Cr 1.7 ( baseline) . D/T on going Abd pain. Patient discussed with Dr Landry will admit for further abd pain evaluation UA, Kidney Transplant US . She denies recent cold or dysuria fever , n/v/d   Procedure(s) (LRB):  EGD (ESOPHAGOGASTRODUODENOSCOPY) (N/A)     Hospital Course:    Patient admitted with abdominal pain and n/v. She has had nausea and vomiting for months. The abdominal pain is new. CT scan at OSH with constipation. KUB also with stool burden. She was given an increased bowel regiment (suppository and enema, stool softeners) and was able to have a BM. GI also consulted given chronic nausea/vomiting. Now s/p EGD which showed LA Grade A reflux esophagitis. Biopsies taken and pending. CMV and EBV PCR checked and not detected.     On day of discharge, patient is feeling fine, tolerating diet. Pain improved. She is stable and ready for discharge. She will follow up with labs on Monday 4/14 and Monday 4/21. Needs GI f/u. Patient verbalized understanding prior to discharge.       Goals of Care Treatment  Preferences:  Code Status: Full Code      Final Active Diagnoses:    Diagnosis Date Noted POA    Chronic nausea [R11.0] 04/07/2025 Yes    Long-term use of immunosuppressant medication [Z79.60] 04/05/2025 Not Applicable    Anemia of chronic disease [D63.8] 08/16/2024 Yes    Anxiety [F41.9] 07/31/2024 Yes    Prophylactic immunotherapy [Z29.89] 07/25/2024 Not Applicable    S/P kidney transplant [Z94.0] 07/25/2024 Not Applicable    Secondary hyperparathyroidism [N25.81] 02/01/2024 Yes    Diabetes mellitus due to underlying condition with chronic kidney disease on chronic dialysis, with long-term current use of insulin [E08.22, N18.6, Z79.4, Z99.2] 04/01/2021 Not Applicable      Problems Resolved During this Admission:    Diagnosis Date Noted Date Resolved POA    PRINCIPAL PROBLEM:  RLQ abdominal pain [R10.31] 04/07/2025 04/09/2025 Yes    Nausea & vomiting [R11.2] 04/07/2025 04/07/2025 Yes    Right upper quadrant abdominal pain [R10.11] 04/05/2025 04/09/2025 Yes     Treatments: see above    Consults (From admission, onward)          Status Ordering Provider     Inpatient consult to Gastroenterology  Once        Provider:  (Not yet assigned)    Completed STEVEN CASTLE     Inpatient consult to Critical Care Medicine  Once        Provider:  (Not yet assigned)    Completed DEBORA COULTER     Inpatient consult to Pulmonology  Once        Provider:  (Not yet assigned)    Completed DEBORA COULTER     Inpatient consult to PICC team ([facility])  Once        Provider:  (Not yet assigned)    Completed DEBORA COULTER     Inpatient consult to Interventional Radiology  Once        Provider:  (Not yet assigned)    Completed DEANNA BEST            Pending Diagnostic Studies:       None          Significant Diagnostic Studies: Labs: CMP   Recent Labs   Lab 04/08/25  0607 04/09/25  0631    138   K 3.3* 3.7    106   CO2 25 22*   BUN 18 23   CREATININE 1.6* 1.5*   CALCIUM 10.8* 10.4   ALBUMIN 3.5 3.1*   BILITOT 0.5  0.3   ALKPHOS 180* 177*   AST 19 14   ALT 25 18   ANIONGAP 6* 10   , CBC   Recent Labs   Lab 04/08/25  0607 04/09/25  0631   WBC 5.34 5.57   HGB 11.7* 11.0*   HCT 34.8* 31.7*    241   , and All labs within the past 24 hours have been reviewed    Discharged Condition: good    Disposition: Home or Self Care    Patient Instructions:      Diet Adult Regular     Notify your health care provider if you experience any of the following:  increased confusion or weakness     Notify your health care provider if you experience any of the following:  persistent dizziness, light-headedness, or visual disturbances     Notify your health care provider if you experience any of the following:  worsening rash     Notify your health care provider if you experience any of the following:  severe persistent headache     Notify your health care provider if you experience any of the following:  difficulty breathing or increased cough     Notify your health care provider if you experience any of the following:  redness, tenderness, or signs of infection (pain, swelling, redness, odor or green/yellow discharge around incision site)     Notify your health care provider if you experience any of the following:  severe uncontrolled pain     Notify your health care provider if you experience any of the following:  persistent nausea and vomiting or diarrhea     Notify your health care provider if you experience any of the following:  temperature >100.4     No dressing needed     Activity as tolerated     Medications:  Reconciled Home Medications:      Medication List        START taking these medications      losartan 100 MG tablet  Commonly known as: COZAAR  Take 1 tablet (100 mg total) by mouth once daily.  Start taking on: April 10, 2025  Replaces: olmesartan 40 MG tablet     polyethylene glycol 17 gram Pwpk  Commonly known as: GLYCOLAX  Take 17 g by mouth 2 (two) times daily.     senna-docusate 8.6-50 mg per tablet  Commonly known as:  SENNA WITH DOCUSATE SODIUM  Take 1 tablet by mouth once daily.            CHANGE how you take these medications      carvediloL 12.5 MG tablet  Commonly known as: COREG  Take 1 tablet (12.5 mg total) by mouth 2 (two) times daily.  What changed:   medication strength  how much to take     predniSONE 5 MG tablet  Commonly known as: DELTASONE  Take 1 tablet (5 mg total) by mouth once daily.  What changed:   how much to take  how to take this  when to take this  additional instructions     tacrolimus 1 MG Cap  Commonly known as: PROGRAF  Take 3 capsules (3 mg total) by mouth every 12 (twelve) hours.  What changed: See the new instructions.            CONTINUE taking these medications      ALPRAZolam 2 MG Tab  Commonly known as: XANAX  Take 2 mg by mouth every evening.     atorvastatin 20 MG tablet  Commonly known as: LIPITOR  Take 1 tablet (20 mg total) by mouth every evening.     blood sugar diagnostic Strp  use 1 strip to check blood glucose 3 (three) times daily.     blood-glucose meter Misc  use as directed to check blood glucose     cetirizine 5 MG tablet  Commonly known as: ZYRTEC  Take 1 tablet (5 mg total) by mouth once daily.     cinacalcet 60 MG Tab  Commonly known as: SENSIPAR  Take 1 tablet (60 mg total) by mouth 2 (two) times daily with meals.     DEXCOM G7 SENSOR Blessing  Generic drug: blood-glucose sensor  Change every 10 days.     DULoxetine 30 MG capsule  Commonly known as: CYMBALTA  Take 30 mg by mouth once daily.     ezetimibe 10 mg tablet  Commonly known as: ZETIA  Take 1 tablet (10 mg total) by mouth once daily.     insulin aspart U-100 100 unit/mL (3 mL) Inpn pen  Commonly known as: NovoLOG  Inject subcutaneously as needed for sliding scale. Total daily dose: 15 units/day     k phos di & mono-sod phos mono 250 mg Tab  Commonly known as: K-PHOS-NEUTRAL  Take 2 tablets by mouth 2 (two) times a day.     lancets Misc  1 lancet each to check blood glucose 3 (three) times daily.     LANTUS SOLOSTAR U-100  "INSULIN 100 unit/mL (3 mL) Inpn pen  Generic drug: insulin glargine U-100 (Lantus)  Inject 13 Units into the skin once daily.     magnesium oxide 400 mg (241.3 mg magnesium) tablet  Commonly known as: MAG-OX  Take 2 tablets (800 mg total) by mouth 2 (two) times daily.     MOUNJARO 2.5 mg/0.5 mL Pnij  Generic drug: tirzepatide  Inject 2.5 mg into the skin every 7 days.     multivitamin Tab  Take 1 tablet by mouth once daily.     mycophenolate sodium 180 MG Tbec  Commonly known as: MYFORTIC  Take 2 tablets (360 mg total) by mouth 2 (two) times daily.     ondansetron 4 MG Tbdl  Commonly known as: ZOFRAN-ODT  Take 1 tablet (4 mg total) by mouth every 8 (eight) hours as needed (as needed for nausea).     oxybutynin 5 MG Tab  Commonly known as: DITROPAN  Take 1 tablet (5 mg total) by mouth 3 (three) times daily as needed (bladder spasms).     pantoprazole 40 MG tablet  Commonly known as: PROTONIX  Take 1 tablet (40 mg total) by mouth once daily.     pen needle, diabetic 32 gauge x 5/32" Ndle  1 each for use with insulin pen 3 (three) times daily.            STOP taking these medications      olmesartan 40 MG tablet  Commonly known as: BENICAR  Replaced by: losartan 100 MG tablet            Time spent caring for patient (Greater than 1/2 spent in direct face-to-face contact): > 30 minutes    Morena Gudino PA-C  Kidney Transplant  [facility]  "

## 2025-04-08 NOTE — PROGRESS NOTES
SW spoke with Robinson Salguero (Medicaid Transportation) [phone] to arrange transportation home at discharge. Patient is going to [address], [address], [place] [address]. Transportation was arranged April 9, 2025 at 3pm. NP notified.   Reference #: [id number]    Kamilla Otero LCSW, Kidney Transplant

## 2025-04-08 NOTE — PROGRESS NOTES
[facility]  Kidney Transplant  Progress Note      Reason for Follow-up: Reassessment of Kidney Transplant - 7/24/2024  (#1) recipient and management of immunosuppression.       Subjective:   History of Present Illness:  Joann Kenney is a 63 y/o F s/p DBD kidney transplant 7/24/2024 for ESRD 2/2 Type II DM-(KDPI 85%, CIT >23 hrs, Thymo induction, CMV ++). She has CKD stage 3 - GFR 30-59 and her baseline creatinine is between 1.3-1.7. PMHx born w solitary kidney, DM2, HTN, incontinence (s/p bladder sling), CAD with stents (on ASA), atrial fibrillation, CVA (no residual deficits), ALEJA, and anemia. Surgery without complications.    On 4/4 patient presented to OSH for severe RU ABD pain- she underwent CT abd which was not consistent with cholecystitis.  Cr 1.7 ( baseline) . D/T on going Abd pain. Patient discussed with Dr Landry will admit for further abd pain evaluation UA, Kidney Transplant US . She denies recent cold or dysuria fever , n/v/d         Hospital Course:  Patient admitted with abdominal pain and n/v. She has had nausea and vomiting for months. The abdominal pain is new. CT scan at OSH with constipation.  KUB still with stool burden. GI also consulted given chronic nausea/vomiting to assess need for EGD.     Interval History: No acute events overnight. Patient feeling better in terms of abdominal pain. Able to have BMs. Going for EGD today to assess for nausea/vomiting. EBV and CMV PCR pending. Creatinine is rising. Plan for IVF hydration. Continue to monitor.         Past Medical, Surgical, Family, and Social History:   Unchanged from H&P.    Scheduled Meds:   ALPRAZolam  2 mg Oral QHS    atorvastatin  20 mg Oral QHS    bisacodyL  10 mg Oral Daily    bisacodyL  10 mg Rectal Once    carvediloL  12.5 mg Oral BID WM    cinacalcet  60 mg Oral BID WM    docusate sodium  100 mg Oral TID PC    ezetimibe  10 mg Oral Daily    fluticasone propionate  2 spray Each Nostril Daily    heparin (porcine)  5,000  Units Subcutaneous Q8H    losartan  50 mg Oral Daily    mupirocin   Nasal BID    mycophenolate sodium  360 mg Oral BID    pantoprazole  40 mg Oral Daily    polyethylene glycol  17 g Oral BID    potassium chloride  20 mEq Oral Once    predniSONE  5 mg Oral Daily    senna  8.6 mg Oral Daily    sodium chloride 0.9%  3 mL Intravenous Q8H    tacrolimus  3 mg Oral BID     Continuous Infusions:   0.9% NaCl   Intravenous Continuous        0.9% NaCl   Intravenous Continuous         PRN Meds:  Current Facility-Administered Medications:     acetaminophen, 650 mg, Oral, Q6H PRN    dextrose 50%, 12.5 g, Intravenous, PRN    dextrose 50%, 25 g, Intravenous, PRN    glucagon (human recombinant), 1 mg, Intramuscular, PRN    glucose, 16 g, Oral, PRN    glucose, 24 g, Oral, PRN    hydrALAZINE, 10 mg, Intravenous, Q8H PRN    insulin aspart U-100, 0-5 Units, Subcutaneous, QID (AC + HS) PRN    ondansetron, 4 mg, Oral, Q8H PRN    ondansetron, 4 mg, Intravenous, Q8H PRN    Intake/Output - Last 3 Shifts         04/06 0700  04/07 0659 04/07 0700  04/08 0659 04/08 0700 04/09 0659    P.O. 480 1200     I.V. (mL/kg) 50 (0.6)      Total Intake(mL/kg) 530 (6.7) 1200 (15.2)     Urine (mL/kg/hr) 1100 (0.6)      Stool 0      Total Output 1100      Net -570 +1200            Urine Occurrence  6 x     Stool Occurrence 2 x 2 x              Review of Systems   Constitutional:  Negative for chills and fever.   Respiratory:  Negative for shortness of breath.    Gastrointestinal:  Positive for constipation, nausea and vomiting. Negative for abdominal distention.   Genitourinary:  Negative for difficulty urinating.   Allergic/Immunologic: Positive for immunocompromised state.   Neurological:  Negative for weakness.   Psychiatric/Behavioral:  The patient is nervous/anxious.       Objective:     Vital Signs (Most Recent):  Temp: 97.3 °F (36.3 °C) (04/08/25 0837)  Pulse: (!) 50 (04/08/25 0837)  Resp: 14 (04/08/25 0837)  BP: (!) 206/92 (04/08/25 0837)  SpO2: 99 %  "(04/08/25 0837) Vital Signs (24h Range):  Temp:  [97.3 °F (36.3 °C)-98.6 °F (37 °C)] 97.3 °F (36.3 °C)  Pulse:  [50-77] 50  Resp:  [14-18] 14  SpO2:  [97 %-100 %] 99 %  BP: (123-206)/(70-92) 206/92     Weight: 76.2 kg (168 lb)  Height: 5' 5" (165.1 cm)  Body mass index is 27.96 kg/m².     Physical Exam  Vitals and nursing note reviewed.   Cardiovascular:      Rate and Rhythm: Normal rate.   Pulmonary:      Effort: Pulmonary effort is normal.   Abdominal:      General: There is no distension.      Tenderness: There is no abdominal tenderness.   Neurological:      Mental Status: She is alert and oriented to person, place, and time.   Psychiatric:         Mood and Affect: Mood normal.         Behavior: Behavior normal.         Thought Content: Thought content normal.         Judgment: Judgment normal.          Laboratory:  CBC:   Recent Labs   Lab 04/06/25  0900 04/07/25  0540 04/08/25  0607   WBC 5.05 6.34 5.34   RBC 3.86* 3.68* 3.81*   HGB 12.0 11.9* 11.7*   HCT 35.8* 34.4* 34.8*    232 250   MCV 93 94 91   MCH 31.1* 32.3* 30.7   MCHC 33.5 34.6 33.6     BMP:   Recent Labs   Lab 04/06/25  0900 04/07/25  0540 04/08/25  0607    138 138   K 3.8 3.5 3.3*   * 111* 107   CO2 20* 19* 25   BUN 17 17 18   CREATININE 1.2 1.3 1.6*   CALCIUM 10.8* 10.1 10.8*       Diagnostic Results:  None  Assessment/Plan:     Long-term use of immunosuppressant medication  - see prophylactic immunotherapy      Right upper quadrant abdominal pain  - assess CT scan from OSH  - Kidney US with good flow. Collection seen. IR attempted aspiration but no fluid successfully aspirated   - UA cx multiple org none in predominance  - Likely 2/2 to constipation  - Give mag citrate and suppository   - GI consulted given nausea/vomiting. Going for EGD today 4/8.   - monitor       Anemia of chronic disease  - H/H stable  - no overt signs of bleed  - continue to monitor with daily CBC      Prophylactic immunotherapy  - continue prograf  - " continue to monitor for toxic side effects and check daily levels. Will adjust for therapeutic dose      S/P kidney transplant  s/p DBD kidney transplant 7/24/2024 for ESRD 2/2 Type II DM-(KDPI 85%, CIT >23 hrs, Thymo induction, CMV ++). She has CKD stage 3 - GFR 30-59 and her baseline creatinine is between 1.3-1.7. PMHx born w solitary kidney, DM2, HTN, incontinence (s/p bladder sling), CAD with stents (on ASA), atrial fibrillation, CVA (no residual deficits), ALEJA, and anemia. Surgery without complications.    - Creatinine elevated  - Give IVF  - Monitor with daily labs       Diabetes mellitus due to underlying condition with chronic kidney disease on chronic dialysis, with long-term current use of insulin  - Endocrine consulted for help with management          Discharge Planning: Not a candidate for discharge. Likely dc tomorrow pending improvement in creatinine     Medical decision making for this encounter includes review of pertinent labs and diagnostic studies, assessment and planning, discussions with consulting providers, discussion with patient/family, and participation in multidisciplinary rounds. Time spent caring for patient: 30 minutes    Gosia Mora PA-C  Kidney Transplant  [facility] - [facility]

## 2025-04-09 VITALS
HEART RATE: 64 BPM | DIASTOLIC BLOOD PRESSURE: 77 MMHG | WEIGHT: 168 LBS | OXYGEN SATURATION: 100 % | HEIGHT: 65 IN | SYSTOLIC BLOOD PRESSURE: 173 MMHG | RESPIRATION RATE: 18 BRPM | TEMPERATURE: 98 F | BODY MASS INDEX: 27.99 KG/M2

## 2025-04-09 PROBLEM — R10.11 RIGHT UPPER QUADRANT ABDOMINAL PAIN: Status: RESOLVED | Noted: 2025-04-05 | Resolved: 2025-04-09

## 2025-04-09 PROBLEM — R10.31 RLQ ABDOMINAL PAIN: Status: RESOLVED | Noted: 2025-04-07 | Resolved: 2025-04-09

## 2025-04-09 LAB
ABSOLUTE EOSINOPHIL (OHS): 0.13 K/UL
ABSOLUTE MONOCYTE (OHS): 0.39 K/UL (ref 0.3–1)
ABSOLUTE NEUTROPHIL COUNT (OHS): 4.07 K/UL (ref 1.8–7.7)
ALBUMIN SERPL BCP-MCNC: 3.1 G/DL (ref 3.5–5.2)
ALP SERPL-CCNC: 177 UNIT/L (ref 40–150)
ALT SERPL W/O P-5'-P-CCNC: 18 UNIT/L (ref 10–44)
ANION GAP (OHS): 10 MMOL/L (ref 8–16)
AST SERPL-CCNC: 14 UNIT/L (ref 11–45)
BASOPHILS # BLD AUTO: 0.05 K/UL
BASOPHILS NFR BLD AUTO: 0.9 %
BILIRUB SERPL-MCNC: 0.3 MG/DL (ref 0.1–1)
BUN SERPL-MCNC: 23 MG/DL (ref 8–23)
CALCIUM SERPL-MCNC: 10.4 MG/DL (ref 8.7–10.5)
CHLORIDE SERPL-SCNC: 106 MMOL/L (ref 95–110)
CO2 SERPL-SCNC: 22 MMOL/L (ref 23–29)
CREAT SERPL-MCNC: 1.5 MG/DL (ref 0.5–1.4)
ERYTHROCYTE [DISTWIDTH] IN BLOOD BY AUTOMATED COUNT: 12.3 % (ref 11.5–14.5)
ESTROGEN SERPL-MCNC: NORMAL PG/ML
GFR SERPLBLD CREATININE-BSD FMLA CKD-EPI: 38 ML/MIN/1.73/M2
GLUCOSE SERPL-MCNC: 141 MG/DL (ref 70–110)
HCT VFR BLD AUTO: 31.7 % (ref 37–48.5)
HGB BLD-MCNC: 11 GM/DL (ref 12–16)
IMM GRANULOCYTES # BLD AUTO: 0.01 K/UL (ref 0–0.04)
IMM GRANULOCYTES NFR BLD AUTO: 0.2 % (ref 0–0.5)
INSULIN SERPL-ACNC: NORMAL U[IU]/ML
LAB AP CLINICAL INFORMATION: NORMAL
LAB AP GROSS DESCRIPTION: NORMAL
LAB AP PERFORMING LOCATION(S): NORMAL
LAB AP REPORT FOOTNOTES: NORMAL
LYMPHOCYTES # BLD AUTO: 0.92 K/UL (ref 1–4.8)
MAGNESIUM SERPL-MCNC: 1.7 MG/DL (ref 1.6–2.6)
MCH RBC QN AUTO: 32.2 PG (ref 27–31)
MCHC RBC AUTO-ENTMCNC: 34.7 G/DL (ref 32–36)
MCV RBC AUTO: 93 FL (ref 82–98)
NUCLEATED RBC (/100WBC) (OHS): 0 /100 WBC
PHOSPHATE SERPL-MCNC: 2.4 MG/DL (ref 2.7–4.5)
PLATELET # BLD AUTO: 241 K/UL (ref 150–450)
PMV BLD AUTO: 11.2 FL (ref 9.2–12.9)
POCT GLUCOSE: 139 MG/DL (ref 70–110)
POTASSIUM SERPL-SCNC: 3.7 MMOL/L (ref 3.5–5.1)
PROT SERPL-MCNC: 5.8 GM/DL (ref 6–8.4)
RBC # BLD AUTO: 3.42 M/UL (ref 4–5.4)
RELATIVE EOSINOPHIL (OHS): 2.3 %
RELATIVE LYMPHOCYTE (OHS): 16.5 % (ref 18–48)
RELATIVE MONOCYTE (OHS): 7 % (ref 4–15)
RELATIVE NEUTROPHIL (OHS): 73.1 % (ref 38–73)
SODIUM SERPL-SCNC: 138 MMOL/L (ref 136–145)
TACROLIMUS BLD-MCNC: 8.8 NG/ML (ref 5–15)
WBC # BLD AUTO: 5.57 K/UL (ref 3.9–12.7)

## 2025-04-09 PROCEDURE — 63600175 PHARM REV CODE 636 W HCPCS: Performed by: PHYSICIAN ASSISTANT

## 2025-04-09 PROCEDURE — 1111F DSCHRG MED/CURRENT MED MERGE: CPT | Mod: CPTII,,,

## 2025-04-09 PROCEDURE — 82040 ASSAY OF SERUM ALBUMIN: CPT | Performed by: INTERNAL MEDICINE

## 2025-04-09 PROCEDURE — 83735 ASSAY OF MAGNESIUM: CPT | Performed by: INTERNAL MEDICINE

## 2025-04-09 PROCEDURE — 99239 HOSP IP/OBS DSCHRG MGMT >30: CPT | Mod: ,,,

## 2025-04-09 PROCEDURE — 25000003 PHARM REV CODE 250: Performed by: NURSE PRACTITIONER

## 2025-04-09 PROCEDURE — 84100 ASSAY OF PHOSPHORUS: CPT | Performed by: PHYSICIAN ASSISTANT

## 2025-04-09 PROCEDURE — 25000003 PHARM REV CODE 250

## 2025-04-09 PROCEDURE — 63600175 PHARM REV CODE 636 W HCPCS: Performed by: NURSE PRACTITIONER

## 2025-04-09 PROCEDURE — 85025 COMPLETE CBC W/AUTO DIFF WBC: CPT | Performed by: INTERNAL MEDICINE

## 2025-04-09 PROCEDURE — 25000003 PHARM REV CODE 250: Performed by: PHYSICIAN ASSISTANT

## 2025-04-09 PROCEDURE — 36415 COLL VENOUS BLD VENIPUNCTURE: CPT | Performed by: INTERNAL MEDICINE

## 2025-04-09 PROCEDURE — 80197 ASSAY OF TACROLIMUS: CPT | Performed by: INTERNAL MEDICINE

## 2025-04-09 RX ORDER — CARVEDILOL 12.5 MG/1
12.5 TABLET ORAL 2 TIMES DAILY
Qty: 60 TABLET | Refills: 11 | Status: SHIPPED | OUTPATIENT
Start: 2025-04-09

## 2025-04-09 RX ORDER — TACROLIMUS 1 MG/1
3 CAPSULE ORAL EVERY 12 HOURS
Qty: 180 CAPSULE | Refills: 11 | Status: SHIPPED | OUTPATIENT
Start: 2025-04-09 | End: 2026-04-09

## 2025-04-09 RX ORDER — ONDANSETRON 4 MG/1
4 TABLET, ORALLY DISINTEGRATING ORAL EVERY 8 HOURS PRN
Qty: 90 TABLET | Refills: 2 | Status: SHIPPED | OUTPATIENT
Start: 2025-04-09 | End: 2025-04-09

## 2025-04-09 RX ORDER — LOSARTAN POTASSIUM 100 MG/1
100 TABLET ORAL DAILY
Qty: 30 TABLET | Refills: 11 | Status: SHIPPED | OUTPATIENT
Start: 2025-04-10 | End: 2026-04-10

## 2025-04-09 RX ORDER — LOSARTAN POTASSIUM 50 MG/1
100 TABLET ORAL DAILY
Status: DISCONTINUED | OUTPATIENT
Start: 2025-04-10 | End: 2025-04-09 | Stop reason: HOSPADM

## 2025-04-09 RX ORDER — SUCRALFATE 1 G/10ML
1 SUSPENSION ORAL
Status: DISCONTINUED | OUTPATIENT
Start: 2025-04-09 | End: 2025-04-09 | Stop reason: HOSPADM

## 2025-04-09 RX ORDER — PREDNISONE 5 MG/1
5 TABLET ORAL DAILY
Qty: 70 TABLET | Refills: 11 | Status: SHIPPED | OUTPATIENT
Start: 2025-04-09

## 2025-04-09 RX ORDER — ONDANSETRON 4 MG/1
4 TABLET, ORALLY DISINTEGRATING ORAL EVERY 8 HOURS PRN
Qty: 90 TABLET | Refills: 1 | Status: SHIPPED | OUTPATIENT
Start: 2025-04-09

## 2025-04-09 RX ORDER — POLYETHYLENE GLYCOL 3350 17 G/17G
17 POWDER, FOR SOLUTION ORAL 2 TIMES DAILY
COMMUNITY
Start: 2025-04-09

## 2025-04-09 RX ORDER — AMOXICILLIN 250 MG
1 CAPSULE ORAL DAILY
COMMUNITY
Start: 2025-04-09

## 2025-04-09 RX ORDER — SUCRALFATE 1 G/10ML
1 SUSPENSION ORAL
Qty: 560 ML | Refills: 0 | Status: SHIPPED | OUTPATIENT
Start: 2025-04-09 | End: 2025-04-23

## 2025-04-09 RX ADMIN — MUPIROCIN: 20 OINTMENT TOPICAL at 08:04

## 2025-04-09 RX ADMIN — POLYETHYLENE GLYCOL 3350 17 G: 17 POWDER, FOR SOLUTION ORAL at 08:04

## 2025-04-09 RX ADMIN — ONDANSETRON 4 MG: 4 TABLET, ORALLY DISINTEGRATING ORAL at 10:04

## 2025-04-09 RX ADMIN — DIBASIC SODIUM PHOSPHATE, MONOBASIC POTASSIUM PHOSPHATE AND MONOBASIC SODIUM PHOSPHATE 2 TABLET: 852; 155; 130 TABLET ORAL at 12:04

## 2025-04-09 RX ADMIN — MYCOPHENOLIC ACID 360 MG: 180 TABLET, DELAYED RELEASE ORAL at 08:04

## 2025-04-09 RX ADMIN — LOSARTAN POTASSIUM 50 MG: 50 TABLET, FILM COATED ORAL at 08:04

## 2025-04-09 RX ADMIN — PANTOPRAZOLE SODIUM 40 MG: 40 TABLET, DELAYED RELEASE ORAL at 08:04

## 2025-04-09 RX ADMIN — HEPARIN SODIUM 5000 UNITS: 5000 INJECTION INTRAVENOUS; SUBCUTANEOUS at 05:04

## 2025-04-09 RX ADMIN — CARVEDILOL 12.5 MG: 6.25 TABLET, FILM COATED ORAL at 08:04

## 2025-04-09 RX ADMIN — DOCUSATE SODIUM 100 MG: 100 CAPSULE, LIQUID FILLED ORAL at 08:04

## 2025-04-09 RX ADMIN — PREDNISONE 5 MG: 5 TABLET ORAL at 08:04

## 2025-04-09 RX ADMIN — BISACODYL 10 MG: 5 TABLET, COATED ORAL at 08:04

## 2025-04-09 RX ADMIN — TACROLIMUS 3 MG: 1 CAPSULE ORAL at 08:04

## 2025-04-09 RX ADMIN — FLUTICASONE PROPIONATE 100 MCG: 50 SPRAY, METERED NASAL at 08:04

## 2025-04-09 RX ADMIN — HEPARIN SODIUM 5000 UNITS: 5000 INJECTION INTRAVENOUS; SUBCUTANEOUS at 02:04

## 2025-04-09 RX ADMIN — SENNOSIDES 8.6 MG: 8.6 TABLET, FILM COATED ORAL at 08:04

## 2025-04-09 RX ADMIN — SUCRALFATE 1 G: 1 SUSPENSION ORAL at 12:04

## 2025-04-09 RX ADMIN — CINACALCET HYDROCHLORIDE 60 MG: 30 TABLET, FILM COATED ORAL at 08:04

## 2025-04-09 RX ADMIN — SODIUM CHLORIDE: 9 INJECTION, SOLUTION INTRAVENOUS at 02:04

## 2025-04-09 RX ADMIN — EZETIMIBE 10 MG: 10 TABLET ORAL at 08:04

## 2025-04-09 NOTE — PROGRESS NOTES
SW sent home health referral for resumption of care to [facility] via [facility].   Phone: [phone]  Fax: [phone]    Kamilla Otero LCSW, Kidney Transplant

## 2025-04-09 NOTE — PROGRESS NOTES
Kidney Transplant  Discharge Note:    Per KTS rounds morning rounds patient is expected to discharge today. Patient will discharge home under the care of self and children. ASH arranged Medicaid transportation to transport patient back home to [place]. Transportation is arranged for 3pm. ASH informed patient she has transportation benefits with [employer] and Medicaid. ASH provided transportation phone numbers for [employer] and [employer] to patient. Patient advised to call 2-3 in advance for a ride to medical appointments. Patient reported readiness to discharge at this time. Per rounds this morning patient does not require referrals from this  at time of discharge. Patient denied needing or wanting mental health resources or referrals at this time. Patient denied additional needs or concerns from this . Patient verbalize understanding and involved in treatment planning and discharge process. Patient agrees to contact transplant team with needs, questions or concerns as they arise. Patient aware of, involved in, and coping well with this discharge plan. Patient did not have any concerns with the discharge plan at this time. ASH remains available at [phone].    Kamilla Otero LCSW, Kidney Transplant

## 2025-04-09 NOTE — PLAN OF CARE
Pt AAOx4, afebrile, stable on RA while awake and CPAP while asleep. Systolic BP initially elevated this shift, but has since improved. BP assessed standing. Pt reluctant and very slow with taking PO medications d/t fear of nausea and/or vomiting. No emesis occurrences reported overnight. Pt did not take myfortic, senna, or lipitor as scheduled - team made aware. + BM since suppository and increasing stool softeners, but none overnight. EGD yesterday, biopsies in process. Cr increased 1.6 yesterday, NS at 75 cc/hr continued. PRN eye drops added for C/O dryness. ACHS accuchecks assessed, and PRN SSI administered per MAR. Pt ambulating independently. Bed in lowest locked position, call light and personal items in reach, verbalized understanding to call for assistance.

## 2025-04-09 NOTE — PROGRESS NOTES
Discharge Medication Note:    Hospital Course:  Joann Kenney is a 64 y.o. female s/p    Donation after Brain Death   transplant on 7/24/2024 (Kidney) for Diabetes Mellitus - Type II. She was admitted for abdominal pain and chronic nausea/vomiting. CT scan and KUB with large stool burden, now having regular bowel movements and will DC with scheduled miralax and Senna-docusate PRN. Pt underwent endoscopy with GI which showed esophagitis, bx pathology pending. Patient to follow up with GI outpatient for management.     Met with Joann Kenney at discharge to review discharge medications and to update the blue medication card.           Medication List        START taking these medications      losartan 100 MG tablet  Commonly known as: COZAAR  Take 1 tablet (100 mg total) by mouth once daily.  Start taking on: April 10, 2025  Replaces: olmesartan 40 MG tablet     polyethylene glycol 17 gram Pwpk  Commonly known as: GLYCOLAX  Take 17 g by mouth 2 (two) times daily.     senna-docusate 8.6-50 mg per tablet  Commonly known as: SENNA WITH DOCUSATE SODIUM  Take 1 tablet by mouth once daily.     sucralfate 100 mg/mL suspension  Commonly known as: CARAFATE  Take 10 mLs (1 g total) by mouth before meals and at bedtime as needed (nausea, reflux).            CHANGE how you take these medications      carvediloL 12.5 MG tablet  Commonly known as: COREG  Take 1 tablet (12.5 mg total) by mouth 2 (two) times daily.  What changed:   medication strength  how much to take     ondansetron 4 MG Tbdl  Commonly known as: ZOFRAN-ODT  Take 1 tablet (4 mg total) by mouth every 8 (eight) hours as needed (nausea).  What changed: reasons to take this     predniSONE 5 MG tablet  Commonly known as: DELTASONE  Take 1 tablet (5 mg total) by mouth once daily.  What changed:   how much to take  how to take this  when to take this  additional instructions     tacrolimus 1 MG Cap  Commonly known as: PROGRAF  Take 3 capsules (3 mg total) by mouth every  12 (twelve) hours.  What changed: See the new instructions.            CONTINUE taking these medications      ALPRAZolam 2 MG Tab  Commonly known as: XANAX     atorvastatin 20 MG tablet  Commonly known as: LIPITOR  Take 1 tablet (20 mg total) by mouth every evening.     blood sugar diagnostic Strp  use 1 strip to check blood glucose 3 (three) times daily.     blood-glucose meter Misc  use as directed to check blood glucose     cetirizine 5 MG tablet  Commonly known as: ZYRTEC  Take 1 tablet (5 mg total) by mouth once daily.     cinacalcet 60 MG Tab  Commonly known as: SENSIPAR  Take 1 tablet (60 mg total) by mouth 2 (two) times daily with meals.     DEXCOM G7 SENSOR Blessing  Generic drug: blood-glucose sensor  Change every 10 days.     DULoxetine 30 MG capsule  Commonly known as: CYMBALTA     ezetimibe 10 mg tablet  Commonly known as: ZETIA  Take 1 tablet (10 mg total) by mouth once daily.     insulin aspart U-100 100 unit/mL (3 mL) Inpn pen  Commonly known as: NovoLOG  Inject subcutaneously as needed for sliding scale. Total daily dose: 15 units/day     k phos di & mono-sod phos mono 250 mg Tab  Commonly known as: K-PHOS-NEUTRAL  Take 2 tablets by mouth 2 (two) times a day.     lancets Misc  1 lancet each to check blood glucose 3 (three) times daily.     LANTUS SOLOSTAR U-100 INSULIN 100 unit/mL (3 mL) Inpn pen  Generic drug: insulin glargine U-100 (Lantus)  Inject 13 Units into the skin once daily.     magnesium oxide 400 mg (241.3 mg magnesium) tablet  Commonly known as: MAG-OX  Take 2 tablets (800 mg total) by mouth 2 (two) times daily.     MOUNJARO 2.5 mg/0.5 mL Pnij  Generic drug: tirzepatide  Inject 2.5 mg into the skin every 7 days.     multivitamin Tab     mycophenolate sodium 180 MG Tbec  Commonly known as: MYFORTIC  Take 2 tablets (360 mg total) by mouth 2 (two) times daily.     oxybutynin 5 MG Tab  Commonly known as: DITROPAN  Take 1 tablet (5 mg total) by mouth 3 (three) times daily as needed (bladder  "spasms).     pantoprazole 40 MG tablet  Commonly known as: PROTONIX  Take 1 tablet (40 mg total) by mouth once daily.     pen needle, diabetic 32 gauge x 5/32" Ndle  1 each for use with insulin pen 3 (three) times daily.            STOP taking these medications      olmesartan 40 MG tablet  Commonly known as: BENICAR  Replaced by: losartan 100 MG tablet               Where to Get Your Medications        These medications were sent to [facility]pharm[facility] #[id number] - [place], [place] - [address]  [address], [place] [address]      Phone: [phone]   carvediloL 12.5 MG tablet  losartan 100 MG tablet  ondansetron 4 MG Tbdl  predniSONE 5 MG tablet  sucralfate 100 mg/mL suspension  tacrolimus 1 MG Cap       You can get these medications from any pharmacy    You don't need a prescription for these medications  polyethylene glycol 17 gram Pwpk  senna-docusate 8.6-50 mg per tablet            The following medications have been placed on HOLD and should be restarted in the outpatient setting (when appropriate): none    Joann Kenney verbalized understanding and had the opportunity to ask questions.    "

## 2025-04-09 NOTE — PROGRESS NOTES
Discharge education given to pt  regarding medications, future appointments, and when to call a healthcare provider. Both verbalized understanding.  She was brought downstairs via wheelchair by myself, and is being brought home via medicaid transport. Prescriptions sent into local [facility]. All personal belongings and meds removed from room. Telemetry and PIV removed prior to d/c. Home health orders placed. She will follow up with GI doctor and labs on consecutive Mondays.

## 2025-04-10 ENCOUNTER — RESULTS FOLLOW-UP (OUTPATIENT)
Dept: GASTROENTEROLOGY | Facility: CLINIC | Age: 65
End: 2025-04-10

## 2025-04-15 ENCOUNTER — DOCUMENTATION ONLY (OUTPATIENT)
Dept: TRANSPLANT | Facility: CLINIC | Age: 65
End: 2025-04-15
Payer: MEDICARE

## 2025-04-15 LAB
EXT ALBUMIN: 4.1 (ref 3.5–5)
EXT ALKALINE PHOSPHATASE: ABNORMAL
EXT ALLOSURE: ABNORMAL
EXT ALT: ABNORMAL
EXT AMYLASE: ABNORMAL
EXT ANC: ABNORMAL
EXT AST: ABNORMAL
EXT BACTERIA UA: ABNORMAL
EXT BANDS%: ABNORMAL
EXT BILIRUBIN DIRECT: ABNORMAL
EXT BILIRUBIN TOTAL: ABNORMAL
EXT BK VIRUS DNA QN PCR: ABNORMAL
EXT BUN: 23 (ref 7–20)
EXT C PEPTIDE: ABNORMAL
EXT CALCIUM: 11.5 (ref 8.4–10.2)
EXT CHLORIDE: 101 (ref 100–112)
EXT CHOLESTEROL: ABNORMAL
EXT CMV DNA QUANT. BY PCR: ABNORMAL
EXT CO2: 28 (ref 22–30)
EXT CREATININE UA: ABNORMAL
EXT CREATININE: 1.6 MG/DL (ref 0.52–1.5)
EXT CYCLOSPORINE LVL: ABNORMAL
EXT EBV DNA BY PCR: ABNORMAL
EXT EBV IGG: ABNORMAL
EXT EGFR NO RACE VARIABLE: 35.23
EXT EOSINOPHIL%: 3.7 (ref 1–5)
EXT FERRITIN: ABNORMAL
EXT GFR MDRD AF AMER: ABNORMAL
EXT GFR MDRD NON AF AMER: ABNORMAL
EXT GLUCOSE UA: ABNORMAL
EXT GLUCOSE: 168 (ref 74–106)
EXT HBV DNA QUANT PCR: ABNORMAL
EXT HCV QUANT: ABNORMAL
EXT HDL: ABNORMAL
EXT HEMATOCRIT: 36 (ref 37–47)
EXT HEMOGLOBIN A1C: ABNORMAL
EXT HEMOGLOBIN: 12.1 (ref 12–16)
EXT HIV RNA QUANT PCR: ABNORMAL
EXT IMMUNKNOW (STIMULATED): ABNORMAL
EXT IRON SATURATION: ABNORMAL
EXT LDH, TOTAL: ABNORMAL
EXT LDL CHOLESTEROL: ABNORMAL
EXT LEFLUNOMIDE METABOLITE: ABNORMAL
EXT LIPASE: ABNORMAL
EXT LYMPH%: 19.9 (ref 20–40)
EXT MAGNESIUM: 2.1 (ref 1.6–2.3)
EXT MONOCYTES%: 8.2 (ref 3.5–12.5)
EXT NITRITES UA: ABNORMAL
EXT PHOSPHORUS: 3 (ref 2.5–4.5)
EXT PLATELETS: 260 (ref 130–400)
EXT POTASSIUM: 4.4 (ref 3.5–5.1)
EXT PROT/CREAT RATIO UR: ABNORMAL
EXT PROTEIN TOTAL: ABNORMAL
EXT PROTEIN UA: ABNORMAL
EXT PTH, INTACT: ABNORMAL
EXT RBC UA: ABNORMAL
EXT SEGS%: 66.9 (ref 40–65)
EXT SERUM IRON: ABNORMAL
EXT SIROLIMUS LVL: ABNORMAL
EXT SODIUM: 137 MMOL/L (ref 137–148)
EXT TACROLIMUS LVL: ABNORMAL
EXT TIBC: ABNORMAL
EXT TRIGLYCERIDES: ABNORMAL
EXT URIC ACID: ABNORMAL
EXT URINE CULTURE: ABNORMAL
EXT URINE PROTEIN: ABNORMAL
EXT VIT D 25 HYDROXY: ABNORMAL
EXT WBC UA: ABNORMAL
EXT WBC: 4.63 (ref 4.8–10.8)
PARVOVIRUS B19 DNA BY PCR: ABNORMAL
QUANTIFERON GOLD TB: ABNORMAL

## 2025-04-23 ENCOUNTER — DOCUMENTATION ONLY (OUTPATIENT)
Dept: TRANSPLANT | Facility: CLINIC | Age: 65
End: 2025-04-23
Payer: MEDICARE

## 2025-04-23 LAB
EXT ALBUMIN: 4.2 (ref 3.5–5)
EXT BANDS%: 71.3 (ref 40–65)
EXT BUN: 22 (ref 7–20)
EXT CALCIUM: 11.2 (ref 8.4–10.2)
EXT CHLORIDE: 102 (ref 100–112)
EXT CMV DNA QUANT. BY PCR: NOT DETECTED
EXT CO2: 32 (ref 22–30)
EXT CREATININE: 1.9 MG/DL (ref 0.52–1.5)
EXT EGFR NO RACE VARIABLE: 28.66
EXT EOSINOPHIL%: 2.4 (ref 1–5)
EXT GLUCOSE: 151 (ref 74–106)
EXT HEMATOCRIT: 37.5 (ref 37–47)
EXT HEMOGLOBIN: 12.8 (ref 12–16)
EXT LYMPH%: 17.1 (ref 20–40)
EXT MAGNESIUM: 1.9 (ref 1.6–2.3)
EXT MONOCYTES%: 7.8 (ref 3.5–12.5)
EXT PHOSPHORUS: 2.9 (ref 2.5–4.5)
EXT PLATELETS: 300 (ref 130–400)
EXT POTASSIUM: 3.7 (ref 3.5–5.1)
EXT SODIUM: 141 MMOL/L (ref 137–148)
EXT TACROLIMUS LVL: 3.3
EXT WBC: 5.91 (ref 4.8–10.8)

## 2025-04-24 ENCOUNTER — DOCUMENTATION ONLY (OUTPATIENT)
Dept: TRANSPLANT | Facility: CLINIC | Age: 65
End: 2025-04-24
Payer: MEDICARE

## 2025-04-29 ENCOUNTER — DOCUMENTATION ONLY (OUTPATIENT)
Dept: TRANSPLANT | Facility: CLINIC | Age: 65
End: 2025-04-29
Payer: MEDICARE

## 2025-04-29 LAB
EXT ALBUMIN: 4.2 (ref 3.5–5)
EXT ALKALINE PHOSPHATASE: ABNORMAL
EXT ALLOSURE: ABNORMAL
EXT ALT: ABNORMAL
EXT AMYLASE: ABNORMAL
EXT ANC: ABNORMAL
EXT AST: ABNORMAL
EXT BACTERIA UA: ABNORMAL
EXT BANDS%: ABNORMAL
EXT BILIRUBIN DIRECT: ABNORMAL
EXT BILIRUBIN TOTAL: ABNORMAL
EXT BK VIRUS DNA QN PCR: NOT DETECTED
EXT BUN: 19 (ref 7–20)
EXT C PEPTIDE: ABNORMAL
EXT CALCIUM: 9.9 (ref 8.4–10.2)
EXT CHLORIDE: 105 (ref 100–112)
EXT CHOLESTEROL: ABNORMAL
EXT CMV DNA QUANT. BY PCR: NOT DETECTED
EXT CO2: 25 (ref 22–30)
EXT CREATININE UA: ABNORMAL
EXT CREATININE: 1.3 MG/DL (ref 0.52–1.5)
EXT CYCLOSPORINE LVL: ABNORMAL
EXT EBV DNA BY PCR: ABNORMAL
EXT EBV IGG: ABNORMAL
EXT EGFR NO RACE VARIABLE: 45.19
EXT EOSINOPHIL%: 3.9 (ref 1–5)
EXT FERRITIN: ABNORMAL
EXT GFR MDRD AF AMER: ABNORMAL
EXT GFR MDRD NON AF AMER: ABNORMAL
EXT GLUCOSE UA: ABNORMAL
EXT GLUCOSE: 151 (ref 74–106)
EXT HBV DNA QUANT PCR: ABNORMAL
EXT HCV QUANT: ABNORMAL
EXT HDL: ABNORMAL
EXT HEMATOCRIT: 36 (ref 37–47)
EXT HEMOGLOBIN A1C: ABNORMAL
EXT HEMOGLOBIN: 12.4 (ref 12–16)
EXT HIV RNA QUANT PCR: ABNORMAL
EXT IMMUNKNOW (STIMULATED): ABNORMAL
EXT IRON SATURATION: ABNORMAL
EXT LDH, TOTAL: ABNORMAL
EXT LDL CHOLESTEROL: ABNORMAL
EXT LEFLUNOMIDE METABOLITE: ABNORMAL
EXT LIPASE: ABNORMAL
EXT LYMPH%: 19 (ref 20–40)
EXT MAGNESIUM: 1.5 (ref 1.6–2.3)
EXT MONOCYTES%: 6.8 (ref 3.5–12.5)
EXT NITRITES UA: ABNORMAL
EXT PHOSPHORUS: 2.5 (ref 2.5–4.5)
EXT PLATELETS: 268 (ref 130–400)
EXT POTASSIUM: 3.6 (ref 3.5–5.1)
EXT PROT/CREAT RATIO UR: ABNORMAL
EXT PROTEIN TOTAL: ABNORMAL
EXT PROTEIN UA: ABNORMAL
EXT PTH, INTACT: ABNORMAL
EXT RBC UA: ABNORMAL
EXT SEGS%: 68.7 (ref 40–65)
EXT SERUM IRON: ABNORMAL
EXT SIROLIMUS LVL: ABNORMAL
EXT SODIUM: 142 MMOL/L (ref 137–148)
EXT TACROLIMUS LVL: 5.2
EXT TIBC: ABNORMAL
EXT TRIGLYCERIDES: ABNORMAL
EXT URIC ACID: ABNORMAL
EXT URINE CULTURE: ABNORMAL
EXT URINE PROTEIN: ABNORMAL
EXT VIT D 25 HYDROXY: ABNORMAL
EXT WBC UA: ABNORMAL
EXT WBC: 5.11 (ref 4.8–10.8)
PARVOVIRUS B19 DNA BY PCR: ABNORMAL
QUANTIFERON GOLD TB: ABNORMAL

## 2025-05-05 ENCOUNTER — DOCUMENTATION ONLY (OUTPATIENT)
Dept: TRANSPLANT | Facility: CLINIC | Age: 65
End: 2025-05-05
Payer: MEDICARE

## 2025-06-18 ENCOUNTER — RESULTS FOLLOW-UP (OUTPATIENT)
Dept: TRANSPLANT | Facility: CLINIC | Age: 65
End: 2025-06-18

## 2025-06-18 ENCOUNTER — DOCUMENTATION ONLY (OUTPATIENT)
Dept: TRANSPLANT | Facility: CLINIC | Age: 65
End: 2025-06-18
Payer: MEDICARE

## 2025-06-18 LAB
EXT ALBUMIN: 4.2 (ref 3.5–5)
EXT BUN: 25 (ref 7–20)
EXT CALCIUM: 10.3 (ref 8.4–10.2)
EXT CHLORIDE: 103 (ref 100–112)
EXT CO2: 29 (ref 22–30)
EXT CREATININE: 1.3 MG/DL (ref 0.52–1.5)
EXT EGFR NO RACE VARIABLE: 45.15
EXT EOSINOPHIL%: 5.2 (ref 1–5)
EXT GLUCOSE: 142 (ref 74–106)
EXT HEMATOCRIT: 39.3 (ref 37–47)
EXT HEMOGLOBIN: 13.1 (ref 12–16)
EXT LYMPH%: 16.8 (ref 20–40)
EXT MAGNESIUM: 1.8 (ref 1.6–2.3)
EXT MONOCYTES%: 8.5 (ref 3.5–12.5)
EXT PHOSPHORUS: 3.1 (ref 2.5–4.5)
EXT PLATELETS: 277 (ref 130–400)
EXT POTASSIUM: 3.9 (ref 3.5–5.1)
EXT SEGS%: 67.7 (ref 40–65)
EXT SODIUM: 140 MMOL/L (ref 137–148)
EXT TACROLIMUS LVL: 5 (ref 5–20)
EXT WBC: 6.12 (ref 4.8–10.8)

## 2025-06-19 ENCOUNTER — TELEPHONE (OUTPATIENT)
Dept: TRANSPLANT | Facility: CLINIC | Age: 65
End: 2025-06-19
Payer: MEDICARE

## 2025-06-19 NOTE — TELEPHONE ENCOUNTER
Copied from CRM #[id number]. Topic: General Inquiry - Test Results  >> Jun 18, 2025  4:51 PM Latha wrote:    Name Of Caller:  Joann      Contact Preference:  [phone]      Nature of Call:  Pt received a missed call from kidney txp. She would like check if results of her tests are ready.

## 2025-07-03 ENCOUNTER — DOCUMENTATION ONLY (OUTPATIENT)
Dept: TRANSPLANT | Facility: CLINIC | Age: 65
End: 2025-07-03
Payer: MEDICARE

## 2025-07-03 LAB
EXT ALBUMIN: 3.8 (ref 3.5–5)
EXT ALKALINE PHOSPHATASE: 149 (ref 38–126)
EXT ALLOSURE: ABNORMAL
EXT ALT: 19
EXT AMYLASE: ABNORMAL
EXT ANC: ABNORMAL
EXT AST: 25 (ref 15–46)
EXT BACTERIA UA: ABNORMAL
EXT BANDS%: ABNORMAL
EXT BILIRUBIN DIRECT: ABNORMAL
EXT BILIRUBIN TOTAL: 0.3 (ref 0.2–1.3)
EXT BK VIRUS DNA QN PCR: ABNORMAL
EXT BUN: 25 (ref 7–20)
EXT C PEPTIDE: ABNORMAL
EXT CALCIUM: 11 (ref 8.4–10.2)
EXT CHLORIDE: 108 (ref 100–112)
EXT CHOLESTEROL: ABNORMAL
EXT CMV DNA QUANT. BY PCR: ABNORMAL
EXT CO2: 25 (ref 22–30)
EXT CREATININE UA: 43.9 (ref 22–328)
EXT CREATININE: 1.3 MG/DL (ref 7–20)
EXT CYCLOSPORINE LVL: ABNORMAL
EXT EBV DNA BY PCR: ABNORMAL
EXT EBV IGG: ABNORMAL
EXT EGFR NO RACE VARIABLE: 45.14
EXT EOSINOPHIL%: 9.6 (ref 1–5)
EXT FERRITIN: ABNORMAL
EXT GFR MDRD AF AMER: ABNORMAL
EXT GFR MDRD NON AF AMER: ABNORMAL
EXT GLUCOSE UA: NEGATIVE
EXT GLUCOSE: ABNORMAL
EXT HBV DNA QUANT PCR: ABNORMAL
EXT HCV QUANT: ABNORMAL
EXT HDL: ABNORMAL
EXT HEMATOCRIT: 36.8 (ref 37–47)
EXT HEMOGLOBIN A1C: ABNORMAL
EXT HEMOGLOBIN: 12.2 (ref 12–16)
EXT HIV RNA QUANT PCR: ABNORMAL
EXT IMMUNKNOW (STIMULATED): ABNORMAL
EXT IRON SATURATION: ABNORMAL
EXT LDH, TOTAL: ABNORMAL
EXT LDL CHOLESTEROL: ABNORMAL
EXT LEFLUNOMIDE METABOLITE: ABNORMAL
EXT LIPASE: ABNORMAL
EXT LYMPH%: 15.7 (ref 20–40)
EXT MAGNESIUM: 2.3 (ref 1.6–2.3)
EXT MONOCYTES%: 8.4 (ref 3.5–12.5)
EXT NITRITES UA: NEGATIVE
EXT PHOSPHORUS: 3.3 (ref 2.5–4.5)
EXT PLATELETS: 339 (ref 130–400)
EXT POTASSIUM: 4 (ref 3.5–5.1)
EXT PROT/CREAT RATIO UR: 1412.3
EXT PROTEIN TOTAL: ABNORMAL
EXT PROTEIN UA: NEGATIVE
EXT PTH, INTACT: ABNORMAL
EXT RBC UA: ABNORMAL
EXT SEGS%: 63.3 (ref 40–65)
EXT SERUM IRON: ABNORMAL
EXT SIROLIMUS LVL: ABNORMAL
EXT SODIUM: 140 MMOL/L (ref 137–148)
EXT TACROLIMUS LVL: ABNORMAL
EXT TIBC: ABNORMAL
EXT TRIGLYCERIDES: ABNORMAL
EXT URIC ACID: ABNORMAL
EXT URINE CULTURE: ABNORMAL
EXT URINE PROTEIN: 62
EXT VIT D 25 HYDROXY: ABNORMAL
EXT WBC UA: ABNORMAL
EXT WBC: 5.81 (ref 4.8–10.8)
PARVOVIRUS B19 DNA BY PCR: ABNORMAL
QUANTIFERON GOLD TB: ABNORMAL

## 2025-07-07 ENCOUNTER — OFFICE VISIT (OUTPATIENT)
Dept: TRANSPLANT | Facility: CLINIC | Age: 65
End: 2025-07-07
Payer: MEDICARE

## 2025-07-07 DIAGNOSIS — Z94.0 S/P KIDNEY TRANSPLANT: Primary | ICD-10-CM

## 2025-07-07 DIAGNOSIS — I12.9 TYPE 2 DM WITH CKD STAGE 3 AND HYPERTENSION: ICD-10-CM

## 2025-07-07 DIAGNOSIS — R80.9 PROTEINURIA, UNSPECIFIED TYPE: ICD-10-CM

## 2025-07-07 DIAGNOSIS — N18.30 TYPE 2 DM WITH CKD STAGE 3 AND HYPERTENSION: ICD-10-CM

## 2025-07-07 DIAGNOSIS — D84.9 IMMUNOSUPPRESSED STATUS: ICD-10-CM

## 2025-07-07 DIAGNOSIS — E11.22 TYPE 2 DM WITH CKD STAGE 3 AND HYPERTENSION: ICD-10-CM

## 2025-07-07 PROCEDURE — 98006 SYNCH AUDIO-VIDEO EST MOD 30: CPT | Mod: 95,,, | Performed by: NURSE PRACTITIONER

## 2025-07-07 PROCEDURE — 3051F HG A1C>EQUAL 7.0%<8.0%: CPT | Mod: CPTII,95,, | Performed by: NURSE PRACTITIONER

## 2025-07-07 PROCEDURE — 4010F ACE/ARB THERAPY RXD/TAKEN: CPT | Mod: CPTII,95,, | Performed by: NURSE PRACTITIONER

## 2025-07-07 NOTE — PROGRESS NOTES
The patient location is: [place]  The chief complaint leading to consultation is: kidney transplant follow-up and immunosuppression     Visit type: audiovisual    Face to Face time with patient:  35 minutes of total time spent on the encounter, which includes face to face time and non-face to face time preparing to see the patient (eg, review of tests), Obtaining and/or reviewing separately obtained history, Documenting clinical information in the electronic or other health record, Independently interpreting results (not separately reported) and communicating results to the patient/family/caregiver, or Care coordination (not separately reported).     Each patient to whom he or she provides medical services by telemedicine is:  (1) informed of the relationship between the physician and patient and the respective role of any other health care provider with respect to management of the patient; and (2) notified that he or she may decline to receive medical services by telemedicine and may withdraw from such care at any time.               Kidney Post-Transplant Assessment    Referring Physician: Brice Mike  Current Nephrologist: Brice Mike    ORGAN: RIGHT KIDNEY  Donor Type: donation after brain death  PHS Increased Risk: no  Cold Ischemia: 1,402 mins  Induction Medications: thymo    Subjective:     CC:  Reassessment of renal allograft function and management of chronic immunosuppression.    HPI:  Ms. Kenney is a 64 y.o. year old White female with history of ESRD secondary to DM who received a donation after brain death kidney transplant on 7/24/24 (KDPI 85%, CIT >23 hrs, Thymo induction, CMV ++).  She has CKD stage 3 - GFR 30-59 and her baseline creatinine is between 1.3-1.7. She takes mycophenolic acid, prednisone, and tacrolimus for maintenance immunosuppression.     Has re-established with general nephrology Dr. Mike post transplant. Has been constipated and following with her local GI doctor. Has started  "on linzess. She is not taking multiple medications off her list including losartan. Discussed the need to restart losartan since she had an elevation in her proteinuria--agreed to restart.  Feels well today without complaints. Staying hydrated, no problems with urination. Denies peripheral edema. No pain over allograft. Tolerating IS without issue, no diarrhea or vomiting.       Reports home Blood pressure 140/80--- "always under 150"        Current Medications[1]    Past Medical History:   Diagnosis Date    Anemia     Anxiety     Atrial fibrillation     Bleeding 08/08/2024    Coronary artery disease     Depression     Diabetes mellitus, type 2     Disorder of kidney and ureter     Heart murmur     Hyperlipidemia     Hypertension     Obesity     ALEJA (obstructive sleep apnea)     Proteinuria     Solitary kidney     Stroke     Transient neurological symptoms 11/13/2018       Review of Systems   Constitutional:  Negative for activity change and fever.   Eyes:  Negative for visual disturbance.   Respiratory:  Negative for shortness of breath.    Cardiovascular:  Negative for chest pain and leg swelling.   Gastrointestinal:  Negative for constipation, diarrhea and nausea.   Genitourinary:  Negative for difficulty urinating, frequency and hematuria.   Musculoskeletal:  Negative for arthralgias and myalgias.   Skin:  Negative for wound.   Allergic/Immunologic: Positive for immunocompromised state.   Neurological:  Negative for weakness and numbness.   Psychiatric/Behavioral:  Negative for sleep disturbance. The patient is not nervous/anxious.        Objective:     There were no vitals taken for this visit.body mass index is unknown because there is no height or weight on file.    Physical Exam - VIRTUAL VISIT    Labs:  Lab Results   Component Value Date    WBC 5.57 04/09/2025    HGB 11.0 (L) 04/09/2025    HCT 31.7 (L) 04/09/2025    LABPLAT 291 03/13/2020     04/09/2025    K 3.7 04/09/2025     04/09/2025    CO2 " 22 (L) 04/09/2025    BUN 23 04/09/2025    CREATININE 1.5 (H) 04/09/2025    EGFRNORACEVR 38 (L) 04/09/2025    CALCIUM 10.4 04/09/2025    PHOS 2.4 (L) 04/09/2025    MG 1.7 04/09/2025    ALBUMIN 3.1 (L) 04/09/2025    AST 14 04/09/2025    ALT 18 04/09/2025     (H) 04/01/2025    TACROLIMUS 8.8 04/09/2025       Lab Results   Component Value Date    EXTANC 649.4 09/30/2024    EXTWBC 5.81 07/03/2025    EXTSEGS 63.3 07/03/2025    EXTPLATELETS 339 07/03/2025    EXTHEMOGLOBI 12.2 07/03/2025    EXTHEMATOCRI 36.8 (L) 07/03/2025    EXTCREATININ 1.30 (A) 07/03/2025    EXTCREATININ 43.9 07/03/2025    EXTSODIUM 140 07/03/2025    EXTPOTASSIUM 4.00 07/03/2025    EXTBUN 25 (H) 07/03/2025    EXTCO2 25.0 07/03/2025    EXTCALCIUM 11.00 (H) 07/03/2025    EXTPHOSPHORU 3.3 07/03/2025    EXTGLUCOSE 142 (H) 06/16/2025    EXTALBUMIN 3.80 07/03/2025    EXTAST 25.0 07/03/2025    EXTALT 19.0 07/03/2025    EXTBILITOTAL 0.30 07/03/2025       Lab Results   Component Value Date    EXTTACROLVL penidng 07/03/2025    EXTPROTCRE 1,412.3 (H) 07/03/2025    EXTPROTEINUA negative 07/03/2025    EXTWBCUA 0-2 01/29/2025    EXTRBCUA none seen 01/29/2025       Labs were reviewed with the patient.    Assessment:     1. S/P kidney transplant    2. Immunosuppressed status    3. Type 2 DM with CKD stage 3 and hypertension    4. Proteinuria, unspecified type      Plan:   Proteinuria--not taking her losartan.  Needs repeat urine p/c ratio in 2 weeks after restarting losartan.; also obtain spep upep and free light chains   Urine p/cr ratio increased from 300s to 1400s      1. Immunosuppression: Prograf trough pending. Continue Prograf 3/3, MyF 360 mg BID, and Prednisone 5 mg QD. Will continue to monitor for drug toxicities    2. Allograft Function: Stable. Continue good oral hydration.   - ESRD secondary to DM s/p donation after brain death kidney transplant on 7/24/24 (KDPI 85%, CIT >23 hrs, Thymo induction, CMV ++).    - baseline creatinine is between 1.3-1.7.     Latest Reference Range & Units 11mo 1wk,  Kidney-Post 1 Year  07/03/25 07:42   EXT Creatinine 7.0 - 20.0 mg/dL 1.30 ! (P) (E)   EXT eGFR NO RACE VARIABLE  45.14 (L) (P) (E)         3. Hypertension management:   - advise low salt diet and home BP monitoring      4. Hypercalcemia   - sensipar 60 mg BID      5. Hypomagnesemia    - MagOx 800 mg BID   - high magnesium diet      6. Anemia: stable. No need for intervention    Latest Reference Range & Units 11mo 1wk,  Kidney-Post 1 Year  07/03/25 07:42   EXT WBC 4.80 - 10.80  5.81 (P) (E)   EXT Hemoglobin 12.0 - 16.0  12.2 (P) (E)   EXT Hematocrit 37.0 - 47.0  36.8 (L) (P) (E)   EXT Platelets 130 - 400  339 (P) (E)       7. Proteinuria: continue p/c ratio as per guidelines    - 7/3/25 1412.3    8. BK virus infection screening:  BK PCR per protocol    - last PCR not detected--pending 7/3/25    9. Weight education: provided during the clinic visit   There is no height or weight on file to calculate BMI.     10. Patient safety education regarding immunosuppression including prophylaxis posttransplant for CMV, PCP  - PJP PROPHYLAXIS: Bactrim 1/21/25 (completed)   - CMV PROPHYLAXIS: Valcyte 10/23/24 (completed)         Follow-up:   Clinic: return to transplant clinic weekly for the first month after transplant; every 2 weeks during months 2-3; then at 6-, 9-, 12-, 18-, 24-, and 36- months post-transplant to reassess for complications from immunosuppression toxicity and monitor for rejection.  Annually thereafter.    Labs: since patient remains at high risk for rejection and drug-related complications that warrant close monitoring, labs will be ordered as follows: continue twice weekly CBC, renal panel, and drug level for first month; then same labs once weekly through 3rd month post-transplant.  Urine for UA and protein/creatinine ratio monthly.  Serum BK - PCR at 1-, 3-, 6-, 9-, 12-, 18-, 24-, 36- 48-, and 60 months post-transplant.  Hepatic panel at 1-, 2-, 3-, 6-, 9-, 12-,  18-, 24-, and 36- months post-transplant.    Trinity Adames NP              [1]   Current Outpatient Medications   Medication Sig Dispense Refill    ALPRAZolam (XANAX) 2 MG Tab Take 2 mg by mouth every evening.      atorvastatin (LIPITOR) 20 MG tablet Take 1 tablet (20 mg total) by mouth every evening. 90 tablet 3    blood sugar diagnostic Strp use 1 strip to check blood glucose 3 (three) times daily. 100 strip 1    blood-glucose meter Misc use as directed to check blood glucose 1 each 0    blood-glucose sensor (DEXCOM G7 SENSOR) Blessing Change every 10 days. 3 each 11    carvediloL (COREG) 12.5 MG tablet Take 1 tablet (12.5 mg total) by mouth 2 (two) times daily. 60 tablet 11    cetirizine (ZYRTEC) 5 MG tablet Take 1 tablet (5 mg total) by mouth once daily. 30 tablet 11    cinacalcet (SENSIPAR) 60 MG Tab Take 1 tablet (60 mg total) by mouth 2 (two) times daily with meals. 180 tablet 3    DULoxetine (CYMBALTA) 30 MG capsule Take 30 mg by mouth once daily.      ezetimibe (ZETIA) 10 mg tablet Take 1 tablet (10 mg total) by mouth once daily. 90 tablet 3    insulin aspart U-100 (NOVOLOG) 100 unit/mL (3 mL) InPn pen Inject subcutaneously as needed for sliding scale. Total daily dose: 15 units/day 3 mL 5    insulin glargine U-100, Lantus, (LANTUS SOLOSTAR U-100 INSULIN) 100 unit/mL (3 mL) InPn pen Inject 13 Units into the skin once daily. 11.7 mL 3    k phos di & mono-sod phos mono (K-PHOS-NEUTRAL) 250 mg Tab Take 2 tablets by mouth 2 (two) times a day. 120 tablet 5    lancets Misc 1 lancet each to check blood glucose 3 (three) times daily. 100 each 1    losartan (COZAAR) 100 MG tablet Take 1 tablet (100 mg total) by mouth once daily. 30 tablet 11    magnesium oxide (MAG-OX) 400 mg (241.3 mg magnesium) tablet Take 2 tablets (800 mg total) by mouth 2 (two) times daily. 60 tablet 5    multivitamin Tab Take 1 tablet by mouth once daily.      mycophenolate sodium (MYFORTIC) 180 MG TbEC Take 2 tablets (360 mg total) by mouth 2  "(two) times daily. 360 tablet 3    ondansetron (ZOFRAN-ODT) 4 MG TbDL Take 1 tablet (4 mg total) by mouth every 8 (eight) hours as needed (nausea). 90 tablet 1    oxybutynin (DITROPAN) 5 MG Tab Take 1 tablet (5 mg total) by mouth 3 (three) times daily as needed (bladder spasms). 12 tablet 0    pantoprazole (PROTONIX) 40 MG tablet Take 1 tablet (40 mg total) by mouth once daily. 30 tablet 2    pen needle, diabetic 32 gauge x 5/32" Ndle 1 each for use with insulin pen 3 (three) times daily. 100 each 1    polyethylene glycol (GLYCOLAX) 17 gram PwPk Take 17 g by mouth 2 (two) times daily.      predniSONE (DELTASONE) 5 MG tablet Take 1 tablet (5 mg total) by mouth once daily. 70 tablet 11    senna-docusate (SENNA WITH DOCUSATE SODIUM) 8.6-50 mg per tablet Take 1 tablet by mouth once daily.      tacrolimus (PROGRAF) 1 MG Cap Take 3 capsules (3 mg total) by mouth every 12 (twelve) hours. 180 capsule 11     No current facility-administered medications for this visit.     "

## 2025-07-07 NOTE — LETTER
July 7, 2025        Brice Mike  [address] [facility] [address]  Phone: [phone]  Fax: [phone]             [facility]  [address]  [facility] [address]  Phone: [phone]   Patient: Joann Kenney   MR Number: [id number]   YOB: 1960   Date of Visit: 7/7/2025       Dear Dr. Brice Mike    Thank you for referring Joann Kenney to me for evaluation. Attached you will find relevant portions of my assessment and plan of care.    If you have questions, please do not hesitate to call me. I look forward to following Joann Kenney along with you.    Sincerely,    Trinity Adames, NP    Enclosure    If you would like to receive this communication electronically, please contact [email or web address] or [phone] to request [employer] access.    [employer] is a tool which provides read-only access to select patient information with whom you have a relationship. Its easy to use and provides real time access to review your patients record including encounter summaries, notes, results, and demographic information.    If you feel you have received this communication in error or would no longer like to receive these types of communications, please e-mail [email or web address]

## 2025-07-08 ENCOUNTER — DOCUMENTATION ONLY (OUTPATIENT)
Dept: TRANSPLANT | Facility: CLINIC | Age: 65
End: 2025-07-08
Payer: MEDICARE

## 2025-07-25 ENCOUNTER — DOCUMENTATION ONLY (OUTPATIENT)
Dept: TRANSPLANT | Facility: CLINIC | Age: 65
End: 2025-07-25
Payer: MEDICARE

## 2025-07-25 LAB
EXT BUN: 36 (ref 7–20)
EXT CALCIUM: 10.6 (ref 8.4–10.2)
EXT CHLORIDE: 111 (ref 100–112)
EXT CO2: 21 (ref 22–30)
EXT CREATININE UA: 40.6 (ref 328–?)
EXT CREATININE: 2.2 MG/DL (ref 0.52–1.5)
EXT EGFR NO RACE VARIABLE: 24
EXT EOSINOPHIL%: 7.2 (ref 1–5)
EXT GLUCOSE: 188 (ref 74–106)
EXT HEMATOCRIT: 35.7 (ref 37–47)
EXT HEMOGLOBIN: 11.9 (ref 12–16)
EXT LYMPH%: 16.3 (ref 20–40)
EXT MONOCYTES%: 8.3 (ref 3.5–12.5)
EXT PLATELETS: 262 (ref 130–400)
EXT POTASSIUM: 4.8 (ref 3.5–5.1)
EXT PROT/CREAT RATIO UR: 0.89
EXT SEGS%: 66.1 (ref 40–65)
EXT SODIUM: 139 MMOL/L (ref 137–148)
EXT TACROLIMUS LVL: ABNORMAL
EXT WBC: 6.12 (ref 4.8–10.8)
URINE PROTEIN: 36

## 2025-07-28 DIAGNOSIS — R41.3 MEMORY IMPAIRMENT: Primary | ICD-10-CM

## 2025-07-29 ENCOUNTER — DOCUMENTATION ONLY (OUTPATIENT)
Dept: TRANSPLANT | Facility: CLINIC | Age: 65
End: 2025-07-29
Payer: MEDICARE

## 2025-07-29 LAB
EXT ALBUMIN: ABNORMAL
EXT ALKALINE PHOSPHATASE: ABNORMAL
EXT ALLOSURE: ABNORMAL
EXT ALT: ABNORMAL
EXT AMYLASE: ABNORMAL
EXT ANC: ABNORMAL
EXT AST: ABNORMAL
EXT BACTERIA UA: ABNORMAL
EXT BANDS%: ABNORMAL
EXT BILIRUBIN DIRECT: ABNORMAL
EXT BILIRUBIN TOTAL: ABNORMAL
EXT BK VIRUS DNA QN PCR: ABNORMAL
EXT BUN: ABNORMAL
EXT C PEPTIDE: ABNORMAL
EXT CALCIUM: ABNORMAL
EXT CHLORIDE: ABNORMAL
EXT CHOLESTEROL: ABNORMAL
EXT CMV DNA QUANT. BY PCR: ABNORMAL
EXT CO2: ABNORMAL
EXT CREATININE UA: ABNORMAL
EXT CREATININE: ABNORMAL
EXT CYCLOSPORINE LVL: ABNORMAL
EXT EBV DNA BY PCR: ABNORMAL
EXT EBV IGG: ABNORMAL
EXT EGFR NO RACE VARIABLE: ABNORMAL
EXT EOSINOPHIL%: ABNORMAL
EXT FERRITIN: ABNORMAL
EXT GFR MDRD AF AMER: ABNORMAL
EXT GFR MDRD NON AF AMER: ABNORMAL
EXT GLUCOSE UA: ABNORMAL
EXT GLUCOSE: ABNORMAL
EXT HBV DNA QUANT PCR: ABNORMAL
EXT HCV QUANT: ABNORMAL
EXT HDL: ABNORMAL
EXT HEMATOCRIT: ABNORMAL
EXT HEMOGLOBIN A1C: ABNORMAL
EXT HEMOGLOBIN: ABNORMAL
EXT HIV RNA QUANT PCR: ABNORMAL
EXT IMMUNKNOW (STIMULATED): ABNORMAL
EXT IRON SATURATION: ABNORMAL
EXT LDH, TOTAL: ABNORMAL
EXT LDL CHOLESTEROL: ABNORMAL
EXT LEFLUNOMIDE METABOLITE: ABNORMAL
EXT LIPASE: ABNORMAL
EXT LYMPH%: ABNORMAL
EXT MAGNESIUM: ABNORMAL
EXT MONOCYTES%: ABNORMAL
EXT NITRITES UA: ABNORMAL
EXT PHOSPHORUS: ABNORMAL
EXT PLATELETS: ABNORMAL
EXT POTASSIUM: ABNORMAL
EXT PROT/CREAT RATIO UR: ABNORMAL
EXT PROTEIN TOTAL: ABNORMAL
EXT PROTEIN UA: ABNORMAL
EXT PTH, INTACT: ABNORMAL
EXT RBC UA: ABNORMAL
EXT SEGS%: ABNORMAL
EXT SERUM IRON: ABNORMAL
EXT SIROLIMUS LVL: ABNORMAL
EXT SODIUM: ABNORMAL
EXT TACROLIMUS LVL: 4.3
EXT TIBC: ABNORMAL
EXT TRIGLYCERIDES: ABNORMAL
EXT URIC ACID: ABNORMAL
EXT URINE CULTURE: ABNORMAL
EXT URINE PROTEIN: ABNORMAL
EXT VIT D 25 HYDROXY: ABNORMAL
EXT WBC UA: ABNORMAL
EXT WBC: ABNORMAL
PARVOVIRUS B19 DNA BY PCR: ABNORMAL
QUANTIFERON GOLD TB: ABNORMAL

## 2025-07-30 ENCOUNTER — DOCUMENTATION ONLY (OUTPATIENT)
Dept: TRANSPLANT | Facility: CLINIC | Age: 65
End: 2025-07-30
Payer: MEDICARE

## 2025-08-08 ENCOUNTER — DOCUMENTATION ONLY (OUTPATIENT)
Dept: TRANSPLANT | Facility: CLINIC | Age: 65
End: 2025-08-08
Payer: MEDICARE

## 2025-08-08 LAB
CRP SERPL-MCNC: 0.5
ERYTHROCYTE SEDIMENTATION RATE: 26 (ref 0–20)
EXT ALBUMIN: ABNORMAL
EXT ALKALINE PHOSPHATASE: ABNORMAL
EXT ALLOSURE: ABNORMAL
EXT ALT: ABNORMAL
EXT AMYLASE: ABNORMAL
EXT ANC: ABNORMAL
EXT AST: ABNORMAL
EXT BACTERIA UA: ABNORMAL
EXT BANDS%: ABNORMAL
EXT BILIRUBIN DIRECT: ABNORMAL
EXT BILIRUBIN TOTAL: ABNORMAL
EXT BK VIRUS DNA QN PCR: ABNORMAL
EXT BUN: 34 (ref 7–20)
EXT C PEPTIDE: ABNORMAL
EXT CALCIUM: 9.5 (ref 8.4–10.2)
EXT CHLORIDE: 107 (ref 100–112)
EXT CHOLESTEROL: ABNORMAL
EXT CMV DNA QUANT. BY PCR: ABNORMAL
EXT CO2: 25 (ref 22–30)
EXT CREATININE UA: ABNORMAL
EXT CREATININE: 1.7 MG/DL (ref 0.52–1.5)
EXT CYCLOSPORINE LVL: ABNORMAL
EXT EBV DNA BY PCR: ABNORMAL
EXT EBV IGG: ABNORMAL
EXT EGFR NO RACE VARIABLE: 32.69
EXT EOSINOPHIL%: ABNORMAL
EXT FERRITIN: ABNORMAL
EXT GFR MDRD AF AMER: ABNORMAL
EXT GFR MDRD NON AF AMER: ABNORMAL
EXT GLUCOSE UA: ABNORMAL
EXT GLUCOSE: 131 (ref 74–106)
EXT HBV DNA QUANT PCR: ABNORMAL
EXT HCV QUANT: ABNORMAL
EXT HDL: ABNORMAL
EXT HEMATOCRIT: ABNORMAL
EXT HEMOGLOBIN A1C: ABNORMAL
EXT HEMOGLOBIN: ABNORMAL
EXT HIV RNA QUANT PCR: ABNORMAL
EXT IMMUNKNOW (STIMULATED): ABNORMAL
EXT IRON SATURATION: ABNORMAL
EXT LDH, TOTAL: ABNORMAL
EXT LDL CHOLESTEROL: ABNORMAL
EXT LEFLUNOMIDE METABOLITE: ABNORMAL
EXT LIPASE: ABNORMAL
EXT LYMPH%: ABNORMAL
EXT MAGNESIUM: ABNORMAL
EXT MONOCYTES%: ABNORMAL
EXT NITRITES UA: ABNORMAL
EXT PHOSPHORUS: ABNORMAL
EXT PLATELETS: ABNORMAL
EXT POTASSIUM: 3.9 (ref 3.5–5.1)
EXT PROT/CREAT RATIO UR: ABNORMAL
EXT PROTEIN TOTAL: ABNORMAL
EXT PROTEIN UA: ABNORMAL
EXT PTH, INTACT: ABNORMAL
EXT RBC UA: ABNORMAL
EXT SEGS%: ABNORMAL
EXT SERUM IRON: ABNORMAL
EXT SIROLIMUS LVL: ABNORMAL
EXT SODIUM: 141 MMOL/L (ref 137–148)
EXT TACROLIMUS LVL: ABNORMAL
EXT TIBC: ABNORMAL
EXT TRIGLYCERIDES: ABNORMAL
EXT URIC ACID: ABNORMAL
EXT URINE CULTURE: ABNORMAL
EXT URINE PROTEIN: ABNORMAL
EXT VIT D 25 HYDROXY: ABNORMAL
EXT WBC UA: ABNORMAL
EXT WBC: ABNORMAL
PARVOVIRUS B19 DNA BY PCR: ABNORMAL
QUANTIFERON GOLD TB: ABNORMAL

## 2025-08-12 ENCOUNTER — DOCUMENTATION ONLY (OUTPATIENT)
Dept: TRANSPLANT | Facility: CLINIC | Age: 65
End: 2025-08-12
Payer: MEDICARE

## (undated) DEVICE — SOL NS 1000CC

## (undated) DEVICE — PLUG CATHETER STERILE FOLEY

## (undated) DEVICE — SUT PDS BV 6-0

## (undated) DEVICE — STAPLER SKIN PROXIMATE WIDE

## (undated) DEVICE — DRESSING ABSRBNT ISLAND 3.6X8

## (undated) DEVICE — FOLEY BLLN 20FR 3WAY 5CC

## (undated) DEVICE — TIP YANKAUERS BULB NO VENT

## (undated) DEVICE — HEMOSTAT SURGICEL NUKNIT 3X4IN

## (undated) DEVICE — STAPLER INT LINEAR ARTC 3.5-45

## (undated) DEVICE — SUT 2-0 12-18IN SILK

## (undated) DEVICE — Device

## (undated) DEVICE — ADHESIVE DERMABOND ADVANCED

## (undated) DEVICE — TOWEL OR XRAY WHITE 17X26IN

## (undated) DEVICE — PACK KIDNEY TRANSPLANT CUSTOM

## (undated) DEVICE — SUT 4-0 12-18IN SILK BLACK

## (undated) DEVICE — SUT 3-0 12-18IN SILK

## (undated) DEVICE — BLADE SURG CARBON STEEL SZ11

## (undated) DEVICE — SUT SILK 2-0 STRANDS 30IN

## (undated) DEVICE — HEMOSTAT SURGICEL NU-KNIT 6X9

## (undated) DEVICE — ELECTRODE REM PLYHSV RETURN 9

## (undated) DEVICE — SET IRR URLGY 2LINE UNIV SPIKE

## (undated) DEVICE — PUNCH AORTIC 4.8MM

## (undated) DEVICE — CART STAPLE RELD 45MM WHT

## (undated) DEVICE — SYR ONLY LUER LOCK 20CC

## (undated) DEVICE — INSERTS STEALTH FIBRA SIZE 1.

## (undated) DEVICE — SUT PROLENE 6-0 BV-1 30IN

## (undated) DEVICE — SUT SILK 3-0 STRANDS 30IN

## (undated) DEVICE — SUT PROLENE 5-0 36IN C-1

## (undated) DEVICE — SUT ETHILON 3-0 PS2 18 BLK

## (undated) DEVICE — STOCKINETTE 2INX36

## (undated) DEVICE — DECANTER FLUID TRNSF WHITE 9IN

## (undated) DEVICE — SUT 1 36IN PDS II VIO MONO

## (undated) DEVICE — DRAPE SLUSH WARMER WITH DISC

## (undated) DEVICE — CLIPPER BLADE MOD 4406 (CAREF)

## (undated) DEVICE — TRAY CATH 1-LYR URIMTR 16FR

## (undated) DEVICE — FIBRILLAR ABS HEMOSTAT 4X4